# Patient Record
Sex: FEMALE | Race: BLACK OR AFRICAN AMERICAN | NOT HISPANIC OR LATINO | Employment: OTHER | URBAN - METROPOLITAN AREA
[De-identification: names, ages, dates, MRNs, and addresses within clinical notes are randomized per-mention and may not be internally consistent; named-entity substitution may affect disease eponyms.]

---

## 2017-01-18 ENCOUNTER — HISTORICAL (OUTPATIENT)
Dept: ADMINISTRATIVE | Facility: HOSPITAL | Age: 67
End: 2017-01-18

## 2017-02-03 ENCOUNTER — HISTORICAL (OUTPATIENT)
Dept: LAB | Facility: HOSPITAL | Age: 67
End: 2017-02-03

## 2017-02-17 ENCOUNTER — HISTORICAL (OUTPATIENT)
Dept: RADIOLOGY | Facility: HOSPITAL | Age: 67
End: 2017-02-17

## 2017-03-03 ENCOUNTER — HISTORICAL (OUTPATIENT)
Dept: RADIOLOGY | Facility: HOSPITAL | Age: 67
End: 2017-03-03

## 2017-03-14 ENCOUNTER — HISTORICAL (OUTPATIENT)
Dept: SURGERY | Facility: HOSPITAL | Age: 67
End: 2017-03-14

## 2017-03-15 ENCOUNTER — HISTORICAL (OUTPATIENT)
Dept: ANESTHESIOLOGY | Facility: HOSPITAL | Age: 67
End: 2017-03-15

## 2017-03-27 ENCOUNTER — HISTORICAL (OUTPATIENT)
Dept: SURGERY | Facility: HOSPITAL | Age: 67
End: 2017-03-27

## 2017-03-29 ENCOUNTER — HOSPITAL ENCOUNTER (OUTPATIENT)
Dept: MEDSURG UNIT | Facility: HOSPITAL | Age: 67
End: 2017-03-31

## 2017-05-22 ENCOUNTER — HISTORICAL (OUTPATIENT)
Dept: ADMINISTRATIVE | Facility: HOSPITAL | Age: 67
End: 2017-05-22

## 2017-06-26 ENCOUNTER — HISTORICAL (OUTPATIENT)
Dept: RADIOLOGY | Facility: HOSPITAL | Age: 67
End: 2017-06-26

## 2017-07-05 ENCOUNTER — HISTORICAL (OUTPATIENT)
Dept: INFUSION THERAPY | Facility: HOSPITAL | Age: 67
End: 2017-07-05

## 2017-07-05 LAB
ABS NEUT (OLG): 1.5 X10(3)/MCL (ref 1.5–6.9)
ALBUMIN SERPL-MCNC: 3.3 GM/DL (ref 3.4–5)
ALBUMIN/GLOB SERPL: 0.9 RATIO
ALP SERPL-CCNC: 81 UNIT/L (ref 30–113)
ALT SERPL-CCNC: 21 UNIT/L (ref 10–45)
AST SERPL-CCNC: 20 UNIT/L (ref 15–37)
BASOPHILS # BLD AUTO: 0 X10(3)/MCL (ref 0–0.1)
BASOPHILS NFR BLD AUTO: 0 % (ref 0–1)
BILIRUB SERPL-MCNC: 0.3 MG/DL (ref 0.1–0.9)
BILIRUBIN DIRECT+TOT PNL SERPL-MCNC: 0.1 MG/DL (ref 0–0.3)
BILIRUBIN DIRECT+TOT PNL SERPL-MCNC: 0.2 MG/DL
BUN SERPL-MCNC: 18 MG/DL (ref 10–20)
CALCIUM SERPL-MCNC: 8.9 MG/DL (ref 8–10.5)
CHLORIDE SERPL-SCNC: 106 MMOL/L (ref 100–108)
CO2 SERPL-SCNC: 30 MMOL/L (ref 21–35)
CREAT SERPL-MCNC: 0.89 MG/DL (ref 0.7–1.3)
EOSINOPHIL # BLD AUTO: 0.1 X10(3)/MCL (ref 0–0.6)
EOSINOPHIL NFR BLD AUTO: 3 % (ref 0–5)
ERYTHROCYTE [DISTWIDTH] IN BLOOD BY AUTOMATED COUNT: 13.6 % (ref 11.5–17)
GLOBULIN SER-MCNC: 3.6 GM/DL
GLUCOSE SERPL-MCNC: 75 MG/DL (ref 75–116)
HCT VFR BLD AUTO: 37.8 % (ref 36–48)
HGB BLD-MCNC: 12.2 GM/DL (ref 12–16)
LYMPHOCYTES # BLD AUTO: 1.3 X10(3)/MCL (ref 0.5–4.1)
LYMPHOCYTES NFR BLD AUTO: 40.1 % (ref 15–40)
MCH RBC QN AUTO: 29 PG (ref 27–34)
MCHC RBC AUTO-ENTMCNC: 32 GM/DL (ref 31–36)
MCV RBC AUTO: 90 FL (ref 80–99)
MONOCYTES # BLD AUTO: 0.3 X10(3)/MCL (ref 0–1.1)
MONOCYTES NFR BLD AUTO: 10 % (ref 4–12)
NEUTROPHILS # BLD AUTO: 1.5 X10(3)/MCL (ref 1.5–6.9)
NEUTROPHILS NFR BLD AUTO: 46 % (ref 43–75)
PLATELET # BLD AUTO: 183 X10(3)/MCL (ref 140–400)
PMV BLD AUTO: 10.3 FL (ref 6.8–10)
POTASSIUM SERPL-SCNC: 4.5 MMOL/L (ref 3.6–5.2)
PROT SERPL-MCNC: 6.9 GM/DL (ref 6.4–8.2)
RBC # BLD AUTO: 4.19 X10(6)/MCL (ref 4.2–5.4)
SODIUM SERPL-SCNC: 142 MMOL/L (ref 135–145)
WBC # SPEC AUTO: 3.2 X10(3)/MCL (ref 4.5–11.5)

## 2017-09-05 ENCOUNTER — HISTORICAL (OUTPATIENT)
Dept: RADIOLOGY | Facility: HOSPITAL | Age: 67
End: 2017-09-05

## 2017-10-05 ENCOUNTER — HISTORICAL (OUTPATIENT)
Dept: INFUSION THERAPY | Facility: HOSPITAL | Age: 67
End: 2017-10-05

## 2017-10-05 LAB
ABS NEUT (OLG): 3.1 X10(3)/MCL (ref 1.5–6.9)
ALBUMIN SERPL-MCNC: 3.3 GM/DL (ref 3.4–5)
ALBUMIN/GLOB SERPL: 1 RATIO
ALP SERPL-CCNC: 74 UNIT/L (ref 30–113)
ALT SERPL-CCNC: 14 UNIT/L (ref 10–45)
AST SERPL-CCNC: 13 UNIT/L (ref 15–37)
BASOPHILS # BLD AUTO: 0 X10(3)/MCL (ref 0–0.1)
BASOPHILS NFR BLD AUTO: 0 % (ref 0–1)
BILIRUB SERPL-MCNC: 0.2 MG/DL (ref 0.1–0.9)
BILIRUBIN DIRECT+TOT PNL SERPL-MCNC: 0.1 MG/DL
BILIRUBIN DIRECT+TOT PNL SERPL-MCNC: 0.1 MG/DL (ref 0–0.3)
BUN SERPL-MCNC: 15 MG/DL (ref 10–20)
CALCIUM SERPL-MCNC: 8.8 MG/DL (ref 8–10.5)
CHLORIDE SERPL-SCNC: 107 MMOL/L (ref 100–108)
CO2 SERPL-SCNC: 32 MMOL/L (ref 21–35)
CREAT SERPL-MCNC: 0.93 MG/DL (ref 0.7–1.3)
EOSINOPHIL # BLD AUTO: 0 X10(3)/MCL (ref 0–0.6)
EOSINOPHIL NFR BLD AUTO: 1 % (ref 0–5)
ERYTHROCYTE [DISTWIDTH] IN BLOOD BY AUTOMATED COUNT: 14.4 % (ref 11.5–17)
GLOBULIN SER-MCNC: 3.4 GM/DL
GLUCOSE SERPL-MCNC: 81 MG/DL (ref 75–116)
HCT VFR BLD AUTO: 36.7 % (ref 36–48)
HGB BLD-MCNC: 11.7 GM/DL (ref 12–16)
LYMPHOCYTES # BLD AUTO: 1.1 X10(3)/MCL (ref 0.5–4.1)
LYMPHOCYTES NFR BLD AUTO: 24 % (ref 15–40)
MCH RBC QN AUTO: 28 PG (ref 27–34)
MCHC RBC AUTO-ENTMCNC: 32 GM/DL (ref 31–36)
MCV RBC AUTO: 89 FL (ref 80–99)
MONOCYTES # BLD AUTO: 0.4 X10(3)/MCL (ref 0–1.1)
MONOCYTES NFR BLD AUTO: 9 % (ref 4–12)
NEUTROPHILS # BLD AUTO: 3.1 X10(3)/MCL (ref 1.5–6.9)
NEUTROPHILS NFR BLD AUTO: 66 % (ref 43–75)
PLATELET # BLD AUTO: 216 X10(3)/MCL (ref 140–400)
PMV BLD AUTO: 10 FL (ref 6.8–10)
POTASSIUM SERPL-SCNC: 3.9 MMOL/L (ref 3.6–5.2)
PROT SERPL-MCNC: 6.7 GM/DL (ref 6.4–8.2)
RBC # BLD AUTO: 4.11 X10(6)/MCL (ref 4.2–5.4)
SODIUM SERPL-SCNC: 142 MMOL/L (ref 135–145)
WBC # SPEC AUTO: 4.7 X10(3)/MCL (ref 4.5–11.5)

## 2017-10-25 ENCOUNTER — HISTORICAL (OUTPATIENT)
Dept: RADIOLOGY | Facility: HOSPITAL | Age: 67
End: 2017-10-25

## 2018-01-30 ENCOUNTER — HISTORICAL (OUTPATIENT)
Dept: INFUSION THERAPY | Facility: HOSPITAL | Age: 68
End: 2018-01-30

## 2018-01-30 LAB
ABS NEUT (OLG): 3 X10(3)/MCL (ref 1.5–6.9)
ALBUMIN SERPL-MCNC: 3.5 GM/DL (ref 3.4–5)
ALBUMIN/GLOB SERPL: 0.9 RATIO
ALP SERPL-CCNC: 84 UNIT/L (ref 30–113)
ALT SERPL-CCNC: 20 UNIT/L (ref 10–45)
AST SERPL-CCNC: 21 UNIT/L (ref 15–37)
BASOPHILS # BLD AUTO: 0 X10(3)/MCL (ref 0–0.1)
BASOPHILS NFR BLD AUTO: 0 % (ref 0–1)
BILIRUB SERPL-MCNC: 0.1 MG/DL (ref 0.1–0.9)
BILIRUBIN DIRECT+TOT PNL SERPL-MCNC: 0 MG/DL
BILIRUBIN DIRECT+TOT PNL SERPL-MCNC: 0.1 MG/DL (ref 0–0.3)
BUN SERPL-MCNC: 12 MG/DL (ref 10–20)
CALCIUM SERPL-MCNC: 9.7 MG/DL (ref 8–10.5)
CHLORIDE SERPL-SCNC: 104 MMOL/L (ref 100–108)
CO2 SERPL-SCNC: 30 MMOL/L (ref 21–35)
CREAT SERPL-MCNC: 0.87 MG/DL (ref 0.7–1.3)
EOSINOPHIL # BLD AUTO: 0.1 X10(3)/MCL (ref 0–0.6)
EOSINOPHIL NFR BLD AUTO: 2 % (ref 0–5)
ERYTHROCYTE [DISTWIDTH] IN BLOOD BY AUTOMATED COUNT: 13.6 % (ref 11.5–17)
GLOBULIN SER-MCNC: 3.9 GM/DL
GLUCOSE SERPL-MCNC: 85 MG/DL (ref 75–116)
HCT VFR BLD AUTO: 41 % (ref 36–48)
HGB BLD-MCNC: 13 GM/DL (ref 12–16)
LYMPHOCYTES # BLD AUTO: 1.2 X10(3)/MCL (ref 0.5–4.1)
LYMPHOCYTES NFR BLD AUTO: 24.9 % (ref 15–40)
MCH RBC QN AUTO: 29 PG (ref 27–34)
MCHC RBC AUTO-ENTMCNC: 32 GM/DL (ref 31–36)
MCV RBC AUTO: 91 FL (ref 80–99)
MONOCYTES # BLD AUTO: 0.4 X10(3)/MCL (ref 0–1.1)
MONOCYTES NFR BLD AUTO: 9 % (ref 4–12)
NEUTROPHILS # BLD AUTO: 3 X10(3)/MCL (ref 1.5–6.9)
NEUTROPHILS NFR BLD AUTO: 63 % (ref 43–75)
PLATELET # BLD AUTO: 203 X10(3)/MCL (ref 140–400)
PMV BLD AUTO: 10.6 FL (ref 6.8–10)
POTASSIUM SERPL-SCNC: 4.2 MMOL/L (ref 3.6–5.2)
PROT SERPL-MCNC: 7.4 GM/DL (ref 6.4–8.2)
RBC # BLD AUTO: 4.5 X10(6)/MCL (ref 4.2–5.4)
SODIUM SERPL-SCNC: 141 MMOL/L (ref 135–145)
WBC # SPEC AUTO: 4.7 X10(3)/MCL (ref 4.5–11.5)

## 2018-02-26 ENCOUNTER — HISTORICAL (OUTPATIENT)
Dept: RADIOLOGY | Facility: HOSPITAL | Age: 68
End: 2018-02-26

## 2018-02-26 ENCOUNTER — HISTORICAL (OUTPATIENT)
Dept: LAB | Facility: HOSPITAL | Age: 68
End: 2018-02-26

## 2018-02-26 LAB
ABS NEUT (OLG): 3 X10(3)/MCL (ref 1.5–6.9)
ALBUMIN SERPL-MCNC: 3.5 GM/DL (ref 3.4–5)
ALBUMIN/GLOB SERPL: 0.9 RATIO
ALP SERPL-CCNC: 78 UNIT/L (ref 30–113)
ALT SERPL-CCNC: 25 UNIT/L (ref 10–45)
APPEARANCE, UA: CLEAR
AST SERPL-CCNC: 39 UNIT/L (ref 15–37)
BACTERIA SPEC CULT: NORMAL /HPF
BILIRUB SERPL-MCNC: 0.3 MG/DL (ref 0.1–0.9)
BILIRUB UR QL STRIP: NEGATIVE
BILIRUBIN DIRECT+TOT PNL SERPL-MCNC: 0.1 MG/DL (ref 0–0.3)
BILIRUBIN DIRECT+TOT PNL SERPL-MCNC: 0.2 MG/DL
BUN SERPL-MCNC: 17 MG/DL (ref 10–20)
CALCIUM SERPL-MCNC: 8.9 MG/DL (ref 8–10.5)
CHLORIDE SERPL-SCNC: 104 MMOL/L (ref 100–108)
CHOLEST SERPL-MCNC: 126 MG/DL (ref 140–200)
CHOLEST/HDLC SERPL: 2 MG/DL (ref 0–8)
CO2 SERPL-SCNC: 30 MMOL/L (ref 21–35)
COLOR UR: YELLOW
CREAT SERPL-MCNC: 0.77 MG/DL (ref 0.7–1.3)
ERYTHROCYTE [DISTWIDTH] IN BLOOD BY AUTOMATED COUNT: 13.5 % (ref 11.5–17)
GLOBULIN SER-MCNC: 3.9 GM/DL
GLUCOSE (UA): NEGATIVE
GLUCOSE SERPL-MCNC: 80 MG/DL (ref 75–116)
HCT VFR BLD AUTO: 38.6 % (ref 36–48)
HDLC SERPL-MCNC: 64 MG/DL (ref 35–59)
HGB BLD-MCNC: 12.3 GM/DL (ref 12–16)
HGB UR QL STRIP: ABNORMAL
KETONES UR QL STRIP: NEGATIVE
LDLC SERPL CALC-MCNC: 57 MG/DL (ref 100–129)
LEUKOCYTE ESTERASE UR QL STRIP: NEGATIVE
MCH RBC QN AUTO: 29 PG (ref 27–34)
MCHC RBC AUTO-ENTMCNC: 32 GM/DL (ref 31–36)
MCV RBC AUTO: 91 FL (ref 80–99)
NITRITE UR QL STRIP: NEGATIVE
PH UR STRIP: 6 [PH]
PLATELET # BLD AUTO: 212 X10(3)/MCL (ref 140–400)
PMV BLD AUTO: 10.9 FL (ref 6.8–10)
POTASSIUM SERPL-SCNC: 4.3 MMOL/L (ref 3.6–5.2)
PROT SERPL-MCNC: 7.4 GM/DL (ref 6.4–8.2)
PROT UR QL STRIP: ABNORMAL
RBC # BLD AUTO: 4.25 X10(6)/MCL (ref 4.2–5.4)
RBC #/AREA URNS HPF: NORMAL /HPF
SODIUM SERPL-SCNC: 140 MMOL/L (ref 135–145)
SP GR UR STRIP: 1.02
SQUAMOUS EPITHELIAL, UA: NORMAL /LPF
TRIGL SERPL-MCNC: 29 MG/DL (ref 35–150)
TSH SERPL-ACNC: 1.67 MIU/ML (ref 0.36–3.74)
UROBILINOGEN UR STRIP-ACNC: 1 EU/DL
VLDLC SERPL CALC-MCNC: 6 MG/DL
WBC # SPEC AUTO: 4.9 X10(3)/MCL (ref 4.5–11.5)
WBC #/AREA URNS HPF: NORMAL /[HPF]

## 2018-03-05 ENCOUNTER — HISTORICAL (OUTPATIENT)
Dept: LAB | Facility: HOSPITAL | Age: 68
End: 2018-03-05

## 2018-03-05 LAB
HEMOCCULT SP1 STL QL: NEGATIVE
HEMOCCULT SP2 STL QL: NEGATIVE

## 2018-03-23 ENCOUNTER — HISTORICAL (OUTPATIENT)
Dept: RADIOLOGY | Facility: HOSPITAL | Age: 68
End: 2018-03-23

## 2018-04-27 ENCOUNTER — HISTORICAL (OUTPATIENT)
Dept: LAB | Facility: HOSPITAL | Age: 68
End: 2018-04-27

## 2018-04-27 LAB
ABS NEUT (OLG): 3.7 X10(3)/MCL (ref 1.5–6.9)
ALBUMIN SERPL-MCNC: 3.3 GM/DL (ref 3.4–5)
ALBUMIN/GLOB SERPL: 0.8 RATIO
ALP SERPL-CCNC: 92 UNIT/L (ref 30–113)
ALT SERPL-CCNC: 23 UNIT/L (ref 10–45)
AST SERPL-CCNC: 17 UNIT/L (ref 15–37)
BASOPHILS # BLD AUTO: 0 X10(3)/MCL (ref 0–0.1)
BASOPHILS NFR BLD AUTO: 0 % (ref 0–1)
BILIRUB SERPL-MCNC: 0.4 MG/DL (ref 0.1–0.9)
BILIRUBIN DIRECT+TOT PNL SERPL-MCNC: 0.1 MG/DL (ref 0–0.3)
BILIRUBIN DIRECT+TOT PNL SERPL-MCNC: 0.3 MG/DL
BUN SERPL-MCNC: 13 MG/DL (ref 10–20)
CALCIUM SERPL-MCNC: 9.2 MG/DL (ref 8–10.5)
CHLORIDE SERPL-SCNC: 104 MMOL/L (ref 100–108)
CO2 SERPL-SCNC: 28 MMOL/L (ref 21–35)
CREAT SERPL-MCNC: 0.82 MG/DL (ref 0.7–1.3)
EOSINOPHIL # BLD AUTO: 0.1 X10(3)/MCL (ref 0–0.6)
EOSINOPHIL NFR BLD AUTO: 2 % (ref 0–5)
ERYTHROCYTE [DISTWIDTH] IN BLOOD BY AUTOMATED COUNT: 14.2 % (ref 11.5–17)
GLOBULIN SER-MCNC: 4 GM/DL
GLUCOSE SERPL-MCNC: 89 MG/DL (ref 75–116)
HCT VFR BLD AUTO: 37.3 % (ref 36–48)
HGB BLD-MCNC: 12.1 GM/DL (ref 12–16)
LYMPHOCYTES # BLD AUTO: 1.2 X10(3)/MCL (ref 0.5–4.1)
LYMPHOCYTES NFR BLD AUTO: 21.6 % (ref 15–40)
MCH RBC QN AUTO: 29 PG (ref 27–34)
MCHC RBC AUTO-ENTMCNC: 32 GM/DL (ref 31–36)
MCV RBC AUTO: 90 FL (ref 80–99)
MONOCYTES # BLD AUTO: 0.4 X10(3)/MCL (ref 0–1.1)
MONOCYTES NFR BLD AUTO: 8 % (ref 4–12)
NEUTROPHILS # BLD AUTO: 3.7 X10(3)/MCL (ref 1.5–6.9)
NEUTROPHILS NFR BLD AUTO: 68 % (ref 43–75)
PLATELET # BLD AUTO: 195 X10(3)/MCL (ref 140–400)
PMV BLD AUTO: 10.6 FL (ref 6.8–10)
POTASSIUM SERPL-SCNC: 3.8 MMOL/L (ref 3.6–5.2)
PROT SERPL-MCNC: 7.3 GM/DL (ref 6.4–8.2)
RBC # BLD AUTO: 4.16 X10(6)/MCL (ref 4.2–5.4)
SODIUM SERPL-SCNC: 141 MMOL/L (ref 135–145)
WBC # SPEC AUTO: 5.5 X10(3)/MCL (ref 4.5–11.5)

## 2018-06-18 ENCOUNTER — HISTORICAL (OUTPATIENT)
Dept: RADIOLOGY | Facility: HOSPITAL | Age: 68
End: 2018-06-18

## 2018-07-18 ENCOUNTER — HISTORICAL (OUTPATIENT)
Dept: LAB | Facility: HOSPITAL | Age: 68
End: 2018-07-18

## 2018-07-18 LAB
ABS NEUT (OLG): 2.7 X10(3)/MCL (ref 1.5–6.9)
ALBUMIN SERPL-MCNC: 3.7 GM/DL (ref 3.4–5)
ALBUMIN/GLOB SERPL: 1 RATIO
ALP SERPL-CCNC: 88 UNIT/L (ref 30–113)
ALT SERPL-CCNC: 23 UNIT/L (ref 10–45)
AST SERPL-CCNC: 21 UNIT/L (ref 15–37)
BASOPHILS # BLD AUTO: 0 X10(3)/MCL (ref 0–0.1)
BASOPHILS NFR BLD AUTO: 0 % (ref 0–1)
BILIRUB SERPL-MCNC: 0.4 MG/DL (ref 0.1–0.9)
BILIRUBIN DIRECT+TOT PNL SERPL-MCNC: 0.1 MG/DL (ref 0–0.3)
BILIRUBIN DIRECT+TOT PNL SERPL-MCNC: 0.3 MG/DL
BUN SERPL-MCNC: 14 MG/DL (ref 10–20)
CALCIUM SERPL-MCNC: 9.1 MG/DL (ref 8–10.5)
CHLORIDE SERPL-SCNC: 104 MMOL/L (ref 100–108)
CO2 SERPL-SCNC: 30 MMOL/L (ref 21–35)
CREAT SERPL-MCNC: 0.87 MG/DL (ref 0.7–1.3)
EOSINOPHIL # BLD AUTO: 0.1 X10(3)/MCL (ref 0–0.6)
EOSINOPHIL NFR BLD AUTO: 2 % (ref 0–5)
ERYTHROCYTE [DISTWIDTH] IN BLOOD BY AUTOMATED COUNT: 13.9 % (ref 11.5–17)
GLOBULIN SER-MCNC: 3.7 GM/DL
GLUCOSE SERPL-MCNC: 79 MG/DL (ref 75–116)
HCT VFR BLD AUTO: 39.3 % (ref 36–48)
HEMOCCULT SP1 STL QL: NEGATIVE
HEMOCCULT SP2 STL QL: NEGATIVE
HGB BLD-MCNC: 12.6 GM/DL (ref 12–16)
LYMPHOCYTES # BLD AUTO: 1.2 X10(3)/MCL (ref 0.5–4.1)
LYMPHOCYTES NFR BLD AUTO: 26.5 % (ref 15–40)
MCH RBC QN AUTO: 29 PG (ref 27–34)
MCHC RBC AUTO-ENTMCNC: 32 GM/DL (ref 31–36)
MCV RBC AUTO: 90 FL (ref 80–99)
MONOCYTES # BLD AUTO: 0.4 X10(3)/MCL (ref 0–1.1)
MONOCYTES NFR BLD AUTO: 10 % (ref 4–12)
NEUTROPHILS # BLD AUTO: 2.7 X10(3)/MCL (ref 1.5–6.9)
NEUTROPHILS NFR BLD AUTO: 61 % (ref 43–75)
PLATELET # BLD AUTO: 212 X10(3)/MCL (ref 140–400)
PMV BLD AUTO: 10.8 FL (ref 6.8–10)
POTASSIUM SERPL-SCNC: 4.2 MMOL/L (ref 3.6–5.2)
PROT SERPL-MCNC: 7.4 GM/DL (ref 6.4–8.2)
RBC # BLD AUTO: 4.37 X10(6)/MCL (ref 4.2–5.4)
SODIUM SERPL-SCNC: 140 MMOL/L (ref 135–145)
WBC # SPEC AUTO: 4.4 X10(3)/MCL (ref 4.5–11.5)

## 2018-07-26 ENCOUNTER — HISTORICAL (OUTPATIENT)
Dept: LAB | Facility: HOSPITAL | Age: 68
End: 2018-07-26

## 2018-07-26 LAB
ABS NEUT (OLG): 2.7 X10(3)/MCL (ref 1.5–6.9)
ALBUMIN SERPL-MCNC: 3.3 GM/DL (ref 3.4–5)
ALBUMIN/GLOB SERPL: 0.9 RATIO
ALP SERPL-CCNC: 85 UNIT/L (ref 30–113)
ALT SERPL-CCNC: 22 UNIT/L (ref 10–45)
AST SERPL-CCNC: 18 UNIT/L (ref 15–37)
BASOPHILS # BLD AUTO: 0 X10(3)/MCL (ref 0–0.1)
BASOPHILS NFR BLD AUTO: 0 % (ref 0–1)
BILIRUB SERPL-MCNC: 0.2 MG/DL (ref 0.1–0.9)
BILIRUBIN DIRECT+TOT PNL SERPL-MCNC: 0.1 MG/DL
BILIRUBIN DIRECT+TOT PNL SERPL-MCNC: 0.1 MG/DL (ref 0–0.3)
BUN SERPL-MCNC: 15 MG/DL (ref 10–20)
CALCIUM SERPL-MCNC: 9.2 MG/DL (ref 8–10.5)
CHLORIDE SERPL-SCNC: 106 MMOL/L (ref 100–108)
CO2 SERPL-SCNC: 30 MMOL/L (ref 21–35)
CREAT SERPL-MCNC: 0.86 MG/DL (ref 0.7–1.3)
EOSINOPHIL # BLD AUTO: 0.1 X10(3)/MCL (ref 0–0.6)
EOSINOPHIL NFR BLD AUTO: 3 % (ref 0–5)
ERYTHROCYTE [DISTWIDTH] IN BLOOD BY AUTOMATED COUNT: 13.9 % (ref 11.5–17)
GLOBULIN SER-MCNC: 3.5 GM/DL
GLUCOSE SERPL-MCNC: 91 MG/DL (ref 75–116)
HCT VFR BLD AUTO: 37 % (ref 36–48)
HGB BLD-MCNC: 11.8 GM/DL (ref 12–16)
LYMPHOCYTES # BLD AUTO: 1 X10(3)/MCL (ref 0.5–4.1)
LYMPHOCYTES NFR BLD AUTO: 23.9 % (ref 15–40)
MCH RBC QN AUTO: 29 PG (ref 27–34)
MCHC RBC AUTO-ENTMCNC: 32 GM/DL (ref 31–36)
MCV RBC AUTO: 91 FL (ref 80–99)
MONOCYTES # BLD AUTO: 0.4 X10(3)/MCL (ref 0–1.1)
MONOCYTES NFR BLD AUTO: 10 % (ref 4–12)
NEUTROPHILS # BLD AUTO: 2.7 X10(3)/MCL (ref 1.5–6.9)
NEUTROPHILS NFR BLD AUTO: 63 % (ref 43–75)
PLATELET # BLD AUTO: 203 X10(3)/MCL (ref 140–400)
PMV BLD AUTO: 10.7 FL (ref 6.8–10)
POTASSIUM SERPL-SCNC: 3.9 MMOL/L (ref 3.6–5.2)
PROT SERPL-MCNC: 6.8 GM/DL (ref 6.4–8.2)
RBC # BLD AUTO: 4.08 X10(6)/MCL (ref 4.2–5.4)
SODIUM SERPL-SCNC: 139 MMOL/L (ref 135–145)
WBC # SPEC AUTO: 4.3 X10(3)/MCL (ref 4.5–11.5)

## 2018-10-17 ENCOUNTER — HISTORICAL (OUTPATIENT)
Dept: CARDIOLOGY | Facility: HOSPITAL | Age: 68
End: 2018-10-17

## 2019-01-14 ENCOUNTER — HISTORICAL (OUTPATIENT)
Dept: LAB | Facility: HOSPITAL | Age: 69
End: 2019-01-14

## 2019-01-14 LAB
ABS NEUT (OLG): 8 X10(3)/MCL (ref 1.5–6.9)
ALBUMIN SERPL-MCNC: 3.3 GM/DL (ref 3.4–5)
ALBUMIN/GLOB SERPL: 0.8 RATIO
ALP SERPL-CCNC: 96 UNIT/L (ref 30–113)
ALT SERPL-CCNC: 24 UNIT/L (ref 10–45)
APPEARANCE, UA: CLEAR
AST SERPL-CCNC: 14 UNIT/L (ref 15–37)
BILIRUB SERPL-MCNC: 0.1 MG/DL (ref 0.1–0.9)
BILIRUB UR QL STRIP: NEGATIVE
BILIRUBIN DIRECT+TOT PNL SERPL-MCNC: <0.05 MG/DL (ref 0–0.3)
BILIRUBIN DIRECT+TOT PNL SERPL-MCNC: >0.05 MG/DL
BUN SERPL-MCNC: 13 MG/DL (ref 10–20)
CALCIUM SERPL-MCNC: 8.9 MG/DL (ref 8–10.5)
CHLORIDE SERPL-SCNC: 103 MMOL/L (ref 100–108)
CHOLEST SERPL-MCNC: 129 MG/DL (ref 140–200)
CHOLEST/HDLC SERPL: 2 MG/DL (ref 0–8)
CO2 SERPL-SCNC: 30 MMOL/L (ref 21–35)
COLOR UR: NORMAL
CREAT SERPL-MCNC: 0.99 MG/DL (ref 0.7–1.3)
ERYTHROCYTE [DISTWIDTH] IN BLOOD BY AUTOMATED COUNT: 13.4 % (ref 11.5–17)
GLOBULIN SER-MCNC: 3.9 GM/DL
GLUCOSE (UA): NEGATIVE
GLUCOSE SERPL-MCNC: 88 MG/DL (ref 75–116)
HCT VFR BLD AUTO: 40.3 % (ref 36–48)
HDLC SERPL-MCNC: 65 MG/DL (ref 35–59)
HEMOCCULT SP1 STL QL: NEGATIVE
HEMOCCULT SP2 STL QL: NEGATIVE
HGB BLD-MCNC: 12.5 GM/DL (ref 12–16)
HGB UR QL STRIP: NEGATIVE
KETONES UR QL STRIP: NEGATIVE
LDLC SERPL CALC-MCNC: 59 MG/DL (ref 100–129)
LEUKOCYTE ESTERASE UR QL STRIP: NEGATIVE
MCH RBC QN AUTO: 29 PG (ref 27–34)
MCHC RBC AUTO-ENTMCNC: 31 GM/DL (ref 31–36)
MCV RBC AUTO: 92 FL (ref 80–99)
NITRITE UR QL STRIP: NEGATIVE
PH UR STRIP: 7 [PH]
PLATELET # BLD AUTO: 225 X10(3)/MCL (ref 140–400)
PMV BLD AUTO: 10.2 FL (ref 6.8–10)
POTASSIUM SERPL-SCNC: 3.8 MMOL/L (ref 3.6–5.2)
PROT SERPL-MCNC: 7.2 GM/DL (ref 6.4–8.2)
PROT UR QL STRIP: NEGATIVE
RBC # BLD AUTO: 4.37 X10(6)/MCL (ref 4.2–5.4)
SODIUM SERPL-SCNC: 141 MMOL/L (ref 135–145)
SP GR UR STRIP: 1.01
TRIGL SERPL-MCNC: 33 MG/DL (ref 35–150)
TSH SERPL-ACNC: 1.93 MIU/ML (ref 0.36–3.74)
UROBILINOGEN UR STRIP-ACNC: 0.2 EU/DL
VLDLC SERPL CALC-MCNC: 7 MG/DL
WBC # SPEC AUTO: 9.7 X10(3)/MCL (ref 4.5–11.5)

## 2019-02-12 ENCOUNTER — HISTORICAL (OUTPATIENT)
Dept: LAB | Facility: HOSPITAL | Age: 69
End: 2019-02-12

## 2019-02-12 LAB
ABS NEUT (OLG): 2.5 X10(3)/MCL (ref 1.5–6.9)
ALBUMIN SERPL-MCNC: 3.2 GM/DL (ref 3.4–5)
ALBUMIN/GLOB SERPL: 0.8 RATIO
ALP SERPL-CCNC: 96 UNIT/L (ref 30–113)
ALT SERPL-CCNC: 21 UNIT/L (ref 10–45)
AST SERPL-CCNC: 17 UNIT/L (ref 15–37)
BASOPHILS # BLD AUTO: 0 X10(3)/MCL (ref 0–0.1)
BASOPHILS NFR BLD AUTO: 0 % (ref 0–1)
BILIRUB SERPL-MCNC: 0.2 MG/DL (ref 0.1–0.9)
BILIRUBIN DIRECT+TOT PNL SERPL-MCNC: <0.05 MG/DL (ref 0–0.3)
BILIRUBIN DIRECT+TOT PNL SERPL-MCNC: >0.15 MG/DL
BUN SERPL-MCNC: 13 MG/DL (ref 10–20)
CALCIUM SERPL-MCNC: 9 MG/DL (ref 8–10.5)
CHLORIDE SERPL-SCNC: 106 MMOL/L (ref 100–108)
CO2 SERPL-SCNC: 29 MMOL/L (ref 21–35)
CREAT SERPL-MCNC: 0.96 MG/DL (ref 0.7–1.3)
EOSINOPHIL # BLD AUTO: 0.1 X10(3)/MCL (ref 0–0.6)
EOSINOPHIL NFR BLD AUTO: 2 % (ref 0–5)
ERYTHROCYTE [DISTWIDTH] IN BLOOD BY AUTOMATED COUNT: 13.5 % (ref 11.5–17)
GLOBULIN SER-MCNC: 3.8 GM/DL
GLUCOSE SERPL-MCNC: 81 MG/DL (ref 75–116)
HCT VFR BLD AUTO: 40.3 % (ref 36–48)
HGB BLD-MCNC: 12.4 GM/DL (ref 12–16)
IMM GRANULOCYTES # BLD AUTO: 0.01 10*3/UL (ref 0–0.02)
IMM GRANULOCYTES NFR BLD AUTO: 0.2 % (ref 0–0.43)
LYMPHOCYTES # BLD AUTO: 1.1 X10(3)/MCL (ref 0.5–4.1)
LYMPHOCYTES NFR BLD AUTO: 27 % (ref 15–40)
MCH RBC QN AUTO: 29 PG (ref 27–34)
MCHC RBC AUTO-ENTMCNC: 31 GM/DL (ref 31–36)
MCV RBC AUTO: 93 FL (ref 80–99)
MONOCYTES # BLD AUTO: 0.4 X10(3)/MCL (ref 0–1.1)
MONOCYTES NFR BLD AUTO: 10 % (ref 4–12)
NEUTROPHILS # BLD AUTO: 2.5 X10(3)/MCL (ref 1.5–6.9)
NEUTROPHILS NFR BLD AUTO: 61 % (ref 43–75)
PLATELET # BLD AUTO: 223 X10(3)/MCL (ref 140–400)
PMV BLD AUTO: 9.6 FL (ref 6.8–10)
POTASSIUM SERPL-SCNC: 4.1 MMOL/L (ref 3.6–5.2)
PROT SERPL-MCNC: 7 GM/DL (ref 6.4–8.2)
RBC # BLD AUTO: 4.33 X10(6)/MCL (ref 4.2–5.4)
SODIUM SERPL-SCNC: 142 MMOL/L (ref 135–145)
WBC # SPEC AUTO: 4.1 X10(3)/MCL (ref 4.5–11.5)

## 2019-02-28 ENCOUNTER — HISTORICAL (OUTPATIENT)
Dept: RADIOLOGY | Facility: HOSPITAL | Age: 69
End: 2019-02-28

## 2019-03-15 ENCOUNTER — HISTORICAL (OUTPATIENT)
Dept: LAB | Facility: HOSPITAL | Age: 69
End: 2019-03-15

## 2019-03-15 LAB
ABS NEUT (OLG): 2.7 X10(3)/MCL (ref 1.5–6.9)
ALBUMIN SERPL-MCNC: 3.5 GM/DL (ref 3.4–5)
ALBUMIN/GLOB SERPL: 1 RATIO
ALP SERPL-CCNC: 91 UNIT/L (ref 30–113)
ALT SERPL-CCNC: 18 UNIT/L (ref 10–45)
AST SERPL-CCNC: 19 UNIT/L (ref 15–37)
BASOPHILS # BLD AUTO: 0 X10(3)/MCL (ref 0–0.1)
BASOPHILS NFR BLD AUTO: 0 % (ref 0–1)
BILIRUB SERPL-MCNC: 0.2 MG/DL (ref 0.1–0.9)
BILIRUBIN DIRECT+TOT PNL SERPL-MCNC: <0.05 MG/DL (ref 0–0.3)
BILIRUBIN DIRECT+TOT PNL SERPL-MCNC: >0.15 MG/DL
BUN SERPL-MCNC: 14 MG/DL (ref 10–20)
CALCIUM SERPL-MCNC: 9.3 MG/DL (ref 8–10.5)
CHLORIDE SERPL-SCNC: 104 MMOL/L (ref 100–108)
CO2 SERPL-SCNC: 28 MMOL/L (ref 21–35)
CREAT SERPL-MCNC: 0.97 MG/DL (ref 0.7–1.3)
EOSINOPHIL # BLD AUTO: 0.1 X10(3)/MCL (ref 0–0.6)
EOSINOPHIL NFR BLD AUTO: 2 % (ref 0–5)
ERYTHROCYTE [DISTWIDTH] IN BLOOD BY AUTOMATED COUNT: 13.5 % (ref 11.5–17)
GLOBULIN SER-MCNC: 3.6 GM/DL
GLUCOSE SERPL-MCNC: 85 MG/DL (ref 75–116)
HCT VFR BLD AUTO: 38.8 % (ref 36–48)
HGB BLD-MCNC: 12 GM/DL (ref 12–16)
LYMPHOCYTES # BLD AUTO: 1.1 X10(3)/MCL (ref 0.5–4.1)
LYMPHOCYTES NFR BLD AUTO: 26 % (ref 15–40)
MCH RBC QN AUTO: 29 PG (ref 27–34)
MCHC RBC AUTO-ENTMCNC: 31 GM/DL (ref 31–36)
MCV RBC AUTO: 93 FL (ref 80–99)
MONOCYTES # BLD AUTO: 0.4 X10(3)/MCL (ref 0–1.1)
MONOCYTES NFR BLD AUTO: 9 % (ref 4–12)
NEUTROPHILS # BLD AUTO: 2.7 X10(3)/MCL (ref 1.5–6.9)
NEUTROPHILS NFR BLD AUTO: 62 % (ref 43–75)
PLATELET # BLD AUTO: 199 X10(3)/MCL (ref 140–400)
PMV BLD AUTO: 10.3 FL (ref 6.8–10)
POTASSIUM SERPL-SCNC: 4.2 MMOL/L (ref 3.6–5.2)
PROT SERPL-MCNC: 7.1 GM/DL (ref 6.4–8.2)
RBC # BLD AUTO: 4.17 X10(6)/MCL (ref 4.2–5.4)
SODIUM SERPL-SCNC: 139 MMOL/L (ref 135–145)
WBC # SPEC AUTO: 4.3 X10(3)/MCL (ref 4.5–11.5)

## 2019-06-17 ENCOUNTER — HISTORICAL (OUTPATIENT)
Dept: RESPIRATORY THERAPY | Facility: HOSPITAL | Age: 69
End: 2019-06-17

## 2019-06-17 LAB
ABS NEUT (OLG): 2.5 X10(3)/MCL (ref 1.5–6.9)
ALBUMIN SERPL-MCNC: 3.4 GM/DL (ref 3.4–5)
ALBUMIN/GLOB SERPL: 0.8 RATIO
ALP SERPL-CCNC: 93 UNIT/L (ref 30–113)
ALT SERPL-CCNC: 18 UNIT/L (ref 10–45)
AST SERPL-CCNC: 23 UNIT/L (ref 15–37)
BASOPHILS # BLD AUTO: 0 X10(3)/MCL (ref 0–0.1)
BASOPHILS NFR BLD AUTO: 0 % (ref 0–1)
BILIRUB SERPL-MCNC: 0.4 MG/DL (ref 0.1–0.9)
BILIRUBIN DIRECT+TOT PNL SERPL-MCNC: 0.1 MG/DL (ref 0–0.3)
BILIRUBIN DIRECT+TOT PNL SERPL-MCNC: 0.3 MG/DL
BUN SERPL-MCNC: 16 MG/DL (ref 10–20)
CALCIUM SERPL-MCNC: 9.7 MG/DL (ref 8–10.5)
CHLORIDE SERPL-SCNC: 103 MMOL/L (ref 100–108)
CO2 SERPL-SCNC: 28 MMOL/L (ref 21–35)
CREAT SERPL-MCNC: 1 MG/DL (ref 0.7–1.3)
EOSINOPHIL # BLD AUTO: 0.1 X10(3)/MCL (ref 0–0.6)
EOSINOPHIL NFR BLD AUTO: 2 % (ref 0–5)
ERYTHROCYTE [DISTWIDTH] IN BLOOD BY AUTOMATED COUNT: 13.1 % (ref 11.5–17)
GLOBULIN SER-MCNC: 4.2 GM/DL
GLUCOSE SERPL-MCNC: 82 MG/DL (ref 75–116)
HCT VFR BLD AUTO: 40.8 % (ref 36–48)
HGB BLD-MCNC: 13 GM/DL (ref 12–16)
LYMPHOCYTES # BLD AUTO: 1.1 X10(3)/MCL (ref 0.5–4.1)
LYMPHOCYTES NFR BLD AUTO: 28 % (ref 15–40)
MCH RBC QN AUTO: 29 PG (ref 27–34)
MCHC RBC AUTO-ENTMCNC: 32 GM/DL (ref 31–36)
MCV RBC AUTO: 90 FL (ref 80–99)
MONOCYTES # BLD AUTO: 0.3 X10(3)/MCL (ref 0–1.1)
MONOCYTES NFR BLD AUTO: 8 % (ref 4–12)
NEUTROPHILS # BLD AUTO: 2.5 X10(3)/MCL (ref 1.5–6.9)
NEUTROPHILS NFR BLD AUTO: 62 % (ref 43–75)
PLATELET # BLD AUTO: 209 X10(3)/MCL (ref 140–400)
PMV BLD AUTO: 10.6 FL (ref 6.8–10)
POTASSIUM SERPL-SCNC: 4.5 MMOL/L (ref 3.6–5.2)
PROT SERPL-MCNC: 7.6 GM/DL (ref 6.4–8.2)
RBC # BLD AUTO: 4.51 X10(6)/MCL (ref 4.2–5.4)
SODIUM SERPL-SCNC: 139 MMOL/L (ref 135–145)
WBC # SPEC AUTO: 4 X10(3)/MCL (ref 4.5–11.5)

## 2019-06-24 ENCOUNTER — HISTORICAL (OUTPATIENT)
Dept: RADIOLOGY | Facility: HOSPITAL | Age: 69
End: 2019-06-24

## 2019-09-23 ENCOUNTER — HISTORICAL (OUTPATIENT)
Dept: CARDIOLOGY | Facility: HOSPITAL | Age: 69
End: 2019-09-23

## 2019-09-25 ENCOUNTER — HISTORICAL (OUTPATIENT)
Dept: LAB | Facility: HOSPITAL | Age: 69
End: 2019-09-25

## 2019-09-25 LAB
ABS NEUT (OLG): 2.8 X10(3)/MCL (ref 1.5–6.9)
ALBUMIN SERPL-MCNC: 3.2 GM/DL (ref 3.4–5)
ALBUMIN/GLOB SERPL: 0.8 RATIO
ALP SERPL-CCNC: 104 UNIT/L (ref 30–113)
ALT SERPL-CCNC: 19 UNIT/L (ref 10–45)
AST SERPL-CCNC: 13 UNIT/L (ref 15–37)
BASOPHILS # BLD AUTO: 0 X10(3)/MCL (ref 0–0.1)
BASOPHILS NFR BLD AUTO: 1 % (ref 0–1)
BILIRUB SERPL-MCNC: 0.3 MG/DL (ref 0.1–0.9)
BILIRUBIN DIRECT+TOT PNL SERPL-MCNC: 0.1 MG/DL (ref 0–0.3)
BILIRUBIN DIRECT+TOT PNL SERPL-MCNC: 0.2 MG/DL
BUN SERPL-MCNC: 15 MG/DL (ref 10–20)
CALCIUM SERPL-MCNC: 9.1 MG/DL (ref 8–10.5)
CHLORIDE SERPL-SCNC: 104 MMOL/L (ref 100–108)
CO2 SERPL-SCNC: 28 MMOL/L (ref 21–35)
CREAT SERPL-MCNC: 1.1 MG/DL (ref 0.7–1.3)
EOSINOPHIL # BLD AUTO: 0.1 X10(3)/MCL (ref 0–0.6)
EOSINOPHIL NFR BLD AUTO: 2 % (ref 0–5)
ERYTHROCYTE [DISTWIDTH] IN BLOOD BY AUTOMATED COUNT: 13.5 % (ref 11.5–17)
GLOBULIN SER-MCNC: 3.8 GM/DL
GLUCOSE SERPL-MCNC: 112 MG/DL (ref 75–116)
HCT VFR BLD AUTO: 40.3 % (ref 36–48)
HGB BLD-MCNC: 12.9 GM/DL (ref 12–16)
IMM GRANULOCYTES # BLD AUTO: 0.01 10*3/UL (ref 0–0.02)
IMM GRANULOCYTES NFR BLD AUTO: 0.2 % (ref 0–0.43)
LYMPHOCYTES # BLD AUTO: 1.2 X10(3)/MCL (ref 0.5–4.1)
LYMPHOCYTES NFR BLD AUTO: 26 % (ref 15–40)
MCH RBC QN AUTO: 29 PG (ref 27–34)
MCHC RBC AUTO-ENTMCNC: 32 GM/DL (ref 31–36)
MCV RBC AUTO: 90 FL (ref 80–99)
MONOCYTES # BLD AUTO: 0.4 X10(3)/MCL (ref 0–1.1)
MONOCYTES NFR BLD AUTO: 8 % (ref 4–12)
NEUTROPHILS # BLD AUTO: 2.8 X10(3)/MCL (ref 1.5–6.9)
NEUTROPHILS NFR BLD AUTO: 63 % (ref 43–75)
PLATELET # BLD AUTO: 193 X10(3)/MCL (ref 140–400)
PMV BLD AUTO: 9.9 FL (ref 6.8–10)
POTASSIUM SERPL-SCNC: 3.7 MMOL/L (ref 3.6–5.2)
PROT SERPL-MCNC: 7 GM/DL (ref 6.4–8.2)
RBC # BLD AUTO: 4.46 X10(6)/MCL (ref 4.2–5.4)
SODIUM SERPL-SCNC: 139 MMOL/L (ref 135–145)
WBC # SPEC AUTO: 4.4 X10(3)/MCL (ref 4.5–11.5)

## 2020-01-29 ENCOUNTER — HISTORICAL (OUTPATIENT)
Dept: LAB | Facility: HOSPITAL | Age: 70
End: 2020-01-29

## 2020-01-29 LAB
ABS NEUT (OLG): 2.7 X10(3)/MCL (ref 1.5–6.9)
ALBUMIN SERPL-MCNC: 3.6 GM/DL (ref 3.4–5)
ALBUMIN/GLOB SERPL: 0.9 RATIO
ALP SERPL-CCNC: 102 UNIT/L (ref 30–113)
ALT SERPL-CCNC: 24 UNIT/L (ref 10–45)
APPEARANCE, UA: CLEAR
AST SERPL-CCNC: 23 UNIT/L (ref 15–37)
BILIRUB SERPL-MCNC: 0.3 MG/DL (ref 0.1–0.9)
BILIRUB UR QL STRIP: NEGATIVE
BILIRUBIN DIRECT+TOT PNL SERPL-MCNC: 0.1 MG/DL (ref 0–0.3)
BILIRUBIN DIRECT+TOT PNL SERPL-MCNC: 0.2 MG/DL
BUN SERPL-MCNC: 14 MG/DL (ref 10–20)
CALCIUM SERPL-MCNC: 9.2 MG/DL (ref 8–10.5)
CHLORIDE SERPL-SCNC: 105 MMOL/L (ref 100–108)
CHOLEST SERPL-MCNC: 118 MG/DL (ref 140–200)
CHOLEST/HDLC SERPL: 2 MG/DL (ref 0–8)
CO2 SERPL-SCNC: 30 MMOL/L (ref 21–35)
COLOR UR: NORMAL
CREAT SERPL-MCNC: 0.92 MG/DL (ref 0.7–1.3)
DEPRECATED CALCIDIOL+CALCIFEROL SERPL-MC: 66.6 NG/ML (ref 30–80)
ERYTHROCYTE [DISTWIDTH] IN BLOOD BY AUTOMATED COUNT: 13.8 % (ref 11.5–17)
GLOBULIN SER-MCNC: 3.8 GM/DL
GLUCOSE (UA): NEGATIVE
GLUCOSE SERPL-MCNC: 69 MG/DL (ref 75–116)
HCT VFR BLD AUTO: 40.7 % (ref 36–48)
HDLC SERPL-MCNC: 60 MG/DL (ref 35–59)
HGB BLD-MCNC: 12.8 GM/DL (ref 12–16)
HGB UR QL STRIP: NEGATIVE
KETONES UR QL STRIP: NEGATIVE
LDLC SERPL CALC-MCNC: 55 MG/DL (ref 100–129)
LEUKOCYTE ESTERASE UR QL STRIP: NEGATIVE
MCH RBC QN AUTO: 29 PG (ref 27–34)
MCHC RBC AUTO-ENTMCNC: 31 GM/DL (ref 31–36)
MCV RBC AUTO: 93 FL (ref 80–99)
NITRITE UR QL STRIP: NEGATIVE
PH UR STRIP: 7.5 [PH]
PLATELET # BLD AUTO: 195 X10(3)/MCL (ref 140–400)
PMV BLD AUTO: 10.8 FL (ref 6.8–10)
POTASSIUM SERPL-SCNC: 4.1 MMOL/L (ref 3.6–5.2)
PROT SERPL-MCNC: 7.4 GM/DL (ref 6.4–8.2)
PROT UR QL STRIP: NEGATIVE
RBC # BLD AUTO: 4.39 X10(6)/MCL (ref 4.2–5.4)
SODIUM SERPL-SCNC: 141 MMOL/L (ref 135–145)
SP GR UR STRIP: 1.01
TRIGL SERPL-MCNC: 24 MG/DL (ref 35–150)
TSH SERPL-ACNC: 3 MIU/ML (ref 0.36–3.74)
UROBILINOGEN UR STRIP-ACNC: 0.2 EU/DL
VLDLC SERPL CALC-MCNC: 5 MG/DL
WBC # SPEC AUTO: 4.3 X10(3)/MCL (ref 4.5–11.5)

## 2020-02-03 LAB
HEMOCCULT SP1 STL QL: NEGATIVE
HEMOCCULT SP2 STL QL: NEGATIVE

## 2020-03-05 ENCOUNTER — HOSPITAL ENCOUNTER (OUTPATIENT)
Dept: ADMINISTRATIVE | Facility: HOSPITAL | Age: 70
End: 2020-03-11
Attending: INTERNAL MEDICINE | Admitting: INTERNAL MEDICINE

## 2020-03-07 LAB — FINAL CULTURE: NORMAL

## 2020-03-09 ENCOUNTER — HISTORICAL (OUTPATIENT)
Dept: RADIOLOGY | Facility: HOSPITAL | Age: 70
End: 2020-03-09

## 2020-09-16 ENCOUNTER — HISTORICAL (OUTPATIENT)
Dept: LAB | Facility: HOSPITAL | Age: 70
End: 2020-09-16

## 2020-09-16 LAB
ALBUMIN SERPL-MCNC: 3.5 GM/DL (ref 3.4–4.8)
ALBUMIN/GLOB SERPL: 0.9 RATIO (ref 1.1–2)
ALP SERPL-CCNC: 104 UNIT/L (ref 40–150)
ALT SERPL-CCNC: 27 UNIT/L (ref 0–55)
AST SERPL-CCNC: 25 UNIT/L (ref 5–34)
BILIRUB SERPL-MCNC: 0.3 MG/DL
BILIRUBIN DIRECT+TOT PNL SERPL-MCNC: 0.1 MG/DL (ref 0–0.5)
BILIRUBIN DIRECT+TOT PNL SERPL-MCNC: 0.2 MG/DL (ref 0–0.8)
BUN SERPL-MCNC: 16 MG/DL (ref 9.8–20.1)
CALCIUM SERPL-MCNC: 9.7 MG/DL (ref 8.4–10.2)
CHLORIDE SERPL-SCNC: 102 MMOL/L (ref 98–107)
CO2 SERPL-SCNC: 25 MMOL/L (ref 23–31)
CREAT SERPL-MCNC: 0.9 MG/DL (ref 0.55–1.02)
GLOBULIN SER-MCNC: 3.8 GM/DL (ref 2.4–3.5)
GLUCOSE SERPL-MCNC: 109 MG/DL (ref 82–115)
POTASSIUM SERPL-SCNC: 3.8 MMOL/L (ref 3.5–5.1)
PROT SERPL-MCNC: 7.3 GM/DL (ref 5.8–7.6)
SODIUM SERPL-SCNC: 138 MMOL/L (ref 136–145)

## 2021-02-23 ENCOUNTER — HISTORICAL (OUTPATIENT)
Dept: LAB | Facility: HOSPITAL | Age: 71
End: 2021-02-23

## 2021-02-23 LAB
ABS NEUT (OLG): 3.2 X10(3)/MCL (ref 1.5–6.9)
ALBUMIN SERPL-MCNC: 3.5 GM/DL (ref 3.4–4.8)
ALBUMIN/GLOB SERPL: 1 RATIO (ref 1.1–2)
ALP SERPL-CCNC: 107 UNIT/L (ref 40–150)
ALT SERPL-CCNC: 12 UNIT/L (ref 0–55)
AST SERPL-CCNC: 15 UNIT/L (ref 5–34)
BILIRUB SERPL-MCNC: <0.5 MG/DL
BILIRUBIN DIRECT+TOT PNL SERPL-MCNC: 0.2 MG/DL (ref 0–0.5)
BILIRUBIN DIRECT+TOT PNL SERPL-MCNC: <0.3 MG/DL (ref 0–0.8)
BUN SERPL-MCNC: 14 MG/DL (ref 9.8–20.1)
CALCIUM SERPL-MCNC: 9.5 MG/DL (ref 8.4–10.2)
CHLORIDE SERPL-SCNC: 105 MMOL/L (ref 98–107)
CHOLEST SERPL-MCNC: 106 MG/DL
CHOLEST/HDLC SERPL: 3 {RATIO} (ref 0–5)
CO2 SERPL-SCNC: 30 MMOL/L (ref 23–31)
CREAT SERPL-MCNC: 0.92 MG/DL (ref 0.55–1.02)
DEPRECATED CALCIDIOL+CALCIFEROL SERPL-MC: 52.8 NG/ML (ref 30–80)
ERYTHROCYTE [DISTWIDTH] IN BLOOD BY AUTOMATED COUNT: 13.7 % (ref 11.5–17)
GLOBULIN SER-MCNC: 3.6 GM/DL (ref 2.4–3.5)
GLUCOSE SERPL-MCNC: 95 MG/DL (ref 82–115)
HCT VFR BLD AUTO: 38.8 % (ref 36–48)
HDLC SERPL-MCNC: 38 MG/DL (ref 35–60)
HEMOCCULT SP1 STL QL: NEGATIVE
HEMOCCULT SP2 STL QL: NEGATIVE
HGB BLD-MCNC: 12.6 GM/DL (ref 12–16)
LDLC SERPL CALC-MCNC: 60 MG/DL (ref 50–140)
MCH RBC QN AUTO: 29 PG (ref 27–34)
MCHC RBC AUTO-ENTMCNC: 32 GM/DL (ref 31–36)
MCV RBC AUTO: 89 FL (ref 80–99)
PLATELET # BLD AUTO: 199 X10(3)/MCL (ref 140–400)
PMV BLD AUTO: 10.1 FL (ref 6.8–10)
POTASSIUM SERPL-SCNC: 4.2 MMOL/L (ref 3.5–5.1)
PROT SERPL-MCNC: 7.1 GM/DL (ref 5.8–7.6)
RBC # BLD AUTO: 4.37 X10(6)/MCL (ref 4.2–5.4)
SODIUM SERPL-SCNC: 142 MMOL/L (ref 136–145)
TRIGL SERPL-MCNC: 40 MG/DL (ref 37–140)
TSH SERPL-ACNC: 2.37 UIU/ML (ref 0.35–4.94)
VLDLC SERPL CALC-MCNC: 8 MG/DL
WBC # SPEC AUTO: 4.7 X10(3)/MCL (ref 4.5–11.5)

## 2021-02-24 LAB
APPEARANCE, UA: NORMAL
BILIRUB UR QL STRIP: NEGATIVE
COLOR UR: YELLOW
GLUCOSE (UA): NEGATIVE MG/DL
HGB UR QL STRIP: NEGATIVE
KETONES UR QL STRIP: NEGATIVE MG/DL
LEUKOCYTE ESTERASE UR QL STRIP: NEGATIVE
NITRITE UR QL STRIP: NEGATIVE
PH UR STRIP: 6 [PH] (ref 4.6–8)
PROT UR QL STRIP: NEGATIVE MG/DL
SP GR UR STRIP: 1.02 (ref 1–1.03)
UROBILINOGEN UR STRIP-ACNC: 0.2 EU/DL

## 2021-03-10 ENCOUNTER — HISTORICAL (OUTPATIENT)
Dept: RADIOLOGY | Facility: HOSPITAL | Age: 71
End: 2021-03-10

## 2021-06-14 ENCOUNTER — HISTORICAL (OUTPATIENT)
Dept: LAB | Facility: HOSPITAL | Age: 71
End: 2021-06-14

## 2021-06-14 LAB — BNP BLD-MCNC: 13.7 PG/ML

## 2021-07-19 ENCOUNTER — HISTORICAL (OUTPATIENT)
Dept: RADIOLOGY | Facility: HOSPITAL | Age: 71
End: 2021-07-19

## 2021-07-19 LAB
ALBUMIN SERPL-MCNC: 3.3 GM/DL (ref 3.4–4.8)
ALBUMIN/GLOB SERPL: 1 RATIO (ref 1.1–2)
ALP SERPL-CCNC: 112 UNIT/L (ref 40–150)
ALT SERPL-CCNC: 17 UNIT/L (ref 0–55)
AST SERPL-CCNC: 17 UNIT/L (ref 5–34)
BILIRUB SERPL-MCNC: 0.3 MG/DL
BILIRUBIN DIRECT+TOT PNL SERPL-MCNC: 0.1 MG/DL (ref 0–0.5)
BILIRUBIN DIRECT+TOT PNL SERPL-MCNC: 0.2 MG/DL (ref 0–0.8)
BUN SERPL-MCNC: 15 MG/DL (ref 9.8–20.1)
CALCIUM SERPL-MCNC: 9.7 MG/DL (ref 8.4–10.2)
CHLORIDE SERPL-SCNC: 106 MMOL/L (ref 98–107)
CO2 SERPL-SCNC: 28 MMOL/L (ref 23–31)
CREAT SERPL-MCNC: 0.91 MG/DL (ref 0.55–1.02)
DEPRECATED CALCIDIOL+CALCIFEROL SERPL-MC: 55.3 NG/ML (ref 30–80)
GLOBULIN SER-MCNC: 3.3 GM/DL (ref 2.4–3.5)
GLUCOSE SERPL-MCNC: 88 MG/DL (ref 82–115)
POTASSIUM SERPL-SCNC: 3.8 MMOL/L (ref 3.5–5.1)
PROT SERPL-MCNC: 6.6 GM/DL (ref 5.8–7.6)
SODIUM SERPL-SCNC: 140 MMOL/L (ref 136–145)

## 2022-01-28 ENCOUNTER — HISTORICAL (OUTPATIENT)
Dept: LAB | Facility: HOSPITAL | Age: 72
End: 2022-01-28

## 2022-01-28 LAB
ALBUMIN SERPL-MCNC: 3.5 G/DL (ref 3.4–4.8)
ALBUMIN/GLOB SERPL: 1 {RATIO} (ref 1.1–2)
ALP SERPL-CCNC: 89 U/L (ref 40–150)
ALT SERPL-CCNC: 19 U/L (ref 0–55)
AST SERPL-CCNC: 24 U/L (ref 5–34)
BILIRUB SERPL-MCNC: 0.3 MG/DL
BILIRUBIN DIRECT+TOT PNL SERPL-MCNC: 0.1 (ref 0–0.5)
BILIRUBIN DIRECT+TOT PNL SERPL-MCNC: 0.2 (ref 0–0.8)
BUN SERPL-MCNC: 12 MG/DL (ref 9.8–20.1)
CALCIUM SERPL-MCNC: 9 MG/DL (ref 8.7–10.5)
CHLORIDE SERPL-SCNC: 104 MMOL/L (ref 98–107)
CO2 SERPL-SCNC: 25 MMOL/L (ref 23–31)
CREAT SERPL-MCNC: 0.97 MG/DL (ref 0.55–1.02)
GLOBULIN SER-MCNC: 3.4 G/DL (ref 2.4–3.5)
GLUCOSE SERPL-MCNC: 106 MG/DL (ref 82–115)
HEMOLYSIS INTERF INDEX SERPL-ACNC: >10
ICTERIC INTERF INDEX SERPL-ACNC: 0
LIPEMIC INTERF INDEX SERPL-ACNC: >10
POTASSIUM SERPL-SCNC: 4.6 MMOL/L (ref 3.5–5.1)
PROT SERPL-MCNC: 6.9 G/DL (ref 5.8–7.6)
SODIUM SERPL-SCNC: 138 MMOL/L (ref 136–145)

## 2022-01-30 ENCOUNTER — HISTORICAL (OUTPATIENT)
Dept: ADMINISTRATIVE | Facility: HOSPITAL | Age: 72
End: 2022-01-30

## 2022-03-06 ENCOUNTER — HISTORICAL (OUTPATIENT)
Dept: ADMINISTRATIVE | Facility: HOSPITAL | Age: 72
End: 2022-03-06

## 2022-03-07 ENCOUNTER — HISTORICAL (OUTPATIENT)
Dept: LAB | Facility: HOSPITAL | Age: 72
End: 2022-03-07

## 2022-03-07 LAB
ABS NEUT (OLG): 2.9 (ref 1.5–6.9)
ALBUMIN SERPL-MCNC: 3.6 G/DL (ref 3.4–4.8)
ALBUMIN/GLOB SERPL: 1.1 {RATIO} (ref 1.1–2)
ALP SERPL-CCNC: 109 U/L (ref 40–150)
ALT SERPL-CCNC: 19 U/L (ref 0–55)
APPEARANCE, UA: CLEAR
AST SERPL-CCNC: 22 U/L (ref 5–34)
BILIRUB SERPL-MCNC: 0.4 MG/DL
BILIRUB UR QL STRIP: NEGATIVE
BILIRUBIN DIRECT+TOT PNL SERPL-MCNC: 0.2 (ref 0–0.5)
BILIRUBIN DIRECT+TOT PNL SERPL-MCNC: 0.2 (ref 0–0.8)
BUN SERPL-MCNC: 14 MG/DL (ref 9.8–20.1)
CALCIUM SERPL-MCNC: 9.3 MG/DL (ref 8.7–10.5)
CHLORIDE SERPL-SCNC: 107 MMOL/L (ref 98–107)
CHOLEST SERPL-MCNC: 129 MG/DL
CHOLEST/HDLC SERPL: 3 {RATIO} (ref 0–5)
CO2 SERPL-SCNC: 28 MMOL/L (ref 23–31)
COLOR UR: YELLOW
CREAT SERPL-MCNC: 0.81 MG/DL (ref 0.55–1.02)
DEPRECATED CALCIDIOL+CALCIFEROL SERPL-MC: 56 NG/ML (ref 30–80)
DO MICRO?: NO
ERYTHROCYTE [DISTWIDTH] IN BLOOD BY AUTOMATED COUNT: 14.3 % (ref 11.5–17)
GLOBULIN SER-MCNC: 3.4 G/DL (ref 2.4–3.5)
GLUCOSE (UA): NEGATIVE
GLUCOSE SERPL-MCNC: 85 MG/DL (ref 82–115)
HCT VFR BLD AUTO: 40 % (ref 36–48)
HDLC SERPL-MCNC: 50 MG/DL (ref 35–60)
HEMOCCULT SP1 STL QL: NEGATIVE
HEMOCCULT SP2 STL QL: NEGATIVE
HEMOCCULT SP3 STL QL: NEGATIVE
HEMOLYSIS INTERF INDEX SERPL-ACNC: >10
HGB BLD-MCNC: 12.5 G/DL (ref 12–16)
HGB UR QL STRIP: NORMAL
ICTERIC INTERF INDEX SERPL-ACNC: 0
KETONES UR QL STRIP: NEGATIVE
LDLC SERPL CALC-MCNC: 72 MG/DL (ref 50–140)
LEUKOCYTE ESTERASE UR QL STRIP: NEGATIVE
LIPEMIC INTERF INDEX SERPL-ACNC: <0
MCH RBC QN AUTO: 28 PG (ref 27–34)
MCHC RBC AUTO-ENTMCNC: 31 G/DL (ref 31–36)
MCV RBC AUTO: 91 FL (ref 80–99)
NITRITE UR QL STRIP: NEGATIVE
PH UR STRIP: 7 [PH] (ref 4.6–8)
PLATELET # BLD AUTO: 260 10*3/UL (ref 140–400)
PMV BLD AUTO: 10.5 FL (ref 6.8–10)
POTASSIUM SERPL-SCNC: 4.5 MMOL/L (ref 3.5–5.1)
PROT SERPL-MCNC: 7 G/DL (ref 5.8–7.6)
PROT UR QL STRIP: NEGATIVE
RBC # BLD AUTO: 4.39 10*6/UL (ref 4.2–5.4)
SODIUM SERPL-SCNC: 141 MMOL/L (ref 136–145)
SP GR UR STRIP: 1.02 (ref 1–1.03)
TRIGL SERPL-MCNC: 37 MG/DL (ref 37–140)
TSH SERPL-ACNC: 2.16 M[IU]/L (ref 0.35–4.94)
UROBILINOGEN UR STRIP-ACNC: 0.2
VLDLC SERPL CALC-MCNC: 7 MG/DL
WBC # SPEC AUTO: 4.3 10*3/UL (ref 4.5–11.5)

## 2022-03-11 ENCOUNTER — HISTORICAL (OUTPATIENT)
Dept: RADIOLOGY | Facility: HOSPITAL | Age: 72
End: 2022-03-11

## 2022-03-11 ENCOUNTER — HISTORICAL (OUTPATIENT)
Dept: ADMINISTRATIVE | Facility: HOSPITAL | Age: 72
End: 2022-03-11

## 2022-04-04 ENCOUNTER — HISTORICAL (OUTPATIENT)
Dept: ADMINISTRATIVE | Facility: HOSPITAL | Age: 72
End: 2022-04-04

## 2022-04-04 LAB
ABS NEUT (OLG): 2.93 (ref 2.1–9.2)
ALBUMIN SERPL-MCNC: 3.3 G/DL (ref 3.4–4.8)
ALBUMIN/GLOB SERPL: 0.9 {RATIO} (ref 1.1–2)
ALP SERPL-CCNC: 103 U/L (ref 40–150)
ALT SERPL-CCNC: 7 U/L (ref 0–55)
AST SERPL-CCNC: 16 U/L (ref 5–34)
BILIRUB SERPL-MCNC: 1 MG/DL
BILIRUBIN DIRECT+TOT PNL SERPL-MCNC: 0.4 (ref 0–0.5)
BILIRUBIN DIRECT+TOT PNL SERPL-MCNC: 0.6 (ref 0–0.8)
BUN SERPL-MCNC: 13 MG/DL (ref 9.8–20.1)
CALCIUM SERPL-MCNC: 8.7 MG/DL (ref 8.7–10.5)
CHLORIDE SERPL-SCNC: 105 MMOL/L (ref 98–107)
CO2 SERPL-SCNC: 24 MMOL/L (ref 23–31)
CREAT SERPL-MCNC: 1.01 MG/DL (ref 0.55–1.02)
ERYTHROCYTE [DISTWIDTH] IN BLOOD BY AUTOMATED COUNT: 15.8 % (ref 11.5–17)
GLOBULIN SER-MCNC: 3.7 G/DL (ref 2.4–3.5)
GLUCOSE SERPL-MCNC: 78 MG/DL (ref 82–115)
HCT VFR BLD AUTO: 42.6 % (ref 37–47)
HEMOLYSIS INTERF INDEX SERPL-ACNC: 30
HGB BLD-MCNC: 13.5 G/DL (ref 12–16)
ICTERIC INTERF INDEX SERPL-ACNC: 1
LIPEMIC INTERF INDEX SERPL-ACNC: 4
MANUAL DIFF? (OHS): NO
MCH RBC QN AUTO: 32 PG (ref 27–31)
MCHC RBC AUTO-ENTMCNC: 31.7 G/DL (ref 33–36)
MCV RBC AUTO: 100.9 FL (ref 80–94)
PLATELET # BLD AUTO: 213 10*3/UL (ref 130–400)
PMV BLD AUTO: 11.1 FL (ref 9.4–12.4)
POTASSIUM SERPL-SCNC: 4.9 MMOL/L (ref 3.5–5.1)
PROT SERPL-MCNC: 7 G/DL (ref 5.8–7.6)
RBC # BLD AUTO: 4.22 10*6/UL (ref 4.2–5.4)
SODIUM SERPL-SCNC: 142 MMOL/L (ref 136–145)
VERIFY DIFF SUSPECT FLAG (OHS): NORMAL
WBC # SPEC AUTO: 5.1 10*3/UL (ref 4.5–11.5)

## 2022-04-05 LAB
INR PPP: 1.1 (ref 0–1.3)
PROTHROMBIN TIME: 14 S (ref 12.5–14.5)

## 2022-04-09 ENCOUNTER — HISTORICAL (OUTPATIENT)
Dept: ADMINISTRATIVE | Facility: HOSPITAL | Age: 72
End: 2022-04-09
Payer: MEDICAID

## 2022-04-26 VITALS
BODY MASS INDEX: 30.48 KG/M2 | HEIGHT: 64 IN | SYSTOLIC BLOOD PRESSURE: 148 MMHG | WEIGHT: 178.56 LBS | DIASTOLIC BLOOD PRESSURE: 68 MMHG

## 2022-04-30 NOTE — OP NOTE
DATE OF SURGERY:    09/23/2019    SURGEON:  Hanna Lezama MD    PROCEDURE:  Left heart catheterization.    INDICATION:  Chest pain and abnormal nuclear stress test.    ACCESS:  Right common femoral artery.    DESCRIPTION OF PROCEDURE:  The patient was brought to the cardiac catheterization lab in the fasting postabsorptive state.  The right groin was prepped and draped in the usual sterile manner.  Copious 2% xylocaine was instilled into the right groin.  The right common femoral artery was accessed using standard Seldinger technique, and a 5-Hebrew sheath was introduced.  Coronary angiogram was performed.  The report is as follows:   1. The left main coronary artery is normal in size and caliber with no evidence of any obstructive disease noted.  2. The left anterior descending artery is a medium caliber vessel originating from the left main coronary artery.  Normal size and caliber with no evidence of any obstructive disease noted.  3. Left circumflex artery is a codominant vessel originating from the left main coronary artery, which is normal in size and caliber with no evidence of any obstructive disease noted.  4. The right coronary artery is a medium caliber vessel originating from the right coronary sinus, normal in size and caliber, with no evidence of obstructive disease noted.  5. The left ventricle is mildly dilated with moderately reduced left ventricular ejection fraction of 35%.     The patient tolerated the procedure well.  No complications were noted.        ______________________________  Hanna Lezama MD    RRP/UAB  DD:  09/23/2019  Time:  01:59PM  DT:  09/23/2019  Time:  02:14PM  Job #:  [id number]

## 2022-04-30 NOTE — DISCHARGE SUMMARY
Patient:   Laura Jacobs            MRN: [id number]            FIN: [id number]               Age:   69 years     Sex:  Female     :  1950   Associated Diagnoses:   None   Author:   Dakota Benavides      Discharge Information      Discharge Summary Information   Admitted  3/5/2020   Discharged  3/11/2020   Admitting physician     Teja BAZZI, Hanna PICKARD        Physical Examination   Vital Signs   3/11/2020 7:27 CDT       Temperature Oral          36.8 DegC                             Temperature Oral (calculated)             98.24 DegF                             Peripheral Pulse Rate     77 bpm                             SpO2                      97 %                             Systolic Blood Pressure   102 mmHg                             Diastolic Blood Pressure  71 mmHg                             Mean Arterial Pressure, Cuff              81 mmHg    3/11/2020 4:53 CDT       Temperature Oral          36.6 DegC                             Temperature Oral (calculated)             97.88 DegF                             Peripheral Pulse Rate     80 bpm                             SpO2                      96 %                             Systolic Blood Pressure   96 mmHg                             Diastolic Blood Pressure  62 mmHg                             Mean Arterial Pressure, Cuff              73 mmHg    3/11/2020 4:00 CDT       Heart Rate Monitored      77 bpm    3/11/2020 0:00 CDT       Heart Rate Monitored      78 bpm    3/10/2020 22:39 CDT      Temperature Oral          36.8 DegC                             Temperature Oral (calculated)             98.24 DegF                             Peripheral Pulse Rate     89 bpm                             Systolic Blood Pressure   107 mmHg                             Diastolic Blood Pressure  73 mmHg                             Mean Arterial Pressure, Cuff              84 mmHg    3/10/2020 20:00 CDT      Heart Rate Monitored       83 bpm                             Oxygen Therapy            Room air    3/10/2020 19:11 CDT      Temperature Oral          36.5 DegC                             Temperature Oral (calculated)             97.70 DegF                             Peripheral Pulse Rate     95 bpm                             SpO2                      95 %                             Systolic Blood Pressure   102 mmHg                             Diastolic Blood Pressure  70 mmHg                             Mean Arterial Pressure, Cuff              80 mmHg    3/10/2020 18:00 CDT      Oxygen Therapy            Room air    3/10/2020 16:02 CDT      Temperature Oral          36.5 DegC                             Temperature Oral (calculated)             97.70 DegF                             Peripheral Pulse Rate     79 bpm                             SpO2                      100 %                             Systolic Blood Pressure   97 mmHg                             Diastolic Blood Pressure  72 mmHg                             Mean Arterial Pressure, Cuff              80 mmHg    3/10/2020 16:00 CDT      Heart Rate Monitored      72 bpm                             Oxygen Therapy            Room air    3/10/2020 12:42 CDT      Heart Rate Monitored      78 bpm    3/10/2020 12:25 CDT      Oxygen Therapy            Room air    3/10/2020 12:00 CDT      Heart Rate Monitored      75 bpm    3/10/2020 11:16 CDT      Peripheral Pulse Rate     75 bpm                             SpO2                      97 %                             Systolic Blood Pressure   96 mmHg                             Diastolic Blood Pressure  71 mmHg                             Mean Arterial Pressure, Cuff              79 mmHg    3/10/2020 8:10 CDT       Heart Rate Monitored      71 bpm    3/10/2020 8:00 CDT       Heart Rate Monitored      64 bpm                             Oxygen Therapy            Room air                             Oxygen Therapy            Room air     3/10/2020 7:16 CDT       Peripheral Pulse Rate     85 bpm                             Systolic Blood Pressure   94 mmHg                             Diastolic Blood Pressure  73 mmHg                             Mean Arterial Pressure, Cuff              80 mmHg    3/10/2020 3:28 CDT       Systolic Blood Pressure   102 mmHg                             Diastolic Blood Pressure  69 mmHg                             Mean Arterial Pressure, Cuff              80 mmHg    3/10/2020 0:00 CDT       Heart Rate Monitored      78 bpm        Vital Signs (last 24 hrs)_____  Last Charted___________  Temp Oral     36.8 DegC  (MAR 11 07:27)  Heart Rate Peripheral   77 bpm  (MAR 11 07:27)  SBP      102 mmHg  (MAR 11 07:27)  DBP      71 mmHg  (MAR 11 07:27)  SpO2      97 %  (MAR 11 07:27)  Weight      70.9 kg  (MAR 11 05:00)     Measurements from flowsheet : Measurements   3/11/2020 5:00 CDT       Weight Measured           70.9 kg                             Weighing Method           Standing    3/10/2020 5:00 CDT       Weight Measured           74.3 kg                             Weighing Method           Standing     General:  Alert and oriented, No acute distress.    HENT:  Normocephalic.    Neck:  Supple, Non-tender.    Respiratory:  Lungs are clear to auscultation, Respirations are non-labored, Breath sounds are equal.    Cardiovascular:  Normal rate, Regular rhythm, 1+ BLE edema.    Gastrointestinal:  Soft, Non-tender.    Musculoskeletal:  Normal range of motion, Normal strength.    Integumentary:  Warm, Dry.    Neurologic:  Alert, Oriented.    Psychiatric:  Cooperative, Appropriate mood & affect.       Hospital Course   Ms. Jacobs is a 68yo female with a history of HTN, breast cancer s/p radiation, chemo, and left mastectomy, and nonischemic cardiomyopathy who presented to the ER with complaints of worsening SOB and BLE edema over the last 2 weeks. Upon evaluation in the ER her BNP was elevated and she was admitted for further  treatment.  She denied any chest pain and had a LHC last year which revealed normal coronaries and EF of 35%.  She has diuresed well ~6kg and reports feeling much better today.  She will be discharged home today and follow up in our office in 1 week.       Discharge Plan   Discharge Summary Plan   Discharge Status: improved.     Discharge instructions given: to patient.     Discharge disposition: discharge to home self care.

## 2022-04-30 NOTE — ED PROVIDER NOTES
Patient:   Laura Jacobs            MRN: [id number]            FIN: [id number]               Age:   69 years     Sex:  Female     :  1950   Associated Diagnoses:   CHF exacerbation; Hypokalemia   Author:   Keagan Steele MD      Basic Information   Time seen: Date & time 3/5/2020 19:50:00.   History source: Patient.   Arrival mode: Private vehicle.   History limitation: None.      History of Present Illness   The patient presents with difficulty breathing and Pt here for mild inc in SOB x 2 days.  Has bilateral lower ext swelling that also has worsened.  Tried fluid pills but didn't help.  (William MERINO).  The onset was 2  days ago.  The course/duration of symptoms is worsening.  Degree at onset mild.  Degree at present moderate.  There are Exacerbating factorss including exertion and lying flat.  There are Relieving factorss including rest and sitting upright.  Risk factors consist of congestive heart failure and hypertension.  Prior episodes: rare.  Therapy today: none.  Associated symptoms: weight gain, denies chest pain, denies fever, denies chills, denies cough, denies nausea, denies vomiting, denies abdominal pain, denies back pain and denies hemoptysis.     This is a 69-year-old female who is known to Dr. Lezama who presents emergency department after having increased shortness of breath, orthopnea, ROMO and lower extremity swelling x2 days.  Patient states she took a little pink pill for fluid yesterday which helped minimally.  She states that her shortness of breath has worsened to the point where she presented to emergency room today.  She denies any chest pain, cough or sputum production.      Review of Systems   Constitutional symptoms:  Negative except as documented in HPI.   Skin symptoms:  Negative except as documented in HPI.   ENMT symptoms:  Negative except as documented in HPI.   Respiratory symptoms:  Shortness of breath, orthopnea, no cough, no wheezing.     Cardiovascular symptoms:  Peripheral edema, No chest pain,    Gastrointestinal symptoms:  No abdominal pain, no nausea, no vomiting, no diarrhea, no constipation.    Neurologic symptoms:  Negative except as documented in HPI.   Psychiatric symptoms:  Negative except as documented in HPI.      Health Status   Allergies:    Allergic Reactions (Selected)  No Known Medication Allergies,    Allergies (1) Active Reaction  No Known Medication Allergies None Documented  .   Medications:  (Selected)   Prescriptions  Prescribed  Lasix 40 mg oral tablet: 40 mg = 1 tab(s), Oral, Daily, # 5 tab(s), 0 Refill(s)  Documented Medications  Documented  Xarelto 20mg Tablet: 20 mg = 1 tab(s), Oral, Daily, # 30 tab(s), 0 Refill(s)  carvedilol 3.125 mg oral tablet: 3.125 mg = 1 tab(s), Oral, BID  lisinopril 10 mg oral tablet: 10 mg = 1 tab(s), Oral, Daily.      Past Medical/ Family/ Social History   Medical history:    Active  Knee pain (91808905)  Resolved  Hypertension ([id number]):  Resolved.  Osteoarthritis (8631846576):  Resolved.  Venous insufficiency of lower limb (2755504141):  Resolved.  DVT - Deep vein thrombosis, History of (2595267964):  Resolved.  Breast cancer (699827026):  Resolved.  Leg swelling symptom (206959242):  Resolved.  CHF - Congestive heart failure (755001759):  Resolved.  PAD - Peripheral arterial disease (685756453044899):  Resolved.  HLD - Hyperlipidemia (560265288):  Resolved.  Chemotherapy (158794966):  Resolved.  Radiation (741778312):  Resolved..   Surgical history:    Total Knee Arthroplasty (Right) on 1/5/2017 at 66 Years.  Comments:  1/5/2017 12:40 [facility] - Addison Spann RN  auto-populated from documented surgical case  Total Knee Arthroplasty (Left) on 5/3/2016 at 65 Years.  Comments:  5/3/2016 14:18 Addison Salomon RN  auto-populated from documented surgical case  Hysterectomy ([id number]).  Mastectomy, Left.  Mediport Placement, Right.  Foot, Right..   Family  history:    Entire family history is negative..   Social history:    Social & Psychosocial Habits    Alcohol  04/21/2015 Risk Assessment: Denies Alcohol Use    08/03/2016  Use: Never    Employment/School  10/17/2018  Status: Employed    Home/Environment  10/17/2018  Lives with: Children    Substance Use  04/21/2015 Risk Assessment: Denies Substance Abuse    12/23/2015  Use: Never    Tobacco  11/28/2014 Risk Assessment: Denies Tobacco Use    09/23/2019  Use: Never (less than 100 in l    Type: Cigarettes    Patient Wants Consult For Cessation Counseling No    Abuse/Neglect  09/23/2019  SHX Any signs of abuse or neglect No  .   Problem list:    Active Problems (12)  Acquired flat foot   Aftercare following joint replacement   Altered gait   DVT - Deep vein thrombosis   History of arthroplasty of right knee   Impaired mobility   Knee pain   None   Obesity   Osteoarthritis of both knees   Right hip pain   Status post left knee replacement   .      Physical Examination               Vital Signs      Vital Signs (last 24 hrs)_____  Last Charted___________  Temp Oral     36.7 DegC  (MAR 05 19:52)  Heart Rate Peripheral   99 bpm  (MAR 05 19:52)  Resp Rate         16 br/min  (MAR 05 19:52)  SBP      131 mmHg  (MAR 05 19:52)  DBP      H 91mmHg  (MAR 05 19:52)  SpO2      96 %  (MAR 05 19:52)  .   General:  Alert, mild distress.    Skin:  Warm, dry, intact.    Head:  Normocephalic.   Eye:  Extraocular movements are intact, normal conjunctiva.    Ears, nose, mouth and throat:  Tympanic membranes clear, oral mucosa moist, no pharyngeal erythema or exudate.    Cardiovascular:  Regular rate and rhythm, Normal peripheral perfusion, Holosystolic murmur, Edema: Bilateral, lower extremity, 3+, pitting.    Respiratory:  Breath sounds are equal, Symmetrical chest wall expansion, Respirations: Regular, no respiratory distress, Breath sounds: Bilateral, base(s), crackles present.    Gastrointestinal:  Soft, Nontender, Non distended, Normal  bowel sounds.    Musculoskeletal:  Normal ROM, normal strength, no tenderness, no swelling, no deformity.    Neurological:  Alert and oriented to person, place, time, and situation, No focal neurological deficit observed, CN II-XII intact, normal sensory observed, normal motor observed, normal speech observed, normal coordination observed.    Psychiatric:  Cooperative, appropriate mood & affect, normal judgment, non-suicidal.       Medical Decision Making   Documents reviewed:  Emergency department nurses' notes, emergency department records, prior records.    Orders  Launch Orders   Laboratory:  POC iSTAT ER Troponin request (Order): Blood, Stat collect, 3/5/2020 19:54 CST by Idris MERINO, Lacy NEWBERRY Lab Collect, Print Label By Order Location  Troponin-I (Order): Stat collect, 3/5/2020 19:53 CST, Blood, Lab Collect, Print Label By Order Location, 3/5/2020 19:53 CST  BNP (Order): Stat collect, 3/5/2020 19:53 CST, Blood, Lab Collect, Print Label By Order Location, 3/5/2020 19:53 CST  UA with Reflex (Order): Stat collect, Urine, 3/5/2020 19:53 CST, Nurse collect, Print Label By Order Location  CMP (Order): Stat collect, 3/5/2020 19:53 CST, Blood, Lab Collect, Print Label By Order Location, 3/5/2020 19:53 CST  CBC w/ Auto Diff (Order): Now collect, 3/5/2020 19:53 CST, Blood, Lab Collect, Print Label By Order Location, 3/5/2020 19:53 CST  Radiology:  CXR 1 View (Order): Stat, 3/5/2020 19:53 CST, Chest Pain, None, Patient Bed, Patient Has IV?, Rad Type, Schedule this test, Not Scheduled  Cardiology:  EKG Adult 12 Lead (Order): 3/5/2020 19:54 CST, Patient Bed, Patient Has IV, Standard Precautions, -1, -1, 3/5/2020 19:54 CST.   Electrocardiogram:  Time 3/5/2020 19:57:00, rate 96, normal sinus rhythm, EP Interp, The Axis is normal.  , Ectopy None, P wave and MD interval Left atrial enlargement.    Radiology results:  Rad Results (ST)  < 12 hrs   Accession: [id number]  Order: XR Chest 1 View  Report Dt/Tm: 03/05/2020  20:07  Report:   EXAMINATION  XR Chest 1 View     INDICATION  Chest Pain     Comparison: 20 May, 2019     FINDINGS  Lines/tubes/devices:  Right chest wall power-injectable subcutaneous port is unchanged. Left  axillary clips are again noted.     The cardiomediastinal silhouette and central pulmonary vasculature are  unremarkable for AP projection.  The trachea is midline. There is no lobar consolidation or significant  pleural effusion. No definite pneumothorax is appreciated.     There is no acute osseous or extrathoracic abnormality.     IMPRESSION  No acute intrathoracic process.      .      Impression and Plan   Diagnosis   CHF exacerbation (HFC63-YA I50.9)   Hypokalemia (AHA52-DG E87.6)      Calls-Consults   -  3/5/2020 23:05:00 , Teja BAZZI, Hanna KUMARI, recommends admission.    Plan   Condition: Guarded.    Disposition: Admit time  3/5/2020 23:06:00, Admit to Inpatient Telemetry Unit, Hanna Lezama MD       Addendum      Teaching-Supervisory Addendum-Brief   I participated in the following activities of this patients care: the medical history, the physical exam, medical decision making.   I personally performed: supervision of the patient's care, the medical history, the physical exam, the medical decision making.   The case was discussed with: the resident.   Results interpretation: I agree with the study interpretation in this patient's care.   Notes: I have seen the pt with the resident and I have performed an independent history and physical.      70 yo f c/o fluid overload symptoms - sob and leg swelling, pt has crackles on lung exam and swollen ankles.  Pt has elevated bnp and normal cxr, will diurese and admit.

## 2022-04-30 NOTE — H&P
Patient:   Laura Jacobs            MRN: [id number]            FIN: [id number]               Age:   69 years     Sex:  Female     :  1950   Associated Diagnoses:   None   Author:   Dakota Benavides      Basic Information   Source of history:  Self, Medical record.    Referral source:  Emergency department.    History limitation:  None.    Resusitation Status:  Full Code.       Chief Complaint   3/5/2020 19:52 CST       C/O BILAT LOWER EXT EDEMA X 2 DAYS. HX LUNG CA/CHF. RA SAT = 96        History of Present Illness   Ms. Jacobs is a 70yo female with a history of HTN, breast cancer s/p radiation, chemo, and left mastectomy, and nonischemic cardiomyopathy who presented to the ER with complaints of worsening SOB and BLE edema over the last 2 weeks. Upon evaluation in the ER her BNP was elevated and she was admitted for further treatment.  She denies any chest pain and had a LHC last year which revealed normal coronaries and EF of 35%.  She has been on optimized medical therapy.       Review of Systems   Constitutional:  Negative.    Respiratory:  Shortness of breath, No cough, No wheezing.    Cardiovascular:  Peripheral edema, No chest pain, No palpitations.    Gastrointestinal:  Negative.    Neurologic:  Alert and oriented X4.       Health Status   Allergies:    Allergic Reactions (All)  No Known Medication Allergies   Problem list:    All Problems  Right hip pain / SNOMED CT 2891855077 / Confirmed  DVT - Deep vein thrombosis / SNOMED CT 9961742084 / Confirmed  Altered gait / SNOMED CT 43246840 / Confirmed  History of arthroplasty of right knee / SNOMED CT 166124661 / Confirmed  Status post left knee replacement / SNOMED CT 0784424564 / Confirmed  Impaired mobility / SNOMED CT 705393081 / Confirmed  Knee pain / SNOMED CT 78999890 / Confirmed  None / SNOMED CT 729795075 / Confirmed  Obesity / SNOMED CT 2954688945 / Probable  Osteoarthritis of both knees / SNOMED CT 158018635 /  Confirmed  Acquired flat foot / SNOMED CT 678016437 / Confirmed  Aftercare following joint replacement / SNOMED CT 252949745 / Confirmed      Histories   Past Medical History:    Active  Knee pain (08401753)  Resolved  Hypertension (XH81A8V5--K1K4-D4EX4HG62D64):  Resolved.  Osteoarthritis (6535410564):  Resolved.  Venous insufficiency of lower limb (6048403631):  Resolved.  DVT - Deep vein thrombosis, History of (8788611533):  Resolved.  Breast cancer (315535855):  Resolved.  Leg swelling symptom (920842696):  Resolved.  CHF - Congestive heart failure (738700657):  Resolved.  PAD - Peripheral arterial disease (549096089680433):  Resolved.  HLD - Hyperlipidemia (785887696):  Resolved.  Chemotherapy (517327250):  Resolved.  Radiation (960826078):  Resolved.   Procedure history:    Total Knee Arthroplasty (Right) on 1/5/2017 at 66 Years.  Comments:  1/5/2017 12:40 [facility] - Addison Spann RN  auto-populated from documented surgical case  Total Knee Arthroplasty (Left) on 5/3/2016 at 65 Years.  Comments:  5/3/2016 14:18 [facility] - Addison Spann RN  auto-populated from documented surgical case  Hysterectomy ([id number]).  Mastectomy, Left.  Mediport Placement, Right.  Foot, Right.      Physical Examination   General:  Alert and oriented, No acute distress.    HENT:  Normocephalic.    Neck:  Supple, Non-tender.    Respiratory:  Respirations are non-labored, Breath sounds are equal.    Cardiovascular:  Normal rate, Regular rhythm, 3+ edema to BLE.    Gastrointestinal:  Soft, Non-tender.    Musculoskeletal:  Normal range of motion, Normal strength.    Integumentary:  Warm, Dry, Pink.    Neurologic:  Alert, Oriented.    Psychiatric:  Cooperative, Appropriate mood & affect.       Impression and Plan   Acute on chronic systolic heart failure       hx of nonischemic cardiomyopathy, EF 35%       diuresing well       continue Lasix 40mg IV BID       monitor strict I&Os and daily weights    HTN       bp elevated this morning        resume home medications and continue to monitor    Hypokalemia       replacement ordered       recheck in AM

## 2022-05-06 ENCOUNTER — HOSPITAL ENCOUNTER (INPATIENT)
Facility: HOSPITAL | Age: 72
LOS: 4 days | Discharge: HOME | DRG: 312 | End: 2022-05-10
Attending: EMERGENCY MEDICINE | Admitting: INTERNAL MEDICINE
Payer: COMMERCIAL

## 2022-05-06 DIAGNOSIS — N17.9 AKI (ACUTE KIDNEY INJURY): ICD-10-CM

## 2022-05-06 DIAGNOSIS — R55 SYNCOPE: ICD-10-CM

## 2022-05-06 DIAGNOSIS — R55 NEAR SYNCOPE: Primary | ICD-10-CM

## 2022-05-06 DIAGNOSIS — I95.9 HYPOTENSION, UNSPECIFIED HYPOTENSION TYPE: ICD-10-CM

## 2022-05-06 LAB
ALBUMIN SERPL-MCNC: 3.7 GM/DL (ref 3.4–4.8)
ALBUMIN/GLOB SERPL: 0.9 RATIO (ref 1.1–2)
ALP SERPL-CCNC: 119 UNIT/L (ref 40–150)
ALT SERPL-CCNC: 12 UNIT/L (ref 0–55)
AMPHET UR QL SCN: NEGATIVE
AST SERPL-CCNC: 20 UNIT/L (ref 5–34)
BARBITURATE SCN PRESENT UR: NEGATIVE
BASOPHILS # BLD AUTO: 0.06 X10(3)/MCL (ref 0–0.2)
BASOPHILS NFR BLD AUTO: 1.1 %
BENZODIAZ UR QL SCN: NEGATIVE
BILIRUBIN DIRECT+TOT PNL SERPL-MCNC: 0.3 MG/DL (ref 0–0.5)
BILIRUBIN DIRECT+TOT PNL SERPL-MCNC: 0.5 MG/DL (ref 0–0.8)
BILIRUBIN DIRECT+TOT PNL SERPL-MCNC: 0.8 MG/DL
BUN SERPL-MCNC: 43.3 MG/DL (ref 9.8–20.1)
CALCIUM SERPL-MCNC: 9.3 MG/DL (ref 8.4–10.2)
CANNABINOIDS UR QL SCN: NEGATIVE
CHLORIDE SERPL-SCNC: 103 MMOL/L (ref 98–107)
CO2 SERPL-SCNC: 24 MMOL/L (ref 23–31)
COCAINE UR QL SCN: NEGATIVE
CREAT SERPL-MCNC: 1.79 MG/DL (ref 0.55–1.02)
EOSINOPHIL # BLD AUTO: 0.19 X10(3)/MCL (ref 0–0.9)
EOSINOPHIL NFR BLD AUTO: 3.6 %
ERYTHROCYTE [DISTWIDTH] IN BLOOD BY AUTOMATED COUNT: 15.3 % (ref 11.5–17)
FENTANYL UR QL SCN: NEGATIVE
GLOBULIN SER-MCNC: 4.2 GM/DL (ref 2.4–3.5)
GLUCOSE SERPL-MCNC: 94 MG/DL (ref 82–115)
HCT VFR BLD AUTO: 44.7 % (ref 37–47)
HGB BLD-MCNC: 14.4 GM/DL (ref 12–16)
IMM GRANULOCYTES # BLD AUTO: 0.01 X10(3)/MCL (ref 0–0.02)
IMM GRANULOCYTES NFR BLD AUTO: 0.2 % (ref 0–0.43)
LACTATE SERPL-SCNC: 1 MMOL/L (ref 0.5–2.2)
LYMPHOCYTES # BLD AUTO: 1.16 X10(3)/MCL (ref 0.6–4.6)
LYMPHOCYTES NFR BLD AUTO: 22 %
MCH RBC QN AUTO: 31.9 PG (ref 27–31)
MCHC RBC AUTO-ENTMCNC: 32.2 MG/DL (ref 33–36)
MCV RBC AUTO: 98.9 FL (ref 80–94)
MDMA UR QL SCN: NEGATIVE
MONOCYTES # BLD AUTO: 0.4 X10(3)/MCL (ref 0.1–1.3)
MONOCYTES NFR BLD AUTO: 7.6 %
NEUTROPHILS # BLD AUTO: 3.5 X10(3)/MCL (ref 2.1–9.2)
NEUTROPHILS NFR BLD AUTO: 65.5 %
NRBC BLD AUTO-RTO: 0 %
OPIATES UR QL SCN: NEGATIVE
PCP UR QL: NEGATIVE
PH UR: 6 [PH] (ref 3–11)
PLATELET # BLD AUTO: 284 X10(3)/MCL (ref 130–400)
PMV BLD AUTO: 9.5 FL (ref 9.4–12.4)
POCT GLUCOSE: 126 MG/DL (ref 70–110)
POTASSIUM SERPL-SCNC: 5.1 MMOL/L (ref 3.5–5.1)
PROT SERPL-MCNC: 7.9 GM/DL (ref 5.8–7.6)
RBC # BLD AUTO: 4.52 X10(6)/MCL (ref 4.2–5.4)
SODIUM SERPL-SCNC: 138 MMOL/L (ref 136–145)
SPECIFIC GRAVITY, URINE AUTO (.000) (OHS): 1.01 (ref 1–1.03)
TROPONIN I SERPL-MCNC: 0.02 NG/ML (ref 0–0.04)
WBC # SPEC AUTO: 5.3 X10(3)/MCL (ref 4.5–11.5)

## 2022-05-06 PROCEDURE — 93010 EKG 12-LEAD: ICD-10-PCS | Mod: ,,, | Performed by: INTERNAL MEDICINE

## 2022-05-06 PROCEDURE — 99285 EMERGENCY DEPT VISIT HI MDM: CPT | Mod: 25

## 2022-05-06 PROCEDURE — 85025 COMPLETE CBC W/AUTO DIFF WBC: CPT | Performed by: STUDENT IN AN ORGANIZED HEALTH CARE EDUCATION/TRAINING PROGRAM

## 2022-05-06 PROCEDURE — 11000001 HC ACUTE MED/SURG PRIVATE ROOM

## 2022-05-06 PROCEDURE — 80053 COMPREHEN METABOLIC PANEL: CPT | Performed by: STUDENT IN AN ORGANIZED HEALTH CARE EDUCATION/TRAINING PROGRAM

## 2022-05-06 PROCEDURE — 93005 ELECTROCARDIOGRAM TRACING: CPT

## 2022-05-06 PROCEDURE — 84484 ASSAY OF TROPONIN QUANT: CPT | Performed by: STUDENT IN AN ORGANIZED HEALTH CARE EDUCATION/TRAINING PROGRAM

## 2022-05-06 PROCEDURE — 93010 ELECTROCARDIOGRAM REPORT: CPT | Mod: ,,, | Performed by: INTERNAL MEDICINE

## 2022-05-06 PROCEDURE — 83605 ASSAY OF LACTIC ACID: CPT | Performed by: STUDENT IN AN ORGANIZED HEALTH CARE EDUCATION/TRAINING PROGRAM

## 2022-05-06 PROCEDURE — 63600175 PHARM REV CODE 636 W HCPCS: Performed by: STUDENT IN AN ORGANIZED HEALTH CARE EDUCATION/TRAINING PROGRAM

## 2022-05-06 PROCEDURE — 82962 GLUCOSE BLOOD TEST: CPT

## 2022-05-06 PROCEDURE — 80307 DRUG TEST PRSMV CHEM ANLYZR: CPT | Performed by: STUDENT IN AN ORGANIZED HEALTH CARE EDUCATION/TRAINING PROGRAM

## 2022-05-06 PROCEDURE — 36415 COLL VENOUS BLD VENIPUNCTURE: CPT | Performed by: STUDENT IN AN ORGANIZED HEALTH CARE EDUCATION/TRAINING PROGRAM

## 2022-05-06 RX ADMIN — SODIUM CHLORIDE, POTASSIUM CHLORIDE, SODIUM LACTATE AND CALCIUM CHLORIDE 500 ML: 600; 310; 30; 20 INJECTION, SOLUTION INTRAVENOUS at 04:05

## 2022-05-06 NOTE — ED PROVIDER NOTES
Encounter Date: 5/6/2022       History     Chief Complaint   Patient presents with    Altered Mental Status     Pt found lethargic at home. Takes Oxycodone for chronic pain, Hx. CA. Given 1mg Narcan INH. Pt now awake, alert, answers questions appropriately.      72 yo female with sig pmh CHF, HTN, Hx of DVT, HLD and breast CA presents to ED by EMS.  Nephew called 911 after he spoke to her on the phone when she told him she didn't feel well.  Pt was given 1mg of Narcan in route, which was stated that she was more alert after.  She is currently awake and alert and answering questions appropriately.  States she was feeling well this AM.  She was sitting under her carport this afternoon when she suddenly began to feel lightheaded and dizzy.  While in ED she states she is feeling fine, nothing is bothering her and she has no pain.  Asking to leave.  She is unsure if she takes any pain medication.   Has home health once a week to help with medications. Denies SI/HI.        Review of patient's allergies indicates:  No Known Allergies  Past Medical History:   Diagnosis Date    Cancer     breast CA s/p chemo and L masectomy     CHF (congestive heart failure)     History of DVT (deep vein thrombosis)     HLD (hyperlipidemia)     Hypertension     Osteoarthritis     Venous insufficiency      Past Surgical History:   Procedure Laterality Date    HYSTERECTOMY      INSERTION OF PACEMAKER      JOINT REPLACEMENT Bilateral     Knee    MASTECTOMY Left 2019     Family History   Family history unknown: Yes     Social History     Tobacco Use    Smoking status: Never Smoker    Smokeless tobacco: Never Used   Substance Use Topics    Alcohol use: Not Currently    Drug use: Never     Review of Systems   Constitutional: Negative for chills, diaphoresis, fatigue and fever.   HENT: Negative for congestion, ear pain, sinus pressure, sinus pain, sneezing and sore throat.    Eyes: Negative for visual disturbance.   Respiratory:  Negative for cough, chest tightness, shortness of breath and wheezing.    Cardiovascular: Negative for chest pain, palpitations and leg swelling.   Gastrointestinal: Negative for abdominal pain, constipation, diarrhea, nausea and vomiting.   Genitourinary: Negative for dysuria, flank pain, hematuria and urgency.   Musculoskeletal: Negative for back pain.   Neurological: Negative for dizziness, weakness, light-headedness and headaches.   Psychiatric/Behavioral: Negative for agitation, confusion and suicidal ideas.       Physical Exam     Initial Vitals [05/06/22 1520]   BP Pulse Resp Temp SpO2   100/63 62 18 97.7 °F (36.5 °C) 96 %      MAP       --         Physical Exam    Constitutional: She appears well-developed and well-nourished. No distress.   HENT:   Head: Normocephalic and atraumatic.   Right Ear: External ear normal.   Left Ear: External ear normal.   Nose: Nose normal.   Mouth/Throat: Oropharynx is clear and moist.   Eyes: EOM are normal. Pupils are equal, round, and reactive to light. No scleral icterus.   Neck: No JVD present.   Cardiovascular: Normal rate, regular rhythm, normal heart sounds and intact distal pulses.   No murmur heard.  Pulmonary/Chest: Breath sounds normal. No respiratory distress. She has no wheezes.   Abdominal: Abdomen is soft. Bowel sounds are normal. She exhibits no distension. There is no abdominal tenderness.   Musculoskeletal:         General: No edema.     Neurological: She is alert and oriented to person, place, and time. She has normal strength. No cranial nerve deficit. GCS eye subscore is 4. GCS verbal subscore is 5. GCS motor subscore is 6.   Skin: Skin is warm and dry. Capillary refill takes less than 2 seconds.   Chronic L upper extremity lymphedema   Psychiatric: She has a normal mood and affect. Her behavior is normal.          ED Course   Procedures  Labs Reviewed   COMPREHENSIVE METABOLIC PANEL - Abnormal; Notable for the following components:       Result Value     Blood Urea Nitrogen 43.3 (*)     Creatinine 1.79 (*)     Protein Total 7.9 (*)     Globulin 4.2 (*)     Albumin/Globulin Ratio 0.9 (*)     All other components within normal limits   CBC WITH DIFFERENTIAL - Abnormal; Notable for the following components:    MCV 98.9 (*)     MCH 31.9 (*)     MCHC 32.2 (*)     All other components within normal limits   POCT GLUCOSE - Abnormal; Notable for the following components:    POCT Glucose 126 (*)     All other components within normal limits   TROPONIN I - Normal   CBC W/ AUTO DIFFERENTIAL    Narrative:     The following orders were created for panel order CBC auto differential.  Procedure                               Abnormality         Status                     ---------                               -----------         ------                     CBC with Differential[[id number]]        Abnormal            Final result                 Please view results for these tests on the individual orders.   DRUG SCREEN, URINE (BEAKER)   LACTIC ACID, PLASMA   POCT GLUCOSE, HAND-HELD DEVICE        ECG Results          EKG 12-lead (Final result)  Result time 05/06/22 17:29:57    Final result by Interface, Lab In [facility] (05/06/22 17:29:57)                 Narrative:    Test Reason : R55,    Vent. Rate : 057 BPM     Atrial Rate : 057 BPM     P-R Int : 226 ms          QRS Dur : 116 ms      QT Int : 520 ms       P-R-T Axes : 075 -66 092 degrees     QTc Int : 506 ms    Sinus bradycardia with 1st degree A-V block  Left axis deviation  LVH with QRS widening ( R in aVL , Misael product )  Prolonged QT  Abnormal ECG    Confirmed by Aleena Noel MD ([id number]) on 5/6/2022 5:29:48 PM    Referred By:             Confirmed By:Aleena Noel MD                            Imaging Results    None          Medications   lactated ringers bolus 500 mL (500 mLs Intravenous New Bag 5/6/22 1420)     Medical Decision Making:   History:   Old Records Summarized: records from previous admission(s) and  records from clinic visits.  Differential Diagnosis:   Syncope, arrhythmia, dehydration,   Clinical Tests:   Lab Tests: Ordered  Medical Tests: Ordered  Other:   I have discussed this case with another health care provider.       <> Summary of the Discussion: Hospital medicine agrees to admit.              ED Course as of 05/06/22 2031   Fri May 06, 2022   1830 Patient was given initial 500cc LR bolus with mild improvement of BP to 104/80.    On re-eval BP 84/56, patient denies any sob and appears euvolemic, will give another 500cc LR bolus at this time.  [TP]   1935 Completed 2nd 500cc LR bolus.  /72 when patient up and moving.  [TP]      ED Course User Index  [TP] Bettye Zapien MD             Clinical Impression:   Final diagnoses:  [R55] Syncope  [R55] Near syncope (Primary)  [N17.9] AUSTIN (acute kidney injury)  [I95.9] Hypotension, unspecified hypotension type          ED Disposition Condition    Admit               Bettye Zapien MD  05/06/22 2031

## 2022-05-06 NOTE — ED NOTES
Bed: 32  Expected date:   Expected time:   Means of arrival:   Comments:  70yo F, Weakness, hx. CA and HTN.

## 2022-05-07 PROCEDURE — 25000003 PHARM REV CODE 250: Performed by: INTERNAL MEDICINE

## 2022-05-07 PROCEDURE — 11000001 HC ACUTE MED/SURG PRIVATE ROOM

## 2022-05-07 RX ORDER — SPIRONOLACTONE 25 MG/1
25 TABLET ORAL DAILY
Status: DISCONTINUED | OUTPATIENT
Start: 2022-05-08 | End: 2022-05-10 | Stop reason: HOSPADM

## 2022-05-07 RX ORDER — ATORVASTATIN CALCIUM 40 MG/1
40 TABLET, FILM COATED ORAL DAILY
Status: DISCONTINUED | OUTPATIENT
Start: 2022-05-08 | End: 2022-05-10 | Stop reason: HOSPADM

## 2022-05-07 RX ORDER — FUROSEMIDE 40 MG/1
40 TABLET ORAL
Status: ON HOLD | COMMUNITY
End: 2023-04-01 | Stop reason: HOSPADM

## 2022-05-07 RX ORDER — AMIODARONE HYDROCHLORIDE 400 MG/1
400 TABLET ORAL DAILY
Status: ON HOLD | COMMUNITY
End: 2023-04-01 | Stop reason: HOSPADM

## 2022-05-07 RX ORDER — ATORVASTATIN CALCIUM 40 MG/1
40 TABLET, FILM COATED ORAL DAILY
Status: ON HOLD | COMMUNITY
End: 2023-11-27 | Stop reason: SDUPTHER

## 2022-05-07 RX ORDER — SPIRONOLACTONE 25 MG/1
25 TABLET ORAL DAILY
Status: ON HOLD | COMMUNITY
End: 2023-04-01 | Stop reason: HOSPADM

## 2022-05-07 RX ORDER — SACUBITRIL AND VALSARTAN 24; 26 MG/1; MG/1
1 TABLET, FILM COATED ORAL 2 TIMES DAILY
Status: ON HOLD | COMMUNITY
End: 2023-04-01 | Stop reason: SDUPTHER

## 2022-05-07 RX ORDER — METOPROLOL SUCCINATE 25 MG/1
25 TABLET, EXTENDED RELEASE ORAL DAILY
Status: ON HOLD | COMMUNITY
End: 2023-04-01 | Stop reason: SDUPTHER

## 2022-05-07 RX ORDER — METOPROLOL SUCCINATE 25 MG/1
25 TABLET, EXTENDED RELEASE ORAL DAILY
Status: DISCONTINUED | OUTPATIENT
Start: 2022-05-08 | End: 2022-05-10 | Stop reason: HOSPADM

## 2022-05-07 RX ORDER — FUROSEMIDE 40 MG/1
40 TABLET ORAL DAILY
Status: DISCONTINUED | OUTPATIENT
Start: 2022-05-08 | End: 2022-05-10 | Stop reason: HOSPADM

## 2022-05-07 RX ORDER — ASPIRIN 81 MG/1
81 TABLET ORAL DAILY
Status: DISCONTINUED | OUTPATIENT
Start: 2022-05-08 | End: 2022-05-10 | Stop reason: HOSPADM

## 2022-05-07 RX ORDER — ANASTROZOLE 1 MG/1
1 TABLET ORAL DAILY
Status: ON HOLD | COMMUNITY
End: 2023-11-27 | Stop reason: SDUPTHER

## 2022-05-07 RX ORDER — PANTOPRAZOLE SODIUM 40 MG/1
40 TABLET, DELAYED RELEASE ORAL DAILY
Status: DISCONTINUED | OUTPATIENT
Start: 2022-05-08 | End: 2022-05-10 | Stop reason: HOSPADM

## 2022-05-07 RX ORDER — SUCRALFATE 1 G/1
1 TABLET ORAL
Status: DISCONTINUED | OUTPATIENT
Start: 2022-05-07 | End: 2022-05-07

## 2022-05-07 RX ORDER — AMIODARONE HYDROCHLORIDE 200 MG/1
400 TABLET ORAL DAILY
Status: DISCONTINUED | OUTPATIENT
Start: 2022-05-08 | End: 2022-05-10 | Stop reason: HOSPADM

## 2022-05-07 RX ORDER — ALUMINUM HYDROXIDE, MAGNESIUM HYDROXIDE, AND SIMETHICONE 2400; 240; 2400 MG/30ML; MG/30ML; MG/30ML
30 SUSPENSION ORAL
Status: DISCONTINUED | OUTPATIENT
Start: 2022-05-07 | End: 2022-05-10 | Stop reason: HOSPADM

## 2022-05-07 RX ORDER — SUCRALFATE 1 G/1
1 TABLET ORAL
Status: DISCONTINUED | OUTPATIENT
Start: 2022-05-07 | End: 2022-05-10 | Stop reason: HOSPADM

## 2022-05-07 RX ORDER — DEXLANSOPRAZOLE 60 MG/1
60 CAPSULE, DELAYED RELEASE ORAL DAILY
COMMUNITY

## 2022-05-07 RX ORDER — ANASTROZOLE 1 MG/1
1 TABLET ORAL DAILY
Status: DISCONTINUED | OUTPATIENT
Start: 2022-05-08 | End: 2022-05-10 | Stop reason: HOSPADM

## 2022-05-07 RX ORDER — ASPIRIN 81 MG/1
81 TABLET ORAL DAILY
Status: ON HOLD | COMMUNITY
End: 2022-10-24 | Stop reason: HOSPADM

## 2022-05-07 RX ADMIN — SACUBITRIL AND VALSARTAN 1 TABLET: 24; 26 TABLET, FILM COATED ORAL at 10:05

## 2022-05-07 RX ADMIN — ALUMINUM HYDROXIDE, MAGNESIUM HYDROXIDE, AND DIMETHICONE 30 ML: 400; 400; 40 SUSPENSION ORAL at 10:05

## 2022-05-07 RX ADMIN — SUCRALFATE 1 G: 1 TABLET ORAL at 10:05

## 2022-05-08 PROCEDURE — 11000001 HC ACUTE MED/SURG PRIVATE ROOM

## 2022-05-08 PROCEDURE — 25000003 PHARM REV CODE 250: Performed by: INTERNAL MEDICINE

## 2022-05-08 RX ADMIN — SUCRALFATE 1 G: 1 TABLET ORAL at 05:05

## 2022-05-08 RX ADMIN — AMIODARONE HYDROCHLORIDE 400 MG: 200 TABLET ORAL at 09:05

## 2022-05-08 RX ADMIN — ASPIRIN 81 MG: 81 TABLET, COATED ORAL at 09:05

## 2022-05-08 RX ADMIN — ALUMINUM HYDROXIDE, MAGNESIUM HYDROXIDE, AND DIMETHICONE 30 ML: 400; 400; 40 SUSPENSION ORAL at 08:05

## 2022-05-08 RX ADMIN — ATORVASTATIN CALCIUM 40 MG: 40 TABLET, FILM COATED ORAL at 09:05

## 2022-05-08 RX ADMIN — ANASTROZOLE 1 MG: 1 TABLET ORAL at 09:05

## 2022-05-08 RX ADMIN — SUCRALFATE 1 G: 1 TABLET ORAL at 11:05

## 2022-05-08 RX ADMIN — PANTOPRAZOLE SODIUM 40 MG: 40 TABLET, DELAYED RELEASE ORAL at 09:05

## 2022-05-08 RX ADMIN — SUCRALFATE 1 G: 1 TABLET ORAL at 08:05

## 2022-05-08 RX ADMIN — ALUMINUM HYDROXIDE, MAGNESIUM HYDROXIDE, AND DIMETHICONE 30 ML: 400; 400; 40 SUSPENSION ORAL at 05:05

## 2022-05-08 RX ADMIN — RIVAROXABAN 20 MG: 10 TABLET, FILM COATED ORAL at 05:05

## 2022-05-08 NOTE — H&P
[facility]                       [address] [address]    PATIENT NAME:       JI HILLMAN  YOB: 1950  CSN:                [id number]   MRN:                [id number]  ADMIT DATE:         05/06/2022 15:24:00  PHYSICIAN:          Juan F Mena MD                        HISTORY AND PHYSICAL      CHIEF COMPLAINT:  Weakness, near syncope, and dizziness.    HISTORY OF PRESENT ILLNESS:  Patient is a 71-year-old  female   who stated that she was at [employer] sitting  when she basically started feeling   weak, dizzy and almost passed out.  At this point, they called the ambulance   and patient was taken to emergency room at [facility],   and patient was evaluated.  Therefore, patient is to be admitted for further   evaluation and treatment.    PAST MEDICAL HISTORY:  Significant for hypertension and hyperlipidemia.  Breast   cancer.  Osteoarthritis.  Deep venous thrombosis.  Heart problem.    SOCIAL:  Patient is single, has 2 sons.  School:  She completed 10th grade.  She   does not drink.  Does not smoke.  No history of IV drug abuse.    ALLERGIES:  NO KNOWN ALLERGIES.     FAMILY HISTORY:  Significant for hypertension, heart problem and cancer.    PAST SURGICAL HISTORY:  Patient stated that she had both knee surgery, total   knee replacement.  Also had pacemaker placement and MediPort.    CURRENT MEDICATIONS:  Patient is on:   1. Amiodarone 400 mg once a day.    2. Anastrazole 1 g a day.    3. Aspirin 81 mg once a day.    4. Atorvastatin 40 mg once a day.    5. Furosemide 40 mg once a day.    6. Pantoprazole 40 mg once a day.   7. Xarelto 20 mg once a day.    8. Spironolactone 25 mg once a day.    9. Sucralfate 1 g q.i.d. before meals and at bedtime.    REVIEW OF SYSTEMS:  CARDIOVASCULAR:  Negative for chest pain.    RESPIRATORY:  No shortness of breath or  productive cough.    GASTROINTESTINAL:  No diarrhea or vomiting.    ENDOCRINOLOGIC:  Patient has no diabetes or thyroid disorder.    NEUROLOGICAL:  Negative.    PHYSICAL EXAMINATION:  VITAL SIGNS:  At the time patient was seen, she had a blood pressure of 102/53,   a pulse of 51, respirations 16, temperature 98.0.    HEAD:  Normocephalic with no acute trauma to the head.   EYES:  Pupils react to light.  Accommodation appears to be satisfactory.    EARS: NOSE AND THROAT:  No clear pathology.    NECK:  Supple.  No JVD or adenopathy.    HEART:  Patient has S1, S2.  No murmur or gallop.    CHEST:  Appeared to be fairly clear.  No wheezing or rales.    ABDOMEN:  Soft and nontender with good bowel sounds.   EXTREMITIES:  Superior and inferior fairly good range of motion, but the left   arm is edematous.  Patient stated that since she had the breast cancer surgery,   that has happened.  Because she had the lymph node removed, that should be part   of the surgery cut also.  NEUROLOGICAL:  No focal neurological deficit.  Cranial   nerves 2-12 intact.    IMPRESSION:    1. Patient has had near syncope.    2. Hypertension.    3. Hyperlipidemia.    4. History of breast cancer.    5. Deep venous thrombosis.    6. Osteoarthritis.    7. Pacemaker dependent.    PLAN:  We will continue present care.  When patient is stable enough, probably   will discharge the patient soon.        ______________________________  Juan F Mena MD    CHL/AQS  DD:  05/07/2022  Time:  07:47PM  DT:  05/07/2022  Time:  09:11PM  Job #:  [id number]      HISTORY AND PHYSICAL

## 2022-05-09 PROCEDURE — 25000003 PHARM REV CODE 250: Performed by: INTERNAL MEDICINE

## 2022-05-09 PROCEDURE — 11000001 HC ACUTE MED/SURG PRIVATE ROOM

## 2022-05-09 RX ADMIN — ALUMINUM HYDROXIDE, MAGNESIUM HYDROXIDE, AND DIMETHICONE 30 ML: 400; 400; 40 SUSPENSION ORAL at 09:05

## 2022-05-09 RX ADMIN — SACUBITRIL AND VALSARTAN 1 TABLET: 24; 26 TABLET, FILM COATED ORAL at 10:05

## 2022-05-09 RX ADMIN — SUCRALFATE 1 G: 1 TABLET ORAL at 10:05

## 2022-05-09 RX ADMIN — ALUMINUM HYDROXIDE, MAGNESIUM HYDROXIDE, AND DIMETHICONE 30 ML: 400; 400; 40 SUSPENSION ORAL at 05:05

## 2022-05-09 RX ADMIN — PANTOPRAZOLE SODIUM 40 MG: 40 TABLET, DELAYED RELEASE ORAL at 10:05

## 2022-05-09 RX ADMIN — SUCRALFATE 1 G: 1 TABLET ORAL at 05:05

## 2022-05-09 RX ADMIN — METOPROLOL SUCCINATE 25 MG: 25 TABLET, EXTENDED RELEASE ORAL at 10:05

## 2022-05-09 RX ADMIN — ANASTROZOLE 1 MG: 1 TABLET ORAL at 10:05

## 2022-05-09 RX ADMIN — FUROSEMIDE 40 MG: 40 TABLET ORAL at 10:05

## 2022-05-09 RX ADMIN — RIVAROXABAN 20 MG: 10 TABLET, FILM COATED ORAL at 04:05

## 2022-05-09 RX ADMIN — SUCRALFATE 1 G: 1 TABLET ORAL at 09:05

## 2022-05-09 RX ADMIN — ALUMINUM HYDROXIDE, MAGNESIUM HYDROXIDE, AND DIMETHICONE 30 ML: 400; 400; 40 SUSPENSION ORAL at 10:05

## 2022-05-09 RX ADMIN — AMIODARONE HYDROCHLORIDE 400 MG: 200 TABLET ORAL at 10:05

## 2022-05-09 RX ADMIN — SUCRALFATE 1 G: 1 TABLET ORAL at 04:05

## 2022-05-09 RX ADMIN — SPIRONOLACTONE 25 MG: 25 TABLET ORAL at 10:05

## 2022-05-09 RX ADMIN — SACUBITRIL AND VALSARTAN 1 TABLET: 24; 26 TABLET, FILM COATED ORAL at 09:05

## 2022-05-09 RX ADMIN — ASPIRIN 81 MG: 81 TABLET, COATED ORAL at 10:05

## 2022-05-09 RX ADMIN — ATORVASTATIN CALCIUM 40 MG: 40 TABLET, FILM COATED ORAL at 10:05

## 2022-05-09 NOTE — PROGRESS NOTES
[facility]                       [address] [address]    PATIENT NAME:       JI HILLMAN  YOB: 1950  CSN:                [id number]   MRN:                [id number]  ADMIT DATE:         05/06/2022 15:24:00  PHYSICIAN:          Juan F Mena MD                            PROGRESS NOTE    DATE:  05/08/2022 00:00:00    SUBJECTIVE:  Patient is a 71-year-old  female who definitely was   at [employer] when she basically passed out.  They had called ambulance and   transported patient to the emergency room.  We admitted the patient on telemetry   and also we found that patient during the hospitalization, her blood pressure   has been very low.  Patient has been having hypotension, so we had to stop her   medication today.  Also, we consulted cardiologist to see the patient and also   to make sure the pacemaker is working okay.  She is very happy to stay because   yesterday she did not want to stay.    EXAMINATION:  GENERAL:  She is a cooperative, alert, oriented.    HEART:  Patient has S1, S2.  No murmur or gallop.   CHEST:  Appeared to be clear.  No wheezing or rales.    ABDOMEN:  Soft and nontender.  Good bowel sounds.   EXTREMITIES:  Superior and inferiorly with good motor function.  No clubbing or   cyanosis.  Again, the left arm is edematous since patient had breast cancer   surgery.    IMPRESSION:    1. Near syncope.    2. Hypertension.    3. Hypotension.    4. Hyperlipidemia.    5. History of breast cancer.    6. Deep venous thrombosis.    7. Pacemaker dependent.    8. Osteoarthritis.    PLAN:  I am consulting.  Patient's cardiologist is Dr. Lezama, especially to   check the position and the review of the pacemaker also.  Patient has been   cooperative.        ______________________________  Juan F Mena MD    CHL/AQS  DD:  05/08/2022  Time:  06:50PM  DT:  05/08/2022  Time:   09:36PM  Job #:  [id number]      PROGRESS NOTE

## 2022-05-09 NOTE — PROGRESS NOTES
[facility] - Observation Unit Hospital Medicine  Progress Note    Patient Name: Laura Jacobs  MRN: [id number]  Patient Class: IP- Inpatient   Admission Date: 5/6/2022  Length of Stay: 3 days  Attending Physician: Ruben Brooks Sr., MD  Primary Care Provider: Ruben Brooks Sr, MD        Subjective:     Principal Problem-Syncope        HPI:  Patient is feeling much better.      Overview/Hospital Course:  Patient is responding very well to current treatment.      Interval History:    Review of Systems   Constitutional: Negative.    HENT: Negative.     Eyes: Negative.    Respiratory: Negative.     Cardiovascular: Negative.    Gastrointestinal: Negative.    Genitourinary: Negative.    Musculoskeletal: Negative.    Neurological: Negative.    Psychiatric/Behavioral: Negative.     Objective:     Vital Signs (Most Recent):  Temp: 97.9 °F (36.6 °C) (05/09/22 1623)  Pulse: 65 (05/09/22 1623)  Resp: 18 (05/09/22 1623)  BP: 98/66 (05/09/22 1623)  SpO2: 95 % (05/09/22 1623) Vital Signs (24h Range):  Temp:  [97.6 °F (36.4 °C)-98.3 °F (36.8 °C)] 97.9 °F (36.6 °C)  Pulse:  [65-85] 65  Resp:  [16-18] 18  SpO2:  [95 %-97 %] 95 %  BP: ()/(61-74) 98/66     Weight: 63.5 kg (139 lb 15.9 oz)  Body mass index is 24.8 kg/m².    Intake/Output Summary (Last 24 hours) at 5/9/2022 1844  Last data filed at 5/9/2022 1700  Gross per 24 hour   Intake 960 ml   Output --   Net 960 ml      Physical Exam  Constitutional:       Appearance: Normal appearance.   HENT:      Head: Normocephalic and atraumatic.      Nose: Nose normal.   Eyes:      Extraocular Movements: Extraocular movements intact.      Pupils: Pupils are equal, round, and reactive to light.   Cardiovascular:      Rate and Rhythm: Normal rate and regular rhythm.      Pulses: Normal pulses.      Heart sounds: Normal heart sounds.   Pulmonary:      Effort: Pulmonary effort is normal.      Breath sounds: Normal breath sounds.   Abdominal:      General: Abdomen is  flat. Bowel sounds are normal.      Palpations: Abdomen is soft.   Musculoskeletal:         General: Normal range of motion.      Cervical back: Normal range of motion and neck supple.   Skin:     General: Skin is warm and dry.   Neurological:      General: No focal deficit present.      Mental Status: She is alert and oriented to person, place, and time. Mental status is at baseline.       Significant Labs: All pertinent labs within the past 24 hours have been reviewed.    Significant Imaging: I have reviewed all pertinent imaging results/findings within the past 24 hours.      Assessment/Plan:      No notes have been filed under this hospital service.  Service: Hospital Medicine    VTE Risk Mitigation (From admission, onward)         Ordered     rivaroxaban tablet 20 mg  With dinner         05/07/22 1938                Discharge Planning   AMANDA:      Code Status: Not on file   Is the patient medically ready for discharge?:     Reason for patient still in hospital (select all that apply): Patient unstable  Discharge Plan A: Home with family                  Ruben Brooks Sr, MD  Department of Hospital Medicine   [facility] - Observation Unit

## 2022-05-09 NOTE — PLAN OF CARE
PT RESIDES WITH SONGLADYS. USES [employer].  INDEP. WITH ADL, COOKING. 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  USES Phloronol ON [employer].  WILL DISCHARGE HOME TO SON.

## 2022-05-09 NOTE — SUBJECTIVE & OBJECTIVE
Interval History:    Review of Systems   Constitutional: Negative.    HENT: Negative.     Eyes: Negative.    Respiratory: Negative.     Cardiovascular: Negative.    Gastrointestinal: Negative.    Genitourinary: Negative.    Musculoskeletal: Negative.    Neurological: Negative.    Psychiatric/Behavioral: Negative.     Objective:     Vital Signs (Most Recent):  Temp: 97.9 °F (36.6 °C) (05/09/22 1623)  Pulse: 65 (05/09/22 1623)  Resp: 18 (05/09/22 1623)  BP: 98/66 (05/09/22 1623)  SpO2: 95 % (05/09/22 1623) Vital Signs (24h Range):  Temp:  [97.6 °F (36.4 °C)-98.3 °F (36.8 °C)] 97.9 °F (36.6 °C)  Pulse:  [65-85] 65  Resp:  [16-18] 18  SpO2:  [95 %-97 %] 95 %  BP: ()/(61-74) 98/66     Weight: 63.5 kg (139 lb 15.9 oz)  Body mass index is 24.8 kg/m².    Intake/Output Summary (Last 24 hours) at 5/9/2022 1844  Last data filed at 5/9/2022 1700  Gross per 24 hour   Intake 960 ml   Output --   Net 960 ml      Physical Exam  Constitutional:       Appearance: Normal appearance.   HENT:      Head: Normocephalic and atraumatic.      Nose: Nose normal.   Eyes:      Extraocular Movements: Extraocular movements intact.      Pupils: Pupils are equal, round, and reactive to light.   Cardiovascular:      Rate and Rhythm: Normal rate and regular rhythm.      Pulses: Normal pulses.      Heart sounds: Normal heart sounds.   Pulmonary:      Effort: Pulmonary effort is normal.      Breath sounds: Normal breath sounds.   Abdominal:      General: Abdomen is flat. Bowel sounds are normal.      Palpations: Abdomen is soft.   Musculoskeletal:         General: Normal range of motion.      Cervical back: Normal range of motion and neck supple.   Skin:     General: Skin is warm and dry.   Neurological:      General: No focal deficit present.      Mental Status: She is alert and oriented to person, place, and time. Mental status is at baseline.       Significant Labs: All pertinent labs within the past 24 hours have been reviewed.    Significant  Imaging: I have reviewed all pertinent imaging results/findings within the past 24 hours.

## 2022-05-10 VITALS
HEIGHT: 63 IN | WEIGHT: 140 LBS | OXYGEN SATURATION: 90 % | RESPIRATION RATE: 18 BRPM | SYSTOLIC BLOOD PRESSURE: 113 MMHG | TEMPERATURE: 98 F | DIASTOLIC BLOOD PRESSURE: 80 MMHG | BODY MASS INDEX: 24.8 KG/M2 | HEART RATE: 67 BPM

## 2022-05-10 PROBLEM — R55 VASOVAGAL SYNCOPE: Status: ACTIVE | Noted: 2022-05-10

## 2022-05-10 PROCEDURE — 25000003 PHARM REV CODE 250: Performed by: INTERNAL MEDICINE

## 2022-05-10 RX ADMIN — ANASTROZOLE 1 MG: 1 TABLET ORAL at 08:05

## 2022-05-10 RX ADMIN — ASPIRIN 81 MG: 81 TABLET, COATED ORAL at 08:05

## 2022-05-10 RX ADMIN — FUROSEMIDE 40 MG: 40 TABLET ORAL at 08:05

## 2022-05-10 RX ADMIN — METOPROLOL SUCCINATE 25 MG: 25 TABLET, EXTENDED RELEASE ORAL at 08:05

## 2022-05-10 RX ADMIN — PANTOPRAZOLE SODIUM 40 MG: 40 TABLET, DELAYED RELEASE ORAL at 08:05

## 2022-05-10 RX ADMIN — SACUBITRIL AND VALSARTAN 1 TABLET: 24; 26 TABLET, FILM COATED ORAL at 08:05

## 2022-05-10 RX ADMIN — SPIRONOLACTONE 25 MG: 25 TABLET ORAL at 08:05

## 2022-05-10 RX ADMIN — SUCRALFATE 1 G: 1 TABLET ORAL at 06:05

## 2022-05-10 RX ADMIN — AMIODARONE HYDROCHLORIDE 400 MG: 200 TABLET ORAL at 08:05

## 2022-05-10 RX ADMIN — ATORVASTATIN CALCIUM 40 MG: 40 TABLET, FILM COATED ORAL at 08:05

## 2022-05-10 RX ADMIN — ALUMINUM HYDROXIDE, MAGNESIUM HYDROXIDE, AND DIMETHICONE 30 ML: 400; 400; 40 SUSPENSION ORAL at 06:05

## 2022-05-10 NOTE — PLAN OF CARE
Problem: Adult Inpatient Plan of Care  Goal: Patient-Specific Goal (Individualized)  Outcome: Ongoing, Progressing  Goal: Optimal Comfort and Wellbeing  Outcome: Ongoing, Progressing     Problem: Fall Injury Risk  Goal: Absence of Fall and Fall-Related Injury  5/10/2022 0316 by Marta Agarwal RN  Outcome: Ongoing, Progressing  5/10/2022 0316 by Marta Agarwal RN  Outcome: Ongoing, Progressing  5/9/2022 2014 by Marta Agarwal RN  Outcome: Ongoing, Progressing     Problem: Pain Acute  Goal: Acceptable Pain Control and Functional Ability  5/10/2022 0316 by Marta Agarwal RN  Outcome: Ongoing, Progressing  5/10/2022 0316 by Marta Agarwal RN  Outcome: Ongoing, Progressing

## 2022-05-10 NOTE — DISCHARGE SUMMARY
[facility]  Observation Unit  Hospital Medicine  Discharge Summary      Patient Name: Laura Jacobs  MRN: [id number]  Patient Class: IP- Inpatient  Admission Date: 5/6/2022  Hospital Length of Stay: 4 days  Discharge Date and Time:  05/10/2022 8:20 AM  Attending Physician: Ruben Brooks Sr., MD   Discharging Provider: Ruben Brooks Sr, MD  Primary Care Provider: Ruben Brooks Sr, MD      HPI:   Patient is feeling much better.      * No surgery found *      Hospital Course:   Patient is responding very well to current treatment.       Goals of Care Treatment Preferences:         Consults:     No new Assessment & Plan notes have been filed under this hospital service since the last note was generated.  Service: Hospital Medicine    Final Active Diagnoses:    Diagnosis Date Noted POA    PRINCIPAL PROBLEM:  Vasovagal syncope [R55] 05/10/2022 Yes      Problems Resolved During this Admission:       Discharged Condition: good    Disposition:     Follow Up:    Patient Instructions:   No discharge procedures on file.    Significant Diagnostic Studies: Labs: All labs within the past 24 hours have been reviewed    Pending Diagnostic Studies:     None         Medications:  Reconciled Home Medications:      Medication List      CONTINUE taking these medications    amiodarone 400 MG tablet  Commonly known as: PACERONE  Take 400 mg by mouth once daily.     anastrozole 1 mg Tab  Commonly known as: ARIMIDEX  Take 1 mg by mouth once daily.     aspirin 81 MG EC tablet  Commonly known as: ECOTRIN  Take 81 mg by mouth once daily.     atorvastatin 40 MG tablet  Commonly known as: LIPITOR  Take 40 mg by mouth once daily.     dexlansoprazole 60 mg capsule  Commonly known as: DEXILANT  Take 60 mg by mouth once daily.     ENTRESTO 24-26 mg per tablet  Generic drug: sacubitriL-valsartan  Take 1 tablet by mouth 2 (two) times daily.     furosemide 40 MG tablet  Commonly known as: LASIX  Take 40 mg by mouth once  daily.     metoprolol succinate 25 MG 24 hr tablet  Commonly known as: TOPROL-XL  Take 25 mg by mouth once daily.     rivaroxaban 20 mg Tab  Commonly known as: XARELTO  Take 20 mg by mouth daily with dinner or evening meal.     spironolactone 25 MG tablet  Commonly known as: ALDACTONE  Take 25 mg by mouth once daily.            Indwelling Lines/Drains at time of discharge:   Lines/Drains/Airways     None                 Time spent on the discharge of patient: 20 minutes         Ruben Brooks Sr, MD  Department of [facility] Medicine  [facility] - Observation Unit

## 2022-05-10 NOTE — PLAN OF CARE
Problem: Adult Inpatient Plan of Care  Goal: Patient-Specific Goal (Individualized)  Outcome: Ongoing, Progressing  Goal: Optimal Comfort and Wellbeing  Outcome: Ongoing, Progressing     Problem: Fall Injury Risk  Goal: Absence of Fall and Fall-Related Injury  Outcome: Ongoing, Progressing

## 2022-05-23 ENCOUNTER — LAB VISIT (OUTPATIENT)
Dept: LAB | Facility: HOSPITAL | Age: 72
End: 2022-05-23
Attending: NURSE PRACTITIONER
Payer: COMMERCIAL

## 2022-05-23 DIAGNOSIS — C50.911 MALIGNANT NEOPLASM OF RIGHT FEMALE BREAST, UNSPECIFIED ESTROGEN RECEPTOR STATUS, UNSPECIFIED SITE OF BREAST: ICD-10-CM

## 2022-05-23 DIAGNOSIS — C50.911 MALIGNANT NEOPLASM OF RIGHT FEMALE BREAST, UNSPECIFIED ESTROGEN RECEPTOR STATUS, UNSPECIFIED SITE OF BREAST: Primary | ICD-10-CM

## 2022-05-23 LAB
BASOPHILS # BLD AUTO: 0.01 X10(3)/MCL (ref 0–0.2)
BASOPHILS NFR BLD AUTO: 0.2 %
EOSINOPHIL # BLD AUTO: 0.14 X10(3)/MCL (ref 0–0.9)
EOSINOPHIL NFR BLD AUTO: 2.6 %
ERYTHROCYTE [DISTWIDTH] IN BLOOD BY AUTOMATED COUNT: 14 % (ref 11.5–17)
HCT VFR BLD AUTO: 41 % (ref 37–47)
HGB BLD-MCNC: 12.9 GM/DL (ref 12–16)
IMM GRANULOCYTES # BLD AUTO: 0.01 X10(3)/MCL (ref 0–0.02)
IMM GRANULOCYTES NFR BLD AUTO: 0.2 % (ref 0–0.43)
LYMPHOCYTES # BLD AUTO: 1.06 X10(3)/MCL (ref 0.6–4.6)
LYMPHOCYTES NFR BLD AUTO: 19.9 %
MCH RBC QN AUTO: 29.1 PG (ref 27–31)
MCHC RBC AUTO-ENTMCNC: 31.5 MG/DL (ref 33–36)
MCV RBC AUTO: 92.3 FL (ref 80–94)
MONOCYTES # BLD AUTO: 0.38 X10(3)/MCL (ref 0.1–1.3)
MONOCYTES NFR BLD AUTO: 7.1 %
NEUTROPHILS # BLD AUTO: 3.7 X10(3)/MCL (ref 2.1–9.2)
NEUTROPHILS NFR BLD AUTO: 70 %
PLATELET # BLD AUTO: 210 X10(3)/MCL (ref 130–400)
PMV BLD AUTO: 9.9 FL (ref 9.4–12.4)
RBC # BLD AUTO: 4.44 X10(6)/MCL (ref 4.2–5.4)
WBC # SPEC AUTO: 5.3 X10(3)/MCL (ref 4.5–11.5)

## 2022-05-23 PROCEDURE — 85025 COMPLETE CBC W/AUTO DIFF WBC: CPT

## 2022-05-23 PROCEDURE — 36415 COLL VENOUS BLD VENIPUNCTURE: CPT

## 2022-05-30 DIAGNOSIS — C50.911 MALIGNANT NEOPLASM OF RIGHT FEMALE BREAST, UNSPECIFIED ESTROGEN RECEPTOR STATUS, UNSPECIFIED SITE OF BREAST: Primary | ICD-10-CM

## 2022-05-30 RX ORDER — VERAPAMIL HYDROCHLORIDE 240 MG/1
240 TABLET, FILM COATED, EXTENDED RELEASE ORAL DAILY
COMMUNITY
Start: 2022-03-29

## 2022-05-30 RX ORDER — ASPIRIN 81 MG/1
81 TABLET ORAL DAILY
COMMUNITY
Start: 2022-04-21

## 2022-05-30 RX ORDER — AMLODIPINE BESYLATE 10 MG/1
5 TABLET ORAL DAILY
COMMUNITY
Start: 2022-03-05 | End: 2023-09-13

## 2022-05-30 RX ORDER — PRAVASTATIN SODIUM 20 MG/1
20 TABLET ORAL DAILY
COMMUNITY
Start: 2022-05-13

## 2022-05-30 RX ORDER — DEXLANSOPRAZOLE 60 MG/1
1 CAPSULE, DELAYED RELEASE ORAL DAILY
COMMUNITY
Start: 2022-05-25

## 2022-05-30 RX ORDER — CLONIDINE HYDROCHLORIDE 0.1 MG/1
0.1 TABLET ORAL 3 TIMES DAILY
COMMUNITY
Start: 2022-03-29

## 2022-05-30 RX ORDER — ANASTROZOLE 1 MG/1
1 TABLET ORAL DAILY
COMMUNITY
Start: 2022-05-25 | End: 2023-03-13

## 2022-05-30 RX ORDER — CETIRIZINE HYDROCHLORIDE 10 MG/1
10 TABLET ORAL NIGHTLY
COMMUNITY
Start: 2022-04-21

## 2022-05-31 ENCOUNTER — OFFICE VISIT (OUTPATIENT)
Dept: HEMATOLOGY/ONCOLOGY | Facility: CLINIC | Age: 72
End: 2022-05-31
Payer: COMMERCIAL

## 2022-05-31 ENCOUNTER — LAB VISIT (OUTPATIENT)
Dept: LAB | Facility: HOSPITAL | Age: 72
End: 2022-05-31
Attending: NURSE PRACTITIONER
Payer: COMMERCIAL

## 2022-05-31 VITALS
WEIGHT: 238.81 LBS | TEMPERATURE: 97 F | HEART RATE: 66 BPM | HEIGHT: 66 IN | RESPIRATION RATE: 16 BRPM | DIASTOLIC BLOOD PRESSURE: 60 MMHG | SYSTOLIC BLOOD PRESSURE: 126 MMHG | OXYGEN SATURATION: 95 % | BODY MASS INDEX: 38.38 KG/M2

## 2022-05-31 DIAGNOSIS — C50.911 MALIGNANT NEOPLASM OF RIGHT FEMALE BREAST, UNSPECIFIED ESTROGEN RECEPTOR STATUS, UNSPECIFIED SITE OF BREAST: Primary | ICD-10-CM

## 2022-05-31 DIAGNOSIS — C50.911 MALIGNANT NEOPLASM OF RIGHT FEMALE BREAST, UNSPECIFIED ESTROGEN RECEPTOR STATUS, UNSPECIFIED SITE OF BREAST: ICD-10-CM

## 2022-05-31 LAB
ALBUMIN SERPL-MCNC: 3.8 GM/DL (ref 3.4–4.8)
ALBUMIN/GLOB SERPL: 1 RATIO (ref 1.1–2)
ALP SERPL-CCNC: 101 UNIT/L (ref 40–150)
ALT SERPL-CCNC: 18 UNIT/L (ref 0–55)
AST SERPL-CCNC: 20 UNIT/L (ref 5–34)
BILIRUBIN DIRECT+TOT PNL SERPL-MCNC: 0.3 MG/DL
BUN SERPL-MCNC: 12 MG/DL (ref 9.8–20.1)
CALCIUM SERPL-MCNC: 9.4 MG/DL (ref 8.4–10.2)
CHLORIDE SERPL-SCNC: 107 MMOL/L (ref 98–107)
CO2 SERPL-SCNC: 27 MMOL/L (ref 23–31)
CREAT SERPL-MCNC: 0.91 MG/DL (ref 0.55–1.02)
GLOBULIN SER-MCNC: 3.7 GM/DL (ref 2.4–3.5)
GLUCOSE SERPL-MCNC: 79 MG/DL (ref 82–115)
POTASSIUM SERPL-SCNC: 3.9 MMOL/L (ref 3.5–5.1)
PROT SERPL-MCNC: 7.5 GM/DL (ref 5.8–7.6)
SODIUM SERPL-SCNC: 139 MMOL/L (ref 136–145)

## 2022-05-31 PROCEDURE — 3288F FALL RISK ASSESSMENT DOCD: CPT | Mod: CPTII,S$GLB,, | Performed by: NURSE PRACTITIONER

## 2022-05-31 PROCEDURE — 80053 COMPREHEN METABOLIC PANEL: CPT

## 2022-05-31 PROCEDURE — 3288F PR FALLS RISK ASSESSMENT DOCUMENTED: ICD-10-PCS | Mod: CPTII,S$GLB,, | Performed by: NURSE PRACTITIONER

## 2022-05-31 PROCEDURE — 3074F PR MOST RECENT SYSTOLIC BLOOD PRESSURE < 130 MM HG: ICD-10-PCS | Mod: CPTII,S$GLB,, | Performed by: NURSE PRACTITIONER

## 2022-05-31 PROCEDURE — 1160F PR REVIEW ALL MEDS BY PRESCRIBER/CLIN PHARMACIST DOCUMENTED: ICD-10-PCS | Mod: CPTII,S$GLB,, | Performed by: NURSE PRACTITIONER

## 2022-05-31 PROCEDURE — 1160F RVW MEDS BY RX/DR IN RCRD: CPT | Mod: CPTII,S$GLB,, | Performed by: NURSE PRACTITIONER

## 2022-05-31 PROCEDURE — 1159F PR MEDICATION LIST DOCUMENTED IN MEDICAL RECORD: ICD-10-PCS | Mod: CPTII,S$GLB,, | Performed by: NURSE PRACTITIONER

## 2022-05-31 PROCEDURE — 3008F BODY MASS INDEX DOCD: CPT | Mod: CPTII,S$GLB,, | Performed by: NURSE PRACTITIONER

## 2022-05-31 PROCEDURE — 3008F PR BODY MASS INDEX (BMI) DOCUMENTED: ICD-10-PCS | Mod: CPTII,S$GLB,, | Performed by: NURSE PRACTITIONER

## 2022-05-31 PROCEDURE — 1101F PT FALLS ASSESS-DOCD LE1/YR: CPT | Mod: CPTII,S$GLB,, | Performed by: NURSE PRACTITIONER

## 2022-05-31 PROCEDURE — 1101F PR PT FALLS ASSESS DOC 0-1 FALLS W/OUT INJ PAST YR: ICD-10-PCS | Mod: CPTII,S$GLB,, | Performed by: NURSE PRACTITIONER

## 2022-05-31 PROCEDURE — 1159F MED LIST DOCD IN RCRD: CPT | Mod: CPTII,S$GLB,, | Performed by: NURSE PRACTITIONER

## 2022-05-31 PROCEDURE — 1126F AMNT PAIN NOTED NONE PRSNT: CPT | Mod: CPTII,S$GLB,, | Performed by: NURSE PRACTITIONER

## 2022-05-31 PROCEDURE — 3078F PR MOST RECENT DIASTOLIC BLOOD PRESSURE < 80 MM HG: ICD-10-PCS | Mod: CPTII,S$GLB,, | Performed by: NURSE PRACTITIONER

## 2022-05-31 PROCEDURE — 4010F PR ACE/ARB THEARPY RXD/TAKEN: ICD-10-PCS | Mod: CPTII,S$GLB,, | Performed by: NURSE PRACTITIONER

## 2022-05-31 PROCEDURE — 4010F ACE/ARB THERAPY RXD/TAKEN: CPT | Mod: CPTII,S$GLB,, | Performed by: NURSE PRACTITIONER

## 2022-05-31 PROCEDURE — 3074F SYST BP LT 130 MM HG: CPT | Mod: CPTII,S$GLB,, | Performed by: NURSE PRACTITIONER

## 2022-05-31 PROCEDURE — 99999 PR PBB SHADOW E&M-EST. PATIENT-LVL IV: CPT | Mod: PBBFAC,,, | Performed by: NURSE PRACTITIONER

## 2022-05-31 PROCEDURE — 3078F DIAST BP <80 MM HG: CPT | Mod: CPTII,S$GLB,, | Performed by: NURSE PRACTITIONER

## 2022-05-31 PROCEDURE — 99999 PR PBB SHADOW E&M-EST. PATIENT-LVL IV: ICD-10-PCS | Mod: PBBFAC,,, | Performed by: NURSE PRACTITIONER

## 2022-05-31 PROCEDURE — 1126F PR PAIN SEVERITY QUANTIFIED, NO PAIN PRESENT: ICD-10-PCS | Mod: CPTII,S$GLB,, | Performed by: NURSE PRACTITIONER

## 2022-05-31 PROCEDURE — 36415 COLL VENOUS BLD VENIPUNCTURE: CPT

## 2022-05-31 PROCEDURE — 99214 PR OFFICE/OUTPT VISIT, EST, LEVL IV, 30-39 MIN: ICD-10-PCS | Mod: S$GLB,,, | Performed by: NURSE PRACTITIONER

## 2022-05-31 PROCEDURE — 99214 OFFICE O/P EST MOD 30 MIN: CPT | Mod: S$GLB,,, | Performed by: NURSE PRACTITIONER

## 2022-05-31 NOTE — PROGRESS NOTES
[facility] Hematology/Oncology  PROGRESS NOTE      Subjective:         NAME: Klarissa Salazar : 1950     71 y.o. female   MRN: [id number]      Chief Complaint:    Chief Complaint   Patient presents with    Breast Cancer     F/u with labs--- Pt reports no new concerns         Current Treatment : Arimidex 2017     History of Present Illness:   Ms. Salazar is a 68 yo BF with PMH of HTN and hyperlipidemia who had an abnormal mammogram in 2017. It was recommended that she undergo additional imaging and US of R breast done on 3/3/17 demonstrated a 1.1 x 1.0 cm suspicious, solid, irregular mass at the 930 position. She was referred to Dr. Farmer who performed a needle localization and excision of R breast mass on 3/15/17. Pathology revealed invasive ductal carcinoma, grade 1. ER/ME both positive, Her 2 negative. After much discussion, decision was made to proceed with complete mastectomy and sentinel lymph node biopsy, which she had done on 3/30/17. Path from this procedure was negative for residual malignancy in the breast and sentinel lymph node was negative, but 1 of 16 additional lymph nodes removed had metastatic carcinoma present. She has been referred to oncology to discuss additional treatment options.     Treatment completed:   R mastectomy and SLNB 3/30/17   Path: No residual carcinoma identified. [place] lymph node negative. 1 of 16 other lymph nodes with metastatic carcinoma.      Interval History:  Pt is here today for follow up of ER + breast ca dx 3/2017 s/p right mastectomy currently on arimidex, Calcium and Vit D   She has no new concerns. She is tolerating Arimidex ,She has minimal hot flashes, she declines medication for this.   Denies any new pain, unintentional weight loss. She denies shortness of breath, chest pain, dizziness of headaches.   DEXA 2021-Borderline osteopenia with diminishing bone density since prior   exam    ROS:   Review of Systems   Constitutional: Negative.    HENT:  Negative.    Eyes: Negative.    Respiratory: Negative.    Cardiovascular: Negative.    Gastrointestinal: Positive for constipation.   Genitourinary: Negative.    Musculoskeletal: Negative.    Skin: Negative.    Neurological: Negative.    Endo/Heme/Allergies: Negative.    Psychiatric/Behavioral: Negative.      Allergies:  Review of patient's allergies indicates:   Allergen Reactions    Clindamycin      Other reaction(s): Angioedema    Lisinopril      Other reaction(s): Angioedema    Sulfa (sulfonamide antibiotics)     Sulfamethoxazole-trimethoprim      Other reaction(s): Angioedema       Medications:    Current Outpatient Medications:     amLODIPine (NORVASC) 10 MG tablet, Take 10 mg by mouth once daily., Disp: , Rfl:     anastrozole (ARIMIDEX) 1 mg Tab, Take 1 mg by mouth once daily., Disp: , Rfl:     aspirin (ECOTRIN) 81 MG EC tablet, Take 81 mg by mouth once daily., Disp: , Rfl:     cetirizine (ZYRTEC) 10 MG tablet, Take 10 mg by mouth nightly., Disp: , Rfl:     cloNIDine (CATAPRES) 0.1 MG tablet, Take 0.1 mg by mouth 3 (three) times daily., Disp: , Rfl:     dexlansoprazole (DEXILANT) 60 mg capsule, Take 1 capsule by mouth once daily at 6am., Disp: , Rfl:     pravastatin (PRAVACHOL) 20 MG tablet, Take 20 mg by mouth once daily., Disp: , Rfl:     verapamiL (CALAN-SR) 240 MG CR tablet, Take 240 mg by mouth once daily., Disp: , Rfl:     PMHx/PSHx Updates:  Past Medical History:   Diagnosis Date    High cholesterol     HTN (hypertension)     Malignant neoplasm of right female breast      Past Surgical History:   Procedure Laterality Date    BREAST LUMPECTOMY W/ NEEDLE LOCALIZATION Right     HYSTERECTOMY      MASTECTOMY W/ SENTINEL NODE BIOPSY Right        Family History:   Family History   Problem Relation Age of Onset    Diabetes Mother     Hypertension Mother     Lung cancer Mother        Social History:   Social History     Socioeconomic History    Marital status:    Tobacco Use     "Smoking status: Never Smoker    Smokeless tobacco: Never Used   Substance and Sexual Activity    Alcohol use: Yes    Drug use: Never             Objective:     Vitals:  Blood pressure 126/60, pulse 66, temperature 97.2 °F (36.2 °C), resp. rate 16, height 5' 6" (1.676 m), weight 108.3 kg (238 lb 12.8 oz), SpO2 95 %.    Physical Examination:   Physical Exam  Vitals reviewed.   Constitutional:       Appearance: Normal appearance.   HENT:      Head: Normocephalic and atraumatic.      Right Ear: External ear normal.      Left Ear: External ear normal.      Nose: Nose normal.      Mouth/Throat:      Mouth: Mucous membranes are dry.      Pharynx: Oropharynx is clear.   Eyes:      Extraocular Movements: Extraocular movements intact.      Conjunctiva/sclera: Conjunctivae normal.      Pupils: Pupils are equal, round, and reactive to light.   Cardiovascular:      Rate and Rhythm: Normal rate and regular rhythm.      Pulses: Normal pulses.      Heart sounds: Normal heart sounds.   Pulmonary:      Effort: Pulmonary effort is normal.      Breath sounds: Normal breath sounds.   Abdominal:      General: Abdomen is flat. Bowel sounds are normal.      Palpations: Abdomen is soft.   Musculoskeletal:         General: Normal range of motion.      Cervical back: Normal range of motion and neck supple.   Skin:     General: Skin is warm and dry.   Neurological:      General: No focal deficit present.      Mental Status: She is alert and oriented to person, place, and time.   Psychiatric:         Mood and Affect: Mood normal.         Behavior: Behavior normal.         Thought Content: Thought content normal.         Judgment: Judgment normal.      Breasts: left breasts appear normal, no suspicious masses, no skin or nipple changes or axillary nodes, right breast s/p mastectomy, keloid scarring       Labs:   Lab Visit on 05/31/2022   Component Date Value Ref Range Status    Sodium Level 05/31/2022 139  136 - 145 mmol/L Final    " Potassium Level 05/31/2022 3.9  3.5 - 5.1 mmol/L Final    Chloride 05/31/2022 107  98 - 107 mmol/L Final    Carbon Dioxide 05/31/2022 27  23 - 31 mmol/L Final    Glucose Level 05/31/2022 79 (A) 82 - 115 mg/dL Final    Blood Urea Nitrogen 05/31/2022 12.0  9.8 - 20.1 mg/dL Final    Creatinine 05/31/2022 0.91  0.55 - 1.02 mg/dL Final    Calcium Level Total 05/31/2022 9.4  8.4 - 10.2 mg/dL Final    Protein Total 05/31/2022 7.5  5.8 - 7.6 gm/dL Final    Albumin Level 05/31/2022 3.8  3.4 - 4.8 gm/dL Final    Globulin 05/31/2022 3.7 (A) 2.4 - 3.5 gm/dL Final    Albumin/Globulin Ratio 05/31/2022 1.0 (A) 1.1 - 2.0 ratio Final    Bilirubin Total 05/31/2022 0.3  <=1.5 mg/dL Final    Alkaline Phosphatase 05/31/2022 101  40 - 150 unit/L Final    Alanine Aminotransferase 05/31/2022 18  0 - 55 unit/L Final    Aspartate Aminotransferase 05/31/2022 20  5 - 34 unit/L Final    Estimated GFR- 05/31/2022 >60  mls/min/1.73/m2 Final         Assessment/Plan:   Right breast cancer, T1N1M0, ER/HI positive and Her2 negative. S/p mastectomy and lymph node biopsy on 3/30/17. Because Oncotype recurrence score was 8, endocrine therapy alone was recommended.   She started adjuvant AI at the end of May 2017 and continues to tolerate it well. The hot flashes she reports are infrequent and mild.   Today pt is clinically stable, has YESENIA on exam and her labs are unremarkable. Recommend she continue current treatment with Arimidex.   Bone health. DEXA 7/2021 reveals boarderline osteopenia - slightly worse than previous. Rec she continue OTC Petar/Vit D supplementation.   Discussed Prolia advised patient she will need to get dental clearance, pt states that she will wait for now  MMG 3/2022- Recommend annual supplemental breast screening with dynamic contrast enhanced breast MRI in this patient with a personal history of breast cancer and heterogeneously dense breast tissue. Ideally, mammography and breast MRI are alternated  every 6 months. To establish such a schedule, breast MRI would be due September, 2022.        PLAN:  RTC 4mo with MD  with CMP   MRI breast ordered   Mammo left breast 3/2022         Discussion:     I have explained all of the above in detail and the patient understands all of the current recommendation(s). I have answered all of their questions to the best of my ability and to their complete satisfaction.   The patient is to continue with the current management plan.            Electronically signed by Darleen Harp NP

## 2022-06-25 ENCOUNTER — HOSPITAL ENCOUNTER (EMERGENCY)
Facility: HOSPITAL | Age: 72
Discharge: HOME | End: 2022-06-25
Attending: STUDENT IN AN ORGANIZED HEALTH CARE EDUCATION/TRAINING PROGRAM
Payer: COMMERCIAL

## 2022-06-25 VITALS
HEART RATE: 86 BPM | SYSTOLIC BLOOD PRESSURE: 129 MMHG | RESPIRATION RATE: 18 BRPM | HEIGHT: 64 IN | WEIGHT: 154.31 LBS | TEMPERATURE: 99 F | BODY MASS INDEX: 26.34 KG/M2 | DIASTOLIC BLOOD PRESSURE: 88 MMHG | OXYGEN SATURATION: 99 %

## 2022-06-25 DIAGNOSIS — M25.462 KNEE EFFUSION, LEFT: Primary | ICD-10-CM
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
## 2022-06-25 PROCEDURE — 25000003 PHARM REV CODE 250: Performed by: PHYSICIAN ASSISTANT

## 2022-06-25 PROCEDURE — 99283 EMERGENCY DEPT VISIT LOW MDM: CPT | Mod: 25

## 2022-06-25 RX ORDER — HYDROCODONE BITARTRATE AND ACETAMINOPHEN 5; 325 MG/1; MG/1
1 TABLET ORAL EVERY 6 HOURS PRN
Qty: 12 TABLET | Refills: 0 | Status: SHIPPED | OUTPATIENT
Start: 2022-06-25 | End: 2022-06-25 | Stop reason: SDUPTHER

## 2022-06-25 RX ORDER — HYDROCODONE BITARTRATE AND ACETAMINOPHEN 5; 325 MG/1; MG/1
1 TABLET ORAL
Status: COMPLETED | OUTPATIENT
Start: 2022-06-25 | End: 2022-06-25

## 2022-06-25 RX ORDER — HYDROCODONE BITARTRATE AND ACETAMINOPHEN 5; 325 MG/1; MG/1
1 TABLET ORAL EVERY 6 HOURS PRN
Qty: 12 TABLET | Refills: 0 | Status: SHIPPED | OUTPATIENT
Start: 2022-06-25 | End: 2022-06-28

## 2022-06-25 RX ADMIN — HYDROCODONE BITARTRATE AND ACETAMINOPHEN 1 TABLET: 5; 325 TABLET ORAL at 10:06

## 2022-06-25 NOTE — ED PROVIDER NOTES
"Encounter Date: 6/25/2022       History     Chief Complaint   Patient presents with    Fall     Pt slipped and fell on left knee yesterday, pt uses cane at baseline, difficulty walking since accident and pain is on anterior surface, previous knee surgery (1 year ago- MD luther). Pt denies hitting head or loc.     72 y/o female with hx of prior total knee replacement presents with acute pain and swelling after having a trip and fall at home yesterday.  Denies head injury or LOC.  No numbness, tingling, or weakness to extremities.  Denies any other injuries.  States "I just need some pain medication to go to my niece's party".    The history is provided by the patient. No  was used.     Review of patient's allergies indicates:  No Known Allergies  Past Medical History:   Diagnosis Date    Cancer     breast CA s/p chemo and L masectomy     CHF (congestive heart failure)     History of DVT (deep vein thrombosis)     HLD (hyperlipidemia)     Hypertension     Osteoarthritis     Venous insufficiency      Past Surgical History:   Procedure Laterality Date    HYSTERECTOMY      INSERTION OF PACEMAKER      JOINT REPLACEMENT Bilateral     Knee    MASTECTOMY Left 2019     Family History   Family history unknown: Yes     Social History     Tobacco Use    Smoking status: Never Smoker    Smokeless tobacco: Never Used   Substance Use Topics    Alcohol use: Not Currently    Drug use: Never     Review of Systems   Constitutional: Negative.    Respiratory: Negative.    Cardiovascular: Negative.    Musculoskeletal: Positive for gait problem and joint swelling. Negative for back pain and neck pain.   Neurological: Negative for dizziness, syncope, weakness, light-headedness and numbness.   All other systems reviewed and are negative.      Physical Exam     Initial Vitals [06/25/22 0800]   BP Pulse Resp Temp SpO2   119/73 67 12 99 °F (37.2 °C) 98 %      MAP       --         Physical " Exam    Constitutional: She appears well-developed and well-nourished.   HENT:   Head: Normocephalic and atraumatic.   Neck: Neck supple.   Normal range of motion.  Cardiovascular: Normal rate, regular rhythm, normal heart sounds and intact distal pulses.   Pulmonary/Chest: Breath sounds normal. No respiratory distress. She has no rhonchi. She has no rales.   Abdominal: Abdomen is soft. Bowel sounds are normal. There is no abdominal tenderness.   Musculoskeletal:      Cervical back: Normal range of motion and neck supple.      Comments: Left Knee:  Anterior scar noted c/w hx of total knee replacement.  Moderate edema and effusion noted.  Diffuse anterior tenderness noted on exam.  Limited ROM secondary to pain and swelling.  No definite instabilities.  Distal N/V status intact.      Normal Left ankle and left hip.     Skin: Skin is warm and dry.   Psychiatric: She has a normal mood and affect.         ED Course   Procedures  Labs Reviewed - No data to display       Imaging Results          X-Ray Knee Complete 4 or More Views Left (Final result)  Result time 06/25/22 08:56:11    Final result by Jaxon De Jesus MD (06/25/22 08:56:11)                 Impression:      No acute abnormalities are demonstrated.    Intra-articular effusion      Electronically signed by: Jaxon De Jesus MD  Date:    06/25/2022  Time:    08:56             Narrative:    EXAMINATION:  XR KNEE COMP 4 OR MORE VIEWS LEFT    CLINICAL HISTORY:  Unspecified fall, initial encounter    COMPARISON:  11/28/2016    FINDINGS:  There is a prosthesis in the left knee.  No acute fractures are seen.  There is no dislocation.  There is a joint effusion present.                                 Medications   HYDROcodone-acetaminophen 5-325 mg per tablet 1 tablet (1 tablet Oral Given 6/25/22 1006)        Additional MDM:   Differential Diagnosis:   Other: The following diagnoses were also considered and will be evaluated: patella fracture, knee effusion and knee  sprain. Patient presented with acute swelling and pain after slip and fall at home.  Xray is negative for acute findings.  She is N/V intact.  RICE protocol.  Will dc with ace wrap and knee immobilizer.  Will also give short course of Norco.  Patient is on Xarelto so will avoid NSAIDS.  Again, she did not hit her head or have a LOC.  She has denied headache, dizziness, and weakness.  Advised fu with Ortho next week and return to ED with worsening symptoms.                      Clinical Impression:   Final diagnoses:  [W19.XXXA] Fall  [M25.462] Knee effusion, left (Primary)          ED Disposition Condition    Discharge Stable        ED Prescriptions     Medication Sig Dispense Start Date End Date Auth. Provider    HYDROcodone-acetaminophen (NORCO) 5-325 mg per tablet  (Status: Discontinued) Take 1 tablet by mouth every 6 (six) hours as needed for Pain. 12 tablet 6/25/2022 6/25/2022 Maribell Agosto PA-C    HYDROcodone-acetaminophen (NORCO) 5-325 mg per tablet  (Status: Discontinued) Take 1 tablet by mouth every 6 (six) hours as needed for Pain. 12 tablet 6/25/2022 6/25/2022 Maribell Agosto PA-C    HYDROcodone-acetaminophen (NORCO) 5-325 mg per tablet  (Status: Discontinued) Take 1 tablet by mouth every 6 (six) hours as needed for Pain. 12 tablet 6/25/2022 6/25/2022 Maribell Agosto PA-C    HYDROcodone-acetaminophen (NORCO) 5-325 mg per tablet Take 1 tablet by mouth every 6 (six) hours as needed for Pain. 12 tablet 6/25/2022 6/28/2022 Maribell Agosto PA-C        Follow-up Information     Follow up With Specialties Details Why Contact Info    Ruben Brooks Sr., MD Internal Medicine In 3 days for recheck.  Return to ED with worsening symptoms. [address] [facility] [address]  [address]  [facility] [id number] [phone]      Addison Montes MD Orthopedic Surgery In 3 days for follow up. [address]  [address]  [place] [address]  [phone]             Maribell Agosto PA-C  06/25/22 0701

## 2022-06-27 DIAGNOSIS — M25.569 KNEE PAIN, UNSPECIFIED CHRONICITY, UNSPECIFIED LATERALITY: Primary | ICD-10-CM

## 2022-07-04 ENCOUNTER — HOSPITAL ENCOUNTER (EMERGENCY)
Facility: HOSPITAL | Age: 72
Discharge: HOME | End: 2022-07-04
Attending: INTERNAL MEDICINE
Payer: COMMERCIAL

## 2022-07-04 VITALS
SYSTOLIC BLOOD PRESSURE: 158 MMHG | RESPIRATION RATE: 19 BRPM | DIASTOLIC BLOOD PRESSURE: 80 MMHG | BODY MASS INDEX: 37.45 KG/M2 | TEMPERATURE: 99 F | OXYGEN SATURATION: 96 % | HEART RATE: 71 BPM | HEIGHT: 66 IN | WEIGHT: 233 LBS

## 2022-07-04 DIAGNOSIS — R42 DIZZINESS: Primary | ICD-10-CM

## 2022-07-04 PROBLEM — E78.00 HYPERCHOLESTEROLEMIA: Status: ACTIVE | Noted: 2022-07-04

## 2022-07-04 PROBLEM — I10 HYPERTENSION: Status: ACTIVE | Noted: 2022-07-04

## 2022-07-04 PROBLEM — C50.919 MALIGNANT NEOPLASM OF BREAST: Status: ACTIVE | Noted: 2022-07-04

## 2022-07-04 LAB
ALBUMIN SERPL-MCNC: 3.7 GM/DL (ref 3.4–4.8)
ALBUMIN/GLOB SERPL: 1 RATIO (ref 1.1–2)
ALP SERPL-CCNC: 114 UNIT/L (ref 40–150)
ALT SERPL-CCNC: 24 UNIT/L (ref 0–55)
APPEARANCE UR: CLEAR
AST SERPL-CCNC: 23 UNIT/L (ref 5–34)
BASOPHILS # BLD AUTO: 0.02 X10(3)/MCL (ref 0–0.2)
BASOPHILS NFR BLD AUTO: 0.3 %
BILIRUB UR QL STRIP.AUTO: NEGATIVE MG/DL
BILIRUBIN DIRECT+TOT PNL SERPL-MCNC: 0.2 MG/DL
BNP BLD-MCNC: 14.3 PG/ML
BUN SERPL-MCNC: 12 MG/DL (ref 9.8–20.1)
CALCIUM SERPL-MCNC: 9.4 MG/DL (ref 8.4–10.2)
CHLORIDE SERPL-SCNC: 111 MMOL/L (ref 98–107)
CO2 SERPL-SCNC: 22 MMOL/L (ref 23–31)
COLOR UR AUTO: YELLOW
CREAT SERPL-MCNC: 0.79 MG/DL (ref 0.55–1.02)
EOSINOPHIL # BLD AUTO: 0.07 X10(3)/MCL (ref 0–0.9)
EOSINOPHIL NFR BLD AUTO: 1.2 %
ERYTHROCYTE [DISTWIDTH] IN BLOOD BY AUTOMATED COUNT: 13.6 % (ref 11.5–17)
GLOBULIN SER-MCNC: 3.7 GM/DL (ref 2.4–3.5)
GLUCOSE SERPL-MCNC: 95 MG/DL (ref 82–115)
GLUCOSE UR QL STRIP.AUTO: NEGATIVE MG/DL
HCT VFR BLD AUTO: 40.8 % (ref 37–47)
HGB BLD-MCNC: 12.6 GM/DL (ref 12–16)
IMM GRANULOCYTES # BLD AUTO: 0.01 X10(3)/MCL (ref 0–0.04)
IMM GRANULOCYTES NFR BLD AUTO: 0.2 %
KETONES UR QL STRIP.AUTO: NEGATIVE MG/DL
LEUKOCYTE ESTERASE UR QL STRIP.AUTO: NEGATIVE UNIT/L
LYMPHOCYTES # BLD AUTO: 0.79 X10(3)/MCL (ref 0.6–4.6)
LYMPHOCYTES NFR BLD AUTO: 13.2 %
MCH RBC QN AUTO: 28.8 PG (ref 27–31)
MCHC RBC AUTO-ENTMCNC: 30.9 MG/DL (ref 33–36)
MCV RBC AUTO: 93.4 FL (ref 80–94)
MONOCYTES # BLD AUTO: 0.37 X10(3)/MCL (ref 0.1–1.3)
MONOCYTES NFR BLD AUTO: 6.2 %
NEUTROPHILS # BLD AUTO: 4.7 X10(3)/MCL (ref 2.1–9.2)
NEUTROPHILS NFR BLD AUTO: 78.9 %
NITRITE UR QL STRIP.AUTO: NEGATIVE
PH UR STRIP.AUTO: 7 [PH]
PLATELET # BLD AUTO: 194 X10(3)/MCL (ref 130–400)
PMV BLD AUTO: 10 FL (ref 7.4–10.4)
POTASSIUM SERPL-SCNC: 3.8 MMOL/L (ref 3.5–5.1)
PROT SERPL-MCNC: 7.4 GM/DL (ref 5.8–7.6)
PROT UR QL STRIP.AUTO: NEGATIVE MG/DL
RBC # BLD AUTO: 4.37 X10(6)/MCL (ref 4.2–5.4)
RBC UR QL AUTO: NEGATIVE UNIT/L
SARS-COV-2 RDRP RESP QL NAA+PROBE: NEGATIVE
SODIUM SERPL-SCNC: 142 MMOL/L (ref 136–145)
SP GR UR STRIP.AUTO: 1.01
TROPONIN I SERPL-MCNC: <0.01 NG/ML (ref 0–0.04)
TSH SERPL-ACNC: 1.97 UIU/ML (ref 0.35–4.94)
UROBILINOGEN UR STRIP-ACNC: 0.2 MG/DL
WBC # SPEC AUTO: 6 X10(3)/MCL (ref 4.5–11.5)

## 2022-07-04 PROCEDURE — 81003 URINALYSIS AUTO W/O SCOPE: CPT | Performed by: INTERNAL MEDICINE

## 2022-07-04 PROCEDURE — 99285 EMERGENCY DEPT VISIT HI MDM: CPT | Mod: 25

## 2022-07-04 PROCEDURE — 36415 COLL VENOUS BLD VENIPUNCTURE: CPT | Performed by: INTERNAL MEDICINE

## 2022-07-04 PROCEDURE — 80053 COMPREHEN METABOLIC PANEL: CPT | Performed by: INTERNAL MEDICINE

## 2022-07-04 PROCEDURE — 93005 ELECTROCARDIOGRAM TRACING: CPT

## 2022-07-04 PROCEDURE — 87635 SARS-COV-2 COVID-19 AMP PRB: CPT | Performed by: INTERNAL MEDICINE

## 2022-07-04 PROCEDURE — 84443 ASSAY THYROID STIM HORMONE: CPT | Performed by: INTERNAL MEDICINE

## 2022-07-04 PROCEDURE — 85025 COMPLETE CBC W/AUTO DIFF WBC: CPT | Performed by: INTERNAL MEDICINE

## 2022-07-04 PROCEDURE — 83880 ASSAY OF NATRIURETIC PEPTIDE: CPT | Performed by: INTERNAL MEDICINE

## 2022-07-04 PROCEDURE — 84484 ASSAY OF TROPONIN QUANT: CPT | Performed by: INTERNAL MEDICINE

## 2022-07-04 NOTE — ED NOTES
Pt to ED via POV for near syncope. Pt states she woke up and walked into living room, was going to close AC vent and got dizzy, almost passing out. PT awake and alert, feels well now. Pt denies any other complaints at this time. Pt moves all extremities, no distress, all safety and personal needs addressed.

## 2022-07-04 NOTE — ED PROVIDER NOTES
07/04/2022         11:27 AM    Source of History:  History obtained from the patient.       Chief complaint:  From Nurse Triage:  Dizziness (States she woke up dizzy and feeling faintish this am)    HPI:  Klarissa Salazar is a 71 y.o. female presenting with Dizziness (States she woke up dizzy and feeling faintish this am)       With history of hypertension comes to the emergency room with complaint of dizziness which started about 15-20 minute after she woke up, she says she was feeling cold this morning, was trying to close the air-conditioner vent with a broom and she got dizzy and almost passes out, she is feeling okay now.  Denies any other complaints    Review of Systems   Constitutional symptoms:  No Fever. No Chills    Skin symptoms:  No Rash.    Eye symptoms:  Negative except as documented in HPI.   ENMT symptoms:  No Sore throat,    Respiratory symptoms:  No Shortness of Breath, no Cough, no Wheezing.    Cardiovascular symptoms:  No Chest pain, no Palpitations, no Tachycardia.    Gastrointestinal symptoms:  No Abdominal pain, no Nausea, no Vomiting, no Diarrhea, no Constipation.    Genitourinary symptoms:  No Dysuria,    Musculoskeletal symptoms:  No Back pain,    Neurologic symptoms:  no Headache, Dizziness.    Psychiatric symptoms:  No Anxiety, No Depression, No Substance Abuse.              Additional review of systems information: Patient Denies Any Other Complaints.  All Other Systems Reviewed With Patient And Negative.    ALLEGIES:  Review of patient's allergies indicates:   Allergen Reactions    Clindamycin      Other reaction(s): Angioedema    Lisinopril      Other reaction(s): Angioedema    Sulfa (sulfonamide antibiotics)     Sulfamethoxazole-trimethoprim      Other reaction(s): Angioedema       HOME MEDICINES:  No current facility-administered medications for this encounter.    Current Outpatient Medications:     amLODIPine (NORVASC) 10 MG tablet, Take 10 mg by mouth once daily., Disp: ,  "Rfl:     anastrozole (ARIMIDEX) 1 mg Tab, Take 1 mg by mouth once daily., Disp: , Rfl:     aspirin (ECOTRIN) 81 MG EC tablet, Take 81 mg by mouth once daily., Disp: , Rfl:     cetirizine (ZYRTEC) 10 MG tablet, Take 10 mg by mouth nightly., Disp: , Rfl:     cloNIDine (CATAPRES) 0.1 MG tablet, Take 0.1 mg by mouth 3 (three) times daily., Disp: , Rfl:     dexlansoprazole (DEXILANT) 60 mg capsule, Take 1 capsule by mouth once daily at 6am., Disp: , Rfl:     pravastatin (PRAVACHOL) 20 MG tablet, Take 20 mg by mouth once daily., Disp: , Rfl:     verapamiL (CALAN-SR) 240 MG CR tablet, Take 240 mg by mouth once daily., Disp: , Rfl:     PMH:  As per HPI and below:    Reviewed and updated in chart.    PAST MEDICAL HISTORY:  Past Medical History:   Diagnosis Date    High cholesterol     HTN (hypertension)     Malignant neoplasm of right female breast         PAST SURGICAL HISTORY:  Past Surgical History:   Procedure Laterality Date    BREAST LUMPECTOMY W/ NEEDLE LOCALIZATION Right     HYSTERECTOMY      MASTECTOMY W/ SENTINEL NODE BIOPSY Right        SOCIAL HISTORY:  Social History     Tobacco Use    Smoking status: Never Smoker    Smokeless tobacco: Never Used   Substance Use Topics    Alcohol use: Yes    Drug use: Never       FAMILY HISTORY:  Family History   Problem Relation Age of Onset    Diabetes Mother     Hypertension Mother     Lung cancer Mother         PROBLEM LIST:  Patient Active Problem List   Diagnosis    Hypercholesterolemia    Hypertension    Malignant neoplasm of breast        PHYSICAL EXAM:    Vitals:    07/04/22 1102   BP: (!) 158/80   Pulse: 71   Resp: 19   Temp: 98.6 °F (37 °C)     BP (!) 158/80 (BP Location: Left arm, Patient Position: Sitting)   Pulse 71   Temp 98.6 °F (37 °C) (Oral)   Resp 19   Ht 5' 6" (1.676 m)   Wt 105.7 kg (233 lb)   SpO2 96%   BMI 37.61 kg/m²    Vital Signs: Reviewed As In Chart.  General:  Alert, No Acute Distress.   Skin: Normal For Ethnic " Origin  Eye:  Extraocular Movements Are Intact.   Cardiovascular:  Regular Rate And Rhythm, No Murmur.    Respiratory:  Lungs Are Clear To Auscultation, Respirations Are Non-Labored.    Musculoskeletal:  No Gross Deformity Noted.   Gastrointestinal:  Soft, Non Distended, Non Tender, Normal Bowel Sounds.    Neurological:  Alert And Oriented To Person, Place, Time, And Situation, Normal Motor Observed, Normal Speech Observed.  GCS 15, NIH is 0  Psychiatric:  Cooperative, Appropriate Mood & Affect.    INITIAL IMPRESSION/ DIFFERENTIAL DX:      MEDICAL DECISION MAKING:      Reviewed Nurses Note. Reviewed Vital Signs.     Reviewed Pertinent old records.    71 y.o. female with dizziness.  Denies any other complaints, I am going to do a detailed workup on her and decide further.    ED WORKUP AND COURSE:    Orders Placed This Encounter   Procedures    CT Head Without Contrast    CBC auto differential    Comprehensive metabolic panel    Troponin I #1    BNP    Urinalysis, Reflex to Urine Culture Urine, Clean Catch    TSH    CBC with Differential    COVID-19 Rapid Screening    Diet NPO    Vital signs    Cardiac Monitoring - Adult    Pulse Oximetry Continuous    EKG 12-lead    Saline lock IV       Medications - No data to display         Reviewed Labs and Radiology Interpretations:    ECG Results          EKG 12-lead (Preliminary result)  Result time 07/04/22 11:35:58    ED Interpretation by Steffen Espinoza MD (07/04/22 11:35:58, [facility] - Emergency Dept, Emergency Medicine)    EKG: Interpreted by Steffen Espinoza MD. independently as Normal Sinus Rhythm, Rate 73, Normal Axis, Normal Intervals.                                ED LABS ORDERED AND REVIEWED:  Admission on 07/04/2022   Component Date Value Ref Range Status    Sodium Level 07/04/2022 142  136 - 145 mmol/L Final    Potassium Level 07/04/2022 3.8  3.5 - 5.1 mmol/L Final    Chloride 07/04/2022 111 (A) 98 - 107 mmol/L Final    Carbon  Dioxide 07/04/2022 22 (A) 23 - 31 mmol/L Final    Glucose Level 07/04/2022 95  82 - 115 mg/dL Final    Blood Urea Nitrogen 07/04/2022 12.0  9.8 - 20.1 mg/dL Final    Creatinine 07/04/2022 0.79  0.55 - 1.02 mg/dL Final    Calcium Level Total 07/04/2022 9.4  8.4 - 10.2 mg/dL Final    Protein Total 07/04/2022 7.4  5.8 - 7.6 gm/dL Final    Albumin Level 07/04/2022 3.7  3.4 - 4.8 gm/dL Final    Globulin 07/04/2022 3.7 (A) 2.4 - 3.5 gm/dL Final    Albumin/Globulin Ratio 07/04/2022 1.0 (A) 1.1 - 2.0 ratio Final    Bilirubin Total 07/04/2022 0.2  <=1.5 mg/dL Final    Alkaline Phosphatase 07/04/2022 114  40 - 150 unit/L Final    Alanine Aminotransferase 07/04/2022 24  0 - 55 unit/L Final    Aspartate Aminotransferase 07/04/2022 23  5 - 34 unit/L Final    Estimated GFR- 07/04/2022 >60  mls/min/1.73/m2 Final    Troponin-I 07/04/2022 <0.010  0.000 - 0.045 ng/mL Final    Natriuretic Peptide 07/04/2022 14.3  <=100.0 pg/mL Final    Color, UA 07/04/2022 Yellow  Yellow, Colorless, Other, Clear Final    Appearance, UA 07/04/2022 Clear  Clear Final    Specific Gravity, UA 07/04/2022 1.010   Final    pH, UA 07/04/2022 7.0  5.0, 5.5, 6.0, 6.5, 7.0, 7.5, 8.0, 8.5 Final    Protein, UA 07/04/2022 Negative  Negative, 300  mg/dL Final    Glucose, UA 07/04/2022 Negative  Negative, Normal mg/dL Final    Ketones, UA 07/04/2022 Negative  Negative, +1, +2, +3, +4, +5, >=160, >=80 mg/dL Final    Blood, UA 07/04/2022 Negative  Negative unit/L Final    Bilirubin, UA 07/04/2022 Negative  Negative mg/dL Final    Urobilinogen, UA 07/04/2022 0.2  0.2, 1.0, Normal mg/dL Final    Nitrites, UA 07/04/2022 Negative  Negative Final    Leukocyte Esterase, UA 07/04/2022 Negative  Negative, 75  unit/L Final    Thyroid Stimulating Hormone 07/04/2022 1.9739  0.3500 - 4.9400 uIU/mL Final    WBC 07/04/2022 6.0  4.5 - 11.5 x10(3)/mcL Final    RBC 07/04/2022 4.37  4.20 - 5.40 x10(6)/mcL Final    Hgb 07/04/2022 12.6  12.0  - 16.0 gm/dL Final    Hct 07/04/2022 40.8  37.0 - 47.0 % Final    MCV 07/04/2022 93.4  80.0 - 94.0 fL Final    MCH 07/04/2022 28.8  27.0 - 31.0 pg Final    MCHC 07/04/2022 30.9 (A) 33.0 - 36.0 mg/dL Final    RDW 07/04/2022 13.6  11.5 - 17.0 % Final    Platelet 07/04/2022 194  130 - 400 x10(3)/mcL Final    MPV 07/04/2022 10.0  7.4 - 10.4 fL Final    Neut % 07/04/2022 78.9  % Final    Lymph % 07/04/2022 13.2  % Final    Mono % 07/04/2022 6.2  % Final    Eos % 07/04/2022 1.2  % Final    Basophil % 07/04/2022 0.3  % Final    Lymph # 07/04/2022 0.79  0.6 - 4.6 x10(3)/mcL Final    Neut # 07/04/2022 4.7  2.1 - 9.2 x10(3)/mcL Final    Mono # 07/04/2022 0.37  0.1 - 1.3 x10(3)/mcL Final    Eos # 07/04/2022 0.07  0 - 0.9 x10(3)/mcL Final    Baso # 07/04/2022 0.02  0 - 0.2 x10(3)/mcL Final    IG# 07/04/2022 0.01  0 - 0.04 x10(3)/mcL Final    IG% 07/04/2022 0.2  % Final    SARS COV-2 MOLECULAR 07/04/2022 Negative  Negative Final       RADIOLOGY STUDIES ORDERED AND REVIEWED:  Imaging Results          CT Head Without Contrast (Final result)  Result time 07/04/22 12:29:28    Final result by Joaquin Pelaez MD (07/04/22 12:29:28)                 Impression:      No acute intracranial abnormality.  Findings consistent microangiopathy      Electronically signed by: Joaquin Pelaez MD  Date:    07/04/2022  Time:    12:29             Narrative:    EXAMINATION:  CT HEAD WITHOUT CONTRAST    CLINICAL HISTORY:  Dizziness, persistent/recurrent, cardiac or vascular cause suspected;    TECHNIQUE:  Multiple cross-section of the head were obtained without the use of contrast.  Coronal and sagittal reformatted images were obtained.  An automated dose exposure technique was utilized.  This limits radiation does the patient.    COMPARISON:  None    FINDINGS:  No acute ischemic changes or infarct.  No intraparenchymal hemorrhage or contusion.  No mass, mass effect midline shift, edema, or extra-axial fluid collection.  The gray-white  matter differentiation maintained with hypodensities in the periventricular deep cortical white matter consistent with nonspecific small-vessel ischemia.  The cerebral sulci and basal cisterns are normal.  No hydrocephalus.    The globes are intact.  The paranasal sinuses and mastoid air cells are well pneumatized                                ED COURSE AND REEVALUATIONS:    PROCEDURES PERFORMED IN ED:  Procedures                DIAGNOSTIC IMPRESSION:      1. Dizziness         ED Disposition Condition    Discharge Stable             Medication List      CONTINUE taking these medications    amLODIPine 10 MG tablet  Commonly known as: NORVASC     anastrozole 1 mg Tab  Commonly known as: ARIMIDEX     aspirin 81 MG EC tablet  Commonly known as: ECOTRIN     cetirizine 10 MG tablet  Commonly known as: ZYRTEC     cloNIDine 0.1 MG tablet  Commonly known as: CATAPRES     dexlansoprazole 60 mg capsule  Commonly known as: DEXILANT     pravastatin 20 MG tablet  Commonly known as: PRAVACHOL     verapamiL 240 MG CR tablet  Commonly known as: CALAN-SR              Follow-up Information     Drew Morse MD In 2 days.    Specialty: Family Medicine  Contact information:  [address] [place] [address]  [phone]                          ED Prescriptions     None        Follow-up Information     Follow up With Specialties Details Why Contact Info    Drew Morse MD Family Medicine In 2 days  [address] [place] [address]  [phone]             Steffen Espinoza MD  07/04/22 1321

## 2022-07-04 NOTE — DISCHARGE INSTRUCTIONS
On studies in the ER we have found following incidental finding and we recommend that you must see your family doctor to followup on these and do further workup, reevaluation, followup and treatment as needed.    Old changes in the brain of possible small vessel disease.    Continue taking Aspirin daily.          Take medicines as prescribed    See your family doctor in one to 2 days for further evaluation, workup, and treatment as necessary    Avoid driving or operating machinery while taking medicines as some medicines might cause drowsiness and may cause problems. Also pain medicines have potential of being addictive  so use Pain meds specially Narcotics Sparingly.    The exam and treatment you received in Emergency Room was for an urgent problem and NOT INTENDED AS COMPLETE CARE. It is important that you FOLLOW UP with a doctor for ongoing care. If your symptoms become WORSE or you DO NOT IMPROVE and you are unable to reach your health care provider, you should RETURN to the emergency department. The Emergency Room doctor has provided a PRELIMINARY INTERPRETATION of all your STUDIES. A final interpretation may be done after you are discharged. IF A CHANGE in your diagnosis or treatment is needed WE WILL CONTACT YOU. It is critical that we have a CURRENT PHONE NUMBER FOR YOU.

## 2022-07-25 ENCOUNTER — HOSPITAL ENCOUNTER (EMERGENCY)
Facility: HOSPITAL | Age: 72
Discharge: HOME | End: 2022-07-25
Attending: GENERAL ACUTE CARE HOSPITAL
Payer: COMMERCIAL

## 2022-07-25 VITALS
RESPIRATION RATE: 18 BRPM | DIASTOLIC BLOOD PRESSURE: 78 MMHG | BODY MASS INDEX: 37.61 KG/M2 | OXYGEN SATURATION: 97 % | TEMPERATURE: 98 F | HEART RATE: 72 BPM | SYSTOLIC BLOOD PRESSURE: 131 MMHG | WEIGHT: 233 LBS

## 2022-07-25 DIAGNOSIS — M70.62 TROCHANTERIC BURSITIS OF LEFT HIP: Primary | ICD-10-CM

## 2022-07-25 PROCEDURE — 63600175 PHARM REV CODE 636 W HCPCS: Performed by: GENERAL ACUTE CARE HOSPITAL

## 2022-07-25 PROCEDURE — 25000003 PHARM REV CODE 250: Performed by: GENERAL ACUTE CARE HOSPITAL

## 2022-07-25 PROCEDURE — 96372 THER/PROPH/DIAG INJ SC/IM: CPT | Performed by: GENERAL ACUTE CARE HOSPITAL

## 2022-07-25 PROCEDURE — 99284 EMERGENCY DEPT VISIT MOD MDM: CPT | Mod: 25

## 2022-07-25 RX ORDER — DEXAMETHASONE SODIUM PHOSPHATE 4 MG/ML
8 INJECTION, SOLUTION INTRA-ARTICULAR; INTRALESIONAL; INTRAMUSCULAR; INTRAVENOUS; SOFT TISSUE
Status: COMPLETED | OUTPATIENT
Start: 2022-07-25 | End: 2022-07-25

## 2022-07-25 RX ORDER — CYCLOBENZAPRINE HCL 10 MG
10 TABLET ORAL
Status: COMPLETED | OUTPATIENT
Start: 2022-07-25 | End: 2022-07-25

## 2022-07-25 RX ORDER — CYCLOBENZAPRINE HCL 10 MG
10 TABLET ORAL 3 TIMES DAILY PRN
Qty: 15 TABLET | Refills: 0 | Status: SHIPPED | OUTPATIENT
Start: 2022-07-25 | End: 2022-07-30

## 2022-07-25 RX ORDER — DICLOFENAC SODIUM 50 MG/1
50 TABLET, DELAYED RELEASE ORAL 3 TIMES DAILY
Qty: 21 TABLET | Refills: 0 | Status: SHIPPED | OUTPATIENT
Start: 2022-07-25 | End: 2022-08-01

## 2022-07-25 RX ORDER — KETOROLAC TROMETHAMINE 30 MG/ML
30 INJECTION, SOLUTION INTRAMUSCULAR; INTRAVENOUS
Status: COMPLETED | OUTPATIENT
Start: 2022-07-25 | End: 2022-07-25

## 2022-07-25 RX ADMIN — KETOROLAC TROMETHAMINE 30 MG: 30 INJECTION, SOLUTION INTRAMUSCULAR; INTRAVENOUS at 08:07

## 2022-07-25 RX ADMIN — CYCLOBENZAPRINE 10 MG: 10 TABLET, FILM COATED ORAL at 08:07

## 2022-07-25 RX ADMIN — DEXAMETHASONE SODIUM PHOSPHATE 8 MG: 4 INJECTION, SOLUTION INTRA-ARTICULAR; INTRALESIONAL; INTRAMUSCULAR; INTRAVENOUS; SOFT TISSUE at 09:07

## 2022-07-26 NOTE — ED PROVIDER NOTES
Encounter Date: 7/25/2022       History     Chief Complaint   Patient presents with    Back Pain     C/o pain from L hip that radiates down L leg.      Patient given emergency room with chief complaints of left hip pain radiating to left leg, relieved pink with walking, has symptoms for 2 days, denies history of trauma and falls, denies history of previous back pain        Review of patient's allergies indicates:   Allergen Reactions    Clindamycin      Other reaction(s): Angioedema    Lisinopril      Other reaction(s): Angioedema    Sulfa (sulfonamide antibiotics)     Sulfamethoxazole-trimethoprim      Other reaction(s): Angioedema     Past Medical History:   Diagnosis Date    High cholesterol     HTN (hypertension)     Malignant neoplasm of right female breast      Past Surgical History:   Procedure Laterality Date    BREAST LUMPECTOMY W/ NEEDLE LOCALIZATION Right     HYSTERECTOMY      MASTECTOMY W/ SENTINEL NODE BIOPSY Right      Family History   Problem Relation Age of Onset    Diabetes Mother     Hypertension Mother     Lung cancer Mother      Social History     Tobacco Use    Smoking status: Never Smoker    Smokeless tobacco: Never Used   Substance Use Topics    Alcohol use: Yes    Drug use: Never     Review of Systems   Musculoskeletal: Positive for arthralgias, gait problem and myalgias.   All other systems reviewed and are negative.      Physical Exam     Initial Vitals [07/25/22 1939]   BP Pulse Resp Temp SpO2   (!) 181/97 76 18 98.1 °F (36.7 °C) 100 %      MAP       --         Physical Exam    Nursing note and vitals reviewed.  Constitutional: She appears well-developed and well-nourished.   HENT:   Head: Normocephalic.   Right Ear: External ear normal.   Left Ear: External ear normal.   Nose: Nose normal.   Mouth/Throat: Oropharynx is clear and moist.   Eyes: Conjunctivae and EOM are normal. Pupils are equal, round, and reactive to light.   Neck: Neck supple.   Normal range of  motion.  Cardiovascular: Normal rate and regular rhythm.   Pulmonary/Chest: Breath sounds normal.   Abdominal: Abdomen is soft. Bowel sounds are normal.   Musculoskeletal:      Cervical back: Normal range of motion and neck supple.      Left hip: Tenderness present. Decreased range of motion.      Comments: Tenderness at left supratrochanteric area     Neurological: She is alert and oriented to person, place, and time. She has normal reflexes.   Skin: Skin is warm and dry. Capillary refill takes 2 to 3 seconds.   Psychiatric: She has a normal mood and affect. Her behavior is normal. Judgment and thought content normal.         ED Course   Procedures  Labs Reviewed - No data to display       Imaging Results    None          Medications   cyclobenzaprine tablet 10 mg (10 mg Oral Given 7/25/22 2000)   ketorolac injection 30 mg (30 mg Intramuscular Given 7/25/22 2000)   dexamethasone injection 8 mg (8 mg Intramuscular Given 7/25/22 2100)                          Clinical Impression:   Final diagnoses:  [M70.62] Trochanteric bursitis of left hip (Primary)          ED Disposition Condition    Discharge Stable        ED Prescriptions     Medication Sig Dispense Start Date End Date Auth. Provider    cyclobenzaprine (FLEXERIL) 10 MG tablet Take 1 tablet (10 mg total) by mouth 3 (three) times daily as needed for Muscle spasms. 15 tablet 7/25/2022 7/30/2022 Anisa Tyson MD    diclofenac (VOLTAREN) 50 MG EC tablet Take 1 tablet (50 mg total) by mouth 3 (three) times daily. for 7 days 21 tablet 7/25/2022 8/1/2022 Anisa Tyson MD        Follow-up Information     Follow up With Specialties Details Why Contact Info    Drew Morse MD Family Medicine In 3 days requires a referral for PT/ortho [address] [place] [address]  [phone]             Anisa Tyson MD  07/25/22 2053

## 2022-08-09 ENCOUNTER — HOSPITAL ENCOUNTER (EMERGENCY)
Facility: HOSPITAL | Age: 72
Discharge: HOME | End: 2022-08-09
Attending: EMERGENCY MEDICINE
Payer: COMMERCIAL

## 2022-08-09 VITALS
WEIGHT: 230 LBS | TEMPERATURE: 98 F | HEART RATE: 74 BPM | DIASTOLIC BLOOD PRESSURE: 95 MMHG | SYSTOLIC BLOOD PRESSURE: 168 MMHG | BODY MASS INDEX: 40.75 KG/M2 | HEIGHT: 63 IN | RESPIRATION RATE: 16 BRPM | OXYGEN SATURATION: 96 %

## 2022-08-09 DIAGNOSIS — U07.1 COVID: Primary | ICD-10-CM

## 2022-08-09 DIAGNOSIS — M54.10 RADICULAR SYNDROME OF LEFT LEG: ICD-10-CM

## 2022-08-09 DIAGNOSIS — M79.18 LEFT BUTTOCK PAIN: ICD-10-CM

## 2022-08-09 PROCEDURE — 63600175 PHARM REV CODE 636 W HCPCS: Performed by: EMERGENCY MEDICINE

## 2022-08-09 PROCEDURE — 99284 EMERGENCY DEPT VISIT MOD MDM: CPT | Mod: 25

## 2022-08-09 PROCEDURE — 96372 THER/PROPH/DIAG INJ SC/IM: CPT | Performed by: EMERGENCY MEDICINE

## 2022-08-09 RX ORDER — FUROSEMIDE 20 MG/1
20 TABLET ORAL DAILY
COMMUNITY
Start: 2022-07-20

## 2022-08-09 RX ORDER — ERGOCALCIFEROL 1.25 MG/1
50000 CAPSULE ORAL
COMMUNITY
Start: 2022-06-23

## 2022-08-09 RX ORDER — PREDNISONE 20 MG/1
40 TABLET ORAL DAILY
Qty: 10 TABLET | Refills: 0 | Status: SHIPPED | OUTPATIENT
Start: 2022-08-09 | End: 2022-08-14

## 2022-08-09 RX ORDER — KETOROLAC TROMETHAMINE 30 MG/ML
30 INJECTION, SOLUTION INTRAMUSCULAR; INTRAVENOUS
Status: COMPLETED | OUTPATIENT
Start: 2022-08-09 | End: 2022-08-09

## 2022-08-09 RX ADMIN — KETOROLAC TROMETHAMINE 30 MG: 60 INJECTION, SOLUTION INTRAMUSCULAR at 08:08

## 2022-08-09 NOTE — ED PROVIDER NOTES
Encounter Date: 8/9/2022       History     Chief Complaint   Patient presents with    Leg Pain     Chronic leg pain   seen here for the  same 2 -3 weeks ago     Patient presents the emergency department for concern of left buttock pain that started late last month.  No recent traumas falls or fevers.  No recent injuries to account for patient's symptoms.  Patient states she has a burning pain in the mid aspect of her left buttock which sometimes radiates down the back of her leg.  She was seen by her family doctor was provided tramadol but she continues to have pain.  She denies any motor or sensory deficits of her lower extremities.        Review of patient's allergies indicates:   Allergen Reactions    Clindamycin      Other reaction(s): Angioedema    Lisinopril      Other reaction(s): Angioedema    Sulfa (sulfonamide antibiotics)     Sulfamethoxazole-trimethoprim      Other reaction(s): Angioedema     Past Medical History:   Diagnosis Date    High cholesterol     HTN (hypertension)     Malignant neoplasm of right female breast      Past Surgical History:   Procedure Laterality Date    BREAST LUMPECTOMY W/ NEEDLE LOCALIZATION Right     HYSTERECTOMY      MASTECTOMY W/ SENTINEL NODE BIOPSY Right      Family History   Problem Relation Age of Onset    Diabetes Mother     Hypertension Mother     Lung cancer Mother      Social History     Tobacco Use    Smoking status: Never Smoker    Smokeless tobacco: Never Used   Substance Use Topics    Alcohol use: Yes    Drug use: Never     Review of Systems   Constitutional: Negative for fever.   HENT: Negative for sore throat.    Respiratory: Negative for shortness of breath.    Cardiovascular: Negative for chest pain.   Gastrointestinal: Negative for nausea.   Genitourinary: Negative for dysuria.   Musculoskeletal: Negative for back pain.        Left buttock pain sharp tingly radiating down her left leg   Skin: Negative for rash.   Neurological: Negative for  weakness.   Hematological: Does not bruise/bleed easily.       Physical Exam     Initial Vitals [08/09/22 0748]   BP Pulse Resp Temp SpO2   (!) 164/85 82 16 98.1 °F (36.7 °C) 98 %      MAP       --         Physical Exam    Constitutional: She appears well-developed and well-nourished. No distress.   HENT:   Head: Normocephalic and atraumatic.   Eyes: EOM are normal. Pupils are equal, round, and reactive to light.   Neck:   Normal range of motion.  Cardiovascular: Normal rate, regular rhythm, normal heart sounds and intact distal pulses.   Or bilateral lower extremities with 2+ DP pulses bilaterally   Pulmonary/Chest: No stridor. No respiratory distress. She has no wheezes.   Abdominal: Abdomen is soft. Bowel sounds are normal. She exhibits no distension.   Musculoskeletal:         General: Normal range of motion.      Cervical back: Normal range of motion.      Comments: Patient able to stand up from a Emergency Department bed without difficulty takes several stents exhibiting no pain.  Patient has no midline tenderness to palpation of her lumbar spine.  Patient does have tenderness when palpation mid gluteal region is performed the absence of any masses induration or erythema or fluctuance     Psychiatric: She has a normal mood and affect.         ED Course   Procedures  Labs Reviewed - No data to display       Imaging Results          X-Ray Hip 2 or 3 views Left (with Pelvis when performed) (Final result)  Result time 08/09/22 09:31:47    Final result by Ok Douglas MD (08/09/22 09:31:47)                 Impression:      1. No acute osseous defect identified      Electronically signed by: Ok Douglas  Date:    08/09/2022  Time:    09:31             Narrative:    EXAMINATION:  XR HIP WITH PELVIS WHEN PERFORMED, 2 OR 3 VIEWS LEFT    CLINICAL HISTORY:  ; gluteal pain;.    COMPARISON:  None available.    FINDINGS:  AP and frog-leg lateral views revealno definite fracture or dislocation.Hip joints appear grossly  intact.  No aggressive osteolytic or osteoblastic lesion is seen.  There is mild asymmetry of the obturator foramen and iliac bones likely due to patient position/rotation                    ED Interpretation by Ok Mesa MD (08/09/22 09:22:37, [facility] - Emergency Dept, Emergency Medicine)    No osteolytic lesions no acute disease process                               Medications   ketorolac injection 30 mg (30 mg Intramuscular Given 8/9/22 0843)     Medical Decision Making:   Initial Assessment:   Patient has mid buttock pain intermittent sharp tingling that radiates down her legs at times.  No recent event traumas falls or infection to account for patient's symptoms.  She is taking tramadol for family doctor but continues have pain.  She was here on the 25th and was diagnosed with trochanteric bursitis.  No x-ray on file.  Her symptoms today are more consistent with a piriformis syndrome the patient has no midline tenderness to palpation over lumbar spine and has tingling in her buttock that goes down her leg.  She has a strong +2 DP pulse symptoms clinically are not consistent with dissection.  However, will perform x-ray of hip at this time as this is the 2nd time patient has been to this emergency department and she has also visited her family doctor.  Will also provide Toradol to the patient.  She denies being diabetic will also provide little piriformis syndrome and 5 days of steroids with PCP follow-up to let them know if this helps with her symptoms.  Clinical Tests:   Radiological Study: Reviewed  ED Management:  No osteolytic lesions seen on x-ray.  As discussed patient's symptoms most consistent with piriformis syndrome.  Will provide 5 days of steroids with follow-up primary care physician in the next 5 days to let them know if that helps with her symptoms.  Gabapentin can also be a consideration with therefore syndrome who would defer this at this time S family doctor should evaluate  see if the steroids have helped before prescribing another therapy.                      Clinical Impression:   Final diagnoses:  [U07.1] COVID (Primary)  [M79.18] Left buttock pain  [M54.10] Radicular syndrome of left leg          ED Disposition Condition    Discharge Stable        ED Prescriptions     Medication Sig Dispense Start Date End Date Auth. Provider    predniSONE (DELTASONE) 20 MG tablet Take 2 tablets (40 mg total) by mouth once daily. for 5 days 10 tablet 8/9/2022 8/14/2022 Ok Mesa MD        Follow-up Information     Follow up With Specialties Details Why Contact Info    Drew Morse MD Family Medicine In 1 week  [address]  [place] [address]  [phone]      [facility] - Emergency Dept Emergency Medicine  As needed, If symptoms worsen [address]  [facility] [id number] [phone]           Ok Mesa MD  08/09/22 0927       Ok Mesa MD  08/09/22 0952

## 2022-08-15 ENCOUNTER — HOSPITAL ENCOUNTER (EMERGENCY)
Facility: HOSPITAL | Age: 72
Discharge: HOME | End: 2022-08-15
Attending: GENERAL ACUTE CARE HOSPITAL
Payer: COMMERCIAL

## 2022-08-15 VITALS
HEART RATE: 78 BPM | RESPIRATION RATE: 20 BRPM | DIASTOLIC BLOOD PRESSURE: 78 MMHG | OXYGEN SATURATION: 99 % | TEMPERATURE: 98 F | SYSTOLIC BLOOD PRESSURE: 143 MMHG | HEIGHT: 66 IN | BODY MASS INDEX: 36.96 KG/M2 | WEIGHT: 230 LBS

## 2022-08-15 DIAGNOSIS — M54.32 SCIATICA OF LEFT SIDE: Primary | ICD-10-CM

## 2022-08-15 DIAGNOSIS — I10 MALIGNANT HYPERTENSION: ICD-10-CM

## 2022-08-15 PROCEDURE — 25000003 PHARM REV CODE 250: Performed by: GENERAL ACUTE CARE HOSPITAL

## 2022-08-15 PROCEDURE — 96372 THER/PROPH/DIAG INJ SC/IM: CPT | Performed by: GENERAL ACUTE CARE HOSPITAL

## 2022-08-15 PROCEDURE — 99284 EMERGENCY DEPT VISIT MOD MDM: CPT | Mod: 25

## 2022-08-15 PROCEDURE — 63600175 PHARM REV CODE 636 W HCPCS: Performed by: GENERAL ACUTE CARE HOSPITAL

## 2022-08-15 RX ORDER — CYCLOBENZAPRINE HCL 10 MG
10 TABLET ORAL
Status: COMPLETED | OUTPATIENT
Start: 2022-08-15 | End: 2022-08-15

## 2022-08-15 RX ORDER — CYCLOBENZAPRINE HCL 10 MG
10 TABLET ORAL 3 TIMES DAILY PRN
Qty: 30 TABLET | Refills: 0 | Status: SHIPPED | OUTPATIENT
Start: 2022-08-15 | End: 2022-08-25

## 2022-08-15 RX ORDER — KETOROLAC TROMETHAMINE 30 MG/ML
60 INJECTION, SOLUTION INTRAMUSCULAR; INTRAVENOUS
Status: COMPLETED | OUTPATIENT
Start: 2022-08-15 | End: 2022-08-15

## 2022-08-15 RX ORDER — NIFEDIPINE 10 MG/1
10 CAPSULE ORAL ONCE
Status: COMPLETED | OUTPATIENT
Start: 2022-08-15 | End: 2022-08-15

## 2022-08-15 RX ORDER — IBUPROFEN 800 MG/1
800 TABLET ORAL EVERY 8 HOURS PRN
Qty: 30 TABLET | Refills: 0 | Status: SHIPPED | OUTPATIENT
Start: 2022-08-15 | End: 2022-08-25

## 2022-08-15 RX ADMIN — CYCLOBENZAPRINE 10 MG: 10 TABLET, FILM COATED ORAL at 03:08

## 2022-08-15 RX ADMIN — KETOROLAC TROMETHAMINE 60 MG: 60 INJECTION, SOLUTION INTRAMUSCULAR at 03:08

## 2022-08-15 RX ADMIN — NIFEDIPINE 10 MG: 10 CAPSULE ORAL at 04:08

## 2022-08-15 NOTE — ED PROVIDER NOTES
Encounter Date: 8/15/2022       History     Chief Complaint   Patient presents with    Leg Pain     Pt brought in by [facility] with L leg pain since July 23rd.     Patient was brought into emergency room by EMS with chief complaints in the left lower extremity pain radiated to left foot for 3 weeks, patient was seen in ER at 08/09/2022, discharged home with prescription for prednisolone 20 mg (patient confirms that she takes medication but there is no improvement of pain), patient was recommended to follow-up with PCP (did not see PCP), patient easily transferred herself from stretcher to bed, denies paresthesias, denies urinary/fecal incontinence.        Review of patient's allergies indicates:   Allergen Reactions    Clindamycin      Other reaction(s): Angioedema    Lisinopril      Other reaction(s): Angioedema    Sulfa (sulfonamide antibiotics)     Sulfamethoxazole-trimethoprim      Other reaction(s): Angioedema     Past Medical History:   Diagnosis Date    High cholesterol     HTN (hypertension)     Malignant neoplasm of right female breast      Past Surgical History:   Procedure Laterality Date    BREAST LUMPECTOMY W/ NEEDLE LOCALIZATION Right     HYSTERECTOMY      MASTECTOMY W/ SENTINEL NODE BIOPSY Right      Family History   Problem Relation Age of Onset    Diabetes Mother     Hypertension Mother     Lung cancer Mother      Social History     Tobacco Use    Smoking status: Never Smoker    Smokeless tobacco: Never Used   Substance Use Topics    Alcohol use: Yes    Drug use: Never     Review of Systems   Musculoskeletal: Positive for myalgias.   All other systems reviewed and are negative.      Physical Exam     Initial Vitals [08/15/22 0307]   BP Pulse Resp Temp SpO2   (!) 178/88 78 20 98.2 °F (36.8 °C) 99 %      MAP       --         Physical Exam    Nursing note and vitals reviewed.  Constitutional: She appears well-developed and well-nourished.   HENT:   Head: Normocephalic and  atraumatic.   Right Ear: External ear normal.   Left Ear: External ear normal.   Nose: Nose normal.   Mouth/Throat: Oropharynx is clear and moist.   Eyes: Conjunctivae and EOM are normal. Pupils are equal, round, and reactive to light.   Neck: Neck supple.   Normal range of motion.  Cardiovascular: Normal rate, regular rhythm and normal heart sounds.   Pulmonary/Chest: Breath sounds normal.   Abdominal: Abdomen is soft. Bowel sounds are normal.   Musculoskeletal:         General: Tenderness present.      Cervical back: Normal range of motion and neck supple.      Thoracic back: Normal.      Lumbar back: Normal.      Right hip: Normal.      Left hip: Tenderness present.      Comments: Patient has bilateral negative straight raising leg tests, there is tenderness on the palpation of mid left buttock, reports radiation pain in the left foot     Neurological: She is alert. She has normal strength and normal reflexes. GCS score is 15. GCS eye subscore is 4. GCS verbal subscore is 5. GCS motor subscore is 6.   Skin: Skin is warm. Capillary refill takes 2 to 3 seconds.   Psychiatric: She has a normal mood and affect. Her behavior is normal. Thought content normal.         ED Course   Procedures  Labs Reviewed - No data to display       Imaging Results    None          Medications   ketorolac injection 60 mg (60 mg Intramuscular Given 8/15/22 0329)   cyclobenzaprine tablet 10 mg (10 mg Oral Given 8/15/22 6449)   NIFEdipine capsule 10 mg (10 mg Oral Given 8/15/22 3425)     Medical Decision Making:   Initial Assessment:   Patient presented 2nd time in the ER for left buttock pain radiating to left foot, has symptoms for 3-4 weeks, was evaluated in ER at 08/09/2022, diagnosed with sciatica, discharged with prednisone and recommended to follow-up with PCP.  Patient did not follow-up with PCP.  Patient was easy transported herself from stretcher to bed, walking without limping.  Patient received Toradol IM injection and Robaxin  for pain.  Patient was educated that she requires physical therapy referral for nerve stimulation and tends unit treatment  Differential Diagnosis:   Sciatica, muscle strain, hamstring strain,             ED Course as of 08/15/22 0512   Mon Aug 15, 2022   0355 [place] [facility] website revealed that Overdose Risk score is 280, last refills at 07.28.2022 for Tramadol 50 mg #7 [IP]   0356 The pain is slightly got better   [IP]      ED Course User Index  [IP] Anisa Tyson MD             Clinical Impression:   Final diagnoses:  [M54.32] Sciatica of left side (Primary)  [I10] Malignant hypertension          ED Disposition Condition    Discharge Stable        ED Prescriptions     Medication Sig Dispense Start Date End Date Auth. Provider    cyclobenzaprine (FLEXERIL) 10 MG tablet Take 1 tablet (10 mg total) by mouth 3 (three) times daily as needed for Muscle spasms. 30 tablet 8/15/2022 8/25/2022 Anisa Tyson MD    ibuprofen (ADVIL,MOTRIN) 800 MG tablet Take 1 tablet (800 mg total) by mouth every 8 (eight) hours as needed for Pain. 30 tablet 8/15/2022 8/25/2022 Anisa Tyson MD        Follow-up Information     Follow up With Specialties Details Why Contact Info    Drew Morse MD Family Medicine In 3 days If symptoms worsen [address] [place] [address]  [phone]             Anisa Tyson MD  08/15/22 0512

## 2022-08-30 DIAGNOSIS — C50.911 MALIGNANT NEOPLASM OF RIGHT FEMALE BREAST, UNSPECIFIED ESTROGEN RECEPTOR STATUS, UNSPECIFIED SITE OF BREAST: Primary | ICD-10-CM

## 2022-08-31 DIAGNOSIS — C50.911 MALIGNANT NEOPLASM OF RIGHT FEMALE BREAST, UNSPECIFIED ESTROGEN RECEPTOR STATUS, UNSPECIFIED SITE OF BREAST: Primary | ICD-10-CM

## 2022-09-06 ENCOUNTER — HOSPITAL ENCOUNTER (OUTPATIENT)
Dept: RADIOLOGY | Facility: HOSPITAL | Age: 72
Discharge: HOME | End: 2022-09-06
Attending: PHYSICIAN ASSISTANT
Payer: COMMERCIAL

## 2022-09-06 DIAGNOSIS — Z12.31 BREAST CANCER SCREENING BY MAMMOGRAM: ICD-10-CM

## 2022-09-06 PROCEDURE — 77067 SCR MAMMO BI INCL CAD: CPT | Mod: TC

## 2022-09-06 PROCEDURE — 77067 SCR MAMMO BI INCL CAD: CPT | Mod: 26,,, | Performed by: RADIOLOGY

## 2022-09-06 PROCEDURE — 77067 MAMMO DIGITAL SCREENING RIGHT WITH TOMO: ICD-10-PCS | Mod: 26,,, | Performed by: RADIOLOGY

## 2022-09-06 PROCEDURE — 77063 BREAST TOMOSYNTHESIS BI: CPT | Mod: 26,,, | Performed by: RADIOLOGY

## 2022-09-06 PROCEDURE — 77063 MAMMO DIGITAL SCREENING RIGHT WITH TOMO: ICD-10-PCS | Mod: 26,,, | Performed by: RADIOLOGY

## 2022-09-12 ENCOUNTER — OFFICE VISIT (OUTPATIENT)
Dept: HEMATOLOGY/ONCOLOGY | Facility: CLINIC | Age: 72
End: 2022-09-12
Payer: COMMERCIAL

## 2022-09-12 VITALS
SYSTOLIC BLOOD PRESSURE: 133 MMHG | BODY MASS INDEX: 37.74 KG/M2 | DIASTOLIC BLOOD PRESSURE: 86 MMHG | HEART RATE: 75 BPM | TEMPERATURE: 98 F | WEIGHT: 234.81 LBS | OXYGEN SATURATION: 97 % | HEIGHT: 66 IN | RESPIRATION RATE: 18 BRPM

## 2022-09-12 DIAGNOSIS — Z79.811 LONG TERM CURRENT USE OF AROMATASE INHIBITOR: Primary | ICD-10-CM

## 2022-09-12 DIAGNOSIS — Z12.31 SCREENING MAMMOGRAM FOR HIGH-RISK PATIENT: ICD-10-CM

## 2022-09-12 PROCEDURE — 3008F PR BODY MASS INDEX (BMI) DOCUMENTED: ICD-10-PCS | Mod: CPTII,S$GLB,, | Performed by: INTERNAL MEDICINE

## 2022-09-12 PROCEDURE — 1160F RVW MEDS BY RX/DR IN RCRD: CPT | Mod: CPTII,S$GLB,, | Performed by: INTERNAL MEDICINE

## 2022-09-12 PROCEDURE — 1159F PR MEDICATION LIST DOCUMENTED IN MEDICAL RECORD: ICD-10-PCS | Mod: CPTII,S$GLB,, | Performed by: INTERNAL MEDICINE

## 2022-09-12 PROCEDURE — 3075F PR MOST RECENT SYSTOLIC BLOOD PRESS GE 130-139MM HG: ICD-10-PCS | Mod: CPTII,S$GLB,, | Performed by: INTERNAL MEDICINE

## 2022-09-12 PROCEDURE — 1125F PR PAIN SEVERITY QUANTIFIED, PAIN PRESENT: ICD-10-PCS | Mod: CPTII,S$GLB,, | Performed by: INTERNAL MEDICINE

## 2022-09-12 PROCEDURE — 3288F PR FALLS RISK ASSESSMENT DOCUMENTED: ICD-10-PCS | Mod: CPTII,S$GLB,, | Performed by: INTERNAL MEDICINE

## 2022-09-12 PROCEDURE — 4010F PR ACE/ARB THEARPY RXD/TAKEN: ICD-10-PCS | Mod: CPTII,S$GLB,, | Performed by: INTERNAL MEDICINE

## 2022-09-12 PROCEDURE — 1160F PR REVIEW ALL MEDS BY PRESCRIBER/CLIN PHARMACIST DOCUMENTED: ICD-10-PCS | Mod: CPTII,S$GLB,, | Performed by: INTERNAL MEDICINE

## 2022-09-12 PROCEDURE — 3079F DIAST BP 80-89 MM HG: CPT | Mod: CPTII,S$GLB,, | Performed by: INTERNAL MEDICINE

## 2022-09-12 PROCEDURE — 99999 PR PBB SHADOW E&M-EST. PATIENT-LVL V: ICD-10-PCS | Mod: PBBFAC,,, | Performed by: INTERNAL MEDICINE

## 2022-09-12 PROCEDURE — 1101F PT FALLS ASSESS-DOCD LE1/YR: CPT | Mod: CPTII,S$GLB,, | Performed by: INTERNAL MEDICINE

## 2022-09-12 PROCEDURE — 3288F FALL RISK ASSESSMENT DOCD: CPT | Mod: CPTII,S$GLB,, | Performed by: INTERNAL MEDICINE

## 2022-09-12 PROCEDURE — 99214 PR OFFICE/OUTPT VISIT, EST, LEVL IV, 30-39 MIN: ICD-10-PCS | Mod: S$GLB,,, | Performed by: INTERNAL MEDICINE

## 2022-09-12 PROCEDURE — 3079F PR MOST RECENT DIASTOLIC BLOOD PRESSURE 80-89 MM HG: ICD-10-PCS | Mod: CPTII,S$GLB,, | Performed by: INTERNAL MEDICINE

## 2022-09-12 PROCEDURE — 3075F SYST BP GE 130 - 139MM HG: CPT | Mod: CPTII,S$GLB,, | Performed by: INTERNAL MEDICINE

## 2022-09-12 PROCEDURE — 1159F MED LIST DOCD IN RCRD: CPT | Mod: CPTII,S$GLB,, | Performed by: INTERNAL MEDICINE

## 2022-09-12 PROCEDURE — 1125F AMNT PAIN NOTED PAIN PRSNT: CPT | Mod: CPTII,S$GLB,, | Performed by: INTERNAL MEDICINE

## 2022-09-12 PROCEDURE — 1101F PR PT FALLS ASSESS DOC 0-1 FALLS W/OUT INJ PAST YR: ICD-10-PCS | Mod: CPTII,S$GLB,, | Performed by: INTERNAL MEDICINE

## 2022-09-12 PROCEDURE — 99214 OFFICE O/P EST MOD 30 MIN: CPT | Mod: S$GLB,,, | Performed by: INTERNAL MEDICINE

## 2022-09-12 PROCEDURE — 3008F BODY MASS INDEX DOCD: CPT | Mod: CPTII,S$GLB,, | Performed by: INTERNAL MEDICINE

## 2022-09-12 PROCEDURE — 4010F ACE/ARB THERAPY RXD/TAKEN: CPT | Mod: CPTII,S$GLB,, | Performed by: INTERNAL MEDICINE

## 2022-09-12 PROCEDURE — 99999 PR PBB SHADOW E&M-EST. PATIENT-LVL V: CPT | Mod: PBBFAC,,, | Performed by: INTERNAL MEDICINE

## 2022-09-12 RX ORDER — METOPROLOL SUCCINATE 25 MG/1
25 TABLET, EXTENDED RELEASE ORAL DAILY
COMMUNITY
Start: 2022-08-23

## 2022-09-12 RX ORDER — SACUBITRIL AND VALSARTAN 24; 26 MG/1; MG/1
1 TABLET, FILM COATED ORAL 2 TIMES DAILY
COMMUNITY
Start: 2022-08-23

## 2022-09-12 RX ORDER — SPIRONOLACTONE 25 MG/1
25 TABLET ORAL DAILY
COMMUNITY
Start: 2022-08-23

## 2022-09-12 RX ORDER — FUROSEMIDE 40 MG/1
40 TABLET ORAL
COMMUNITY
Start: 2022-08-23 | End: 2023-03-13

## 2022-09-12 RX ORDER — AMIODARONE HYDROCHLORIDE 200 MG/1
400 TABLET ORAL DAILY
COMMUNITY
Start: 2022-08-23

## 2022-09-12 RX ORDER — ATORVASTATIN CALCIUM 40 MG/1
40 TABLET, FILM COATED ORAL DAILY
COMMUNITY
Start: 2022-08-30

## 2022-09-12 RX ORDER — FOLIC ACID 1 MG/1
1000 TABLET ORAL DAILY
COMMUNITY
Start: 2022-08-23

## 2022-09-12 RX ORDER — RIVAROXABAN 20 MG/1
20 TABLET, FILM COATED ORAL DAILY
COMMUNITY
Start: 2022-08-23

## 2022-09-12 NOTE — PROGRESS NOTES
[facility]  PROGRESS NOTE      Subjective:         NAME: Klarissa Salazar : 1950     71 y.o. female   MRN: [id number]      Chief Complaint:    Chief Complaint   Patient presents with    Breast Cancer     4 month f/u with labs-- Patient reports no new concerns today         Current Treatment : Arimidex 2017     History of Present Illness:   Ms. Salazar is a 70 yo BF with PMH of HTN and hyperlipidemia who had an abnormal mammogram in 2017. It was recommended that she undergo additional imaging and US of R breast done on 3/3/17 demonstrated a 1.1 x 1.0 cm suspicious, solid, irregular mass at the 930 position. She was referred to Dr. Farmer who performed a needle localization and excision of R breast mass on 3/15/17. Pathology revealed invasive ductal carcinoma, grade 1. ER/CT both positive, Her 2 negative. After much discussion, decision was made to proceed with complete mastectomy and sentinel lymph node biopsy, which she had done on 3/30/17. Path from this procedure was negative for residual malignancy in the breast and sentinel lymph node was negative, but 1 of 16 additional lymph nodes removed had metastatic carcinoma present. She has been referred to oncology to discuss additional treatment options.     Treatment completed:   R mastectomy and SLNB 3/30/17   Path: No residual carcinoma identified. [place] lymph node negative. 1 of 16 other lymph nodes with metastatic carcinoma.      Interval History:  Pt is here today for follow up of ER + breast ca dx 3/2017 s/p right mastectomy currently on arimidex, Calcium and Vit D   She has no new concerns. She is tolerating Arimidex ,She has minimal hot flashes, she declines medication for this.   Denies any new pain, unintentional weight loss. She denies shortness of breath, chest pain, dizziness of headaches.   DEXA 2021-Borderline osteopenia with diminishing bone density since prior   exam    ROS:   Review of Systems   Constitutional:  Negative.    HENT: Negative.     Eyes: Negative.    Respiratory: Negative.     Cardiovascular: Negative.    Gastrointestinal:  Positive for constipation.   Genitourinary: Negative.    Musculoskeletal: Negative.    Skin: Negative.    Neurological: Negative.    Endo/Heme/Allergies: Negative.    Psychiatric/Behavioral: Negative.     Allergies:  Review of patient's allergies indicates:   Allergen Reactions    Clindamycin      Other reaction(s): Angioedema    Lisinopril      Other reaction(s): Angioedema    Sulfa (sulfonamide antibiotics)     Sulfamethoxazole-trimethoprim      Other reaction(s): Angioedema       Medications:    Current Outpatient Medications:     amiodarone (PACERONE) 200 MG Tab, Take 400 mg by mouth once daily., Disp: , Rfl:     amLODIPine (NORVASC) 10 MG tablet, Take 5 mg by mouth once daily., Disp: , Rfl:     anastrozole (ARIMIDEX) 1 mg Tab, Take 1 mg by mouth once daily., Disp: , Rfl:     aspirin (ECOTRIN) 81 MG EC tablet, Take 81 mg by mouth once daily., Disp: , Rfl:     atorvastatin (LIPITOR) 40 MG tablet, Take 40 mg by mouth once daily., Disp: , Rfl:     cetirizine (ZYRTEC) 10 MG tablet, Take 10 mg by mouth nightly., Disp: , Rfl:     cloNIDine (CATAPRES) 0.1 MG tablet, Take 0.1 mg by mouth 3 (three) times daily., Disp: , Rfl:     dexlansoprazole (DEXILANT) 60 mg capsule, Take 1 capsule by mouth once daily at 6am., Disp: , Rfl:     ENTRESTO 24-26 mg per tablet, Take 1 tablet by mouth 2 (two) times daily., Disp: , Rfl:     folic acid (FOLVITE) 1 MG tablet, Take 1,000 mcg by mouth once daily., Disp: , Rfl:     furosemide (LASIX) 20 MG tablet, Take 20 mg by mouth once daily., Disp: , Rfl:     furosemide (LASIX) 40 MG tablet, Take 40 mg by mouth every Mon, Wed, Fri., Disp: , Rfl:     metoprolol succinate (TOPROL-XL) 25 MG 24 hr tablet, Take 25 mg by mouth once daily., Disp: , Rfl:     pravastatin (PRAVACHOL) 20 MG tablet, Take 20 mg by mouth once daily., Disp: , Rfl:     spironolactone (ALDACTONE) 25  "MG tablet, Take 25 mg by mouth once daily., Disp: , Rfl:     verapamiL (CALAN-SR) 240 MG CR tablet, Take 240 mg by mouth once daily., Disp: , Rfl:     VITAMIN D2 1,250 mcg (50,000 unit) capsule, Take 50,000 Units by mouth every 7 days., Disp: , Rfl:     XARELTO 20 mg Tab, Take 20 mg by mouth once daily., Disp: , Rfl:     PMHx/PSHx Updates:  Past Medical History:   Diagnosis Date    High cholesterol     HTN (hypertension)     Malignant neoplasm of right female breast      Past Surgical History:   Procedure Laterality Date    BREAST LUMPECTOMY W/ NEEDLE LOCALIZATION Right     HYSTERECTOMY      MASTECTOMY W/ SENTINEL NODE BIOPSY Right        Family History:   Family History   Problem Relation Age of Onset    Diabetes Mother     Hypertension Mother     Lung cancer Mother        Social History:   Social History     Socioeconomic History    Marital status:    Tobacco Use    Smoking status: Never    Smokeless tobacco: Never   Substance and Sexual Activity    Alcohol use: Yes    Drug use: Never             Objective:     Vitals:  Blood pressure 133/86, pulse 75, temperature 98.2 °F (36.8 °C), resp. rate 18, height 5' 5.98" (1.676 m), weight 106.5 kg (234 lb 12.8 oz), SpO2 97 %.    Physical Examination:   Physical Exam  Vitals reviewed.   Constitutional:       Appearance: Normal appearance.   HENT:      Head: Normocephalic and atraumatic.      Right Ear: External ear normal.      Left Ear: External ear normal.      Nose: Nose normal.      Mouth/Throat:      Mouth: Mucous membranes are dry.      Pharynx: Oropharynx is clear.   Eyes:      Extraocular Movements: Extraocular movements intact.      Conjunctiva/sclera: Conjunctivae normal.      Pupils: Pupils are equal, round, and reactive to light.   Cardiovascular:      Rate and Rhythm: Normal rate and regular rhythm.      Pulses: Normal pulses.      Heart sounds: Normal heart sounds.   Pulmonary:      Effort: Pulmonary effort is normal.      Breath sounds: Normal breath " sounds.   Abdominal:      General: Abdomen is flat. Bowel sounds are normal.      Palpations: Abdomen is soft.   Musculoskeletal:         General: Normal range of motion.      Cervical back: Normal range of motion and neck supple.   Skin:     General: Skin is warm and dry.   Neurological:      General: No focal deficit present.      Mental Status: She is alert and oriented to person, place, and time.   Psychiatric:         Mood and Affect: Mood normal.         Behavior: Behavior normal.         Thought Content: Thought content normal.         Judgment: Judgment normal.   Breast - deferred per patient preference      Labs:   Lab Visit on 09/12/2022   Component Date Value Ref Range Status    Sodium Level 09/12/2022 141  136 - 145 mmol/L Final    Potassium Level 09/12/2022 4.1  3.5 - 5.1 mmol/L Final    Chloride 09/12/2022 106  98 - 107 mmol/L Final    Carbon Dioxide 09/12/2022 26  23 - 31 mmol/L Final    Glucose Level 09/12/2022 100  82 - 115 mg/dL Final    Blood Urea Nitrogen 09/12/2022 12.0  9.8 - 20.1 mg/dL Final    Creatinine 09/12/2022 0.93  0.55 - 1.02 mg/dL Final    Calcium Level Total 09/12/2022 9.5  8.4 - 10.2 mg/dL Final    Protein Total 09/12/2022 7.5  5.8 - 7.6 gm/dL Final    Albumin Level 09/12/2022 3.5  3.4 - 4.8 gm/dL Final    Globulin 09/12/2022 4.0 (H)  2.4 - 3.5 gm/dL Final    Albumin/Globulin Ratio 09/12/2022 0.9 (L)  1.1 - 2.0 ratio Final    Bilirubin Total 09/12/2022 0.2  <=1.5 mg/dL Final    Alkaline Phosphatase 09/12/2022 106  40 - 150 unit/L Final    Alanine Aminotransferase 09/12/2022 15  0 - 55 unit/L Final    Aspartate Aminotransferase 09/12/2022 16  5 - 34 unit/L Final    eGFR 09/12/2022 >60  mls/min/1.73/m2 Final         Assessment/Plan:   Right breast cancer, T1N1M0, ER/IL positive and Her2 negative. S/p mastectomy and lymph node biopsy on 3/30/17. Because Oncotype recurrence score was 8, endocrine therapy alone was recommended.   She started adjuvant AI at the end of May 2017 and  continues to tolerate it well. The hot flashes she reports are infrequent and mild.   Today pt is clinically stable, has YESENIA on exam and her labs are unremarkable. Recommend she continue current treatment with Arimidex.   Bone health. DEXA 7/2021 reveals boarderline osteopenia - slightly worse than previous. Rec she continue OTC Petar/Vit D supplementation.   Discussed Prolia advised patient she will need to get dental clearance, pt states that she will wait for now  MMG 3/2022- Recommend annual supplemental breast screening with dynamic contrast enhanced breast MRI in this patient with a personal history of breast cancer and heterogeneously dense breast tissue. Ideally, mammography and breast MRI are alternated every 6 months. To establish such a schedule, breast MRI would be due September, 2022.        PLAN:  Continue Arimidex for 7 - 10 years given patient initially had 1+ LN, currently tolerating with no issues  RTC 6 months after next imaging  MRI breast ordered however patient unable to tolerate due to claustrophobia  Mammo left breast 3/2022, repeat ordered for 3/2023  Repeat DEXA ordered      Discussion:     I have explained all of the above in detail and the patient understands all of the current recommendation(s). I have answered all of their questions to the best of my ability and to their complete satisfaction.   The patient is to continue with the current management plan.      Electronically signed by Emanuel Dumont MD

## 2022-09-28 ENCOUNTER — TELEPHONE (OUTPATIENT)
Dept: HEMATOLOGY/ONCOLOGY | Facility: CLINIC | Age: 72
End: 2022-09-28
Payer: COMMERCIAL

## 2022-10-02 ENCOUNTER — HOSPITAL ENCOUNTER (EMERGENCY)
Facility: HOSPITAL | Age: 72
Discharge: HOME | End: 2022-10-02
Attending: EMERGENCY MEDICINE
Payer: COMMERCIAL

## 2022-10-02 VITALS
SYSTOLIC BLOOD PRESSURE: 108 MMHG | RESPIRATION RATE: 18 BRPM | OXYGEN SATURATION: 99 % | DIASTOLIC BLOOD PRESSURE: 67 MMHG | HEART RATE: 56 BPM | TEMPERATURE: 98 F

## 2022-10-02 DIAGNOSIS — T14.90XA TRAUMA: ICD-10-CM

## 2022-10-02 DIAGNOSIS — R53.1 WEAKNESS: ICD-10-CM

## 2022-10-02 DIAGNOSIS — S96.912A MUSCLE STRAIN OF LEFT FOOT, INITIAL ENCOUNTER: Primary | ICD-10-CM

## 2022-10-02 DIAGNOSIS — R55 SYNCOPE: ICD-10-CM

## 2022-10-02 LAB
ALBUMIN SERPL-MCNC: 3.1 GM/DL (ref 3.4–4.8)
ALBUMIN/GLOB SERPL: 1.1 RATIO (ref 1.1–2)
ALP SERPL-CCNC: 94 UNIT/L (ref 40–150)
ALT SERPL-CCNC: 35 UNIT/L (ref 0–55)
APPEARANCE UR: CLEAR
AST SERPL-CCNC: 32 UNIT/L (ref 5–34)
BACTERIA #/AREA URNS AUTO: NORMAL /HPF
BASOPHILS # BLD AUTO: 0.03 X10(3)/MCL (ref 0–0.2)
BASOPHILS NFR BLD AUTO: 0.4 %
BILIRUB UR QL STRIP.AUTO: NEGATIVE MG/DL
BILIRUBIN DIRECT+TOT PNL SERPL-MCNC: 0.5 MG/DL
BUN SERPL-MCNC: 20 MG/DL (ref 9.8–20.1)
CALCIUM SERPL-MCNC: 8.3 MG/DL (ref 8.4–10.2)
CHLORIDE SERPL-SCNC: 112 MMOL/L (ref 98–107)
CO2 SERPL-SCNC: 23 MMOL/L (ref 23–31)
COLOR UR AUTO: YELLOW
CREAT SERPL-MCNC: 1.07 MG/DL (ref 0.55–1.02)
EOSINOPHIL # BLD AUTO: 0.02 X10(3)/MCL (ref 0–0.9)
EOSINOPHIL NFR BLD AUTO: 0.2 %
ERYTHROCYTE [DISTWIDTH] IN BLOOD BY AUTOMATED COUNT: 13.3 % (ref 11.5–17)
GFR SERPLBLD CREATININE-BSD FMLA CKD-EPI: 56 MLS/MIN/1.73/M2
GLOBULIN SER-MCNC: 2.7 GM/DL (ref 2.4–3.5)
GLUCOSE SERPL-MCNC: 98 MG/DL (ref 82–115)
GLUCOSE UR QL STRIP.AUTO: NEGATIVE MG/DL
HCT VFR BLD AUTO: 30.4 % (ref 37–47)
HGB BLD-MCNC: 10 GM/DL (ref 12–16)
IMM GRANULOCYTES # BLD AUTO: 0.04 X10(3)/MCL (ref 0–0.04)
IMM GRANULOCYTES NFR BLD AUTO: 0.5 %
KETONES UR QL STRIP.AUTO: NEGATIVE MG/DL
LEUKOCYTE ESTERASE UR QL STRIP.AUTO: ABNORMAL UNIT/L
LYMPHOCYTES # BLD AUTO: 0.74 X10(3)/MCL (ref 0.6–4.6)
LYMPHOCYTES NFR BLD AUTO: 8.9 %
MCH RBC QN AUTO: 34.4 PG (ref 27–31)
MCHC RBC AUTO-ENTMCNC: 32.9 MG/DL (ref 33–36)
MCV RBC AUTO: 104.5 FL (ref 80–94)
MONOCYTES # BLD AUTO: 0.75 X10(3)/MCL (ref 0.1–1.3)
MONOCYTES NFR BLD AUTO: 9 %
NEUTROPHILS # BLD AUTO: 6.7 X10(3)/MCL (ref 2.1–9.2)
NEUTROPHILS NFR BLD AUTO: 81 %
NITRITE UR QL STRIP.AUTO: NEGATIVE
NRBC BLD AUTO-RTO: 0 %
PH UR STRIP.AUTO: 5.5 [PH]
PLATELET # BLD AUTO: 195 X10(3)/MCL (ref 130–400)
PMV BLD AUTO: 9 FL (ref 7.4–10.4)
POTASSIUM SERPL-SCNC: 4.5 MMOL/L (ref 3.5–5.1)
PROT SERPL-MCNC: 5.8 GM/DL (ref 5.8–7.6)
PROT UR QL STRIP.AUTO: NEGATIVE MG/DL
RBC # BLD AUTO: 2.91 X10(6)/MCL (ref 4.2–5.4)
RBC #/AREA URNS AUTO: <5 /HPF
RBC UR QL AUTO: NEGATIVE UNIT/L
SODIUM SERPL-SCNC: 143 MMOL/L (ref 136–145)
SP GR UR STRIP.AUTO: 1.02 (ref 1–1.03)
SQUAMOUS #/AREA URNS AUTO: <5 /HPF
TROPONIN I SERPL-MCNC: 0.03 NG/ML (ref 0–0.04)
UROBILINOGEN UR STRIP-ACNC: 1 MG/DL
WBC # SPEC AUTO: 8.3 X10(3)/MCL (ref 4.5–11.5)
WBC #/AREA URNS AUTO: <5 /HPF

## 2022-10-02 PROCEDURE — 93005 ELECTROCARDIOGRAM TRACING: CPT

## 2022-10-02 PROCEDURE — 93010 EKG 12-LEAD: ICD-10-PCS | Mod: ,,, | Performed by: INTERNAL MEDICINE

## 2022-10-02 PROCEDURE — 80053 COMPREHEN METABOLIC PANEL: CPT | Performed by: EMERGENCY MEDICINE

## 2022-10-02 PROCEDURE — 81001 URINALYSIS AUTO W/SCOPE: CPT | Performed by: EMERGENCY MEDICINE

## 2022-10-02 PROCEDURE — 85025 COMPLETE CBC W/AUTO DIFF WBC: CPT | Performed by: EMERGENCY MEDICINE

## 2022-10-02 PROCEDURE — 25000003 PHARM REV CODE 250: Performed by: EMERGENCY MEDICINE

## 2022-10-02 PROCEDURE — 36415 COLL VENOUS BLD VENIPUNCTURE: CPT | Performed by: EMERGENCY MEDICINE

## 2022-10-02 PROCEDURE — 84484 ASSAY OF TROPONIN QUANT: CPT

## 2022-10-02 PROCEDURE — 93010 ELECTROCARDIOGRAM REPORT: CPT | Mod: ,,, | Performed by: INTERNAL MEDICINE

## 2022-10-02 PROCEDURE — 99285 EMERGENCY DEPT VISIT HI MDM: CPT | Mod: 25

## 2022-10-02 PROCEDURE — 84484 ASSAY OF TROPONIN QUANT: CPT | Performed by: EMERGENCY MEDICINE

## 2022-10-02 RX ORDER — HYDROCODONE BITARTRATE AND ACETAMINOPHEN 5; 325 MG/1; MG/1
1 TABLET ORAL EVERY 4 HOURS PRN
Qty: 8 TABLET | Refills: 0 | Status: ON HOLD | OUTPATIENT
Start: 2022-10-02 | End: 2023-11-27 | Stop reason: SDUPTHER

## 2022-10-02 RX ADMIN — SODIUM CHLORIDE 500 ML: 0.9 INJECTION, SOLUTION INTRAVENOUS at 12:10

## 2022-10-02 NOTE — ED PROVIDER NOTES
Encounter Date: 10/2/2022       History     Chief Complaint   Patient presents with    Fatigue     C/o weakness and l foot pain after fall yesterday. L foot swelling noted. +PMS to LLE.      Patient states that for last 24 hours has been feeling weak she inverted her left foot the other day and is complaining of persistent ankle pain patient globally weak no chest pain or shortness of breath no nausea no vomiting has been eating well no fevers no chills.  Patient no headache neck pain chest pain shortness of breath patient is able to walk on that foot with a cane.  No knee pain or hip pain did not fall and strike any other part of her body    The history is provided by the patient.   Fatigue  The current episode started more than 2 days ago. The problem occurs constantly. The problem has not changed since onset.Nothing aggravates the symptoms. Nothing relieves the symptoms.   Review of patient's allergies indicates:  No Known Allergies  Past Medical History:   Diagnosis Date    Cancer     breast CA s/p chemo and L masectomy     CHF (congestive heart failure)     History of DVT (deep vein thrombosis)     HLD (hyperlipidemia)     Hypertension     Osteoarthritis     Venous insufficiency      Past Surgical History:   Procedure Laterality Date    HYSTERECTOMY      INSERTION OF PACEMAKER      JOINT REPLACEMENT Bilateral     Knee    MASTECTOMY Left 2019     Family History   Family history unknown: Yes     Social History     Tobacco Use    Smoking status: Never    Smokeless tobacco: Never   Substance Use Topics    Alcohol use: Not Currently    Drug use: Never     Review of Systems   Constitutional:  Positive for fatigue.     Physical Exam     Initial Vitals [10/02/22 1052]   BP Pulse Resp Temp SpO2   (!) 120/40 62 20 97.9 °F (36.6 °C) 99 %      MAP       --         Physical Exam    Constitutional: She appears well-developed and well-nourished.   Weak frail elderly female no apparent distress mildly anxious   HENT:   Head:  Normocephalic and atraumatic.   Mouth/Throat: Oropharynx is clear and moist.   Eyes: Conjunctivae are normal. Pupils are equal, round, and reactive to light.   Neck: Neck supple.   Normal range of motion.  Cardiovascular:  Normal rate, regular rhythm and normal heart sounds.           Pulmonary/Chest: Breath sounds normal. She has no wheezes. She has no rhonchi. She has no rales.   Abdominal: Abdomen is soft. Bowel sounds are normal.   Musculoskeletal:         General: Normal range of motion.      Cervical back: Normal range of motion and neck supple.      Comments: Left ankle mildly edematous and tender     Neurological: She is alert and oriented to person, place, and time. GCS score is 15. GCS eye subscore is 4. GCS verbal subscore is 5. GCS motor subscore is 6.   Skin: Skin is warm and dry. Capillary refill takes less than 2 seconds.   Psychiatric: She has a normal mood and affect. Her behavior is normal. Judgment and thought content normal.       ED Course   Procedures  Labs Reviewed   COMPREHENSIVE METABOLIC PANEL - Abnormal; Notable for the following components:       Result Value    Chloride 112 (*)     Creatinine 1.07 (*)     Calcium Level Total 8.3 (*)     Albumin Level 3.1 (*)     All other components within normal limits   URINALYSIS, REFLEX TO URINE CULTURE - Abnormal; Notable for the following components:    Leukocyte Esterase, UA Trace (*)     All other components within normal limits   CBC WITH DIFFERENTIAL - Abnormal; Notable for the following components:    RBC 2.91 (*)     Hgb 10.0 (*)     Hct 30.4 (*)     .5 (*)     MCH 34.4 (*)     MCHC 32.9 (*)     All other components within normal limits   TROPONIN I - Normal   URINALYSIS, MICROSCOPIC - Normal   CBC W/ AUTO DIFFERENTIAL    Narrative:     The following orders were created for panel order CBC auto differential.  Procedure                               Abnormality         Status                     ---------                                -----------         ------                     CBC with Differential[[id number]]        Abnormal            Final result                 Please view results for these tests on the individual orders.     EKG Readings: (Independently Interpreted)   Initial Reading: No STEMI. Rhythm: Normal Sinus Rhythm. Heart Rate: 60. Ectopy: No Ectopy. Conduction: 1st Degree AV Block. ST Segments: Normal ST Segments. T Waves: Normal. Clinical Impression: Left Ventricular Hypertrophy (LDH)   1358   ECG Results              EKG 12-lead (Final result)  Result time 10/02/22 15:11:47      Final result by Interface, Lab In [facility] (10/02/22 15:11:47)                   Narrative:    Test Reason : R53.1,    Vent. Rate : 061 BPM     Atrial Rate : 061 BPM     P-R Int : 232 ms          QRS Dur : 128 ms      QT Int : 522 ms       P-R-T Axes : 107 221 134 degrees     QTc Int : 525 ms     Suspect arm lead reversal, interpretation assumes no reversal  Sinus rhythm with 1st degree A-V block  Nonspecific intraventricular block  Minimal voltage criteria for LVH, may be normal variant ( Misael product )  Abnormal ECG  Confirmed by Teofilo Morataya MD ([id number]) on 10/2/2022 3:11:36 PM    Referred By: [email or web address]   SELF           Confirmed By:Teofilo Morataya MD                                  Imaging Results              X-Ray Chest 1 View (Final result)  Result time 10/02/22 13:01:03      Final result by Shubham Jain MD (10/02/22 13:01:03)                   Impression:      NO ACUTE CARDIOPULMONARY PROCESS IDENTIFIED.      Electronically signed by: Shubham Jain  Date:    10/02/2022  Time:    13:01               Narrative:    EXAMINATION:  XR CHEST 1 VIEW    CLINICAL HISTORY:  Syncope and collapse    TECHNIQUE:  One view    COMPARISON:  March 6, 2022.    FINDINGS:  Cardiopericardial silhouette is within normal limits.  Right chest implanted MediPort terminates within the superior vena cava.  Left chest implanted cardiac device electrode is in similar  location.  No acute dense focal or segmental consolidation, congestive process, pleural effusions or pneumothorax.  Left axillary metallic clips.                                       X-Ray Ankle Complete Left (Final result)  Result time 10/02/22 13:02:24      Final result by Shubham Jain MD (10/02/22 13:02:24)                   Impression:      No acute osseous abnormality identified.      Electronically signed by: Shubham Jain  Date:    10/02/2022  Time:    13:02               Narrative:    EXAMINATION:  Left ankle three views.    CLINICAL HISTORY:  Four.    TECHNIQUE:  Three views.    COMPARISON:  None available .    FINDINGS:  Left ankle articular surfaces alignment is preserved.  No acute fracture or dislocation.  There is prominent calcaneal enthesophyte.                                       Medications   sodium chloride 0.9% bolus 500 mL (0 mLs Intravenous Stopped 10/2/22 1332)     Medical Decision Making:   Clinical Tests:   Lab Tests: Ordered and Reviewed  The following lab test(s) were unremarkable: CBC, CMP and Urinalysis  ED Management:  Labs EKG without abnormality, patient feels better with IV fluids patient left ankle no fracture no subluxation will place in walking boot she will follow-up with Dr. Currie  return emergency room as needed                        Clinical Impression:   Final diagnoses:  [T14.90XA] Trauma  [R55] Syncope  [R53.1] Weakness  [S96.912A] Muscle strain of left foot, initial encounter (Primary)               Trevor Rodriguez III, MD  10/02/22 1920       Trevor Rodriguez III, MD  10/02/22 1921       Trevor Rodriguez III, MD  10/02/22 1922

## 2022-10-02 NOTE — ED NOTES
C/o l. Foot pain r/t fall on yesterday on cement, reports tripped on cement, unable to bear weight on LLE, + swelling, cap refill < 3 seconds, pedal pulse + 2 bilateral.

## 2022-10-03 NOTE — DISCHARGE INSTRUCTIONS
Call Dr. Brooks in tomorrow for follow-up return emergency room as needed any complaints or problems

## 2022-10-05 ENCOUNTER — HOSPITAL ENCOUNTER (OUTPATIENT)
Dept: RADIOLOGY | Facility: HOSPITAL | Age: 72
Discharge: HOME | End: 2022-10-05
Attending: FAMILY MEDICINE
Payer: COMMERCIAL

## 2022-10-05 DIAGNOSIS — M79.672 PAIN IN LEFT FOOT: ICD-10-CM

## 2022-10-05 DIAGNOSIS — M25.572 PAIN IN JOINT, ANKLE AND FOOT, LEFT: ICD-10-CM

## 2022-10-05 PROCEDURE — 73630 X-RAY EXAM OF FOOT: CPT | Mod: TC,LT

## 2022-10-05 PROCEDURE — 73610 X-RAY EXAM OF ANKLE: CPT | Mod: TC,LT

## 2022-10-14 ENCOUNTER — ANESTHESIA (OUTPATIENT)
Dept: SURGERY | Facility: HOSPITAL | Age: 72
DRG: 481 | End: 2022-10-14
Payer: COMMERCIAL

## 2022-10-14 ENCOUNTER — ANESTHESIA EVENT (OUTPATIENT)
Dept: SURGERY | Facility: HOSPITAL | Age: 72
DRG: 481 | End: 2022-10-14
Payer: COMMERCIAL

## 2022-10-14 ENCOUNTER — HOSPITAL ENCOUNTER (INPATIENT)
Facility: HOSPITAL | Age: 72
LOS: 10 days | Discharge: NURSING, REHAB OR LONG-TERM CARE | DRG: 481 | End: 2022-10-24
Attending: EMERGENCY MEDICINE | Admitting: INTERNAL MEDICINE
Payer: COMMERCIAL

## 2022-10-14 DIAGNOSIS — S72.142A CLOSED INTERTROCHANTERIC FRACTURE OF HIP, LEFT, INITIAL ENCOUNTER: Primary | ICD-10-CM

## 2022-10-14 DIAGNOSIS — I48.20 CHRONIC A-FIB: ICD-10-CM

## 2022-10-14 DIAGNOSIS — Z01.811 PRE-OP CHEST EXAM: ICD-10-CM

## 2022-10-14 LAB
ALBUMIN SERPL-MCNC: 3.1 GM/DL (ref 3.4–4.8)
ALBUMIN/GLOB SERPL: 0.9 RATIO (ref 1.1–2)
ALP SERPL-CCNC: 118 UNIT/L (ref 40–150)
ALT SERPL-CCNC: 186 UNIT/L (ref 0–55)
AST SERPL-CCNC: 129 UNIT/L (ref 5–34)
BASOPHILS # BLD AUTO: 0.04 X10(3)/MCL (ref 0–0.2)
BASOPHILS NFR BLD AUTO: 0.5 %
BILIRUBIN DIRECT+TOT PNL SERPL-MCNC: 0.5 MG/DL
BUN SERPL-MCNC: 17.4 MG/DL (ref 9.8–20.1)
CALCIUM SERPL-MCNC: 8.9 MG/DL (ref 8.4–10.2)
CHLORIDE SERPL-SCNC: 103 MMOL/L (ref 98–107)
CO2 SERPL-SCNC: 24 MMOL/L (ref 23–31)
CREAT SERPL-MCNC: 1.24 MG/DL (ref 0.55–1.02)
EOSINOPHIL # BLD AUTO: 0.3 X10(3)/MCL (ref 0–0.9)
EOSINOPHIL NFR BLD AUTO: 3.8 %
ERYTHROCYTE [DISTWIDTH] IN BLOOD BY AUTOMATED COUNT: 13.4 % (ref 11.5–17)
GFR SERPLBLD CREATININE-BSD FMLA CKD-EPI: 47 MLS/MIN/1.73/M2
GLOBULIN SER-MCNC: 3.5 GM/DL (ref 2.4–3.5)
GLUCOSE SERPL-MCNC: 95 MG/DL (ref 82–115)
HCT VFR BLD AUTO: 34.8 % (ref 37–47)
HGB BLD-MCNC: 11.4 GM/DL (ref 12–16)
IMM GRANULOCYTES # BLD AUTO: 0.05 X10(3)/MCL (ref 0–0.04)
IMM GRANULOCYTES NFR BLD AUTO: 0.6 %
INR BLD: 1.53 (ref 0–1.3)
LYMPHOCYTES # BLD AUTO: 1.17 X10(3)/MCL (ref 0.6–4.6)
LYMPHOCYTES NFR BLD AUTO: 14.9 %
MCH RBC QN AUTO: 33.6 PG (ref 27–31)
MCHC RBC AUTO-ENTMCNC: 32.8 MG/DL (ref 33–36)
MCV RBC AUTO: 102.7 FL (ref 80–94)
MONOCYTES # BLD AUTO: 0.44 X10(3)/MCL (ref 0.1–1.3)
MONOCYTES NFR BLD AUTO: 5.6 %
NEUTROPHILS # BLD AUTO: 5.8 X10(3)/MCL (ref 2.1–9.2)
NEUTROPHILS NFR BLD AUTO: 74.6 %
NRBC BLD AUTO-RTO: 0 %
PLATELET # BLD AUTO: 290 X10(3)/MCL (ref 130–400)
PMV BLD AUTO: 9.8 FL (ref 7.4–10.4)
POTASSIUM SERPL-SCNC: 3.8 MMOL/L (ref 3.5–5.1)
PROT SERPL-MCNC: 6.6 GM/DL (ref 5.8–7.6)
PROTHROMBIN TIME: 18.2 SECONDS (ref 12.5–14.5)
RBC # BLD AUTO: 3.39 X10(6)/MCL (ref 4.2–5.4)
SODIUM SERPL-SCNC: 143 MMOL/L (ref 136–145)
WBC # SPEC AUTO: 7.8 X10(3)/MCL (ref 4.5–11.5)

## 2022-10-14 PROCEDURE — 27245 TREAT THIGH FRACTURE: CPT | Mod: LT,,, | Performed by: STUDENT IN AN ORGANIZED HEALTH CARE EDUCATION/TRAINING PROGRAM

## 2022-10-14 PROCEDURE — 99285 EMERGENCY DEPT VISIT HI MDM: CPT | Mod: 25

## 2022-10-14 PROCEDURE — 25000003 PHARM REV CODE 250: Performed by: EMERGENCY MEDICINE

## 2022-10-14 PROCEDURE — 93010 EKG 12-LEAD: ICD-10-PCS | Mod: ,,, | Performed by: INTERNAL MEDICINE

## 2022-10-14 PROCEDURE — 93005 ELECTROCARDIOGRAM TRACING: CPT

## 2022-10-14 PROCEDURE — 63600175 PHARM REV CODE 636 W HCPCS: Performed by: EMERGENCY MEDICINE

## 2022-10-14 PROCEDURE — 36000711: Performed by: STUDENT IN AN ORGANIZED HEALTH CARE EDUCATION/TRAINING PROGRAM

## 2022-10-14 PROCEDURE — 21400001 HC TELEMETRY ROOM

## 2022-10-14 PROCEDURE — 96376 TX/PRO/DX INJ SAME DRUG ADON: CPT

## 2022-10-14 PROCEDURE — 99223 PR INITIAL HOSPITAL CARE,LEVL III: ICD-10-PCS | Mod: 57,,, | Performed by: STUDENT IN AN ORGANIZED HEALTH CARE EDUCATION/TRAINING PROGRAM

## 2022-10-14 PROCEDURE — 27245 PR OPEN FIX INTER/SUBTROCH FX,IMPLNT: ICD-10-PCS | Mod: LT,,, | Performed by: STUDENT IN AN ORGANIZED HEALTH CARE EDUCATION/TRAINING PROGRAM

## 2022-10-14 PROCEDURE — 80053 COMPREHEN METABOLIC PANEL: CPT | Performed by: EMERGENCY MEDICINE

## 2022-10-14 PROCEDURE — 36415 COLL VENOUS BLD VENIPUNCTURE: CPT | Performed by: EMERGENCY MEDICINE

## 2022-10-14 PROCEDURE — 85025 COMPLETE CBC W/AUTO DIFF WBC: CPT | Performed by: EMERGENCY MEDICINE

## 2022-10-14 PROCEDURE — C1713 ANCHOR/SCREW BN/BN,TIS/BN: HCPCS | Performed by: STUDENT IN AN ORGANIZED HEALTH CARE EDUCATION/TRAINING PROGRAM

## 2022-10-14 PROCEDURE — 37000008 HC ANESTHESIA 1ST 15 MINUTES: Performed by: STUDENT IN AN ORGANIZED HEALTH CARE EDUCATION/TRAINING PROGRAM

## 2022-10-14 PROCEDURE — 25000003 PHARM REV CODE 250: Performed by: NURSE ANESTHETIST, CERTIFIED REGISTERED

## 2022-10-14 PROCEDURE — 85610 PROTHROMBIN TIME: CPT | Performed by: EMERGENCY MEDICINE

## 2022-10-14 PROCEDURE — 63600175 PHARM REV CODE 636 W HCPCS

## 2022-10-14 PROCEDURE — 63600175 PHARM REV CODE 636 W HCPCS: Performed by: STUDENT IN AN ORGANIZED HEALTH CARE EDUCATION/TRAINING PROGRAM

## 2022-10-14 PROCEDURE — 71000033 HC RECOVERY, INTIAL HOUR: Performed by: STUDENT IN AN ORGANIZED HEALTH CARE EDUCATION/TRAINING PROGRAM

## 2022-10-14 PROCEDURE — 93010 ELECTROCARDIOGRAM REPORT: CPT | Mod: ,,, | Performed by: INTERNAL MEDICINE

## 2022-10-14 PROCEDURE — 25000003 PHARM REV CODE 250: Performed by: STUDENT IN AN ORGANIZED HEALTH CARE EDUCATION/TRAINING PROGRAM

## 2022-10-14 PROCEDURE — 37000009 HC ANESTHESIA EA ADD 15 MINS: Performed by: STUDENT IN AN ORGANIZED HEALTH CARE EDUCATION/TRAINING PROGRAM

## 2022-10-14 PROCEDURE — C1769 GUIDE WIRE: HCPCS | Performed by: STUDENT IN AN ORGANIZED HEALTH CARE EDUCATION/TRAINING PROGRAM

## 2022-10-14 PROCEDURE — 27201423 OPTIME MED/SURG SUP & DEVICES STERILE SUPPLY: Performed by: STUDENT IN AN ORGANIZED HEALTH CARE EDUCATION/TRAINING PROGRAM

## 2022-10-14 PROCEDURE — 63600175 PHARM REV CODE 636 W HCPCS: Performed by: NURSE ANESTHETIST, CERTIFIED REGISTERED

## 2022-10-14 PROCEDURE — 71000039 HC RECOVERY, EACH ADD'L HOUR: Performed by: STUDENT IN AN ORGANIZED HEALTH CARE EDUCATION/TRAINING PROGRAM

## 2022-10-14 PROCEDURE — 36000710: Performed by: STUDENT IN AN ORGANIZED HEALTH CARE EDUCATION/TRAINING PROGRAM

## 2022-10-14 PROCEDURE — 96374 THER/PROPH/DIAG INJ IV PUSH: CPT

## 2022-10-14 PROCEDURE — 99223 1ST HOSP IP/OBS HIGH 75: CPT | Mod: 57,,, | Performed by: STUDENT IN AN ORGANIZED HEALTH CARE EDUCATION/TRAINING PROGRAM

## 2022-10-14 PROCEDURE — 96375 TX/PRO/DX INJ NEW DRUG ADDON: CPT

## 2022-10-14 PROCEDURE — 11000001 HC ACUTE MED/SURG PRIVATE ROOM

## 2022-10-14 DEVICE — SCREW LOCKING 5.0 X 35MM: Type: IMPLANTABLE DEVICE | Site: FEMUR | Status: FUNCTIONAL

## 2022-10-14 DEVICE — SCREW LAG TITANIUM 10.5X90: Type: IMPLANTABLE DEVICE | Site: FEMUR | Status: FUNCTIONAL

## 2022-10-14 DEVICE — NAIL GAMMA 3 125D ANG 11X180: Type: IMPLANTABLE DEVICE | Site: FEMUR | Status: FUNCTIONAL

## 2022-10-14 RX ORDER — TRAMADOL HYDROCHLORIDE 50 MG/1
50 TABLET ORAL EVERY 6 HOURS PRN
Status: DISCONTINUED | OUTPATIENT
Start: 2022-10-14 | End: 2022-10-24 | Stop reason: HOSPADM

## 2022-10-14 RX ORDER — HYDROMORPHONE HYDROCHLORIDE 2 MG/ML
0.5 INJECTION, SOLUTION INTRAMUSCULAR; INTRAVENOUS; SUBCUTANEOUS EVERY 4 HOURS PRN
Status: DISCONTINUED | OUTPATIENT
Start: 2022-10-14 | End: 2022-10-24 | Stop reason: HOSPADM

## 2022-10-14 RX ORDER — SODIUM CHLORIDE, SODIUM LACTATE, POTASSIUM CHLORIDE, CALCIUM CHLORIDE 600; 310; 30; 20 MG/100ML; MG/100ML; MG/100ML; MG/100ML
INJECTION, SOLUTION INTRAVENOUS CONTINUOUS
Status: DISCONTINUED | OUTPATIENT
Start: 2022-10-14 | End: 2022-10-21

## 2022-10-14 RX ORDER — SODIUM CHLORIDE 9 MG/ML
500 INJECTION, SOLUTION INTRAVENOUS
Status: COMPLETED | OUTPATIENT
Start: 2022-10-14 | End: 2022-10-14

## 2022-10-14 RX ORDER — ONDANSETRON 2 MG/ML
INJECTION INTRAMUSCULAR; INTRAVENOUS
Status: COMPLETED
Start: 2022-10-14 | End: 2022-10-14

## 2022-10-14 RX ORDER — MORPHINE SULFATE 4 MG/ML
4 INJECTION, SOLUTION INTRAMUSCULAR; INTRAVENOUS
Status: COMPLETED | OUTPATIENT
Start: 2022-10-14 | End: 2022-10-14

## 2022-10-14 RX ORDER — ROCURONIUM BROMIDE 10 MG/ML
INJECTION, SOLUTION INTRAVENOUS
Status: DISCONTINUED | OUTPATIENT
Start: 2022-10-14 | End: 2022-10-14

## 2022-10-14 RX ORDER — TALC
6 POWDER (GRAM) TOPICAL NIGHTLY PRN
Status: DISCONTINUED | OUTPATIENT
Start: 2022-10-14 | End: 2022-10-24 | Stop reason: HOSPADM

## 2022-10-14 RX ORDER — EPHEDRINE SULFATE 50 MG/ML
INJECTION, SOLUTION INTRAVENOUS
Status: DISCONTINUED | OUTPATIENT
Start: 2022-10-14 | End: 2022-10-14

## 2022-10-14 RX ORDER — ACETAMINOPHEN 10 MG/ML
INJECTION, SOLUTION INTRAVENOUS
Status: DISCONTINUED | OUTPATIENT
Start: 2022-10-14 | End: 2022-10-14

## 2022-10-14 RX ORDER — PROPOFOL 10 MG/ML
VIAL (ML) INTRAVENOUS
Status: DISCONTINUED | OUTPATIENT
Start: 2022-10-14 | End: 2022-10-14

## 2022-10-14 RX ORDER — MORPHINE SULFATE 4 MG/ML
INJECTION, SOLUTION INTRAMUSCULAR; INTRAVENOUS
Status: COMPLETED
Start: 2022-10-14 | End: 2022-10-14

## 2022-10-14 RX ORDER — MIDAZOLAM HYDROCHLORIDE 1 MG/ML
INJECTION INTRAMUSCULAR; INTRAVENOUS
Status: DISCONTINUED | OUTPATIENT
Start: 2022-10-14 | End: 2022-10-14

## 2022-10-14 RX ORDER — CEFAZOLIN SODIUM 2 G/50ML
SOLUTION INTRAVENOUS
Status: DISPENSED
Start: 2022-10-14 | End: 2022-10-15

## 2022-10-14 RX ORDER — DEXAMETHASONE SODIUM PHOSPHATE 4 MG/ML
INJECTION, SOLUTION INTRA-ARTICULAR; INTRALESIONAL; INTRAMUSCULAR; INTRAVENOUS; SOFT TISSUE
Status: DISCONTINUED | OUTPATIENT
Start: 2022-10-14 | End: 2022-10-14

## 2022-10-14 RX ORDER — VANCOMYCIN HYDROCHLORIDE 1 G/20ML
INJECTION, POWDER, LYOPHILIZED, FOR SOLUTION INTRAVENOUS
Status: DISCONTINUED | OUTPATIENT
Start: 2022-10-14 | End: 2022-10-14 | Stop reason: HOSPADM

## 2022-10-14 RX ORDER — ONDANSETRON 2 MG/ML
4 INJECTION INTRAMUSCULAR; INTRAVENOUS EVERY 8 HOURS PRN
Status: DISCONTINUED | OUTPATIENT
Start: 2022-10-14 | End: 2022-10-24 | Stop reason: HOSPADM

## 2022-10-14 RX ORDER — GLYCOPYRROLATE 0.2 MG/ML
INJECTION INTRAMUSCULAR; INTRAVENOUS
Status: DISCONTINUED | OUTPATIENT
Start: 2022-10-14 | End: 2022-10-14

## 2022-10-14 RX ORDER — CEFAZOLIN SODIUM 2 G/50ML
2 SOLUTION INTRAVENOUS
Status: COMPLETED | OUTPATIENT
Start: 2022-10-15 | End: 2022-10-15

## 2022-10-14 RX ORDER — ONDANSETRON 2 MG/ML
4 INJECTION INTRAMUSCULAR; INTRAVENOUS
Status: COMPLETED | OUTPATIENT
Start: 2022-10-14 | End: 2022-10-14

## 2022-10-14 RX ORDER — SODIUM CHLORIDE 0.9 % (FLUSH) 0.9 %
10 SYRINGE (ML) INJECTION
Status: DISCONTINUED | OUTPATIENT
Start: 2022-10-14 | End: 2022-10-24 | Stop reason: HOSPADM

## 2022-10-14 RX ORDER — FENTANYL CITRATE 50 UG/ML
INJECTION, SOLUTION INTRAMUSCULAR; INTRAVENOUS
Status: DISCONTINUED | OUTPATIENT
Start: 2022-10-14 | End: 2022-10-14

## 2022-10-14 RX ORDER — PHENYLEPHRINE HYDROCHLORIDE 10 MG/ML
INJECTION INTRAVENOUS
Status: DISCONTINUED | OUTPATIENT
Start: 2022-10-14 | End: 2022-10-14

## 2022-10-14 RX ORDER — HYDROMORPHONE HYDROCHLORIDE 2 MG/ML
0.2 INJECTION, SOLUTION INTRAMUSCULAR; INTRAVENOUS; SUBCUTANEOUS EVERY 5 MIN PRN
Status: DISCONTINUED | OUTPATIENT
Start: 2022-10-14 | End: 2022-10-24 | Stop reason: HOSPADM

## 2022-10-14 RX ORDER — FAMOTIDINE 20 MG/1
20 TABLET, FILM COATED ORAL 2 TIMES DAILY
Status: DISCONTINUED | OUTPATIENT
Start: 2022-10-14 | End: 2022-10-24 | Stop reason: HOSPADM

## 2022-10-14 RX ORDER — CEFAZOLIN SODIUM 1 G/3ML
INJECTION, POWDER, FOR SOLUTION INTRAMUSCULAR; INTRAVENOUS
Status: DISCONTINUED | OUTPATIENT
Start: 2022-10-14 | End: 2022-10-14

## 2022-10-14 RX ADMIN — PHENYLEPHRINE HYDROCHLORIDE 200 MCG: 10 INJECTION INTRAVENOUS at 05:10

## 2022-10-14 RX ADMIN — MORPHINE SULFATE 4 MG: 4 INJECTION, SOLUTION INTRAMUSCULAR; INTRAVENOUS at 02:10

## 2022-10-14 RX ADMIN — PROPOFOL 80 MG: 10 INJECTION, EMULSION INTRAVENOUS at 04:10

## 2022-10-14 RX ADMIN — CEFAZOLIN 2 G: 330 INJECTION, POWDER, FOR SOLUTION INTRAMUSCULAR; INTRAVENOUS at 04:10

## 2022-10-14 RX ADMIN — SUGAMMADEX 141 MG: 100 INJECTION, SOLUTION INTRAVENOUS at 05:10

## 2022-10-14 RX ADMIN — GLYCOPYRROLATE 0.1 MG: 0.2 INJECTION INTRAMUSCULAR; INTRAVENOUS at 04:10

## 2022-10-14 RX ADMIN — ROCURONIUM BROMIDE 50 MG: 50 INJECTION INTRAVENOUS at 04:10

## 2022-10-14 RX ADMIN — TRAMADOL HYDROCHLORIDE 50 MG: 50 TABLET, COATED ORAL at 09:10

## 2022-10-14 RX ADMIN — FENTANYL CITRATE 25 MCG: 50 INJECTION, SOLUTION INTRAMUSCULAR; INTRAVENOUS at 05:10

## 2022-10-14 RX ADMIN — ONDANSETRON 4 MG: 2 INJECTION INTRAMUSCULAR; INTRAVENOUS at 02:10

## 2022-10-14 RX ADMIN — SODIUM CHLORIDE, SODIUM GLUCONATE, SODIUM ACETATE, POTASSIUM CHLORIDE AND MAGNESIUM CHLORIDE: 526; 502; 368; 37; 30 INJECTION, SOLUTION INTRAVENOUS at 04:10

## 2022-10-14 RX ADMIN — PHENYLEPHRINE HYDROCHLORIDE 200 MCG: 10 INJECTION INTRAVENOUS at 04:10

## 2022-10-14 RX ADMIN — MORPHINE SULFATE 4 MG: 4 INJECTION INTRAVENOUS at 02:10

## 2022-10-14 RX ADMIN — SODIUM CHLORIDE 500 ML: 9 INJECTION, SOLUTION INTRAVENOUS at 12:10

## 2022-10-14 RX ADMIN — FENTANYL CITRATE 25 MCG: 50 INJECTION, SOLUTION INTRAMUSCULAR; INTRAVENOUS at 04:10

## 2022-10-14 RX ADMIN — DEXAMETHASONE SODIUM PHOSPHATE 4 MG: 4 INJECTION, SOLUTION INTRA-ARTICULAR; INTRALESIONAL; INTRAMUSCULAR; INTRAVENOUS; SOFT TISSUE at 04:10

## 2022-10-14 RX ADMIN — EPHEDRINE SULFATE 50 MG: 50 INJECTION INTRAVENOUS at 04:10

## 2022-10-14 RX ADMIN — SODIUM CHLORIDE, POTASSIUM CHLORIDE, SODIUM LACTATE AND CALCIUM CHLORIDE: 600; 310; 30; 20 INJECTION, SOLUTION INTRAVENOUS at 03:10

## 2022-10-14 RX ADMIN — FAMOTIDINE 20 MG: 20 TABLET, FILM COATED ORAL at 09:10

## 2022-10-14 RX ADMIN — MORPHINE SULFATE: 4 INJECTION INTRAVENOUS at 12:10

## 2022-10-14 RX ADMIN — ONDANSETRON 4 MG: 2 INJECTION INTRAMUSCULAR; INTRAVENOUS at 04:10

## 2022-10-14 RX ADMIN — ACETAMINOPHEN 1000 MG: 10 INJECTION, SOLUTION INTRAVENOUS at 04:10

## 2022-10-14 RX ADMIN — MORPHINE SULFATE: 4 INJECTION, SOLUTION INTRAMUSCULAR; INTRAVENOUS at 12:10

## 2022-10-14 RX ADMIN — MIDAZOLAM 2 MG: 1 INJECTION INTRAMUSCULAR; INTRAVENOUS at 04:10

## 2022-10-14 NOTE — CONSULTS
Orthopedic Surgery Consult Note    Reason for Consult:  Left hip fracture    HPI:  Patient is a 71-year-old female who was walking to the store when she tripped and fell and injured her left hip.  She denies any previous injuries to that hip before.  She has a history of total knee arthroplasty on the left side.  She has no pain in the knee today nor did she have any pain in the knee prior to this.  She has a history of congestive heart failure and is on anticoagulation.  She has no other pain anywhere else.  She denies any numbness tingling radiating to the foot.  Pain is localized to the anterolateral hip.  It is a sharp pain worse with motion.      PMH:   Past Medical History:   Diagnosis Date    Cancer     breast CA s/p chemo and L masectomy     CHF (congestive heart failure)     History of DVT (deep vein thrombosis)     HLD (hyperlipidemia)     Hypertension     Osteoarthritis     Venous insufficiency        PSH:   Past Surgical History:   Procedure Laterality Date    HYSTERECTOMY      INSERTION OF PACEMAKER      JOINT REPLACEMENT Bilateral     Knee    MASTECTOMY Left 2019       Med:   No current facility-administered medications on file prior to encounter.     Current Outpatient Medications on File Prior to Encounter   Medication Sig Dispense Refill    amiodarone (PACERONE) 400 MG tablet Take 400 mg by mouth once daily.      anastrozole (ARIMIDEX) 1 mg Tab Take 1 mg by mouth once daily.      aspirin (ECOTRIN) 81 MG EC tablet Take 81 mg by mouth once daily.      atorvastatin (LIPITOR) 40 MG tablet Take 40 mg by mouth once daily.      dexlansoprazole (DEXILANT) 60 mg capsule Take 60 mg by mouth once daily.      furosemide (LASIX) 40 MG tablet Take 40 mg by mouth once daily.      HYDROcodone-acetaminophen (NORCO) 5-325 mg per tablet Take 1 tablet by mouth every 4 (four) hours as needed for Pain. 8 tablet 0    metoprolol succinate (TOPROL-XL) 25 MG 24 hr tablet Take 25 mg by mouth once daily.      rivaroxaban  (XARELTO) 20 mg Tab Take 20 mg by mouth daily with dinner or evening meal.      sacubitriL-valsartan (ENTRESTO) 24-26 mg per tablet Take 1 tablet by mouth 2 (two) times daily.      spironolactone (ALDACTONE) 25 MG tablet Take 25 mg by mouth once daily.         Allergies: Review of patient's allergies indicates:  No Known Allergies    SH:   Social History     Socioeconomic History    Marital status: Single   Tobacco Use    Smoking status: Never    Smokeless tobacco: Never   Substance and Sexual Activity    Alcohol use: Not Currently    Drug use: Never    Sexual activity: Not Currently   Social History Narrative    ** Merged History Encounter **            FH: None    ROS:   Constitutional: Denies fever chills  Eyes: No change in vision  ENT: No ringing or current infections  CV: No chest pain  Resp: No labored breathing  MSK: Pain evident at site of injury located in HPI,   Integ: No signs of abrasions or lacerations  Neuro: No numbness or tingling  Lymphatic: No swelling outside the area of injury     /62 (BP Location: Right arm, Patient Position: Lying)   Pulse 60   Temp 97.7 °F (36.5 °C)   Resp 18   Wt 70.3 kg (155 lb)   SpO2 95%   BMI 26.61 kg/m²   NAD, Alert, Awake, Ox4   HEENT: atraumatic, normal cephalic, neg ecchymosis, lacerations   CV: No increased work of breathing   Pulm: Normal work of breathing   Skin: No cutaneous rash, no open wounds   Vascular: 2+ RP, wwp, bcr   Left lower extremity:  No open wounds or rashes.  Previous surgical incision over the total knee arthroplasty site is well healed without any sign of infection.  No knee effusion.  Leg is shortened and externally rotated.  Significant pain with log roll.  She is able to flex and extend the great toe.  Dorsalis pedis pulses 2+ her foot is warm well perfused    Labs:  Hemoglobin is 11.4, hematocrit is 34.8       Imaging:  X-ray of the left hip and pelvis shows comminuted intertrochanteric left femur fracture        Assessment:  Left  femur intertrochanteric hip fracture    Plan:  Plan is for surgery today.  Intramedullary nail placement left femur    I explained that surgery and the nature of their condition are not without risks. These include, but are not limited to, bleeding, infection, neurovascular compromise, malunion, nonunion, hardware complications, wound complications, scarring, cosmetic defects, need for later and/or repeated surgeries, pain, loss of ROM, loss of function, PTOA, deformity, functional abnormalities, stiffness, thromboembolic complications, compartment syndrome, loss of limb, loss of life, anesthetic complications, and other imponderables. I explained that these can occur despite the adequacy of treatments rendered, and that their risks are heightened given the nature of their condition. They verbalized understanding. They would like to continue with surgery at this time. If appropriate family was involved with surgical discussion.

## 2022-10-14 NOTE — OP NOTE
OPERATIVE NOTE     INTRAMEDULLARY NAIL left INTERTROCHANTERIC FEMUR FRACTURE     Attending Physician: Ankush Alex MD     First Assistant:  None     Pre-Op Diagnosis:  Left Intertrochanteric Femur fracture      Post-Op Diagnosis: same      Procedure: Intramedullary Nail left Hip 47717     Anesthesia: General     Estimated Blood Loss: 75cc     Complications: none      Specimens: none     Drains: none      Findings: displaced intertrochanteric femur fracture of the left hip     Implants:       [place] short gamma nail 11 x 180 x 125  10.5 x 90 mm lag screw  5 mm x 35 mm distal locking screw           Indications:   Patient sustained a fall from standing sustaining a hip fracture. Patient was explained the risks/benefits/alternatives to operative management of his hip fracture and elected to proceed with the recommended IMN of hip. Written informed consent was obtained from the patient. The patient was admitted tot he family medicine service and medically optimized.      Procedure:   The correct patient was identified in the pre-operative holding area and the operative site was marked. The patient was taken to the OR where general anesthesia was induced by the anesthesia team.  The patient was placed on the fracture table in the scissor position with the contralateral leg well padded to the frame. A closed reduction of the operative hip fracture was obtained under fluoroscopic guidance with traction, adduction, and internal rotation. The fracture reduction was acceptable.  Two grams of IV ancef were administered to the patient. The operative hip was prepped and draped in the usual standard fashion. A time-out was called verifying the correct patient, procedure, operative site, allergies, and administration of antibiotics.      After a time-out, I made an incision over the greater trochanter splitting the fascia and carrying the dissection down sharply to the greater trochanter. A guidewire was inserted into the proximal  femur under fluoroscopic guidance in both the AP and lateral planes. The greater trochanter and proximal femur was entered with the opening reamer. An intramedullay nail was placed in the femoral canal. Using the jig, the guidewire was placed through the nail and into the femoral head under fluoroscopic guidance. This was over drilled and the appropriate size lag screw was inserted, followed by compression of the fracture. We had an acceptable reduction and placement of hardware. The nail was locked distally using the jig and this screw obtained great purchase. The jig was removed and final fluoroscopy revealed acceptable reduction and fixation of the intertrochanteric femur fracture. The wounds were irrigated with normal saline and closed with vicryl, followed by dermabond and steri-strips for the skin closure. Sterile dressings were applied. The patient was taken off the fracture table and then to recovery in stable condition.      POST OP PLAN:   The patient will be admitted to the medicine service. The patient will receive 2 doses of ancef post op and will work with PT/OT in the morning, WBAT. Lovenox for DVT Prophylaxis.

## 2022-10-14 NOTE — ED PROVIDER NOTES
Encounter Date: 10/14/2022       History     Chief Complaint   Patient presents with    Hip Pain     Walking and left leg gave out, fell onto left hip. Shortened, but not rotated.  Pt complaining of some mid thigh pain.  GCS 15. Did not hit her head.  +Thinners. No LOC. 2+ pulses.      Patient was walking to the store tripped and fell and experienced immediately left hip pain unable ambulate.  No history of previous injuries to that hip before does have a history of cardiac disease were she is on blood thinners did not strike her head no head neck pain no preceding events such as chest pain shortness of breath or unilateral weakness.    The history is provided by the patient.   Hip Pain  This is a new problem. The current episode started less than 1 hour ago. The problem occurs constantly. The problem has not changed since onset.Pertinent negatives include no chest pain and no shortness of breath. Nothing aggravates the symptoms. Nothing relieves the symptoms.   Review of patient's allergies indicates:  No Known Allergies  Past Medical History:   Diagnosis Date    Cancer     breast CA s/p chemo and L masectomy     CHF (congestive heart failure)     History of DVT (deep vein thrombosis)     HLD (hyperlipidemia)     Hypertension     Osteoarthritis     Venous insufficiency      Past Surgical History:   Procedure Laterality Date    HYSTERECTOMY      INSERTION OF PACEMAKER      JOINT REPLACEMENT Bilateral     Knee    MASTECTOMY Left 2019     Family History   Family history unknown: Yes     Social History     Tobacco Use    Smoking status: Never    Smokeless tobacco: Never   Substance Use Topics    Alcohol use: Not Currently    Drug use: Never     Review of Systems   Constitutional:  Negative for fever.   HENT:  Negative for sore throat.    Respiratory:  Negative for shortness of breath.    Cardiovascular:  Negative for chest pain.   Gastrointestinal:  Negative for nausea.   Genitourinary:  Negative for difficulty  urinating, dyspareunia, dysuria, menstrual problem, vaginal bleeding and vaginal discharge.   Musculoskeletal:  Negative for back pain.   Skin:  Negative for rash.   Neurological:  Negative for weakness.   Hematological:  Does not bruise/bleed easily.     Physical Exam     Initial Vitals [10/14/22 1152]   BP Pulse Resp Temp SpO2   114/66 60 18 97.7 °F (36.5 °C) 98 %      MAP       --         Physical Exam    Constitutional: She appears well-developed and well-nourished.   HENT:   Head: Normocephalic and atraumatic.   Mouth/Throat: Oropharynx is clear and moist.   Eyes: Conjunctivae are normal. Pupils are equal, round, and reactive to light.   Neck: Neck supple.   Normal range of motion.  Cardiovascular:  Normal rate, regular rhythm and normal heart sounds.           Pulmonary/Chest: Breath sounds normal. She has no wheezes. She has no rhonchi. She has no rales.   Abdominal: Abdomen is soft. Bowel sounds are normal.   Musculoskeletal:         General: Normal range of motion.      Cervical back: Normal range of motion and neck supple.      Comments: Left hip externally rotated but not shortened neurovascular intact     Neurological: She is alert and oriented to person, place, and time. GCS score is 15. GCS eye subscore is 4. GCS verbal subscore is 5. GCS motor subscore is 6.   Skin: Skin is warm and dry. Capillary refill takes less than 2 seconds.   Psychiatric: She has a normal mood and affect. Her behavior is normal. Judgment and thought content normal.       ED Course   Procedures  Labs Reviewed   COMPREHENSIVE METABOLIC PANEL - Abnormal; Notable for the following components:       Result Value    Creatinine 1.24 (*)     Albumin Level 3.1 (*)     Albumin/Globulin Ratio 0.9 (*)     Alanine Aminotransferase 186 (*)     Aspartate Aminotransferase 129 (*)     All other components within normal limits   PROTIME-INR - Abnormal; Notable for the following components:    PT 18.2 (*)     INR 1.53 (*)     All other components  within normal limits   CBC WITH DIFFERENTIAL - Abnormal; Notable for the following components:    RBC 3.39 (*)     Hgb 11.4 (*)     Hct 34.8 (*)     .7 (*)     MCH 33.6 (*)     MCHC 32.8 (*)     IG# 0.05 (*)     All other components within normal limits   CBC W/ AUTO DIFFERENTIAL    Narrative:     The following orders were created for panel order CBC auto differential.  Procedure                               Abnormality         Status                     ---------                               -----------         ------                     CBC with Differential[[id number]]        Abnormal            Final result                 Please view results for these tests on the individual orders.   URINALYSIS     EKG Readings: (Independently Interpreted)   Rhythm: Normal Sinus Rhythm. Heart Rate: 55. Ectopy: No Ectopy. Conduction: 1st Degree AV Block. ST Segments: Normal ST Segments. T Waves: Normal. Clinical Impression: Normal Sinus Rhythm   1205   ECG Results              EKG 12-lead (Final result)  Result time 10/14/22 13:47:01      Final result by Interface, Lab In [facility] (10/14/22 13:47:01)                   Narrative:    Test Reason : S72.142A,    Vent. Rate : 054 BPM     Atrial Rate : 054 BPM     P-R Int : 216 ms          QRS Dur : 114 ms      QT Int : 534 ms       P-R-T Axes : 067 018 065 degrees     QTc Int : 506 ms    Sinus bradycardia with 1st degree A-V block  Minimal voltage criteria for LVH, may be normal variant ( Misael product )    Abnormal ECG  Confirmed by Teofilo Morataya MD ([id number]) on 10/14/2022 1:46:50 PM    Referred By: [email or web address]   SELF           Confirmed By:Teofilo Morataya MD                                  Imaging Results              X-Ray Chest 1 View (Final result)  Result time 10/14/22 12:54:01      Final result by Edgar Chaparro MD (10/14/22 12:54:01)                   Impression:      No acute findings in the chest      Electronically signed by: Edgar Chaparro  MD  Date:    10/14/2022  Time:    12:54               Narrative:    EXAMINATION:  XR CHEST 1 VIEW    CLINICAL HISTORY:  Encounter for preprocedural respiratory examination    COMPARISON:  10/02/2022    FINDINGS:  Single view of the chest shows no focal consolidation, pneumothorax or pleural effusion.  Cardiac silhouette and pulmonary vasculature are normal.  Aorta is partially calcified.  Cardiac device and port catheter are stable in position.                                       X-Ray Hip 2 or 3 views Left (with Pelvis when performed) (Final result)  Result time 10/14/22 12:53:43      Final result by Edgar Chaparro MD (10/14/22 12:53:43)                   Impression:      Intertrochanteric left femoral neck fracture      Electronically signed by: Edgar Chaparro MD  Date:    10/14/2022  Time:    12:53               Narrative:    EXAMINATION:  Four images pelvis and left hip    CLINICAL HISTORY:  Fracture    COMPARISON:  05/22/2017    FINDINGS:  There is an acute, displaced and mildly comminuted intertrochanteric left femoral neck fracture.  No additional fracture or dislocation.                                       Medications   morphine injection 4 mg (has no administration in time range)   ondansetron 4 mg/2 mL injection (has no administration in time range)   morphine 4 mg/mL injection (has no administration in time range)   morphine injection 4 mg ( Intravenous Given 10/14/22 1230)   0.9%  NaCl infusion (500 mLs Intravenous New Bag 10/14/22 1255)   ondansetron injection 4 mg (4 mg Intravenous Given 10/14/22 1409)     Medical Decision Making:   Clinical Tests:   Lab Tests: Ordered and Reviewed  The following lab test(s) were unremarkable: CBC and CMP  ED Management:  Discussed case with orthopedics and with Internal Medicine will admit           ED Course as of 10/14/22 1413   Fri Oct 14, 2022   1405 Discussed case with orthopedics and with Internal Medicine will admit [FK]      ED Course User Index  [FK]  Trevor Rodriguez III, MD                 Clinical Impression:   Final diagnoses:  [Z01.811] Pre-op chest exam  [S72.142A] Closed intertrochanteric fracture of hip, left, initial encounter (Primary)        ED Disposition Condition    Admit Stable                Trevor Rodriguez III, MD  10/14/22 1411       Trevor Rodriguez III, MD  10/20/22 1498

## 2022-10-14 NOTE — TRANSFER OF CARE
Anesthesia Transfer of Care Note    Patient: Laura Jacobs    Procedure(s) Performed: Procedure(s) (LRB):  INSERTION, INTRAMEDULLARY СВЕТЛАНА, FEMUR (Left)    Patient location: PACU    Anesthesia Type: general    Transport from OR: Transported from OR on room air with adequate spontaneous ventilation    Post pain: adequate analgesia    Post assessment: no apparent anesthetic complications    Post vital signs: stable    Level of consciousness: awake and alert    Nausea/Vomiting: no nausea/vomiting    Complications: none    Transfer of care protocol was followed      Last vitals:   Visit Vitals  /70 (BP Location: Right arm, Patient Position: Lying)   Pulse 85   Temp (!) 35 °C (95 °F) (Skin)   Resp 15   Wt 70.3 kg (155 lb)   SpO2 99%   BMI 26.61 kg/m²

## 2022-10-14 NOTE — PROGRESS NOTES
I reviewed x-rays.  Patient has left intertrochanteric femur fracture.  This will need surgery.  Please keep NPO.  Hospitalist admission.  I will do surgery on the left hip today    Ankush Alex MD

## 2022-10-14 NOTE — ANESTHESIA PROCEDURE NOTES
Intubation    Date/Time: 10/14/2022 4:40 PM  Performed by: Jose Lan CRNA  Authorized by: Jayden Simmons MD     Intubation:     Induction:  Intravenous    Intubated:  Postinduction    Mask Ventilation:  Easy mask    Attempts:  1    Attempted By:  CRNA    Method of Intubation:  Direct    Blade:  Thompson 2    Laryngeal View Grade: Grade I - full view of cords      Difficult Airway Encountered?: No      Complications:  None    Airway Device:  Oral endotracheal tube    Airway Device Size:  7.0    Style/Cuff Inflation:  Cuffed    Inflation Amount (mL):  6    Tube secured:  21    Secured at:  The lips    Placement Verified By:  Capnometry    Complicating Factors:  None    Findings Post-Intubation:  BS equal bilateral and atraumatic/condition of teeth unchanged

## 2022-10-14 NOTE — ANESTHESIA PREPROCEDURE EVALUATION
10/14/2022  Laura Jacobs is a 71 y.o., female.      Pre-op Assessment    I have reviewed the Patient Summary Reports.     I have reviewed the Nursing Notes. I have reviewed the NPO Status.   I have reviewed the Medications.     Review of Systems  Anesthesia Hx:  No problems with previous Anesthesia    Hematology/Oncology:        Current/Recent Cancer. Breast left   EENT/Dental:EENT/Dental Normal   Cardiovascular:   Pacemaker Hypertension CAD  Dysrhythmias  CHF    Pulmonary:  Pulmonary Normal    Renal/:  Renal/ Normal     Hepatic/GI:  Hepatic/GI Normal    Musculoskeletal:   Arthritis     Neurological:  Neurology Normal    Endocrine:  Endocrine Normal    Dermatological:  Skin Normal    Psych:  Psychiatric Normal           Physical Exam  General: Well nourished, Cooperative, Alert and Oriented    Airway:  Mallampati: I   Mouth Opening: Normal  TM Distance: Normal  Tongue: Normal  Neck ROM: Normal ROM    Dental:  Partial Dentures  Top edentulous  Chest/Lungs:  Clear to auscultation, Normal Respiratory Rate    Heart:  Rate: Normal  Rhythm: Regular Rhythm        Anesthesia Plan  Type of Anesthesia, risks & benefits discussed:    Anesthesia Type: Gen ETT  Intra-op Monitoring Plan: Standard ASA Monitors  Post Op Pain Control Plan: multimodal analgesia  Induction:  IV  Airway Plan: Direct  Informed Consent: Informed consent signed with the Patient and all parties understand the risks and agree with anesthesia plan.  All questions answered.   ASA Score: 3 Emergent  Day of Surgery Review of History & Physical: H&P Update referred to the surgeon/provider.    Ready For Surgery From Anesthesia Perspective.     .

## 2022-10-15 PROBLEM — S72.142A CLOSED INTERTROCHANTERIC FRACTURE OF HIP, LEFT, INITIAL ENCOUNTER: Status: ACTIVE | Noted: 2022-10-15

## 2022-10-15 LAB
APPEARANCE UR: CLEAR
BACTERIA #/AREA URNS AUTO: NORMAL /HPF
BILIRUB UR QL STRIP.AUTO: NEGATIVE MG/DL
COLOR UR AUTO: YELLOW
GLUCOSE UR QL STRIP.AUTO: NEGATIVE MG/DL
KETONES UR QL STRIP.AUTO: NEGATIVE MG/DL
LEUKOCYTE ESTERASE UR QL STRIP.AUTO: NEGATIVE UNIT/L
NITRITE UR QL STRIP.AUTO: NEGATIVE
PH UR STRIP.AUTO: 5.5 [PH]
PROT UR QL STRIP.AUTO: NEGATIVE MG/DL
RBC #/AREA URNS AUTO: <5 /HPF
RBC UR QL AUTO: NEGATIVE UNIT/L
SP GR UR STRIP.AUTO: 1.02 (ref 1–1.03)
SQUAMOUS #/AREA URNS AUTO: <5 /HPF
UROBILINOGEN UR STRIP-ACNC: 0.2 MG/DL
WBC #/AREA URNS AUTO: <5 /HPF

## 2022-10-15 PROCEDURE — 63600175 PHARM REV CODE 636 W HCPCS: Performed by: STUDENT IN AN ORGANIZED HEALTH CARE EDUCATION/TRAINING PROGRAM

## 2022-10-15 PROCEDURE — 25000003 PHARM REV CODE 250: Performed by: STUDENT IN AN ORGANIZED HEALTH CARE EDUCATION/TRAINING PROGRAM

## 2022-10-15 PROCEDURE — 21400001 HC TELEMETRY ROOM

## 2022-10-15 PROCEDURE — 25000003 PHARM REV CODE 250: Performed by: EMERGENCY MEDICINE

## 2022-10-15 PROCEDURE — 81001 URINALYSIS AUTO W/SCOPE: CPT | Performed by: EMERGENCY MEDICINE

## 2022-10-15 PROCEDURE — 25000003 PHARM REV CODE 250: Performed by: INTERNAL MEDICINE

## 2022-10-15 PROCEDURE — 97530 THERAPEUTIC ACTIVITIES: CPT

## 2022-10-15 PROCEDURE — 97162 PT EVAL MOD COMPLEX 30 MIN: CPT

## 2022-10-15 RX ORDER — ATORVASTATIN CALCIUM 40 MG/1
40 TABLET, FILM COATED ORAL DAILY
Status: DISCONTINUED | OUTPATIENT
Start: 2022-10-16 | End: 2022-10-24 | Stop reason: HOSPADM

## 2022-10-15 RX ORDER — FUROSEMIDE 40 MG/1
40 TABLET ORAL DAILY
Status: DISCONTINUED | OUTPATIENT
Start: 2022-10-16 | End: 2022-10-21

## 2022-10-15 RX ORDER — HYDROCODONE BITARTRATE AND ACETAMINOPHEN 5; 325 MG/1; MG/1
1 TABLET ORAL EVERY 4 HOURS PRN
Status: DISCONTINUED | OUTPATIENT
Start: 2022-10-15 | End: 2022-10-24 | Stop reason: HOSPADM

## 2022-10-15 RX ORDER — AMIODARONE HYDROCHLORIDE 200 MG/1
400 TABLET ORAL DAILY
Status: DISCONTINUED | OUTPATIENT
Start: 2022-10-16 | End: 2022-10-24 | Stop reason: HOSPADM

## 2022-10-15 RX ORDER — ANASTROZOLE 1 MG/1
1 TABLET ORAL DAILY
Status: DISCONTINUED | OUTPATIENT
Start: 2022-10-16 | End: 2022-10-24 | Stop reason: HOSPADM

## 2022-10-15 RX ORDER — PANTOPRAZOLE SODIUM 40 MG/1
40 TABLET, DELAYED RELEASE ORAL DAILY
Status: DISCONTINUED | OUTPATIENT
Start: 2022-10-16 | End: 2022-10-24 | Stop reason: HOSPADM

## 2022-10-15 RX ORDER — METOPROLOL SUCCINATE 25 MG/1
25 TABLET, EXTENDED RELEASE ORAL DAILY
Status: DISCONTINUED | OUTPATIENT
Start: 2022-10-16 | End: 2022-10-24 | Stop reason: HOSPADM

## 2022-10-15 RX ORDER — SPIRONOLACTONE 25 MG/1
25 TABLET ORAL DAILY
Status: DISCONTINUED | OUTPATIENT
Start: 2022-10-16 | End: 2022-10-24 | Stop reason: HOSPADM

## 2022-10-15 RX ADMIN — CEFAZOLIN SODIUM 2 G: 2 SOLUTION INTRAVENOUS at 10:10

## 2022-10-15 RX ADMIN — TRAMADOL HYDROCHLORIDE 50 MG: 50 TABLET, COATED ORAL at 07:10

## 2022-10-15 RX ADMIN — MELATONIN TAB 3 MG 6 MG: 3 TAB at 08:10

## 2022-10-15 RX ADMIN — TRAMADOL HYDROCHLORIDE 50 MG: 50 TABLET, COATED ORAL at 03:10

## 2022-10-15 RX ADMIN — CEFAZOLIN SODIUM 2 G: 2 SOLUTION INTRAVENOUS at 04:10

## 2022-10-15 RX ADMIN — FAMOTIDINE 20 MG: 20 TABLET, FILM COATED ORAL at 08:10

## 2022-10-15 RX ADMIN — SACUBITRIL AND VALSARTAN 1 TABLET: 24; 26 TABLET, FILM COATED ORAL at 08:10

## 2022-10-15 RX ADMIN — TRAMADOL HYDROCHLORIDE 50 MG: 50 TABLET, COATED ORAL at 10:10

## 2022-10-15 RX ADMIN — FAMOTIDINE 20 MG: 20 TABLET, FILM COATED ORAL at 10:10

## 2022-10-15 NOTE — PROGRESS NOTES
Ortho Progress Note    Orthopaedic Injuries:  Left intertrochanteric hip fracture    Orthopaedic Surgeries:  10/14/2022:  Intramedullary nail to left hip for intertrochanteric femur fracture    S:  No acute events overnight.  Patient is doing well.  She worked with therapy today.  Pain is controlled.  No issues with the incision site.  No fevers sweats or chills.  No calf pain.  No shortness of breath.  No chest pain    O:   Vitals:    10/15/22 1139   BP: 119/64   Pulse: 68   Resp: 19   Temp: 98.2 °F (36.8 °C)     Recent Labs     10/14/22  1245   WBC 7.8   HGB 11.4*   HCT 34.8*        Recent Labs     10/14/22  1245      K 3.8   CO2 24   BUN 17.4     No results for input(s): ESR, CRP in the last 72 hours.    PE:  Gen: A+Ox3, NAD  Card: RRR by RP  Lungs: nonlabored breathing, symmetric chest rise  Abd: S/NT/ND  Left lower extremity:  Surgical dressing is in place without any drainage.  Calf is soft and compressible without any pain.  No pain with logroll of the hip.  The foot    X-ray of the left hip shows well-fixed intramedullary richard with fixed intertrochanteric femur fracture    A/P:  Postop day 1 status post left IM nail for intertrochanteric femur fracture  - DVT ppx:  Per primary team  - Abx plan:  No further antibiotics needed  - PT/OT- weight-bearing as tolerated  - Pain control  ?  Weight-bearing status:  Weight-bearing as tolerated

## 2022-10-15 NOTE — PLAN OF CARE
Problem: Physical Therapy  Goal: Physical Therapy Goal  Description: STGs:     1. Pt to perform bed mobility with Min A  2. Pt to perform sit<>stand at RW with Min A  3. Pt to perform stand-pivot transfer at RW with Min A  4. Pt to ambulate 50' at RW with Min A  Outcome: Ongoing, Progressing

## 2022-10-15 NOTE — PT/OT/SLP EVAL
Physical Therapy Evaluation    Patient Name:  Laura Jacobs   MRN:  [id number]    Recommendations:     Discharge Recommendations:  rehabilitation facility, nursing facility, skilled   Discharge Equipment Recommendations: walker, rolling, commode, other (see comments) (shower chair vs tub bench)   Barriers to discharge: Decreased caregiver support    Assessment:     Laura Jacobs is a 71 y.o. female admitted following a fall and s/p IM nail of L intertrochanteric femur fx (10/14/22).  She presents with the following impairments/functional limitations:  weakness, impaired endurance, impaired self care skills, impaired functional mobility, gait instability, impaired balance, decreased lower extremity function, pain, orthopedic precautions.    Rehab Prognosis: Good; patient would benefit from acute skilled PT services to address these deficits and reach maximum level of function.    Recent Surgery: Procedure(s) (LRB):  INSERTION, INTRAMEDULLARY СВЕТЛАНА, FEMUR (Left) 1 Day Post-Op    Plan:     During this hospitalization, patient to be seen BID to address the identified rehab impairments via gait training, therapeutic activities, therapeutic exercises and progress toward the following goals:    Plan of Care Expires:  11/14/22    Subjective     Chief Complaint: L hip pain   Patient/Family Comments/goals: Return to PLOF  Pain/Comfort:  Pain Rating 1: 9/10  Location - Side 1: Left  Location 1: hip  Pain Addressed 1: Pre-medicate for activity, Reposition, Cessation of Activity, Nurse notified    Living Environment:  Single story home and with no steps to enter.   Prior to admission, patients level of function was Independent with use of SC.  Equipment used at home: cane, straight.  DME owned (not currently used): single point cane and bedside commode.     Objective:     Communicated with nurse Jaime prior to session.  Patient found HOB elevated with peripheral IV, SCD  upon PT entry to room.    General Precautions:  Standard, fall   Orthopedic Precautions:LLE weight bearing as tolerated   Braces: N/A  Respiratory Status: Room air    Exams:  RLE ROM: WFL  RLE Strength: WFL; at least 3/5 all  LLE ROM: WFL  LLE Strength: ankle DF/PF 3/5; otherwise, 2-/5 all, limited 2/2 pain    Functional Mobility:  Bed Mobility:     Supine to Sit: moderate assistance  Sit to Supine: maximal assistance  Transfers:     Sit to Stand:  maximal assistance with rolling walker x 2 reps   Gait: Pt side stepped 2' at RW at BS with Max A and unable to tolerate further attempt 2/2 pain. Pt also very anxious.  Sitting balance: Min A  Standing balance: at RW; statically with Mod A, dynamically with Max A    Therapeutic Activities and Exercises:   Increased time required for pt education, LE exercise sitting EOB with Min A for balance, seated balance, activities, and practice of transfers x 2 reps. Pt educated on LLE WBAT and proper technique for transfers and ambulation with use of RW. Pt verbalized understanding of all but unable to successfully attempt forward ambulation at this time 2/2 instability, weakness, and pain. Continue POC as tolerated.       Patient left HOB elevated with all lines intact, call button in reach, and SCDs reapplied .    GOALS:   Multidisciplinary Problems       Physical Therapy Goals          Problem: Physical Therapy    Goal Priority Disciplines Outcome Goal Variances Interventions   Physical Therapy Goal     PT, PT/OT Ongoing, Progressing     Description: STGs:     1. Pt to perform bed mobility with Min A  2. Pt to perform sit<>stand at RW with Min A  3. Pt to perform stand-pivot transfer at RW with Min A  4. Pt to ambulate 50' at RW with Min A                       History:     Past Medical History:   Diagnosis Date    Cancer     breast CA s/p chemo and L masectomy     CHF (congestive heart failure)     History of DVT (deep vein thrombosis)     HLD (hyperlipidemia)     Hypertension     Osteoarthritis     Venous insufficiency         Past Surgical History:   Procedure Laterality Date    HYSTERECTOMY      INSERTION OF PACEMAKER      JOINT REPLACEMENT Bilateral     Knee    MASTECTOMY Left 2019       Time Tracking:     PT Received On:    PT Start Time: 0852     PT Stop Time: 0920  PT Total Time (min): 28 min     Billable Minutes: Evaluation moderate complexity 18 minutes and Therapeutic Activity 10 minutes      10/15/2022

## 2022-10-16 PROCEDURE — 25000003 PHARM REV CODE 250: Performed by: STUDENT IN AN ORGANIZED HEALTH CARE EDUCATION/TRAINING PROGRAM

## 2022-10-16 PROCEDURE — 25000003 PHARM REV CODE 250: Performed by: INTERNAL MEDICINE

## 2022-10-16 PROCEDURE — 21400001 HC TELEMETRY ROOM

## 2022-10-16 PROCEDURE — 25000003 PHARM REV CODE 250: Performed by: EMERGENCY MEDICINE

## 2022-10-16 RX ADMIN — MELATONIN TAB 3 MG 6 MG: 3 TAB at 08:10

## 2022-10-16 RX ADMIN — ATORVASTATIN CALCIUM 40 MG: 40 TABLET, FILM COATED ORAL at 11:10

## 2022-10-16 RX ADMIN — ANASTROZOLE 1 MG: 1 TABLET, COATED ORAL at 11:10

## 2022-10-16 RX ADMIN — AMIODARONE HYDROCHLORIDE 400 MG: 200 TABLET ORAL at 11:10

## 2022-10-16 RX ADMIN — TRAMADOL HYDROCHLORIDE 50 MG: 50 TABLET, COATED ORAL at 08:10

## 2022-10-16 RX ADMIN — FAMOTIDINE 20 MG: 20 TABLET, FILM COATED ORAL at 08:10

## 2022-10-16 RX ADMIN — TRAMADOL HYDROCHLORIDE 50 MG: 50 TABLET, COATED ORAL at 11:10

## 2022-10-16 RX ADMIN — PANTOPRAZOLE SODIUM 40 MG: 40 TABLET, DELAYED RELEASE ORAL at 11:10

## 2022-10-16 RX ADMIN — SACUBITRIL AND VALSARTAN 1 TABLET: 24; 26 TABLET, FILM COATED ORAL at 08:10

## 2022-10-16 NOTE — H&P
[facility]                       [address] [place] [address]    PATIENT NAME:       JI HILLMAN  YOB: 1950  CSN:                [id number]   MRN:                [id number]  ADMIT DATE:         10/14/2022 11:58:00  PHYSICIAN:          Juan F Mena MD                        HISTORY AND PHYSICAL      CHIEF COMPLAINT:  Fall resulting in left hip fracture.    HISTORY OF PRESENT ILLNESS:  The patient is a 71-year-old    female who reported that she was going to the store and apparently she tripped   and fell.  At this point, she was assisted by one of the family members.    Therefore, the patient was unable to put pressure on this left lower extremity   and she was transferred to the hospital for further care.  In the emergency   room, she was attended by the ER physician.  Therefore, Mrs. Hillman was   admitted for further care.    PAST MEDICAL HISTORY:  Significant for:  Hypertension, congestive heart failure,   history of left breast cancer, hyperlipidemia, osteoarthritis, and deep venous   thrombosis.    SOCIAL HISTORY:  The patient is a , has 2 boys.  She does not drink, does   not smoke.  No history of IV drug abuse.    FAMILY HISTORY:  Significant for hypertension, diabetes mellitus, heart, and   cancer.    PAST SURGICAL HISTORY:  The patient has a history of mastectomy, bilateral knee   replacement, hysterectomy.    ALLERGIES:  NO KNOWN ALLERGY.     CURRENT MEDICATION:  She is takin. Aspirin 81 mg once a day.    2. Lipitor 40 mg once a day.  3. Dexilant 60 mg once a day.  4. Lasix 40 mg tablet once a day.  5. Metoprolol succinate 25 mg once a day.    6. Entresto 24/26 mg daily.    7. Spironolactone 25 mg a day.    REVIEW OF SYSTEMS:  CARDIOVASCULAR:  Negative for chest pain.  RESPIRATORY:  No shortness of breath.    GASTROINTESTINAL:  No diarrhea or  vomiting.  ENDOCRINOLOGIC:  The patient definitely has no thyroid disorder or diabetes.    Apparently not reported as having diabetes mellitus.  NEUROLOGICAL:  The patient has no focal neurological deficit.  Has a gait   problem secondary to arthritis.    PHYSICAL EXAMINATION:  VITAL SIGNS:  At the time the patient was seen, she had a blood pressure of   122/62, a pulse of 88, respirations 20, temperature 97.8.    HEENT:  Head:  Normocephalic, with no acute trauma to the head.  Eyes:  Both   pupils react to light.  Accommodation seems to be satisfactory.  Ears, Nose, and   Throat:  No current pathology.    NECK:  Supple.  No JVD or adenopathy.    HEART:  The patient has S1, S2.  No significant murmur or gallop.  CHEST:  Fairly clear.  No wheezing or rales.    ABDOMEN:  Soft, nontender.  Good bowel sounds.  EXTREMITIES:  Superior:  Fairly good range of motion.  Inferior:  Limited range   of motion of the left leg secondary to hip fracture.    NEUROLOGIC:  No focal neurological deficit.  Cranial nerves 2 through 12 intact.    LABORATORY DATA:  The patient has hemoglobin 11.4, hematocrit 34.8.     X-ray done revealed that the patient has an intertrochanteric left femoral neck   fracture.    IMPRESSION:  The patient has:  1. Hypertension.   2. Hyperlipidemia.    3. Osteoarthritis.    4. History of left breast cancer.    5. History of deep venous thrombosis.    6. Left intertrochanteric hip fracture.    PLAN:    1. The patient has been submitted to surgery, and she is post surgery at this   time.  2. We have been giving her lot of medication at this time for pain.  3. Will resume her current medication.      Hopefully, the patient will do well.        ______________________________  MD DELIA Fry/MARTIS  DD:  10/15/2022  Time:  06:58PM  DT:  10/15/2022  Time:  07:37PM  Job #:  [id number]      HISTORY AND PHYSICAL

## 2022-10-16 NOTE — PROGRESS NOTES
Ortho Progress Note    Orthopaedic Injuries:  Left intertrochanteric hip fracture    Orthopaedic Surgeries:  10/14/2022:  Intramedullary nail to left hip for intertrochanteric femur fracture    S:  No acute events overnight.  Hip pain is significantly improved.  She has not worked with therapy although she has been getting up out of bed to use the restroom.  No chest pain shortness of breath or calf pain.    O:   Vitals:    10/16/22 1524   BP: 131/69   Pulse: 75   Resp: 18   Temp: 97.7 °F (36.5 °C)     Recent Labs     10/14/22  1245   WBC 7.8   HGB 11.4*   HCT 34.8*        Recent Labs     10/14/22  1245      K 3.8   CO2 24   BUN 17.4     No results for input(s): ESR, CRP in the last 72 hours.    PE:  Gen: A+Ox3, NAD  Card: RRR by RP  Lungs: nonlabored breathing, symmetric chest rise  Abd: S/NT/ND  Left lower extremity:  Surgical dressing is in place without any drainage.  Calf is soft and compressible without any pain.  No pain with logroll of the hip.  The foot    X-ray of the left hip shows well-fixed intramedullary richard with fixed intertrochanteric femur fracture    A/P:  Postop day 2 status post left IM nail for intertrochanteric femur fracture  - DVT ppx:  Per primary team  - Abx plan:  No further antibiotics needed  - PT/OT- weight-bearing as tolerated  - Pain control  ?  Weight-bearing status:  Weight-bearing as tolerated

## 2022-10-17 LAB
ABO + RH BLD: NORMAL
ABO + RH BLD: NORMAL
ALBUMIN SERPL-MCNC: 2.2 GM/DL (ref 3.4–4.8)
ALBUMIN/GLOB SERPL: 0.9 RATIO (ref 1.1–2)
ALP SERPL-CCNC: 101 UNIT/L (ref 40–150)
ALT SERPL-CCNC: 88 UNIT/L (ref 0–55)
AST SERPL-CCNC: 67 UNIT/L (ref 5–34)
BASOPHILS # BLD AUTO: 0.07 X10(3)/MCL (ref 0–0.2)
BASOPHILS NFR BLD AUTO: 0.8 %
BILIRUBIN DIRECT+TOT PNL SERPL-MCNC: 0.3 MG/DL
BLD PROD TYP BPU: NORMAL
BLD PROD TYP BPU: NORMAL
BLOOD UNIT EXPIRATION DATE: NORMAL
BLOOD UNIT EXPIRATION DATE: NORMAL
BLOOD UNIT TYPE CODE: 5100
BLOOD UNIT TYPE CODE: 5100
BUN SERPL-MCNC: 9.8 MG/DL (ref 9.8–20.1)
CALCIUM SERPL-MCNC: 8 MG/DL (ref 8.4–10.2)
CHLORIDE SERPL-SCNC: 103 MMOL/L (ref 98–107)
CO2 SERPL-SCNC: 26 MMOL/L (ref 23–31)
CREAT SERPL-MCNC: 0.72 MG/DL (ref 0.55–1.02)
CROSSMATCH INTERPRETATION: NORMAL
CROSSMATCH INTERPRETATION: NORMAL
DISPENSE STATUS: NORMAL
DISPENSE STATUS: NORMAL
EOSINOPHIL # BLD AUTO: 0.37 X10(3)/MCL (ref 0–0.9)
EOSINOPHIL NFR BLD AUTO: 4.1 %
ERYTHROCYTE [DISTWIDTH] IN BLOOD BY AUTOMATED COUNT: 14.1 % (ref 11.5–17)
GFR SERPLBLD CREATININE-BSD FMLA CKD-EPI: >60 MLS/MIN/1.73/M2
GLOBULIN SER-MCNC: 2.4 GM/DL (ref 2.4–3.5)
GLUCOSE SERPL-MCNC: 97 MG/DL (ref 82–115)
GROUP & RH: NORMAL
HCT VFR BLD AUTO: 21.1 % (ref 37–47)
HGB BLD-MCNC: 7 GM/DL (ref 12–16)
IMM GRANULOCYTES # BLD AUTO: 0.12 X10(3)/MCL (ref 0–0.04)
IMM GRANULOCYTES NFR BLD AUTO: 1.3 %
INDIRECT COOMBS GEL: NORMAL
LYMPHOCYTES # BLD AUTO: 1.75 X10(3)/MCL (ref 0.6–4.6)
LYMPHOCYTES NFR BLD AUTO: 19.5 %
MCH RBC QN AUTO: 34 PG (ref 27–31)
MCHC RBC AUTO-ENTMCNC: 33.2 MG/DL (ref 33–36)
MCV RBC AUTO: 102.4 FL (ref 80–94)
MONOCYTES # BLD AUTO: 0.83 X10(3)/MCL (ref 0.1–1.3)
MONOCYTES NFR BLD AUTO: 9.2 %
NEUTROPHILS # BLD AUTO: 5.9 X10(3)/MCL (ref 2.1–9.2)
NEUTROPHILS NFR BLD AUTO: 65.1 %
NRBC BLD AUTO-RTO: 0 %
PLATELET # BLD AUTO: 220 X10(3)/MCL (ref 130–400)
PMV BLD AUTO: 9.4 FL (ref 7.4–10.4)
POTASSIUM SERPL-SCNC: 4.6 MMOL/L (ref 3.5–5.1)
PROT SERPL-MCNC: 4.6 GM/DL (ref 5.8–7.6)
RBC # BLD AUTO: 2.06 X10(6)/MCL (ref 4.2–5.4)
SODIUM SERPL-SCNC: 136 MMOL/L (ref 136–145)
UNIT NUMBER: NORMAL
UNIT NUMBER: NORMAL
WBC # SPEC AUTO: 9 X10(3)/MCL (ref 4.5–11.5)

## 2022-10-17 PROCEDURE — 86850 RBC ANTIBODY SCREEN: CPT | Performed by: INTERNAL MEDICINE

## 2022-10-17 PROCEDURE — 80053 COMPREHEN METABOLIC PANEL: CPT | Performed by: INTERNAL MEDICINE

## 2022-10-17 PROCEDURE — 85025 COMPLETE CBC W/AUTO DIFF WBC: CPT | Performed by: INTERNAL MEDICINE

## 2022-10-17 PROCEDURE — 86923 COMPATIBILITY TEST ELECTRIC: CPT | Performed by: INTERNAL MEDICINE

## 2022-10-17 PROCEDURE — 97530 THERAPEUTIC ACTIVITIES: CPT | Mod: CQ

## 2022-10-17 PROCEDURE — 63600175 PHARM REV CODE 636 W HCPCS: Performed by: INTERNAL MEDICINE

## 2022-10-17 PROCEDURE — P9016 RBC LEUKOCYTES REDUCED: HCPCS | Performed by: INTERNAL MEDICINE

## 2022-10-17 PROCEDURE — 36415 COLL VENOUS BLD VENIPUNCTURE: CPT | Performed by: INTERNAL MEDICINE

## 2022-10-17 PROCEDURE — 36430 TRANSFUSION BLD/BLD COMPNT: CPT

## 2022-10-17 PROCEDURE — 25000003 PHARM REV CODE 250: Performed by: STUDENT IN AN ORGANIZED HEALTH CARE EDUCATION/TRAINING PROGRAM

## 2022-10-17 PROCEDURE — 25000003 PHARM REV CODE 250: Performed by: INTERNAL MEDICINE

## 2022-10-17 PROCEDURE — 21400001 HC TELEMETRY ROOM

## 2022-10-17 PROCEDURE — 25000003 PHARM REV CODE 250: Performed by: EMERGENCY MEDICINE

## 2022-10-17 RX ORDER — FUROSEMIDE 10 MG/ML
20 INJECTION INTRAMUSCULAR; INTRAVENOUS EVERY 4 HOURS
Status: COMPLETED | OUTPATIENT
Start: 2022-10-17 | End: 2022-10-17

## 2022-10-17 RX ORDER — HYDROCODONE BITARTRATE AND ACETAMINOPHEN 500; 5 MG/1; MG/1
TABLET ORAL
Status: DISCONTINUED | OUTPATIENT
Start: 2022-10-17 | End: 2022-10-24 | Stop reason: HOSPADM

## 2022-10-17 RX ADMIN — SPIRONOLACTONE 25 MG: 25 TABLET ORAL at 09:10

## 2022-10-17 RX ADMIN — SACUBITRIL AND VALSARTAN 1 TABLET: 24; 26 TABLET, FILM COATED ORAL at 08:10

## 2022-10-17 RX ADMIN — ANASTROZOLE 1 MG: 1 TABLET, COATED ORAL at 12:10

## 2022-10-17 RX ADMIN — AMIODARONE HYDROCHLORIDE 400 MG: 200 TABLET ORAL at 09:10

## 2022-10-17 RX ADMIN — METOPROLOL SUCCINATE 25 MG: 25 TABLET, EXTENDED RELEASE ORAL at 09:10

## 2022-10-17 RX ADMIN — FUROSEMIDE 20 MG: 10 INJECTION, SOLUTION INTRAMUSCULAR; INTRAVENOUS at 06:10

## 2022-10-17 RX ADMIN — TRAMADOL HYDROCHLORIDE 50 MG: 50 TABLET, COATED ORAL at 08:10

## 2022-10-17 RX ADMIN — FAMOTIDINE 20 MG: 20 TABLET, FILM COATED ORAL at 09:10

## 2022-10-17 RX ADMIN — SACUBITRIL AND VALSARTAN 1 TABLET: 24; 26 TABLET, FILM COATED ORAL at 09:10

## 2022-10-17 RX ADMIN — ATORVASTATIN CALCIUM 40 MG: 40 TABLET, FILM COATED ORAL at 09:10

## 2022-10-17 RX ADMIN — FUROSEMIDE 20 MG: 10 INJECTION, SOLUTION INTRAMUSCULAR; INTRAVENOUS at 02:10

## 2022-10-17 RX ADMIN — FUROSEMIDE 40 MG: 40 TABLET ORAL at 09:10

## 2022-10-17 RX ADMIN — FAMOTIDINE 20 MG: 20 TABLET, FILM COATED ORAL at 08:10

## 2022-10-17 RX ADMIN — PANTOPRAZOLE SODIUM 40 MG: 40 TABLET, DELAYED RELEASE ORAL at 09:10

## 2022-10-17 RX ADMIN — MELATONIN TAB 3 MG 6 MG: 3 TAB at 08:10

## 2022-10-17 NOTE — PROGRESS NOTES
[facility] Hospital Medicine  Progress Note    Patient Name: Laura Jacobs  MRN: [id number]  Patient Class: IP- Inpatient   Admission Date: 10/14/2022  Length of Stay: 3 days  Attending Physician: Ruben Brooks Sr., MD  Primary Care Provider: Ruben Brooks Sr, MD        Subjective:     Principal Problem:Closed intertrochanteric fracture of hip, left, initial encounter        HPI:  71 year old Black Female reported to [facility] E.R[facility] after her left leg gave out and she fell in the street near her home. She reported to the E.R. with left hip pain.  X-ray of Left Hip showed Left Hip Fracture.  Ortho. was consulted.      Overview/Hospital Course:  71 year old Black Female was admitted to the hospital for Left Hip Fracture.  Ortho. was consulted and performed a Left Hip Fracture Repair surgery on 10/14/2022.  Patient is without complaints at the present time.  Patient is currently receiving a blood transfusion of 2 units of PRBC's for unstable anemia following the surgery.  I will consult [facility] for Rehab.      Interval History:     Review of Systems   Constitutional:  Negative for activity change, appetite change, chills, diaphoresis, fatigue, fever and unexpected weight change.   HENT:  Negative for congestion, dental problem, drooling, ear discharge, ear pain, facial swelling, hearing loss, mouth sores, nosebleeds, postnasal drip, rhinorrhea, sinus pressure, sinus pain, sneezing, sore throat, tinnitus, trouble swallowing and voice change.    Eyes:  Negative for photophobia, pain, discharge, redness, itching and visual disturbance.   Respiratory:  Negative for apnea, cough, choking, chest tightness, shortness of breath, wheezing and stridor.    Cardiovascular:  Negative for chest pain, palpitations and leg swelling.   Gastrointestinal:  Negative for abdominal distention, abdominal pain, anal bleeding, blood in stool, constipation, diarrhea, nausea, rectal pain and vomiting.   Endocrine:  Negative for cold intolerance, heat intolerance, polydipsia, polyphagia and polyuria.   Genitourinary:  Negative for decreased urine volume, difficulty urinating, dysuria, enuresis, flank pain, frequency, genital sores, hematuria and urgency.   Musculoskeletal:  Positive for gait problem. Negative for arthralgias, back pain, joint swelling, myalgias, neck pain and neck stiffness.   Skin:  Negative for color change, pallor, rash and wound.   Allergic/Immunologic: Negative for environmental allergies, food allergies and immunocompromised state.   Neurological:  Positive for weakness. Negative for dizziness, tremors, seizures, syncope, facial asymmetry, speech difficulty, light-headedness, numbness and headaches.   Hematological:  Negative for adenopathy. Does not bruise/bleed easily.   Psychiatric/Behavioral:  Negative for agitation, behavioral problems, confusion, decreased concentration, dysphoric mood, hallucinations, self-injury, sleep disturbance and suicidal ideas. The patient is not nervous/anxious and is not hyperactive.    Objective:     Vital Signs (Most Recent):  Temp: 98.5 °F (36.9 °C) (10/17/22 1151)  Pulse: 63 (10/17/22 1151)  Resp: 18 (10/17/22 1053)  BP: 127/75 (10/17/22 1151)  SpO2: 97 % (10/17/22 1053) Vital Signs (24h Range):  Temp:  [97.7 °F (36.5 °C)-98.6 °F (37 °C)] 98.5 °F (36.9 °C)  Pulse:  [62-75] 63  Resp:  [17-18] 18  SpO2:  [96 %-98 %] 97 %  BP: (112-137)/(62-80) 127/75     Weight: 70.3 kg (155 lb)  Body mass index is 24.27 kg/m².    Intake/Output Summary (Last 24 hours) at 10/17/2022 1338  Last data filed at 10/17/2022 0605  Gross per 24 hour   Intake 120 ml   Output 700 ml   Net -580 ml      Physical Exam  Constitutional:       Appearance: Normal appearance.   HENT:      Head: Normocephalic and atraumatic.      Nose: Nose normal.   Eyes:      Extraocular Movements: Extraocular movements intact.      Pupils: Pupils are equal, round, and reactive to light.   Cardiovascular:      Rate and  Rhythm: Normal rate and regular rhythm.      Pulses: Normal pulses.      Heart sounds: Normal heart sounds.   Pulmonary:      Effort: Pulmonary effort is normal.      Breath sounds: Normal breath sounds.   Abdominal:      General: Abdomen is flat. Bowel sounds are normal.      Palpations: Abdomen is soft.   Musculoskeletal:      Cervical back: Normal range of motion and neck supple.      Comments: Decreased ROM in Left Hip;   Skin:     General: Skin is warm and dry.   Neurological:      General: No focal deficit present.      Mental Status: She is alert and oriented to person, place, and time.      Motor: Weakness present.      Gait: Gait abnormal.       Significant Labs: All pertinent labs within the past 24 hours have been reviewed.    Significant Imaging: I have reviewed all pertinent imaging results/findings within the past 24 hours.      Assessment/Plan:      No notes have been filed under this hospital service.  Service: Hospital Medicine    VTE Risk Mitigation (From admission, onward)         Ordered     IP VTE HIGH RISK PATIENT  Once         10/14/22 1455     Place sequential compression device  Until discontinued         10/14/22 1455                Discharge Planning   AMANDA:      Code Status: Full Code   Is the patient medically ready for discharge?:     Reason for patient still in hospital (select all that apply): Patient trending condition                     Ruben Brooks Sr, MD  Department of Hospital Medicine   [facility]

## 2022-10-17 NOTE — SUBJECTIVE & OBJECTIVE
Interval History:     Review of Systems   Constitutional:  Negative for activity change, appetite change, chills, diaphoresis, fatigue, fever and unexpected weight change.   HENT:  Negative for congestion, dental problem, drooling, ear discharge, ear pain, facial swelling, hearing loss, mouth sores, nosebleeds, postnasal drip, rhinorrhea, sinus pressure, sinus pain, sneezing, sore throat, tinnitus, trouble swallowing and voice change.    Eyes:  Negative for photophobia, pain, discharge, redness, itching and visual disturbance.   Respiratory:  Negative for apnea, cough, choking, chest tightness, shortness of breath, wheezing and stridor.    Cardiovascular:  Negative for chest pain, palpitations and leg swelling.   Gastrointestinal:  Negative for abdominal distention, abdominal pain, anal bleeding, blood in stool, constipation, diarrhea, nausea, rectal pain and vomiting.   Endocrine: Negative for cold intolerance, heat intolerance, polydipsia, polyphagia and polyuria.   Genitourinary:  Negative for decreased urine volume, difficulty urinating, dysuria, enuresis, flank pain, frequency, genital sores, hematuria and urgency.   Musculoskeletal:  Positive for gait problem. Negative for arthralgias, back pain, joint swelling, myalgias, neck pain and neck stiffness.   Skin:  Negative for color change, pallor, rash and wound.   Allergic/Immunologic: Negative for environmental allergies, food allergies and immunocompromised state.   Neurological:  Positive for weakness. Negative for dizziness, tremors, seizures, syncope, facial asymmetry, speech difficulty, light-headedness, numbness and headaches.   Hematological:  Negative for adenopathy. Does not bruise/bleed easily.   Psychiatric/Behavioral:  Negative for agitation, behavioral problems, confusion, decreased concentration, dysphoric mood, hallucinations, self-injury, sleep disturbance and suicidal ideas. The patient is not nervous/anxious and is not hyperactive.     Objective:     Vital Signs (Most Recent):  Temp: 98.5 °F (36.9 °C) (10/17/22 1151)  Pulse: 63 (10/17/22 1151)  Resp: 18 (10/17/22 1053)  BP: 127/75 (10/17/22 1151)  SpO2: 97 % (10/17/22 1053) Vital Signs (24h Range):  Temp:  [97.7 °F (36.5 °C)-98.6 °F (37 °C)] 98.5 °F (36.9 °C)  Pulse:  [62-75] 63  Resp:  [17-18] 18  SpO2:  [96 %-98 %] 97 %  BP: (112-137)/(62-80) 127/75     Weight: 70.3 kg (155 lb)  Body mass index is 24.27 kg/m².    Intake/Output Summary (Last 24 hours) at 10/17/2022 1338  Last data filed at 10/17/2022 0605  Gross per 24 hour   Intake 120 ml   Output 700 ml   Net -580 ml      Physical Exam  Constitutional:       Appearance: Normal appearance.   HENT:      Head: Normocephalic and atraumatic.      Nose: Nose normal.   Eyes:      Extraocular Movements: Extraocular movements intact.      Pupils: Pupils are equal, round, and reactive to light.   Cardiovascular:      Rate and Rhythm: Normal rate and regular rhythm.      Pulses: Normal pulses.      Heart sounds: Normal heart sounds.   Pulmonary:      Effort: Pulmonary effort is normal.      Breath sounds: Normal breath sounds.   Abdominal:      General: Abdomen is flat. Bowel sounds are normal.      Palpations: Abdomen is soft.   Musculoskeletal:      Cervical back: Normal range of motion and neck supple.      Comments: Decreased ROM in Left Hip;   Skin:     General: Skin is warm and dry.   Neurological:      General: No focal deficit present.      Mental Status: She is alert and oriented to person, place, and time.      Motor: Weakness present.      Gait: Gait abnormal.       Significant Labs: All pertinent labs within the past 24 hours have been reviewed.    Significant Imaging: I have reviewed all pertinent imaging results/findings within the past 24 hours.

## 2022-10-17 NOTE — HPI
71 year old Black Female reported to [facility] E.R. after her left leg gave out and she fell in the street [place]. She reported to the E.R. with left hip pain.  X-ray of Left Hip showed Left Hip Fracture.  Ortho. was consulted and performed Hip Fracture repair surgery on 10/14/2022.  Patient is without complaints except left hip pain.

## 2022-10-17 NOTE — PLAN OF CARE
Dr Torres would like for pt to go to [facility] and is medically ready. Referral sent and I will update Nely

## 2022-10-17 NOTE — PT/OT/SLP PROGRESS
Physical Therapy         Treatment        Laura Jacobs   MRN: [id number]     PT Received On: 10/17/22  PT Start Time: 0956     PT Stop Time: 1020    PT Total Time (min): 24 min       Billable Minutes:  Therapeutic Activity 24  Total Minutes: 24    Treatment Type: Treatment  PT/PTA: PTA     PTA Visit Number: 1       General Precautions: Standard, fall  Orthopedic Precautions: Orthopedic Precautions : LLE weight bearing as tolerated   Braces:           Subjective:  Communicated with NSG prior to session.    Pain/Comfort  Location - Side 1: Left  Location 1: leg  Pain Addressed 1: Reposition, Distraction    Objective:  Patient found in bed with HOB elevated, with Patient found with: SCD (pt with no c/o weakness/dizziness upon arrival).    Functional Mobility:  Bed Mobility:   Supine to sit: Minimal Assistance   Sit to supine: Maximum Assistance   Rolling: Activity did not occur   Scooting: Moderate Assistance    Balance:   Static Sit: FAIR+: Able to take MINIMAL challenges from all directions  Dynamic Sit:  FAIR+: Maintains balance through MINIMAL excursions of active trunk motion  Static Stand: POOR: Needs MODERATE assist to maintain  Dynamic stand: POOR: Needs MOD (moderate) assist during gait    Transfer Training:  Sit to stand:Moderate Assistance with Rolling Walker . 2 trials from EOB and 1 trial from bedside chair.   Bed <> Chair:  Squat Pivot and Step Transfer with Moderate Assistance with No Assistive Device and Rolling Walker . Pt able to take pregait steps to bedside chair with RW. Pt very unsteady and required constant steadying assistance roughly 4ft to chair. Once pt in chair she c/o severe dizziness. BP assessed where it read 79/43 with HR of 75. Pt then assisted B2B via squat pivot T/F modA.         Additional Treatment:  Once pt back in supine, BP assessed again where it read 118/65 with HR of 67. NSG aware of how pt did with PT.    Activity Tolerance:  Patient tolerated treatment well and  Treatment limited secondary to medical complications .    Patient left supine with all lines intact, call button in reach, and bed alarm on.    Assessment:  Laura Jacobs is a 71 y.o. female with a medical diagnosis of Closed intertrochanteric fracture of hip, left, initial encounter. She presents with decreased safety awareness and remains a fall risk. NSG stated pt scheduled to get 2 units of blood today. Pt disappointed she could not do more with PT and asked multiple times for therapy to return tomorrow.   Pt would benefit from further rehab therapy services to increase overall independence level and assist in getting pt closer to PLOF.      Rehab potential is good.    Activity tolerance: Good    Discharge recommendations: Discharge Facility/Level of Care Needs: rehabilitation facility, nursing facility, skilled     Equipment recommendations: Equipment Needed After Discharge: bedside commode, walker, rolling     GOALS:   Multidisciplinary Problems       Physical Therapy Goals          Problem: Physical Therapy    Goal Priority Disciplines Outcome Goal Variances Interventions   Physical Therapy Goal     PT, PT/OT Ongoing, Progressing     Description: STGs:     1. Pt to perform bed mobility with Min A  2. Pt to perform sit<>stand at RW with Min A  3. Pt to perform stand-pivot transfer at RW with Min A  4. Pt to ambulate 50' at RW with Min A                       PLAN:    Patient to be seen BID  to address the above listed problems via gait training, therapeutic activities, therapeutic exercises  Plan of Care expires: 11/14/22  Plan of Care reviewed with: patient         10/17/2022

## 2022-10-17 NOTE — PROGRESS NOTES
[facility]                       [address] [address]    PATIENT NAME:       JI HILLMAN  YOB: 1950  CSN:                [id number]   MRN:                [id number]  ADMIT DATE:         10/14/2022 11:58:00  PHYSICIAN:          Juan F Mena MD                            PROGRESS NOTE    DATE:  10/16/2022 00:00:00    SUBJECTIVE:  This is a 71-year-old  female who had a fall while   going to the store.  She tripped and fell, resulting in a fracture of the left   intertrochanteric.  Therefore, Surgery was involved.  Patient underwent surgery   and they repaired the fracture.  Patient is presently in bed, and they are going   to start physical therapy soon today.    OBJECTIVE:  GENERAL:  She is alert, oriented, not in any acute distress.  She is   eating fairly well.   HEART:  Patient had S1, S2.  No murmur or gallop.    CHEST:  Clear.  No wheezing or rales.   ABDOMEN:  Soft and nontender with good bowel sounds.   EXTREMITIES:  Extremity superior with good range of motion.  Extremity inferior,   patient has limitation of motion of the left leg secondary to fracture of the   left hip.    IMPRESSION:    1. Left hip fracture.    2. Hypertension.    3. Hyperlipidemia.    4. Osteoarthritis.    5. Deep venous thrombosis.    6. History of left breast cancer.    PLAN:  We will continue present care.  Waiting to see when patient is going to   start physical therapy.        ______________________________  Juan F Mena MD    CHL/AQS  DD:  10/16/2022  Time:  06:09PM  DT:  10/16/2022  Time:  07:36PM  Job #:  [id number]      PROGRESS NOTE

## 2022-10-17 NOTE — HOSPITAL COURSE
71 year old Black Female was admitted to the hospital for Left Hip Fracture.  Ortho. was consulted and performed a Left Hip Fracture Repair surgery on 10/14/2022.  Patient is without complaints at the present time, except left hip pain.  Patient is currently receiving a blood transfusion of 2 units of PRBC's for unstable anemia following the surgery.    Patient is being discharged to [facility] today for inpatient rehab.

## 2022-10-17 NOTE — PLAN OF CARE
10/17/22 1717   Discharge Assessment   Assessment Type Discharge Planning Assessment   Confirmed Demographics Correct on Facesheet   Source of Information patient   Does patient/caregiver understand observation status   (inpt)   Reason For Admission fx left hip   Lives With child(bhavana), adult   Facility Arrived From: home   Do you expect to return to your current living situation? No  (inpt rehab)   Do you have help at home or someone to help you manage your care at home? Yes   Who are your caregiver(s) and their phone number(s)? alisha haynes   Prior to hospitilization cognitive status: Alert/Oriented;No Deficits   Current cognitive status: Alert/Oriented;No Deficits   Walking or Climbing Stairs Difficulty ambulation difficulty, requires equipment;ambulation difficulty, assistance 1 person   Dressing/Bathing Difficulty bathing difficulty, assistance 1 person   Equipment Currently Used at Home cane, straight   Readmission within 30 days? No   Patient currently being followed by outpatient case management? No   Do you currently have service(s) that help you manage your care at home? No   Do you take prescription medications? Yes   Do you have prescription coverage? Yes   Coverage [employer]   Do you have any problems affording any of your prescribed medications? No   Who is going to help you get home at discharge? alisha haynes   How do you get to doctors appointments? family or friend will provide;public transportation   Are you on dialysis? No   Discharge Plan A Rehab   Discharge Plan B Rehab   DME Needed Upon Discharge  none   Discharge Plan discussed with: Patient   Discharge Barriers Identified None   Alcohol Use   Q1: How often do you have a drink containing alcohol? Never   Q2: How many drinks containing alcohol do you have on a typical day when you are drinking? None   Q3: How often do you have six or more drinks on one occasion? Never   Relationship/Environment   Name(s) of Who Lives With Patient erick raya  reina lott   Referral sent to [facility]   PCP is Dr. Brooks

## 2022-10-18 LAB
ERYTHROCYTE [DISTWIDTH] IN BLOOD BY AUTOMATED COUNT: 14.5 % (ref 11.5–17)
HCT VFR BLD AUTO: 33.6 % (ref 37–47)
HGB BLD-MCNC: 11.1 GM/DL (ref 12–16)
MCH RBC QN AUTO: 32.6 PG (ref 27–31)
MCHC RBC AUTO-ENTMCNC: 33 MG/DL (ref 33–36)
MCV RBC AUTO: 98.5 FL (ref 80–94)
NRBC BLD AUTO-RTO: 0 %
PLATELET # BLD AUTO: 263 X10(3)/MCL (ref 130–400)
PMV BLD AUTO: 9.5 FL (ref 7.4–10.4)
RBC # BLD AUTO: 3.41 X10(6)/MCL (ref 4.2–5.4)
WBC # SPEC AUTO: 8.1 X10(3)/MCL (ref 4.5–11.5)

## 2022-10-18 PROCEDURE — 25000003 PHARM REV CODE 250: Performed by: EMERGENCY MEDICINE

## 2022-10-18 PROCEDURE — 36415 COLL VENOUS BLD VENIPUNCTURE: CPT | Performed by: INTERNAL MEDICINE

## 2022-10-18 PROCEDURE — 25000003 PHARM REV CODE 250: Performed by: INTERNAL MEDICINE

## 2022-10-18 PROCEDURE — 21400001 HC TELEMETRY ROOM

## 2022-10-18 PROCEDURE — 97530 THERAPEUTIC ACTIVITIES: CPT | Mod: CQ

## 2022-10-18 PROCEDURE — 85027 COMPLETE CBC AUTOMATED: CPT | Performed by: INTERNAL MEDICINE

## 2022-10-18 PROCEDURE — 97116 GAIT TRAINING THERAPY: CPT | Mod: CQ

## 2022-10-18 PROCEDURE — 25000003 PHARM REV CODE 250: Performed by: STUDENT IN AN ORGANIZED HEALTH CARE EDUCATION/TRAINING PROGRAM

## 2022-10-18 RX ADMIN — MELATONIN TAB 3 MG 6 MG: 3 TAB at 08:10

## 2022-10-18 RX ADMIN — SACUBITRIL AND VALSARTAN 1 TABLET: 24; 26 TABLET, FILM COATED ORAL at 08:10

## 2022-10-18 RX ADMIN — AMIODARONE HYDROCHLORIDE 400 MG: 200 TABLET ORAL at 09:10

## 2022-10-18 RX ADMIN — FAMOTIDINE 20 MG: 20 TABLET, FILM COATED ORAL at 08:10

## 2022-10-18 RX ADMIN — TRAMADOL HYDROCHLORIDE 50 MG: 50 TABLET, COATED ORAL at 10:10

## 2022-10-18 RX ADMIN — SACUBITRIL AND VALSARTAN 1 TABLET: 24; 26 TABLET, FILM COATED ORAL at 09:10

## 2022-10-18 RX ADMIN — TRAMADOL HYDROCHLORIDE 50 MG: 50 TABLET, COATED ORAL at 03:10

## 2022-10-18 RX ADMIN — FUROSEMIDE 40 MG: 40 TABLET ORAL at 09:10

## 2022-10-18 RX ADMIN — TRAMADOL HYDROCHLORIDE 50 MG: 50 TABLET, COATED ORAL at 09:10

## 2022-10-18 RX ADMIN — ANASTROZOLE 1 MG: 1 TABLET, COATED ORAL at 09:10

## 2022-10-18 RX ADMIN — PANTOPRAZOLE SODIUM 40 MG: 40 TABLET, DELAYED RELEASE ORAL at 09:10

## 2022-10-18 RX ADMIN — ATORVASTATIN CALCIUM 40 MG: 40 TABLET, FILM COATED ORAL at 09:10

## 2022-10-18 RX ADMIN — METOPROLOL SUCCINATE 25 MG: 25 TABLET, EXTENDED RELEASE ORAL at 09:10

## 2022-10-18 RX ADMIN — SPIRONOLACTONE 25 MG: 25 TABLET ORAL at 09:10

## 2022-10-18 RX ADMIN — FAMOTIDINE 20 MG: 20 TABLET, FILM COATED ORAL at 09:10

## 2022-10-18 NOTE — PROGRESS NOTES
Ortho Progress Note    Orthopaedic Injuries:  Left intertrochanteric hip fracture    Orthopaedic Surgeries:  10/14/2022:  Intramedullary nail to left hip for intertrochanteric femur fracture    S:  No acute events overnight.  No complaints today.  No chest pain shortness of breath or calf pain    O:   Vitals:    10/18/22 0937   BP:    Pulse:    Resp: 18   Temp:      Recent Labs     10/18/22  0401   WBC 8.1   HGB 11.1*   HCT 33.6*        Recent Labs     10/17/22  0446      K 4.6   CO2 26   BUN 9.8     No results for input(s): ESR, CRP in the last 72 hours.    PE:  Gen: A+Ox3, NAD  Card: RRR by RP  Lungs: nonlabored breathing, symmetric chest rise  Abd: S/NT/ND  Left lower extremity:  Dressing was removed and wound examined.  The incision is clean dry and intact without any drainage or signs of infection.  Calf is soft and compressible without any pain.  No pain with logroll of the hip.  She can flex her toes but is unable to extend the toes. The foot    X-ray of the left hip shows well-fixed intramedullary richard with fixed intertrochanteric femur fracture    A/P:  Postop day 4 status post left IM nail for intertrochanteric femur fracture and acute blood loss anemia  - DVT ppx:  Per primary team  - Abx plan:  No further antibiotics needed  - PT/OT- weight-bearing as tolerated.  She would benefit from a AFO to keep her ankle in neutral while she mobilizes.  - Pain control  ?  Weight-bearing status:  Weight-bearing as tolerated

## 2022-10-18 NOTE — PT/OT/SLP PROGRESS
Physical Therapy         Treatment        Laura Jacobs   MRN: [id number]     PT Received On: 10/18/22  PT Start Time: 0855     PT Stop Time: 0918    PT Total Time (min): 23 min       Billable Minutes:  Gait [id number] and Therapeutic Activity 13  Total Minutes: 23    Treatment Type: Treatment  PT/PTA: PTA     PTA Visit Number: 2       General Precautions: Standard, fall  Orthopedic Precautions: Orthopedic Precautions : LLE weight bearing as tolerated   Braces:           Subjective:  Communicated with NSG prior to session.    Pain/Comfort  Location - Side 1: Left  Location 1: leg  Pain Addressed 1: Reposition, Distraction    Objective:  Patient found in bed with HOB elevated, with Patient found with: SCD    Functional Mobility:  Bed Mobility:   Supine to sit: Moderate Assistance. modAx2    Sit to supine: Maximum Assistance. maxAx2   Rolling: Activity did not occur   Scooting: Moderate Assistance    Balance:   Static Sit: FAIR+: Able to take MINIMAL challenges from all directions  Dynamic Sit:  FAIR+: Maintains balance through MINIMAL excursions of active trunk motion  Static Stand: POOR: Needs MODERATE assist to maintain  Dynamic stand: POOR: Needs MOD (moderate) assist during gait    Transfer Training:  Sit to stand:Moderate Assistance with Rolling Walker . modAx2. 2 trials done from EOB  Bed <> Chair:  Squat Pivot and Step Transfer with Moderate Assistance and Maximum Assistance with No Assistive Device and Rolling Walker . Pt stood from EOB and stand step T/F EOB>bedside chair mod-maxA. Pt with forward flexed posture 2/2 leg pain.   After amb trial pt became dizzy and felt faint (pt with eyes closed at this time and appeared to be close to passing out). Therapist assisted pt from bedside chair>EOB via squat pivot T/F maxAx2.     Gait Training:  Pt amb 3ft with RW modA with chair following closely behind. Pt required constant steadying assistance. Pt presents with forward lean and required max vcs/tcs to  correct.      Additional Treatment:  BP unable to be assessed until after she was T/F back into supine. Pt /67 with HR of 60 after mobility. Pt stated she felt better back in supine. Nsg made aware.     Activity Tolerance:  Patient tolerated treatment well, Patient limited by pain, and Treatment limited secondary to medical complications .    Patient left supine with all lines intact, call button in reach, and bed alarm on.    Assessment:  Laura Jacobs is a 71 y.o. female with a medical diagnosis of Closed intertrochanteric fracture of hip, left, initial encounter. She presents with decreased activity tolerance 2/2 pain and dizziness.   Pt would benefit from rehab pr SNF placement upon d/c to assist with getting pt closer to PLOF and increase independence level.     Rehab potential is good.    Activity tolerance: Fair    Discharge recommendations: Discharge Facility/Level of Care Needs: rehabilitation facility, nursing facility, skilled     Equipment recommendations: Equipment Needed After Discharge: bedside commode, walker, rolling     GOALS:   Multidisciplinary Problems       Physical Therapy Goals          Problem: Physical Therapy    Goal Priority Disciplines Outcome Goal Variances Interventions   Physical Therapy Goal     PT, PT/OT Ongoing, Progressing     Description: STGs:     1. Pt to perform bed mobility with Min A  2. Pt to perform sit<>stand at RW with Min A  3. Pt to perform stand-pivot transfer at RW with Min A  4. Pt to ambulate 50' at RW with Min A                       PLAN:    Patient to be seen BID  to address the above listed problems via gait training, therapeutic activities, therapeutic exercises  Plan of Care expires: 11/14/22  Plan of Care reviewed with: patient         10/18/2022

## 2022-10-18 NOTE — PROGRESS NOTES
Ortho Progress Note    Orthopaedic Injuries:  Left intertrochanteric hip fracture    Orthopaedic Surgeries:  10/14/2022:  Intramedullary nail to left hip for intertrochanteric femur fracture    S:  No acute events overnight.  Did well with PT. No complaints today    O:   Vitals:    10/17/22 2032   BP:    Pulse:    Resp: 18   Temp:      Recent Labs     10/17/22  0446   WBC 9.0   HGB 7.0*   HCT 21.1*        Recent Labs     10/17/22  0446      K 4.6   CO2 26   BUN 9.8     No results for input(s): ESR, CRP in the last 72 hours.    PE:  Gen: A+Ox3, NAD  Card: RRR by RP  Lungs: nonlabored breathing, symmetric chest rise  Abd: S/NT/ND  Left lower extremity:  Surgical dressing is in place without any drainage.  Calf is soft and compressible without any pain.  No pain with logroll of the hip.  The foot    X-ray of the left hip shows well-fixed intramedullary richard with fixed intertrochanteric femur fracture    A/P:  Postop day 3 status post left IM nail for intertrochanteric femur fracture  - DVT ppx:  Per primary team  - Abx plan:  No further antibiotics needed  - PT/OT- weight-bearing as tolerated  - Pain control  ?  Weight-bearing status:  Weight-bearing as tolerated

## 2022-10-18 NOTE — PLAN OF CARE
Problem: Adult Inpatient Plan of Care  Goal: Patient-Specific Goal (Individualized)  Outcome: Ongoing, Progressing  Flowsheets (Taken 10/17/2022 2233)  Patient-Specific Goals (Include Timeframe): TO GO TO REHAB SOON     Problem: Fall Injury Risk  Goal: Absence of Fall and Fall-Related Injury  Outcome: Ongoing, Progressing     Problem: Skin Injury Risk Increased  Goal: Skin Health and Integrity  Outcome: Ongoing, Progressing

## 2022-10-19 PROCEDURE — 97530 THERAPEUTIC ACTIVITIES: CPT | Mod: CQ

## 2022-10-19 PROCEDURE — 25000003 PHARM REV CODE 250: Performed by: INTERNAL MEDICINE

## 2022-10-19 PROCEDURE — 25000003 PHARM REV CODE 250: Performed by: STUDENT IN AN ORGANIZED HEALTH CARE EDUCATION/TRAINING PROGRAM

## 2022-10-19 PROCEDURE — 21400001 HC TELEMETRY ROOM

## 2022-10-19 PROCEDURE — 25000003 PHARM REV CODE 250: Performed by: EMERGENCY MEDICINE

## 2022-10-19 PROCEDURE — 97166 OT EVAL MOD COMPLEX 45 MIN: CPT

## 2022-10-19 RX ORDER — BISACODYL 10 MG
10 SUPPOSITORY, RECTAL RECTAL DAILY PRN
Status: DISCONTINUED | OUTPATIENT
Start: 2022-10-19 | End: 2022-10-24 | Stop reason: HOSPADM

## 2022-10-19 RX ORDER — POLYETHYLENE GLYCOL 3350 17 G/17G
17 POWDER, FOR SOLUTION ORAL DAILY
Status: DISCONTINUED | OUTPATIENT
Start: 2022-10-19 | End: 2022-10-24 | Stop reason: HOSPADM

## 2022-10-19 RX ADMIN — TRAMADOL HYDROCHLORIDE 50 MG: 50 TABLET, COATED ORAL at 04:10

## 2022-10-19 RX ADMIN — FAMOTIDINE 20 MG: 20 TABLET, FILM COATED ORAL at 08:10

## 2022-10-19 RX ADMIN — FUROSEMIDE 40 MG: 40 TABLET ORAL at 09:10

## 2022-10-19 RX ADMIN — SACUBITRIL AND VALSARTAN 1 TABLET: 24; 26 TABLET, FILM COATED ORAL at 08:10

## 2022-10-19 RX ADMIN — SACUBITRIL AND VALSARTAN 1 TABLET: 24; 26 TABLET, FILM COATED ORAL at 09:10

## 2022-10-19 RX ADMIN — METOPROLOL SUCCINATE 25 MG: 25 TABLET, EXTENDED RELEASE ORAL at 09:10

## 2022-10-19 RX ADMIN — POLYETHYLENE GLYCOL 3350 17 G: 17 POWDER, FOR SOLUTION ORAL at 09:10

## 2022-10-19 RX ADMIN — HYDROCODONE BITARTRATE AND ACETAMINOPHEN 1 TABLET: 5; 325 TABLET ORAL at 08:10

## 2022-10-19 RX ADMIN — TRAMADOL HYDROCHLORIDE 50 MG: 50 TABLET, COATED ORAL at 09:10

## 2022-10-19 RX ADMIN — TRAMADOL HYDROCHLORIDE 50 MG: 50 TABLET, COATED ORAL at 03:10

## 2022-10-19 RX ADMIN — AMIODARONE HYDROCHLORIDE 400 MG: 200 TABLET ORAL at 09:10

## 2022-10-19 RX ADMIN — BISACODYL 10 MG: 10 SUPPOSITORY RECTAL at 03:10

## 2022-10-19 RX ADMIN — FAMOTIDINE 20 MG: 20 TABLET, FILM COATED ORAL at 09:10

## 2022-10-19 RX ADMIN — ATORVASTATIN CALCIUM 40 MG: 40 TABLET, FILM COATED ORAL at 09:10

## 2022-10-19 RX ADMIN — ANASTROZOLE 1 MG: 1 TABLET, COATED ORAL at 09:10

## 2022-10-19 RX ADMIN — SPIRONOLACTONE 25 MG: 25 TABLET ORAL at 09:10

## 2022-10-19 RX ADMIN — PANTOPRAZOLE SODIUM 40 MG: 40 TABLET, DELAYED RELEASE ORAL at 09:10

## 2022-10-19 NOTE — PT/OT/SLP EVAL
Occupational Therapy   Evaluation    Name: Laura Jacobs  MRN: [id number]  Admitting Diagnosis:  Closed intertrochanteric fracture of hip, left, initial encounter  Recent Surgery: Procedure(s) (LRB):  INSERTION, INTRAMEDULLARY СВЕТЛАНА, FEMUR (Left) 5 Days Post-Op    Recommendations:     Discharge Recommendations: rehabilitation facility  Discharge Equipment Recommendations:  walker, rolling, bedside commode, hip kit  Barriers to discharge:  Other (Comment) (Severity of deficits)    Assessment:     Laura Jacobs is a 71 y.o. female with a medical diagnosis of Closed intertrochanteric fracture of hip, left, initial encounter. Performance deficits affecting function: weakness, gait instability, impaired endurance, impaired balance, impaired self care skills, pain, impaired functional mobility. Pt significantly limited by pain. Pt did not reported dizziness during today's session. BP was monitored throughout. Pt would benefit from rehab placement at d/c in order to maximize IND c ADLs and mobility.   Rehab Prognosis: Good; patient would benefit from acute skilled OT services to address these deficits and reach maximum level of function.       Plan:     Patient to be seen 5 x/week, daily to address the above listed problems via therapeutic activities, therapeutic exercises, self-care/home management  Plan of Care Expires: 11/02/22  Plan of Care Reviewed with: patient    Subjective     Chief Complaint: Pain in LLE   Patient/Family Comments/goals: To return to PLOF     Occupational Profile:  Living Environment: Pt lives in a [facility] c her grandchildren. Has a walk in shower.   Previous level of function: IND c ADLs and mobility using a cane.   Roles and Routines: Reported she was still cooking and cleaning.   Equipment Used at Home:  cane, straight  Assistance upon Discharge: Pt would benefit from rehab placement at [place].     Pain/Comfort:  Location - Side 1: Left  Location 1: leg  Pain Addressed 1: Reposition  BP in  supine: 117/64 HR: 60 BP seated EOB: 144/99 HR: 67  Patients cultural, spiritual, Gnosticism conflicts given the current situation: no    Objective:     Communicated with: RN prior to session.  Patient found supine with PureWick upon OT entry to room.    General Precautions: Standard, fall   Orthopedic Precautions:LLE weight bearing as tolerated   Braces: N/A  Respiratory Status: Room air    Occupational Performance:    Bed Mobility:    Patient completed Supine to Sit with minimum assistance to manage LLE    Functional Mobility/Transfers:  Patient completed Sit <> Stand Transfer with moderate assistance  with  rolling walker   Patient completed Bed <> Chair Transfer using Step Transfer technique with moderate assistance with rolling walker  Functional Mobility: Mod A stand step t/f to chair using RW    Activities of Daily Living:  Upper Body Dressing: independence Doff/don gown   Lower Body Dressing: maximal assistance Pt able to doff R sock in fig 4; Unable to doff L sock and required Max A to join.     Cognitive/Visual Perceptual:  Cognitive/Psychosocial Skills:     -       Safety awareness/insight to disability: impaired   -       Mood/Affect/Coping skills/emotional control: Cooperative    Physical Exam:  Upper Extremity Range of Motion:     -       Right Upper Extremity: WNL  -       Left Upper Extremity: WNL  LUE in sleeve for swelling; Pt reported hx of lymph node removal       Treatment & Education:  Pt educated on OT POC- verbalized understanding.     Patient left up in chair with all lines intact and call button in reach    GOALS:   Multidisciplinary Problems       Occupational Therapy Goals          Problem: Occupational Therapy    Goal Priority Disciplines Outcome Interventions   Occupational Therapy Goal     OT, PT/OT Ongoing, Progressing    Description: Goals to be met by: 11/2    Patient will increase functional independence with ADLs by performing:    LE Dressing with Stand-by Assistance c AE as  needed.   Grooming while standing at sink with Stand-by Assistance.  Toileting from bedside commode with Stand-by Assistance for hygiene and clothing management.   Toilet transfer to bedside commode with Stand-by Assistance.                         History:     Past Medical History:   Diagnosis Date    Cancer     breast CA s/p chemo and L masectomy     CHF (congestive heart failure)     History of DVT (deep vein thrombosis)     HLD (hyperlipidemia)     Hypertension     Osteoarthritis     Venous insufficiency          Past Surgical History:   Procedure Laterality Date    HYSTERECTOMY      INSERTION OF PACEMAKER      INTRAMEDULLARY RODDING OF FEMUR Left 10/14/2022    Procedure: INSERTION, INTRAMEDULLARY СВЕТЛАНА, FEMUR;  Surgeon: Ankush Alex MD;  Location: [facility];  Service: Orthopedics;  Laterality: Left;    JOINT REPLACEMENT Bilateral     Knee    MASTECTOMY Left 2019       Time Tracking:     OT Date of Treatment: 10/19/22  OT Start Time: 0903  OT Stop Time: 0921  OT Total Time (min): 18 min    Billable Minutes:Evaluation Moderate complexity     10/19/2022

## 2022-10-19 NOTE — PROGRESS NOTES
[facility] Hospital Medicine  Progress Note    Patient Name: Laura Jacobs  MRN: [id number]  Patient Class: IP- Inpatient   Admission Date: 10/14/2022  Length of Stay: 4 days  Attending Physician: Ruben Brooks Sr., MD  Primary Care Provider: Ruben Brooks Sr, MD        Subjective:     Principal Problem:Closed intertrochanteric fracture of hip, left, initial encounter        HPI:  71 year old Black Female reported to [facility] E.[facility] after her left leg gave out and she fell in the street near [place] home. She reported to the E.R. with left hip pain.  X-ray of Left Hip showed Left Hip Fracture.  Ortho. was consulted and performed Hip Fracture repair surgery on 10/14/2022.  Patient is without complaints except left hip pain.      Overview/Hospital Course:  71 year old Black Female was admitted to the hospital for Left Hip Fracture.  Ortho. was consulted and performed a Left Hip Fracture Repair surgery on 10/14/2022.  Patient is without complaints at the present time, except left hip pain.  Patient is currently receiving a blood transfusion of 2 units of PRBC's for unstable anemia following the surgery.  I am waiting for [facility] eval.      Interval History:     Review of Systems   Constitutional:  Negative for activity change, appetite change, chills, diaphoresis, fatigue, fever and unexpected weight change.   HENT:  Negative for congestion, dental problem, drooling, ear discharge, ear pain, facial swelling, hearing loss, mouth sores, nosebleeds, postnasal drip, rhinorrhea, sinus pressure, sinus pain, sneezing, sore throat, tinnitus, trouble swallowing and voice change.    Eyes:  Negative for photophobia, pain, discharge, redness, itching and visual disturbance.   Respiratory:  Negative for apnea, cough, choking, chest tightness, shortness of breath, wheezing and stridor.    Cardiovascular:  Negative for chest pain, palpitations and leg swelling.   Gastrointestinal:  Negative for abdominal  distention, abdominal pain, anal bleeding, blood in stool, constipation, diarrhea, nausea, rectal pain and vomiting.   Endocrine: Negative for cold intolerance, heat intolerance, polydipsia, polyphagia and polyuria.   Genitourinary:  Negative for decreased urine volume, difficulty urinating, dysuria, enuresis, flank pain, frequency, genital sores, hematuria and urgency.   Musculoskeletal:  Positive for gait problem. Negative for arthralgias, back pain, joint swelling, myalgias, neck pain and neck stiffness.        Left hip pain;   Skin:  Negative for color change, pallor, rash and wound.   Allergic/Immunologic: Negative for environmental allergies, food allergies and immunocompromised state.   Neurological:  Positive for weakness. Negative for dizziness, tremors, seizures, syncope, facial asymmetry, speech difficulty, light-headedness, numbness and headaches.   Hematological:  Negative for adenopathy. Does not bruise/bleed easily.   Psychiatric/Behavioral:  Negative for agitation, behavioral problems, confusion, decreased concentration, dysphoric mood, hallucinations, self-injury, sleep disturbance and suicidal ideas. The patient is not nervous/anxious and is not hyperactive.    Objective:     Vital Signs (Most Recent):  Temp: 97.9 °F (36.6 °C) (10/18/22 1453)  Pulse: (!) 59 (10/18/22 1453)  Resp: 18 (10/18/22 1553)  BP: 111/60 (10/18/22 1453)  SpO2: 98 % (10/18/22 1049)   Vital Signs (24h Range):  Temp:  [97.7 °F (36.5 °C)-98.4 °F (36.9 °C)] 97.9 °F (36.6 °C)  Pulse:  [58-63] 59  Resp:  [18] 18  SpO2:  [97 %-98 %] 98 %  BP: (111-127)/(60-80) 111/60     Weight: 70.3 kg (155 lb)  Body mass index is 24.27 kg/m².    Intake/Output Summary (Last 24 hours) at 10/18/2022 1912  Last data filed at 10/18/2022 1424  Gross per 24 hour   Intake --   Output 1650 ml   Net -1650 ml      Physical Exam  Constitutional:       Appearance: Normal appearance.   HENT:      Head: Normocephalic and atraumatic.      Nose: Nose normal.    Eyes:      Extraocular Movements: Extraocular movements intact.      Pupils: Pupils are equal, round, and reactive to light.   Cardiovascular:      Rate and Rhythm: Normal rate and regular rhythm.      Pulses: Normal pulses.      Heart sounds: Normal heart sounds.   Pulmonary:      Effort: Pulmonary effort is normal.      Breath sounds: Normal breath sounds.   Abdominal:      General: Abdomen is flat. Bowel sounds are normal.      Palpations: Abdomen is soft.   Musculoskeletal:         General: Signs of injury present.      Cervical back: Normal range of motion and neck supple.   Skin:     General: Skin is warm and dry.   Neurological:      General: No focal deficit present.      Mental Status: She is alert and oriented to person, place, and time.      Motor: Weakness present.      Gait: Gait abnormal.       Significant Labs: All pertinent labs within the past 24 hours have been reviewed.    Significant Imaging: I have reviewed all pertinent imaging results/findings within the past 24 hours.      Assessment/Plan:      No notes have been filed under this hospital service.  Service: Hospital Medicine    VTE Risk Mitigation (From admission, onward)         Ordered     IP VTE HIGH RISK PATIENT  Once         10/14/22 1455     Place sequential compression device  Until discontinued         10/14/22 1455                Discharge Planning   AMANDA:      Code Status: Full Code   Is the patient medically ready for discharge?:     Reason for patient still in hospital (select all that apply): Patient trending condition  Discharge Plan A: Rehab                  Ruben Brooks Sr, MD  Department of Hospital Medicine   [facility]

## 2022-10-19 NOTE — SUBJECTIVE & OBJECTIVE
Interval History:     Review of Systems   Constitutional:  Negative for activity change, appetite change, chills, diaphoresis, fatigue, fever and unexpected weight change.   HENT:  Negative for congestion, dental problem, drooling, ear discharge, ear pain, facial swelling, hearing loss, mouth sores, nosebleeds, postnasal drip, rhinorrhea, sinus pressure, sinus pain, sneezing, sore throat, tinnitus, trouble swallowing and voice change.    Eyes:  Negative for photophobia, pain, discharge, redness, itching and visual disturbance.   Respiratory:  Negative for apnea, cough, choking, chest tightness, shortness of breath, wheezing and stridor.    Cardiovascular:  Negative for chest pain, palpitations and leg swelling.   Gastrointestinal:  Negative for abdominal distention, abdominal pain, anal bleeding, blood in stool, constipation, diarrhea, nausea, rectal pain and vomiting.   Endocrine: Negative for cold intolerance, heat intolerance, polydipsia, polyphagia and polyuria.   Genitourinary:  Negative for decreased urine volume, difficulty urinating, dysuria, enuresis, flank pain, frequency, genital sores, hematuria and urgency.   Musculoskeletal:  Positive for gait problem. Negative for arthralgias, back pain, joint swelling, myalgias, neck pain and neck stiffness.        Left hip pain;   Skin:  Negative for color change, pallor, rash and wound.   Allergic/Immunologic: Negative for environmental allergies, food allergies and immunocompromised state.   Neurological:  Positive for weakness. Negative for dizziness, tremors, seizures, syncope, facial asymmetry, speech difficulty, light-headedness, numbness and headaches.   Hematological:  Negative for adenopathy. Does not bruise/bleed easily.   Psychiatric/Behavioral:  Negative for agitation, behavioral problems, confusion, decreased concentration, dysphoric mood, hallucinations, self-injury, sleep disturbance and suicidal ideas. The patient is not nervous/anxious and is not  hyperactive.    Objective:     Vital Signs (Most Recent):  Temp: 97.9 °F (36.6 °C) (10/18/22 1453)  Pulse: (!) 59 (10/18/22 1453)  Resp: 18 (10/18/22 1553)  BP: 111/60 (10/18/22 1453)  SpO2: 98 % (10/18/22 1049)   Vital Signs (24h Range):  Temp:  [97.7 °F (36.5 °C)-98.4 °F (36.9 °C)] 97.9 °F (36.6 °C)  Pulse:  [58-63] 59  Resp:  [18] 18  SpO2:  [97 %-98 %] 98 %  BP: (111-127)/(60-80) 111/60     Weight: 70.3 kg (155 lb)  Body mass index is 24.27 kg/m².    Intake/Output Summary (Last 24 hours) at 10/18/2022 1912  Last data filed at 10/18/2022 1424  Gross per 24 hour   Intake --   Output 1650 ml   Net -1650 ml      Physical Exam  Constitutional:       Appearance: Normal appearance.   HENT:      Head: Normocephalic and atraumatic.      Nose: Nose normal.   Eyes:      Extraocular Movements: Extraocular movements intact.      Pupils: Pupils are equal, round, and reactive to light.   Cardiovascular:      Rate and Rhythm: Normal rate and regular rhythm.      Pulses: Normal pulses.      Heart sounds: Normal heart sounds.   Pulmonary:      Effort: Pulmonary effort is normal.      Breath sounds: Normal breath sounds.   Abdominal:      General: Abdomen is flat. Bowel sounds are normal.      Palpations: Abdomen is soft.   Musculoskeletal:         General: Signs of injury present.      Cervical back: Normal range of motion and neck supple.   Skin:     General: Skin is warm and dry.   Neurological:      General: No focal deficit present.      Mental Status: She is alert and oriented to person, place, and time.      Motor: Weakness present.      Gait: Gait abnormal.       Significant Labs: All pertinent labs within the past 24 hours have been reviewed.    Significant Imaging: I have reviewed all pertinent imaging results/findings within the past 24 hours.

## 2022-10-19 NOTE — PROGRESS NOTES
[facility] Hospital Medicine  Progress Note    Patient Name: Laura Jacobs  MRN: [id number]  Patient Class: IP- Inpatient   Admission Date: 10/14/2022  Length of Stay: 5 days  Attending Physician: Ruben Brooks Sr., MD  Primary Care Provider: Ruben Brooks Sr, MD        Subjective:     Principal Problem:Closed intertrochanteric fracture of hip, left, initial encounter        HPI:  71 year old Black Female reported to [facility] E.R. after her left leg gave out and she fell in the street near her home. She reported to the E.R. with left hip pain.  X-ray of Left Hip showed Left Hip Fracture.  Ortho. was consulted and performed Hip Fracture repair surgery on 10/14/2022.  Patient is without complaints except left hip pain.      Overview/Hospital Course:  71 year old Black Female was admitted to the hospital for Left Hip Fracture.  Ortho. was consulted and performed a Left Hip Fracture Repair surgery on 10/14/2022.  Patient is without complaints at the present time, except left hip pain.  Patient is currently receiving a blood transfusion of 2 units of PRBC's for unstable anemia following the surgery.  I am waiting for [facility] eval.      Interval History:     Review of Systems   Constitutional:  Negative for activity change, appetite change, chills, diaphoresis, fatigue, fever and unexpected weight change.   HENT:  Negative for congestion, dental problem, drooling, ear discharge, ear pain, facial swelling, hearing loss, mouth sores, nosebleeds, postnasal drip, rhinorrhea, sinus pressure, sinus pain, sneezing, sore throat, tinnitus, trouble swallowing and voice change.    Eyes:  Negative for photophobia, pain, discharge, redness, itching and visual disturbance.   Respiratory:  Negative for apnea, cough, choking, chest tightness, shortness of breath, wheezing and stridor.    Cardiovascular:  Negative for chest pain, palpitations and leg swelling.   Gastrointestinal:  Negative for abdominal  distention, abdominal pain, anal bleeding, blood in stool, constipation, diarrhea, nausea, rectal pain and vomiting.   Endocrine: Negative for cold intolerance, heat intolerance, polydipsia, polyphagia and polyuria.   Genitourinary:  Negative for decreased urine volume, difficulty urinating, dysuria, enuresis, flank pain, frequency, genital sores, hematuria and urgency.   Musculoskeletal:  Positive for gait problem. Negative for arthralgias, back pain, joint swelling, myalgias, neck pain and neck stiffness.   Skin:  Negative for color change, pallor, rash and wound.   Allergic/Immunologic: Negative for environmental allergies, food allergies and immunocompromised state.   Neurological:  Positive for weakness. Negative for dizziness, tremors, seizures, syncope, facial asymmetry, speech difficulty, light-headedness, numbness and headaches.   Hematological:  Negative for adenopathy. Does not bruise/bleed easily.   Psychiatric/Behavioral:  Negative for agitation, behavioral problems, confusion, decreased concentration, dysphoric mood, hallucinations, self-injury, sleep disturbance and suicidal ideas. The patient is not nervous/anxious and is not hyperactive.    Objective:     Vital Signs (Most Recent):  Temp: 97.9 °F (36.6 °C) (10/19/22 1516)  Pulse: (!) 56 (10/19/22 1516)  Resp: 18 (10/19/22 1559)  BP: 100/62 (10/19/22 1516)  SpO2: 95 % (10/19/22 1516)   Vital Signs (24h Range):  Temp:  [97.9 °F (36.6 °C)-98.5 °F (36.9 °C)] 97.9 °F (36.6 °C)  Pulse:  [56-62] 56  Resp:  [16-19] 18  SpO2:  [93 %-98 %] 95 %  BP: ()/(53-99) 100/62     Weight: 70.3 kg (155 lb)  Body mass index is 24.27 kg/m².    Intake/Output Summary (Last 24 hours) at 10/19/2022 1817  Last data filed at 10/19/2022 1343  Gross per 24 hour   Intake --   Output 1150 ml   Net -1150 ml      Physical Exam  Constitutional:       Appearance: Normal appearance.   HENT:      Head: Normocephalic and atraumatic.      Nose: Nose normal.   Eyes:      Extraocular  Movements: Extraocular movements intact.      Pupils: Pupils are equal, round, and reactive to light.   Cardiovascular:      Rate and Rhythm: Normal rate and regular rhythm.      Pulses: Normal pulses.      Heart sounds: Normal heart sounds.   Pulmonary:      Effort: Pulmonary effort is normal.      Breath sounds: Normal breath sounds.   Abdominal:      General: Abdomen is flat. Bowel sounds are normal.      Palpations: Abdomen is soft.   Musculoskeletal:         General: Normal range of motion.      Cervical back: Normal range of motion and neck supple.   Skin:     General: Skin is warm and dry.   Neurological:      General: No focal deficit present.      Mental Status: She is alert and oriented to person, place, and time.      Motor: Weakness present.      Gait: Gait abnormal.       Significant Labs: All pertinent labs within the past 24 hours have been reviewed.    Significant Imaging: I have reviewed all pertinent imaging results/findings within the past 24 hours.      Assessment/Plan:      No notes have been filed under this hospital service.  Service: Hospital Medicine    VTE Risk Mitigation (From admission, onward)         Ordered     IP VTE HIGH RISK PATIENT  Once         10/14/22 1455     Place sequential compression device  Until discontinued         10/14/22 1455                Discharge Planning   AMANDA:      Code Status: Full Code   Is the patient medically ready for discharge?:     Reason for patient still in hospital (select all that apply): Patient trending condition  Discharge Plan A: Rehab                  Ruben Brooks Sr, MD  Department of Hospital Medicine   [facility]

## 2022-10-19 NOTE — PT/OT/SLP PROGRESS
Physical Therapy         Treatment        Laura Jacobs   MRN: [id number]     PT Received On: 10/19/22  PT Start Time: 0900     PT Stop Time: 0923    PT Total Time (min): 23 min       Billable Minutes:  Therapeutic Activity 23  Total Minutes: 23    Treatment Type: Treatment  PT/PTA: PTA     PTA Visit Number: 3       General Precautions: Standard, fall  Orthopedic Precautions: Orthopedic Precautions : LLE weight bearing as tolerated   Braces:           Subjective:  Communicated with NSG prior to session.    Pain/Comfort  Location - Side 1: Left  Location 1: leg  Pain Addressed 1: Reposition, Distraction    Objective:  Patient found in bed with HOB elevated, with Patient found with: PureWick    Supine: BP: 117/64 with HR of 60  Sitting: /99 with HR of 67  Standing: unable to get accurate reading (NSG present)      Functional Mobility:  Bed Mobility:   Supine to sit: Minimal Assistance   Sit to supine: Activity did not occur   Rolling: Activity did not occur   Scooting: Minimal Assistance    Balance:   Static Sit: FAIR+: Able to take MINIMAL challenges from all directions  Dynamic Sit:  FAIR+: Maintains balance through MINIMAL excursions of active trunk motion  Static Stand: POOR+: Needs MINIMAL assist to maintain  Dynamic stand: POOR+: Needs MIN (minimal ) assist during gait    Transfer Training:  Sit to stand:Moderate Assistance with Rolling Walker .  Bed <> Chair:  Step Transfer with Minimal Assistance with Rolling Walker . Pt T/F EOB>bedside chair. Pt required increased time and heavy vcs for safety.       Additional Treatment:      Activity Tolerance:  Patient tolerated treatment well and Patient limited by pain    Patient left up in chair with call button in reach.    Assessment:  Laura Jacobs is a 71 y.o. female with a medical diagnosis of Closed intertrochanteric fracture of hip, left, initial encounter. She presents with decreased safety awareness and remains a fall risk. Pt also requires  increased time for all activities.   Pt with small vomiting episode at end of tx session that was resolved quickly. Pt stating she felt fine just having increased pain in L leg. NSG aware.     Pt would benefit from further rehab or SNF therapy services to increase overall independence level and assist in getting pt closer to PLOF.      Rehab potential is good.    Activity tolerance: Good    Discharge recommendations: Discharge Facility/Level of Care Needs: rehabilitation facility, nursing facility, skilled     Equipment recommendations: Equipment Needed After Discharge: walker, rolling, bedside commode     GOALS:   Multidisciplinary Problems       Physical Therapy Goals          Problem: Physical Therapy    Goal Priority Disciplines Outcome Goal Variances Interventions   Physical Therapy Goal     PT, PT/OT Ongoing, Progressing     Description: STGs:     1. Pt to perform bed mobility with Min A  2. Pt to perform sit<>stand at RW with Min A  3. Pt to perform stand-pivot transfer at RW with Min A  4. Pt to ambulate 50' at RW with Min A                       PLAN:    Patient to be seen BID  to address the above listed problems via gait training, therapeutic activities, therapeutic exercises  Plan of Care expires: 11/14/22  Plan of Care reviewed with: patient         10/19/2022

## 2022-10-19 NOTE — SUBJECTIVE & OBJECTIVE
Interval History:     Review of Systems   Constitutional:  Negative for activity change, appetite change, chills, diaphoresis, fatigue, fever and unexpected weight change.   HENT:  Negative for congestion, dental problem, drooling, ear discharge, ear pain, facial swelling, hearing loss, mouth sores, nosebleeds, postnasal drip, rhinorrhea, sinus pressure, sinus pain, sneezing, sore throat, tinnitus, trouble swallowing and voice change.    Eyes:  Negative for photophobia, pain, discharge, redness, itching and visual disturbance.   Respiratory:  Negative for apnea, cough, choking, chest tightness, shortness of breath, wheezing and stridor.    Cardiovascular:  Negative for chest pain, palpitations and leg swelling.   Gastrointestinal:  Negative for abdominal distention, abdominal pain, anal bleeding, blood in stool, constipation, diarrhea, nausea, rectal pain and vomiting.   Endocrine: Negative for cold intolerance, heat intolerance, polydipsia, polyphagia and polyuria.   Genitourinary:  Negative for decreased urine volume, difficulty urinating, dysuria, enuresis, flank pain, frequency, genital sores, hematuria and urgency.   Musculoskeletal:  Positive for gait problem. Negative for arthralgias, back pain, joint swelling, myalgias, neck pain and neck stiffness.   Skin:  Negative for color change, pallor, rash and wound.   Allergic/Immunologic: Negative for environmental allergies, food allergies and immunocompromised state.   Neurological:  Positive for weakness. Negative for dizziness, tremors, seizures, syncope, facial asymmetry, speech difficulty, light-headedness, numbness and headaches.   Hematological:  Negative for adenopathy. Does not bruise/bleed easily.   Psychiatric/Behavioral:  Negative for agitation, behavioral problems, confusion, decreased concentration, dysphoric mood, hallucinations, self-injury, sleep disturbance and suicidal ideas. The patient is not nervous/anxious and is not hyperactive.     Objective:     Vital Signs (Most Recent):  Temp: 97.9 °F (36.6 °C) (10/19/22 1516)  Pulse: (!) 56 (10/19/22 1516)  Resp: 18 (10/19/22 1559)  BP: 100/62 (10/19/22 1516)  SpO2: 95 % (10/19/22 1516)   Vital Signs (24h Range):  Temp:  [97.9 °F (36.6 °C)-98.5 °F (36.9 °C)] 97.9 °F (36.6 °C)  Pulse:  [56-62] 56  Resp:  [16-19] 18  SpO2:  [93 %-98 %] 95 %  BP: ()/(53-99) 100/62     Weight: 70.3 kg (155 lb)  Body mass index is 24.27 kg/m².    Intake/Output Summary (Last 24 hours) at 10/19/2022 1817  Last data filed at 10/19/2022 1343  Gross per 24 hour   Intake --   Output 1150 ml   Net -1150 ml      Physical Exam  Constitutional:       Appearance: Normal appearance.   HENT:      Head: Normocephalic and atraumatic.      Nose: Nose normal.   Eyes:      Extraocular Movements: Extraocular movements intact.      Pupils: Pupils are equal, round, and reactive to light.   Cardiovascular:      Rate and Rhythm: Normal rate and regular rhythm.      Pulses: Normal pulses.      Heart sounds: Normal heart sounds.   Pulmonary:      Effort: Pulmonary effort is normal.      Breath sounds: Normal breath sounds.   Abdominal:      General: Abdomen is flat. Bowel sounds are normal.      Palpations: Abdomen is soft.   Musculoskeletal:         General: Normal range of motion.      Cervical back: Normal range of motion and neck supple.   Skin:     General: Skin is warm and dry.   Neurological:      General: No focal deficit present.      Mental Status: She is alert and oriented to person, place, and time.      Motor: Weakness present.      Gait: Gait abnormal.       Significant Labs: All pertinent labs within the past 24 hours have been reviewed.    Significant Imaging: I have reviewed all pertinent imaging results/findings within the past 24 hours.

## 2022-10-19 NOTE — PLAN OF CARE
Problem: Occupational Therapy  Goal: Occupational Therapy Goal  Description: Goals to be met by: 11/2    Patient will increase functional independence with ADLs by performing:    LE Dressing with Stand-by Assistance c AE as needed.   Grooming while standing at sink with Stand-by Assistance.  Toileting from bedside commode with Stand-by Assistance for hygiene and clothing management.   Toilet transfer to bedside commode with Stand-by Assistance.    Outcome: Ongoing, Progressing

## 2022-10-20 PROCEDURE — 25000003 PHARM REV CODE 250: Performed by: EMERGENCY MEDICINE

## 2022-10-20 PROCEDURE — 25000003 PHARM REV CODE 250: Performed by: STUDENT IN AN ORGANIZED HEALTH CARE EDUCATION/TRAINING PROGRAM

## 2022-10-20 PROCEDURE — 25000003 PHARM REV CODE 250: Performed by: INTERNAL MEDICINE

## 2022-10-20 PROCEDURE — 97116 GAIT TRAINING THERAPY: CPT | Mod: CQ

## 2022-10-20 PROCEDURE — 97535 SELF CARE MNGMENT TRAINING: CPT

## 2022-10-20 PROCEDURE — 97530 THERAPEUTIC ACTIVITIES: CPT | Mod: CQ

## 2022-10-20 PROCEDURE — 21400001 HC TELEMETRY ROOM

## 2022-10-20 RX ADMIN — SACUBITRIL AND VALSARTAN 1 TABLET: 24; 26 TABLET, FILM COATED ORAL at 09:10

## 2022-10-20 RX ADMIN — PANTOPRAZOLE SODIUM 40 MG: 40 TABLET, DELAYED RELEASE ORAL at 09:10

## 2022-10-20 RX ADMIN — SPIRONOLACTONE 25 MG: 25 TABLET ORAL at 09:10

## 2022-10-20 RX ADMIN — MELATONIN TAB 3 MG 6 MG: 3 TAB at 09:10

## 2022-10-20 RX ADMIN — POLYETHYLENE GLYCOL 3350 17 G: 17 POWDER, FOR SOLUTION ORAL at 09:10

## 2022-10-20 RX ADMIN — FAMOTIDINE 20 MG: 20 TABLET, FILM COATED ORAL at 09:10

## 2022-10-20 RX ADMIN — METOPROLOL SUCCINATE 25 MG: 25 TABLET, EXTENDED RELEASE ORAL at 09:10

## 2022-10-20 RX ADMIN — FUROSEMIDE 40 MG: 40 TABLET ORAL at 09:10

## 2022-10-20 RX ADMIN — ATORVASTATIN CALCIUM 40 MG: 40 TABLET, FILM COATED ORAL at 09:10

## 2022-10-20 RX ADMIN — HYDROCODONE BITARTRATE AND ACETAMINOPHEN 1 TABLET: 5; 325 TABLET ORAL at 06:10

## 2022-10-20 RX ADMIN — ANASTROZOLE 1 MG: 1 TABLET, COATED ORAL at 09:10

## 2022-10-20 RX ADMIN — TRAMADOL HYDROCHLORIDE 50 MG: 50 TABLET, COATED ORAL at 09:10

## 2022-10-20 RX ADMIN — AMIODARONE HYDROCHLORIDE 400 MG: 200 TABLET ORAL at 09:10

## 2022-10-20 NOTE — PT/OT/SLP PROGRESS
Physical Therapy         Treatment        Laura Jacobs   MRN: [id number]     PT Received On: 10/20/22  PT Start Time: 0909     PT Stop Time: 0937    PT Total Time (min): 28 min       Billable Minutes:  Gait [id number] and Therapeutic Activity 10  Total Minutes: 28    Treatment Type: Treatment  PT/PTA: PTA     PTA Visit Number: 4       General Precautions: Standard, fall  Orthopedic Precautions: Orthopedic Precautions : LLE weight bearing as tolerated   Braces:           Subjective:  Communicated with NSG prior to session.    Pain/Comfort  Location - Side 1: Left  Location 1: leg  Pain Addressed 1: Reposition, Distraction    Objective:  Patient found in bed with HOB elevated, with Patient found with: PureWick    BP prior to mobility: 131/88 with HR of 66  BP after mobility: 136/84 with HR of 60    Functional Mobility:  Bed Mobility:   Supine to sit: Moderate Assistance   Sit to supine: Moderate Assistance   Rolling: Activity did not occur   Scooting: Moderate Assistance    Balance:   Static Sit: FAIR-: Maintains without assist but inconsistent   Dynamic Sit:  FAIR+: Maintains balance through MINIMAL excursions of active trunk motion  Static Stand: POOR: Needs MODERATE assist to maintain  Dynamic stand: POOR: Needs MOD (moderate) assist during gait    Transfer Training:  Sit to stand:Moderate Assistance with Rolling Walker . 2 trials done from EOB and 2 trials from bedside chair.  Bed <> Chair:  Step Transfer with Moderate Assistance with Rolling Walker . Pt T/F EOB<>bedside chair. Pt required assistance managing RW for both T/Fs.     Gait Training:  Pt amb 6ft with RW min-modA. Pt required max encouragement to cont. Pt c/o severe dizziness and blurry vision at end of amb trial tx terminated and pt assisted B2B.      Additional Treatment:    Activity Tolerance:  Patient tolerated treatment well, Patient limited by fatigue, Patient limited by pain, and Treatment limited secondary to medical complications  .    Patient left with bed in chair position with all lines intact and call button in reach.    Assessment:  Laura Jacobs is a 71 y.o. female with a medical diagnosis of Closed intertrochanteric fracture of hip, left, initial encounter. She presents with decreased safety awareness and remains a fall risk. Pt with dizziness/blurry vision that did not subside with rest. Pt also with increased nausea. Pt initially stated this was new and then stated later this was also happening at home. Personal sitter present in room confirming this would happen to pt at home as well. Pt assisted B2B at end of tx session to ensure safety. NSG aware.     Rehab potential is good.    Activity tolerance: Good    Discharge recommendations: Discharge Facility/Level of Care Needs: nursing facility, skilled, rehabilitation facility     Equipment recommendations: Equipment Needed After Discharge: walker, rolling, bedside commode     GOALS:   Multidisciplinary Problems       Physical Therapy Goals          Problem: Physical Therapy    Goal Priority Disciplines Outcome Goal Variances Interventions   Physical Therapy Goal     PT, PT/OT Ongoing, Progressing     Description: STGs:     1. Pt to perform bed mobility with Min A  2. Pt to perform sit<>stand at RW with Min A  3. Pt to perform stand-pivot transfer at RW with Min A  4. Pt to ambulate 50' at RW with Min A                       PLAN:    Patient to be seen BID  to address the above listed problems via gait training, therapeutic activities, therapeutic exercises  Plan of Care expires: 11/14/22  Plan of Care reviewed with: patient         10/20/2022

## 2022-10-20 NOTE — PLAN OF CARE
Problem: Adult Inpatient Plan of Care  Goal: Plan of Care Review  Outcome: Ongoing, Progressing  Goal: Patient-Specific Goal (Individualized)  Outcome: Ongoing, Progressing  Goal: Absence of Hospital-Acquired Illness or Injury  Outcome: Ongoing, Progressing     Problem: Fall Injury Risk  Goal: Absence of Fall and Fall-Related Injury  Outcome: Ongoing, Progressing     Problem: Skin Injury Risk Increased  Goal: Skin Health and Integrity  Outcome: Ongoing, Progressing

## 2022-10-20 NOTE — PHYSICIAN QUERY
PT Name: Laura Jacobs  MR #: [id number]    DOCUMENTATION CLARIFICATION      CDS/: Rocio Blair RN BSN              Contact information:[email or web address]    This form is a permanent document in the medical record.      Query Date: October 20, 2022    By submitting this query, we are merely seeking further clarification of documentation. Please utilize your independent clinical judgment when addressing the question(s) below.    The Medical Record contains the following:   Indicators  Supporting Clinical Findings Location in Medical Record   x Anemia documented unstable anemia  10/17/22 Hospital Medicine progress note   x H&H 10/14 hgb 11.4  hct  34.8  10/17 hgb 7.0    hct  21.1     BP                    HR      GI bleeding documented      Acute bleeding (Non GI site)     x Transfusion(s) Transfuse 2 u PRBC 10/17/22 Transfusion summary   x Acute/Chronic illness Left Intertrochanteric Femur fracture  10/14/22 Op    Treatments     x Other Post -Op Diagnosis:  Left Intertrochanteric Femur fracture   Procedure: Intramedullary Nail left Hip  Estimated Blood Loss: 75cc 10/14/22 Op     Provider, please clarify Anemia diagnosis:  [ x  ] Acute blood loss anemia    [   ] Acute blood loss anemia expected post-operatively    [   ] Precipitous drop in Hematocrit   [   ] Anemia, unspecified    [   ] Other Hematological Diagnosis (please specify): _________________   [   ] Clinically Undetermined              Please document in your progress notes daily for the duration of treatment, until resolved, and include in your discharge summary.    Form No. [id number]

## 2022-10-20 NOTE — PLAN OF CARE
Updated clinicals sent to [facility], submitted and awaiting insurance auth.  Finn Hernandez at [facility]

## 2022-10-20 NOTE — PROGRESS NOTES
"Inpatient Nutrition Evaluation    Admit Date: 10/14/2022   Total duration of encounter: 6 days    Nutrition Recommendation/Prescription     Continue heart healthy diet as tolerated  RD to monitor po intake and weight changes    Nutrition Assessment     Chart Review    Reason Seen: length of stay    Diagnosis: Closed intertrochanteric fracture of hip, left, initial encounter      Relevant Medical History: HTN, CHF, L breast cancer, HLD, osteoarthritis, DVT    Nutrition-Related Medications: suppository PRN, Pepcid BID, Lasix daily, Zofran PRN, Protonix daily, Miralax daily, NaCl PRN    Nutrition-Related Labs: no new labs      Diet Order: Diet heart healthy  Oral Supplement Order: none  Appetite/Oral Intake: good/% of meals  Factors Affecting Nutritional Intake: vomiting  Food/Judaism/Cultural Preferences: none reported  Food Allergies: none reported    Skin Integrity: incision  Wound(s):       Comments    10/20: pt reports good appetite, eating most of meals, with an episode of vomiting this AM. UBW reported "100 something" with weight fluctuations. Latest weight of 70.3 kg (155 lb) on 10/15 and previous weight of 70 kg (154 lb) on 6/25. Will continue to monitor.    Anthropometrics    Height: 5' 7.01" (170.2 cm) Height Method: Estimated  Last Weight: 70.3 kg (155 lb) (10/15/22 0745) Weight Method: Bed Scale  BMI (Calculated): 24.3  BMI Classification: normal (BMI 18.5-24.9)     Ideal Body Weight (IBW), Female: 135.05 lb     % Ideal Body Weight, Female (lb): 114.77 %                             Usual Weight Provided By: EMR weight history    Wt Readings from Last 5 Encounters:   10/15/22 70.3 kg (155 lb)   06/25/22 70 kg (154 lb 5.2 oz)   05/06/22 63.5 kg (139 lb 15.9 oz)   07/03/17 81 kg (178 lb 9.2 oz)     Weight Change(s) Since Admission:  Admit Weight: 70.3 kg (155 lb) (10/14/22 1152)      Patient Education    Not applicable.    Monitoring & Evaluation     Dietitian will monitor food and beverage intake and " weight change.  Nutrition Risk/Follow-Up: low (follow-up in 5-7 days)  Patients assigned 'low nutrition risk' status do not qualify for a full nutritional assessment but will be monitored and re-evaluated in a 5-7 day time period. Please consult if re-evaluation needed sooner.    Kitty Nelson, Registration Eligible, Provisional LDN

## 2022-10-20 NOTE — PT/OT/SLP PROGRESS
Occupational Therapy   Treatment    Name: Laura Jacobs  MRN: [id number]  Admitting Diagnosis:  Closed intertrochanteric fracture of hip, left, initial encounter  6 Days Post-Op    Recommendations:     Discharge Recommendations: rehabilitation facility  Discharge Equipment Recommendations:  walker, rolling, bedside commode, hip kit  Barriers to discharge:  Other (Comment) (Severity of deficits)    Assessment:     Laura Jacobs is a 71 y.o. female with a medical diagnosis of Closed intertrochanteric fracture of hip, left, initial encounter.  She presents with the following performance deficits affecting function: impaired endurance, impaired self care skills, impaired functional mobility, impaired balance, decreased lower extremity function, orthopedic precautions, weakness.   Pt w/ good motivation to participate in OT, however poor endurance and emesis ultimately limiting her participation in today's session.    Rehab Prognosis:  Good; patient would benefit from acute skilled OT services to address these deficits and reach maximum level of function.       Plan:     Patient to be seen 5 x/week, daily to address the above listed problems via therapeutic activities, therapeutic exercises, self-care/home management  Plan of Care Expires: 11/02/22  Plan of Care Reviewed with: patient    Subjective     Pain/Comfort:  Pain Rating 1: 0/10    Objective:     Communicated with: RN prior to session.  Patient found HOB elevated with ANDRIA Goldsmith upon OT entry to room.    General Precautions: Standard, fall   Orthopedic Precautions:LLE weight bearing as tolerated   Braces: N/A  Respiratory Status: Room air    Vitals:  BP: 110/64, HR: 55     Occupational Performance:     Bed Mobility:    Patient completed Supine to Sit with contact guard assistance  Patient completed Sit to Supine with minimum assistance     Activities of Daily Living:  Lower Body Dressing: moderate assistance to doff L sock w/ reacher while seated  EOB. Pt requiring moderate verbal and tactile cues throughout. After successful doffing of L sock, pt w/ belching and ultimately emesis. Upon cessation of symptoms, returned pt to supine, elevated HOB, and notified RN.    Treatment & Education:  Provided pt w/ a brief description of hip kit equipment, and demonstrated appropriate use of reacher to doff socks. Pt with fair carryover, requiring additional verbal cues to successfully complete task.  Was unable to introduce sock aide today, but left in room to review in the future.     Patient left HOB elevated with all lines intact, call button in reach, RN notified, and emesis bag in reach.    GOALS:   Multidisciplinary Problems       Occupational Therapy Goals          Problem: Occupational Therapy    Goal Priority Disciplines Outcome Interventions   Occupational Therapy Goal     OT, PT/OT Ongoing, Progressing    Description: Goals to be met by: 11/2    Patient will increase functional independence with ADLs by performing:    LE Dressing with Stand-by Assistance c AE as needed.   Grooming while standing at sink with Stand-by Assistance.  Toileting from bedside commode with Stand-by Assistance for hygiene and clothing management.   Toilet transfer to bedside commode with Stand-by Assistance.                         Time Tracking:     OT Date of Treatment: 10/20/22  OT Start Time: 1445  OT Stop Time: 1459  OT Total Time (min): 14 min    Billable Minutes:Self Care/Home Management 14 min    OT/ANGELICA: OT     ANGELICA Visit Number: 1    10/20/2022

## 2022-10-21 LAB
ALBUMIN SERPL-MCNC: 2.7 GM/DL (ref 3.4–4.8)
ALBUMIN/GLOB SERPL: 0.7 RATIO (ref 1.1–2)
ALP SERPL-CCNC: 208 UNIT/L (ref 40–150)
ALT SERPL-CCNC: 141 UNIT/L (ref 0–55)
AST SERPL-CCNC: 159 UNIT/L (ref 5–34)
BASOPHILS # BLD AUTO: 0.05 X10(3)/MCL (ref 0–0.2)
BASOPHILS NFR BLD AUTO: 0.5 %
BILIRUBIN DIRECT+TOT PNL SERPL-MCNC: 1.7 MG/DL
BUN SERPL-MCNC: 19.6 MG/DL (ref 9.8–20.1)
CALCIUM SERPL-MCNC: 9.6 MG/DL (ref 8.4–10.2)
CHLORIDE SERPL-SCNC: 94 MMOL/L (ref 98–107)
CO2 SERPL-SCNC: 30 MMOL/L (ref 23–31)
CREAT SERPL-MCNC: 1.03 MG/DL (ref 0.55–1.02)
EOSINOPHIL # BLD AUTO: 0.15 X10(3)/MCL (ref 0–0.9)
EOSINOPHIL NFR BLD AUTO: 1.5 %
ERYTHROCYTE [DISTWIDTH] IN BLOOD BY AUTOMATED COUNT: 14.3 % (ref 11.5–17)
GFR SERPLBLD CREATININE-BSD FMLA CKD-EPI: 59 MLS/MIN/1.73/M2
GLOBULIN SER-MCNC: 4 GM/DL (ref 2.4–3.5)
GLUCOSE SERPL-MCNC: 123 MG/DL (ref 82–115)
HCT VFR BLD AUTO: 40.7 % (ref 37–47)
HGB BLD-MCNC: 13.4 GM/DL (ref 12–16)
IMM GRANULOCYTES # BLD AUTO: 0.07 X10(3)/MCL (ref 0–0.04)
IMM GRANULOCYTES NFR BLD AUTO: 0.7 %
LYMPHOCYTES # BLD AUTO: 1.18 X10(3)/MCL (ref 0.6–4.6)
LYMPHOCYTES NFR BLD AUTO: 11.7 %
MCH RBC QN AUTO: 32.4 PG (ref 27–31)
MCHC RBC AUTO-ENTMCNC: 32.9 MG/DL (ref 33–36)
MCV RBC AUTO: 98.3 FL (ref 80–94)
MONOCYTES # BLD AUTO: 0.83 X10(3)/MCL (ref 0.1–1.3)
MONOCYTES NFR BLD AUTO: 8.2 %
NEUTROPHILS # BLD AUTO: 7.8 X10(3)/MCL (ref 2.1–9.2)
NEUTROPHILS NFR BLD AUTO: 77.4 %
NRBC BLD AUTO-RTO: 0 %
PLATELET # BLD AUTO: 342 X10(3)/MCL (ref 130–400)
PMV BLD AUTO: 8.9 FL (ref 7.4–10.4)
POCT GLUCOSE: 86 MG/DL (ref 70–110)
POTASSIUM SERPL-SCNC: 5 MMOL/L (ref 3.5–5.1)
PROT SERPL-MCNC: 6.7 GM/DL (ref 5.8–7.6)
RBC # BLD AUTO: 4.14 X10(6)/MCL (ref 4.2–5.4)
SODIUM SERPL-SCNC: 134 MMOL/L (ref 136–145)
WBC # SPEC AUTO: 10.1 X10(3)/MCL (ref 4.5–11.5)

## 2022-10-21 PROCEDURE — 80053 COMPREHEN METABOLIC PANEL: CPT | Performed by: INTERNAL MEDICINE

## 2022-10-21 PROCEDURE — 97164 PT RE-EVAL EST PLAN CARE: CPT

## 2022-10-21 PROCEDURE — 27000221 HC OXYGEN, UP TO 24 HOURS

## 2022-10-21 PROCEDURE — 93010 EKG 12-LEAD: ICD-10-PCS | Mod: ,,, | Performed by: INTERNAL MEDICINE

## 2022-10-21 PROCEDURE — 25000003 PHARM REV CODE 250: Performed by: INTERNAL MEDICINE

## 2022-10-21 PROCEDURE — 36415 COLL VENOUS BLD VENIPUNCTURE: CPT | Performed by: INTERNAL MEDICINE

## 2022-10-21 PROCEDURE — 93005 ELECTROCARDIOGRAM TRACING: CPT

## 2022-10-21 PROCEDURE — 99900035 HC TECH TIME PER 15 MIN (STAT)

## 2022-10-21 PROCEDURE — 93010 ELECTROCARDIOGRAM REPORT: CPT | Mod: ,,, | Performed by: INTERNAL MEDICINE

## 2022-10-21 PROCEDURE — 94761 N-INVAS EAR/PLS OXIMETRY MLT: CPT

## 2022-10-21 PROCEDURE — 25000003 PHARM REV CODE 250: Performed by: EMERGENCY MEDICINE

## 2022-10-21 PROCEDURE — 21400001 HC TELEMETRY ROOM

## 2022-10-21 PROCEDURE — 85025 COMPLETE CBC W/AUTO DIFF WBC: CPT | Performed by: INTERNAL MEDICINE

## 2022-10-21 RX ORDER — FUROSEMIDE 20 MG/1
20 TABLET ORAL DAILY
Status: DISCONTINUED | OUTPATIENT
Start: 2022-10-22 | End: 2022-10-24 | Stop reason: HOSPADM

## 2022-10-21 RX ADMIN — MELATONIN TAB 3 MG 6 MG: 3 TAB at 08:10

## 2022-10-21 RX ADMIN — SACUBITRIL AND VALSARTAN 1 TABLET: 24; 26 TABLET, FILM COATED ORAL at 08:10

## 2022-10-21 RX ADMIN — FAMOTIDINE 20 MG: 20 TABLET, FILM COATED ORAL at 08:10

## 2022-10-21 NOTE — PROGRESS NOTES
[facility] Hospital Medicine  Progress Note    Patient Name: Laura Jacobs  MRN: [id number]  Patient Class: IP- Inpatient   Admission Date: 10/14/2022  Length of Stay: 6 days  Attending Physician: Ruben Brooks Sr., MD  Primary Care Provider: Ruben Brooks Sr, MD        Subjective:     Principal Problem:Closed intertrochanteric fracture of hip, left, initial encounter        HPI:  71 year old Black Female reported to [facility] E.R. after her left leg gave out and she fell in the street [place] home. She reported to the E.R. with left hip pain.  X-ray of Left Hip showed Left Hip Fracture.  Ortho. was consulted and performed Hip Fracture repair surgery on 10/14/2022.  Patient is without complaints except left hip pain.      Overview/Hospital Course:  71 year old Black Female was admitted to the hospital for Left Hip Fracture.  Ortho. was consulted and performed a Left Hip Fracture Repair surgery on 10/14/2022.  Patient is without complaints at the present time, except left hip pain.  Patient is currently receiving a blood transfusion of 2 units of PRBC's for unstable anemia following the surgery.  I am waiting for [facility] eval.      Interval History:     Review of Systems   Constitutional:  Negative for activity change, appetite change, chills, diaphoresis, fatigue, fever and unexpected weight change.   HENT:  Negative for congestion, dental problem, drooling, ear discharge, ear pain, facial swelling, hearing loss, mouth sores, nosebleeds, postnasal drip, rhinorrhea, sinus pressure, sinus pain, sneezing, sore throat, tinnitus, trouble swallowing and voice change.    Eyes:  Negative for photophobia, pain, discharge, redness, itching and visual disturbance.   Respiratory:  Negative for apnea, cough, choking, chest tightness, shortness of breath, wheezing and stridor.    Cardiovascular:  Negative for chest pain, palpitations and leg swelling.   Gastrointestinal:  Negative for abdominal  distention, abdominal pain, anal bleeding, blood in stool, constipation, diarrhea, nausea, rectal pain and vomiting.   Endocrine: Negative for cold intolerance, heat intolerance, polydipsia, polyphagia and polyuria.   Genitourinary:  Negative for decreased urine volume, difficulty urinating, dysuria, enuresis, flank pain, frequency, genital sores, hematuria and urgency.   Musculoskeletal:  Positive for gait problem. Negative for arthralgias, back pain, joint swelling, myalgias, neck pain and neck stiffness.   Skin:  Negative for color change, pallor, rash and wound.   Allergic/Immunologic: Negative for environmental allergies, food allergies and immunocompromised state.   Neurological:  Positive for weakness. Negative for dizziness, tremors, seizures, syncope, facial asymmetry, speech difficulty, light-headedness, numbness and headaches.   Hematological:  Negative for adenopathy. Does not bruise/bleed easily.   Psychiatric/Behavioral:  Negative for agitation, behavioral problems, confusion, decreased concentration, dysphoric mood, hallucinations, self-injury, sleep disturbance and suicidal ideas. The patient is not nervous/anxious and is not hyperactive.    Objective:     Vital Signs (Most Recent):  Temp: 97.6 °F (36.4 °C) (10/20/22 1500)  Pulse: 62 (10/20/22 1500)  Resp: 19 (10/20/22 1136)  BP: 96/67 (10/20/22 1500)  SpO2: (!) 94 % (10/20/22 1500)   Vital Signs (24h Range):  Temp:  [97.5 °F (36.4 °C)-98.1 °F (36.7 °C)] 97.6 °F (36.4 °C)  Pulse:  [55-62] 62  Resp:  [18-20] 19  SpO2:  [90 %-100 %] 94 %  BP: ()/(55-73) 96/67     Weight: 70.3 kg (155 lb)  Body mass index is 24.27 kg/m².    Intake/Output Summary (Last 24 hours) at 10/20/2022 1913  Last data filed at 10/20/2022 1419  Gross per 24 hour   Intake 720 ml   Output 900 ml   Net -180 ml      Physical Exam  Constitutional:       Appearance: Normal appearance.   HENT:      Head: Normocephalic and atraumatic.      Nose: Nose normal.   Eyes:      Extraocular  Movements: Extraocular movements intact.      Pupils: Pupils are equal, round, and reactive to light.   Cardiovascular:      Rate and Rhythm: Normal rate and regular rhythm.      Pulses: Normal pulses.      Heart sounds: Normal heart sounds.   Pulmonary:      Effort: Pulmonary effort is normal.      Breath sounds: Normal breath sounds.   Abdominal:      General: Abdomen is flat. Bowel sounds are normal.      Palpations: Abdomen is soft.   Musculoskeletal:         General: Normal range of motion.      Cervical back: Normal range of motion and neck supple.   Skin:     General: Skin is warm and dry.   Neurological:      General: No focal deficit present.      Mental Status: She is alert and oriented to person, place, and time.      Motor: Weakness present.      Gait: Gait abnormal.       Significant Labs: All pertinent labs within the past 24 hours have been reviewed.    Significant Imaging: I have reviewed all pertinent imaging results/findings within the past 24 hours.      Assessment/Plan:      No notes have been filed under this hospital service.  Service: Hospital Medicine    VTE Risk Mitigation (From admission, onward)         Ordered     IP VTE HIGH RISK PATIENT  Once         10/14/22 1455     Place sequential compression device  Until discontinued         10/14/22 1455                Discharge Planning   AMANDA:      Code Status: Full Code   Is the patient medically ready for discharge?:     Reason for patient still in hospital (select all that apply): Patient trending condition  Discharge Plan A: Rehab                  Ruben Brooks Sr, MD  Department of Hospital Medicine   [facility]

## 2022-10-21 NOTE — PROGRESS NOTES
[facility] Hospital Medicine  Progress Note    Patient Name: Laura Jacobs  MRN: [id number]  Patient Class: IP- Inpatient   Admission Date: 10/14/2022  Length of Stay: 7 days  Attending Physician: Ruben Brooks Sr., MD  Primary Care Provider: Ruben Brooks Sr, MD        Subjective:     Principal Problem:Closed intertrochanteric fracture of hip, left, initial encounter        HPI:  71 year old Black Female reported to [facility] E.R. after her left leg gave out and she fell in the street [place] home. She reported to the E.R. with left hip pain.  X-ray of Left Hip showed Left Hip Fracture.  Ortho. was consulted and performed Hip Fracture repair surgery on 10/14/2022.  Patient is without complaints except left hip pain.      Overview/Hospital Course:  71 year old Black Female was admitted to the hospital for Left Hip Fracture.  Ortho. was consulted and performed a Left Hip Fracture Repair surgery on 10/14/2022.  Patient is without complaints at the present time, except left hip pain.  Patient is currently receiving a blood transfusion of 2 units of PRBC's for unstable anemia following the surgery.  Patient was accepted to [facility], but she had a syncopal episode today so I postponed her transfer to [facility] until Monday, October 24, 2022.      Interval History:     Review of Systems   Constitutional:  Negative for activity change, appetite change, chills, diaphoresis, fatigue, fever and unexpected weight change.   HENT:  Negative for congestion, dental problem, drooling, ear discharge, ear pain, facial swelling, hearing loss, mouth sores, nosebleeds, postnasal drip, rhinorrhea, sinus pressure, sinus pain, sneezing, sore throat, tinnitus, trouble swallowing and voice change.    Eyes:  Negative for photophobia, pain, discharge, redness, itching and visual disturbance.   Respiratory:  Negative for apnea, cough, choking, chest tightness, shortness of breath, wheezing and stridor.     Cardiovascular:  Negative for chest pain, palpitations and leg swelling.   Gastrointestinal:  Negative for abdominal distention, abdominal pain, anal bleeding, blood in stool, constipation, diarrhea, nausea, rectal pain and vomiting.   Endocrine: Negative for cold intolerance, heat intolerance, polydipsia, polyphagia and polyuria.   Genitourinary:  Negative for decreased urine volume, difficulty urinating, dysuria, enuresis, flank pain, frequency, genital sores, hematuria and urgency.   Musculoskeletal:  Positive for gait problem. Negative for arthralgias, back pain, joint swelling, myalgias, neck pain and neck stiffness.        Left hip pain;   Skin:  Negative for color change, pallor, rash and wound.   Allergic/Immunologic: Negative for environmental allergies, food allergies and immunocompromised state.   Neurological:  Positive for syncope and weakness. Negative for dizziness, tremors, seizures, facial asymmetry, speech difficulty, light-headedness, numbness and headaches.   Hematological:  Negative for adenopathy. Does not bruise/bleed easily.   Psychiatric/Behavioral:  Negative for agitation, behavioral problems, confusion, decreased concentration, dysphoric mood, hallucinations, self-injury, sleep disturbance and suicidal ideas. The patient is not nervous/anxious and is not hyperactive.    Objective:     Vital Signs (Most Recent):  Temp: 98.1 °F (36.7 °C) (10/21/22 1519)  Pulse: 60 (10/21/22 1519)  Resp: 18 (10/21/22 1519)  BP: (!) 95/58 (10/21/22 1519)  SpO2: 95 % (10/21/22 1519)   Vital Signs (24h Range):  Temp:  [97.3 °F (36.3 °C)-98.1 °F (36.7 °C)] 98.1 °F (36.7 °C)  Pulse:  [55-60] 60  Resp:  [17-19] 18  SpO2:  [95 %-99 %] 95 %  BP: ()/(58-78) 95/58     Weight: 70.3 kg (155 lb)  Body mass index is 24.27 kg/m².    Intake/Output Summary (Last 24 hours) at 10/21/2022 3098  Last data filed at 10/21/2022 1315  Gross per 24 hour   Intake 960 ml   Output 300 ml   Net 660 ml      Physical  Exam  Constitutional:       Appearance: Normal appearance.   HENT:      Head: Normocephalic and atraumatic.      Nose: Nose normal.   Eyes:      Extraocular Movements: Extraocular movements intact.      Pupils: Pupils are equal, round, and reactive to light.   Cardiovascular:      Rate and Rhythm: Normal rate and regular rhythm.      Pulses: Normal pulses.      Heart sounds: Normal heart sounds.   Pulmonary:      Effort: Pulmonary effort is normal.      Breath sounds: Normal breath sounds.   Abdominal:      General: Abdomen is flat. Bowel sounds are normal.      Palpations: Abdomen is soft.   Musculoskeletal:         General: Normal range of motion.      Cervical back: Normal range of motion and neck supple.   Skin:     General: Skin is warm and dry.   Neurological:      General: No focal deficit present.      Mental Status: She is alert and oriented to person, place, and time.      Motor: Weakness present.      Gait: Gait abnormal.       Significant Labs: All pertinent labs within the past 24 hours have been reviewed.    Significant Imaging: I have reviewed all pertinent imaging results/findings within the past 24 hours.      Assessment/Plan:      No notes have been filed under this hospital service.  Service: Hospital Medicine    VTE Risk Mitigation (From admission, onward)         Ordered     IP VTE HIGH RISK PATIENT  Once         10/14/22 1455     Place sequential compression device  Until discontinued         10/14/22 1455                Discharge Planning   AMANDA:      Code Status: Full Code   Is the patient medically ready for discharge?:     Reason for patient still in hospital (select all that apply): Patient trending condition  Discharge Plan A: Rehab                  Ruben Brooks Sr, MD  Department of Hospital Medicine   [facility]

## 2022-10-21 NOTE — PT/OT/SLP RE-EVAL
Physical Therapy Re-evaluation    Patient Name:  Laura Jacobs   MRN:  [id number]    Recommendations:     Discharge Recommendations:  rehabilitation facility   Discharge Equipment Recommendations: walker, rolling   Barriers to discharge:  acuity of illness    Assessment:     Laura Jacobs is a 71 y.o. female admitted with a medical diagnosis of Closed intertrochanteric fracture of hip, left, initial encounter.  She presents with the following impairments/functional limitations:  weakness, impaired endurance, impaired self care skills, impaired functional mobility, gait instability, impaired balance, decreased safety awareness, pain, orthopedic precautions. Pt tolerated treatment well, however, as PT was leaving the room while pt was up in chair, pt became unresponsive so staff assist button was pressed and nurses arrived quickly after and took over pt care. Nurses took vitals (/87, HR 65, SpO2 99%). Pt became responsive to nurses and PT left the room with pt in nursing care.     Rehab Prognosis:  Good; patient would benefit from acute skilled PT services to address these deficits and reach maximum level of function.      Recent Surgery: Procedure(s) (LRB):  INSERTION, INTRAMEDULLARY СВЕТЛАНА, FEMUR (Left) 7 Days Post-Op    Plan:     During this hospitalization, patient to be seen daily (/BID) to address the above listed problems via gait training, therapeutic activities, therapeutic exercises, neuromuscular re-education  Plan of Care Expires:  11/20/22  Plan of Care Reviewed with: patient    Subjective     Communicated with RN prior to session.  Patient found HOB elevated with Agus, peripheral IV, telemetry, SCD upon PT entry to room, agreeable to evaluation.      Chief Complaint: tired  Patient comments/goals: to get stronger  Pain/Comfort:  Pain Rating 1: 0/10    Patients cultural, spiritual, Adventist conflicts given the current situation: no      Objective:     Patient found with: Agus  peripheral IV, telemetry, SCD     General Precautions: Standard, fall   Orthopedic Precautions:LLE weight bearing as tolerated   Braces:    Respiratory Status: Room air    Exams:  Cognitive Exam:  Patient is oriented to Person, Place, Time, and Situation  RLE ROM: WFL  RLE Strength: WFL  LLE ROM: WFL  LLE Strength: WFL    Functional Mobility:  Bed Mobility:     Supine to Sit: minimum assistance  Transfers:     Sit to Stand:  minimum assistance with rolling walker  Gait: pt ambulated 25 ft with a slow step to gt pattern with a RW and Miky.    AM-PAC 6 CLICK MOBILITY  Total Score:17     Patient left up in chair with all lines intact, call button in reach, and RN present.    GOALS:   Multidisciplinary Problems       Physical Therapy Goals          Problem: Physical Therapy    Goal Priority Disciplines Outcome Goal Variances Interventions   Physical Therapy Goal     PT, PT/OT Ongoing, Progressing     Description: STGs:     1. Pt to perform bed mobility with Modified Independent  2. Pt to perform sit<>stand at RW with Modified Independent  3. Pt to perform stand-pivot transfer at RW with Modified Independent  4. Pt to ambulate 100' at RW with Miky                       History:     Past Medical History:   Diagnosis Date    Cancer     breast CA s/p chemo and L masectomy     CHF (congestive heart failure)     History of DVT (deep vein thrombosis)     HLD (hyperlipidemia)     Hypertension     Osteoarthritis     Venous insufficiency        Past Surgical History:   Procedure Laterality Date    HYSTERECTOMY      INSERTION OF PACEMAKER      INTRAMEDULLARY RODDING OF FEMUR Left 10/14/2022    Procedure: INSERTION, INTRAMEDULLARY СВЕТЛАНА, FEMUR;  Surgeon: Ankush Alex MD;  Location: [facility];  Service: Orthopedics;  Laterality: Left;    JOINT REPLACEMENT Bilateral     Knee    MASTECTOMY Left 2019       Time Tracking:     PT Received On: 10/21/22  PT Start Time: 1011     PT Stop Time: 1024  PT Total Time (min): 13 min     Billable  Minutes: Re-eval 13 minutes      10/21/2022

## 2022-10-21 NOTE — SUBJECTIVE & OBJECTIVE
Interval History:     Review of Systems   Constitutional:  Negative for activity change, appetite change, chills, diaphoresis, fatigue, fever and unexpected weight change.   HENT:  Negative for congestion, dental problem, drooling, ear discharge, ear pain, facial swelling, hearing loss, mouth sores, nosebleeds, postnasal drip, rhinorrhea, sinus pressure, sinus pain, sneezing, sore throat, tinnitus, trouble swallowing and voice change.    Eyes:  Negative for photophobia, pain, discharge, redness, itching and visual disturbance.   Respiratory:  Negative for apnea, cough, choking, chest tightness, shortness of breath, wheezing and stridor.    Cardiovascular:  Negative for chest pain, palpitations and leg swelling.   Gastrointestinal:  Negative for abdominal distention, abdominal pain, anal bleeding, blood in stool, constipation, diarrhea, nausea, rectal pain and vomiting.   Endocrine: Negative for cold intolerance, heat intolerance, polydipsia, polyphagia and polyuria.   Genitourinary:  Negative for decreased urine volume, difficulty urinating, dysuria, enuresis, flank pain, frequency, genital sores, hematuria and urgency.   Musculoskeletal:  Positive for gait problem. Negative for arthralgias, back pain, joint swelling, myalgias, neck pain and neck stiffness.   Skin:  Negative for color change, pallor, rash and wound.   Allergic/Immunologic: Negative for environmental allergies, food allergies and immunocompromised state.   Neurological:  Positive for weakness. Negative for dizziness, tremors, seizures, syncope, facial asymmetry, speech difficulty, light-headedness, numbness and headaches.   Hematological:  Negative for adenopathy. Does not bruise/bleed easily.   Psychiatric/Behavioral:  Negative for agitation, behavioral problems, confusion, decreased concentration, dysphoric mood, hallucinations, self-injury, sleep disturbance and suicidal ideas. The patient is not nervous/anxious and is not hyperactive.     Objective:     Vital Signs (Most Recent):  Temp: 97.6 °F (36.4 °C) (10/20/22 1500)  Pulse: 62 (10/20/22 1500)  Resp: 19 (10/20/22 1136)  BP: 96/67 (10/20/22 1500)  SpO2: (!) 94 % (10/20/22 1500)   Vital Signs (24h Range):  Temp:  [97.5 °F (36.4 °C)-98.1 °F (36.7 °C)] 97.6 °F (36.4 °C)  Pulse:  [55-62] 62  Resp:  [18-20] 19  SpO2:  [90 %-100 %] 94 %  BP: ()/(55-73) 96/67     Weight: 70.3 kg (155 lb)  Body mass index is 24.27 kg/m².    Intake/Output Summary (Last 24 hours) at 10/20/2022 1913  Last data filed at 10/20/2022 1419  Gross per 24 hour   Intake 720 ml   Output 900 ml   Net -180 ml      Physical Exam  Constitutional:       Appearance: Normal appearance.   HENT:      Head: Normocephalic and atraumatic.      Nose: Nose normal.   Eyes:      Extraocular Movements: Extraocular movements intact.      Pupils: Pupils are equal, round, and reactive to light.   Cardiovascular:      Rate and Rhythm: Normal rate and regular rhythm.      Pulses: Normal pulses.      Heart sounds: Normal heart sounds.   Pulmonary:      Effort: Pulmonary effort is normal.      Breath sounds: Normal breath sounds.   Abdominal:      General: Abdomen is flat. Bowel sounds are normal.      Palpations: Abdomen is soft.   Musculoskeletal:         General: Normal range of motion.      Cervical back: Normal range of motion and neck supple.   Skin:     General: Skin is warm and dry.   Neurological:      General: No focal deficit present.      Mental Status: She is alert and oriented to person, place, and time.      Motor: Weakness present.      Gait: Gait abnormal.       Significant Labs: All pertinent labs within the past 24 hours have been reviewed.    Significant Imaging: I have reviewed all pertinent imaging results/findings within the past 24 hours.

## 2022-10-21 NOTE — SUBJECTIVE & OBJECTIVE
Interval History:     Review of Systems   Constitutional:  Negative for activity change, appetite change, chills, diaphoresis, fatigue, fever and unexpected weight change.   HENT:  Negative for congestion, dental problem, drooling, ear discharge, ear pain, facial swelling, hearing loss, mouth sores, nosebleeds, postnasal drip, rhinorrhea, sinus pressure, sinus pain, sneezing, sore throat, tinnitus, trouble swallowing and voice change.    Eyes:  Negative for photophobia, pain, discharge, redness, itching and visual disturbance.   Respiratory:  Negative for apnea, cough, choking, chest tightness, shortness of breath, wheezing and stridor.    Cardiovascular:  Negative for chest pain, palpitations and leg swelling.   Gastrointestinal:  Negative for abdominal distention, abdominal pain, anal bleeding, blood in stool, constipation, diarrhea, nausea, rectal pain and vomiting.   Endocrine: Negative for cold intolerance, heat intolerance, polydipsia, polyphagia and polyuria.   Genitourinary:  Negative for decreased urine volume, difficulty urinating, dysuria, enuresis, flank pain, frequency, genital sores, hematuria and urgency.   Musculoskeletal:  Positive for gait problem. Negative for arthralgias, back pain, joint swelling, myalgias, neck pain and neck stiffness.        Left hip pain;   Skin:  Negative for color change, pallor, rash and wound.   Allergic/Immunologic: Negative for environmental allergies, food allergies and immunocompromised state.   Neurological:  Positive for syncope and weakness. Negative for dizziness, tremors, seizures, facial asymmetry, speech difficulty, light-headedness, numbness and headaches.   Hematological:  Negative for adenopathy. Does not bruise/bleed easily.   Psychiatric/Behavioral:  Negative for agitation, behavioral problems, confusion, decreased concentration, dysphoric mood, hallucinations, self-injury, sleep disturbance and suicidal ideas. The patient is not nervous/anxious and is not  hyperactive.    Objective:     Vital Signs (Most Recent):  Temp: 98.1 °F (36.7 °C) (10/21/22 1519)  Pulse: 60 (10/21/22 1519)  Resp: 18 (10/21/22 1519)  BP: (!) 95/58 (10/21/22 1519)  SpO2: 95 % (10/21/22 1519)   Vital Signs (24h Range):  Temp:  [97.3 °F (36.3 °C)-98.1 °F (36.7 °C)] 98.1 °F (36.7 °C)  Pulse:  [55-60] 60  Resp:  [17-19] 18  SpO2:  [95 %-99 %] 95 %  BP: ()/(58-78) 95/58     Weight: 70.3 kg (155 lb)  Body mass index is 24.27 kg/m².    Intake/Output Summary (Last 24 hours) at 10/21/2022 1748  Last data filed at 10/21/2022 1315  Gross per 24 hour   Intake 960 ml   Output 300 ml   Net 660 ml      Physical Exam  Constitutional:       Appearance: Normal appearance.   HENT:      Head: Normocephalic and atraumatic.      Nose: Nose normal.   Eyes:      Extraocular Movements: Extraocular movements intact.      Pupils: Pupils are equal, round, and reactive to light.   Cardiovascular:      Rate and Rhythm: Normal rate and regular rhythm.      Pulses: Normal pulses.      Heart sounds: Normal heart sounds.   Pulmonary:      Effort: Pulmonary effort is normal.      Breath sounds: Normal breath sounds.   Abdominal:      General: Abdomen is flat. Bowel sounds are normal.      Palpations: Abdomen is soft.   Musculoskeletal:         General: Normal range of motion.      Cervical back: Normal range of motion and neck supple.   Skin:     General: Skin is warm and dry.   Neurological:      General: No focal deficit present.      Mental Status: She is alert and oriented to person, place, and time.      Motor: Weakness present.      Gait: Gait abnormal.       Significant Labs: All pertinent labs within the past 24 hours have been reviewed.    Significant Imaging: I have reviewed all pertinent imaging results/findings within the past 24 hours.

## 2022-10-21 NOTE — PLAN OF CARE
STGs:     1. Pt to perform bed mobility with Modified Independent  2. Pt to perform sit<>stand at RW with Modified Independent  3. Pt to perform stand-pivot transfer at RW with Modified Independent  4. Pt to ambulate 100' at RW with Ariana      POC updated 10/21/2022 secondary to pt progress.

## 2022-10-22 PROCEDURE — 25000003 PHARM REV CODE 250: Performed by: INTERNAL MEDICINE

## 2022-10-22 PROCEDURE — 25000003 PHARM REV CODE 250: Performed by: EMERGENCY MEDICINE

## 2022-10-22 PROCEDURE — 21400001 HC TELEMETRY ROOM

## 2022-10-22 RX ADMIN — SACUBITRIL AND VALSARTAN 1 TABLET: 24; 26 TABLET, FILM COATED ORAL at 08:10

## 2022-10-22 RX ADMIN — POLYETHYLENE GLYCOL 3350 17 G: 17 POWDER, FOR SOLUTION ORAL at 09:10

## 2022-10-22 RX ADMIN — ATORVASTATIN CALCIUM 40 MG: 40 TABLET, FILM COATED ORAL at 09:10

## 2022-10-22 RX ADMIN — FAMOTIDINE 20 MG: 20 TABLET, FILM COATED ORAL at 09:10

## 2022-10-22 RX ADMIN — PANTOPRAZOLE SODIUM 40 MG: 40 TABLET, DELAYED RELEASE ORAL at 09:10

## 2022-10-22 RX ADMIN — FAMOTIDINE 20 MG: 20 TABLET, FILM COATED ORAL at 08:10

## 2022-10-22 RX ADMIN — AMIODARONE HYDROCHLORIDE 400 MG: 200 TABLET ORAL at 09:10

## 2022-10-22 NOTE — PLAN OF CARE
Problem: Adult Inpatient Plan of Care  Goal: Patient-Specific Goal (Individualized)  Outcome: Ongoing, Progressing  Flowsheets (Taken 10/21/2022 2240)  Patient-Specific Goals (Include Timeframe): for my blood pressure to come up tonight     Problem: Fall Injury Risk  Goal: Absence of Fall and Fall-Related Injury  Outcome: Ongoing, Progressing     Problem: Skin Injury Risk Increased  Goal: Skin Health and Integrity  Outcome: Ongoing, Progressing

## 2022-10-23 PROCEDURE — 25000003 PHARM REV CODE 250: Performed by: EMERGENCY MEDICINE

## 2022-10-23 PROCEDURE — 21400001 HC TELEMETRY ROOM

## 2022-10-23 PROCEDURE — 25000003 PHARM REV CODE 250: Performed by: STUDENT IN AN ORGANIZED HEALTH CARE EDUCATION/TRAINING PROGRAM

## 2022-10-23 PROCEDURE — 97530 THERAPEUTIC ACTIVITIES: CPT | Mod: CQ

## 2022-10-23 PROCEDURE — 25000003 PHARM REV CODE 250: Performed by: INTERNAL MEDICINE

## 2022-10-23 PROCEDURE — 97530 THERAPEUTIC ACTIVITIES: CPT | Mod: CO

## 2022-10-23 RX ADMIN — SACUBITRIL AND VALSARTAN 1 TABLET: 24; 26 TABLET, FILM COATED ORAL at 09:10

## 2022-10-23 RX ADMIN — ATORVASTATIN CALCIUM 40 MG: 40 TABLET, FILM COATED ORAL at 08:10

## 2022-10-23 RX ADMIN — FAMOTIDINE 20 MG: 20 TABLET, FILM COATED ORAL at 08:10

## 2022-10-23 RX ADMIN — TRAMADOL HYDROCHLORIDE 50 MG: 50 TABLET, COATED ORAL at 09:10

## 2022-10-23 RX ADMIN — AMIODARONE HYDROCHLORIDE 400 MG: 200 TABLET ORAL at 08:10

## 2022-10-23 RX ADMIN — PANTOPRAZOLE SODIUM 40 MG: 40 TABLET, DELAYED RELEASE ORAL at 08:10

## 2022-10-23 RX ADMIN — TRAMADOL HYDROCHLORIDE 50 MG: 50 TABLET, COATED ORAL at 04:10

## 2022-10-23 RX ADMIN — FAMOTIDINE 20 MG: 20 TABLET, FILM COATED ORAL at 09:10

## 2022-10-23 RX ADMIN — POLYETHYLENE GLYCOL 3350 17 G: 17 POWDER, FOR SOLUTION ORAL at 08:10

## 2022-10-23 NOTE — PT/OT/SLP PROGRESS
Occupational Therapy   Treatment    Name: Laura Jacobs  MRN: [id number]  Admitting Diagnosis:  Closed intertrochanteric fracture of hip, left, initial encounter  9 Days Post-Op    Recommendations:     Discharge Recommendations: rehabilitation facility  Discharge Equipment Recommendations:  walker, rolling  Barriers to discharge:  severity of deficits     Assessment:     Laura Jacobs is a 71 y.o. female with a medical diagnosis of Closed intertrochanteric fracture of hip, left, initial encounter. Performance deficits affecting function are weakness, impaired sensation, impaired self care skills, impaired functional mobility, gait instability, impaired balance, decreased safety awareness, impaired coordination.     Rehab Prognosis:  Good; patient would benefit from acute skilled OT services to address these deficits and reach maximum level of function.       Plan:     Patient to be seen 5 x/week, daily to address the above listed problems via therapeutic activities, therapeutic exercises, self-care/home management  Plan of Care Expires: 11/02/22  Plan of Care Reviewed with: patient    Subjective     Pain/Comfort:       Objective:     Communicated with: RN prior to session.  Patient found HOB elevated with   upon OT entry to room.    General Precautions: Standard, fall   Orthopedic Precautions:LLE weight bearing as tolerated   Braces: N/A  Respiratory Status: Room air     Occupational Performance:     Bed Mobility:    Patient completed Supine to Sit with moderate assistance  Patient completed Sit to Supine with moderate assistance     Functional Mobility/Transfers:  Patient completed Sit <> Stand Transfer  x2 attempts with moderate assistance  with  rolling walker        Treatment & Education:  Educated Pt on OT goals and POC.    Patient left HOB elevated with all lines intact and call button in reach    GOALS:   Multidisciplinary Problems       Occupational Therapy Goals          Problem: Occupational  Therapy    Goal Priority Disciplines Outcome Interventions   Occupational Therapy Goal     OT, PT/OT Ongoing, Progressing    Description: Goals to be met by: 11/2    Patient will increase functional independence with ADLs by performing:    LE Dressing with Stand-by Assistance c AE as needed.   Grooming while standing at sink with Stand-by Assistance.  Toileting from bedside commode with Stand-by Assistance for hygiene and clothing management.   Toilet transfer to bedside commode with Stand-by Assistance.                         Time Tracking:     OT Date of Treatment: 10/23/22  OT Start Time: 1200  OT Stop Time: 1212  OT Total Time (min): 12 min    Billable Minutes:Therapeutic Activity 12    OT/ANGELICA: ANGELICA     ANGELICA Visit Number: 2    10/23/2022

## 2022-10-23 NOTE — PT/OT/SLP PROGRESS
Physical Therapy         Treatment        Laura Jacobs   MRN: [id number]     PT Received On: 10/23/22  PT Start Time: 1216     PT Stop Time: 1228    PT Total Time (min): 12 min       Billable Minutes:  Therapeutic Activity 12  Total Minutes: 12    Treatment Type: Treatment  PT/PTA: PTA     PTA Visit Number: 1       General Precautions: Standard, fall  Orthopedic Precautions: Orthopedic Precautions : LLE weight bearing as tolerated   Braces:      Spiritual, Cultural Beliefs, Adventism Practices, Values that Affect Care: no    Subjective:  Communicated with NSG prior to session.    Pain/Comfort  Location - Side 1: Left  Location 1: leg  Pain Addressed 1: Reposition, Distraction    Objective:  Patient found in bed with HOB slightly elevated, with Patient found with: Agus, telemetry, SCD    BP supine: 109/67 with HR of 74  BP sitting EOB: 131/92 with HR of 84  BP after standin/75 with HR of 76      Functional Mobility:  Bed Mobility:   Supine to sit: Minimal Assistance   Sit to supine: Activity did not occur   Rolling: Activity did not occur   Scooting: Minimal Assistance    Balance:   Static Sit: GOOD: Takes MODERATE challenges from all directions  Dynamic Sit:  GOOD: Maintains balance through MODERATE excursions of active trunk movement  Static Stand: POOR+: Needs MINIMAL assist to maintain  Dynamic stand: POOR+: Needs MIN (minimal ) assist during gait    Transfer Training:  Sit to stand:Minimal Assistance with Rolling Walker .  Bed <> Chair:  Step Transfer with Minimal Assistance with Rolling Walker . Pt required increased time to complete T/F. Pt required max vcs for upright posture and assistance managing RW while turning to sit in chair.        Additional Treatment:  Pt not feeling up to ambulating a this time. NSG aware pt UIC.     Activity Tolerance:  Patient tolerated treatment well, Patient limited by fatigue, and Patient limited by pain    Patient left up in chair with all lines intact and  call button in reach.    Assessment:  Laura Jacobs is a 71 y.o. female with a medical diagnosis of Closed intertrochanteric fracture of hip, left, initial encounter. She presents with decreased safety awareness and remains a fall risk.   Pt would benefit from further rehab or SNF therapy services to increase overall independence level and assist in getting pt closer to PLOF.  Rehab vs SNF recs pending pt progress with PT.     Rehab potential is good.    Activity tolerance: Good    Discharge recommendations: Discharge Facility/Level of Care Needs: rehabilitation facility, nursing facility, skilled     Equipment recommendations: Equipment Needed After Discharge: walker, rolling, bedside commode     GOALS:   Multidisciplinary Problems       Physical Therapy Goals          Problem: Physical Therapy    Goal Priority Disciplines Outcome Goal Variances Interventions   Physical Therapy Goal     PT, PT/OT Ongoing, Progressing     Description: STGs:     1. Pt to perform bed mobility with Modified Independent  2. Pt to perform sit<>stand at RW with Modified Independent  3. Pt to perform stand-pivot transfer at RW with Modified Independent  4. Pt to ambulate 100' at RW with Ariana                       PLAN:    Patient to be seen daily  to address the above listed problems via gait training, therapeutic activities, therapeutic exercises, neuromuscular re-education  Plan of Care expires: 11/20/22  Plan of Care reviewed with: patient         10/23/2022

## 2022-10-23 NOTE — PROGRESS NOTES
[facility]                       [address] [address]    PATIENT NAME:       JI HILLMAN  YOB: 1950  CSN:                [id number]   MRN:                [id number]  ADMIT DATE:         10/14/2022 11:58:00  PHYSICIAN:          Juan F Mena MD                            PROGRESS NOTE    DATE:      SUBJECTIVE:  Patient, who is a 71-year-old  female with history   of left femur fracture.  Had surgery done; however, during the hospitalization,   the patient has been having a low blood count at times and required transfusion.    Therefore, today patient is fairly stable with no acute distress and is well.    PHYSICAL EXAMINATION:  VITAL SIGNS:  At the time I had seen the patient vital signs normal.  HEART:  Patient has S1, S2 with no murmur or gallop.  CHEST:  Appeared to be fairly clear.  No wheezing or rales.  ABDOMEN:  Soft, nontender.  Good bowel sounds.    EXTREMITIES:  Superior, good range of motion of the extremity.  Inferior, no   edema noted except some limitation of motion in the lower left lower extremity   due to the left hip fracture.    IMPRESSION:  At this point patient has:  1. Anemia.  2. Hypertension.  3. Hyperlipidemia.  4. Osteoarthritis.  5. Left breast cancer.  6. The venous thrombosis.  7. Left intertrochanteric hip fracture.    PLAN:    1. Will continue present therapy.   2. Probably patient will be discharged on Monday.      ______________________________  Juan F Mena MD    CHL/AQS  DD:  10/22/2022  Time:  08:05PM  DT:  10/22/2022  Time:  10:14PM  Job #:  [id number]      PROGRESS NOTE

## 2022-10-24 VITALS
RESPIRATION RATE: 18 BRPM | HEIGHT: 67 IN | SYSTOLIC BLOOD PRESSURE: 95 MMHG | TEMPERATURE: 98 F | WEIGHT: 155 LBS | DIASTOLIC BLOOD PRESSURE: 56 MMHG | OXYGEN SATURATION: 96 % | BODY MASS INDEX: 24.33 KG/M2 | HEART RATE: 66 BPM

## 2022-10-24 PROCEDURE — 25000003 PHARM REV CODE 250: Performed by: EMERGENCY MEDICINE

## 2022-10-24 PROCEDURE — 25000003 PHARM REV CODE 250: Performed by: INTERNAL MEDICINE

## 2022-10-24 PROCEDURE — 63600175 PHARM REV CODE 636 W HCPCS: Performed by: EMERGENCY MEDICINE

## 2022-10-24 RX ADMIN — FAMOTIDINE 20 MG: 20 TABLET, FILM COATED ORAL at 10:10

## 2022-10-24 RX ADMIN — AMIODARONE HYDROCHLORIDE 400 MG: 200 TABLET ORAL at 10:10

## 2022-10-24 RX ADMIN — SPIRONOLACTONE 25 MG: 25 TABLET ORAL at 10:10

## 2022-10-24 RX ADMIN — SACUBITRIL AND VALSARTAN 1 TABLET: 24; 26 TABLET, FILM COATED ORAL at 10:10

## 2022-10-24 RX ADMIN — METOPROLOL SUCCINATE 25 MG: 25 TABLET, EXTENDED RELEASE ORAL at 10:10

## 2022-10-24 RX ADMIN — ATORVASTATIN CALCIUM 40 MG: 40 TABLET, FILM COATED ORAL at 10:10

## 2022-10-24 RX ADMIN — FUROSEMIDE 20 MG: 20 TABLET ORAL at 09:10

## 2022-10-24 RX ADMIN — POLYETHYLENE GLYCOL 3350 17 G: 17 POWDER, FOR SOLUTION ORAL at 10:10

## 2022-10-24 RX ADMIN — ANASTROZOLE 1 MG: 1 TABLET, COATED ORAL at 10:10

## 2022-10-24 RX ADMIN — ONDANSETRON 4 MG: 2 INJECTION INTRAMUSCULAR; INTRAVENOUS at 04:10

## 2022-10-24 RX ADMIN — PANTOPRAZOLE SODIUM 40 MG: 40 TABLET, DELAYED RELEASE ORAL at 10:10

## 2022-10-24 NOTE — NURSING
"IV discontinued- cath tip intact.   Report given to BALTAZAR Means at [facility].   All personal items packed, accounted for, and sent with patient.   Patient states that she is "very thankful for the care received".   "

## 2022-10-24 NOTE — DISCHARGE SUMMARY
[facility] Hospital Medicine  Discharge Summary      Patient Name: Laura Jacobs  MRN: [id number]  Patient Class: IP- Inpatient  Admission Date: 10/14/2022  Hospital Length of Stay: 10 days  Discharge Date and Time:  10/24/2022 2:27 PM  Attending Physician: Jalen Che Sr., MD   Discharging Provider: Jalen Che Sr, MD  Primary Care Provider: Jalen Che Sr, MD      HPI:   71 year old Black Female reported to [facility] E.[facility] after her left leg gave out and she fell in the street near her home. She reported to the E.R. with left hip pain.  X-ray of Left Hip showed Left Hip Fracture.  Ortho. was consulted and performed Hip Fracture repair surgery on 10/14/2022.  Patient is without complaints except left hip pain.      Procedure(s) (LRB):  INSERTION, INTRAMEDULLARY СВЕТЛАНА, FEMUR (Left)      Hospital Course:   71 year old Black Female was admitted to the hospital for Left Hip Fracture.  Ortho. was consulted and performed a Left Hip Fracture Repair surgery on 10/14/2022.  Patient is without complaints at the present time, except left hip pain.  Patient is currently receiving a blood transfusion of 2 units of PRBC's for unstable anemia following the surgery.    Patient is being discharged to [facility] today for inpatient rehab.       Goals of Care Treatment Preferences:  Code Status: Full Code      Consults:   Consults (From admission, onward)        Status Ordering Provider     IP consult to case management  Once        Provider:  (Not yet assigned)    Acknowledged JALEN CHE SR     Inpatient consult to Orthopedic Surgery  Once        Provider:  Ankush Alex MD    Completed CRISTADISDEJA III, FOSTER C          No new Assessment & Plan notes have been filed under this hospital service since the last note was generated.  Service: Hospital Medicine    Final Active Diagnoses:    Diagnosis Date Noted POA    PRINCIPAL PROBLEM:  Closed intertrochanteric fracture of hip, left, initial  encounter [S72.142A] 10/15/2022 Yes      Problems Resolved During this Admission:       Discharged Condition: stable    Disposition:     Follow Up:   Follow-up Information     Ruben Brooks Sr, MD Follow up in 1 month(s).    Specialty: Internal Medicine  Contact information:  [address] [facility]  [place] [address]  [phone]                       Patient Instructions:   No discharge procedures on file.    Significant Diagnostic Studies: Labs: All labs within the past 24 hours have been reviewed    Pending Diagnostic Studies:     None         Medications:  Reconciled Home Medications:      Medication List      CONTINUE taking these medications    amiodarone 400 MG tablet  Commonly known as: PACERONE  Take 400 mg by mouth once daily.     anastrozole 1 mg Tab  Commonly known as: ARIMIDEX  Take 1 mg by mouth once daily.     atorvastatin 40 MG tablet  Commonly known as: LIPITOR  Take 40 mg by mouth once daily.     dexlansoprazole 60 mg capsule  Commonly known as: DEXILANT  Take 60 mg by mouth once daily.     ENTRESTO 24-26 mg per tablet  Generic drug: sacubitriL-valsartan  Take 1 tablet by mouth 2 (two) times daily.     furosemide 40 MG tablet  Commonly known as: LASIX  Take 40 mg by mouth once daily.     HYDROcodone-acetaminophen 5-325 mg per tablet  Commonly known as: NORCO  Take 1 tablet by mouth every 4 (four) hours as needed for Pain.     metoprolol succinate 25 MG 24 hr tablet  Commonly known as: TOPROL-XL  Take 25 mg by mouth once daily.     spironolactone 25 MG tablet  Commonly known as: ALDACTONE  Take 25 mg by mouth once daily.        STOP taking these medications    aspirin 81 MG EC tablet  Commonly known as: ECOTRIN     rivaroxaban 20 mg Tab  Commonly known as: XARELTO            Indwelling Lines/Drains at time of discharge:   Lines/Drains/Airways     None                 Time spent on the discharge of patient: 20 minutes         Ruben Brooks Sr, MD  Department of Hospital Medicine  [facility]

## 2022-10-24 NOTE — PROGRESS NOTES
[facility]                       [address] [address]    PATIENT NAME:       JI HILLMAN  YOB: 1950  CSN:                [id number]   MRN:                [id number]  ADMIT DATE:         10/14/2022 11:58:00  PHYSICIAN:          Juan F Mena MD                            PROGRESS NOTE    DATE:  10/23/2022 00:00:00    SUBJECTIVE:  The patient is a 71-year-old  female with history   of left femur fracture.  Had surgery done.  However, post surgery, patient has   been having problems with low hemoglobin and hematocrit.  Transfusion has been   done, and the last blood test today showed that patient has a hemoglobin of   13.4, hematocrit 40.7.  Therefore, patient basically is stable enough and ready   for discharge.    OBJECTIVE:  GENERAL APPEARANCE:  Today, she is fairly alert, oriented.   HEART:  Patient has S1, S2.  No murmur or gallop.    CHEST:  Clear.  No wheezing or rales.    ABDOMEN:  Soft, nontender.  Good bowel sounds.  EXTREMITIES:  Superiorly, good range of motion.  Inferiorly, limited motion of   the left leg secondary to left hip fracture.    IMPRESSION:    1. Resolved anemia.    2. Hypertension.    3. Hyperlipidemia.    4. Osteoarthritis.    5. Left intertrochanteric hip fracture.    6. Breast cancer.    PLAN:  Continue present therapy.  Possibly, patient will be discharged tomorrow.        ______________________________  Juan F Mena MD    CHL/AQS  DD:  10/23/2022  Time:  08:17PM  DT:  10/23/2022  Time:  08:34PM  Job #:  [id number]      PROGRESS NOTE

## 2022-10-24 NOTE — PT/OT/SLP PROGRESS
Attempted to see pt for PT tx. Pt declined tx session and stated she wanted save her energy to do therapy once she got to rehab later today. CM confirmed pt schedule to d/c later today to rehab.

## 2022-10-24 NOTE — PLAN OF CARE
PT HAS BEEN ACCEPTED TO [facility],  OBTAINING DC ORDERS FROM DR. CHE

## 2022-10-25 ENCOUNTER — PATIENT OUTREACH (OUTPATIENT)
Dept: ADMINISTRATIVE | Facility: CLINIC | Age: 72
End: 2022-10-25
Payer: COMMERCIAL

## 2022-11-30 ENCOUNTER — OFFICE VISIT (OUTPATIENT)
Dept: ORTHOPEDICS | Facility: CLINIC | Age: 72
End: 2022-11-30
Payer: COMMERCIAL

## 2022-11-30 ENCOUNTER — HOSPITAL ENCOUNTER (OUTPATIENT)
Dept: RADIOLOGY | Facility: CLINIC | Age: 72
Discharge: HOME | End: 2022-11-30
Attending: STUDENT IN AN ORGANIZED HEALTH CARE EDUCATION/TRAINING PROGRAM
Payer: COMMERCIAL

## 2022-11-30 DIAGNOSIS — M25.552 LEFT HIP PAIN: ICD-10-CM

## 2022-11-30 DIAGNOSIS — M25.552 LEFT HIP PAIN: Primary | ICD-10-CM

## 2022-11-30 DIAGNOSIS — S72.142D DISPLACED INTERTROCHANTERIC FRACTURE OF LEFT FEMUR, SUBSEQUENT ENCOUNTER FOR CLOSED FRACTURE WITH ROUTINE HEALING: ICD-10-CM

## 2022-11-30 PROCEDURE — 4010F ACE/ARB THERAPY RXD/TAKEN: CPT | Mod: CPTII,,, | Performed by: STUDENT IN AN ORGANIZED HEALTH CARE EDUCATION/TRAINING PROGRAM

## 2022-11-30 PROCEDURE — 73502 XR HIP WITH PELVIS WHEN PERFORMED, 2 OR 3 VIEWS LEFT: ICD-10-PCS | Mod: LT,,, | Performed by: STUDENT IN AN ORGANIZED HEALTH CARE EDUCATION/TRAINING PROGRAM

## 2022-11-30 PROCEDURE — 99024 POSTOP FOLLOW-UP VISIT: CPT | Mod: ,,, | Performed by: STUDENT IN AN ORGANIZED HEALTH CARE EDUCATION/TRAINING PROGRAM

## 2022-11-30 PROCEDURE — 3288F FALL RISK ASSESSMENT DOCD: CPT | Mod: CPTII,,, | Performed by: STUDENT IN AN ORGANIZED HEALTH CARE EDUCATION/TRAINING PROGRAM

## 2022-11-30 PROCEDURE — 3288F PR FALLS RISK ASSESSMENT DOCUMENTED: ICD-10-PCS | Mod: CPTII,,, | Performed by: STUDENT IN AN ORGANIZED HEALTH CARE EDUCATION/TRAINING PROGRAM

## 2022-11-30 PROCEDURE — 1101F PT FALLS ASSESS-DOCD LE1/YR: CPT | Mod: CPTII,,, | Performed by: STUDENT IN AN ORGANIZED HEALTH CARE EDUCATION/TRAINING PROGRAM

## 2022-11-30 PROCEDURE — 99024 PR POST-OP FOLLOW-UP VISIT: ICD-10-PCS | Mod: ,,, | Performed by: STUDENT IN AN ORGANIZED HEALTH CARE EDUCATION/TRAINING PROGRAM

## 2022-11-30 PROCEDURE — 4010F PR ACE/ARB THEARPY RXD/TAKEN: ICD-10-PCS | Mod: CPTII,,, | Performed by: STUDENT IN AN ORGANIZED HEALTH CARE EDUCATION/TRAINING PROGRAM

## 2022-11-30 PROCEDURE — 73502 X-RAY EXAM HIP UNI 2-3 VIEWS: CPT | Mod: LT,,, | Performed by: STUDENT IN AN ORGANIZED HEALTH CARE EDUCATION/TRAINING PROGRAM

## 2022-11-30 PROCEDURE — 1101F PR PT FALLS ASSESS DOC 0-1 FALLS W/OUT INJ PAST YR: ICD-10-PCS | Mod: CPTII,,, | Performed by: STUDENT IN AN ORGANIZED HEALTH CARE EDUCATION/TRAINING PROGRAM

## 2022-11-30 NOTE — PROGRESS NOTES
Postop Clinic Note    Surgery:  10/14/2022 left hip intramedullary nail placement for intertrochanteric femur fracture    History of present illness:    Patient is doing very well.  She is walking with a rolling walker.  She has no pain in her hip.  She has no complaints today.      Past Surgical History:   Procedure Laterality Date    HYSTERECTOMY      INSERTION OF PACEMAKER      INTRAMEDULLARY RODDING OF FEMUR Left 10/14/2022    Procedure: INSERTION, INTRAMEDULLARY СВЕТЛАНА, FEMUR;  Surgeon: Ankush Alex MD;  Location: [facility];  Service: Orthopedics;  Laterality: Left;    JOINT REPLACEMENT Bilateral     Knee    MASTECTOMY Left 2019           Examination:    Vital Signs:  There were no vitals filed for this visit.    There is no height or weight on file to calculate BMI.    Constitution:   Well-developed, well nourished patient in no acute distress.  Neurological:   Alert and oriented x 3 and cooperative to examination.     Psychiatric/Behavior: Normal mental status.  Respiratory:   No shortness of breath.  Eyes:    Extraoccular muscles intact  Skin:    No scars, rash or suspicious lesions.    MSK:   Left lower extremity:  Surgical incision over the lateral thighs healed without any signs of infection.  Thigh is soft compressible.  Leg is soft and compressible.  She is able to dorsiflex and plantar flex the foot.  Her foot is warm well perfused    Imaging:   X-ray of the left hip shows intramedullary nail in place with no loosening or failure.  Fracture is healing     Assessment:  Status post above surgery    Plan:  She can continue weight-bearing as tolerated with a walker.  She is no restrictions from my standpoint.  She will come back and see me in 3 months for repeat x-ray of the left hip    Follow Up:  3 months  Xray at next visit:  Left hip

## 2022-12-29 ENCOUNTER — DOCUMENTATION ONLY (OUTPATIENT)
Dept: ADMINISTRATIVE | Facility: HOSPITAL | Age: 72
End: 2022-12-29
Payer: COMMERCIAL

## 2023-03-01 ENCOUNTER — OFFICE VISIT (OUTPATIENT)
Dept: ORTHOPEDICS | Facility: CLINIC | Age: 73
End: 2023-03-01
Payer: COMMERCIAL

## 2023-03-01 ENCOUNTER — HOSPITAL ENCOUNTER (OUTPATIENT)
Dept: RADIOLOGY | Facility: CLINIC | Age: 73
Discharge: HOME | End: 2023-03-01
Attending: STUDENT IN AN ORGANIZED HEALTH CARE EDUCATION/TRAINING PROGRAM
Payer: COMMERCIAL

## 2023-03-01 DIAGNOSIS — M25.552 LEFT HIP PAIN: ICD-10-CM

## 2023-03-01 DIAGNOSIS — S72.142D DISPLACED INTERTROCHANTERIC FRACTURE OF LEFT FEMUR, SUBSEQUENT ENCOUNTER FOR CLOSED FRACTURE WITH ROUTINE HEALING: Primary | ICD-10-CM

## 2023-03-01 PROCEDURE — 4010F PR ACE/ARB THEARPY RXD/TAKEN: ICD-10-PCS | Mod: CPTII,,, | Performed by: STUDENT IN AN ORGANIZED HEALTH CARE EDUCATION/TRAINING PROGRAM

## 2023-03-01 PROCEDURE — 99213 PR OFFICE/OUTPT VISIT, EST, LEVL III, 20-29 MIN: ICD-10-PCS | Mod: ,,, | Performed by: STUDENT IN AN ORGANIZED HEALTH CARE EDUCATION/TRAINING PROGRAM

## 2023-03-01 PROCEDURE — 73502 XR HIP WITH PELVIS WHEN PERFORMED, 2 OR 3 VIEWS LEFT: ICD-10-PCS | Mod: LT,,, | Performed by: STUDENT IN AN ORGANIZED HEALTH CARE EDUCATION/TRAINING PROGRAM

## 2023-03-01 PROCEDURE — 1159F PR MEDICATION LIST DOCUMENTED IN MEDICAL RECORD: ICD-10-PCS | Mod: CPTII,,, | Performed by: STUDENT IN AN ORGANIZED HEALTH CARE EDUCATION/TRAINING PROGRAM

## 2023-03-01 PROCEDURE — 3288F FALL RISK ASSESSMENT DOCD: CPT | Mod: CPTII,,, | Performed by: STUDENT IN AN ORGANIZED HEALTH CARE EDUCATION/TRAINING PROGRAM

## 2023-03-01 PROCEDURE — 1101F PT FALLS ASSESS-DOCD LE1/YR: CPT | Mod: CPTII,,, | Performed by: STUDENT IN AN ORGANIZED HEALTH CARE EDUCATION/TRAINING PROGRAM

## 2023-03-01 PROCEDURE — 1159F MED LIST DOCD IN RCRD: CPT | Mod: CPTII,,, | Performed by: STUDENT IN AN ORGANIZED HEALTH CARE EDUCATION/TRAINING PROGRAM

## 2023-03-01 PROCEDURE — 1125F AMNT PAIN NOTED PAIN PRSNT: CPT | Mod: CPTII,,, | Performed by: STUDENT IN AN ORGANIZED HEALTH CARE EDUCATION/TRAINING PROGRAM

## 2023-03-01 PROCEDURE — 4010F ACE/ARB THERAPY RXD/TAKEN: CPT | Mod: CPTII,,, | Performed by: STUDENT IN AN ORGANIZED HEALTH CARE EDUCATION/TRAINING PROGRAM

## 2023-03-01 PROCEDURE — 99213 OFFICE O/P EST LOW 20 MIN: CPT | Mod: ,,, | Performed by: STUDENT IN AN ORGANIZED HEALTH CARE EDUCATION/TRAINING PROGRAM

## 2023-03-01 PROCEDURE — 3288F PR FALLS RISK ASSESSMENT DOCUMENTED: ICD-10-PCS | Mod: CPTII,,, | Performed by: STUDENT IN AN ORGANIZED HEALTH CARE EDUCATION/TRAINING PROGRAM

## 2023-03-01 PROCEDURE — 1101F PR PT FALLS ASSESS DOC 0-1 FALLS W/OUT INJ PAST YR: ICD-10-PCS | Mod: CPTII,,, | Performed by: STUDENT IN AN ORGANIZED HEALTH CARE EDUCATION/TRAINING PROGRAM

## 2023-03-01 PROCEDURE — 73502 X-RAY EXAM HIP UNI 2-3 VIEWS: CPT | Mod: LT,,, | Performed by: STUDENT IN AN ORGANIZED HEALTH CARE EDUCATION/TRAINING PROGRAM

## 2023-03-01 PROCEDURE — 1125F PR PAIN SEVERITY QUANTIFIED, PAIN PRESENT: ICD-10-PCS | Mod: CPTII,,, | Performed by: STUDENT IN AN ORGANIZED HEALTH CARE EDUCATION/TRAINING PROGRAM

## 2023-03-03 NOTE — PROGRESS NOTES
Postop Clinic Note    Surgery:  10/14/2022 left hip intramedullary nail placement for intertrochanteric femur fracture    History of present illness:    Patient is doing very well.  She is walking with a rolling walker.  She has no pain in her hip.  She has no complaints today. She would like to start walking without a walker.       Past Surgical History:   Procedure Laterality Date    HYSTERECTOMY      INSERTION OF PACEMAKER      INTRAMEDULLARY RODDING OF FEMUR Left 10/14/2022    Procedure: INSERTION, INTRAMEDULLARY СВЕТЛАНА, FEMUR;  Surgeon: Ankush Alex MD;  Location: [facility];  Service: Orthopedics;  Laterality: Left;    JOINT REPLACEMENT Bilateral     Knee    MASTECTOMY Left 2019           Examination:    Vital Signs:    Vitals:    03/01/23 1037   PainSc:   8       There is no height or weight on file to calculate BMI.    Constitution:   Well-developed, well nourished patient in no acute distress.  Neurological:   Alert and oriented x 3 and cooperative to examination.     Psychiatric/Behavior: Normal mental status.  Respiratory:   No shortness of breath.  Eyes:    Extraoccular muscles intact  Skin:    No scars, rash or suspicious lesions.    MSK:   Left lower extremity:  Surgical incision over the lateral thighs healed without any signs of infection.  Thigh is soft compressible.  Leg is soft and compressible.  She is able to dorsiflex and plantar flex the foot.  Her foot is warm well perfused. She has returned to her baseline shuffling gait without a walker    Imaging:   X-ray of the left hip shows intramedullary nail in place with no loosening or failure.  Fracture is healing     Assessment:  Status post above surgery    Plan:  She can continue weight-bearing as tolerated with a walker.  She is no restrictions from my standpoint.  She can follow up as needed     Follow Up:  As needed   Xray at next visit:  Left hip

## 2023-03-10 ENCOUNTER — LAB VISIT (OUTPATIENT)
Dept: LAB | Facility: HOSPITAL | Age: 73
End: 2023-03-10
Payer: COMMERCIAL

## 2023-03-10 DIAGNOSIS — C50.911 MALIGNANT NEOPLASM OF RIGHT FEMALE BREAST, UNSPECIFIED ESTROGEN RECEPTOR STATUS, UNSPECIFIED SITE OF BREAST: ICD-10-CM

## 2023-03-10 DIAGNOSIS — C50.911 MALIGNANT NEOPLASM OF RIGHT FEMALE BREAST, UNSPECIFIED ESTROGEN RECEPTOR STATUS, UNSPECIFIED SITE OF BREAST: Primary | ICD-10-CM

## 2023-03-10 DIAGNOSIS — E11.9 DIABETES MELLITUS WITHOUT COMPLICATION: ICD-10-CM

## 2023-03-10 DIAGNOSIS — E78.00 HIGH CHOLESTEROL: Primary | ICD-10-CM

## 2023-03-10 DIAGNOSIS — E55.9 VITAMIN D DEFICIENCY: ICD-10-CM

## 2023-03-10 LAB
ALBUMIN SERPL-MCNC: 3.8 G/DL (ref 3.4–4.8)
ALBUMIN/GLOB SERPL: 1 RATIO (ref 1.1–2)
ALP SERPL-CCNC: 104 UNIT/L (ref 40–150)
ALT SERPL-CCNC: 14 UNIT/L (ref 0–55)
APPEARANCE UR: CLEAR
AST SERPL-CCNC: 16 UNIT/L (ref 5–34)
BASOPHILS # BLD AUTO: 0.03 X10(3)/MCL (ref 0–0.2)
BASOPHILS NFR BLD AUTO: 0.4 %
BILIRUB UR QL STRIP.AUTO: NEGATIVE MG/DL
BILIRUBIN DIRECT+TOT PNL SERPL-MCNC: 0.3 MG/DL
BUN SERPL-MCNC: 16 MG/DL (ref 9.8–20.1)
CALCIUM SERPL-MCNC: 9.8 MG/DL (ref 8.4–10.2)
CHLORIDE SERPL-SCNC: 106 MMOL/L (ref 98–107)
CHOLEST SERPL-MCNC: 131 MG/DL
CHOLEST/HDLC SERPL: 3 {RATIO} (ref 0–5)
CO2 SERPL-SCNC: 27 MMOL/L (ref 23–31)
COLOR UR AUTO: YELLOW
CREAT SERPL-MCNC: 0.85 MG/DL (ref 0.55–1.02)
DEPRECATED CALCIDIOL+CALCIFEROL SERPL-MC: 75.5 NG/ML (ref 30–80)
EOSINOPHIL # BLD AUTO: 0.15 X10(3)/MCL (ref 0–0.9)
EOSINOPHIL NFR BLD AUTO: 2.1 %
ERYTHROCYTE [DISTWIDTH] IN BLOOD BY AUTOMATED COUNT: 14 % (ref 11.5–17)
EST. AVERAGE GLUCOSE BLD GHB EST-MCNC: 116.9 MG/DL
GFR SERPLBLD CREATININE-BSD FMLA CKD-EPI: >60 MLS/MIN/1.73/M2
GLOBULIN SER-MCNC: 3.9 GM/DL (ref 2.4–3.5)
GLUCOSE SERPL-MCNC: 84 MG/DL (ref 82–115)
GLUCOSE UR QL STRIP.AUTO: NEGATIVE MG/DL
HBA1C MFR BLD: 5.7 %
HCT VFR BLD AUTO: 41.7 % (ref 37–47)
HDLC SERPL-MCNC: 48 MG/DL (ref 35–60)
HGB BLD-MCNC: 13 G/DL (ref 12–16)
IMM GRANULOCYTES # BLD AUTO: 0.02 X10(3)/MCL (ref 0–0.04)
IMM GRANULOCYTES NFR BLD AUTO: 0.3 %
KETONES UR QL STRIP.AUTO: NEGATIVE MG/DL
LDLC SERPL CALC-MCNC: 73 MG/DL (ref 50–140)
LEUKOCYTE ESTERASE UR QL STRIP.AUTO: NEGATIVE UNIT/L
LYMPHOCYTES # BLD AUTO: 1.14 X10(3)/MCL (ref 0.6–4.6)
LYMPHOCYTES NFR BLD AUTO: 15.6 %
MCH RBC QN AUTO: 28.1 PG
MCHC RBC AUTO-ENTMCNC: 31.2 G/DL (ref 33–36)
MCV RBC AUTO: 90.3 FL (ref 80–94)
MONOCYTES # BLD AUTO: 0.42 X10(3)/MCL (ref 0.1–1.3)
MONOCYTES NFR BLD AUTO: 5.7 %
NEUTROPHILS # BLD AUTO: 5.55 X10(3)/MCL (ref 2.1–9.2)
NEUTROPHILS NFR BLD AUTO: 75.9 %
NITRITE UR QL STRIP.AUTO: NEGATIVE
PH UR STRIP.AUTO: 6.5 [PH]
PLATELET # BLD AUTO: 219 X10(3)/MCL (ref 130–400)
PMV BLD AUTO: 10.5 FL (ref 7.4–10.4)
POTASSIUM SERPL-SCNC: 4.1 MMOL/L (ref 3.5–5.1)
PROT SERPL-MCNC: 7.7 GM/DL (ref 5.8–7.6)
PROT UR QL STRIP.AUTO: NEGATIVE MG/DL
RBC # BLD AUTO: 4.62 X10(6)/MCL (ref 4.2–5.4)
RBC UR QL AUTO: NEGATIVE UNIT/L
SODIUM SERPL-SCNC: 141 MMOL/L (ref 136–145)
SP GR UR STRIP.AUTO: 1.02
TRIGL SERPL-MCNC: 48 MG/DL (ref 37–140)
TSH SERPL-ACNC: 1.88 UIU/ML (ref 0.35–4.94)
UROBILINOGEN UR STRIP-ACNC: 0.2 MG/DL
VLDLC SERPL CALC-MCNC: 10 MG/DL
WBC # SPEC AUTO: 7.3 X10(3)/MCL (ref 4.5–11.5)

## 2023-03-10 PROCEDURE — 82306 VITAMIN D 25 HYDROXY: CPT

## 2023-03-10 PROCEDURE — 36415 COLL VENOUS BLD VENIPUNCTURE: CPT

## 2023-03-10 PROCEDURE — 84443 ASSAY THYROID STIM HORMONE: CPT

## 2023-03-10 PROCEDURE — 85025 COMPLETE CBC W/AUTO DIFF WBC: CPT

## 2023-03-10 PROCEDURE — 81003 URINALYSIS AUTO W/O SCOPE: CPT

## 2023-03-10 PROCEDURE — 80053 COMPREHEN METABOLIC PANEL: CPT

## 2023-03-10 PROCEDURE — 83036 HEMOGLOBIN GLYCOSYLATED A1C: CPT

## 2023-03-10 PROCEDURE — 80061 LIPID PANEL: CPT

## 2023-03-13 ENCOUNTER — CLINICAL SUPPORT (OUTPATIENT)
Dept: HEMATOLOGY/ONCOLOGY | Facility: CLINIC | Age: 73
End: 2023-03-13
Payer: COMMERCIAL

## 2023-03-13 VITALS
HEIGHT: 65 IN | RESPIRATION RATE: 18 BRPM | SYSTOLIC BLOOD PRESSURE: 147 MMHG | HEART RATE: 57 BPM | WEIGHT: 242.31 LBS | DIASTOLIC BLOOD PRESSURE: 82 MMHG | BODY MASS INDEX: 40.37 KG/M2 | OXYGEN SATURATION: 96 % | TEMPERATURE: 98 F

## 2023-03-13 DIAGNOSIS — C50.911 MALIGNANT NEOPLASM OF RIGHT FEMALE BREAST, UNSPECIFIED ESTROGEN RECEPTOR STATUS, UNSPECIFIED SITE OF BREAST: Primary | ICD-10-CM

## 2023-03-13 DIAGNOSIS — Z79.811 AROMATASE INHIBITOR USE: ICD-10-CM

## 2023-03-13 DIAGNOSIS — Z79.811 LONG TERM CURRENT USE OF AROMATASE INHIBITOR: ICD-10-CM

## 2023-03-13 PROCEDURE — 99999 PR PBB SHADOW E&M-EST. PATIENT-LVL IV: ICD-10-PCS | Mod: PBBFAC,,,

## 2023-03-13 PROCEDURE — 99999 PR PBB SHADOW E&M-EST. PATIENT-LVL IV: CPT | Mod: PBBFAC,,,

## 2023-03-13 PROCEDURE — 99214 OFFICE O/P EST MOD 30 MIN: CPT | Mod: S$GLB,,,

## 2023-03-13 PROCEDURE — 99214 PR OFFICE/OUTPT VISIT, EST, LEVL IV, 30-39 MIN: ICD-10-PCS | Mod: S$GLB,,,

## 2023-03-13 NOTE — PROGRESS NOTES
[facility] Hematology/Oncology  PROGRESS NOTE      Subjective:         NAME: Klarissa Salazar : 1950     72 y.o. female   MRN: [id number]      Chief Complaint:    Chief Complaint   Patient presents with    Breast Cancer     Pt reports no issues/concerns         Current Treatment : Arimidex 2017     History of Present Illness:   Ms. Salazar is a 68 yo BF with PMH of HTN and hyperlipidemia who had an abnormal mammogram in 2017. It was recommended that she undergo additional imaging and US of R breast done on 3/3/17 demonstrated a 1.1 x 1.0 cm suspicious, solid, irregular mass at the 930 position. She was referred to Dr. Farmer who performed a needle localization and excision of R breast mass on 3/15/17. Pathology revealed invasive ductal carcinoma, grade 1. ER/KY both positive, Her 2 negative. After much discussion, decision was made to proceed with complete mastectomy and sentinel lymph node biopsy, which she had done on 3/30/17. Path from this procedure was negative for residual malignancy in the breast and sentinel lymph node was negative, but 1 of 16 additional lymph nodes removed had metastatic carcinoma present. She has been referred to oncology to discuss additional treatment options.     Treatment completed:   R mastectomy and SLNB 3/30/17   Path: No residual carcinoma identified. Ashley lymph node negative. 1 of 16 other lymph nodes with metastatic carcinoma.      Interval History:  Pt is here today for follow up of ER + breast ca dx 3/2017 s/p right mastectomy currently on arimidex, Calcium and Vit D   She has no new concerns. She is tolerating Arimidex.  Does report mild hot flashes that are stable and unchanged.  Denies any new pain, unintentional weight loss. She denies shortness of breath, chest pain, dizziness of headaches.       ROS:   Review of Systems   Constitutional:  Negative for appetite change, chills, fatigue, fever and unexpected weight change.   HENT:  Negative for facial  swelling, mouth sores, nosebleeds, sinus pressure/congestion and sore throat.    Eyes:  Negative for photophobia and pain.   Respiratory:  Negative for cough, chest tightness, shortness of breath and wheezing.    Cardiovascular:  Negative for chest pain, palpitations and leg swelling.   Gastrointestinal:  Negative for abdominal pain, blood in stool, change in bowel habit, constipation, diarrhea, nausea, vomiting and change in bowel habit.   Endocrine: Negative.    Genitourinary:  Negative for dysuria, frequency, hematuria, hot flashes, pelvic pain, urgency and vaginal pain.   Musculoskeletal:  Negative for arthralgias, gait problem, joint swelling and myalgias.   Integumentary:  Negative for pallor, rash, wound, breast mass, breast discharge and breast tenderness.   Allergic/Immunologic: Negative.  Negative for immunocompromised state.   Neurological:  Negative for dizziness, vertigo, syncope, weakness and numbness.   Hematological:  Negative for adenopathy. Does not bruise/bleed easily.   Psychiatric/Behavioral:  Negative for behavioral problems and dysphoric mood. The patient is not nervous/anxious.    All other systems reviewed and are negative.  Breast: Negative for mass and tenderness       Allergies:  Review of patient's allergies indicates:   Allergen Reactions    Clindamycin      Other reaction(s): Angioedema    Lisinopril      Other reaction(s): Angioedema    Sulfa (sulfonamide antibiotics)     Sulfamethoxazole-trimethoprim      Other reaction(s): Angioedema       Medications:    Current Outpatient Medications:     amiodarone (PACERONE) 200 MG Tab, Take 400 mg by mouth once daily., Disp: , Rfl:     amLODIPine (NORVASC) 10 MG tablet, Take 5 mg by mouth once daily., Disp: , Rfl:     anastrozole (ARIMIDEX) 1 mg Tab, TAKE 1 TABLET BY MOUTH DAILY, Disp: 30 tablet, Rfl: 6    aspirin (ECOTRIN) 81 MG EC tablet, Take 81 mg by mouth once daily., Disp: , Rfl:     atorvastatin (LIPITOR) 40 MG tablet, Take 40 mg by  "mouth once daily., Disp: , Rfl:     cetirizine (ZYRTEC) 10 MG tablet, Take 10 mg by mouth nightly., Disp: , Rfl:     cloNIDine (CATAPRES) 0.1 MG tablet, Take 0.1 mg by mouth 3 (three) times daily., Disp: , Rfl:     dexlansoprazole (DEXILANT) 60 mg capsule, Take 1 capsule by mouth once daily at 6am., Disp: , Rfl:     ENTRESTO 24-26 mg per tablet, Take 1 tablet by mouth 2 (two) times daily., Disp: , Rfl:     folic acid (FOLVITE) 1 MG tablet, Take 1,000 mcg by mouth once daily., Disp: , Rfl:     furosemide (LASIX) 20 MG tablet, Take 20 mg by mouth once daily., Disp: , Rfl:     metoprolol succinate (TOPROL-XL) 25 MG 24 hr tablet, Take 25 mg by mouth once daily., Disp: , Rfl:     pravastatin (PRAVACHOL) 20 MG tablet, Take 20 mg by mouth once daily., Disp: , Rfl:     spironolactone (ALDACTONE) 25 MG tablet, Take 25 mg by mouth once daily., Disp: , Rfl:     verapamiL (CALAN-SR) 240 MG CR tablet, Take 240 mg by mouth once daily., Disp: , Rfl:     VITAMIN D2 1,250 mcg (50,000 unit) capsule, Take 50,000 Units by mouth every 7 days., Disp: , Rfl:     XARELTO 20 mg Tab, Take 20 mg by mouth once daily., Disp: , Rfl:     PMHx/PSHx Updates:  Past Medical History:   Diagnosis Date    High cholesterol     HTN (hypertension)     Malignant neoplasm of right female breast      Past Surgical History:   Procedure Laterality Date    BREAST LUMPECTOMY W/ NEEDLE LOCALIZATION Right     HYSTERECTOMY      MASTECTOMY W/ SENTINEL NODE BIOPSY Right        Family History:   Family History   Problem Relation Age of Onset    Diabetes Mother     Hypertension Mother     Lung cancer Mother        Social History:   Social History     Socioeconomic History    Marital status:    Tobacco Use    Smoking status: Never    Smokeless tobacco: Never   Substance and Sexual Activity    Alcohol use: Yes    Drug use: Never             Objective:     Vitals:  Blood pressure (!) 147/82, pulse (!) 57, temperature 98.1 °F (36.7 °C), resp. rate 18, height 5' 5" " (1.651 m), weight 109.9 kg (242 lb 4.6 oz), SpO2 96 %.    Physical Examination:   Physical Exam  Vitals reviewed.   Constitutional:       Appearance: Normal appearance.   HENT:      Head: Normocephalic and atraumatic.      Right Ear: External ear normal.      Left Ear: External ear normal.      Nose: Nose normal.      Mouth/Throat:      Mouth: Mucous membranes are moist.      Pharynx: Oropharynx is clear.   Eyes:      Extraocular Movements: Extraocular movements intact.      Conjunctiva/sclera: Conjunctivae normal.      Pupils: Pupils are equal, round, and reactive to light.   Cardiovascular:      Rate and Rhythm: Normal rate and regular rhythm.      Pulses: Normal pulses.      Heart sounds: Normal heart sounds.   Pulmonary:      Effort: Pulmonary effort is normal.      Breath sounds: Normal breath sounds.   Chest:      Comments: Keloid formations noted along right breast mastectomy incision line, well-healed.  Keloids also noted to outer quadrant of left breast.  No adenopathy, masses, rashes, discharge, or nipple inversion noted.  Abdominal:      General: Abdomen is flat. Bowel sounds are normal.      Palpations: Abdomen is soft.   Musculoskeletal:         General: Normal range of motion.      Cervical back: Normal range of motion and neck supple.   Lymphadenopathy:      Comments: No adenopathy noted bilaterally   Skin:     General: Skin is warm and dry.   Neurological:      General: No focal deficit present.      Mental Status: She is alert and oriented to person, place, and time.   Psychiatric:         Mood and Affect: Mood normal.         Behavior: Behavior normal.         Thought Content: Thought content normal.         Judgment: Judgment normal.         Labs:   Lab Visit on 03/10/2023   Component Date Value Ref Range Status    Sodium Level 03/10/2023 141  136 - 145 mmol/L Final    Potassium Level 03/10/2023 4.1  3.5 - 5.1 mmol/L Final    Chloride 03/10/2023 106  98 - 107 mmol/L Final    Carbon Dioxide  03/10/2023 27  23 - 31 mmol/L Final    Glucose Level 03/10/2023 84  82 - 115 mg/dL Final    Blood Urea Nitrogen 03/10/2023 16.0  9.8 - 20.1 mg/dL Final    Creatinine 03/10/2023 0.85  0.55 - 1.02 mg/dL Final    Calcium Level Total 03/10/2023 9.8  8.4 - 10.2 mg/dL Final    Protein Total 03/10/2023 7.7 (H)  5.8 - 7.6 gm/dL Final    Albumin Level 03/10/2023 3.8  3.4 - 4.8 g/dL Final    Globulin 03/10/2023 3.9 (H)  2.4 - 3.5 gm/dL Final    Albumin/Globulin Ratio 03/10/2023 1.0 (L)  1.1 - 2.0 ratio Final    Bilirubin Total 03/10/2023 0.3  <=1.5 mg/dL Final    Alkaline Phosphatase 03/10/2023 104  40 - 150 unit/L Final    Alanine Aminotransferase 03/10/2023 14  0 - 55 unit/L Final    Aspartate Aminotransferase 03/10/2023 16  5 - 34 unit/L Final    eGFR 03/10/2023 >60  mls/min/1.73/m2 Final    WBC 03/10/2023 7.3  4.5 - 11.5 x10(3)/mcL Final    RBC 03/10/2023 4.62  4.20 - 5.40 x10(6)/mcL Final    Hgb 03/10/2023 13.0  12.0 - 16.0 g/dL Final    Hct 03/10/2023 41.7  37.0 - 47.0 % Final    MCV 03/10/2023 90.3  80.0 - 94.0 fL Final    MCH 03/10/2023 28.1  pg Final    MCHC 03/10/2023 31.2 (L)  33.0 - 36.0 g/dL Final    RDW 03/10/2023 14.0  11.5 - 17.0 % Final    Platelet 03/10/2023 219  130 - 400 x10(3)/mcL Final    MPV 03/10/2023 10.5 (H)  7.4 - 10.4 fL Final    Neut % 03/10/2023 75.9  % Final    Lymph % 03/10/2023 15.6  % Final    Mono % 03/10/2023 5.7  % Final    Eos % 03/10/2023 2.1  % Final    Basophil % 03/10/2023 0.4  % Final    Lymph # 03/10/2023 1.14  0.6 - 4.6 x10(3)/mcL Final    Neut # 03/10/2023 5.55  2.1 - 9.2 x10(3)/mcL Final    Mono # 03/10/2023 0.42  0.1 - 1.3 x10(3)/mcL Final    Eos # 03/10/2023 0.15  0 - 0.9 x10(3)/mcL Final    Baso # 03/10/2023 0.03  0 - 0.2 x10(3)/mcL Final    IG# 03/10/2023 0.02  0 - 0.04 x10(3)/mcL Final    IG% 03/10/2023 0.3  % Final    Color, UA 03/10/2023 Yellow  Yellow, Light-Yellow, Dark Yellow, Deisy, Straw Final    Appearance, UA 03/10/2023 Clear  Clear Final    Specific [place], UA  03/10/2023 1.020   Final    pH, UA 03/10/2023 6.5  5.0 - 8.5 Final    Protein, UA 03/10/2023 Negative  Negative mg/dL Final    Glucose, UA 03/10/2023 Negative  Negative, Normal mg/dL Final    Ketones, UA 03/10/2023 Negative  Negative mg/dL Final    Blood, UA 03/10/2023 Negative  Negative unit/L Final    Bilirubin, UA 03/10/2023 Negative  Negative mg/dL Final    Urobilinogen, UA 03/10/2023 0.2  0.2, 1.0, Normal mg/dL Final    Nitrites, UA 03/10/2023 Negative  Negative Final    Leukocyte Esterase, UA 03/10/2023 Negative  Negative unit/L Final    Cholesterol Total 03/10/2023 131  <=200 mg/dL Final    HDL Cholesterol 03/10/2023 48  35 - 60 mg/dL Final    Triglyceride 03/10/2023 48  37 - 140 mg/dL Final    Cholesterol/HDL Ratio 03/10/2023 3  0 - 5 Final    Very Low Density Lipoprotein 03/10/2023 10   Final    LDL Cholesterol 03/10/2023 73.00  50.00 - 140.00 mg/dL Final    Thyroid Stimulating Hormone 03/10/2023 1.876  0.350 - 4.940 uIU/mL Final    Vit D 25 OH 03/10/2023 75.5  30.0 - 80.0 ng/mL Final    Hemoglobin A1c 03/10/2023 5.7  <=7.0 % Final    Estimated Average Glucose 03/10/2023 116.9  mg/dL Final         Assessment/Plan:   Right breast cancer, T1N1M0, ER/MA positive and Her2 negative.   S/p mastectomy and lymph node biopsy on 3/30/17. Because Oncotype recurrence score was 8, endocrine therapy alone was recommended.   She started adjuvant AI at the end of May 2017 and continues to tolerate it well. The hot flashes she reports are infrequent and mild.  Today pt is clinically stable, has YESENIA on exam and her labs are unremarkable. Recommend she continue current treatment with Arimidex.  Bone health.   DEXA 7/2021 reveals boarderline osteopenia - slightly worse than previous. Rec she continue OTC Petar/Vit D supplementation.   Discussed Prolia advised patient she will need to get dental clearance, pt states that she will wait for now  Follow-up DEXA currently scheduled for 07/2023    Mammograms  [facility] 3/2022- Recommend  annual supplemental breast screening with dynamic contrast enhanced breast MRI in this patient with a personal history of breast cancer and heterogeneously dense breast tissue. Ideally, mammography and breast MRI are alternated every 6 months.  However, patient is unable to tolerate due to severe claustrophobia. She declines breast MRI at this time.   Annual screening mammogram due 03/2023, but patient did move date to coincide with bone density exam scheduled for 07/2023      PLAN:  Continue Arimidex for 7 - 10 years given patient initially had 1+ LN, currently tolerating with no issues.  DEXA in mammogram scheduled for 07/2023.  RTC 6 months with NP for FU/lab      Discussion:     I have explained all of the above in detail and the patient understands all of the current recommendation(s). I have answered all of their questions to the best of my ability and to their complete satisfaction.   The patient is to continue with the current management plan.      Electronically signed by STELLA Kuo

## 2023-03-29 ENCOUNTER — HOSPITAL ENCOUNTER (INPATIENT)
Facility: HOSPITAL | Age: 73
LOS: 2 days | Discharge: HOME HEALTH | DRG: 640 | End: 2023-04-01
Attending: EMERGENCY MEDICINE | Admitting: STUDENT IN AN ORGANIZED HEALTH CARE EDUCATION/TRAINING PROGRAM
Payer: COMMERCIAL

## 2023-03-29 DIAGNOSIS — R00.1 BRADYCARDIA: ICD-10-CM

## 2023-03-29 DIAGNOSIS — R29.6 FREQUENT FALLS: ICD-10-CM

## 2023-03-29 DIAGNOSIS — R53.1 WEAKNESS: ICD-10-CM

## 2023-03-29 DIAGNOSIS — R27.0 ATAXIA: ICD-10-CM

## 2023-03-29 DIAGNOSIS — R07.9 CHEST PAIN: ICD-10-CM

## 2023-03-29 DIAGNOSIS — R53.1 GENERALIZED WEAKNESS: ICD-10-CM

## 2023-03-29 DIAGNOSIS — E86.0 DEHYDRATION: Primary | ICD-10-CM

## 2023-03-29 DIAGNOSIS — R42 DIZZINESS: ICD-10-CM
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
## 2023-03-29 DIAGNOSIS — R79.89 TROPONIN I ABOVE REFERENCE RANGE: ICD-10-CM

## 2023-03-29 LAB
ALBUMIN SERPL-MCNC: 3.4 G/DL (ref 3.4–4.8)
ALBUMIN/GLOB SERPL: 0.9 RATIO (ref 1.1–2)
ALP SERPL-CCNC: 193 UNIT/L (ref 40–150)
ALT SERPL-CCNC: 70 UNIT/L (ref 0–55)
AST SERPL-CCNC: 33 UNIT/L (ref 5–34)
BASOPHILS # BLD AUTO: 0.03 X10(3)/MCL (ref 0–0.2)
BASOPHILS NFR BLD AUTO: 0.6 %
BILIRUBIN DIRECT+TOT PNL SERPL-MCNC: 0.5 MG/DL
BUN SERPL-MCNC: 30.8 MG/DL (ref 9.8–20.1)
CALCIUM SERPL-MCNC: 8.7 MG/DL (ref 8.4–10.2)
CHLORIDE SERPL-SCNC: 109 MMOL/L (ref 98–107)
CO2 SERPL-SCNC: 20 MMOL/L (ref 23–31)
CREAT SERPL-MCNC: 1.58 MG/DL (ref 0.55–1.02)
EOSINOPHIL # BLD AUTO: 0.45 X10(3)/MCL (ref 0–0.9)
EOSINOPHIL NFR BLD AUTO: 9.7 %
ERYTHROCYTE [DISTWIDTH] IN BLOOD BY AUTOMATED COUNT: 14.2 % (ref 11.5–17)
GFR SERPLBLD CREATININE-BSD FMLA CKD-EPI: 35 MLS/MIN/1.73/M2
GLOBULIN SER-MCNC: 3.8 GM/DL (ref 2.4–3.5)
GLUCOSE SERPL-MCNC: 83 MG/DL (ref 82–115)
HCT VFR BLD AUTO: 39.9 % (ref 37–47)
HGB BLD-MCNC: 13.3 G/DL (ref 12–16)
IMM GRANULOCYTES # BLD AUTO: 0.01 X10(3)/MCL (ref 0–0.04)
IMM GRANULOCYTES NFR BLD AUTO: 0.2 %
LYMPHOCYTES # BLD AUTO: 1.33 X10(3)/MCL (ref 0.6–4.6)
LYMPHOCYTES NFR BLD AUTO: 28.5 %
MCH RBC QN AUTO: 33.1 PG (ref 27–31)
MCHC RBC AUTO-ENTMCNC: 33.3 G/DL (ref 33–36)
MCV RBC AUTO: 99.3 FL (ref 80–94)
MONOCYTES # BLD AUTO: 0.47 X10(3)/MCL (ref 0.1–1.3)
MONOCYTES NFR BLD AUTO: 10.1 %
NEUTROPHILS # BLD AUTO: 2.37 X10(3)/MCL (ref 2.1–9.2)
NEUTROPHILS NFR BLD AUTO: 50.9 %
NRBC BLD AUTO-RTO: 0 %
PLATELET # BLD AUTO: 225 X10(3)/MCL (ref 130–400)
PMV BLD AUTO: 9.4 FL (ref 7.4–10.4)
POTASSIUM SERPL-SCNC: 4 MMOL/L (ref 3.5–5.1)
PROT SERPL-MCNC: 7.2 GM/DL (ref 5.8–7.6)
RBC # BLD AUTO: 4.02 X10(6)/MCL (ref 4.2–5.4)
SODIUM SERPL-SCNC: 138 MMOL/L (ref 136–145)
WBC # SPEC AUTO: 4.7 X10(3)/MCL (ref 4.5–11.5)

## 2023-03-29 PROCEDURE — 80053 COMPREHEN METABOLIC PANEL: CPT | Performed by: STUDENT IN AN ORGANIZED HEALTH CARE EDUCATION/TRAINING PROGRAM

## 2023-03-29 PROCEDURE — 93010 EKG 12-LEAD: ICD-10-PCS | Mod: ,,, | Performed by: STUDENT IN AN ORGANIZED HEALTH CARE EDUCATION/TRAINING PROGRAM

## 2023-03-29 PROCEDURE — 93005 ELECTROCARDIOGRAM TRACING: CPT

## 2023-03-29 PROCEDURE — 93010 ELECTROCARDIOGRAM REPORT: CPT | Mod: ,,, | Performed by: STUDENT IN AN ORGANIZED HEALTH CARE EDUCATION/TRAINING PROGRAM

## 2023-03-29 PROCEDURE — 99285 EMERGENCY DEPT VISIT HI MDM: CPT | Mod: 25

## 2023-03-29 PROCEDURE — 85025 COMPLETE CBC W/AUTO DIFF WBC: CPT | Performed by: STUDENT IN AN ORGANIZED HEALTH CARE EDUCATION/TRAINING PROGRAM

## 2023-03-29 NOTE — Clinical Note
Diagnosis: Weakness [[id number]]   Future Attending Provider: IRISH VILLALOBOS [[id number]]   Admitting Provider:: IRISH VILLALOBOS [[id number]]

## 2023-03-30 LAB
ALBUMIN SERPL-MCNC: 2.8 G/DL (ref 3.4–4.8)
ALBUMIN/GLOB SERPL: 0.9 RATIO (ref 1.1–2)
ALP SERPL-CCNC: 141 UNIT/L (ref 40–150)
ALT SERPL-CCNC: 53 UNIT/L (ref 0–55)
APPEARANCE UR: CLEAR
AST SERPL-CCNC: 26 UNIT/L (ref 5–34)
AV INDEX (PROSTH): 0.56
AV MEAN GRADIENT: 6 MMHG
AV PEAK GRADIENT: 12 MMHG
AV VELOCITY RATIO: 0.56
BACTERIA #/AREA URNS AUTO: ABNORMAL /HPF
BASOPHILS # BLD AUTO: 0.04 X10(3)/MCL (ref 0–0.2)
BASOPHILS NFR BLD AUTO: 0.9 %
BILIRUB UR QL STRIP.AUTO: NEGATIVE MG/DL
BILIRUBIN DIRECT+TOT PNL SERPL-MCNC: 0.4 MG/DL
BUN SERPL-MCNC: 28.8 MG/DL (ref 9.8–20.1)
CALCIUM SERPL-MCNC: 8.2 MG/DL (ref 8.4–10.2)
CHLORIDE SERPL-SCNC: 113 MMOL/L (ref 98–107)
CO2 SERPL-SCNC: 20 MMOL/L (ref 23–31)
COLOR UR AUTO: ABNORMAL
CREAT SERPL-MCNC: 1.24 MG/DL (ref 0.55–1.02)
CV ECHO LV RWT: 0.38 CM
DOP CALC AO PEAK VEL: 1.76 M/S
DOP CALC AO VTI: 39.2 CM
DOP CALC LVOT PEAK VEL: 0.98 M/S
DOP CALCLVOT PEAK VEL VTI: 22 CM
E WAVE DECELERATION TIME: 246 MSEC
E/A RATIO: 0.98
E/E' RATIO: 10.93 M/S
ECHO LV POSTERIOR WALL: 0.92 CM (ref 0.6–1.1)
EJECTION FRACTION: 40 %
EOSINOPHIL # BLD AUTO: 0.46 X10(3)/MCL (ref 0–0.9)
EOSINOPHIL NFR BLD AUTO: 10.6 %
ERYTHROCYTE [DISTWIDTH] IN BLOOD BY AUTOMATED COUNT: 14.4 % (ref 11.5–17)
FRACTIONAL SHORTENING: 19 % (ref 28–44)
GFR SERPLBLD CREATININE-BSD FMLA CKD-EPI: 47 MLS/MIN/1.73/M2
GLOBULIN SER-MCNC: 3 GM/DL (ref 2.4–3.5)
GLUCOSE SERPL-MCNC: 79 MG/DL (ref 82–115)
GLUCOSE UR QL STRIP.AUTO: NEGATIVE MG/DL
HCT VFR BLD AUTO: 37.2 % (ref 37–47)
HGB BLD-MCNC: 12.4 G/DL (ref 12–16)
IMM GRANULOCYTES # BLD AUTO: 0.01 X10(3)/MCL (ref 0–0.04)
IMM GRANULOCYTES NFR BLD AUTO: 0.2 %
INTERVENTRICULAR SEPTUM: 0.74 CM (ref 0.6–1.1)
KETONES UR QL STRIP.AUTO: NEGATIVE MG/DL
LEFT ATRIUM SIZE: 3.3 CM
LEFT ATRIUM VOLUME MOD: 76.6 CM3
LEFT INTERNAL DIMENSION IN SYSTOLE: 3.98 CM (ref 2.1–4)
LEFT VENTRICLE DIASTOLIC VOLUME: 113 ML
LEFT VENTRICLE SYSTOLIC VOLUME: 69.2 ML
LEFT VENTRICULAR INTERNAL DIMENSION IN DIASTOLE: 4.9 CM (ref 3.5–6)
LEFT VENTRICULAR MASS: 137.59 G
LEUKOCYTE ESTERASE UR QL STRIP.AUTO: ABNORMAL UNIT/L
LV LATERAL E/E' RATIO: 9.11 M/S
LV SEPTAL E/E' RATIO: 13.67 M/S
LVOT MG: 2 MMHG
LVOT MV: 0.6 CM/S
LYMPHOCYTES # BLD AUTO: 1.37 X10(3)/MCL (ref 0.6–4.6)
LYMPHOCYTES NFR BLD AUTO: 31.7 %
MAGNESIUM SERPL-MCNC: 1.9 MG/DL (ref 1.6–2.6)
MCH RBC QN AUTO: 33.1 PG (ref 27–31)
MCHC RBC AUTO-ENTMCNC: 33.3 G/DL (ref 33–36)
MCV RBC AUTO: 99.2 FL (ref 80–94)
MONOCYTES # BLD AUTO: 0.33 X10(3)/MCL (ref 0.1–1.3)
MONOCYTES NFR BLD AUTO: 7.6 %
MV PEAK A VEL: 0.84 M/S
MV PEAK E VEL: 0.82 M/S
NEUTROPHILS # BLD AUTO: 2.11 X10(3)/MCL (ref 2.1–9.2)
NEUTROPHILS NFR BLD AUTO: 49 %
NITRITE UR QL STRIP.AUTO: NEGATIVE
NRBC BLD AUTO-RTO: 0 %
PH UR STRIP.AUTO: 5.5 [PH]
PHOSPHATE SERPL-MCNC: 3.3 MG/DL (ref 2.3–4.7)
PLATELET # BLD AUTO: 250 X10(3)/MCL (ref 130–400)
PMV BLD AUTO: 9.5 FL (ref 7.4–10.4)
POTASSIUM SERPL-SCNC: 3.8 MMOL/L (ref 3.5–5.1)
PROT SERPL-MCNC: 5.8 GM/DL (ref 5.8–7.6)
PROT UR QL STRIP.AUTO: NEGATIVE MG/DL
PV PEAK VELOCITY: 0.93 CM/S
RA PRESSURE: 8 MMHG
RBC # BLD AUTO: 3.75 X10(6)/MCL (ref 4.2–5.4)
RBC #/AREA URNS AUTO: <5 /HPF
RBC UR QL AUTO: NEGATIVE UNIT/L
RIGHT VENTRICULAR END-DIASTOLIC DIMENSION: 3.84 CM
SODIUM SERPL-SCNC: 141 MMOL/L (ref 136–145)
SP GR UR STRIP.AUTO: 1.02 (ref 1–1.03)
SQUAMOUS #/AREA URNS AUTO: 8 /HPF
T3FREE SERPL-MCNC: 1.57 PG/ML (ref 1.57–3.91)
T4 FREE SERPL-MCNC: 1.25 NG/DL (ref 0.7–1.48)
TDI LATERAL: 0.09 M/S
TDI SEPTAL: 0.06 M/S
TDI: 0.08 M/S
TRICUSPID ANNULAR PLANE SYSTOLIC EXCURSION: 2.69 CM
TROPONIN I SERPL-MCNC: 0.01 NG/ML (ref 0–0.04)
TROPONIN I SERPL-MCNC: 0.02 NG/ML (ref 0–0.04)
TROPONIN I SERPL-MCNC: 0.03 NG/ML (ref 0–0.04)
TROPONIN I SERPL-MCNC: 0.05 NG/ML (ref 0–0.04)
TSH SERPL-ACNC: 0.01 UIU/ML (ref 0.35–4.94)
UROBILINOGEN UR STRIP-ACNC: 1 MG/DL
WBC # SPEC AUTO: 4.3 X10(3)/MCL (ref 4.5–11.5)
WBC #/AREA URNS AUTO: 9 /HPF

## 2023-03-30 PROCEDURE — 84481 FREE ASSAY (FT-3): CPT

## 2023-03-30 PROCEDURE — 85025 COMPLETE CBC W/AUTO DIFF WBC: CPT | Performed by: NURSE PRACTITIONER

## 2023-03-30 PROCEDURE — 25000003 PHARM REV CODE 250: Performed by: PHYSICIAN ASSISTANT

## 2023-03-30 PROCEDURE — 80053 COMPREHEN METABOLIC PANEL: CPT | Performed by: NURSE PRACTITIONER

## 2023-03-30 PROCEDURE — 84484 ASSAY OF TROPONIN QUANT: CPT | Mod: 91 | Performed by: EMERGENCY MEDICINE

## 2023-03-30 PROCEDURE — 96360 HYDRATION IV INFUSION INIT: CPT

## 2023-03-30 PROCEDURE — 84100 ASSAY OF PHOSPHORUS: CPT | Performed by: NURSE PRACTITIONER

## 2023-03-30 PROCEDURE — 11000001 HC ACUTE MED/SURG PRIVATE ROOM

## 2023-03-30 PROCEDURE — 81001 URINALYSIS AUTO W/SCOPE: CPT | Performed by: EMERGENCY MEDICINE

## 2023-03-30 PROCEDURE — 84443 ASSAY THYROID STIM HORMONE: CPT

## 2023-03-30 PROCEDURE — 25000003 PHARM REV CODE 250: Performed by: EMERGENCY MEDICINE

## 2023-03-30 PROCEDURE — 84439 ASSAY OF FREE THYROXINE: CPT

## 2023-03-30 PROCEDURE — 83735 ASSAY OF MAGNESIUM: CPT | Performed by: NURSE PRACTITIONER

## 2023-03-30 RX ORDER — TALC
6 POWDER (GRAM) TOPICAL NIGHTLY PRN
Status: DISCONTINUED | OUTPATIENT
Start: 2023-03-30 | End: 2023-04-01 | Stop reason: HOSPADM

## 2023-03-30 RX ORDER — POLYETHYLENE GLYCOL 3350 17 G/17G
17 POWDER, FOR SOLUTION ORAL 2 TIMES DAILY PRN
Status: DISCONTINUED | OUTPATIENT
Start: 2023-03-30 | End: 2023-04-01 | Stop reason: HOSPADM

## 2023-03-30 RX ORDER — SIMETHICONE 80 MG
1 TABLET,CHEWABLE ORAL 4 TIMES DAILY PRN
Status: DISCONTINUED | OUTPATIENT
Start: 2023-03-30 | End: 2023-04-01 | Stop reason: HOSPADM

## 2023-03-30 RX ORDER — ONDANSETRON 2 MG/ML
4 INJECTION INTRAMUSCULAR; INTRAVENOUS EVERY 4 HOURS PRN
Status: DISCONTINUED | OUTPATIENT
Start: 2023-03-30 | End: 2023-04-01 | Stop reason: HOSPADM

## 2023-03-30 RX ORDER — MECLIZINE HYDROCHLORIDE 25 MG/1
25 TABLET ORAL 2 TIMES DAILY PRN
Status: ON HOLD | COMMUNITY
Start: 2023-03-07 | End: 2023-04-01 | Stop reason: HOSPADM

## 2023-03-30 RX ORDER — CELECOXIB 200 MG/1
200 CAPSULE ORAL
Status: ON HOLD | COMMUNITY
Start: 2023-03-24 | End: 2023-04-01 | Stop reason: HOSPADM

## 2023-03-30 RX ORDER — ERGOCALCIFEROL 1.25 MG/1
50000 CAPSULE ORAL
Status: ON HOLD | COMMUNITY
Start: 2023-03-20 | End: 2023-11-27 | Stop reason: SDUPTHER

## 2023-03-30 RX ORDER — ACETAMINOPHEN 325 MG/1
650 TABLET ORAL EVERY 6 HOURS PRN
Status: DISCONTINUED | OUTPATIENT
Start: 2023-03-30 | End: 2023-04-01 | Stop reason: HOSPADM

## 2023-03-30 RX ORDER — GABAPENTIN 100 MG/1
CAPSULE ORAL
Status: ON HOLD | COMMUNITY
Start: 2023-03-24 | End: 2023-04-01 | Stop reason: HOSPADM

## 2023-03-30 RX ORDER — NALOXONE HCL 0.4 MG/ML
0.02 VIAL (ML) INJECTION
Status: DISCONTINUED | OUTPATIENT
Start: 2023-03-30 | End: 2023-04-01 | Stop reason: HOSPADM

## 2023-03-30 RX ORDER — AMLODIPINE BESYLATE 5 MG/1
5 TABLET ORAL
Status: ON HOLD | COMMUNITY
Start: 2023-03-22 | End: 2023-04-01 | Stop reason: HOSPADM

## 2023-03-30 RX ORDER — ASPIRIN 325 MG
325 TABLET, DELAYED RELEASE (ENTERIC COATED) ORAL
Status: COMPLETED | OUTPATIENT
Start: 2023-03-30 | End: 2023-03-30

## 2023-03-30 RX ORDER — SODIUM CHLORIDE 9 MG/ML
INJECTION, SOLUTION INTRAVENOUS CONTINUOUS
Status: DISCONTINUED | OUTPATIENT
Start: 2023-03-30 | End: 2023-04-01 | Stop reason: HOSPADM

## 2023-03-30 RX ORDER — PROCHLORPERAZINE EDISYLATE 5 MG/ML
5 INJECTION INTRAMUSCULAR; INTRAVENOUS EVERY 6 HOURS PRN
Status: DISCONTINUED | OUTPATIENT
Start: 2023-03-30 | End: 2023-04-01 | Stop reason: HOSPADM

## 2023-03-30 RX ORDER — MAG HYDROX/ALUMINUM HYD/SIMETH 200-200-20
30 SUSPENSION, ORAL (FINAL DOSE FORM) ORAL 4 TIMES DAILY PRN
Status: DISCONTINUED | OUTPATIENT
Start: 2023-03-30 | End: 2023-04-01 | Stop reason: HOSPADM

## 2023-03-30 RX ADMIN — SODIUM CHLORIDE: 9 INJECTION, SOLUTION INTRAVENOUS at 10:03

## 2023-03-30 RX ADMIN — SODIUM CHLORIDE 1000 ML: 9 INJECTION, SOLUTION INTRAVENOUS at 01:03

## 2023-03-30 RX ADMIN — ASPIRIN 325 MG: 325 TABLET, COATED ORAL at 03:03

## 2023-03-30 NOTE — ED PROVIDER NOTES
Encounter Date: 3/29/2023    SCRIBE #1 NOTE: I, Alejandra Corado, am scribing for, and in the presence of,  Zeina Alves MD. I have scribed the following portions of the note - Other sections scribed: HPI, ROS, PE, EKG.     History     Chief Complaint   Patient presents with    Fall     AASI c/o of recent falls over several days, last fall this afternoon. C/o of weakness. -LOC, denies hitting head, -BT. Denies any complaints of pain, no obvious injuries noted. Ambulates with cane at home. GCS 15     72 year old female with a hx of breast cancer s/p radiation and left mastectomy, CHF, DVT, HLD, HTN, and osteoarthritis presents to the ED for frequent falls over the past several days. Per the patient's neighbor, the patient has fallen 3 times in the past 3 days and has been confused about her medications. The patient was seen by her PCP Dr. Torres today and her lasix was changed to PRN. She says that falling is normal for her, but then says that her falls started Monday. She denies pain, hitting her head, or losing consciousness. She reports that one fall was mechanical and some falls are from lower extremity weakness. She uses a cane at home and lives with her nephew.     The history is provided by the patient and a caregiver. No  was used.   Fall  The accident occurred today. The fall occurred while standing. Distance fallen: ground level. She was Ambulatory at the scene. Pertinent negatives include no neck pain, no back pain, no fever, no abdominal pain, no nausea, no vomiting, no hematuria, no headaches and no loss of consciousness. She has tried nothing for the symptoms.   Review of patient's allergies indicates:   Allergen Reactions    Sulfa (sulfonamide antibiotics)      Past Medical History:   Diagnosis Date    Cancer     breast CA s/p chemo and L masectomy     CHF (congestive heart failure)     History of DVT (deep vein thrombosis)     HLD (hyperlipidemia)     Hypertension      Osteoarthritis     Venous insufficiency      Past Surgical History:   Procedure Laterality Date    HYSTERECTOMY      INSERTION OF PACEMAKER      INTRAMEDULLARY RODDING OF FEMUR Left 10/14/2022    Procedure: INSERTION, INTRAMEDULLARY СВЕТЛАНА, FEMUR;  Surgeon: Ankush Alex MD;  Location: [facility];  Service: Orthopedics;  Laterality: Left;    JOINT REPLACEMENT Bilateral     Knee    MASTECTOMY Left 2019     Family History   Family history unknown: Yes     Social History     Tobacco Use    Smoking status: Never    Smokeless tobacco: Never   Substance Use Topics    Alcohol use: Not Currently    Drug use: Never     Review of Systems   Constitutional:  Negative for fatigue and fever.   Eyes:  Negative for visual disturbance.   Respiratory:  Negative for chest tightness and shortness of breath.    Cardiovascular:  Negative for chest pain.   Gastrointestinal:  Negative for abdominal pain, diarrhea, nausea and vomiting.   Genitourinary:  Negative for dysuria and hematuria.   Musculoskeletal:  Negative for back pain and neck pain.   Skin:  Negative for rash.   Allergic/Immunologic: Negative for immunocompromised state.   Neurological:  Positive for weakness. Negative for loss of consciousness, syncope and headaches.   All other systems reviewed and are negative.    Physical Exam     Initial Vitals [03/29/23 2024]   BP Pulse Resp Temp SpO2   107/66 (!) 54 16 97.3 °F (36.3 °C) 97 %      MAP       --         Physical Exam    Nursing note and vitals reviewed.  Constitutional: She appears well-developed and well-nourished. She is not diaphoretic. No distress.   HENT:   Head: Normocephalic and atraumatic.   Nose: Nose normal.   Mouth/Throat: Oropharynx is clear and moist.   Eyes: Conjunctivae and EOM are normal. Pupils are equal, round, and reactive to light.   Neck: Trachea normal. Neck supple.   Normal range of motion.  Cardiovascular:  Normal rate, regular rhythm, normal heart sounds and intact distal pulses.           No murmur  heard.  Pulmonary/Chest: Breath sounds normal. No respiratory distress. She has no wheezes. She has no rhonchi. She has no rales. She exhibits no tenderness.   Abdominal: Abdomen is soft. Bowel sounds are normal. She exhibits no distension and no mass. There is no abdominal tenderness. There is no rebound and no guarding.   Musculoskeletal:         General: No tenderness. Normal range of motion.      Cervical back: Normal range of motion and neck supple.      Lumbar back: Normal. Normal range of motion.      Comments: Lymphedema to the left arm, pt is wearing a lymphedema sleeve      Neurological: She is alert. She has normal strength. No cranial nerve deficit or sensory deficit.   Bilateral lower extremities are generally weak, bilateral lower extremities are ataxic    Skin: Skin is warm and dry. Capillary refill takes less than 2 seconds. No abscess noted. No erythema. No pallor.   Psychiatric: She has a normal mood and affect.   Pt is evasive of questioning        ED Course   Procedures  Labs Reviewed   COMPREHENSIVE METABOLIC PANEL - Abnormal; Notable for the following components:       Result Value    Chloride 109 (*)     Carbon Dioxide 20 (*)     Blood Urea Nitrogen 30.8 (*)     Creatinine 1.58 (*)     Globulin 3.8 (*)     Albumin/Globulin Ratio 0.9 (*)     Alkaline Phosphatase 193 (*)     Alanine Aminotransferase 70 (*)     All other components within normal limits   CBC WITH DIFFERENTIAL - Abnormal; Notable for the following components:    RBC 4.02 (*)     MCV 99.3 (*)     MCH 33.1 (*)     All other components within normal limits   URINALYSIS, REFLEX TO URINE CULTURE - Abnormal; Notable for the following components:    Leukocyte Esterase, UA 1+ (*)     All other components within normal limits   TROPONIN I - Abnormal; Notable for the following components:    Troponin-I 0.049 (*)     All other components within normal limits   URINALYSIS, MICROSCOPIC - Abnormal; Notable for the following components:    WBC,  UA 9 (*)     Squamous Epithelial Cells, UA 8 (*)     All other components within normal limits   CBC W/ AUTO DIFFERENTIAL    Narrative:     The following orders were created for panel order CBC auto differential.  Procedure                               Abnormality         Status                     ---------                               -----------         ------                     CBC with Differential[[id number]]        Abnormal            Final result                 Please view results for these tests on the individual orders.   TROPONIN I   COMPREHENSIVE METABOLIC PANEL   MAGNESIUM   PHOSPHORUS   CBC W/ AUTO DIFFERENTIAL    Narrative:     The following orders were created for panel order CBC with Automated Differential.  Procedure                               Abnormality         Status                     ---------                               -----------         ------                     CBC with Differential[[id number]]                            In process                   Please view results for these tests on the individual orders.   CBC WITH DIFFERENTIAL     EKG Readings: (Independently Interpreted)   Initial Reading: No STEMI. Rhythm: Sinus Bradycardia. Heart Rate: 48. Ectopy: No Ectopy. Conduction: 1st Degree AV Block. ST Segments: Normal ST Segments. T Waves: Normal. Axis: Left Axis Deviation. Clinical Impression: Sinus Bradycardia   Performed at 20:32 3/29/23     Imaging Results              X-Ray Chest AP Portable (In process)                      CT Head Without Contrast (Preliminary result)  Result time 03/30/23 01:30:06      Preliminary result by Srinivasa Veloz Jr., MD (03/30/23 01:30:06)                   Narrative:    START OF REPORT:  TECHNIQUE: CT OF THE HEAD WAS PERFORMED WITHOUT INTRAVENOUS CONTRAST WITH AXIAL AS WELL AS CORONAL AND SAGITTAL IMAGES.    COMPARISON: NONE.    DOSAGE INFORMATION: AUTOMATED EXPOSURE CONTROL WAS UTILIZED.    CLINICAL HISTORY: FALL (AASI C/O OF RECENT  FALLS OVER SEVERAL DAYS, LAST FALL THIS AFTERNOON. C/O OF WEAKNESS. -LOC, DENIES HITTING HEAD, -BT. DENIES ANY COMPLAINTS OF PAIN, NO OBVIOUS INJURIES NOTED. AMBULATES WITH CANE AT HOME. GCS 15).    Findings:  Hemorrhage: No acute intracranial hemorrhage is seen.  CSF spaces: The ventricles, sulci and basal cisterns all appear mildly prominent consistent with global cerebral atrophy.  Brain parenchyma: Moderate microvascular change is seen in portions of the periventricular and deep white matter tracts.  Cerebellum: Unremarkable.  Sella and skull base: The sella appears to be within normal limits for age.  Intracranial calcifications: Incidental note is made of bilateral choroid plexus calcification. Incidental note is made of some pineal region calcification. Incidental note is made of some calcification of the falx. Incidental note is made of bilateral basal ganglia calcifcation.  Calvarium: No acute linear or depressed skull fracture is seen.    Maxillofacial Structures:  Paranasal sinuses: The visualized paranasal sinuses appear clear with no significant mucoperiosteal thickening or air fluid levels identified.  Orbits: The orbits appear unremarkable.  Temporal bones and mastoids: The right mastoid air cells are clear. Note is made of post operative changes in the left mastoid air cells.  TMJ: The mandibular condyles appear normally placed with respect to the mandibular fossa.      Impression:  1. No acute intracranial process identified. Details and findings as noted above.                          Preliminary result by Interface, Rad Results In (03/30/23 01:30:06)                   Narrative:    START OF REPORT:  TECHNIQUE: CT OF THE HEAD WAS PERFORMED WITHOUT INTRAVENOUS CONTRAST WITH AXIAL AS WELL AS CORONAL AND SAGITTAL IMAGES.    COMPARISON: NONE.    DOSAGE INFORMATION: AUTOMATED EXPOSURE CONTROL WAS UTILIZED.    CLINICAL HISTORY: FALL (AASI C/O OF RECENT FALLS OVER SEVERAL DAYS, LAST FALL THIS AFTERNOON.  C/O OF WEAKNESS. -LOC, DENIES HITTING HEAD, -BT. DENIES ANY COMPLAINTS OF PAIN, NO OBVIOUS INJURIES NOTED. AMBULATES WITH CANE AT HOME. GCS 15).    Findings:  Hemorrhage: No acute intracranial hemorrhage is seen.  CSF spaces: The ventricles, sulci and basal cisterns all appear mildly prominent consistent with global cerebral atrophy.  Brain parenchyma: Moderate microvascular change is seen in portions of the periventricular and deep white matter tracts.  Cerebellum: Unremarkable.  Sella and skull base: The sella appears to be within normal limits for age.  Intracranial calcifications: Incidental note is made of bilateral choroid plexus calcification. Incidental note is made of some pineal region calcification. Incidental note is made of some calcification of the falx. Incidental note is made of bilateral basal ganglia calcifcation.  Calvarium: No acute linear or depressed skull fracture is seen.    Maxillofacial Structures:  Paranasal sinuses: The visualized paranasal sinuses appear clear with no significant mucoperiosteal thickening or air fluid levels identified.  Orbits: The orbits appear unremarkable.  Temporal bones and mastoids: The right mastoid air cells are clear. Note is made of post operative changes in the left mastoid air cells.  TMJ: The mandibular condyles appear normally placed with respect to the mandibular fossa.      Impression:  1. No acute intracranial process identified. Details and findings as noted above.                                      X-Rays:   Independently Interpreted Readings:   Chest X-Ray: Normal heart size.  No infiltrates.  No acute abnormalities.   Medications   melatonin tablet 6 mg (has no administration in time range)   ondansetron injection 4 mg (has no administration in time range)   prochlorperazine injection Soln 5 mg (has no administration in time range)   polyethylene glycol packet 17 g (has no administration in time range)   acetaminophen tablet 650 mg (has no  administration in time range)   simethicone chewable tablet 80 mg (has no administration in time range)   aluminum-magnesium hydroxide-simethicone 200-200-20 mg/5 mL suspension 30 mL (has no administration in time range)   naloxone 0.4 mg/mL injection 0.02 mg (has no administration in time range)   sodium chloride 0.9% bolus 1,000 mL 1,000 mL (0 mLs Intravenous Stopped 3/30/23 0200)   aspirin EC tablet 325 mg (325 mg Oral Given 3/30/23 0318)     Medical Decision Making  Differential diagnosis includes, but is not limited to, dehydration, electrolyte derangement, anemia, syncope, CVA, deconditioning   To evaluate the CBC, CMP, urinalysis, troponin, EKG, chest x-ray, CT head ordered reviewed  Troponin mildly elevated without any ischemic changes on her EKG or chest pain or shortness of breath.  Slight dehydration for which she received IV fluids.  Full-dose aspirin administered for elevated troponin.  She was unable to ambulate due to her severely ataxic gait and has had numerous frequent falls.  She will be admitted for further evaluation and management    Problems Addressed:  Ataxia: acute illness or injury that poses a threat to life or bodily functions  Bradycardia: undiagnosed new problem with uncertain prognosis  Dehydration: acute illness or injury that poses a threat to life or bodily functions  Fall: acute illness or injury that poses a threat to life or bodily functions  Frequent falls: acute illness or injury that poses a threat to life or bodily functions  Generalized weakness: acute illness or injury that poses a threat to life or bodily functions  Troponin I above reference range: acute illness or injury that poses a threat to life or bodily functions  Weakness: acute illness or injury that poses a threat to life or bodily functions    Amount and/or Complexity of Data Reviewed  Independent Historian: caregiver     Details: Per the patient's neighbor, the patient has fallen 3 times in the past 3 days and  has been confused about her medications. The patient was seen by her PCP Dr. Torres today and her lasix was changed to PRN.  External Data Reviewed: notes.  Labs: ordered.  Radiology: ordered and independent interpretation performed.  ECG/medicine tests: ordered and independent interpretation performed.    Risk  OTC drugs.  Prescription drug management.  Decision regarding hospitalization.       Medical Decision Making:   History:   Old Medical Records: I decided to obtain old medical records.  Old Records Summarized: records from clinic visits.  Initial Assessment:   See HPI  Independently Interpreted Test(s):   I have ordered and independently interpreted X-rays - see prior notes.  I have ordered and independently interpreted EKG Reading(s) - see prior notes  Clinical Tests:   Lab Tests: Ordered and Reviewed  The following lab test(s) were unremarkable: CBC, CMP, Urinalysis and Troponin  Radiological Study: Ordered and Reviewed  Medical Tests: Ordered and Reviewed  Other:   I have discussed this case with another health care provider.        Scribe Attestation:   Scribe #1: I performed the above scribed service and the documentation accurately describes the services I performed. I attest to the accuracy of the note.  Comments: Attending:   Physician Attestation Statement for Scribe #1: I, Zeina Alves MD, personally performed the services described in this documentation. All medical record entries made by the scribe were at my direction and in my presence.  I have reviewed the chart and agree that the record reflects my personal performance and is accurate and complete.        Attending Attestation:           Physician Attestation for Scribe:  Physician Attestation Statement for Scribe #1: I, reviewed documentation, as scribed by Alejandra Corado in my presence, and it is both accurate and complete.           ED Course as of 03/30/23 0545   u Mar 30, 2023   0231 Denies chest pain [BS]   0233 Hospitalist  consulted [BS]      ED Course User Index  [BS] Zeina Alves MD                 Clinical Impression:   Final diagnoses:  [R53.1] Weakness  [W19.XXXA] Fall  [E86.0] Dehydration (Primary)  [R00.1] Bradycardia  [R29.6] Frequent falls  [R77.8] Troponin I above reference range  [R27.0] Ataxia  [R53.1] Generalized weakness        ED Disposition Condition    Observation Stable                Zeina Alves MD  03/30/23 0542

## 2023-03-30 NOTE — H&P
[facility] Hospital Medicine History & Physical Examination       Patient Name: Laura Jacobs  MRN: [id number]  Patient Class: OP- Observation   Admission Date: 3/29/2023   Admitting Physician: Chuckie Zaldivar MD   Length of Stay: 0  Attending Physician: Sabas Bragg MD  Primary Care Provider: Ruben Brooks Sr, MD  Face-to-Face encounter date: 03/30/2023  Code Status: Full Code  Chief Complaint: Fall (AASI c/o of recent falls over several days, last fall this afternoon. C/o of weakness. -LOC, denies hitting head, -BT. Denies any complaints of pain, no obvious injuries noted. Ambulates with cane at home. GCS 15)        Patient information was obtained from patient, patient's family, past medical records and ER records.     HISTORY OF PRESENT ILLNESS:   Laura Jacobs is a 72 y.o. Black or  female with a past medical history of hypertension, hyperlipidemia, congestive heart failure, venous insufficiency, DVT and breast cancer status post chemo and left mastectomy. The patient presented to [facility] on 3/29/2023 with a primary complaint of frequent falls.  Patient reports having one fall on 3/27/2023 and again yesterday (3/29/2023).  Patient states she is unsure why she is falling but thinks it could be due to her tripping.  She reports dizziness when walking. Denies complaints of chest pain, shortness a breath, vision changes, headache, abdominal pain, nausea, vomiting, diarrhea, burning with urination, urinary frequency.    Upon presentation to the ED, temperature 97.3° F, heart rate 54, blood pressure 107/66, respiratory rate 16, SpO2 97% on room air.  Labs with WBC 4.7, CO2 20, BUN/creatinine 30.8/1.58 (10/0.86 in December 2022), alkaline phosphatase 193, ALT 70, initial troponin 0.049.  UA with 8 squamous epithelial cells, 9 WBC, 1+ leukocyte esterase. EKG with sinus bradycardia with 1st degree AV block and left ventricular hypertrophy with QRS widening. CT  head without acute intracranial process.  Chest x-ray with no acute cardiopulmonary process.  Patient was given full-dose aspirin.  Patient admitted to hospital medicine services for further medical management.    PAST MEDICAL HISTORY:   Hypertension  Hyperlipidemia  Congestive heart failure  Venous insufficiency  DVT  Breast cancer status post chemo left mastectomy    PAST SURGICAL HISTORY:     Past Surgical History:   Procedure Laterality Date    HYSTERECTOMY      INSERTION OF PACEMAKER      INTRAMEDULLARY RODDING OF FEMUR Left 10/14/2022    Procedure: INSERTION, INTRAMEDULLARY СВЕТЛАНА, FEMUR;  Surgeon: Ankush Alex MD;  Location: [facility];  Service: Orthopedics;  Laterality: Left;    JOINT REPLACEMENT Bilateral     Knee    MASTECTOMY Left 2019       ALLERGIES:   Sulfa (sulfonamide antibiotics)    FAMILY HISTORY:   Reviewed and negative    SOCIAL HISTORY:   Denies tobacco, alcohol or illicit drug use    HOME MEDICATIONS:     Prior to Admission medications    Medication Sig Start Date End Date Taking? Authorizing Provider   anastrozole (ARIMIDEX) 1 mg Tab Take 1 mg by mouth once daily.   Yes Historical Provider   atorvastatin (LIPITOR) 40 MG tablet Take 40 mg by mouth once daily.   Yes Historical Provider   dexlansoprazole (DEXILANT) 60 mg capsule Take 60 mg by mouth once daily.   Yes Historical Provider   furosemide (LASIX) 40 MG tablet Take 40 mg by mouth once daily.   Yes Historical Provider   sacubitriL-valsartan (ENTRESTO) 24-26 mg per tablet Take 1 tablet by mouth 2 (two) times daily.   Yes Historical Provider   amiodarone (PACERONE) 400 MG tablet Take 400 mg by mouth once daily.    Historical Provider   amLODIPine (NORVASC) 5 MG tablet Take 5 mg by mouth. 3/22/23   Historical Provider   HYDROcodone-acetaminophen (NORCO) 5-325 mg per tablet Take 1 tablet by mouth every 4 (four) hours as needed for Pain. 10/2/22   Tervor Rodriguez III, MD   metoprolol succinate (TOPROL-XL) 25 MG 24 hr tablet Take 25 mg by mouth  once daily.    Historical Provider   spironolactone (ALDACTONE) 25 MG tablet Take 25 mg by mouth once daily.    Historical Provider       REVIEW OF SYSTEMS:   Except as documented, all other systems reviewed and negative     PHYSICAL EXAM:     VITAL SIGNS: 24 HRS MIN & MAX LAST   Temp  Min: 97.3 °F (36.3 °C)  Max: 97.3 °F (36.3 °C) 97.3 °F (36.3 °C)   BP  Min: 107/66  Max: 145/87 111/62   Pulse  Min: 47  Max: 54  (!) 48     Resp  Min: 16  Max: 22 (!) 21     SpO2  Min: 95 %  Max: 100 % 95 %       General appearance: Well-developed, well-nourished female in no apparent distress.  No family at bedside.  HEENT: Atraumatic head. Moist mucous membranes of oral cavity.  Lungs: Clear to auscultation bilaterally.   Heart:  Bradycardic rate and rhythm.  No edema bilateral lower extremities. Compression stocking to LUE for swelling.   Abdomen: Soft, non-distended.   Extremities: No cyanosis, clubbing. No deformities.  Skin: No Rash. Warm and dry.  Neuro: Awake, alert and oriented. Motor and sensory exams grossly intact.  Psych/mental status: Appropriate mood and affect. Cooperative. Responds appropriately to questions.       LABS AND IMAGING:     Recent Labs   Lab 03/29/23 2056 03/30/23  0853   WBC 4.7 4.3*   RBC 4.02* 3.75*   HGB 13.3 12.4   HCT 39.9 37.2   MCV 99.3* 99.2*   MCH 33.1* 33.1*   MCHC 33.3 33.3   RDW 14.2 14.4    250   MPV 9.4 9.5       Recent Labs   Lab 03/29/23 2056 03/30/23  0533    141   K 4.0 3.8   CO2 20* 20*   BUN 30.8* 28.8*   CREATININE 1.58* 1.24*   CALCIUM 8.7 8.2*   MG  --  1.90   ALBUMIN 3.4 2.8*   ALKPHOS 193* 141   ALT 70* 53   AST 33 26   BILITOT 0.5 0.4       Microbiology Results (last 7 days)       ** No results found for the last 168 hours. **             X-Ray Chest AP Portable  Narrative: EXAMINATION:  XR CHEST AP PORTABLE    CLINICAL HISTORY:  fall;    TECHNIQUE:  Single view of the chest    COMPARISON:  10/14/2022    FINDINGS:  Left-sided cardiac device remains.  Right-sided  MediPort is in place.  No focal opacification.  Impression: No acute cardiopulmonary process.    Electronically signed by: Moy Andino  Date:    03/30/2023  Time:    07:09  CT Head Without Contrast  Narrative: EXAMINATION:  CT HEAD WITHOUT CONTRAST    CLINICAL HISTORY:  Syncope, recurrent;    TECHNIQUE:  Low dose axial images were obtained through the head.  Coronal and sagittal reformations were also performed. Contrast was not administered.    Automatic exposure control was utilized to reduce the patient's radiation dose.    DLP= 833    COMPARISON:  05/06/2022    FINDINGS:  Hemorrhage: No acute intracranial hemorrhage is seen.    CSF spaces: The ventricles, sulci and basal cisterns all appear mildly prominent consistent with global cerebral atrophy.    Brain parenchyma: Moderate microvascular change is seen in portions of the periventricular and deep white matter tracts.    Cerebellum: Unremarkable.    Sella and skull base: The sella appears to be within normal limits for age.    Intracranial calcifications: Incidental note is made of bilateral choroid plexus calcification. Incidental note is made of some pineal region calcification. Incidental note is made of some calcification of the falx. Incidental note is made of bilateral basal ganglia calcifcation.    Calvarium: No acute linear or depressed skull fracture is seen.    Maxillofacial Structures:    Paranasal sinuses: The visualized paranasal sinuses appear clear with no significant mucoperiosteal thickening or air fluid levels identified.    Orbits: The orbits appear unremarkable.    Temporal bones and mastoids: The right mastoid air cells are clear. Note is made of post operative changes in the left mastoid air cells.    TMJ: The mandibular condyles appear normally placed with respect to the mandibular fossa.  Impression: Impression:    1. No acute intracranial process identified. Details and findings as noted above.    Electronically signed by: Moy  Thu  Date:    03/30/2023  Time:    06:59        ASSESSMENT & PLAN:   Assessment:  Recurrent falls, ? Secondary to symptomatic bradycardia  Sinus bradycardia   Acute kidney injury   NSTEMI  History of central hypertension, hyperlipidemia, congestive heart failure without exacerbation, venous insufficiency, DVT and breast cancer status post chemo and left mastectomy    Plan:  - MRI of the brain ordered.  Follow results   - IV fluid hydration   - Trend troponin   - Telemetry monitoring  - Echo ordered  - Cardiology consulted.  Appreciate recommendations  - Physical and occupational therapy   - Resume appropriate home medications when deemed necessary   - Labs in AM      VTE Prophylaxis: will be placed on SCD for DVT prophylaxis and will be advised to be as mobile as possible and sit in a chair as tolerated      __________________________________________________________________________  INPATIENT LIST OF MEDICATIONS     Current Facility-Administered Medications:     0.9%  NaCl infusion, , Intravenous, Continuous, Sharon Pillai PA-C    acetaminophen tablet 650 mg, 650 mg, Oral, Q6H PRN, Clemencia Montoya AGACNP-BC    aluminum-magnesium hydroxide-simethicone 200-200-20 mg/5 mL suspension 30 mL, 30 mL, Oral, QID PRN, SATISH MartinCNP-BC    melatonin tablet 6 mg, 6 mg, Oral, Nightly PRN, FELICIA MartinP-BC    naloxone 0.4 mg/mL injection 0.02 mg, 0.02 mg, Intravenous, PRN, SATISH MartinCNP-BC    ondansetron injection 4 mg, 4 mg, Intravenous, Q4H PRN, Clemencia Montoya AGACNP-BC    polyethylene glycol packet 17 g, 17 g, Oral, BID PRN, FELICIA MartinP-BC    prochlorperazine injection Soln 5 mg, 5 mg, Intravenous, Q6H PRN, FELICIA MartinP-BC    simethicone chewable tablet 80 mg, 1 tablet, Oral, QID PRN, Clemencia G Jan, AGACNP-BC    Current Outpatient Medications:     anastrozole (ARIMIDEX) 1 mg Tab, Take 1 mg by mouth once daily., Disp: , Rfl:     atorvastatin (LIPITOR) 40 MG tablet,  Take 40 mg by mouth once daily., Disp: , Rfl:     dexlansoprazole (DEXILANT) 60 mg capsule, Take 60 mg by mouth once daily., Disp: , Rfl:     furosemide (LASIX) 40 MG tablet, Take 40 mg by mouth once daily., Disp: , Rfl:     sacubitriL-valsartan (ENTRESTO) 24-26 mg per tablet, Take 1 tablet by mouth 2 (two) times daily., Disp: , Rfl:     amiodarone (PACERONE) 400 MG tablet, Take 400 mg by mouth once daily., Disp: , Rfl:     amLODIPine (NORVASC) 5 MG tablet, Take 5 mg by mouth., Disp: , Rfl:     HYDROcodone-acetaminophen (NORCO) 5-325 mg per tablet, Take 1 tablet by mouth every 4 (four) hours as needed for Pain., Disp: 8 tablet, Rfl: 0    metoprolol succinate (TOPROL-XL) 25 MG 24 hr tablet, Take 25 mg by mouth once daily., Disp: , Rfl:     spironolactone (ALDACTONE) 25 MG tablet, Take 25 mg by mouth once daily., Disp: , Rfl:       Scheduled Meds:  Continuous Infusions:   sodium chloride 0.9%       PRN Meds:.acetaminophen, aluminum-magnesium hydroxide-simethicone, melatonin, naloxone, ondansetron, polyethylene glycol, prochlorperazine, simethicone      Discharge Planning and Disposition: Anticipated discharge to be determined.    ISharon PA, have reviewed and discussed the case with Dr. Sabas Bragg.    Please see the following addendum for further assessment and plan from there attending MD. Sharon Pillai PA-C  03/30/2023

## 2023-03-30 NOTE — ED NOTES
Neighbor called to check on patient: states went to PCP today, told to take Lasix PRN. States sometimes gets confused with meds and may have been taking too much

## 2023-03-30 NOTE — ED NOTES
Hospitalist paged to notify of blood pressures trending low. Pt is aaox4, no acute distress, no complaints at this time, she denies cp, sob, weakness. Requesting to go home.

## 2023-03-30 NOTE — NURSING
Pt and Dede(neighbor) able to answer questions. Pt had a fall at home and PCP told her to come in. Hx of L breast mastectomy, defibrillator, and stroke. Denies CP, SOB, weakness. Completed med req w/ neighbor and she will call back if there was any meds missing.

## 2023-03-30 NOTE — PLAN OF CARE
03/30/23 1435   Discharge Assessment   Assessment Type Discharge Planning Assessment   Confirmed/corrected address, phone number and insurance Yes   Confirmed Demographics Correct on Facesheet   Source of Information patient   Communicated AMANDA with patient/caregiver Date not available/Unable to determine   Reason For Admission Weakness and falls   People in Home alone  (Pt lives alone in a single story home with one step to enter. Pt has sitters that stay with pt from 8:30-3:00. Jani (pt's nephews) stay with pt at night)   Do you expect to return to your current living situation? Yes   Do you have help at home or someone to help you manage your care at home? Yes   Who are your caregiver(s) and their phone number(s)? Sitters during the day from 8:30-3:00   Prior to hospitilization cognitive status: Alert/Oriented   Current cognitive status: Alert/Oriented   Walking or Climbing Stairs ambulation difficulty, requires equipment   Mobility Management RW   Home Layout Able to live on 1st floor   Equipment Currently Used at Home walker, rolling   Patient currently being followed by outpatient case management? No   Do you currently have service(s) that help you manage your care at home? Yes   How Many hours does patient receive services 37   Name and Contact number of agency sitter agency   Who is going to help you get home at discharge? family   How do you get to doctors appointments? family or friend will provide   Are you on dialysis? No   Discharge Plan A Home with family   Discharge Plan B Home Health   DME Needed Upon Discharge  other (see comments)  (TBD)   Discharge Barriers Identified None   Housing Stability   In the last 12 months, was there a time when you were not able to pay the mortgage or rent on time? N   Transportation Needs   In the past 12 months, has lack of transportation kept you from medical appointments or from getting medications? no   Food Insecurity   Within the past 12 months, you  worried that your food would run out before you got the money to buy more. Never true     Pt's PCP is Dr Brooks. Her  is her son, Andrzej ([phone]). She is unsure of the pharmacy that she uses. Family drives pt where she needs to go. Pt does not drive. Pt is disabled. CM will follow for any dc needs.

## 2023-03-30 NOTE — CONSULTS
[facility] - Emergency Dept  Cardiology  Consult Note    Patient Name: Laura Jacobs  MRN: [id number]  Admission Date: 3/29/2023  Hospital Length of Stay: 0 days  Code Status: Full Code   Attending Provider: Ricardo Bass  Consulting Provider: Zane Wilkinson MD  Primary Care Physician: Ruben Brooks Sr, MD  Principal Problem:<principal problem not specified>    Patient information was obtained from patient and ER records.     Subjective:     Chief Complaint:  Symptomatic bradycardia     HPI: 72 y.o. Black or  female with a past medical history of hypertension, hyperlipidemia, congestive heart failure, venous insufficiency, DVT and breast cancer status post chemo and left mastectomy.  Patient admitted to [facility] 3/29/2023 due to falls.      PMH: hypertension, hyperlipidemia, congestive heart failure, venous insufficiency, DVT and breast cancer status post chemo and left mastectomy  PSH: left hip fracture repair, AICD placement, [facility]  Social History:   Family History:     Previous Cardiac Diagnostics:     [id number]-95/30/2023  The left ventricle is normal in size with mildly decreased systolic function.  The estimated ejection fraction is 40%.  Grade II left ventricular diastolic dysfunction.  Aortic valve sclerosis without stenosis. Peak AV velocity 1.8 m/sec.  Mild mitral regurgitation.  Mild pulmonic regurgitation.  Normal right ventricular size with normal right ventricular systolic function.    Left heart catheterization-03/10/2022    Left heart catheterization using right radial approach demonstrated widely patent coronary anatomy with severe left  ventricular systolic dysfunction. The EF was 20-25% with EDP of 5-10 mm Hg. A total of 40 mL contrast delivered.    Review of patient's allergies indicates:   Allergen Reactions    Sulfa (sulfonamide antibiotics)        No current facility-administered medications on file prior to encounter.     Current  Outpatient Medications on File Prior to Encounter   Medication Sig    anastrozole (ARIMIDEX) 1 mg Tab Take 1 mg by mouth once daily.    atorvastatin (LIPITOR) 40 MG tablet Take 40 mg by mouth once daily.    dexlansoprazole (DEXILANT) 60 mg capsule Take 60 mg by mouth once daily.    furosemide (LASIX) 40 MG tablet Take 40 mg by mouth once daily.    sacubitriL-valsartan (ENTRESTO) 24-26 mg per tablet Take 1 tablet by mouth 2 (two) times daily.    amiodarone (PACERONE) 400 MG tablet Take 400 mg by mouth once daily.    amLODIPine (NORVASC) 5 MG tablet Take 5 mg by mouth.    HYDROcodone-acetaminophen (NORCO) 5-325 mg per tablet Take 1 tablet by mouth every 4 (four) hours as needed for Pain.    metoprolol succinate (TOPROL-XL) 25 MG 24 hr tablet Take 25 mg by mouth once daily.    spironolactone (ALDACTONE) 25 MG tablet Take 25 mg by mouth once daily.       Review of Systems:  Review of Systems  As stated above    Objective:     Vital Signs (Most Recent):  Temp: 97.3 °F (36.3 °C) (03/29/23 2024)  Pulse: (!) 47 (03/30/23 0959)  Resp: 13 (03/30/23 0959)  BP: (!) 91/53 (03/30/23 0959)  SpO2: 100 % (03/30/23 0959)   Vital Signs (24h Range):  Temp:  [97.3 °F (36.3 °C)] 97.3 °F (36.3 °C)  Pulse:  [47-54] 47  Resp:  [13-22] 13  SpO2:  [95 %-100 %] 100 %  BP: ()/(53-87) 91/53     Weight: 55.3 kg (122 lb)  Body mass index is 19.1 kg/m².    SpO2: 100 %       No intake or output data in the 24 hours ending 03/30/23 1045    Lines/Drains/Airways       Peripheral Intravenous Line  Duration                  Peripheral IV - Single Lumen 03/30/23 0105 22 G Posterior;Right Hand <1 day                      Significant Labs: CMP   Recent Labs   Lab 03/29/23 2056 03/30/23  0533    141   K 4.0 3.8   CO2 20* 20*   BUN 30.8* 28.8*   CREATININE 1.58* 1.24*   CALCIUM 8.7 8.2*   ALBUMIN 3.4 2.8*   BILITOT 0.5 0.4   ALKPHOS 193* 141   AST 33 26   ALT 70* 53    and CBC   Recent Labs   Lab 03/29/23 2056 03/30/23  0853   WBC 4.7 4.3*   HGB  13.3 12.4   HCT 39.9 37.2    250         Significant Imaging: Echocardiogram: Transthoracic echo (TTE) complete (Cupid Only):   Results for orders placed or performed during the hospital encounter of 03/29/23   Echo   Result Value Ref Range    TDI SEPTAL 0.06 m/s    LV LATERAL E/E' RATIO 9.11 m/s    LV SEPTAL E/E' RATIO 13.67 m/s    Right Atrial Pressure (from IVC) 8 mmHg    EF 40 %    Left Ventricular Outflow Tract Mean Velocity 0.60 cm/s    Left Ventricular Outflow Tract Mean Gradient 2.00 mmHg    TDI LATERAL 0.09 m/s    PV PEAK VELOCITY 0.93 cm/s    LVIDd 4.90 3.5 - 6.0 cm    IVS 0.74 0.6 - 1.1 cm    Posterior Wall 0.92 0.6 - 1.1 cm    LVIDs 3.98 2.1 - 4.0 cm    FS 19 28 - 44 %    LV mass 137.59 g    LA size 3.30 cm    RVDD 3.84 cm    TAPSE 2.69 cm    Left Ventricle Relative Wall Thickness 0.38 cm    AV mean gradient 6 mmHg    AV Velocity Ratio 0.56     AV index (prosthetic) 0.56     E/A ratio 0.98     Mean e' 0.08 m/s    E wave deceleration time 246.00 msec    LVOT peak kaushik 0.98 m/s    LVOT peak VTI 22.00 cm    Ao peak kaushik 1.76 m/s    Ao VTI 39.2 cm    AV peak gradient 12 mmHg    E/E' ratio 10.93 m/s    MV Peak E Kaushik 0.82 m/s    MV Peak A Kaushik 0.84 m/s    LV Systolic Volume 69.20 mL    LV Diastolic Volume 113.00 mL    LA volume (mod) 76.60 cm3    Narrative    · The left ventricle is normal in size with mildly decreased systolic   function.  · The estimated ejection fraction is 40%.  · Grade II left ventricular diastolic dysfunction.  · Aortic valve sclerosis without stenosis. Peak AV velocity 1.8 m/sec.  · Mild mitral regurgitation.  · Mild pulmonic regurgitation.  · Normal right ventricular size with normal right ventricular systolic   function.        Imaging Results              X-Ray Chest AP Portable (Final result)  Result time 03/30/23 07:09:30      Final result by Moy Andino MD (03/30/23 07:09:30)                   Impression:      No acute cardiopulmonary process.      Electronically signed  by: Moy Andino  Date:    03/30/2023  Time:    07:09               Narrative:    EXAMINATION:  XR CHEST AP PORTABLE    CLINICAL HISTORY:  fall;    TECHNIQUE:  Single view of the chest    COMPARISON:  10/14/2022    FINDINGS:  Left-sided cardiac device remains.  Right-sided MediPort is in place.  No focal opacification.                                       CT Head Without Contrast (Final result)  Result time 03/30/23 06:59:59      Final result by Moy Andino MD (03/30/23 06:59:59)                   Impression:    Impression:    1. No acute intracranial process identified. Details and findings as noted above.      Electronically signed by: Moy Andino  Date:    03/30/2023  Time:    06:59               Narrative:    EXAMINATION:  CT HEAD WITHOUT CONTRAST    CLINICAL HISTORY:  Syncope, recurrent;    TECHNIQUE:  Low dose axial images were obtained through the head.  Coronal and sagittal reformations were also performed. Contrast was not administered.    Automatic exposure control was utilized to reduce the patient's radiation dose.    DLP= 833    COMPARISON:  05/06/2022    FINDINGS:  Hemorrhage: No acute intracranial hemorrhage is seen.    CSF spaces: The ventricles, sulci and basal cisterns all appear mildly prominent consistent with global cerebral atrophy.    Brain parenchyma: Moderate microvascular change is seen in portions of the periventricular and deep white matter tracts.    Cerebellum: Unremarkable.    Sella and skull base: The sella appears to be within normal limits for age.    Intracranial calcifications: Incidental note is made of bilateral choroid plexus calcification. Incidental note is made of some pineal region calcification. Incidental note is made of some calcification of the falx. Incidental note is made of bilateral basal ganglia calcifcation.    Calvarium: No acute linear or depressed skull fracture is seen.    Maxillofacial Structures:    Paranasal sinuses: The visualized paranasal  sinuses appear clear with no significant mucoperiosteal thickening or air fluid levels identified.    Orbits: The orbits appear unremarkable.    Temporal bones and mastoids: The right mastoid air cells are clear. Note is made of post operative changes in the left mastoid air cells.    TMJ: The mandibular condyles appear normally placed with respect to the mandibular fossa.                        Preliminary result by Srinivasa Veloz Jr., MD (03/30/23 01:30:06)                   Narrative:    START OF REPORT:  TECHNIQUE: CT OF THE HEAD WAS PERFORMED WITHOUT INTRAVENOUS CONTRAST WITH AXIAL AS WELL AS CORONAL AND SAGITTAL IMAGES.    COMPARISON: NONE.    DOSAGE INFORMATION: AUTOMATED EXPOSURE CONTROL WAS UTILIZED.    CLINICAL HISTORY: FALL (AASI C/O OF RECENT FALLS OVER SEVERAL DAYS, LAST FALL THIS AFTERNOON. C/O OF WEAKNESS. -LOC, DENIES HITTING HEAD, -BT. DENIES ANY COMPLAINTS OF PAIN, NO OBVIOUS INJURIES NOTED. AMBULATES WITH CANE AT HOME. GCS 15).    Findings:  Hemorrhage: No acute intracranial hemorrhage is seen.  CSF spaces: The ventricles, sulci and basal cisterns all appear mildly prominent consistent with global cerebral atrophy.  Brain parenchyma: Moderate microvascular change is seen in portions of the periventricular and deep white matter tracts.  Cerebellum: Unremarkable.  Sella and skull base: The sella appears to be within normal limits for age.  Intracranial calcifications: Incidental note is made of bilateral choroid plexus calcification. Incidental note is made of some pineal region calcification. Incidental note is made of some calcification of the falx. Incidental note is made of bilateral basal ganglia calcifcation.  Calvarium: No acute linear or depressed skull fracture is seen.    Maxillofacial Structures:  Paranasal sinuses: The visualized paranasal sinuses appear clear with no significant mucoperiosteal thickening or air fluid levels identified.  Orbits: The orbits appear  unremarkable.  Temporal bones and mastoids: The right mastoid air cells are clear. Note is made of post operative changes in the left mastoid air cells.  TMJ: The mandibular condyles appear normally placed with respect to the mandibular fossa.      Impression:  1. No acute intracranial process identified. Details and findings as noted above.                          Preliminary result by Interface, Rad Results In (03/30/23 01:30:06)                   Narrative:    START OF REPORT:  TECHNIQUE: CT OF THE HEAD WAS PERFORMED WITHOUT INTRAVENOUS CONTRAST WITH AXIAL AS WELL AS CORONAL AND SAGITTAL IMAGES.    COMPARISON: NONE.    DOSAGE INFORMATION: AUTOMATED EXPOSURE CONTROL WAS UTILIZED.    CLINICAL HISTORY: FALL (AASI C/O OF RECENT FALLS OVER SEVERAL DAYS, LAST FALL THIS AFTERNOON. C/O OF WEAKNESS. -LOC, DENIES HITTING HEAD, -BT. DENIES ANY COMPLAINTS OF PAIN, NO OBVIOUS INJURIES NOTED. AMBULATES WITH CANE AT HOME. GCS 15).    Findings:  Hemorrhage: No acute intracranial hemorrhage is seen.  CSF spaces: The ventricles, sulci and basal cisterns all appear mildly prominent consistent with global cerebral atrophy.  Brain parenchyma: Moderate microvascular change is seen in portions of the periventricular and deep white matter tracts.  Cerebellum: Unremarkable.  Sella and skull base: The sella appears to be within normal limits for age.  Intracranial calcifications: Incidental note is made of bilateral choroid plexus calcification. Incidental note is made of some pineal region calcification. Incidental note is made of some calcification of the falx. Incidental note is made of bilateral basal ganglia calcifcation.  Calvarium: No acute linear or depressed skull fracture is seen.    Maxillofacial Structures:  Paranasal sinuses: The visualized paranasal sinuses appear clear with no significant mucoperiosteal thickening or air fluid levels identified.  Orbits: The orbits appear unremarkable.  Temporal bones and mastoids: The  right mastoid air cells are clear. Note is made of post operative changes in the left mastoid air cells.  TMJ: The mandibular condyles appear normally placed with respect to the mandibular fossa.      Impression:  1. No acute intracranial process identified. Details and findings as noted above.                                          EKG:  No results found for this visit on 03/29/23.    Telemetry:      Physical Exam:  Physical Exam    General:  Well developed, well nourished, no acute respiratory distress  Head: Normocephalic, atraumatic  Eyes: PERRL, EOMI, anicteric sclera  Throat: No posterior pharyngeal erythema or exudate, no tonsillar exudate  Neck: supple, normal ROM, no thyromegaly   CVS:  RRR, S1 and S2 normal, no murmurs, no added heart sounds, rubs, gallops, 2+ peripheral pulses  Resp:  Lungs clear to auscultation bilaterally, no wheezes, rales, or rhonci  GI:  Abdomen soft, non-tender, non-distended, normoactive bowel sounds  MSK: lymphedema in LUE  Skin:  No rashes, ulcers, erythema  Neuro:  Alert and oriented x3, No focal neuro deficits, CNII-XII grossly intact  Psych:  Appropriate mood and affect       Home Medications:   No current facility-administered medications on file prior to encounter.     Current Outpatient Medications on File Prior to Encounter   Medication Sig Dispense Refill    anastrozole (ARIMIDEX) 1 mg Tab Take 1 mg by mouth once daily.      atorvastatin (LIPITOR) 40 MG tablet Take 40 mg by mouth once daily.      dexlansoprazole (DEXILANT) 60 mg capsule Take 60 mg by mouth once daily.      furosemide (LASIX) 40 MG tablet Take 40 mg by mouth once daily.      sacubitriL-valsartan (ENTRESTO) 24-26 mg per tablet Take 1 tablet by mouth 2 (two) times daily.      amiodarone (PACERONE) 400 MG tablet Take 400 mg by mouth once daily.      amLODIPine (NORVASC) 5 MG tablet Take 5 mg by mouth.      HYDROcodone-acetaminophen (NORCO) 5-325 mg per tablet Take 1 tablet by mouth every 4 (four) hours as  needed for Pain. 8 tablet 0    metoprolol succinate (TOPROL-XL) 25 MG 24 hr tablet Take 25 mg by mouth once daily.      spironolactone (ALDACTONE) 25 MG tablet Take 25 mg by mouth once daily.         Current Inpatient Medications:    Current Facility-Administered Medications:     0.9%  NaCl infusion, , Intravenous, Continuous, Sharon Pillai PA-C, Last Rate: 75 mL/hr at 03/30/23 1024, New Bag at 03/30/23 1024    acetaminophen tablet 650 mg, 650 mg, Oral, Q6H PRN, Clemencia Montoya AGACNP-BC    aluminum-magnesium hydroxide-simethicone 200-200-20 mg/5 mL suspension 30 mL, 30 mL, Oral, QID PRN, Clemencia Montoya AGACNP-BC    melatonin tablet 6 mg, 6 mg, Oral, Nightly PRN, FELICIA MartinP-BC    naloxone 0.4 mg/mL injection 0.02 mg, 0.02 mg, Intravenous, PRN, Clemencia Montoya AGACNP-BC    ondansetron injection 4 mg, 4 mg, Intravenous, Q4H PRN, Clemencia Montoya AGACNP-BC    polyethylene glycol packet 17 g, 17 g, Oral, BID PRN, Clemencia Montoya AGACNP-BC    prochlorperazine injection Soln 5 mg, 5 mg, Intravenous, Q6H PRN, Clemencia Montoya AGACNP-BC    simethicone chewable tablet 80 mg, 1 tablet, Oral, QID PRN, Clemencia Montoya AGADENNISEP-BC    Current Outpatient Medications:     anastrozole (ARIMIDEX) 1 mg Tab, Take 1 mg by mouth once daily., Disp: , Rfl:     atorvastatin (LIPITOR) 40 MG tablet, Take 40 mg by mouth once daily., Disp: , Rfl:     dexlansoprazole (DEXILANT) 60 mg capsule, Take 60 mg by mouth once daily., Disp: , Rfl:     furosemide (LASIX) 40 MG tablet, Take 40 mg by mouth once daily., Disp: , Rfl:     sacubitriL-valsartan (ENTRESTO) 24-26 mg per tablet, Take 1 tablet by mouth 2 (two) times daily., Disp: , Rfl:     amiodarone (PACERONE) 400 MG tablet, Take 400 mg by mouth once daily., Disp: , Rfl:     amLODIPine (NORVASC) 5 MG tablet, Take 5 mg by mouth., Disp: , Rfl:     HYDROcodone-acetaminophen (NORCO) 5-325 mg per tablet, Take 1 tablet by mouth every 4 (four) hours as needed for Pain., Disp: 8  tablet, Rfl: 0    metoprolol succinate (TOPROL-XL) 25 MG 24 hr tablet, Take 25 mg by mouth once daily., Disp: , Rfl:     spironolactone (ALDACTONE) 25 MG tablet, Take 25 mg by mouth once daily., Disp: , Rfl:            Assessment:     IMPRESSION:  Syncope  Fall  Hypotension 2/2 to polyphrmacy   heart failure air recovered ejection fraction (EF 40%)  Left Breast Cancer s/p left masectomy  Hypertension  History of DVT    PLAN:     PLAN:  -interrogate AICD  -left heart catheterization performed las sure was negative for  occlusion  -hold home blood pressure meds for soft blood pressure. Restart when blood pressure improves.  Also be cautious with pain medications as this can also lower blood pressure.  - troponin has downtrended  - will continue to follow and adjust BP meds    Thank you for your consult.     Zane Wilkinson MD  Cardiology  [facility] - Emergency Dept  03/30/2023 10:45 AM

## 2023-03-31 LAB
ALBUMIN SERPL-MCNC: 2.5 G/DL (ref 3.4–4.8)
ALBUMIN/GLOB SERPL: 0.9 RATIO (ref 1.1–2)
ALP SERPL-CCNC: 109 UNIT/L (ref 40–150)
ALT SERPL-CCNC: 44 UNIT/L (ref 0–55)
AST SERPL-CCNC: 30 UNIT/L (ref 5–34)
BASOPHILS # BLD AUTO: 0.04 X10(3)/MCL (ref 0–0.2)
BASOPHILS NFR BLD AUTO: 0.9 %
BILIRUBIN DIRECT+TOT PNL SERPL-MCNC: 0.5 MG/DL
BUN SERPL-MCNC: 19.4 MG/DL (ref 9.8–20.1)
CALCIUM SERPL-MCNC: 7.9 MG/DL (ref 8.4–10.2)
CHLORIDE SERPL-SCNC: 118 MMOL/L (ref 98–107)
CO2 SERPL-SCNC: 19 MMOL/L (ref 23–31)
CREAT SERPL-MCNC: 0.88 MG/DL (ref 0.55–1.02)
EOSINOPHIL # BLD AUTO: 0.4 X10(3)/MCL (ref 0–0.9)
EOSINOPHIL NFR BLD AUTO: 8.6 %
ERYTHROCYTE [DISTWIDTH] IN BLOOD BY AUTOMATED COUNT: 14.6 % (ref 11.5–17)
GFR SERPLBLD CREATININE-BSD FMLA CKD-EPI: >60 MLS/MIN/1.73/M2
GLOBULIN SER-MCNC: 2.7 GM/DL (ref 2.4–3.5)
GLUCOSE SERPL-MCNC: 78 MG/DL (ref 82–115)
HCT VFR BLD AUTO: 31.4 % (ref 37–47)
HGB BLD-MCNC: 10.4 G/DL (ref 12–16)
IMM GRANULOCYTES # BLD AUTO: 0.01 X10(3)/MCL (ref 0–0.04)
IMM GRANULOCYTES NFR BLD AUTO: 0.2 %
LYMPHOCYTES # BLD AUTO: 1.52 X10(3)/MCL (ref 0.6–4.6)
LYMPHOCYTES NFR BLD AUTO: 32.5 %
MAGNESIUM SERPL-MCNC: 1.6 MG/DL (ref 1.6–2.6)
MCH RBC QN AUTO: 32.4 PG (ref 27–31)
MCHC RBC AUTO-ENTMCNC: 33.1 G/DL (ref 33–36)
MCV RBC AUTO: 97.8 FL (ref 80–94)
MONOCYTES # BLD AUTO: 0.3 X10(3)/MCL (ref 0.1–1.3)
MONOCYTES NFR BLD AUTO: 6.4 %
NEUTROPHILS # BLD AUTO: 2.4 X10(3)/MCL (ref 2.1–9.2)
NEUTROPHILS NFR BLD AUTO: 51.4 %
NRBC BLD AUTO-RTO: 0 %
PLATELET # BLD AUTO: 220 X10(3)/MCL (ref 130–400)
PMV BLD AUTO: 9.7 FL (ref 7.4–10.4)
POTASSIUM SERPL-SCNC: 4.3 MMOL/L (ref 3.5–5.1)
PROT SERPL-MCNC: 5.2 GM/DL (ref 5.8–7.6)
RBC # BLD AUTO: 3.21 X10(6)/MCL (ref 4.2–5.4)
SODIUM SERPL-SCNC: 142 MMOL/L (ref 136–145)
TROPONIN I SERPL-MCNC: 0.03 NG/ML (ref 0–0.04)
WBC # SPEC AUTO: 4.7 X10(3)/MCL (ref 4.5–11.5)

## 2023-03-31 PROCEDURE — 11000001 HC ACUTE MED/SURG PRIVATE ROOM

## 2023-03-31 PROCEDURE — 25000003 PHARM REV CODE 250: Performed by: STUDENT IN AN ORGANIZED HEALTH CARE EDUCATION/TRAINING PROGRAM

## 2023-03-31 PROCEDURE — 97162 PT EVAL MOD COMPLEX 30 MIN: CPT

## 2023-03-31 PROCEDURE — 85025 COMPLETE CBC W/AUTO DIFF WBC: CPT | Performed by: STUDENT IN AN ORGANIZED HEALTH CARE EDUCATION/TRAINING PROGRAM

## 2023-03-31 PROCEDURE — 83735 ASSAY OF MAGNESIUM: CPT | Performed by: STUDENT IN AN ORGANIZED HEALTH CARE EDUCATION/TRAINING PROGRAM

## 2023-03-31 PROCEDURE — 84484 ASSAY OF TROPONIN QUANT: CPT | Performed by: EMERGENCY MEDICINE

## 2023-03-31 PROCEDURE — 97166 OT EVAL MOD COMPLEX 45 MIN: CPT

## 2023-03-31 PROCEDURE — 80053 COMPREHEN METABOLIC PANEL: CPT | Performed by: STUDENT IN AN ORGANIZED HEALTH CARE EDUCATION/TRAINING PROGRAM

## 2023-03-31 RX ORDER — METOPROLOL SUCCINATE 25 MG/1
25 TABLET, EXTENDED RELEASE ORAL DAILY
Status: DISCONTINUED | OUTPATIENT
Start: 2023-03-31 | End: 2023-04-01 | Stop reason: HOSPADM

## 2023-03-31 RX ORDER — ANASTROZOLE 1 MG/1
1 TABLET ORAL DAILY
Status: DISCONTINUED | OUTPATIENT
Start: 2023-03-31 | End: 2023-04-01 | Stop reason: HOSPADM

## 2023-03-31 RX ORDER — AMIODARONE HYDROCHLORIDE 200 MG/1
400 TABLET ORAL DAILY
Status: DISCONTINUED | OUTPATIENT
Start: 2023-03-31 | End: 2023-03-31

## 2023-03-31 RX ADMIN — SACUBITRIL AND VALSARTAN 1 TABLET: 24; 26 TABLET, FILM COATED ORAL at 08:03

## 2023-03-31 RX ADMIN — METOPROLOL SUCCINATE 25 MG: 25 TABLET, EXTENDED RELEASE ORAL at 11:03

## 2023-03-31 RX ADMIN — SACUBITRIL AND VALSARTAN 1 TABLET: 24; 26 TABLET, FILM COATED ORAL at 11:03

## 2023-03-31 RX ADMIN — ANASTROZOLE 1 MG: 1 TABLET, COATED ORAL at 11:03

## 2023-03-31 NOTE — PT/OT/SLP EVAL
Physical Therapy Evaluation    Patient Name:  Laura Jacobs   MRN:  [id number]    Recommendations:     Discharge Recommendations: home health PT   Discharge Equipment Recommendations: walker, rolling   Barriers to discharge: None    Assessment:     Laura Jacobs is a 72 y.o. female admitted for frequent falls. Patient reports living with nephews in [facility] with 1 step to enter. She requires assistance with ADL's and ambulates with cane. She has HH that comes in daily.  She presents with the following impairments/functional limitations: weakness, impaired functional mobility, gait instability, impaired balance, decreased safety awareness. She was SBA for bed mobility. Ambulated 20 ft with SPC & CGA. Poor safety awareness with occasional LOB. She will need RW & HH PT at discharge. Progress as tolerated.     Rehab Prognosis: Good; patient would benefit from acute skilled PT services to address these deficits and reach maximum level of function.    Recent Surgery: * No surgery found *      Plan:     During this hospitalization, patient to be seen 3 x/week (3-5x/week) to address the identified rehab impairments via gait training, therapeutic activities, therapeutic exercises, neuromuscular re-education and progress toward the following goals:    Plan of Care Expires:  04/30/23    Subjective     Chief Complaint: none  Patient/Family Comments/goals:  none  Pain/Comfort:  Pain Rating 1: 0/10    Patients cultural, spiritual, Mosque conflicts given the current situation: no    Living Environment:  Lives with nephews in [facility] with 1 step to enter.   Prior to admission, patient required assistance with ADL's .  Equipment used at home: cane, straight.  DME owned (not currently used): none.  Upon discharge, patient will have assistance from family.    Objective:     Communicated with nurse prior to session.  Patient found supine with telemetry, peripheral IV  upon PT entry to room.    General Precautions: Standard,  fall  Orthopedic Precautions:N/A   Braces: N/A  Respiratory Status: Room air    Exams:  Cognitive Exam:  Patient is oriented to Person, Place, Time, and Situation  Sensation:    -       Intact  RLE ROM: WFL  RLE Strength: 4/5 grossly  LLE ROM: WFL  LLE Strength: 4/5 grossly    Functional Mobility:  Bed Mobility:     Supine to Sit: stand by assistance  Sit to Supine: stand by assistance  Transfers:     Sit to Stand:  contact guard assistance with straight cane  Gait: 20 ft with SPC & CGA.   Balance: poor      AM-PAC 6 CLICK MOBILITY  Total Score:20     Patient left HOB elevated with all lines intact and call button in reach.    GOALS:   Multidisciplinary Problems       Physical Therapy Goals          Problem: Physical Therapy    Goal Priority Disciplines Outcome Goal Variances Interventions   Physical Therapy Goal     PT, PT/OT Ongoing, Progressing     Description: Goals to be met by: 23     Patient will increase functional independence with mobility by performin. Gait  x 300 feet with Modified [place] using Rolling Walker.   2. Increased functional strength to 5/5 in BLE's.                         History:     Past Medical History:   Diagnosis Date    Cancer     breast CA s/p chemo and L masectomy     CHF (congestive heart failure)     History of DVT (deep vein thrombosis)     HLD (hyperlipidemia)     Hypertension     Osteoarthritis     Venous insufficiency        Past Surgical History:   Procedure Laterality Date    HYSTERECTOMY      INSERTION OF PACEMAKER      INTRAMEDULLARY RODDING OF FEMUR Left 10/14/2022    Procedure: INSERTION, INTRAMEDULLARY СВЕТЛАНА, FEMUR;  Surgeon: Ankush Alex MD;  Location: [facility];  Service: Orthopedics;  Laterality: Left;    JOINT REPLACEMENT Bilateral     Knee    MASTECTOMY Left        Time Tracking:     PT Received On: 23  PT Start Time: 1024     PT Stop Time: 1044  PT Total Time (min): 20 min     Billable Minutes: Evaluation 20 minutes      2023

## 2023-03-31 NOTE — PLAN OF CARE
Problem: Physical Therapy  Goal: Physical Therapy Goal  Description: Goals to be met by: 23     Patient will increase functional independence with mobility by performin. Gait  x 300 feet with Modified [place] using Rolling Walker.   2. Increased functional strength to 5/5 in BLE's.    Outcome: Ongoing, Progressing

## 2023-03-31 NOTE — NURSING
Nurses Note -- 4 Eyes      3/30/2023   9:33 PM      Skin assessed during: Admit      [x] No Pressure Injuries Present    []Prevention Measures Documented      [] Yes- Altered Skin Integrity Present or Discovered   [] LDA Added if Not in Epic (Describe Wound)   [] New Altered Skin Integrity was Present on Admit and Documented in LDA   [] Wound Image Taken    Wound Care Consulted? No    Attending Nurse:  Alyssa Huynh RN     Second RN/Staff Member:  Nancy Berman CNA

## 2023-03-31 NOTE — PT/OT/SLP EVAL
Occupational Therapy   Evaluation    Name: Laura Jacobs  MRN: [id number]  Admitting Diagnosis: <principal problem not specified>  Recent Surgery: * No surgery found *      Recommendations:     Discharge Recommendations: home with home health  Discharge Equipment Recommendations:     Barriers to discharge:       Assessment:     Laura Jacobs is a 72 y.o. female with a medical diagnosis of syncope with symptomatic bradycardia, fall, hypotension 2/2 polypharmacy.  She presents with the following performance deficits affecting function: weakness, impaired endurance, impaired self care skills, impaired functional mobility.      HR remained above 50s throughout ambulation to bathroom with RW; no LOB; recommend home with HH, pt has sitters throughout the day     Rehab Prognosis: Good; patient would benefit from acute skilled OT services to address these deficits and reach maximum level of function.       Plan:     Patient to be seen 3 x/week, 5 x/week to address the above listed problems via self-care/home management, therapeutic activities, therapeutic exercises  Plan of Care Expires:    Plan of Care Reviewed with: patient    Subjective     Chief Complaint: I want to go home   Patient/Family Comments/goals: go home     Occupational Profile:  Living Environment: 1 story   Previous level of function: indep   Roles and Routines: disabled; no driving   Equipment Used at Home: walker, rolling, cane, straight  Assistance upon Discharge: sitters and nephews there but they work during the day     Pain/Comfort:  Pain Rating 1: 0/10    Patients cultural, spiritual, Catholic conflicts given the current situation:      Objective:     Communicated with: nrsg prior to session.  Patient found HOB elevated with   upon OT entry to room.    General Precautions: Standard,    Orthopedic Precautions:    Braces:    Respiratory Status: Room air    Occupational Performance:    Bed Mobility:    Patient completed Supine to Sit with  independence  Patient completed Sit to Supine with independence    Functional Mobility/Transfers:  Patient completed Toilet Transfer Step Transfer technique with stand by assistance with  rolling walker  Functional Mobility: no LOB     Activities of Daily Living:  Lower Body Dressing: stand by assistance    Toileting: stand by assistance      Cognitive/Visual Perceptual:  Cognitive/Psychosocial Skills:     -       Oriented to: Person, Place, Time, and Situation     Physical Exam:  Upper Extremity Range of Motion:     -       Right Upper Extremity: WFL  -       Left Upper Extremity: WFL  Edema in LUE chronic         Patient left HOB elevated with all lines intact and call button in reach    GOALS:   Multidisciplinary Problems       Occupational Therapy Goals          Problem: Occupational Therapy    Goal Priority Disciplines Outcome Interventions   Occupational Therapy Goal     OT, PT/OT Ongoing, Progressing    Description: Goals to be met by: 4/7/2023     Patient will increase functional independence with ADLs by performing:    LE Dressing with Modified [place].  Grooming while standing with Modified [place].  Toileting from toilet with Modified [place] for hygiene and clothing management.   Toilet transfer to toilet with Modified [place].                         History:     Past Medical History:   Diagnosis Date    Cancer     breast CA s/p chemo and L masectomy     CHF (congestive heart failure)     History of DVT (deep vein thrombosis)     HLD (hyperlipidemia)     Hypertension     Osteoarthritis     Venous insufficiency          Past Surgical History:   Procedure Laterality Date    HYSTERECTOMY      INSERTION OF PACEMAKER      INTRAMEDULLARY RODDING OF FEMUR Left 10/14/2022    Procedure: INSERTION, INTRAMEDULLARY СВЕТЛАНА, FEMUR;  Surgeon: Ankush Alex MD;  Location: [facility];  Service: Orthopedics;  Laterality: Left;    JOINT REPLACEMENT Bilateral     Knee    MASTECTOMY Left 2019       Time  Tracking:     OT Date of Treatment:    OT Start Time: 1054  OT Stop Time: 1104  OT Total Time (min): 10 min    Billable Minutes:Evaluation Moderate Complexity     3/31/2023

## 2023-03-31 NOTE — PLAN OF CARE
Problem: Occupational Therapy  Goal: Occupational Therapy Goal  Description: Goals to be met by: 4/7/2023     Patient will increase functional independence with ADLs by performing:    LE Dressing with Modified [place].  Grooming while standing with Modified [place].  Toileting from toilet with Modified [place] for hygiene and clothing management.   Toilet transfer to toilet with Modified [place].    Outcome: Ongoing, Progressing

## 2023-03-31 NOTE — PROGRESS NOTES
[facility]  Hospital Medicine Progress Note        Chief Complaint: Inpatient Follow-up for Dizziness    HPI:   Mrs Jacobs is a 72 year old lady with PMH of hypertension, hyperlipidemia, congestive heart failure, venous insufficiency, DVT and breast cancer status post chemo and left mastectomy. The patient presented to [facility] on 3/29/2023 with a primary complaint of frequent falls.  Patient reports having one fall on 3/27/2023 and again yesterday (3/29/2023).  Patient states she is unsure why she is falling but thinks it could be due to her tripping.  She reports dizziness when walking. Upon presentation to the ED, her VS showed temperature 97.3° F, heart rate 54, blood pressure 107/66, respiratory rate 16, SpO2 97% on room air.  Labs showed WBC 4.7, CO2 20, BUN/creatinine 30.8/1.58 (10/0.86 in December 2022), alkaline phosphatase 193, ALT 70, initial troponin 0.049.  UA with 8 squamous epithelial cells, 9 WBC, 1+ leukocyte esterase. EKG with sinus bradycardia with 1st degree AV block and left ventricular hypertrophy with QRS widening. CT head without acute intracranial process.  CXR with no acute cardiopulmonary process.  Patient was given full-dose aspirin.  Patient admitted to hospital medicine services for further medical management. MRI Brain ordered but unable to be completed until 04/01 because of her pacemaker. Carotids US negative. Echocardiogram showed EF 40%, grade 2 LV diastolic dysfunction, mild MR, mild [place].  Cardiology consulted from the ER; recommended holding home blood pressure medications and adding Entresto and beta blocker back 1st.  Cardiology also thought that patient's symptoms were likely due to polypharmacy.  LHC from last year showing no obstructive coronary disease.  ICD interrogated in the ER with strips being transmitted to company for interpretation.    Interval Hx:   Today, Mrs. Winter stated that she was doing well and had no new complaints.  She  reported she was ready to go home.  PT work with patient and recommended home health with PT on discharge.  Will keep patient in the hospital until tomorrow for MRI brain and if normal will plan for discharge with home health.  Adding beta blocker and Entresto back today.  Will continue monitoring blood pressure and heart rate.    Objective/physical exam:  General: alert lady lying comfortably in bed, in no acute distress  HENT: oral and oropharyngeal mucosa moist, pink, with no erythema or exudates, no ear pain or discharge  Neck: normal neck movement, no lymph nodes or swellings, no JVD or Carotid bruit  Respiratory: clear breathing sounds bilaterally, no crackles, rales, ronchi or wheezes  Cardiovascular: clear S1 and S2, no murmurs, rubs or gallops  Peripheral Vascular: no lesions, ulcers or erosions, normal peripheral pulses and no pedal edema  Gastrointestinal: soft, non-tender, non-distended abdomen, no guarding, rigidity or rebound tenderness, normal bowel sounds  Integumentary: normal skin color, no rashes or lesions  Neuro: AAO x 3; motor strength 5/5 in B/L UEs & LEs; sensation intact to gross and fine touch B/L; CN II-XII grossly intact    VITAL SIGNS: 24 HRS MIN & MAX LAST   Temp  Min: 97.4 °F (36.3 °C)  Max: 98.3 °F (36.8 °C) 98.3 °F (36.8 °C)   BP  Min: 110/64  Max: 138/70 114/61   Pulse  Min: 48  Max: 56  (!) 52     Resp  Min: 14  Max: 18 18   SpO2  Min: 97 %  Max: 100 % 100 %       Recent Labs   Lab 03/29/23 2056 03/30/23  0853 03/31/23  0441   WBC 4.7 4.3* 4.7   RBC 4.02* 3.75* 3.21*   HGB 13.3 12.4 10.4*   HCT 39.9 37.2 31.4*   MCV 99.3* 99.2* 97.8*   MCH 33.1* 33.1* 32.4*   MCHC 33.3 33.3 33.1   RDW 14.2 14.4 14.6    250 220   MPV 9.4 9.5 9.7       Recent Labs   Lab 03/29/23 2056 03/30/23  0533 03/31/23  0441    141 142   K 4.0 3.8 4.3   CO2 20* 20* 19*   BUN 30.8* 28.8* 19.4   CREATININE 1.58* 1.24* 0.88   CALCIUM 8.7 8.2* 7.9*   MG  --  1.90 1.60   ALBUMIN 3.4 2.8* 2.5*   ALKPHOS  193* 141 109   ALT 70* 53 44   AST 33 26 30   BILITOT 0.5 0.4 0.5     Scheduled Med:   anastrozole  1 mg Oral Daily    metoprolol succinate  25 mg Oral Daily    sacubitriL-valsartan  1 tablet Oral BID        Continuous Infusions:   sodium chloride 0.9% 75 mL/hr at 03/30/23 1024        PRN Meds:  acetaminophen, aluminum-magnesium hydroxide-simethicone, melatonin, naloxone, ondansetron, polyethylene glycol, prochlorperazine, simethicone     Assessment/Plan:  Dizziness, Recurrent Falls 2/2 possible Polypharmacy vs Intravascular Depletion  Sinus Bradycardia  [place]  NSTEMI Type 2 2/2 Intravascular Depletion  HTN  HLD  CHF   Chronic Venous Insufficiency   Prior DVT   Breast Cancer s/p Chemotherapy and L Mastectomy    Patient continues to be admitted for close monitoring   HR continues to be in the 50s/min but patient reporting no symptoms  CIS on board; following recommendations   PT/OT on board; following recommendations  Patient will be planned for discharge with home health  B/L U/S carotids negative; echocardiogram showing EF 40%, grade 2 LV DD, mild MS, mild MR  Added Entresto 2426 mg b.i.d. and metoprolol succinate 25 mg daily today  Patient continued on anastrozole 1 mg daily   Patient on IV NS 75 mL/hour; will discontinue today   Continue monitoring patient's symptoms   MRI brain pending for tomorrow; if normal will plan for discharge with home health    VTE prophylaxis: SCDs    Patient condition:  Stable    Anticipated discharge and Disposition:     DC tomorrow post MRI; if normal    All diagnosis and differential diagnosis have been reviewed; assessment and plan has been documented; I have personally reviewed the labs and test results that are presently available; I have reviewed the patients medication list; I have reviewed the consulting providers response and recommendations. I have reviewed or attempted to review medical records based upon their availability    All of the patient's questions have been   addressed and answered. Patient's is agreeable to the above stated plan. I will continue to monitor closely and make adjustments to medical management as needed.  _____________________________________________________________________    Nutrition Status:  Cardiac    Radiology:  Echo  · The left ventricle is normal in size with mildly decreased systolic   function.  · The estimated ejection fraction is 40%.  · Grade II left ventricular diastolic dysfunction.  · Aortic valve sclerosis without stenosis. Peak AV velocity 1.8 m/sec.  · Mild mitral regurgitation.  · Mild pulmonic regurgitation.  · Normal right ventricular size with normal right ventricular systolic   function.     X-Ray Chest AP Portable  Narrative: EXAMINATION:  XR CHEST AP PORTABLE    CLINICAL HISTORY:  fall;    TECHNIQUE:  Single view of the chest    COMPARISON:  10/14/2022    FINDINGS:  Left-sided cardiac device remains.  Right-sided MediPort is in place.  No focal opacification.  Impression: No acute cardiopulmonary process.    Electronically signed by: Moy Andino  Date:    03/30/2023  Time:    07:09  CT Head Without Contrast  Narrative: EXAMINATION:  CT HEAD WITHOUT CONTRAST    CLINICAL HISTORY:  Syncope, recurrent;    TECHNIQUE:  Low dose axial images were obtained through the head.  Coronal and sagittal reformations were also performed. Contrast was not administered.    Automatic exposure control was utilized to reduce the patient's radiation dose.    DLP= 833    COMPARISON:  05/06/2022    FINDINGS:  Hemorrhage: No acute intracranial hemorrhage is seen.    CSF spaces: The ventricles, sulci and basal cisterns all appear mildly prominent consistent with global cerebral atrophy.    Brain parenchyma: Moderate microvascular change is seen in portions of the periventricular and deep white matter tracts.    Cerebellum: Unremarkable.    Sella and skull base: The sella appears to be within normal limits for age.    Intracranial calcifications:  Incidental note is made of bilateral choroid plexus calcification. Incidental note is made of some pineal region calcification. Incidental note is made of some calcification of the falx. Incidental note is made of bilateral basal ganglia calcifcation.    Calvarium: No acute linear or depressed skull fracture is seen.    Maxillofacial Structures:    Paranasal sinuses: The visualized paranasal sinuses appear clear with no significant mucoperiosteal thickening or air fluid levels identified.    Orbits: The orbits appear unremarkable.    Temporal bones and mastoids: The right mastoid air cells are clear. Note is made of post operative changes in the left mastoid air cells.    TMJ: The mandibular condyles appear normally placed with respect to the mandibular fossa.  Impression: Impression:    1. No acute intracranial process identified. Details and findings as noted above.    Electronically signed by: Moy Andino  Date:    03/30/2023  Time:    06:59      Sabas Bragg MD   03/31/2023

## 2023-03-31 NOTE — PROGRESS NOTES
[facility] - [facility] Medical Telemetry  Cardiology  Progress Note    Patient Name: Laura Jacobs  MRN: [id number]  Admission Date: 3/29/2023  Hospital Length of Stay: 1 days  Code Status: Full Code   Attending Physician: Sabas Bragg MD   Primary Care Physician: Ruben Brooks Sr, MD  Expected Discharge Date:   Principal Problem:<principal problem not specified>    Subjective:     Brief HPI:  72 y.o. Black or  female with a past medical history of hypertension, hyperlipidemia, congestive heart failure, venous insufficiency, DVT and breast cancer status post chemo and left mastectomy.  Patient admitted to [facility] 3/29/2023 due to falls. Patient had falls on 3/27 and 3/28. Patient stated that she felt dizzy and weak before falling. Patients neighbor noticed that has been weaker over the past couple of day. While going through [facility] rec most recently she had been prescribed clonidine, amiodarone, entresto, metoprolol, cardizem and  amlodipine. Denies CP, SOB, ROMO, nausea, vomiting or peripheral edema.    Hospital Course:     3/31/2023- NAEO. Patient states she is doing well. Able to ambulate with walker without symptoms of dizziness or syncope. Denies CP, SOB, ROMO, nausea, vomiting or peripheral edema.    ROS    As stated above    Objective:     Vital Signs (Most Recent):  Temp: 97.4 °F (36.3 °C) (03/31/23 1117)  Pulse: (!) 56 (03/31/23 1117)  Resp: 18 (03/31/23 0425)  BP: 110/64 (03/31/23 1117)  SpO2: 97 % (03/31/23 1117)   Vital Signs (24h Range):  Temp:  [97.4 °F (36.3 °C)-98.2 °F (36.8 °C)] 97.4 °F (36.3 °C)  Pulse:  [48-56] 56  Resp:  [12-18] 18  SpO2:  [97 %-100 %] 97 %  BP: (110-138)/(55-78) 110/64     Weight: 54.9 kg (121 lb)  Body mass index is 18.95 kg/m².    SpO2: 97 %       No intake or output data in the 24 hours ending 03/31/23 1354    Lines/Drains/Airways       Peripheral Intravenous Line  Duration                  Peripheral IV - Single Lumen 03/30/23 0105 22 G  Posterior;Right Hand 1 day                    Significant Labs:   Recent Results (from the past 72 hour(s))   Comprehensive metabolic panel    Collection Time: 03/29/23  8:56 PM   Result Value Ref Range    Sodium Level 138 136 - 145 mmol/L    Potassium Level 4.0 3.5 - 5.1 mmol/L    Chloride 109 (H) 98 - 107 mmol/L    Carbon Dioxide 20 (L) 23 - 31 mmol/L    Glucose Level 83 82 - 115 mg/dL    Blood Urea Nitrogen 30.8 (H) 9.8 - 20.1 mg/dL    Creatinine 1.58 (H) 0.55 - 1.02 mg/dL    Calcium Level Total 8.7 8.4 - 10.2 mg/dL    Protein Total 7.2 5.8 - 7.6 gm/dL    Albumin Level 3.4 3.4 - 4.8 g/dL    Globulin 3.8 (H) 2.4 - 3.5 gm/dL    Albumin/Globulin Ratio 0.9 (L) 1.1 - 2.0 ratio    Bilirubin Total 0.5 <=1.5 mg/dL    Alkaline Phosphatase 193 (H) 40 - 150 unit/L    Alanine Aminotransferase 70 (H) 0 - 55 unit/L    Aspartate Aminotransferase 33 5 - 34 unit/L    eGFR 35 mls/min/1.73/m2   CBC with Differential    Collection Time: 03/29/23  8:56 PM   Result Value Ref Range    WBC 4.7 4.5 - 11.5 x10(3)/mcL    RBC 4.02 (L) 4.20 - 5.40 x10(6)/mcL    Hgb 13.3 12.0 - 16.0 g/dL    Hct 39.9 37.0 - 47.0 %    MCV 99.3 (H) 80.0 - 94.0 fL    MCH 33.1 (H) 27.0 - 31.0 pg    MCHC 33.3 33.0 - 36.0 g/dL    RDW 14.2 11.5 - 17.0 %    Platelet 225 130 - 400 x10(3)/mcL    MPV 9.4 7.4 - 10.4 fL    Neut % 50.9 %    Lymph % 28.5 %    Mono % 10.1 %    Eos % 9.7 %    Basophil % 0.6 %    Lymph # 1.33 0.6 - 4.6 x10(3)/mcL    Neut # 2.37 2.1 - 9.2 x10(3)/mcL    Mono # 0.47 0.1 - 1.3 x10(3)/mcL    Eos # 0.45 0 - 0.9 x10(3)/mcL    Baso # 0.03 0 - 0.2 x10(3)/mcL    IG# 0.01 0 - 0.04 x10(3)/mcL    IG% 0.2 %    NRBC% 0.0 %   Troponin I    Collection Time: 03/30/23 12:57 AM   Result Value Ref Range    Troponin-I 0.049 (H) 0.000 - 0.045 ng/mL   Urinalysis, Reflex to Urine Culture    Collection Time: 03/30/23  1:06 AM    Specimen: Urine   Result Value Ref Range    Color, UA Dark Yellow Yellow, Light-Yellow, Dark Yellow, Sara, Straw    Appearance, UA Clear Clear     Specific Gravity, UA 1.021 1.005 - 1.030    pH, UA 5.5 5.0 - 8.5    Protein, UA Negative Negative mg/dL    Glucose, UA Negative Negative, Normal mg/dL    Ketones, UA Negative Negative mg/dL    Blood, UA Negative Negative unit/L    Bilirubin, UA Negative Negative mg/dL    Urobilinogen, UA 1.0 0.2, 1.0, Normal mg/dL    Nitrites, UA Negative Negative    Leukocyte Esterase, UA 1+ (A) Negative unit/L   Urinalysis, Microscopic    Collection Time: 03/30/23  1:06 AM   Result Value Ref Range    RBC, UA <5 <=5 /HPF    WBC, UA 9 (H) <=5 /HPF    Squamous Epithelial Cells, UA 8 (H) <=5 /HPF    Bacteria, UA None Seen None Seen, Rare, Occasional /HPF   Troponin I    Collection Time: 03/30/23  5:33 AM   Result Value Ref Range    Troponin-I 0.024 0.000 - 0.045 ng/mL   Comprehensive Metabolic Panel (CMP)    Collection Time: 03/30/23  5:33 AM   Result Value Ref Range    Sodium Level 141 136 - 145 mmol/L    Potassium Level 3.8 3.5 - 5.1 mmol/L    Chloride 113 (H) 98 - 107 mmol/L    Carbon Dioxide 20 (L) 23 - 31 mmol/L    Glucose Level 79 (L) 82 - 115 mg/dL    Blood Urea Nitrogen 28.8 (H) 9.8 - 20.1 mg/dL    Creatinine 1.24 (H) 0.55 - 1.02 mg/dL    Calcium Level Total 8.2 (L) 8.4 - 10.2 mg/dL    Protein Total 5.8 5.8 - 7.6 gm/dL    Albumin Level 2.8 (L) 3.4 - 4.8 g/dL    Globulin 3.0 2.4 - 3.5 gm/dL    Albumin/Globulin Ratio 0.9 (L) 1.1 - 2.0 ratio    Bilirubin Total 0.4 <=1.5 mg/dL    Alkaline Phosphatase 141 40 - 150 unit/L    Alanine Aminotransferase 53 0 - 55 unit/L    Aspartate Aminotransferase 26 5 - 34 unit/L    eGFR 47 mls/min/1.73/m2   Magnesium    Collection Time: 03/30/23  5:33 AM   Result Value Ref Range    Magnesium Level 1.90 1.60 - 2.60 mg/dL   Phosphorus    Collection Time: 03/30/23  5:33 AM   Result Value Ref Range    Phosphorus Level 3.3 2.3 - 4.7 mg/dL   CBC with Differential    Collection Time: 03/30/23  8:53 AM   Result Value Ref Range    WBC 4.3 (L) 4.5 - 11.5 x10(3)/mcL    RBC 3.75 (L) 4.20 - 5.40 x10(6)/mcL     Hgb 12.4 12.0 - 16.0 g/dL    Hct 37.2 37.0 - 47.0 %    MCV 99.2 (H) 80.0 - 94.0 fL    MCH 33.1 (H) 27.0 - 31.0 pg    MCHC 33.3 33.0 - 36.0 g/dL    RDW 14.4 11.5 - 17.0 %    Platelet 250 130 - 400 x10(3)/mcL    MPV 9.5 7.4 - 10.4 fL    Neut % 49.0 %    Lymph % 31.7 %    Mono % 7.6 %    Eos % 10.6 %    Basophil % 0.9 %    Lymph # 1.37 0.6 - 4.6 x10(3)/mcL    Neut # 2.11 2.1 - 9.2 x10(3)/mcL    Mono # 0.33 0.1 - 1.3 x10(3)/mcL    Eos # 0.46 0 - 0.9 x10(3)/mcL    Baso # 0.04 0 - 0.2 x10(3)/mcL    IG# 0.01 0 - 0.04 x10(3)/mcL    IG% 0.2 %    NRBC% 0.0 %   Troponin I    Collection Time: 03/30/23  9:01 AM   Result Value Ref Range    Troponin-I 0.034 0.000 - 0.045 ng/mL   TSH    Collection Time: 03/30/23  9:01 AM   Result Value Ref Range    Thyroid Stimulating Hormone 0.014 (L) 0.350 - 4.940 uIU/mL   T3, Free (OLG)    Collection Time: 03/30/23  9:01 AM   Result Value Ref Range    T3 Free 1.57 1.57 - 3.91 pg/mL   T4, Free    Collection Time: 03/30/23  9:01 AM   Result Value Ref Range    Thyroxine Free 1.25 0.70 - 1.48 ng/dL   Echo    Collection Time: 03/30/23 10:54 AM   Result Value Ref Range    TDI SEPTAL 0.06 m/s    LV LATERAL E/E' RATIO 9.11 m/s    LV SEPTAL E/E' RATIO 13.67 m/s    Right Atrial Pressure (from IVC) 8 mmHg    EF 40 %    Left Ventricular Outflow Tract Mean Velocity 0.60 cm/s    Left Ventricular Outflow Tract Mean Gradient 2.00 mmHg    TDI LATERAL 0.09 m/s    PV PEAK VELOCITY 0.93 cm/s    LVIDd 4.90 3.5 - 6.0 cm    IVS 0.74 0.6 - 1.1 cm    Posterior Wall 0.92 0.6 - 1.1 cm    LVIDs 3.98 2.1 - 4.0 cm    FS 19 28 - 44 %    LV mass 137.59 g    LA size 3.30 cm    RVDD 3.84 cm    TAPSE 2.69 cm    Left Ventricle Relative Wall Thickness 0.38 cm    AV mean gradient 6 mmHg    AV Velocity Ratio 0.56     AV index (prosthetic) 0.56     E/A ratio 0.98     Mean e' 0.08 m/s    E wave deceleration time 246.00 msec    LVOT peak sinai 0.98 m/s    LVOT peak VTI 22.00 cm    Ao peak sinai 1.76 m/s    Ao VTI 39.2 cm    AV peak gradient  12 mmHg    E/E' ratio 10.93 m/s    MV Peak E Kaushik 0.82 m/s    MV Peak A Kaushik 0.84 m/s    LV Systolic Volume 69.20 mL    LV Diastolic Volume 113.00 mL    LA volume (mod) 76.60 cm3   Troponin I    Collection Time: 03/30/23  5:46 PM   Result Value Ref Range    Troponin-I 0.013 0.000 - 0.045 ng/mL   Troponin I    Collection Time: 03/31/23  4:41 AM   Result Value Ref Range    Troponin-I 0.026 0.000 - 0.045 ng/mL   Comprehensive Metabolic Panel    Collection Time: 03/31/23  4:41 AM   Result Value Ref Range    Sodium Level 142 136 - 145 mmol/L    Potassium Level 4.3 3.5 - 5.1 mmol/L    Chloride 118 (H) 98 - 107 mmol/L    Carbon Dioxide 19 (L) 23 - 31 mmol/L    Glucose Level 78 (L) 82 - 115 mg/dL    Blood Urea Nitrogen 19.4 9.8 - 20.1 mg/dL    Creatinine 0.88 0.55 - 1.02 mg/dL    Calcium Level Total 7.9 (L) 8.4 - 10.2 mg/dL    Protein Total 5.2 (L) 5.8 - 7.6 gm/dL    Albumin Level 2.5 (L) 3.4 - 4.8 g/dL    Globulin 2.7 2.4 - 3.5 gm/dL    Albumin/Globulin Ratio 0.9 (L) 1.1 - 2.0 ratio    Bilirubin Total 0.5 <=1.5 mg/dL    Alkaline Phosphatase 109 40 - 150 unit/L    Alanine Aminotransferase 44 0 - 55 unit/L    Aspartate Aminotransferase 30 5 - 34 unit/L    eGFR >60 mls/min/1.73/m2   Magnesium    Collection Time: 03/31/23  4:41 AM   Result Value Ref Range    Magnesium Level 1.60 1.60 - 2.60 mg/dL   CBC with Differential    Collection Time: 03/31/23  4:41 AM   Result Value Ref Range    WBC 4.7 4.5 - 11.5 x10(3)/mcL    RBC 3.21 (L) 4.20 - 5.40 x10(6)/mcL    Hgb 10.4 (L) 12.0 - 16.0 g/dL    Hct 31.4 (L) 37.0 - 47.0 %    MCV 97.8 (H) 80.0 - 94.0 fL    MCH 32.4 (H) 27.0 - 31.0 pg    MCHC 33.1 33.0 - 36.0 g/dL    RDW 14.6 11.5 - 17.0 %    Platelet 220 130 - 400 x10(3)/mcL    MPV 9.7 7.4 - 10.4 fL    Neut % 51.4 %    Lymph % 32.5 %    Mono % 6.4 %    Eos % 8.6 %    Basophil % 0.9 %    Lymph # 1.52 0.6 - 4.6 x10(3)/mcL    Neut # 2.40 2.1 - 9.2 x10(3)/mcL    Mono # 0.30 0.1 - 1.3 x10(3)/mcL    Eos # 0.40 0 - 0.9 x10(3)/mcL    Baso # 0.04  0 - 0.2 x10(3)/mcL    IG# 0.01 0 - 0.04 x10(3)/mcL    IG% 0.2 %    NRBC% 0.0 %   CV Ultrasound Bilateral Doppler Carotid    Collection Time: 03/31/23  9:54 AM   Result Value Ref Range    Left ICA/CCA ratio 1.18     Right ICA/CCA ratio 1.32     Left Highest ICA 93.00     Left Highest CCA 79     Right Highest ICA 98.00     Right Highest CCA 74     Right Highest EDV 22.00     LT Highest EDV 27.00     Right CCA prox sys 67 cm/s    Right CCA prox alford 18 cm/s    Right CCA dist sys 74 cm/s    Right CCA dist alford 18 cm/s    Right ICA prox sys 61 cm/s    Right ICA prox alford 14 cm/s    Right ICA mid sys 98 cm/s    Right ICA mid alford 22 cm/s    Right ICA dist sys 79 cm/s    Right ICA dist alford 19 cm/s    Right ECA sys 75 cm/s    Right vertebral sys 48 cm/s    Left CCA prox sys 76 cm/s    Left CCA prox alford 17 cm/s    Left CCA dist sys 79 cm/s    Left CCA dist alford 18 cm/s    Left ICA prox sys 84 cm/s    Left ICA prox alford 19 cm/s    Left ICA mid sys 74 cm/s    Left ICA mid alford 25 cm/s    Left ICA dist sys 93 cm/s    Left ICA dist alford 27 cm/s    Left ECA sys 70 cm/s    Left vertebral sys 48 cm/s    Right vertebral alford 6 cm/s    Right ECA alford 12 cm/s    Left vertebral alford 10 cm/s    Left ECA alford 11 cm/s       Telemetry:      Physical Exam    General:  Well developed, well nourished, no acute respiratory distress  Head: Normocephalic, atraumatic  Eyes: PERRL, EOMI, anicteric sclera  Neck: supple, normal ROM, no thyromegaly   CVS:  RRR, S1 and S2 normal, no murmurs, no added heart sounds, rubs, gallops, 2+ peripheral pulses  Resp:  Lungs clear to auscultation bilaterally, no wheezes, rales, or rhonci  GI:  Abdomen soft, non-tender, non-distended, normoactive bowel sounds  MSK:  ambulates with a walker. Lymphedema in LUE  Skin:  No rashes, ulcers, erythema  Neuro:  Alert and oriented x3, No focal neuro deficits, CNII-XII grossly intact  Psych:  Appropriate mood and affect     Current Inpatient Medications:    Current  Facility-Administered Medications:     0.9%  NaCl infusion, , Intravenous, Continuous, Sharon Pillai PA-C, Last Rate: 75 mL/hr at 03/30/23 1024, New Bag at 03/30/23 1024    acetaminophen tablet 650 mg, 650 mg, Oral, Q6H PRN, FELICIA MartinP-BC    aluminum-magnesium hydroxide-simethicone 200-200-20 mg/5 mL suspension 30 mL, 30 mL, Oral, QID PRN, DORENE Martin-BC    anastrozole tablet 1 mg, 1 mg, Oral, Daily, Sabas Bragg MD, 1 mg at 03/31/23 1108    melatonin tablet 6 mg, 6 mg, Oral, Nightly PRN, DORENE Martin-BC    metoprolol succinate (TOPROL-XL) 24 hr tablet 25 mg, 25 mg, Oral, Daily, Sabas Bragg MD, 25 mg at 03/31/23 1108    naloxone 0.4 mg/mL injection 0.02 mg, 0.02 mg, Intravenous, PRN, FELICIA MartinP-BC    ondansetron injection 4 mg, 4 mg, Intravenous, Q4H PRN, FELICIA MartinP-BC    polyethylene glycol packet 17 g, 17 g, Oral, BID PRN, FELICIA MartinP-BC    prochlorperazine injection Soln 5 mg, 5 mg, Intravenous, Q6H PRN, FELICIA MartinP-BC    sacubitriL-valsartan 24-26 mg per tablet 1 tablet, 1 tablet, Oral, BID, Sabas Bragg MD, 1 tablet at 03/31/23 1108    simethicone chewable tablet 80 mg, 1 tablet, Oral, QID PRN, DORENE Martin-BC    VTE Risk Mitigation (From admission, onward)           Ordered     IP VTE HIGH RISK PATIENT  Once         03/30/23 0417     Place sequential compression device  Until discontinued         03/30/23 0417     Reason for No Pharmacological VTE Prophylaxis  Once        Question:  Reasons:  Answer:  Already adequately anticoagulated on oral Anticoagulants    03/30/23 0417                    Assessment:     Syncope  Fall  Hypotension 2/2 to polyphrmacy   heart failure air recovered ejection fraction (EF 40%)  Left Breast Cancer s/p left masectomy  Hypertension  History of DVT    Plan:     -interrogate AICD  -left heart catheterization performed lash sure was negative for  occlusion  - continue entresto  24-26 mg BID and metoprolol succinate 25 mg BID.  - no further cardiac workup is needed. Will be available for consult when needed for further cardiac problems.  - Thanks for the consult    Zane Wilkinson MD  Cardiology  [facility] - [facility] Medical Telemetry  03/31/2023

## 2023-04-01 VITALS
OXYGEN SATURATION: 96 % | SYSTOLIC BLOOD PRESSURE: 93 MMHG | TEMPERATURE: 99 F | WEIGHT: 121 LBS | DIASTOLIC BLOOD PRESSURE: 62 MMHG | BODY MASS INDEX: 18.95 KG/M2 | RESPIRATION RATE: 18 BRPM | HEART RATE: 60 BPM

## 2023-04-01 PROBLEM — R42 DIZZINESS: Status: ACTIVE | Noted: 2023-04-01

## 2023-04-01 LAB
ANION GAP SERPL CALC-SCNC: 5 MEQ/L
BASOPHILS # BLD AUTO: 0.03 X10(3)/MCL (ref 0–0.2)
BASOPHILS NFR BLD AUTO: 0.6 %
BUN SERPL-MCNC: 14.2 MG/DL (ref 9.8–20.1)
CALCIUM SERPL-MCNC: 8.5 MG/DL (ref 8.4–10.2)
CHLORIDE SERPL-SCNC: 111 MMOL/L (ref 98–107)
CO2 SERPL-SCNC: 25 MMOL/L (ref 23–31)
CREAT SERPL-MCNC: 0.93 MG/DL (ref 0.55–1.02)
CREAT/UREA NIT SERPL: 15
EOSINOPHIL # BLD AUTO: 0.23 X10(3)/MCL (ref 0–0.9)
EOSINOPHIL NFR BLD AUTO: 4.5 %
ERYTHROCYTE [DISTWIDTH] IN BLOOD BY AUTOMATED COUNT: 14.6 % (ref 11.5–17)
GFR SERPLBLD CREATININE-BSD FMLA CKD-EPI: >60 MLS/MIN/1.73/M2
GLUCOSE SERPL-MCNC: 117 MG/DL (ref 82–115)
HCT VFR BLD AUTO: 33.1 % (ref 37–47)
HGB BLD-MCNC: 11.1 G/DL (ref 12–16)
IMM GRANULOCYTES # BLD AUTO: 0.02 X10(3)/MCL (ref 0–0.04)
IMM GRANULOCYTES NFR BLD AUTO: 0.4 %
LYMPHOCYTES # BLD AUTO: 1.02 X10(3)/MCL (ref 0.6–4.6)
LYMPHOCYTES NFR BLD AUTO: 19.8 %
MAGNESIUM SERPL-MCNC: 1.6 MG/DL (ref 1.6–2.6)
MCH RBC QN AUTO: 32.3 PG (ref 27–31)
MCHC RBC AUTO-ENTMCNC: 33.5 G/DL (ref 33–36)
MCV RBC AUTO: 96.2 FL (ref 80–94)
MONOCYTES # BLD AUTO: 0.45 X10(3)/MCL (ref 0.1–1.3)
MONOCYTES NFR BLD AUTO: 8.7 %
NEUTROPHILS # BLD AUTO: 3.4 X10(3)/MCL (ref 2.1–9.2)
NEUTROPHILS NFR BLD AUTO: 66 %
NRBC BLD AUTO-RTO: 0 %
PLATELET # BLD AUTO: 235 X10(3)/MCL (ref 130–400)
PMV BLD AUTO: 9.8 FL (ref 7.4–10.4)
POTASSIUM SERPL-SCNC: 4.4 MMOL/L (ref 3.5–5.1)
RBC # BLD AUTO: 3.44 X10(6)/MCL (ref 4.2–5.4)
SODIUM SERPL-SCNC: 141 MMOL/L (ref 136–145)
WBC # SPEC AUTO: 5.2 X10(3)/MCL (ref 4.5–11.5)

## 2023-04-01 PROCEDURE — 83735 ASSAY OF MAGNESIUM: CPT | Performed by: STUDENT IN AN ORGANIZED HEALTH CARE EDUCATION/TRAINING PROGRAM

## 2023-04-01 PROCEDURE — 80048 BASIC METABOLIC PNL TOTAL CA: CPT | Performed by: STUDENT IN AN ORGANIZED HEALTH CARE EDUCATION/TRAINING PROGRAM

## 2023-04-01 PROCEDURE — 25000003 PHARM REV CODE 250: Performed by: STUDENT IN AN ORGANIZED HEALTH CARE EDUCATION/TRAINING PROGRAM

## 2023-04-01 PROCEDURE — 85025 COMPLETE CBC W/AUTO DIFF WBC: CPT | Performed by: STUDENT IN AN ORGANIZED HEALTH CARE EDUCATION/TRAINING PROGRAM

## 2023-04-01 RX ORDER — SACUBITRIL AND VALSARTAN 24; 26 MG/1; MG/1
1 TABLET, FILM COATED ORAL 2 TIMES DAILY
Qty: 60 TABLET | Refills: 3 | Status: ON HOLD | OUTPATIENT
Start: 2023-04-01 | End: 2023-11-27 | Stop reason: SDUPTHER

## 2023-04-01 RX ORDER — METOPROLOL SUCCINATE 25 MG/1
25 TABLET, EXTENDED RELEASE ORAL DAILY
Qty: 30 TABLET | Refills: 6 | Status: ON HOLD | OUTPATIENT
Start: 2023-04-01 | End: 2023-11-27

## 2023-04-01 RX ORDER — POLYETHYLENE GLYCOL 3350 17 G/17G
17 POWDER, FOR SOLUTION ORAL 2 TIMES DAILY PRN
Qty: 15 EACH | Refills: 0 | Status: ON HOLD | OUTPATIENT
Start: 2023-04-01 | End: 2023-11-27

## 2023-04-01 RX ADMIN — ANASTROZOLE 1 MG: 1 TABLET, COATED ORAL at 08:04

## 2023-04-01 RX ADMIN — METOPROLOL SUCCINATE 25 MG: 25 TABLET, EXTENDED RELEASE ORAL at 08:04

## 2023-04-01 RX ADMIN — SACUBITRIL AND VALSARTAN 1 TABLET: 24; 26 TABLET, FILM COATED ORAL at 08:04

## 2023-04-01 NOTE — PLAN OF CARE
04/01/23 0944   Final Note   Assessment Type Final Discharge Note   Anticipated Discharge Disposition Home-Health   Hospital Resources/Appts/Education Provided Post-Acute resouces added to AVS   Post-Acute Status   Post-Acute Authorization Home Health   Home Health Status Referrals Sent  (Resume with Flory)   Patient choice form signed by patient/caregiver List with quality metrics by geographic area provided   Discharge Delays None known at this time

## 2023-04-01 NOTE — NURSING
Patient is scheduled for an MRI today and is pending discharge. Called MRI and was informed that nurse on Friday was explained that this MRI had to be done at [facility] due to her pacemaker that is not compatible to the machine. Informed today by tech that [facility] is closed on the weekends. Called ext [phone] and left a detailed message. Dr. Bragg here and made aware.

## 2023-04-02 LAB
LEFT CCA DIST DIAS: 18 CM/S
LEFT CCA DIST SYS: 79 CM/S
LEFT CCA PROX DIAS: 17 CM/S
LEFT CCA PROX SYS: 76 CM/S
LEFT ECA DIAS: 11 CM/S
LEFT ECA SYS: 70 CM/S
LEFT ICA DIST DIAS: 27 CM/S
LEFT ICA DIST SYS: 93 CM/S
LEFT ICA MID DIAS: 25 CM/S
LEFT ICA MID SYS: 74 CM/S
LEFT ICA PROX DIAS: 19 CM/S
LEFT ICA PROX SYS: 84 CM/S
LEFT VERTEBRAL DIAS: 10 CM/S
LEFT VERTEBRAL SYS: 48 CM/S
OHS CV CAROTID RIGHT ICA EDV HIGHEST: 22
OHS CV CAROTID ULTRASOUND LEFT ICA/CCA RATIO: 1.18
OHS CV CAROTID ULTRASOUND RIGHT ICA/CCA RATIO: 1.32
OHS CV PV CAROTID LEFT HIGHEST CCA: 79
OHS CV PV CAROTID LEFT HIGHEST ICA: 93
OHS CV PV CAROTID RIGHT HIGHEST CCA: 74
OHS CV PV CAROTID RIGHT HIGHEST ICA: 98
OHS CV US CAROTID LEFT HIGHEST EDV: 27
RIGHT CCA DIST DIAS: 18 CM/S
RIGHT CCA DIST SYS: 74 CM/S
RIGHT CCA PROX DIAS: 18 CM/S
RIGHT CCA PROX SYS: 67 CM/S
RIGHT ECA DIAS: 12 CM/S
RIGHT ECA SYS: 75 CM/S
RIGHT ICA DIST DIAS: 19 CM/S
RIGHT ICA DIST SYS: 79 CM/S
RIGHT ICA MID DIAS: 22 CM/S
RIGHT ICA MID SYS: 98 CM/S
RIGHT ICA PROX DIAS: 14 CM/S
RIGHT ICA PROX SYS: 61 CM/S
RIGHT VERTEBRAL DIAS: 6 CM/S
RIGHT VERTEBRAL SYS: 48 CM/S

## 2023-04-02 NOTE — DISCHARGE SUMMARY
[facility]  Hospital Medicine Discharge Summary    Admit Date: 3/29/2023  Discharge Date and Time: 4/1/20237:14 PM  Admitting Physician:  Team  Discharging Physician: Sabas Bragg MD.  Primary Care Physician: Ruben Brooks Sr, MD  Consults: Cardiology    Discharge Diagnoses:  Dizziness, Recurrent Falls 2/2 possible Polypharmacy vs Intravascular Depletion  Sinus Bradycardia  [place]  NSTEMI Type 2 2/2 Intravascular Depletion  HTN  HLD  CHF   Chronic Venous Insufficiency   Prior DVT   Breast Cancer s/p Chemotherapy and L Mastectomy    Hospital Course:   Mrs Jacobs is a 72 year old lady with PMH of hypertension, hyperlipidemia, congestive heart failure, venous insufficiency, DVT and breast cancer status post chemo and left mastectomy. The patient presented to [facility] on 3/29/2023 with a primary complaint of frequent falls.  Patient reports having one fall on 3/27/2023 and again yesterday (3/29/2023).  Patient states she is unsure why she is falling but thinks it could be due to her tripping.  She reports dizziness when walking. Upon presentation to the ED, her VS showed temperature 97.3° F, heart rate 54, blood pressure 107/66, respiratory rate 16, SpO2 97% on room air.  Labs showed WBC 4.7, CO2 20, BUN/creatinine 30.8/1.58 (10/0.86 in December 2022), alkaline phosphatase 193, ALT 70, initial troponin 0.049.  UA with 8 squamous epithelial cells, 9 WBC, 1+ leukocyte esterase. EKG with sinus bradycardia with 1st degree AV block and left ventricular hypertrophy with QRS widening. CT head without acute intracranial process.  CXR with no acute cardiopulmonary process.  Patient was given full-dose aspirin.  Patient admitted to hospital medicine services for further medical management. MRI Brain ordered but unable to be completed until 04/01 because of her pacemaker. Carotids US negative. Echocardiogram showed EF 40%, grade 2 LV diastolic dysfunction, mild MR, mild AZ.  Cardiology consulted from  the ER; recommended holding home blood pressure medications and adding Entresto and beta blocker back 1st.  Cardiology also thought that patient's symptoms were likely due to polypharmacy.  LHC from last year showing no obstructive coronary disease.  ICD interrogated in the ER with strips transmitted to company for interpretation. PT work with patient and recommended home health with PT on discharge.  Patient's Entresto and metoprolol succinate were added back on 03/31.  Initial plan was to keep Mrs. Jacobs in the hospital till 4/1 in order for her to get her MRI brain; however due to [facility] being closed on the weekend unable to get MRI with patient not even on schedule next week.  Will plan for discharge today with outpatient MRI brain with without contrast to be scheduled in the upcoming week.  Will contact case management and will call [facility] on Monday to expedite appointment.  Patient otherwise doing well and had no active complaints.  She insisted that she was ready to go home and that she be discharged.    Pt was seen and examined on the day of discharge.  Mrs. Winter stated that she was doing well and had no new complaints.  Having regular bowel movements and able to tolerate p.o. intake.  She stated that she would like to go home and will be able to come back for her MRI brain in the upcoming week once scheduled as she lives with her nephew can bring her to her appointments.  Will plan for discharge today.  Consulted case management to set up home health for patient for continued physical therapy at home.  Will discontinue Amiodarone, Lasix, Amlodipine, Celecoxib, Gabapentin, Spironolactone, Meclizine for now.    Vitals:  VITAL SIGNS: 24 HRS MIN & MAX LAST   Temp  Min: 97.5 °F (36.4 °C)  Max: 98.5 °F (36.9 °C) 98.5 °F (36.9 °C)   BP  Min: 90/47  Max: 123/64 93/62   Pulse  Min: 48  Max: 60  60   Resp  Min: 18  Max: 18 18   SpO2  Min: 96 %  Max: 98 % 96 %       Physical Exam:  General: alert lady lying  comfortably in bed, in no acute distress  HENT: oral and oropharyngeal mucosa moist, pink, with no erythema or exudates, no ear pain or discharge  Neck: normal neck movement, no lymph nodes or swellings, no JVD or Carotid bruit  Respiratory: clear breathing sounds bilaterally, no crackles, rales, ronchi or wheezes  Cardiovascular: clear S1 and S2, no murmurs, rubs or gallops  Peripheral Vascular: no lesions, ulcers or erosions, normal peripheral pulses and no pedal edema  Gastrointestinal: soft, non-tender, non-distended abdomen, no guarding, rigidity or rebound tenderness, normal bowel sounds  Integumentary: normal skin color, no rashes or lesions  Neuro: AAO x 3; motor strength 5/5 in B/L UEs & LEs; sensation intact to gross and fine touch B/L; CN II-XII grossly intact    Procedures Performed: No admission procedures for hospital encounter.     Significant Diagnostic Studies: See Full reports for all details    Recent Labs   Lab 03/30/23  0853 03/31/23 0441 04/01/23  0600   WBC 4.3* 4.7 5.2   RBC 3.75* 3.21* 3.44*   HGB 12.4 10.4* 11.1*   HCT 37.2 31.4* 33.1*   MCV 99.2* 97.8* 96.2*   MCH 33.1* 32.4* 32.3*   MCHC 33.3 33.1 33.5   RDW 14.4 14.6 14.6    220 235   MPV 9.5 9.7 9.8       Recent Labs   Lab 03/29/23 2056 03/30/23  0533 03/31/23  0441 04/01/23  0600    141 142 141   K 4.0 3.8 4.3 4.4   CO2 20* 20* 19* 25   BUN 30.8* 28.8* 19.4 14.2   CREATININE 1.58* 1.24* 0.88 0.93   CALCIUM 8.7 8.2* 7.9* 8.5   MG  --  1.90 1.60 1.60   ALBUMIN 3.4 2.8* 2.5*  --    ALKPHOS 193* 141 109  --    ALT 70* 53 44  --    AST 33 26 30  --    BILITOT 0.5 0.4 0.5  --         Microbiology Results (last 7 days)       ** No results found for the last 168 hours. **             Echo  · The left ventricle is normal in size with mildly decreased systolic   function.  · The estimated ejection fraction is 40%.  · Grade II left ventricular diastolic dysfunction.  · Aortic valve sclerosis without stenosis. Peak AV velocity 1.8  m/sec.  · Mild mitral regurgitation.  · Mild pulmonic regurgitation.  · Normal right ventricular size with normal right ventricular systolic   function.     X-Ray Chest AP Portable  Narrative: EXAMINATION:  XR CHEST AP PORTABLE    CLINICAL HISTORY:  fall;    TECHNIQUE:  Single view of the chest    COMPARISON:  10/14/2022    FINDINGS:  Left-sided cardiac device remains.  Right-sided MediPort is in place.  No focal opacification.  Impression: No acute cardiopulmonary process.    Electronically signed by: Moy Andino  Date:    03/30/2023  Time:    07:09  CT Head Without Contrast  Narrative: EXAMINATION:  CT HEAD WITHOUT CONTRAST    CLINICAL HISTORY:  Syncope, recurrent;    TECHNIQUE:  Low dose axial images were obtained through the head.  Coronal and sagittal reformations were also performed. Contrast was not administered.    Automatic exposure control was utilized to reduce the patient's radiation dose.    DLP= 833    COMPARISON:  05/06/2022    FINDINGS:  Hemorrhage: No acute intracranial hemorrhage is seen.    CSF spaces: The ventricles, sulci and basal cisterns all appear mildly prominent consistent with global cerebral atrophy.    Brain parenchyma: Moderate microvascular change is seen in portions of the periventricular and deep white matter tracts.    Cerebellum: Unremarkable.    Sella and skull base: The sella appears to be within normal limits for age.    Intracranial calcifications: Incidental note is made of bilateral choroid plexus calcification. Incidental note is made of some pineal region calcification. Incidental note is made of some calcification of the falx. Incidental note is made of bilateral basal ganglia calcifcation.    Calvarium: No acute linear or depressed skull fracture is seen.    Maxillofacial Structures:    Paranasal sinuses: The visualized paranasal sinuses appear clear with no significant mucoperiosteal thickening or air fluid levels identified.    Orbits: The orbits appear  unremarkable.    Temporal bones and mastoids: The right mastoid air cells are clear. Note is made of post operative changes in the left mastoid air cells.    TMJ: The mandibular condyles appear normally placed with respect to the mandibular fossa.  Impression: Impression:    1. No acute intracranial process identified. Details and findings as noted above.    Electronically signed by: Moy Andino  Date:    03/30/2023  Time:    06:59         Medication List        START taking these medications      polyethylene glycol 17 gram Pwpk  Commonly known as: GLYCOLAX  Take 17 g by mouth 2 (two) times daily as needed.            CONTINUE taking these medications      anastrozole 1 mg Tab  Commonly known as: ARIMIDEX     atorvastatin 40 MG tablet  Commonly known as: LIPITOR     dexlansoprazole 60 mg capsule  Commonly known as: DEXILANT     ENTRESTO 24-26 mg per tablet  Generic drug: sacubitriL-valsartan  Take 1 tablet by mouth 2 (two) times daily.     HYDROcodone-acetaminophen 5-325 mg per tablet  Commonly known as: NORCO  Take 1 tablet by mouth every 4 (four) hours as needed for Pain.     metoprolol succinate 25 MG 24 hr tablet  Commonly known as: TOPROL-XL  Take 1 tablet (25 mg total) by mouth once daily.     VITAMIN D2 50,000 unit Cap  Generic drug: ergocalciferol            STOP taking these medications      amiodarone 400 MG tablet  Commonly known as: PACERONE     amLODIPine 5 MG tablet  Commonly known as: NORVASC     celecoxib 200 MG capsule  Commonly known as: CeleBREX     furosemide 40 MG tablet  Commonly known as: LASIX     gabapentin 100 MG capsule  Commonly known as: NEURONTIN     meclizine 25 mg tablet  Commonly known as: ANTIVERT     spironolactone 25 MG tablet  Commonly known as: ALDACTONE               Where to Get Your Medications        You can get these medications from any pharmacy    Bring a paper prescription for each of these medications  ENTRESTO 24-26 mg per tablet  metoprolol succinate 25 MG 24 hr  tablet  polyethylene glycol 17 gram Pwpk          Explained in detail to the patient about the discharge plan, medications, and follow-up visits. Pt understands and agrees with the treatment plan  Discharge Disposition: Home-Health Care Svc   Discharged Condition: stable  Diet-    Medications Per DC med rec  Activities as tolerated   Contact information for after-discharge care       Home Medical Care       [facility] .    Service: Home Health Services  Contact information:  [id number]  [facility], [address]  [facility] [address]  [phone]                                 For further questions contact hospitalist office    Discharge time 33 minutes    For worsening symptoms, chest pain, shortness of breath, increased abdominal pain, high grade fever, stroke or stroke like symptoms, immediately go to the nearest Emergency Room or call 911 as soon as possible.      Sabas Price M.D, on 4/1/2023. at 7:14 PM.

## 2023-04-03 ENCOUNTER — PATIENT OUTREACH (OUTPATIENT)
Dept: ADMINISTRATIVE | Facility: CLINIC | Age: 73
End: 2023-04-03
Payer: MEDICARE

## 2023-04-03 NOTE — DISCHARGE INSTRUCTIONS
Please take steroids as prescribed and follow-up with your family doctor in the next 4-5 days to let them know if your symptoms are improving.    If the steroids help you  may benefit from physical therapy or additional medications to help with your symptoms such as gabapentin.    Your hip x-ray today shows no concerning findings   Rhomboid Transposition Flap Text: The defect edges were debeveled with a #15 scalpel blade.  Given the location of the defect and the proximity to free margins a rhomboid transposition flap was deemed most appropriate.  Using a sterile surgical marker, an appropriate rhomboid flap was drawn incorporating the defect.    The area thus outlined was incised deep to adipose tissue with a #15 scalpel blade.  The skin margins were undermined to an appropriate distance in all directions utilizing iris scissors.

## 2023-04-03 NOTE — PROGRESS NOTES
C3 nurse spoke with Laura Jacobs  for a TCC post hospital discharge follow up call. Patient unable to do medication verification. Stated her niece does her medications and she's @ work. Stated Dr. Brooks advised her on medications to take and what medications not to take.The patient had a scheduled [facility] appointment with Ruben Brooks Sr, MD  on 04/03/2023.

## 2023-04-03 NOTE — PLAN OF CARE
Notified by CM on call and MD that patient needed an OP MRI scheduled. Order already placed in system. Called scheduling and gave Pacemaker info. [employer] Model # A219 Emblem, and serial #: [id number] and it is MRI compatible. They will call the patient to schedule.

## 2023-04-04 NOTE — PROGRESS NOTES
Patient had hospital follow up with Ruben Brooks Sr, MD yesterday 04/03/2023.  [facility] phone number provided to patient.

## 2023-07-25 ENCOUNTER — HOSPITAL ENCOUNTER (OUTPATIENT)
Dept: RADIOLOGY | Facility: HOSPITAL | Age: 73
Discharge: HOME | End: 2023-07-25
Attending: INTERNAL MEDICINE
Payer: COMMERCIAL

## 2023-07-25 DIAGNOSIS — Z79.811 LONG TERM CURRENT USE OF AROMATASE INHIBITOR: ICD-10-CM

## 2023-07-25 DIAGNOSIS — Z12.31 SCREENING MAMMOGRAM FOR HIGH-RISK PATIENT: ICD-10-CM

## 2023-07-25 PROCEDURE — 77067 MAMMO DIGITAL SCREENING LEFT WITH TOMO: ICD-10-PCS | Mod: 26,52,, | Performed by: STUDENT IN AN ORGANIZED HEALTH CARE EDUCATION/TRAINING PROGRAM

## 2023-07-25 PROCEDURE — 77080 DXA BONE DENSITY AXIAL: CPT | Mod: TC

## 2023-07-25 PROCEDURE — 77063 MAMMO DIGITAL SCREENING LEFT WITH TOMO: ICD-10-PCS | Mod: 26,52,, | Performed by: STUDENT IN AN ORGANIZED HEALTH CARE EDUCATION/TRAINING PROGRAM

## 2023-07-25 PROCEDURE — 77063 BREAST TOMOSYNTHESIS BI: CPT | Mod: 26,52,, | Performed by: STUDENT IN AN ORGANIZED HEALTH CARE EDUCATION/TRAINING PROGRAM

## 2023-07-25 PROCEDURE — 77067 SCR MAMMO BI INCL CAD: CPT | Mod: TC,52

## 2023-07-25 PROCEDURE — 77067 SCR MAMMO BI INCL CAD: CPT | Mod: 26,52,, | Performed by: STUDENT IN AN ORGANIZED HEALTH CARE EDUCATION/TRAINING PROGRAM

## 2023-09-11 NOTE — PROGRESS NOTES
[facility] Hematology/Oncology  PROGRESS NOTE      Subjective:         NAME: Klarissa Salazar : 1950     72 y.o. female   MRN: [id number]      Chief Complaint:    Chief Complaint   Patient presents with    Breast Cancer     F/u with labs-- Patient reports no new concerns          Current Treatment : Arimidex 2017     History of Present Illness:   Ms. Salazar is a 70 yo BF with PMH of HTN and hyperlipidemia who had an abnormal mammogram in 2017. It was recommended that she undergo additional imaging and US of R breast done on 3/3/17 demonstrated a 1.1 x 1.0 cm suspicious, solid, irregular mass at the 930 position. She was referred to Dr. Farmer who performed a needle localization and excision of R breast mass on 3/15/17. Pathology revealed invasive ductal carcinoma, grade 1. ER/AR both positive, Her 2 negative. After much discussion, decision was made to proceed with complete mastectomy and sentinel lymph node biopsy, which she had done on 3/30/17. Path from this procedure was negative for residual malignancy in the breast and sentinel lymph node was negative, but 1 of 16 additional lymph nodes removed had metastatic carcinoma present. She has been referred to oncology to discuss additional treatment options.     Treatment completed:   R mastectomy and SLNB 3/30/17   Path: No residual carcinoma identified. Welch lymph node negative. 1 of 16 other lymph nodes with metastatic carcinoma.      Interval History:  Pt is here today for follow up of ER + breast ca dx 3/2017 s/p right mastectomy currently on arimidex, Calcium and Vit D   She has no new concerns. She is tolerating Arimidex.  Does report mild hot flashes that are stable and unchanged.  Denies any new pain, unintentional weight loss. She denies shortness of breath, chest pain, dizziness of headaches.       ROS:   Review of Systems   Constitutional:  Negative for appetite change, chills, fatigue, fever and unexpected weight change.   HENT:  Negative  for facial swelling, mouth sores, nosebleeds, sinus pressure/congestion and sore throat.    Eyes:  Negative for photophobia and pain.   Respiratory:  Negative for cough, chest tightness, shortness of breath and wheezing.    Cardiovascular:  Negative for chest pain, palpitations and leg swelling.   Gastrointestinal:  Negative for abdominal pain, blood in stool, change in bowel habit, constipation, diarrhea, nausea, vomiting and change in bowel habit.   Endocrine: Negative.    Genitourinary:  Negative for dysuria, frequency, hematuria, hot flashes, pelvic pain, urgency and vaginal pain.   Musculoskeletal:  Negative for arthralgias, gait problem, joint swelling and myalgias.   Integumentary:  Negative for pallor, rash, wound, breast mass, breast discharge and breast tenderness.   Allergic/Immunologic: Negative.  Negative for immunocompromised state.   Neurological:  Negative for dizziness, vertigo, syncope, weakness and numbness.   Hematological:  Negative for adenopathy. Does not bruise/bleed easily.   Psychiatric/Behavioral:  Negative for behavioral problems and dysphoric mood. The patient is not nervous/anxious.    All other systems reviewed and are negative.  Breast: Negative for mass and tenderness         Allergies:  Review of patient's allergies indicates:   Allergen Reactions    Clindamycin      Other reaction(s): Angioedema    Lisinopril      Other reaction(s): Angioedema    Sulfa (sulfonamide antibiotics)     Sulfamethoxazole-trimethoprim      Other reaction(s): Angioedema       Medications:    Current Outpatient Medications:     amiodarone (PACERONE) 200 MG Tab, Take 400 mg by mouth once daily., Disp: , Rfl:     amLODIPine (NORVASC) 5 MG tablet, Take 5 mg by mouth., Disp: , Rfl:     anastrozole (ARIMIDEX) 1 mg Tab, TAKE 1 TABLET BY MOUTH DAILY, Disp: 30 tablet, Rfl: 6    aspirin (ECOTRIN) 81 MG EC tablet, Take 81 mg by mouth once daily., Disp: , Rfl:     atorvastatin (LIPITOR) 40 MG tablet, Take 40 mg by  mouth once daily., Disp: , Rfl:     cetirizine (ZYRTEC) 10 MG tablet, Take 10 mg by mouth nightly., Disp: , Rfl:     cloNIDine (CATAPRES) 0.1 MG tablet, Take 0.1 mg by mouth 3 (three) times daily., Disp: , Rfl:     dexlansoprazole (DEXILANT) 60 mg capsule, Take 1 capsule by mouth once daily at 6am., Disp: , Rfl:     ENTRESTO 24-26 mg per tablet, Take 1 tablet by mouth 2 (two) times daily., Disp: , Rfl:     ENULOSE 10 gram/15 mL solution, TAKE 30 ML BY MOUTH EVERY DAY, Disp: , Rfl:     FARXIGA 5 mg Tab tablet, Take 5 mg by mouth., Disp: , Rfl:     folic acid (FOLVITE) 1 MG tablet, Take 1,000 mcg by mouth once daily., Disp: , Rfl:     furosemide (LASIX) 20 MG tablet, Take 20 mg by mouth once daily., Disp: , Rfl:     gabapentin (NEURONTIN) 100 MG capsule, Take 100 mg by mouth 2 (two) times daily., Disp: , Rfl:     metoprolol succinate (TOPROL-XL) 25 MG 24 hr tablet, Take 25 mg by mouth once daily., Disp: , Rfl:     pravastatin (PRAVACHOL) 20 MG tablet, Take 20 mg by mouth once daily., Disp: , Rfl:     pregabalin (LYRICA) 50 MG capsule, TAKE 1 CAPSULE BY MOUTH AT NIGHT, Disp: , Rfl:     spironolactone (ALDACTONE) 25 MG tablet, Take 25 mg by mouth once daily., Disp: , Rfl:     verapamiL (CALAN-SR) 240 MG CR tablet, Take 240 mg by mouth once daily., Disp: , Rfl:     VITAMIN D2 1,250 mcg (50,000 unit) capsule, Take 50,000 Units by mouth every 7 days., Disp: , Rfl:     XARELTO 20 mg Tab, Take 20 mg by mouth once daily., Disp: , Rfl:     PMHx/PSHx Updates:  Past Medical History:   Diagnosis Date    High cholesterol     HTN (hypertension)     Malignant neoplasm of right female breast      Past Surgical History:   Procedure Laterality Date    BREAST LUMPECTOMY W/ NEEDLE LOCALIZATION Right     HYSTERECTOMY      MASTECTOMY W/ SENTINEL NODE BIOPSY Right        Family History:   Family History   Problem Relation Age of Onset    Diabetes Mother     Hypertension Mother     Lung cancer Mother        Social History:   Social History  "    Socioeconomic History    Marital status:    Tobacco Use    Smoking status: Never    Smokeless tobacco: Never   Substance and Sexual Activity    Alcohol use: Yes    Drug use: Never             Objective:     Vitals:  Blood pressure 135/79, pulse (!) 56, resp. rate 20, height 5' 5" (1.651 m), weight 110.8 kg (244 lb 3.2 oz), SpO2 98 %.    Physical Examination:   Physical Exam  Vitals reviewed.   Constitutional:       Appearance: Normal appearance.   HENT:      Head: Normocephalic and atraumatic.      Right Ear: External ear normal.      Left Ear: External ear normal.      Nose: Nose normal.      Mouth/Throat:      Mouth: Mucous membranes are moist.      Pharynx: Oropharynx is clear.   Eyes:      Extraocular Movements: Extraocular movements intact.      Conjunctiva/sclera: Conjunctivae normal.      Pupils: Pupils are equal, round, and reactive to light.   Cardiovascular:      Rate and Rhythm: Normal rate and regular rhythm.      Pulses: Normal pulses.      Heart sounds: Normal heart sounds.   Pulmonary:      Effort: Pulmonary effort is normal.      Breath sounds: Normal breath sounds.   Chest:      Comments: Keloid formations noted along right breast mastectomy incision line, well-healed.  Keloids also noted to outer quadrant of left breast.  No adenopathy, masses, rashes, discharge, or nipple inversion noted.  Abdominal:      General: Abdomen is flat. Bowel sounds are normal.      Palpations: Abdomen is soft.   Musculoskeletal:         General: Normal range of motion.      Cervical back: Normal range of motion and neck supple.   Lymphadenopathy:      Comments: No adenopathy noted bilaterally   Skin:     General: Skin is warm and dry.   Neurological:      General: No focal deficit present.      Mental Status: She is alert and oriented to person, place, and time.   Psychiatric:         Mood and Affect: Mood normal.         Behavior: Behavior normal.         Thought Content: Thought content normal.         " Judgment: Judgment normal.         Labs:   Lab Visit on 09/13/2023   Component Date Value Ref Range Status    Sodium Level 09/13/2023 141  136 - 145 mmol/L Final    Potassium Level 09/13/2023 4.2  3.5 - 5.1 mmol/L Final    Chloride 09/13/2023 106  98 - 107 mmol/L Final    Carbon Dioxide 09/13/2023 26  23 - 31 mmol/L Final    Glucose Level 09/13/2023 97  82 - 115 mg/dL Final    Blood Urea Nitrogen 09/13/2023 10.0  9.8 - 20.1 mg/dL Final    Creatinine 09/13/2023 0.95  0.55 - 1.02 mg/dL Final    Calcium Level Total 09/13/2023 9.7  8.4 - 10.2 mg/dL Final    Protein Total 09/13/2023 7.4  5.8 - 7.6 gm/dL Final    Albumin Level 09/13/2023 3.6  3.4 - 4.8 g/dL Final    Globulin 09/13/2023 3.8 (H)  2.4 - 3.5 gm/dL Final    Albumin/Globulin Ratio 09/13/2023 0.9 (L)  1.1 - 2.0 ratio Final    Bilirubin Total 09/13/2023 0.3  <=1.5 mg/dL Final    Alkaline Phosphatase 09/13/2023 100  40 - 150 unit/L Final    Alanine Aminotransferase 09/13/2023 15  0 - 55 unit/L Final    Aspartate Aminotransferase 09/13/2023 18  5 - 34 unit/L Final    eGFR 09/13/2023 >60  mls/min/1.73/m2 Final    WBC 09/13/2023 4.54  4.50 - 11.50 x10(3)/mcL Final    RBC 09/13/2023 4.55  4.20 - 5.40 x10(6)/mcL Final    Hgb 09/13/2023 12.8  12.0 - 16.0 g/dL Final    Hct 09/13/2023 40.7  37.0 - 47.0 % Final    MCV 09/13/2023 89.5  80.0 - 94.0 fL Final    MCH 09/13/2023 28.1  27.0 - 31.0 pg Final    MCHC 09/13/2023 31.4 (L)  33.0 - 36.0 g/dL Final    RDW 09/13/2023 13.9  11.5 - 17.0 % Final    Platelet 09/13/2023 213  130 - 400 x10(3)/mcL Final    MPV 09/13/2023 9.8  7.4 - 10.4 fL Final    Neut % 09/13/2023 67.2  % Final    Lymph % 09/13/2023 22.0  % Final    Mono % 09/13/2023 8.4  % Final    Eos % 09/13/2023 1.8  % Final    Basophil % 09/13/2023 0.4  % Final    Lymph # 09/13/2023 1.00  0.6 - 4.6 x10(3)/mcL Final    Neut # 09/13/2023 3.05  2.1 - 9.2 x10(3)/mcL Final    Mono # 09/13/2023 0.38  0.1 - 1.3 x10(3)/mcL Final    Eos # 09/13/2023 0.08  0 - 0.9 x10(3)/mcL Final     Baso # 09/13/2023 0.02  <=0.2 x10(3)/mcL Final    IG# 09/13/2023 0.01  0 - 0.04 x10(3)/mcL Final    IG% 09/13/2023 0.2  % Final         Assessment/Plan:   Right breast cancer, T1N1M0, ER/WA positive and Her2 negative.   S/p mastectomy and lymph node biopsy on 3/30/17. Because Oncotype recurrence score was 8, endocrine therapy alone was recommended.   She started adjuvant AI at the end of May 2017 and continues to tolerate it well. The hot flashes she reports are infrequent and mild.  Today pt is clinically stable, has YESENIA on exam and her labs are unremarkable. Recommend she continue current treatment with Arimidex.  Bone health.  DEXA 7/2023 showed normal bone density. Rec she continue OTC Petar/Vit D supplementation.   Follow-up DEXA due 07/2025    Mammograms  MMG 3/2022- Recommend annual supplemental breast screening with dynamic contrast enhanced breast MRI in this patient with a personal history of breast cancer and heterogeneously dense breast tissue. Ideally, mammography and breast MRI are alternated every 6 months.  However, patient is unable to tolerate due to severe claustrophobia. She declines breast MRI at this time.   Annual screening mammogram completed on 07/2023 was reviewed with patient in detail.  No evidence of malignancy noted.  Annual screening mammogram due 07/2024.  Order placed today.      PLAN:  Labs and exam are stable.   Continue Arimidex for 7 - 10 years given patient initially had 1+ LN, currently tolerating with no issues.  Mammogram due 07/2024.  Order placed today.    DEXA due 07/2025.  RTC 6 months with NP for FU. No lab needed.       Discussion:     I have explained all of the above in detail and the patient understands all of the current recommendation(s). I have answered all of their questions to the best of my ability and to their complete satisfaction.   The patient is to continue with the current management plan.      Electronically signed by STELLA Kuo

## 2023-09-13 ENCOUNTER — OFFICE VISIT (OUTPATIENT)
Dept: HEMATOLOGY/ONCOLOGY | Facility: CLINIC | Age: 73
End: 2023-09-13
Payer: COMMERCIAL

## 2023-09-13 VITALS
BODY MASS INDEX: 40.68 KG/M2 | DIASTOLIC BLOOD PRESSURE: 79 MMHG | SYSTOLIC BLOOD PRESSURE: 135 MMHG | RESPIRATION RATE: 20 BRPM | HEIGHT: 65 IN | HEART RATE: 56 BPM | OXYGEN SATURATION: 98 % | WEIGHT: 244.19 LBS

## 2023-09-13 DIAGNOSIS — Z79.811 AROMATASE INHIBITOR USE: ICD-10-CM

## 2023-09-13 DIAGNOSIS — C50.911 MALIGNANT NEOPLASM OF RIGHT FEMALE BREAST, UNSPECIFIED ESTROGEN RECEPTOR STATUS, UNSPECIFIED SITE OF BREAST: Primary | ICD-10-CM

## 2023-09-13 DIAGNOSIS — Z12.31 SCREENING MAMMOGRAM FOR HIGH-RISK PATIENT: ICD-10-CM

## 2023-09-13 DIAGNOSIS — Z79.811 LONG TERM CURRENT USE OF AROMATASE INHIBITOR: ICD-10-CM

## 2023-09-13 PROCEDURE — 1126F AMNT PAIN NOTED NONE PRSNT: CPT | Mod: CPTII,S$GLB,,

## 2023-09-13 PROCEDURE — 99999 PR PBB SHADOW E&M-EST. PATIENT-LVL V: CPT | Mod: PBBFAC,,,

## 2023-09-13 PROCEDURE — 4010F PR ACE/ARB THEARPY RXD/TAKEN: ICD-10-PCS | Mod: CPTII,S$GLB,,

## 2023-09-13 PROCEDURE — 99999 PR PBB SHADOW E&M-EST. PATIENT-LVL V: ICD-10-PCS | Mod: PBBFAC,,,

## 2023-09-13 PROCEDURE — 3075F PR MOST RECENT SYSTOLIC BLOOD PRESS GE 130-139MM HG: ICD-10-PCS | Mod: CPTII,S$GLB,,

## 2023-09-13 PROCEDURE — 1126F PR PAIN SEVERITY QUANTIFIED, NO PAIN PRESENT: ICD-10-PCS | Mod: CPTII,S$GLB,,

## 2023-09-13 PROCEDURE — 1159F PR MEDICATION LIST DOCUMENTED IN MEDICAL RECORD: ICD-10-PCS | Mod: CPTII,S$GLB,,

## 2023-09-13 PROCEDURE — 3078F DIAST BP <80 MM HG: CPT | Mod: CPTII,S$GLB,,

## 2023-09-13 PROCEDURE — 3288F PR FALLS RISK ASSESSMENT DOCUMENTED: ICD-10-PCS | Mod: CPTII,S$GLB,,

## 2023-09-13 PROCEDURE — 1101F PT FALLS ASSESS-DOCD LE1/YR: CPT | Mod: CPTII,S$GLB,,

## 2023-09-13 PROCEDURE — 3044F HG A1C LEVEL LT 7.0%: CPT | Mod: CPTII,S$GLB,,

## 2023-09-13 PROCEDURE — 3044F PR MOST RECENT HEMOGLOBIN A1C LEVEL <7.0%: ICD-10-PCS | Mod: CPTII,S$GLB,,

## 2023-09-13 PROCEDURE — 99214 PR OFFICE/OUTPT VISIT, EST, LEVL IV, 30-39 MIN: ICD-10-PCS | Mod: S$GLB,,,

## 2023-09-13 PROCEDURE — 3008F BODY MASS INDEX DOCD: CPT | Mod: CPTII,S$GLB,,

## 2023-09-13 PROCEDURE — 3075F SYST BP GE 130 - 139MM HG: CPT | Mod: CPTII,S$GLB,,

## 2023-09-13 PROCEDURE — 1159F MED LIST DOCD IN RCRD: CPT | Mod: CPTII,S$GLB,,

## 2023-09-13 PROCEDURE — 3008F PR BODY MASS INDEX (BMI) DOCUMENTED: ICD-10-PCS | Mod: CPTII,S$GLB,,

## 2023-09-13 PROCEDURE — 1160F RVW MEDS BY RX/DR IN RCRD: CPT | Mod: CPTII,S$GLB,,

## 2023-09-13 PROCEDURE — 3078F PR MOST RECENT DIASTOLIC BLOOD PRESSURE < 80 MM HG: ICD-10-PCS | Mod: CPTII,S$GLB,,

## 2023-09-13 PROCEDURE — 99214 OFFICE O/P EST MOD 30 MIN: CPT | Mod: S$GLB,,,

## 2023-09-13 PROCEDURE — 1160F PR REVIEW ALL MEDS BY PRESCRIBER/CLIN PHARMACIST DOCUMENTED: ICD-10-PCS | Mod: CPTII,S$GLB,,

## 2023-09-13 PROCEDURE — 4010F ACE/ARB THERAPY RXD/TAKEN: CPT | Mod: CPTII,S$GLB,,

## 2023-09-13 PROCEDURE — 3288F FALL RISK ASSESSMENT DOCD: CPT | Mod: CPTII,S$GLB,,

## 2023-09-13 PROCEDURE — 1101F PR PT FALLS ASSESS DOC 0-1 FALLS W/OUT INJ PAST YR: ICD-10-PCS | Mod: CPTII,S$GLB,,

## 2023-09-13 RX ORDER — GABAPENTIN 100 MG/1
100 CAPSULE ORAL 2 TIMES DAILY
COMMUNITY
Start: 2023-07-24

## 2023-09-13 RX ORDER — DAPAGLIFLOZIN 5 MG/1
5 TABLET, FILM COATED ORAL
COMMUNITY
Start: 2023-07-25

## 2023-09-13 RX ORDER — AMLODIPINE BESYLATE 5 MG/1
5 TABLET ORAL
COMMUNITY
Start: 2023-08-28

## 2023-09-13 RX ORDER — PREGABALIN 50 MG/1
CAPSULE ORAL
COMMUNITY
Start: 2023-08-12

## 2023-09-13 RX ORDER — LACTULOSE 10 G/15ML
SOLUTION ORAL; RECTAL
COMMUNITY
Start: 2023-09-08

## 2023-10-26 DIAGNOSIS — C50.911 MALIGNANT NEOPLASM OF RIGHT FEMALE BREAST, UNSPECIFIED ESTROGEN RECEPTOR STATUS, UNSPECIFIED SITE OF BREAST: ICD-10-CM

## 2023-10-26 DIAGNOSIS — Z79.811 LONG TERM CURRENT USE OF AROMATASE INHIBITOR: ICD-10-CM

## 2023-10-26 DIAGNOSIS — C50.919 MALIGNANT NEOPLASM OF FEMALE BREAST, UNSPECIFIED ESTROGEN RECEPTOR STATUS, UNSPECIFIED LATERALITY, UNSPECIFIED SITE OF BREAST: ICD-10-CM

## 2023-10-26 RX ORDER — ANASTROZOLE 1 MG/1
TABLET ORAL
Qty: 30 TABLET | Refills: 6 | Status: SHIPPED | OUTPATIENT
Start: 2023-10-26

## 2023-11-06 ENCOUNTER — HOSPITAL ENCOUNTER (OUTPATIENT)
Dept: RADIOLOGY | Facility: HOSPITAL | Age: 73
Discharge: HOME | End: 2023-11-06
Attending: PHYSICIAN ASSISTANT
Payer: MEDICARE

## 2023-11-06 DIAGNOSIS — Z12.31 BREAST CANCER SCREENING BY MAMMOGRAM: ICD-10-CM

## 2023-11-06 PROCEDURE — 77063 BREAST TOMOSYNTHESIS BI: CPT | Mod: 26,52,, | Performed by: STUDENT IN AN ORGANIZED HEALTH CARE EDUCATION/TRAINING PROGRAM

## 2023-11-06 PROCEDURE — 77063 MAMMO DIGITAL SCREENING RIGHT WITH TOMO: ICD-10-PCS | Mod: 26,52,, | Performed by: STUDENT IN AN ORGANIZED HEALTH CARE EDUCATION/TRAINING PROGRAM

## 2023-11-06 PROCEDURE — 77067 MAMMO DIGITAL SCREENING RIGHT WITH TOMO: ICD-10-PCS | Mod: 26,52,, | Performed by: STUDENT IN AN ORGANIZED HEALTH CARE EDUCATION/TRAINING PROGRAM

## 2023-11-06 PROCEDURE — 77067 SCR MAMMO BI INCL CAD: CPT | Mod: 26,52,, | Performed by: STUDENT IN AN ORGANIZED HEALTH CARE EDUCATION/TRAINING PROGRAM

## 2023-11-06 PROCEDURE — 77067 SCR MAMMO BI INCL CAD: CPT | Mod: TC,52

## 2023-11-21 ENCOUNTER — HOSPITAL ENCOUNTER (INPATIENT)
Facility: HOSPITAL | Age: 73
LOS: 5 days | Discharge: HOME HEALTH | DRG: 641 | End: 2023-11-27
Attending: STUDENT IN AN ORGANIZED HEALTH CARE EDUCATION/TRAINING PROGRAM | Admitting: INTERNAL MEDICINE
Payer: MEDICARE

## 2023-11-21 DIAGNOSIS — R07.9 CHEST PAIN: ICD-10-CM

## 2023-11-21 DIAGNOSIS — R53.1 GENERALIZED WEAKNESS: ICD-10-CM

## 2023-11-21 DIAGNOSIS — R55 SYNCOPE: Primary | ICD-10-CM

## 2023-11-21 DIAGNOSIS — R55 VASOVAGAL SYNCOPE: ICD-10-CM

## 2023-11-21 LAB
ALBUMIN SERPL-MCNC: 4 G/DL (ref 3.4–4.8)
ALBUMIN/GLOB SERPL: 0.9 RATIO (ref 1.1–2)
ALP SERPL-CCNC: 137 UNIT/L (ref 40–150)
ALT SERPL-CCNC: 21 UNIT/L (ref 0–55)
AST SERPL-CCNC: 24 UNIT/L (ref 5–34)
BASOPHILS # BLD AUTO: 0.03 X10(3)/MCL
BASOPHILS NFR BLD AUTO: 0.6 %
BILIRUB SERPL-MCNC: 0.6 MG/DL
BNP BLD-MCNC: 799.8 PG/ML
BUN SERPL-MCNC: 28.3 MG/DL (ref 9.8–20.1)
CALCIUM SERPL-MCNC: 9.4 MG/DL (ref 8.4–10.2)
CHLORIDE SERPL-SCNC: 107 MMOL/L (ref 98–107)
CO2 SERPL-SCNC: 23 MMOL/L (ref 23–31)
CREAT SERPL-MCNC: 1.48 MG/DL (ref 0.55–1.02)
EOSINOPHIL # BLD AUTO: 0.53 X10(3)/MCL (ref 0–0.9)
EOSINOPHIL NFR BLD AUTO: 10.2 %
ERYTHROCYTE [DISTWIDTH] IN BLOOD BY AUTOMATED COUNT: 14.9 % (ref 11.5–17)
ETHANOL SERPL-MCNC: <10 MG/DL
GFR SERPLBLD CREATININE-BSD FMLA CKD-EPI: 38 MLS/MIN/1.73/M2
GLOBULIN SER-MCNC: 4.3 GM/DL (ref 2.4–3.5)
GLUCOSE SERPL-MCNC: 117 MG/DL (ref 82–115)
HCT VFR BLD AUTO: 40.3 % (ref 37–47)
HGB BLD-MCNC: 12.6 G/DL (ref 12–16)
IMM GRANULOCYTES # BLD AUTO: 0.02 X10(3)/MCL (ref 0–0.04)
IMM GRANULOCYTES NFR BLD AUTO: 0.4 %
INR PPP: 1
LIPASE SERPL-CCNC: 26 U/L
LYMPHOCYTES # BLD AUTO: 1.21 X10(3)/MCL (ref 0.6–4.6)
LYMPHOCYTES NFR BLD AUTO: 23.2 %
MAGNESIUM SERPL-MCNC: 2.1 MG/DL (ref 1.6–2.6)
MCH RBC QN AUTO: 33 PG (ref 27–31)
MCHC RBC AUTO-ENTMCNC: 31.3 G/DL (ref 33–36)
MCV RBC AUTO: 105.5 FL (ref 80–94)
MONOCYTES # BLD AUTO: 0.26 X10(3)/MCL (ref 0.1–1.3)
MONOCYTES NFR BLD AUTO: 5 %
NEUTROPHILS # BLD AUTO: 3.17 X10(3)/MCL (ref 2.1–9.2)
NEUTROPHILS NFR BLD AUTO: 60.6 %
NRBC BLD AUTO-RTO: 0 %
PLATELET # BLD AUTO: 238 X10(3)/MCL (ref 130–400)
PMV BLD AUTO: 9.5 FL (ref 7.4–10.4)
POTASSIUM SERPL-SCNC: 4.2 MMOL/L (ref 3.5–5.1)
PROT SERPL-MCNC: 8.3 GM/DL (ref 5.8–7.6)
PROTHROMBIN TIME: 12.8 SECONDS (ref 11.4–14)
RBC # BLD AUTO: 3.82 X10(6)/MCL (ref 4.2–5.4)
SODIUM SERPL-SCNC: 140 MMOL/L (ref 136–145)
TROPONIN I SERPL-MCNC: 0.04 NG/ML (ref 0–0.04)
WBC # SPEC AUTO: 5.22 X10(3)/MCL (ref 4.5–11.5)

## 2023-11-21 PROCEDURE — 84484 ASSAY OF TROPONIN QUANT: CPT | Performed by: STUDENT IN AN ORGANIZED HEALTH CARE EDUCATION/TRAINING PROGRAM

## 2023-11-21 PROCEDURE — 96360 HYDRATION IV INFUSION INIT: CPT

## 2023-11-21 PROCEDURE — 85025 COMPLETE CBC W/AUTO DIFF WBC: CPT | Performed by: STUDENT IN AN ORGANIZED HEALTH CARE EDUCATION/TRAINING PROGRAM

## 2023-11-21 PROCEDURE — 93005 ELECTROCARDIOGRAM TRACING: CPT

## 2023-11-21 PROCEDURE — 80179 DRUG ASSAY SALICYLATE: CPT

## 2023-11-21 PROCEDURE — 83880 ASSAY OF NATRIURETIC PEPTIDE: CPT | Performed by: STUDENT IN AN ORGANIZED HEALTH CARE EDUCATION/TRAINING PROGRAM

## 2023-11-21 PROCEDURE — 80053 COMPREHEN METABOLIC PANEL: CPT | Performed by: STUDENT IN AN ORGANIZED HEALTH CARE EDUCATION/TRAINING PROGRAM

## 2023-11-21 PROCEDURE — 83690 ASSAY OF LIPASE: CPT | Performed by: STUDENT IN AN ORGANIZED HEALTH CARE EDUCATION/TRAINING PROGRAM

## 2023-11-21 PROCEDURE — 85610 PROTHROMBIN TIME: CPT | Performed by: STUDENT IN AN ORGANIZED HEALTH CARE EDUCATION/TRAINING PROGRAM

## 2023-11-21 PROCEDURE — 99285 EMERGENCY DEPT VISIT HI MDM: CPT | Mod: 25

## 2023-11-21 PROCEDURE — 84439 ASSAY OF FREE THYROXINE: CPT | Performed by: STUDENT IN AN ORGANIZED HEALTH CARE EDUCATION/TRAINING PROGRAM

## 2023-11-21 PROCEDURE — 83735 ASSAY OF MAGNESIUM: CPT | Performed by: STUDENT IN AN ORGANIZED HEALTH CARE EDUCATION/TRAINING PROGRAM

## 2023-11-21 PROCEDURE — 82077 ASSAY SPEC XCP UR&BREATH IA: CPT | Performed by: STUDENT IN AN ORGANIZED HEALTH CARE EDUCATION/TRAINING PROGRAM

## 2023-11-22 PROBLEM — N17.9 AKI (ACUTE KIDNEY INJURY): Status: ACTIVE | Noted: 2023-11-22

## 2023-11-22 LAB
ALBUMIN SERPL-MCNC: 3 G/DL (ref 3.4–4.8)
ALBUMIN/GLOB SERPL: 0.9 RATIO (ref 1.1–2)
ALP SERPL-CCNC: 106 UNIT/L (ref 40–150)
ALT SERPL-CCNC: 16 UNIT/L (ref 0–55)
AMMONIA PLAS-MSCNC: 58.4 UMOL/L (ref 18–72)
AMPHET UR QL SCN: NEGATIVE
ANION GAP SERPL CALC-SCNC: 7 MEQ/L
APPEARANCE UR: CLEAR
AST SERPL-CCNC: 19 UNIT/L (ref 5–34)
AV INDEX (PROSTH): 0.57
AV MEAN GRADIENT: 8 MMHG
AV PEAK GRADIENT: 15 MMHG
AV VALVE AREA BY VELOCITY RATIO: 1.88 CM²
AV VALVE AREA: 1.77 CM²
AV VELOCITY RATIO: 0.6
BACTERIA #/AREA URNS AUTO: ABNORMAL /HPF
BARBITURATE SCN PRESENT UR: NEGATIVE
BASOPHILS # BLD AUTO: 0.03 X10(3)/MCL
BASOPHILS NFR BLD AUTO: 0.7 %
BENZODIAZ UR QL SCN: NEGATIVE
BILIRUB SERPL-MCNC: 0.4 MG/DL
BILIRUB UR QL STRIP.AUTO: NEGATIVE
BSA FOR ECHO PROCEDURE: 1.75 M2
BUN SERPL-MCNC: 22.4 MG/DL (ref 9.8–20.1)
BUN SERPL-MCNC: 26.5 MG/DL (ref 9.8–20.1)
CALCIUM SERPL-MCNC: 8 MG/DL (ref 8.4–10.2)
CALCIUM SERPL-MCNC: 8.4 MG/DL (ref 8.4–10.2)
CANNABINOIDS UR QL SCN: NEGATIVE
CHLORIDE SERPL-SCNC: 111 MMOL/L (ref 98–107)
CHLORIDE SERPL-SCNC: 111 MMOL/L (ref 98–107)
CO2 SERPL-SCNC: 22 MMOL/L (ref 23–31)
CO2 SERPL-SCNC: 25 MMOL/L (ref 23–31)
COCAINE UR QL SCN: NEGATIVE
COLOR UR AUTO: ABNORMAL
CREAT SERPL-MCNC: 1.14 MG/DL (ref 0.55–1.02)
CREAT SERPL-MCNC: 1.17 MG/DL (ref 0.55–1.02)
CREAT/UREA NIT SERPL: 19
CV ECHO LV RWT: 0.4 CM
DOP CALC AO PEAK VEL: 1.92 M/S
DOP CALC AO VTI: 41.4 CM
DOP CALC LVOT AREA: 3.1 CM2
DOP CALC LVOT DIAMETER: 1.99 CM
DOP CALC LVOT PEAK VEL: 1.16 M/S
DOP CALC LVOT STROKE VOLUME: 73.36 CM3
DOP CALC MV VTI: 32.6 CM
DOP CALCLVOT PEAK VEL VTI: 23.6 CM
E WAVE DECELERATION TIME: 188.84 MSEC
E/A RATIO: 0.95
ECHO LV POSTERIOR WALL: 0.88 CM (ref 0.6–1.1)
EOSINOPHIL # BLD AUTO: 0.47 X10(3)/MCL (ref 0–0.9)
EOSINOPHIL NFR BLD AUTO: 10.4 %
ERYTHROCYTE [DISTWIDTH] IN BLOOD BY AUTOMATED COUNT: 15 % (ref 11.5–17)
EST. AVERAGE GLUCOSE BLD GHB EST-MCNC: 102.5 MG/DL
FENTANYL UR QL SCN: NEGATIVE
FOLATE SERPL-MCNC: 9.9 NG/ML (ref 7–31.4)
FRACTIONAL SHORTENING: 23 % (ref 28–44)
GFR SERPLBLD CREATININE-BSD FMLA CKD-EPI: 50 MLS/MIN/1.73/M2
GFR SERPLBLD CREATININE-BSD FMLA CKD-EPI: 52 MLS/MIN/1.73/M2
GLOBULIN SER-MCNC: 3.3 GM/DL (ref 2.4–3.5)
GLUCOSE SERPL-MCNC: 103 MG/DL (ref 82–115)
GLUCOSE SERPL-MCNC: 69 MG/DL (ref 82–115)
GLUCOSE UR QL STRIP.AUTO: ABNORMAL
HBA1C MFR BLD: 5.2 %
HCT VFR BLD AUTO: 34.4 % (ref 37–47)
HGB BLD-MCNC: 11 G/DL (ref 12–16)
HIV 1+2 AB+HIV1 P24 AG SERPL QL IA: NONREACTIVE
HR MV ECHO: 66 BPM
HYALINE CASTS #/AREA URNS LPF: ABNORMAL /LPF
IMM GRANULOCYTES # BLD AUTO: 0.01 X10(3)/MCL (ref 0–0.04)
IMM GRANULOCYTES NFR BLD AUTO: 0.2 %
INTERVENTRICULAR SEPTUM: 0.78 CM (ref 0.6–1.1)
IVC DIAMETER: 2 CM
KETONES UR QL STRIP.AUTO: NEGATIVE
LEFT ATRIUM SIZE: 3.78 CM
LEFT ATRIUM VOLUME INDEX MOD: 32.7 ML/M2
LEFT ATRIUM VOLUME MOD: 56.82 CM3
LEFT CCA DIST DIAS: 20 CM/S
LEFT CCA DIST SYS: 68 CM/S
LEFT CCA PROX DIAS: 25 CM/S
LEFT CCA PROX SYS: 72 CM/S
LEFT ECA DIAS: 6 CM/S
LEFT ECA SYS: 55 CM/S
LEFT ICA DIST DIAS: 23 CM/S
LEFT ICA DIST SYS: 64 CM/S
LEFT ICA MID DIAS: 18 CM/S
LEFT ICA MID SYS: 64 CM/S
LEFT ICA PROX DIAS: 19 CM/S
LEFT ICA PROX SYS: 63 CM/S
LEFT INTERNAL DIMENSION IN SYSTOLE: 3.4 CM (ref 2.1–4)
LEFT VENTRICLE DIASTOLIC VOLUME INDEX: 50.28 ML/M2
LEFT VENTRICLE DIASTOLIC VOLUME: 87.49 ML
LEFT VENTRICLE MASS INDEX: 66 G/M2
LEFT VENTRICLE SYSTOLIC VOLUME INDEX: 27.2 ML/M2
LEFT VENTRICLE SYSTOLIC VOLUME: 47.32 ML
LEFT VENTRICULAR INTERNAL DIMENSION IN DIASTOLE: 4.4 CM (ref 3.5–6)
LEFT VENTRICULAR MASS: 114.88 G
LEFT VERTEBRAL DIAS: 12 CM/S
LEFT VERTEBRAL SYS: 36 CM/S
LEUKOCYTE ESTERASE UR QL STRIP.AUTO: NEGATIVE
LV LATERAL E/E' RATIO: 8.44 M/S
LVOT MG: 2.45 MMHG
LVOT MV: 0.7 CM/S
LYMPHOCYTES # BLD AUTO: 1 X10(3)/MCL (ref 0.6–4.6)
LYMPHOCYTES NFR BLD AUTO: 22.1 %
MCH RBC QN AUTO: 34 PG (ref 27–31)
MCHC RBC AUTO-ENTMCNC: 32 G/DL (ref 33–36)
MCV RBC AUTO: 106.2 FL (ref 80–94)
MDMA UR QL SCN: NEGATIVE
MONOCYTES # BLD AUTO: 0.46 X10(3)/MCL (ref 0.1–1.3)
MONOCYTES NFR BLD AUTO: 10.2 %
MV MEAN GRADIENT: 2 MMHG
MV PEAK A VEL: 0.8 M/S
MV PEAK E VEL: 0.76 M/S
MV PEAK GRADIENT: 4 MMHG
MV VALVE AREA BY CONTINUITY EQUATION: 2.25 CM2
NEUTROPHILS # BLD AUTO: 2.55 X10(3)/MCL (ref 2.1–9.2)
NEUTROPHILS NFR BLD AUTO: 56.4 %
NITRITE UR QL STRIP.AUTO: NEGATIVE
NRBC BLD AUTO-RTO: 0 %
OHS CV CAROTID RIGHT ICA EDV HIGHEST: 17
OHS CV CAROTID ULTRASOUND LEFT ICA/CCA RATIO: 0.94
OHS CV CAROTID ULTRASOUND RIGHT ICA/CCA RATIO: 1.08
OHS CV PV CAROTID LEFT HIGHEST CCA: 72
OHS CV PV CAROTID LEFT HIGHEST ICA: 64
OHS CV PV CAROTID RIGHT HIGHEST CCA: 60
OHS CV PV CAROTID RIGHT HIGHEST ICA: 64
OHS CV US CAROTID LEFT HIGHEST EDV: 23
OHS LV EJECTION FRACTION SIMPSONS BIPLANE MOD: 52 %
OPIATES UR QL SCN: NEGATIVE
PCP UR QL: NEGATIVE
PH UR STRIP.AUTO: 5.5 [PH]
PH UR: 5.5 [PH] (ref 3–11)
PISA MRMAX VEL: 5.08 M/S
PISA TR MAX VEL: 2.52 M/S
PLATELET # BLD AUTO: 214 X10(3)/MCL (ref 130–400)
PMV BLD AUTO: 9.5 FL (ref 7.4–10.4)
POCT GLUCOSE: 140 MG/DL (ref 70–110)
POCT GLUCOSE: 66 MG/DL (ref 70–110)
POTASSIUM SERPL-SCNC: 4.3 MMOL/L (ref 3.5–5.1)
POTASSIUM SERPL-SCNC: 4.4 MMOL/L (ref 3.5–5.1)
PROT SERPL-MCNC: 6.3 GM/DL (ref 5.8–7.6)
PROT UR QL STRIP.AUTO: NEGATIVE
RA MAJOR: 4.41 CM
RA PRESSURE ESTIMATED: 3 MMHG
RBC # BLD AUTO: 3.24 X10(6)/MCL (ref 4.2–5.4)
RBC #/AREA URNS AUTO: ABNORMAL /HPF
RBC UR QL AUTO: NEGATIVE
RIGHT CCA DIST DIAS: 15 CM/S
RIGHT CCA DIST SYS: 59 CM/S
RIGHT CCA PROX DIAS: 14 CM/S
RIGHT CCA PROX SYS: 60 CM/S
RIGHT ECA DIAS: 11 CM/S
RIGHT ECA SYS: 47 CM/S
RIGHT ICA DIST DIAS: 15 CM/S
RIGHT ICA DIST SYS: 59 CM/S
RIGHT ICA MID DIAS: 17 CM/S
RIGHT ICA MID SYS: 64 CM/S
RIGHT ICA PROX DIAS: 12 CM/S
RIGHT ICA PROX SYS: 53 CM/S
RIGHT VERTEBRAL DIAS: 10 CM/S
RIGHT VERTEBRAL SYS: 39 CM/S
RV TB RVSP: 6 MMHG
SALICYLATES SERPL-MCNC: <5 MG/DL (ref 15–30)
SODIUM SERPL-SCNC: 142 MMOL/L (ref 136–145)
SODIUM SERPL-SCNC: 143 MMOL/L (ref 136–145)
SP GR UR STRIP.AUTO: 1.01 (ref 1–1.03)
SQUAMOUS #/AREA URNS LPF: ABNORMAL /HPF
T PALLIDUM AB SER QL: NONREACTIVE
T4 FREE SERPL-MCNC: 1.36 NG/DL (ref 0.7–1.48)
TDI LATERAL: 0.09 M/S
TR MAX PG: 25 MMHG
TRICUSPID ANNULAR PLANE SYSTOLIC EXCURSION: 2.14 CM
TSH SERPL-ACNC: 1.91 UIU/ML (ref 0.35–4.94)
TV REST PULMONARY ARTERY PRESSURE: 28 MMHG
UROBILINOGEN UR STRIP-ACNC: NORMAL
VIT B12 SERPL-MCNC: 324 PG/ML (ref 213–816)
WBC # SPEC AUTO: 4.52 X10(3)/MCL (ref 4.5–11.5)
WBC #/AREA URNS AUTO: ABNORMAL /HPF
Z-SCORE OF LEFT VENTRICULAR DIMENSION IN END DIASTOLE: -0.91
Z-SCORE OF LEFT VENTRICULAR DIMENSION IN END SYSTOLE: 1.04

## 2023-11-22 PROCEDURE — 97161 PT EVAL LOW COMPLEX 20 MIN: CPT

## 2023-11-22 PROCEDURE — 82746 ASSAY OF FOLIC ACID SERUM: CPT

## 2023-11-22 PROCEDURE — 87077 CULTURE AEROBIC IDENTIFY: CPT | Performed by: INTERNAL MEDICINE

## 2023-11-22 PROCEDURE — 87389 HIV-1 AG W/HIV-1&-2 AB AG IA: CPT | Performed by: STUDENT IN AN ORGANIZED HEALTH CARE EDUCATION/TRAINING PROGRAM

## 2023-11-22 PROCEDURE — 80053 COMPREHEN METABOLIC PANEL: CPT

## 2023-11-22 PROCEDURE — 25000003 PHARM REV CODE 250: Performed by: INTERNAL MEDICINE

## 2023-11-22 PROCEDURE — 83880 ASSAY OF NATRIURETIC PEPTIDE: CPT

## 2023-11-22 PROCEDURE — 81001 URINALYSIS AUTO W/SCOPE: CPT | Mod: XB | Performed by: STUDENT IN AN ORGANIZED HEALTH CARE EDUCATION/TRAINING PROGRAM

## 2023-11-22 PROCEDURE — 80377 DRUG/SUBSTANCE NOS 7/MORE: CPT

## 2023-11-22 PROCEDURE — 25000003 PHARM REV CODE 250: Performed by: STUDENT IN AN ORGANIZED HEALTH CARE EDUCATION/TRAINING PROGRAM

## 2023-11-22 PROCEDURE — 85025 COMPLETE CBC W/AUTO DIFF WBC: CPT

## 2023-11-22 PROCEDURE — 82140 ASSAY OF AMMONIA: CPT | Performed by: STUDENT IN AN ORGANIZED HEALTH CARE EDUCATION/TRAINING PROGRAM

## 2023-11-22 PROCEDURE — 87040 BLOOD CULTURE FOR BACTERIA: CPT | Performed by: INTERNAL MEDICINE

## 2023-11-22 PROCEDURE — 83036 HEMOGLOBIN GLYCOSYLATED A1C: CPT

## 2023-11-22 PROCEDURE — 80307 DRUG TEST PRSMV CHEM ANLYZR: CPT | Performed by: STUDENT IN AN ORGANIZED HEALTH CARE EDUCATION/TRAINING PROGRAM

## 2023-11-22 PROCEDURE — 21400001 HC TELEMETRY ROOM

## 2023-11-22 PROCEDURE — 87154 CUL TYP ID BLD PTHGN 6+ TRGT: CPT | Performed by: INTERNAL MEDICINE

## 2023-11-22 PROCEDURE — 93005 ELECTROCARDIOGRAM TRACING: CPT

## 2023-11-22 PROCEDURE — 84443 ASSAY THYROID STIM HORMONE: CPT

## 2023-11-22 PROCEDURE — 63600175 PHARM REV CODE 636 W HCPCS

## 2023-11-22 PROCEDURE — 97165 OT EVAL LOW COMPLEX 30 MIN: CPT

## 2023-11-22 PROCEDURE — 86780 TREPONEMA PALLIDUM: CPT | Performed by: STUDENT IN AN ORGANIZED HEALTH CARE EDUCATION/TRAINING PROGRAM

## 2023-11-22 PROCEDURE — 82607 VITAMIN B-12: CPT

## 2023-11-22 RX ORDER — ENOXAPARIN SODIUM 100 MG/ML
40 INJECTION SUBCUTANEOUS EVERY 24 HOURS
Status: DISCONTINUED | OUTPATIENT
Start: 2023-11-22 | End: 2023-11-27 | Stop reason: HOSPADM

## 2023-11-22 RX ORDER — IBUPROFEN 200 MG
16 TABLET ORAL
Status: DISCONTINUED | OUTPATIENT
Start: 2023-11-22 | End: 2023-11-27 | Stop reason: HOSPADM

## 2023-11-22 RX ORDER — GLUCAGON 1 MG
1 KIT INJECTION
Status: DISCONTINUED | OUTPATIENT
Start: 2023-11-22 | End: 2023-11-27 | Stop reason: HOSPADM

## 2023-11-22 RX ORDER — SODIUM CHLORIDE, SODIUM LACTATE, POTASSIUM CHLORIDE, CALCIUM CHLORIDE 600; 310; 30; 20 MG/100ML; MG/100ML; MG/100ML; MG/100ML
INJECTION, SOLUTION INTRAVENOUS CONTINUOUS
Status: DISCONTINUED | OUTPATIENT
Start: 2023-11-22 | End: 2023-11-22

## 2023-11-22 RX ORDER — NALOXONE HCL 0.4 MG/ML
0.02 VIAL (ML) INJECTION
Status: DISCONTINUED | OUTPATIENT
Start: 2023-11-22 | End: 2023-11-27 | Stop reason: HOSPADM

## 2023-11-22 RX ORDER — MUPIROCIN 20 MG/G
OINTMENT TOPICAL 2 TIMES DAILY
Status: COMPLETED | OUTPATIENT
Start: 2023-11-22 | End: 2023-11-26

## 2023-11-22 RX ORDER — SODIUM CHLORIDE 0.9 % (FLUSH) 0.9 %
10 SYRINGE (ML) INJECTION EVERY 12 HOURS PRN
Status: DISCONTINUED | OUTPATIENT
Start: 2023-11-22 | End: 2023-11-27 | Stop reason: HOSPADM

## 2023-11-22 RX ORDER — IBUPROFEN 200 MG
24 TABLET ORAL
Status: DISCONTINUED | OUTPATIENT
Start: 2023-11-22 | End: 2023-11-27 | Stop reason: HOSPADM

## 2023-11-22 RX ADMIN — SODIUM CHLORIDE, POTASSIUM CHLORIDE, SODIUM LACTATE AND CALCIUM CHLORIDE: 600; 310; 30; 20 INJECTION, SOLUTION INTRAVENOUS at 07:11

## 2023-11-22 RX ADMIN — MUPIROCIN: 20 OINTMENT TOPICAL at 01:11

## 2023-11-22 RX ADMIN — SODIUM CHLORIDE 250 ML: 9 INJECTION, SOLUTION INTRAVENOUS at 12:11

## 2023-11-22 RX ADMIN — ENOXAPARIN SODIUM 40 MG: 40 INJECTION SUBCUTANEOUS at 04:11

## 2023-11-22 RX ADMIN — MUPIROCIN: 20 OINTMENT TOPICAL at 09:11

## 2023-11-22 NOTE — PT/OT/SLP EVAL
"Physical Therapy Evaluation & Discharge Note    Patient Name:  Laura Jacobs   MRN:  [id number]    Recommendations     Therapy Intensity Recommendations at Discharge: Low Intensity Therapy  Discharge Equipment Recommendations: walker, rolling   Equipment to be obtained for discharge: none.  Barriers to discharge: none    Assessment     Laura Jacobs is a 72 y.o. female admitted with a medical diagnosis of Vasovagal syncope.    She presents with the following impairments/functional limitations:  gait instability, impaired balance.    Patient discharged to 'in-house' with Supervision with use of RW.   Patient's current level of function is at baseline (PLOF). With Supervision from family and sitter for most activities.   Patient does not require further acute PT services.     Recent Surgery: * No surgery found *      Pt educated about use of RW at home to decrease fall risk and improve balance during gait and standing activities.     Plan     Patient discharged from acute PT Services on 11/22/23.    Based on current functional levels observed, patient does not require further acute PT services.    Re-consult PT Services if patient's status changes and therapy services warranted.    Subjective     Communicated with patient's nurse prior to session.    Patient agreeable to participate in evaluation.     Chief Complaint: "I feel much better than when I came in."  Patient/Family Comments/goals: "I wish that I could go home today."  Pain/Comfort:  Pain Rating 1: 0/10  Pain Rating Post-Intervention 1: 0/10    Patients cultural, spiritual, Yarsanism conflicts given the current situation: no    Social History  Living Environment: Patient lives with their Son, nephew and notes having a sitter every day  in a single level home, with no steps.   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  Equipment Used at Home: walker, rolling, cane, " straight  Equipment owned (not currently used): none.  Assistance Upon Discharge: family and sitter(s).    Objective     Patient found supine in bed with peripheral IV, telemetry  upon PT entry to room.    General Precautions: Standard, fall   Orthopedic Precautions:N/A   Braces: N/A  Respiratory Status: room air    Vitals : with no dizziness or lightheadedness noted  At Rest (pre-session) in supine  BP  130/72   HR  59   O2 Sat %  98%      With Activity in sitting   BP  144/79   HR  61   O2 Sat %       Vitals in standing   BP  132/832   HR  61   O2 Sat       Vitals at end of session   BP  142/82   HR  62   O2 Sat        Exams:  Orientation: Patient is oriented to person, place, time, situation  Commands: Patient follows commands consistently  Fine Motor Coordination:     -     Intact: Rapid alternating ankle DF/PF  Gross Motor Coordination: decreased during gait in B hips and B LE's grossly   Sensation:    -     Intact: light/touch bilat lower extremity  BILAT UE ROM/strength - defer to OT - see OT note for details  RLE ROM: WFL  RLE Strength: WFL  LLE ROM: WFL  LLE Strength: WFL    Functional Mobility:    Bed Mobility:  Supine to Sit: independence    Transfers:  Sit to Stand: stand by assistance with no assistive device    Gait:  Patient ambulated 130ft with straight cane and contact guard assistance with increased unsteadiness and severe B trendelenburg gait pattern. Performed 130ft with use of RW with Supervision.   Patient demonstrates occasional unsteady gait, decreased carmen, decreased step length, narrow base of support, and decreased weight shift. (B circumduction during gait) (improved balance with use of RW, educated to use Rw at home for safety)     Other Mobility:  not assessed    Balance:  Sit  Static: GOOD-: Takes MODERATE challenges from all directions but inconsistently  Dynamic: GOOD-: Incosistently Maintains balance through MODERATE excursions of active trunk movement,     Stand  Static: GOOD:  Takes MODERATE challenges from all directions  Dynamic: GOOD: Needs SUPERVISION only during gait and able to self right with moderate LOB    Additional Treatment Session  n/a    Patient left sitting edge of bed with all lines intact, call button in reach, tray table at bedside, and patient' nurse notified.    Education     White board updated regarding patient's safest level of mobility with staff assistance.    Goals - No STG's or LTG's established secondary to patient was seen for evaluation and discharge     Multidisciplinary Problems       Physical Therapy Goals       Not on file                    History     Past Medical History:   Diagnosis Date    Cancer     breast CA s/p chemo and L masectomy     Cardiomyopathy     CHF (congestive heart failure)     History of breast cancer     History of DVT (deep vein thrombosis)     HLD (hyperlipidemia)     Hypertension     Lymphedema     Osteoarthritis     Venous insufficiency      Past Surgical History:   Procedure Laterality Date    HYSTERECTOMY      INSERTION OF PACEMAKER      INTRAMEDULLARY RODDING OF FEMUR Left 10/14/2022    Procedure: INSERTION, INTRAMEDULLARY СВЕТЛАНА, FEMUR;  Surgeon: Ankush Alex MD;  Location: [facility];  Service: Orthopedics;  Laterality: Left;    JOINT REPLACEMENT Bilateral     Knee    MASTECTOMY Left 2019     Time Tracking     PT Received On: 11/22/23  PT Start Time: 0929     PT Stop Time: 0953  PT Total Time (min): 24 min     Billable Minutes: Evaluation 24 11/22/2023

## 2023-11-22 NOTE — H&P
[facility] Medicine Wards History & Physical Note     Resident Team: [facility] Medicine List 3  Attending Physician: Edgar Pratt MD  Resident: Mariela Trujillo MD  Intern: Yamil Jaime MD     Date of Admit: 11/21/2023    Chief Complaint     Loss of Consciousness (She  had  one  syncope  episode  wittness  by  the  son  and one  witnessed by the  Ambulance. Patient lethargic  but  does  wake up  and  follow commands)       Subjective:      History of Present Illness:  Laura Jacobs is a 72 y.o.  female with a history of CHF and pacemaker placement who presented on 11/21/2023  with c/o weakness onset today while eating dinner.  She explains that she was sitting at the table when she began to feel weak suddenly and passed out.  States that her son was present and did not allow her to hit the ground.  She denies any head trauma.  Says this happened in the past.  She denies any chest pain, abdominal pain, shortness for breath, or any other complaints.      In the ED, her pulse ranged from 56-60 and her systolic blood pressure ranged from  and diastolic ranged from 54-80. H/H 12.6/40. Cr 1.48 (0.93 in April 2023). . EtOH nl.     Past Medical History:  Past Medical History:   Diagnosis Date    Cancer     breast CA s/p chemo and L masectomy     Cardiomyopathy     CHF (congestive heart failure)     History of breast cancer     History of DVT (deep vein thrombosis)     HLD (hyperlipidemia)     Hypertension     Lymphedema     Osteoarthritis     Venous insufficiency        Past Surgical History:  Past Surgical History:   Procedure Laterality Date    HYSTERECTOMY      INSERTION OF PACEMAKER      INTRAMEDULLARY RODDING OF FEMUR Left 10/14/2022    Procedure: INSERTION, INTRAMEDULLARY СВЕТЛАНА, FEMUR;  Surgeon: Ankush Alex MD;  Location: [facility];  Service: Orthopedics;  Laterality: Left;    JOINT REPLACEMENT Bilateral     Knee    MASTECTOMY Left 2019       Family History:  Family History   Family history unknown: Yes        Social History:  Social History     Tobacco Use    Smoking status: Never    Smokeless tobacco: Never   Substance Use Topics    Alcohol use: Not Currently    Drug use: Never       Allergies:  Review of patient's allergies indicates:   Allergen Reactions    Sulfa (sulfonamide antibiotics)        Home Medications:  Prior to Admission medications    Medication Sig Start Date End Date Taking? Authorizing Provider   anastrozole (ARIMIDEX) 1 mg Tab Take 1 mg by mouth once daily.    Provider, Historical   atorvastatin (LIPITOR) 40 MG tablet Take 40 mg by mouth once daily.    Provider, Historical   dexlansoprazole (DEXILANT) 60 mg capsule Take 60 mg by mouth once daily.    Provider, Historical   HYDROcodone-acetaminophen (NORCO) 5-325 mg per tablet Take 1 tablet by mouth every 4 (four) hours as needed for Pain. 10/2/22   Trevor Rodriguez III, MD   metoprolol succinate (TOPROL-XL) 25 MG 24 hr tablet Take 1 tablet (25 mg total) by mouth once daily. 23   Sabas Bragg MD   polyethylene glycol (GLYCOLAX) 17 gram PwPk Take 17 g by mouth 2 (two) times daily as needed. 23   Sabas Bragg MD   sacubitriL-valsartan (ENTRESTO) 24-26 mg per tablet Take 1 tablet by mouth 2 (two) times daily. 23   Sabas Bragg MD   VITAMIN D2 1,250 mcg (50,000 unit) capsule Take 50,000 Units by mouth every 7 days. 3/20/23   Provider, Historical         Review of Systems:  Review of Systems   Constitutional:  Positive for malaise/fatigue. Negative for chills and fever.   Respiratory:  Negative for cough.    Cardiovascular:  Negative for chest pain and palpitations.   Gastrointestinal:  Negative for abdominal pain, nausea and vomiting.   Genitourinary:  Negative for dysuria.   Neurological:  Positive for loss of consciousness. Negative for dizziness and headaches.          Objective:   Last 24 Hour Vital Signs:  BP  Min: 97/54  Max: 125/72  Temp  Av.9 °F (36.6 °C)  Min: 97.9 °F (36.6 °C)  Max: 97.9 °F (36.6 °C)  Pulse  Avg:  61.2  Min: 56  Max: 67  Resp  Av  Min: 10  Max: 23  SpO2  Av.8 %  Min: 99 %  Max: 100 %  There is no height or weight on file to calculate BMI.  No intake/output data recorded.    Physical Examination:  Physical Exam  Constitutional:       General: She is not in acute distress.     Appearance: Normal appearance. She is not ill-appearing.   Cardiovascular:      Rate and Rhythm: Regular rhythm. Bradycardia present.      Pulses: Normal pulses.      Heart sounds: No murmur heard.  Pulmonary:      Effort: Pulmonary effort is normal. No respiratory distress.      Breath sounds: Normal breath sounds. No wheezing.   Abdominal:      General: There is no distension.      Palpations: Abdomen is soft.      Tenderness: There is no abdominal tenderness.   Musculoskeletal:      Comments: 5/5 BUE strength   Skin:     Capillary Refill: Capillary refill takes less than 2 seconds.   Neurological:      General: No focal deficit present.      Mental Status: She is oriented to person, place, and time.   Psychiatric:      Comments: Somnolent, but arousable          Laboratory:  Most Recent Data:  CBC:   Lab Results   Component Value Date    WBC 5.22 2023    HGB 12.6 2023    HCT 40.3 2023     2023    .5 (H) 2023    RDW 14.9 2023     WBC Differential:   Recent Labs   Lab 23  2144   WBC 5.22   HGB 12.6   HCT 40.3      .5*     BMP:   Lab Results   Component Value Date     2023    K 4.2 2023    CL 98 (L) 2021    CO2 23 2023    BUN 28.3 (H) 2023    CREATININE 1.48 (H) 2023    CALCIUM 9.4 2023    MG 2.10 2023    PHOS 3.3 2023     LFTs:   Lab Results   Component Value Date    ALBUMIN 4.0 2023    BILITOT 0.6 2023    AST 24 2023    ALKPHOS 137 2023    ALT 21 2023     Coags:   Lab Results   Component Value Date    INR 1.0 2023    PROTIME 12.8 2023    PTT 28.9 03/10/2022  "    FLP: No results found for: "CHOL", "HDL", "LDLCALC", "TRIG", "CHOLHDL"  DM:   Lab Results   Component Value Date    CREATININE 1.48 (H) 11/21/2023     Thyroid:   Lab Results   Component Value Date    TSH 0.014 (L) 03/30/2023      Anemia: No results found for: "IRON", "TIBC", "FERRITIN", "SATURATEDIRO"  No results found for: "[id number]"    Lab Results   Component Value Date    FOLATE 6.6 (L) 08/17/2022        Cardiac:   Lab Results   Component Value Date    TROPONINI 0.045 11/21/2023    .8 (H) 11/21/2023     Urinalysis:   Lab Results   Component Value Date    COLORU DK YELLOW 03/06/2022    PHUA 5.5 03/30/2023    NITRITE Negative 03/06/2022    KETONESU Negative 03/06/2022    UROBILINOGEN 1.0 03/30/2023    WBCUA 9 (H) 03/30/2023       Trended Lab Data:  Recent Labs   Lab 11/21/23 2144   WBC 5.22   HGB 12.6   HCT 40.3      .5*   RDW 14.9      K 4.2   CO2 23   BUN 28.3*   CREATININE 1.48*   ALBUMIN 4.0   BILITOT 0.6   AST 24   ALKPHOS 137   ALT 21       Trended Cardiac Data:  Recent Labs   Lab 11/21/23 2144   TROPONINI 0.045   .8*       Microbiology Data:  Microbiology Results (last 7 days)       ** No results found for the last 168 hours. **             Other Results:  EKG:   Results for orders placed or performed during the hospital encounter of 03/29/23   EKG and show to ED MD    Collection Time: 03/29/23  8:32 PM    Narrative    Test Reason : R53.1,    Vent. Rate : 048 BPM     Atrial Rate : 048 BPM     P-R Int : 246 ms          QRS Dur : 138 ms      QT Int : 550 ms       P-R-T Axes : 039 -55 054 degrees     QTc Int : 491 ms    Sinus bradycardia with 1st degree A-V block  Voltage criteria for left ventricular hypertrophy  Abnormal ECG  Confirmed by Ricardo Bass MD ([id number]) on 4/1/2023 5:08:26 PM    Referred By:             Confirmed By:Ricardo Bass MD       Radiology:  Imaging Results              CT Head Without Contrast (Preliminary result)  Result time 11/21/23 23:29:07 "      Preliminary result by Srinivasa Veloz Jr., MD (11/21/23 23:29:07)                   Narrative:    START OF REPORT:  Technique: CT of the head was performed without intravenous contrast with axial as well as coronal and sagittal images.    Comparison: Comparison is with study [id number] 01:16:46.    Dosage Information: Automated exposure control was utilized.    Clinical history: Loss of Consciousness (She had one syncope episode wittness by the son and one witnessed by the Ambulance. Patient lethargic but does wake up and follow commands).    Findings: Overall, no significant interval change as compared with the prior examination.  Artifact: There is mild artifact on some of the images.  Hemorrhage: No acute intracranial hemorrhage is seen.  CSF spaces: The ventricles, sulci and basal cisterns all appear mildly prominent consistent with global cerebral atrophy.  Brain parenchyma: There is preservation of the grey white junction throughout. Mild microvascular change is seen in portions of the periventricular and deep white matter tracts.  Cerebellum: Unremarkable.  Sella and skull base: The sella appears to be within normal limits for age.  Herniation: None.  Intracranial calcifications: Incidental note is made of bilateral choroid plexus calcification. Incidental note is made of some pineal region calcification. Incidental note is made of some calcification of the falx. Incidental note is made of subtle bilateral basal ganglia calcifcation.  Calvarium: No acute linear or depressed skull fracture is seen.    Maxillofacial Structures:  Paranasal sinuses: A medium sized retention cyst or polyp in the right maxillary sinus.  Orbits: The orbits appear unremarkable.  Zygomatic arches: The zygomatic arches are intact and unremarkable.  Temporal bones and mastoids: There is suggestion of left mastoidectomy.  TMJ: The mandibular condyles appear normally placed with respect to the mandibular  fossa.      Impression:  1. No acute intracranial process identified. Details and other findings as noted above.                                         X-Ray Chest AP Portable (Preliminary result)  Result time 11/21/23 23:08:47      Wet Read by Edgar Pratt MD (11/21/23 23:08:47, [facility], Emergency Medicine)    Stable chest                                         Assessment & Plan:     Syncope  - Syncopal episode at home, no head trauma  - EKG showed NSR, 62bpm, 1st degree AV block, and LAD  - Head CT showed now acute abnormalities.  - Will order echo, carotid u/s  - Will also get UDS, TSH, Salicylate level  - Orthostatic vitals in AM    [place]  - Cr 1.48, elevated from 0.93 4/01/2023  - Will treat with IV fluids  - Repeat CMP in AM    CODE STATUS: Full code  Diet: Adult regular  DVT Prophylaxis:   Anticoagulants   Medication Route Frequency             Yamil Jaime MD  [facility] HO-1

## 2023-11-22 NOTE — PROGRESS NOTES
"Inpatient Nutrition Evaluation    Admit Date: 2023   Total duration of encounter: 1 day    Nutrition Recommendation/Prescription     Will change diet to cardiac diet  Pt education on diet/complete  Will order vanilla boost tid; Boost (provides 240 kcal, 10 g protein per serving)   MVI/fe  Biweekly wt  Will monitor nutrition status     Nutrition Assessment     Chart Review    Reason Seen: continuous nutrition monitoring    Malnutrition Screening Tool Results   Have you recently lost weight without trying?: No  Have you been eating poorly because of a decreased appetite?: No   MST Score: 0     Diagnosis:  Syncope, [place]    Relevant Medical History: CHF, pacemaker, breast CA, HLD, HTN, lymphedema, osteoporosis     Nutrition-Related Medications: IVF LR @ 75ml/hr    Nutrition-Related Labs:  () H/H 11.0/34.4(L) Gluc 69 Bun 26 Cr 1.1     Diet Order: Diet Adult Regular  Oral Supplement Order: none  Appetite/Oral Intake: good/% of meals  Factors Affecting Nutritional Intake: none identified  Food/Judaism/Cultural Preferences: none reported  Food Allergies: none reported       Wound(s):   none reported     Comments    () Pt eating lunch while on rounds; reported eating ok ; no N/V; #; current wt 148#; ? Elevated/ fluid. Reviewed cardiac diet. Pt would like oral supplement; will order.     Anthropometrics    Height: 5' 5" (165.1 cm) Height Method: Estimated  Last Weight: 67.1 kg (148 lb) (23 0749) Weight Method: Bed Scale  BMI (Calculated): 24.6  BMI Classification: normal (BMI 18.5-24.9)     Ideal Body Weight (IBW), Female: 125 lb     % Ideal Body Weight, Female (lb): 118.4 %                    Usual Body Weight (UBW), k.3 kg  % Usual Body Weight: 109.74     Usual Weight Provided By: patient and EMR weight history    Wt Readings from Last 5 Encounters:   23 67.1 kg (148 lb)   23 54.9 kg (121 lb)   10/15/22 70.3 kg (155 lb)   22 70 kg (154 lb 5.2 oz)   22 63.5 kg " (139 lb 15.9 oz)     Weight Change(s) Since Admission:  Admit Weight: 61.2 kg (135 lb) (11/22/23 0139)  #    Patient Education    Education Provided: heart healthy diet  Teaching Method: explanation and printed materials  Comprehension: verbalizes understanding  Barriers to Learning: none evident  Expected Compliance: fair  Comments: All questions were answered and dietitian's contact information was provided.     Monitoring & Evaluation     Dietitian will monitor food and beverage intake, weight, and food/nutrition knowledge skill.  Nutrition Risk/Follow-Up: low (follow-up in 5-7 days)  Patients assigned 'low nutrition risk' status do not qualify for a full nutritional assessment but will be monitored and re-evaluated in a 5-7 day time period. Please consult if re-evaluation needed sooner.

## 2023-11-22 NOTE — PT/OT/SLP EVAL
"Occupational Therapy   Evaluation and Discharge Note    Name: Laura Jacobs  MRN: [id number]  Admitting Diagnosis: Vasovagal syncope  Recent Surgery: * No surgery found *      Recommendations:     Discharge Recommendations: Low Intensity Therapy  Discharge Equipment Recommendations:  walker, rolling, shower chair  Barriers to discharge:  None    Assessment:     Laura Jacobs is a 72 y.o. female with a medical diagnosis of Vasovagal syncope.  She presents with fully oriented, no issues with BP monitored throughout session in all positions, and able to perform ADLs without assistance. Performance deficits affecting function: unsteady gait (PT addressed with training to use RW for increased safety)  .      Rehab Prognosis:  no acute services recommended ; patient would benefit from home health skilled OT services to assess home environment, improve strength and endurance, and reach maximum level of function.       Plan:     No acute OT services warranted at this time.    Subjective     Chief Complaint: no complaints  Patient/Family Comments/goals: "I'd like to go home as soon as they say I'm okay"    Occupational Profile:  Living Environment: [facility] with her son and nephews, 1 step to enter, tub/shower combo with shower chair  Previous level of function: I/mod I with ADLs using shower chair and with mobility using straight cane primarily and has RW to use as needed  Roles and Routines: pt does light cooking and cleaning, does not drive but will accompany family members for shopping, stated she had a sitter to assist with cooking/cleaning and is in transition with sitters currently  Equipment Used at Home: bedside commode, shower chair, walker, rolling, cane, straight  Assistance upon Discharge: new sitter, son and nephew    Pain/Comfort:  Pain Rating 1: 0/10  Pain Rating Post-Intervention 1: 0/10    Patients cultural, spiritual, Oriental orthodox conflicts given the current situation: no    Objective: "     Communicated with: nurse Joy prior to session.  Patient found HOB elevated with telemetry, pulse ox (continuous), peripheral IV upon OT entry to room.    General Precautions: Standard, fall  Orthopedic Precautions: N/A  Braces: N/A  Respiratory Status: Room air    Occupational Performance:    Bed Mobility:    Patient completed Supine to Sit with modified independence    Functional Mobility/Transfers:  Patient completed Sit <> Stand Transfer with supervision  with  straight cane   Functional Mobility: SPV to ambulate short distances in room using RW    Activities of Daily Living:  Feeding:  independence seated EOB  Grooming: supervision standing at sink for grooming activities  Upper Body Dressing: modified independence seated EOB  Lower Body Dressing: stand by assistance seated EOB  Toileting: supervision at commode in restroom    Cognitive/Visual Perceptual:  Cognitive/Psychosocial Skills:     -       Oriented to: Person, Place, Time, and Situation   -       Follows Commands/attention:Follows multistep  commands  -       Safety awareness/insight to disability: intact   -       Mood/Affect/Coping skills/emotional control: Appropriate to situation, Cooperative, and Pleasant    Physical Exam:  BUE AROM WFL, strength 4/5 d/t deconditioning    Treatment & Education:  Pt. educated on orientation to environment, use of call bell for assist with transfers OOB or for any other needs due to fall risk.  Pt verbalized understanding.      Patient left sitting edge of bed with all lines intact, call button in reach, nurse notified, and pt eating breakfast.    GOALS:   Multidisciplinary Problems       Occupational Therapy Goals       Not on file                    History:     Past Medical History:   Diagnosis Date    Cancer     breast CA s/p chemo and L masectomy     Cardiomyopathy     CHF (congestive heart failure)     History of breast cancer     History of DVT (deep vein thrombosis)     HLD (hyperlipidemia)      Hypertension     Lymphedema     Osteoarthritis     Venous insufficiency          Past Surgical History:   Procedure Laterality Date    HYSTERECTOMY      INSERTION OF PACEMAKER      INTRAMEDULLARY RODDING OF FEMUR Left 10/14/2022    Procedure: INSERTION, INTRAMEDULLARY СВЕТЛАНА, FEMUR;  Surgeon: Ankush Alex MD;  Location: [facility];  Service: Orthopedics;  Laterality: Left;    JOINT REPLACEMENT Bilateral     Knee    MASTECTOMY Left 2019       Time Tracking:     OT Date of Treatment: 11/22/23  OT Start Time: 0928  OT Stop Time: 0953  OT Total Time (min): 25 min    Billable Minutes:Evaluation 25 11/22/2023

## 2023-11-22 NOTE — PLAN OF CARE
11/22/23 0946   Discharge Assessment   Assessment Type Discharge Planning Assessment   Confirmed/corrected address, phone number and insurance Yes   Confirmed Demographics Correct on Facesheet   Source of Information patient   When was your last doctors appointment?   (Ruben Brooks)   Reason For Admission Syncope, Generalized weakness, Chest pain   People in Home child(bhavana), adult;other relative(s)   Facility Arrived From: Home   Do you expect to return to your current living situation? Yes   Do you have help at home or someone to help you manage your care at home? Yes   Who are your caregiver(s) and their phone number(s)? Caregivers 8:30-3 ([facility] [phone]); Son, Andrzej Jacobs [phone]; Niece, Ksenia Jacobs [phone]); [id number]   Prior to hospitilization cognitive status: Alert/Oriented   Current cognitive status: Alert/Oriented   Walking or Climbing Stairs ambulation difficulty, requires equipment   Mobility Management RW   Dressing/Bathing bathing difficulty, assistance 1 person   Dressing/Bathing Management Caregiver   Home Layout Able to live on 1st floor   Equipment Currently Used at Home walker, rolling;cane, straight   Readmission within 30 days? No   Patient currently being followed by outpatient case management? No   Do you currently have service(s) that help you manage your care at home? Yes   How Many hours does patient receive services 32   Name and Contact number of agency [facility] ([facility] [phone]); HH (Pt/fly unable to provide name)   Is the pt/caregiver preference to resume services with current agency Yes   Do you take prescription medications? Yes  (RACQUEL Lee)   Do you have prescription coverage? Yes   Coverage [employer] M/C; M/D   Do you have any problems affording any of your prescribed medications? No   Is the patient taking medications as prescribed? yes   Who is going to help you get home at discharge? Family   How do you get to doctors appointments? family or friend  will provide   Are you on dialysis? No   DME Needed Upon Discharge    (Pending PT eval)   Discharge Plan discussed with: Patient   Transition of Care Barriers None   Discharge Plan A Home with family;Home Health   Physical Activity   On average, how many days per week do you engage in moderate to strenuous exercise (like a brisk walk)? 0 days   On average, how many minutes do you engage in exercise at this level? 0 min   Financial Resource Strain   How hard is it for you to pay for the very basics like food, housing, medical care, and heating? Not hard   Housing Stability   In the last 12 months, was there a time when you were not able to pay the mortgage or rent on time? N   In the last 12 months, how many places have you lived? 1   In the last 12 months, was there a time when you did not have a steady place to sleep or slept in a shelter (including now)? N   Transportation Needs   In the past 12 months, has lack of transportation kept you from medical appointments or from getting medications? no   In the past 12 months, has lack of transportation kept you from meetings, work, or from getting things needed for daily living? No   Food Insecurity   Within the past 12 months, you worried that your food would run out before you got the money to buy more. Never true   Within the past 12 months, the food you bought just didn't last and you didn't have money to get more. Never true   Stress   Do you feel stress - tense, restless, nervous, or anxious, or unable to sleep at night because your mind is troubled all the time - these days? Not at all   Social Connections   In a typical week, how many times do you talk on the phone with family, friends, or neighbors? More than 3   How often do you get together with friends or relatives? More than 3   Do you belong to any clubs or organizations such as Congregational groups, unions, fraternal or athletic groups, or school groups? No   How often do you attend meetings of the clubs or  organizations you belong to? Never   Are you , , , , never , or living with a partner? Never marrie   Alcohol Use   Q1: How often do you have a drink containing alcohol? Never   Q2: How many drinks containing alcohol do you have on a typical day when you are drinking? None   OTHER   Name(s) of People in Home Son, Nikolay Jacobs; Nephew, Gorge     Pt single with 2 sons; Unable to read or write; Pt receives 32 hrs [facility] at home in addition to HH services ([facility]/Pt unable to provide name of agency); L/M for pt's primary caregiver, [employer]olis, requesting return call for additional information; Pt has good support from fly members who provide transportation and assistance as needed.

## 2023-11-23 LAB
ACINETOBACTER CALCOACETICUS-BAUMANNII COMPLEX (OHS): NOT DETECTED
ALBUMIN SERPL-MCNC: 2.6 G/DL (ref 3.4–4.8)
ALBUMIN/GLOB SERPL: 0.9 RATIO (ref 1.1–2)
ALP SERPL-CCNC: 111 UNIT/L (ref 40–150)
ALT SERPL-CCNC: 14 UNIT/L (ref 0–55)
ANION GAP SERPL CALC-SCNC: 7 MEQ/L
AST SERPL-CCNC: 17 UNIT/L (ref 5–34)
BACTEROIDES FRAGILIS (OHS): NOT DETECTED
BASOPHILS # BLD AUTO: 0.03 X10(3)/MCL
BASOPHILS NFR BLD AUTO: 0.7 %
BILIRUB SERPL-MCNC: 0.2 MG/DL
BUN SERPL-MCNC: 19.7 MG/DL (ref 9.8–20.1)
BUN SERPL-MCNC: 22.7 MG/DL (ref 9.8–20.1)
C AURIS DNA BLD POS QL NAA+NON-PROBE: NOT DETECTED
C GATTII+NEOFOR DNA CSF QL NAA+NON-PROBE: NOT DETECTED
CALCIUM SERPL-MCNC: 8.1 MG/DL (ref 8.4–10.2)
CALCIUM SERPL-MCNC: 8.9 MG/DL (ref 8.4–10.2)
CANDIDA ALBICANS (OHS): NOT DETECTED
CANDIDA GLABRATA (OHS): NOT DETECTED
CANDIDA KRUSEI (OHS): NOT DETECTED
CANDIDA PARAPSILOSIS (OHS): NOT DETECTED
CANDIDA TROPICALIS (OHS): NOT DETECTED
CHLORIDE SERPL-SCNC: 110 MMOL/L (ref 98–107)
CHLORIDE SERPL-SCNC: 112 MMOL/L (ref 98–107)
CO2 SERPL-SCNC: 25 MMOL/L (ref 23–31)
CO2 SERPL-SCNC: 26 MMOL/L (ref 23–31)
CORTIS SERPL-SCNC: 13.9 UG/DL
CREAT SERPL-MCNC: 1.28 MG/DL (ref 0.55–1.02)
CREAT SERPL-MCNC: 1.29 MG/DL (ref 0.55–1.02)
CREAT/UREA NIT SERPL: 15
CTX-M (OHS): ABNORMAL
ENTEROBACTER CLOACAE COMPLEX (OHS): NOT DETECTED
ENTEROBACTERALES (OHS): NOT DETECTED
ENTEROCOCCUS FAECALIS (OHS): NOT DETECTED
ENTEROCOCCUS FAECIUM (OHS): NOT DETECTED
EOSINOPHIL # BLD AUTO: 0.45 X10(3)/MCL (ref 0–0.9)
EOSINOPHIL NFR BLD AUTO: 10.9 %
ERYTHROCYTE [DISTWIDTH] IN BLOOD BY AUTOMATED COUNT: 15 % (ref 11.5–17)
ESCHERICHIA COLI (OHS): NOT DETECTED
GFR SERPLBLD CREATININE-BSD FMLA CKD-EPI: 45 MLS/MIN/1.73/M2
GFR SERPLBLD CREATININE-BSD FMLA CKD-EPI: 45 MLS/MIN/1.73/M2
GLOBULIN SER-MCNC: 2.8 GM/DL (ref 2.4–3.5)
GLUCOSE SERPL-MCNC: 105 MG/DL (ref 82–115)
GLUCOSE SERPL-MCNC: 57 MG/DL (ref 82–115)
GP B STREP DNA CSF QL NAA+NON-PROBE: NOT DETECTED
HAEM INFLU DNA CSF QL NAA+NON-PROBE: NOT DETECTED
HCT VFR BLD AUTO: 31.5 % (ref 37–47)
HGB BLD-MCNC: 9.9 G/DL (ref 12–16)
HOLD SPECIMEN: NORMAL
HOLD SPECIMEN: NORMAL
IMM GRANULOCYTES # BLD AUTO: 0.01 X10(3)/MCL (ref 0–0.04)
IMM GRANULOCYTES NFR BLD AUTO: 0.2 %
IMP (OHS): ABNORMAL
KLEBSIELLA AEROGENES (OHS): NOT DETECTED
KLEBSIELLA OXYTOCA (OHS): NOT DETECTED
KLEBSIELLA PNEUMONIAE GROUP (OHS): NOT DETECTED
KPC (OHS): ABNORMAL
L MONOCYTOG DNA CSF QL NAA+NON-PROBE: NOT DETECTED
LYMPHOCYTES # BLD AUTO: 1.08 X10(3)/MCL (ref 0.6–4.6)
LYMPHOCYTES NFR BLD AUTO: 26.1 %
MCH RBC QN AUTO: 32.7 PG (ref 27–31)
MCHC RBC AUTO-ENTMCNC: 31.4 G/DL (ref 33–36)
MCR-1 (OHS): ABNORMAL
MCV RBC AUTO: 104 FL (ref 80–94)
MECA/C (OHS): ABNORMAL
MECA/C AND MREJ (MRSA)(OHS): ABNORMAL
MONOCYTES # BLD AUTO: 0.45 X10(3)/MCL (ref 0.1–1.3)
MONOCYTES NFR BLD AUTO: 10.9 %
N MEN DNA CSF QL NAA+NON-PROBE: NOT DETECTED
NDM (OHS): ABNORMAL
NEUTROPHILS # BLD AUTO: 2.12 X10(3)/MCL (ref 2.1–9.2)
NEUTROPHILS NFR BLD AUTO: 51.2 %
NRBC BLD AUTO-RTO: 0 %
OXA-48-LIKE (OHS): ABNORMAL
PLATELET # BLD AUTO: 206 X10(3)/MCL (ref 130–400)
PMV BLD AUTO: 9.6 FL (ref 7.4–10.4)
POCT GLUCOSE: 104 MG/DL (ref 70–110)
POCT GLUCOSE: 125 MG/DL (ref 70–110)
POTASSIUM SERPL-SCNC: 4.8 MMOL/L (ref 3.5–5.1)
POTASSIUM SERPL-SCNC: 4.9 MMOL/L (ref 3.5–5.1)
PROT SERPL-MCNC: 5.4 GM/DL (ref 5.8–7.6)
PROTEUS SPP. (OHS): NOT DETECTED
PSEUDOMONAS AERUGINOSA (OHS): NOT DETECTED
RBC # BLD AUTO: 3.03 X10(6)/MCL (ref 4.2–5.4)
S ENT+BONG DNA STL QL NAA+NON-PROBE: NOT DETECTED
S PNEUM DNA CSF QL NAA+NON-PROBE: NOT DETECTED
SERRATIA MARCESCENS (OHS): NOT DETECTED
SODIUM SERPL-SCNC: 143 MMOL/L (ref 136–145)
SODIUM SERPL-SCNC: 143 MMOL/L (ref 136–145)
STAPHYLOCOCCUS AUREUS (OHS): NOT DETECTED
STAPHYLOCOCCUS EPIDERMIDIS (OHS): NOT DETECTED
STAPHYLOCOCCUS LUGDUNENSIS (OHS): NOT DETECTED
STAPHYLOCOCCUS SPP. (OHS): DETECTED
STENOTROPHOMONAS MALTOPHILIA (OHS): NOT DETECTED
STREPTOCOCCUS PYOGENES (GROUP A)(OHS): NOT DETECTED
STREPTOCOCCUS SPP. (OHS): NOT DETECTED
VANA/B (OHS): ABNORMAL
VIM (OHS): ABNORMAL
WBC # SPEC AUTO: 4.14 X10(3)/MCL (ref 4.5–11.5)

## 2023-11-23 PROCEDURE — 63600175 PHARM REV CODE 636 W HCPCS

## 2023-11-23 PROCEDURE — 80053 COMPREHEN METABOLIC PANEL: CPT

## 2023-11-23 PROCEDURE — 87040 BLOOD CULTURE FOR BACTERIA: CPT

## 2023-11-23 PROCEDURE — 85025 COMPLETE CBC W/AUTO DIFF WBC: CPT

## 2023-11-23 PROCEDURE — 94761 N-INVAS EAR/PLS OXIMETRY MLT: CPT

## 2023-11-23 PROCEDURE — 63600175 PHARM REV CODE 636 W HCPCS: Performed by: INTERNAL MEDICINE

## 2023-11-23 PROCEDURE — 82533 TOTAL CORTISOL: CPT

## 2023-11-23 PROCEDURE — 25000003 PHARM REV CODE 250: Performed by: INTERNAL MEDICINE

## 2023-11-23 PROCEDURE — 21400001 HC TELEMETRY ROOM

## 2023-11-23 RX ORDER — SACUBITRIL AND VALSARTAN 24; 26 MG/1; MG/1
1 TABLET, FILM COATED ORAL DAILY
Qty: 60 TABLET | Refills: 3 | Status: CANCELLED | OUTPATIENT
Start: 2023-11-23

## 2023-11-23 RX ADMIN — MUPIROCIN: 20 OINTMENT TOPICAL at 09:11

## 2023-11-23 RX ADMIN — ENOXAPARIN SODIUM 40 MG: 40 INJECTION SUBCUTANEOUS at 04:11

## 2023-11-23 RX ADMIN — SODIUM CHLORIDE, POTASSIUM CHLORIDE, SODIUM LACTATE AND CALCIUM CHLORIDE 1000 ML: 600; 310; 30; 20 INJECTION, SOLUTION INTRAVENOUS at 08:11

## 2023-11-23 RX ADMIN — MUPIROCIN: 20 OINTMENT TOPICAL at 02:11

## 2023-11-23 RX ADMIN — VANCOMYCIN HYDROCHLORIDE 1500 MG: 1.5 INJECTION, POWDER, LYOPHILIZED, FOR SOLUTION INTRAVENOUS at 02:11

## 2023-11-23 NOTE — PROGRESS NOTES
Pharmacokinetic Initial Assessment: IV Vancomycin    Assessment/Plan:    Initiate intravenous vancomycin with loading dose of 1500 mg once followed by a maintenance dose of vancomycin 750 mg IV every 24 hours  Desired empiric serum trough concentration is 15 to 20 mcg/mL  Draw vancomycin trough level 60 min prior to third dose on 11/25 at approximately 1300  Pharmacy will continue to follow and monitor vancomycin.      Please contact pharmacy at extension [phone] with any questions regarding this assessment.     Thank you for the consult,   Jamil Jaiden       Patient brief summary:  Laura Jacobs is a 72 y.o. female initiated on antimicrobial therapy with IV Vancomycin for treatment of suspected bacteremia    Drug Allergies:   Review of patient's allergies indicates:   Allergen Reactions    Sulfa (sulfonamide antibiotics)        Actual Body Weight:   67.1 kg    Renal Function:   Estimated Creatinine Clearance: 36.5 mL/min (A) (based on SCr of 1.29 mg/dL (H)).,     CBC (last 72 hours):  Recent Labs   Lab Result Units 11/21/23 2144 11/22/23 0400 11/22/23  0404 11/23/23  0316   WBC x10(3)/mcL 5.22 4.52  --  4.14*   Hgb g/dL 12.6 11.0*  --  9.9*   Hemoglobin A1c %  --   --  5.2  --    Hct % 40.3 34.4*  --  31.5*   Platelet x10(3)/mcL 238 214  --  206   Mono % % 5.0 10.2  --  10.9   Eos % % 10.2 10.4  --  10.9   Basophil % % 0.6 0.7  --  0.7       Metabolic Panel (last 72 hours):  Recent Labs   Lab Result Units 11/21/23 2144 11/22/23  0400 11/22/23  0733 11/22/23  1451 11/23/23  0316   Sodium Level mmol/L 140 142  --  143 143   Potassium Level mmol/L 4.2 4.4  --  4.3 4.8   Chloride mmol/L 107 111*  --  111* 112*   Carbon Dioxide mmol/L 23 22*  --  25 25   Glucose Level mg/dL 117* 69*  --  103 105   Glucose, UA   --   --  4+*  --   --    Blood Urea Nitrogen mg/dL 28.3* 26.5*  --  22.4* 22.7*   Creatinine mg/dL 1.48* 1.14*  --  1.17* 1.29*   Albumin Level g/dL 4.0 3.0*  --   --  2.6*   Bilirubin Total mg/dL 0.6 0.4   "--   --  0.2   Alkaline Phosphatase unit/L 137 106  --   --  111   Aspartate Aminotransferase unit/L 24 19  --   --  17   Alanine Aminotransferase unit/L 21 16  --   --  14   Magnesium Level mg/dL 2.10  --   --   --   --        Drug levels (last 3 results):  No results for input(s): "VANCOMYCINRA", "VANCORANDOM", "VANCOMYCINPE", "VANCOPEAK", "VANCOMYCINTR", "VANCOTROUGH" in the last 72 hours.    Microbiologic Results:  Microbiology Results (last 7 days)       Procedure Component Value Units Date/Time    Blood Culture [[id number]] Collected: 11/23/23 1159    Order Status: Sent Specimen: Blood from Antecubital, Right     Blood Culture [[id number]] Collected: 11/23/23 1159    Order Status: Sent Specimen: Blood from Hand, Right     Blood Culture [[id number]]     Order Status: Canceled Specimen: Blood     Blood Culture [[id number]]     Order Status: Canceled Specimen: Blood     BCID2 Panel [[id number]]  (Abnormal) Collected: 11/22/23 0750    Order Status: Completed Specimen: Blood from [facility] Updated: 11/23/23 0946     CTX-M (ESBL ) N/A     IMP (Cabapenemase ) N/A     KPC resistance gene (Carbapenemase ) N/A     mcr-1 N/A     mecA ID N/A     Comment: Note: Antimicrobial resistance can occur via multiple mechanisms. A Not Detected result for antimicrobial resistance gene(s) does not indicate antimicrobial susceptibility. Subculturing is required for species identification and susceptibility testing of   isolates.        mecA/C and MREJ (MRSA) gene N/A     NDM (Carbapenemase ) N/A     OXA-48-like (Carbapenemase ) N/A     Marleni/B (VRE gene) N/A     VIM (Carbapenemase ) N/A     Enterococcus faecalis Not Detected     Enterococcus faecium Not Detected     Listeria monocytogenes Not Detected     Staphylococcus spp. Detected     Staphylococcus aureus Not Detected     Staphylococcus epidermidis Not Detected     Staphylococcus lugdunensis Not Detected     Streptococcus spp. Not " Detected     Streptococcus agalactiae (Group B) Not Detected     Streptococcus pneumoniae Not Detected     Streptococcus pyogenes (Group A) Not Detected     Acinetobacter calcoaceticus/baumannii complex Not Detected     Bacteroides fragilis Not Detected     Enterobacterales Not Detected     Enterobacter cloacae complex Not Detected     Escherichia coli Not Detected     Klebsiella aerogenes Not Detected     Klebsiella oxytoca Not Detected     Klebsiella pneumoniae group Not Detected     Proteus spp. Not Detected     Salmonella spp. Not Detected     Serratia marcescens Not Detected     Haemophilus influenzae Not Detected     Neisseria meningitidis Not Detected     Pseudomonas aeruginosa Not Detected     Stenotrophomonas maltophilia Not Detected     Candida albicans Not Detected     Candida auris Not Detected     Candida glabrata Not Detected     Candida krusei Not Detected     Candida parapsilosis Not Detected     Candida tropicalis Not Detected     Cryptococcus neoformans/gattii Not Detected    Narrative:      The [place] [employer] BCID2 Panel is a multiplexed nucleic acid test intended for the use with [employer] or [employer] for the simultaneous qualitative detection and identification of multiple bacterial and yeast nucleic acids and select genetic determinants associated with antimicrobial resistance.  The [employer] BCID2 Panel test is performed directly on blood culture samples identified as positive by a continuous monitoring blood culture system.  Results are intended to be interpreted in conjunction with Gram stain results.    Blood Culture [[id number]] Collected: 11/22/23 0750    Order Status: Resulted Specimen: Blood from [facility] Updated: 11/22/23 0808    Blood Culture [[id number]]     Order Status: Canceled Specimen: Blood from [facility]

## 2023-11-23 NOTE — PROGRESS NOTES
[facility] Medicine Wards Progress Note     Resident Team: [facility] Medicine List 4  Attending Physician: Mallika Jerry MD  Resident: Edgar Muro  Intern: Maik Xiong    Subjective:      Brief HPI:  Laura Jacobs is a 72 y.o.  female with a history of CHF and pacemaker placement who presented on 11/21/2023  with c/o weakness onset today while eating dinner.  She explains that she was sitting at the table when she began to feel weak suddenly and passed out.  States that her son was present and did not allow her to hit the ground.  She denies any head trauma.  Patient is unaware of downtime.  She denies seizure-like activity or history of prior seizures.  She also denies any prodrome we will symptoms such as dizziness.  Says this happened in the past.  She denies any chest pain, abdominal pain, shortness for breath, or any other complaints.  Patient does state that she is noticed on several occasions that her blood glucose level has been low in the 60s.  Son brought in patient's home medication and she is currently taking Farxiga.       In the ED, her pulse ranged from 56-60 and her systolic blood pressure ranged from  and diastolic ranged from 54-80. H/H 12.6/40. Cr 1.48 (0.93 in April 2023). . EtOH nl.     Interval History:  No acute events overnight.  Vital signs stable.  Patient with 4+ glycosuria on UA obtained in ED. Patient denies history of diabetes or taking any antihyperglycemic medications.  However, contact patient's son today to bring in patient's home medications and she is currently taking Farxiga which could be causing hypoglycemic episodes leading to syncope.  Also today, patient's blood cultures resulted positive in 2/2 vials for staph spp.  Patient with [facility].  Patient started on empiric antibiotic therapy and repeat blood cultures obtained.  We will obtain ID consult tomorrow when services available for further recommendations.  Patient currently afebrile and with no  leukocytosis.    Review of Systems:  ROS completed and negative except as indicated above.     Objective:     Last 24 Hour Vital Signs:  BP  Min: 98/62  Max: 152/91  Temp  Av.4 °F (36.9 °C)  Min: 97.9 °F (36.6 °C)  Max: 98.9 °F (37.2 °C)  Pulse  Av  Min: 63  Max: 77  Resp  Av  Min: 18  Max: 18  SpO2  Av.3 %  Min: 98 %  Max: 100 %  I/O last 3 completed shifts:  In: 1538.9 [P.O.:720; I.V.:606; IV Piggyback:212.9]  Out: 1150 [Urine:1150]    Physical Examination:  Physical Exam  Vitals reviewed.   Constitutional:       Appearance: Normal appearance.   Cardiovascular:      Rate and Rhythm: Normal rate and regular rhythm.      Pulses: Normal pulses.      Heart sounds: No murmur heard.     No friction rub. No gallop.   Pulmonary:      Effort: Pulmonary effort is normal.      Breath sounds: Normal breath sounds. No wheezing, rhonchi or rales.   Abdominal:      General: Abdomen is flat.      Palpations: Abdomen is soft.   Musculoskeletal:         General: Normal range of motion.      Comments: Port-A-Cath in place without significant tenderness, swelling, redness   Skin:     General: Skin is warm and dry.      Findings: No bruising.   Neurological:      General: No focal deficit present.      Mental Status: She is alert. Mental status is at baseline.   Psychiatric:         Mood and Affect: Mood normal.         Behavior: Behavior normal.       Laboratory:  Most Recent Data:  CBC:   Lab Results   Component Value Date    WBC 4.14 (L) 2023    HGB 9.9 (L) 2023    HCT 31.5 (L) 2023     2023    .0 (H) 2023    RDW 15.0 2023     WBC Differential:   Recent Labs   Lab 23  2144 23  0400 23  0316   WBC 5.22 4.52 4.14*   HGB 12.6 11.0* 9.9*   HCT 40.3 34.4* 31.5*    214 206   .5* 106.2* 104.0*     BMP:   Lab Results   Component Value Date     2023    K 4.8 2023    CL 98 (L) 2021    CO2 25 2023    BUN 22.7 (H)  "11/23/2023    CREATININE 1.29 (H) 11/23/2023    CALCIUM 8.1 (L) 11/23/2023    MG 2.10 11/21/2023    PHOS 3.3 03/30/2023     LFTs:   Lab Results   Component Value Date    ALBUMIN 2.6 (L) 11/23/2023    BILITOT 0.2 11/23/2023    AST 17 11/23/2023    ALKPHOS 111 11/23/2023    ALT 14 11/23/2023     Coags:   Lab Results   Component Value Date    INR 1.0 11/21/2023    PROTIME 12.8 11/21/2023    PTT 28.9 03/10/2022     FLP: No results found for: "CHOL", "HDL", "LDLCALC", "TRIG", "CHOLHDL"  DM:   Lab Results   Component Value Date    HGBA1C 5.2 11/22/2023    CREATININE 1.29 (H) 11/23/2023     Thyroid:   Lab Results   Component Value Date    TSH 1.912 11/22/2023     Anemia:   Lab Results   Component Value Date    RDAHYVNL62 324 11/22/2023    FOLATE 9.9 11/22/2023     Cardiac:   Lab Results   Component Value Date    TROPONINI 0.045 11/21/2023    .8 (H) 11/21/2023     Radiology:  Imaging Results              CT Head Without Contrast (Final result)  Result time 11/22/23 07:20:03      Final result by Benita Marinelli MD (11/22/23 07:20:03)                   Impression:      1. No acute intracranial abnormality.  2. Chronic microvascular ischemic changes.  No significant change from the [facility] interpretation      Electronically signed by: Benita Marinelli  Date:    11/22/2023  Time:    07:20               Narrative:    EXAMINATION:  CT HEAD WITHOUT CONTRAST    CLINICAL HISTORY:  Mental status change, unknown cause;    TECHNIQUE:  Axial scans were obtained from skull base to the vertex.    Coronal and sagittal reconstructions obtained from the axial data.    Automatic exposure control was utilized to limit radiation dose.    Contrast: None    Radiation Dose:    Total DLP: 904 mGy*cm    COMPARISON:  CT head dated 03/30/2023    FINDINGS:  There is diffuse parenchymal volume loss.  There is no acute intracranial hemorrhage or edema. The gray-white matter differentiation is preserved.  Patchy hypodensities in the " subcortical and periventricular white matter likely represent chronic microvascular ischemic changes.    There is no mass effect or midline shift.  There is diffuse parenchymal volume loss.  The basal cisterns are patent. There is no abnormal extra-axial fluid collection.  Carotid artery calcifications are noted.    There is no displaced fracture identified.  There are changes of prior left-sided mastoidectomy.                        Preliminary result by Srinivasa Veloz Jr., MD (11/21/23 23:29:07)                   Impression:    1. No acute intracranial process identified. Details and other findings as noted above.               Narrative:    START OF REPORT:  Technique: CT of the head was performed without intravenous contrast with axial as well as coronal and sagittal images.    Comparison: Comparison is with study [id number] 01:16:46.    Dosage Information: Automated exposure control was utilized.    Clinical history: Loss of Consciousness (She had one syncope episode wittness by the son and one witnessed by the Ambulance. Patient lethargic but does wake up and follow commands).    Findings: Overall, no significant interval change as compared with the prior examination.  Artifact: There is mild artifact on some of the images.  Hemorrhage: No acute intracranial hemorrhage is seen.  CSF spaces: The ventricles, sulci and basal cisterns all appear mildly prominent consistent with global cerebral atrophy.  Brain parenchyma: There is preservation of the grey white junction throughout. Mild microvascular change is seen in portions of the periventricular and deep white matter tracts.  Cerebellum: Unremarkable.  Sella and skull base: The sella appears to be within normal limits for age.  Herniation: None.  Intracranial calcifications: Incidental note is made of bilateral choroid plexus calcification. Incidental note is made of some pineal region calcification. Incidental note is made of some calcification of  the falx. Incidental note is made of subtle bilateral basal ganglia calcifcation.  Calvarium: No acute linear or depressed skull fracture is seen.    Maxillofacial Structures:  Paranasal sinuses: A medium sized retention cyst or polyp in the right maxillary sinus.  Orbits: The orbits appear unremarkable.  Zygomatic arches: The zygomatic arches are intact and unremarkable.  Temporal bones and mastoids: There is suggestion of left mastoidectomy.  TMJ: The mandibular condyles appear normally placed with respect to the mandibular fossa.                                         X-Ray Chest AP Portable (Final result)  Result time 11/22/23 08:37:34      Final result by Jony Beasley MD (11/22/23 08:37:34)                   Impression:      No acute findings.      Electronically signed by: Jony Beasley  Date:    11/22/2023  Time:    08:37               Narrative:    EXAMINATION:  XR CHEST AP PORTABLE    CLINICAL HISTORY:  Syncope;    COMPARISON:  30 March 2023    FINDINGS:  Frontal view of the chest was obtained. Right Port-A-Cath and left-sided AICD are stable.  The heart is not enlarged.  There is aortic atherosclerosis.  Suspect some mild bibasilar atelectasis.  Grossly clear lungs.  No pneumothorax.                        Wet Read by Edgar Pratt MD (11/21/23 23:08:47, [facility], Emergency Medicine)    Stable chest                                      Current Medications:     Infusions:       Scheduled:   enoxparin  40 mg Subcutaneous Daily    mupirocin   Nasal BID        PRN:  dextrose 10%, dextrose 10%, glucagon (human recombinant), glucose, glucose, naloxone, sodium chloride 0.9%      Assessment & Plan:     1) Syncope      Bradycardia      Hypoglycemia      First-degree AV block  - Syncopal episode at home, no head trauma  - EKG showed NSR, 62bpm, 1st degree AV block, and LAD  - Head CT showed now acute abnormalities.  - echo 11/23 displays EF of 50-55%  - carotid ultrasound 11/22  unremarkable  - patient found to be hypoglycemic with a POCT glucose of 66 on 11/22; patient with 4+ glycosuria on UA conducted in the ED  -patient's son brought in home medications and patient is taking Farxiga    2) [place]  - Cr 1.48, elevated from 0.93 4/01/2023 on admission  - Cr 1.29 today after receiving IV fluids  - continue to avoid nephrotoxic medications    3) 2/2 positive blood cultures for Staph. spp  - awaiting further ID  - patient with current Port-A-Cath in place  - patient afebrile with no leukocytosis  - we will repeat blood cultures today  -start patient on empiric antibiotic therapy with vancomycin  -we will consult ID for further recommendations when services next available  -patient may require line removal, 72hr line holiday, and line replacement      CODE STATUS: Full  Access: PIV  Antibiotics: Vanc  Diet: Adult regular  DVT Prophylaxis: Lovenox  GI Prophylaxis: N/A  Fluids: N/A    Disposition:  On discharge, patient's metoprolol and Farxiga we will be adjusted to avoid future syncopal episodes until follow up with PCP.  Patient will now require further workup due to positive blood culture results.  Disposition on this is pending.  [place] improving with IVF.      Maik Xiong, DO  Internal Medicine [id number]

## 2023-11-24 LAB
ALBUMIN SERPL-MCNC: 3.3 G/DL (ref 3.4–4.8)
ALBUMIN/GLOB SERPL: 0.8 RATIO (ref 1.1–2)
ALP SERPL-CCNC: 122 UNIT/L (ref 40–150)
ALT SERPL-CCNC: 20 UNIT/L (ref 0–55)
AST SERPL-CCNC: 26 UNIT/L (ref 5–34)
BASOPHILS # BLD AUTO: 0.03 X10(3)/MCL
BASOPHILS NFR BLD AUTO: 0.5 %
BILIRUB SERPL-MCNC: 0.4 MG/DL
BUN SERPL-MCNC: 16.2 MG/DL (ref 9.8–20.1)
CALCIUM SERPL-MCNC: 9 MG/DL (ref 8.4–10.2)
CHLORIDE SERPL-SCNC: 104 MMOL/L (ref 98–107)
CO2 SERPL-SCNC: 27 MMOL/L (ref 23–31)
CREAT SERPL-MCNC: 1.17 MG/DL (ref 0.55–1.02)
EOSINOPHIL # BLD AUTO: 0.46 X10(3)/MCL (ref 0–0.9)
EOSINOPHIL NFR BLD AUTO: 8.2 %
ERYTHROCYTE [DISTWIDTH] IN BLOOD BY AUTOMATED COUNT: 14.7 % (ref 11.5–17)
GFR SERPLBLD CREATININE-BSD FMLA CKD-EPI: 50 MLS/MIN/1.73/M2
GLOBULIN SER-MCNC: 4 GM/DL (ref 2.4–3.5)
GLUCOSE SERPL-MCNC: 96 MG/DL (ref 82–115)
HCT VFR BLD AUTO: 39.1 % (ref 37–47)
HGB BLD-MCNC: 12.3 G/DL (ref 12–16)
HOLD SPECIMEN: NORMAL
HOLD SPECIMEN: NORMAL
IMM GRANULOCYTES # BLD AUTO: 0.01 X10(3)/MCL (ref 0–0.04)
IMM GRANULOCYTES NFR BLD AUTO: 0.2 %
LYMPHOCYTES # BLD AUTO: 1 X10(3)/MCL (ref 0.6–4.6)
LYMPHOCYTES NFR BLD AUTO: 17.7 %
MCH RBC QN AUTO: 33 PG (ref 27–31)
MCHC RBC AUTO-ENTMCNC: 31.5 G/DL (ref 33–36)
MCV RBC AUTO: 104.8 FL (ref 80–94)
MONOCYTES # BLD AUTO: 0.42 X10(3)/MCL (ref 0.1–1.3)
MONOCYTES NFR BLD AUTO: 7.4 %
NEUTROPHILS # BLD AUTO: 3.72 X10(3)/MCL (ref 2.1–9.2)
NEUTROPHILS NFR BLD AUTO: 66 %
NRBC BLD AUTO-RTO: 0 %
PLATELET # BLD AUTO: 250 X10(3)/MCL (ref 130–400)
PMV BLD AUTO: 9.3 FL (ref 7.4–10.4)
POCT GLUCOSE: 114 MG/DL (ref 70–110)
POCT GLUCOSE: 69 MG/DL (ref 70–110)
POCT GLUCOSE: 87 MG/DL (ref 70–110)
POCT GLUCOSE: 98 MG/DL (ref 70–110)
POTASSIUM SERPL-SCNC: 5.1 MMOL/L (ref 3.5–5.1)
PROT SERPL-MCNC: 7.3 GM/DL (ref 5.8–7.6)
RBC # BLD AUTO: 3.73 X10(6)/MCL (ref 4.2–5.4)
SODIUM SERPL-SCNC: 139 MMOL/L (ref 136–145)
WBC # SPEC AUTO: 5.64 X10(3)/MCL (ref 4.5–11.5)

## 2023-11-24 PROCEDURE — 21400001 HC TELEMETRY ROOM

## 2023-11-24 PROCEDURE — 94761 N-INVAS EAR/PLS OXIMETRY MLT: CPT

## 2023-11-24 PROCEDURE — 63600175 PHARM REV CODE 636 W HCPCS

## 2023-11-24 PROCEDURE — 63600175 PHARM REV CODE 636 W HCPCS: Performed by: INTERNAL MEDICINE

## 2023-11-24 PROCEDURE — 25000003 PHARM REV CODE 250: Performed by: INTERNAL MEDICINE

## 2023-11-24 PROCEDURE — 80053 COMPREHEN METABOLIC PANEL: CPT

## 2023-11-24 PROCEDURE — 85025 COMPLETE CBC W/AUTO DIFF WBC: CPT

## 2023-11-24 PROCEDURE — 25000003 PHARM REV CODE 250

## 2023-11-24 RX ORDER — SODIUM CHLORIDE 0.9 % (FLUSH) 0.9 %
10 SYRINGE (ML) INJECTION EVERY 6 HOURS
Status: DISCONTINUED | OUTPATIENT
Start: 2023-11-25 | End: 2023-11-27 | Stop reason: HOSPADM

## 2023-11-24 RX ORDER — SODIUM CHLORIDE 0.9 % (FLUSH) 0.9 %
10 SYRINGE (ML) INJECTION
Status: DISCONTINUED | OUTPATIENT
Start: 2023-11-24 | End: 2023-11-27 | Stop reason: HOSPADM

## 2023-11-24 RX ADMIN — CEFAZOLIN 2 G: 2 INJECTION, POWDER, FOR SOLUTION INTRAMUSCULAR; INTRAVENOUS at 02:11

## 2023-11-24 RX ADMIN — MUPIROCIN: 20 OINTMENT TOPICAL at 09:11

## 2023-11-24 RX ADMIN — ENOXAPARIN SODIUM 40 MG: 40 INJECTION SUBCUTANEOUS at 05:11

## 2023-11-24 RX ADMIN — VANCOMYCIN HYDROCHLORIDE 750 MG: 750 INJECTION, POWDER, LYOPHILIZED, FOR SOLUTION INTRAVENOUS at 01:11

## 2023-11-24 RX ADMIN — HEPARIN SODIUM: 5000 INJECTION, SOLUTION INTRAVENOUS; SUBCUTANEOUS at 05:11

## 2023-11-24 RX ADMIN — MUPIROCIN: 20 OINTMENT TOPICAL at 08:11

## 2023-11-24 NOTE — PROGRESS NOTES
[facility] Medicine Wards Progress Note     Resident Team: [facility] Medicine List 4  Attending Physician: Mallika Jerry MD  Resident: Edgar Muro  Intern: Maik Xiong    Subjective:      Brief HPI:  Laura Jacobs is a 72 y.o.  female with a history of CHF and pacemaker placement who presented on 2023  with c/o weakness onset today while eating dinner.  She explains that she was sitting at the table when she began to feel weak suddenly and passed out.  States that her son was present and did not allow her to hit the ground.  She denies any head trauma.  Patient is unaware of downtime.  She denies seizure-like activity or history of prior seizures.  She also denies any prodrome we will symptoms such as dizziness.  Says this happened in the past.  She denies any chest pain, abdominal pain, shortness for breath, or any other complaints.  Patient does state that she is noticed on several occasions that her blood glucose level has been low in the 60s.  Son brought in patient's home medication and she is currently taking Farxiga.       In the ED, her pulse ranged from 56-60 and her systolic blood pressure ranged from  and diastolic ranged from 54-80. H/H 12.6/40. Cr 1.48 (0.93 in 2023). . EtOH nl.     Interval History:  No acute events overnight.  Vital signs stable. Patient's blood cultures resulted positive in 2/2 vials for staph spp.  Patient with MediPort.  Patient started on empiric antibiotic therapy and repeat blood cultures obtained.  We will discuss case with Infectious Disease today for further recommendations.  It is very likely that patient will need line removal, line holiday, and replacement with surgery.  Patient remains afebrile overnight.    Review of Systems:  ROS completed and negative except as indicated above.     Objective:     Last 24 Hour Vital Signs:  BP  Min: 111/51  Max: 154/83  Temp  Av °F (36.7 °C)  Min: 97.7 °F (36.5 °C)  Max: 98.3 °F (36.8 °C)  Pulse  Avg:  63.4  Min: 57  Max: 67  Resp  Av  Min: 18  Max: 18  SpO2  Av.4 %  Min: 98 %  Max: 100 %  I/O last 3 completed shifts:  In: 1326 [P.O.:720; I.V.:606]  Out: 1150 [Urine:1150]    Physical Examination:  Physical Exam  Vitals reviewed.   Constitutional:       Appearance: Normal appearance.   Cardiovascular:      Rate and Rhythm: Normal rate and regular rhythm.      Pulses: Normal pulses.      Heart sounds: No murmur heard.     No friction rub. No gallop.   Pulmonary:      Effort: Pulmonary effort is normal.      Breath sounds: Normal breath sounds. No wheezing, rhonchi or rales.   Abdominal:      General: Abdomen is flat.      Palpations: Abdomen is soft.   Musculoskeletal:         General: Normal range of motion.      Comments: Port-A-Cath in place without significant tenderness, swelling, redness   Skin:     General: Skin is warm and dry.      Findings: No bruising.   Neurological:      General: No focal deficit present.      Mental Status: She is alert. Mental status is at baseline.   Psychiatric:         Mood and Affect: Mood normal.         Behavior: Behavior normal.       Laboratory:  Most Recent Data:  CBC:   Lab Results   Component Value Date    WBC 4.14 (L) 2023    HGB 9.9 (L) 2023    HCT 31.5 (L) 2023     2023    .0 (H) 2023    RDW 15.0 2023     WBC Differential:   Recent Labs   Lab 23  2144 23  0400 23  0316   WBC 5.22 4.52 4.14*   HGB 12.6 11.0* 9.9*   HCT 40.3 34.4* 31.5*    214 206   .5* 106.2* 104.0*       BMP:   Lab Results   Component Value Date     2023    K 4.9 2023    CL 98 (L) 2021    CO2 26 2023    BUN 19.7 2023    CREATININE 1.28 (H) 2023    CALCIUM 8.9 2023    MG 2.10 2023    PHOS 3.3 2023     LFTs:   Lab Results   Component Value Date    ALBUMIN 2.6 (L) 2023    BILITOT 0.2 2023    AST 17 2023    ALKPHOS 111 2023    ALT 14  "11/23/2023     Coags:   Lab Results   Component Value Date    INR 1.0 11/21/2023    PROTIME 12.8 11/21/2023    PTT 28.9 03/10/2022     FLP: No results found for: "CHOL", "HDL", "LDLCALC", "TRIG", "CHOLHDL"  DM:   Lab Results   Component Value Date    HGBA1C 5.2 11/22/2023    CREATININE 1.28 (H) 11/23/2023     Thyroid:   Lab Results   Component Value Date    TSH 1.912 11/22/2023     Anemia:   Lab Results   Component Value Date    GIWLLZSW39 324 11/22/2023    FOLATE 9.9 11/22/2023     Cardiac:   Lab Results   Component Value Date    TROPONINI 0.045 11/21/2023    .8 (H) 11/21/2023     Radiology:  Imaging Results              CT Head Without Contrast (Final result)  Result time 11/22/23 07:20:03      Final result by Benita Marinelli MD (11/22/23 07:20:03)                   Impression:      1. No acute intracranial abnormality.  2. Chronic microvascular ischemic changes.  No significant change from the [facility] interpretation      Electronically signed by: Benita Marinelli  Date:    11/22/2023  Time:    07:20               Narrative:    EXAMINATION:  CT HEAD WITHOUT CONTRAST    CLINICAL HISTORY:  Mental status change, unknown cause;    TECHNIQUE:  Axial scans were obtained from skull base to the vertex.    Coronal and sagittal reconstructions obtained from the axial data.    Automatic exposure control was utilized to limit radiation dose.    Contrast: None    Radiation Dose:    Total DLP: 904 mGy*cm    COMPARISON:  CT head dated 03/30/2023    FINDINGS:  There is diffuse parenchymal volume loss.  There is no acute intracranial hemorrhage or edema. The gray-white matter differentiation is preserved.  Patchy hypodensities in the subcortical and periventricular white matter likely represent chronic microvascular ischemic changes.    There is no mass effect or midline shift.  There is diffuse parenchymal volume loss.  The basal cisterns are patent. There is no abnormal extra-axial fluid collection.  Carotid artery " calcifications are noted.    There is no displaced fracture identified.  There are changes of prior left-sided mastoidectomy.                        Preliminary result by Srinivasa Veloz Jr., MD (11/21/23 23:29:07)                   Impression:    1. No acute intracranial process identified. Details and other findings as noted above.               Narrative:    START OF REPORT:  Technique: CT of the head was performed without intravenous contrast with axial as well as coronal and sagittal images.    Comparison: Comparison is with study [id number] 01:16:46.    Dosage Information: Automated exposure control was utilized.    Clinical history: Loss of Consciousness (She had one syncope episode wittness by the son and one witnessed by the Ambulance. Patient lethargic but does wake up and follow commands).    Findings: Overall, no significant interval change as compared with the prior examination.  Artifact: There is mild artifact on some of the images.  Hemorrhage: No acute intracranial hemorrhage is seen.  CSF spaces: The ventricles, sulci and basal cisterns all appear mildly prominent consistent with global cerebral atrophy.  Brain parenchyma: There is preservation of the grey white junction throughout. Mild microvascular change is seen in portions of the periventricular and deep white matter tracts.  Cerebellum: Unremarkable.  Sella and skull base: The sella appears to be within normal limits for age.  Herniation: None.  Intracranial calcifications: Incidental note is made of bilateral choroid plexus calcification. Incidental note is made of some pineal region calcification. Incidental note is made of some calcification of the falx. Incidental note is made of subtle bilateral basal ganglia calcifcation.  Calvarium: No acute linear or depressed skull fracture is seen.    Maxillofacial Structures:  Paranasal sinuses: A medium sized retention cyst or polyp in the right maxillary sinus.  Orbits: The orbits  appear unremarkable.  Zygomatic arches: The zygomatic arches are intact and unremarkable.  Temporal bones and mastoids: There is suggestion of left mastoidectomy.  TMJ: The mandibular condyles appear normally placed with respect to the mandibular fossa.                                         X-Ray Chest AP Portable (Final result)  Result time 11/22/23 08:37:34      Final result by Jony Beasley MD (11/22/23 08:37:34)                   Impression:      No acute findings.      Electronically signed by: Jony Beasley  Date:    11/22/2023  Time:    08:37               Narrative:    EXAMINATION:  XR CHEST AP PORTABLE    CLINICAL HISTORY:  Syncope;    COMPARISON:  30 March 2023    FINDINGS:  Frontal view of the chest was obtained. Right Port-A-Cath and left-sided AICD are stable.  The heart is not enlarged.  There is aortic atherosclerosis.  Suspect some mild bibasilar atelectasis.  Grossly clear lungs.  No pneumothorax.                        Wet Read by Edgar Pratt MD (11/21/23 23:08:47, [facility], Emergency Medicine)    Stable chest                                      Current Medications:     Infusions:       Scheduled:   enoxparin  40 mg Subcutaneous Daily    mupirocin   Nasal BID    vancomycin (VANCOCIN) IV (PEDS and ADULTS)  750 mg Intravenous Q24H        PRN:  dextrose 10%, dextrose 10%, glucagon (human recombinant), glucose, glucose, naloxone, sodium chloride 0.9%      Assessment & Plan:     1) Syncope      Bradycardia      Hypoglycemia      First-degree AV block  - Syncopal episode at home, no head trauma  - EKG showed NSR, 62bpm, 1st degree AV block, and LAD  - Head CT showed now acute abnormalities.  - echo 11/23 displays EF of 50-55%  - carotid ultrasound 11/22 unremarkable  - patient found to be hypoglycemic with a POCT glucose of 66 on 11/22; patient with 4+ glycosuria on UA conducted in the ED  -patient's son brought in home medications and patient is taking  Farxiga  -likely cause of syncope is polypharmacy, we will adjust patient's medications prior to discharge as appropriate    2) Acute on Chronic Kidney Injury  - Cr 1.48, elevated from 0.93 4/01/2023 on admission  - continue to avoid nephrotoxic medications  -labs pending this morning, continue to follow    3) 2/2 positive blood cultures for Staph. spp  - awaiting further ID  - patient with current Port-A-Cath in place  - patient afebrile with no leukocytosis  - repeat blood cultures pending  -patient currently on day 2 of vancomycin  -discussed the case with Infectious Disease today who recommended vanc lock of line and transitioned from vancomycin to Ancef 2 q.8 hours.  Contacted pharmacy about vanc lock and they we will get back to me on how to execute.  -patient may require line removal, 72hr line holiday, and line replacement    4) Macrocytic Anemia  -H/H has been at baseline  -patient has been receiving chemotherapy for breast cancer  -  -B12 and folate within normal limits    CODE STATUS: Full  Access: PIV  Antibiotics: Vanc  Diet: Adult regular  DVT Prophylaxis: Lovenox  GI Prophylaxis: N/A  Fluids: N/A    Disposition:  We will need to adjust patient's home antibiotic regimen prior to discharge.  We will discuss case today with Infectious Disease for further recommendations.  Patient may require line removal, line holiday, and line replacement before discharge.    Maik Xiong,   Internal Medicine HO-1

## 2023-11-24 NOTE — NURSING
Long called paged with no answer, will continue to contact. X4 attempts for peripherial IV insertion with no success. MD to be made aware with possible anticipation of Mediport line removal per MD progress notes. Vanc/Heparin lock initiated per MD order as well.

## 2023-11-25 LAB
ALBUMIN SERPL-MCNC: 3 G/DL (ref 3.4–4.8)
ALBUMIN/GLOB SERPL: 0.8 RATIO (ref 1.1–2)
ALP SERPL-CCNC: 110 UNIT/L (ref 40–150)
ALT SERPL-CCNC: 16 UNIT/L (ref 0–55)
AST SERPL-CCNC: 23 UNIT/L (ref 5–34)
BASOPHILS # BLD AUTO: 0.03 X10(3)/MCL
BASOPHILS NFR BLD AUTO: 0.6 %
BILIRUB SERPL-MCNC: 0.3 MG/DL
BUN SERPL-MCNC: 18.6 MG/DL (ref 9.8–20.1)
CALCIUM SERPL-MCNC: 8.3 MG/DL (ref 8.4–10.2)
CHLORIDE SERPL-SCNC: 108 MMOL/L (ref 98–107)
CO2 SERPL-SCNC: 19 MMOL/L (ref 23–31)
CREAT SERPL-MCNC: 1.45 MG/DL (ref 0.55–1.02)
EOSINOPHIL # BLD AUTO: 0.53 X10(3)/MCL (ref 0–0.9)
EOSINOPHIL NFR BLD AUTO: 11.4 %
ERYTHROCYTE [DISTWIDTH] IN BLOOD BY AUTOMATED COUNT: 14.8 % (ref 11.5–17)
GFR SERPLBLD CREATININE-BSD FMLA CKD-EPI: 39 MLS/MIN/1.73/M2
GLOBULIN SER-MCNC: 3.6 GM/DL (ref 2.4–3.5)
GLUCOSE SERPL-MCNC: 95 MG/DL (ref 82–115)
HCT VFR BLD AUTO: 31 % (ref 37–47)
HGB BLD-MCNC: 9.8 G/DL (ref 12–16)
IMM GRANULOCYTES # BLD AUTO: 0.01 X10(3)/MCL (ref 0–0.04)
IMM GRANULOCYTES NFR BLD AUTO: 0.2 %
LYMPHOCYTES # BLD AUTO: 1.25 X10(3)/MCL (ref 0.6–4.6)
LYMPHOCYTES NFR BLD AUTO: 27 %
MCH RBC QN AUTO: 33 PG (ref 27–31)
MCHC RBC AUTO-ENTMCNC: 31.6 G/DL (ref 33–36)
MCV RBC AUTO: 104.4 FL (ref 80–94)
MONOCYTES # BLD AUTO: 0.6 X10(3)/MCL (ref 0.1–1.3)
MONOCYTES NFR BLD AUTO: 13 %
NEUTROPHILS # BLD AUTO: 2.21 X10(3)/MCL (ref 2.1–9.2)
NEUTROPHILS NFR BLD AUTO: 47.8 %
NRBC BLD AUTO-RTO: 0 %
NT-PROBNP SERPL IA-MCNC: 4111 PG/ML
PLATELET # BLD AUTO: 209 X10(3)/MCL (ref 130–400)
PMV BLD AUTO: 9.9 FL (ref 7.4–10.4)
POCT GLUCOSE: 108 MG/DL (ref 70–110)
POCT GLUCOSE: 109 MG/DL (ref 70–110)
POCT GLUCOSE: 110 MG/DL (ref 70–110)
POTASSIUM SERPL-SCNC: 5.4 MMOL/L (ref 3.5–5.1)
PROT SERPL-MCNC: 6.6 GM/DL (ref 5.8–7.6)
RBC # BLD AUTO: 2.97 X10(6)/MCL (ref 4.2–5.4)
SODIUM SERPL-SCNC: 138 MMOL/L (ref 136–145)
WBC # SPEC AUTO: 4.63 X10(3)/MCL (ref 4.5–11.5)

## 2023-11-25 PROCEDURE — 85025 COMPLETE CBC W/AUTO DIFF WBC: CPT

## 2023-11-25 PROCEDURE — 94761 N-INVAS EAR/PLS OXIMETRY MLT: CPT

## 2023-11-25 PROCEDURE — 63600175 PHARM REV CODE 636 W HCPCS

## 2023-11-25 PROCEDURE — 76937 US GUIDE VASCULAR ACCESS: CPT

## 2023-11-25 PROCEDURE — 80053 COMPREHEN METABOLIC PANEL: CPT

## 2023-11-25 PROCEDURE — 25000003 PHARM REV CODE 250: Performed by: INTERNAL MEDICINE

## 2023-11-25 PROCEDURE — A4216 STERILE WATER/SALINE, 10 ML: HCPCS

## 2023-11-25 PROCEDURE — 25000003 PHARM REV CODE 250

## 2023-11-25 PROCEDURE — 21400001 HC TELEMETRY ROOM

## 2023-11-25 PROCEDURE — 36410 VNPNXR 3YR/> PHY/QHP DX/THER: CPT

## 2023-11-25 PROCEDURE — C1751 CATH, INF, PER/CENT/MIDLINE: HCPCS

## 2023-11-25 RX ORDER — SODIUM CHLORIDE 0.9 % (FLUSH) 0.9 %
10 SYRINGE (ML) INJECTION
Status: DISCONTINUED | OUTPATIENT
Start: 2023-11-25 | End: 2023-11-27 | Stop reason: HOSPADM

## 2023-11-25 RX ORDER — SODIUM CHLORIDE 0.9 % (FLUSH) 0.9 %
10 SYRINGE (ML) INJECTION EVERY 6 HOURS
Status: DISCONTINUED | OUTPATIENT
Start: 2023-11-25 | End: 2023-11-27 | Stop reason: HOSPADM

## 2023-11-25 RX ADMIN — SODIUM CHLORIDE, PRESERVATIVE FREE 10 ML: 5 INJECTION INTRAVENOUS at 01:11

## 2023-11-25 RX ADMIN — CEFAZOLIN 2 G: 2 INJECTION, POWDER, FOR SOLUTION INTRAMUSCULAR; INTRAVENOUS at 01:11

## 2023-11-25 RX ADMIN — MUPIROCIN: 20 OINTMENT TOPICAL at 08:11

## 2023-11-25 RX ADMIN — CEFAZOLIN 2 G: 2 INJECTION, POWDER, FOR SOLUTION INTRAMUSCULAR; INTRAVENOUS at 04:11

## 2023-11-25 RX ADMIN — SODIUM CHLORIDE, PRESERVATIVE FREE 10 ML: 5 INJECTION INTRAVENOUS at 12:11

## 2023-11-25 RX ADMIN — MUPIROCIN: 20 OINTMENT TOPICAL at 10:11

## 2023-11-25 RX ADMIN — CEFAZOLIN 2 G: 2 INJECTION, POWDER, FOR SOLUTION INTRAMUSCULAR; INTRAVENOUS at 08:11

## 2023-11-25 RX ADMIN — SODIUM CHLORIDE, PRESERVATIVE FREE 10 ML: 5 INJECTION INTRAVENOUS at 06:11

## 2023-11-25 RX ADMIN — ENOXAPARIN SODIUM 40 MG: 40 INJECTION SUBCUTANEOUS at 05:11

## 2023-11-25 RX ADMIN — SODIUM CHLORIDE, PRESERVATIVE FREE 10 ML: 5 INJECTION INTRAVENOUS at 05:11

## 2023-11-25 NOTE — PLAN OF CARE
Problem: Adult Inpatient Plan of Care  Goal: Plan of Care Review  Outcome: Ongoing, Progressing  Flowsheets (Taken 11/25/2023 0749)  Plan of Care Reviewed With: patient  Goal: Absence of Hospital-Acquired Illness or Injury  Outcome: Ongoing, Progressing  Intervention: Identify and Manage Fall Risk  Flowsheets (Taken 11/25/2023 0749)  Safety Promotion/Fall Prevention:   assistive device/personal item within reach   side rails raised x 2   supervised activity  Intervention: Prevent Skin Injury  Flowsheets (Taken 11/25/2023 0749)  Body Position:   position changed independently   30 degrees  Intervention: Prevent and Manage VTE (Venous Thromboembolism) Risk  Flowsheets (Taken 11/25/2023 0749)  Activity Management: Rolling - L1  Intervention: Prevent Infection  Flowsheets (Taken 11/25/2023 0749)  Infection Prevention:   single patient room provided   hand hygiene promoted   rest/sleep promoted  Goal: Optimal Comfort and Wellbeing  Outcome: Ongoing, Progressing  Intervention: Monitor Pain and Promote Comfort  Flowsheets (Taken 11/25/2023 0749)  Pain Management Interventions:   around-the-clock dosing utilized   quiet environment facilitated   relaxation techniques promoted  Intervention: Provide Person-Centered Care  Flowsheets (Taken 11/25/2023 0749)  Trust Relationship/Rapport:   choices provided   reassurance provided   care explained   emotional support provided   empathic listening provided   questions answered   questions encouraged   thoughts/feelings acknowledged     Problem: Infection  Goal: Absence of Infection Signs and Symptoms  Outcome: Ongoing, Progressing

## 2023-11-25 NOTE — PROCEDURES
"Laura Jacobs is a 72 y.o. female patient.    Temp: 98 °F (36.7 °C) (11/24/23 2320)  Pulse: 66 (11/24/23 2320)  Resp: 18 (11/24/23 2320)  BP: 114/75 (11/24/23 2320)  SpO2: 97 % (11/25/23 0000)  Weight: 67.1 kg (148 lb) (11/22/23 0749)  Height: 5' 5" (165.1 cm) (11/22/23 0749)    PICC  Date/Time: 11/25/2023 3:09 AM  Performed by: Taz Dennis RN  Consent Done: Yes  Time out: Immediately prior to procedure a time out was called to verify the correct patient, procedure, equipment, support staff and site/side marked as required  Indications: med administration and vascular access  Anesthesia: local infiltration  Local anesthetic: lidocaine 1% without epinephrine  Anesthetic Total (mL): 5  Preparation: skin prepped with ChloraPrep  Skin prep agent dried: skin prep agent completely dried prior to procedure  Sterile barriers: all five maximum sterile barriers used - cap, mask, sterile gown, sterile gloves, and large sterile sheet  Hand hygiene: hand hygiene performed prior to central venous catheter insertion  Location details: right basilic  Catheter type: single lumen  Catheter size: 4 Fr  Catheter Length: 15cm    Ultrasound guidance: yes  Vessel Caliber: medium and patent, compressibility normal  Needle advanced into vessel with real time Ultrasound guidance.  Guidewire confirmed in vessel.  Sterile sheath used.  Number of attempts: 1  Post-procedure: blood return through all ports, chlorhexidine patch and sterile dressing applied    Assessment: free fluid flow          Name Taz Dennis  11/25/2023    "

## 2023-11-25 NOTE — PROGRESS NOTES
[facility] Medicine Wards Progress Note     Resident Team: [facility] Medicine List 4  Attending Physician: Mallika Jerry MD  Resident: Edgar Muro  Intern: Maik Xiong    Subjective:      Brief HPI:  Laura Jacobs is a 72 y.o.  female with a history of CHF and pacemaker placement who presented on 2023  with c/o weakness onset today while eating dinner.  She explains that she was sitting at the table when she began to feel weak suddenly and passed out.  States that her son was present and did not allow her to hit the ground.  She denies any head trauma.  Patient is unaware of downtime.  She denies seizure-like activity or history of prior seizures.  She also denies any prodrome we will symptoms such as dizziness.  Says this happened in the past.  She denies any chest pain, abdominal pain, shortness for breath, or any other complaints.  Patient does state that she is noticed on several occasions that her blood glucose level has been low in the 60s.  Son brought in patient's home medication and she is currently taking Farxiga.       In the ED, her pulse ranged from 56-60 and her systolic blood pressure ranged from  and diastolic ranged from 54-80. H/H 12.6/40. Cr 1.48 (0.93 in 2023). . EtOH nl.     Interval History:  No acute events overnight.  Vital signs stable. Patient's blood cultures resulted positive in 2/2 vials for staph spp. From cultures taken on . Patient with MediPort.  Discussed the case with ID yesterday who recommended vanc lock and switch to Ancef (D2).  Repeat culture pending.  We will obtain formal ID consult on Monday when services next available.  Patient will also need AMANDA on Monday.    Review of Systems:  ROS completed and negative except as indicated above.     Objective:     Last 24 Hour Vital Signs:  BP  Min: 105/63  Max: 142/80  Temp  Av °F (36.7 °C)  Min: 97.8 °F (36.6 °C)  Max: 98.2 °F (36.8 °C)  Pulse  Av  Min: 55  Max: 71  Resp  Av  Min:  18  Max: 18  SpO2  Av.6 %  Min: 97 %  Max: 100 %  Weight  Av.7 kg (144 lb 12.8 oz)  Min: 65.7 kg (144 lb 12.8 oz)  Max: 65.7 kg (144 lb 12.8 oz)  I/O last 3 completed shifts:  In: 2362.5 [P.O.:2315; IV Piggyback:47.5]  Out: -     Physical Examination:  Physical Exam  Vitals reviewed.   Constitutional:       Appearance: Normal appearance.   Cardiovascular:      Rate and Rhythm: Normal rate and regular rhythm.      Pulses: Normal pulses.      Heart sounds: No murmur heard.     No friction rub. No gallop.   Pulmonary:      Effort: Pulmonary effort is normal.      Breath sounds: Normal breath sounds. No wheezing, rhonchi or rales.   Abdominal:      General: Abdomen is flat.      Palpations: Abdomen is soft.   Musculoskeletal:         General: Normal range of motion.      Comments: Port-A-Cath in place without significant tenderness, swelling, redness   Skin:     General: Skin is warm and dry.      Findings: No bruising.   Neurological:      General: No focal deficit present.      Mental Status: She is alert. Mental status is at baseline.   Psychiatric:         Mood and Affect: Mood normal.         Behavior: Behavior normal.     Laboratory:  Most Recent Data:  CBC:   Lab Results   Component Value Date    WBC 4.63 2023    HGB 9.8 (L) 2023    HCT 31.0 (L) 2023     2023    .4 (H) 2023    RDW 14.8 2023     WBC Differential:   Recent Labs   Lab 23  2144 23  0400 23  0316 23  1300 23  0440   WBC 5.22 4.52 4.14* 5.64 4.63   HGB 12.6 11.0* 9.9* 12.3 9.8*   HCT 40.3 34.4* 31.5* 39.1 31.0*    214 206 250 209   .5* 106.2* 104.0* 104.8* 104.4*       BMP:   Lab Results   Component Value Date     2023    K 5.1 2023    CL 98 (L) 2021    CO2 27 2023    BUN 16.2 2023    CREATININE 1.17 (H) 2023    CALCIUM 9.0 2023    MG 2.10 2023    PHOS 3.3 2023     LFTs:   Lab Results  "  Component Value Date    ALBUMIN 3.3 (L) 11/24/2023    BILITOT 0.4 11/24/2023    AST 26 11/24/2023    ALKPHOS 122 11/24/2023    ALT 20 11/24/2023     Coags:   Lab Results   Component Value Date    INR 1.0 11/21/2023    PROTIME 12.8 11/21/2023    PTT 28.9 03/10/2022     FLP: No results found for: "CHOL", "HDL", "LDLCALC", "TRIG", "CHOLHDL"  DM:   Lab Results   Component Value Date    HGBA1C 5.2 11/22/2023    CREATININE 1.17 (H) 11/24/2023     Thyroid:   Lab Results   Component Value Date    TSH 1.912 11/22/2023     Anemia:   Lab Results   Component Value Date    ZAIWYQGP85 324 11/22/2023    FOLATE 9.9 11/22/2023     Cardiac:   Lab Results   Component Value Date    TROPONINI 0.045 11/21/2023    .8 (H) 11/21/2023     Radiology:  Imaging Results              CT Head Without Contrast (Final result)  Result time 11/22/23 07:20:03      Final result by Benita Marinelli MD (11/22/23 07:20:03)                   Impression:      1. No acute intracranial abnormality.  2. Chronic microvascular ischemic changes.  No significant change from the [facility] interpretation      Electronically signed by: Benita Marinelli  Date:    11/22/2023  Time:    07:20               Narrative:    EXAMINATION:  CT HEAD WITHOUT CONTRAST    CLINICAL HISTORY:  Mental status change, unknown cause;    TECHNIQUE:  Axial scans were obtained from skull base to the vertex.    Coronal and sagittal reconstructions obtained from the axial data.    Automatic exposure control was utilized to limit radiation dose.    Contrast: None    Radiation Dose:    Total DLP: 904 mGy*cm    COMPARISON:  CT head dated 03/30/2023    FINDINGS:  There is diffuse parenchymal volume loss.  There is no acute intracranial hemorrhage or edema. The gray-white matter differentiation is preserved.  Patchy hypodensities in the subcortical and periventricular white matter likely represent chronic microvascular ischemic changes.    There is no mass effect or midline shift.  " There is diffuse parenchymal volume loss.  The basal cisterns are patent. There is no abnormal extra-axial fluid collection.  Carotid artery calcifications are noted.    There is no displaced fracture identified.  There are changes of prior left-sided mastoidectomy.                        Preliminary result by Srinivasa Veloz Jr., MD (11/21/23 23:29:07)                   Impression:    1. No acute intracranial process identified. Details and other findings as noted above.               Narrative:    START OF REPORT:  Technique: CT of the head was performed without intravenous contrast with axial as well as coronal and sagittal images.    Comparison: Comparison is with study [id number] 01:16:46.    Dosage Information: Automated exposure control was utilized.    Clinical history: Loss of Consciousness (She had one syncope episode wittness by the son and one witnessed by the Ambulance. Patient lethargic but does wake up and follow commands).    Findings: Overall, no significant interval change as compared with the prior examination.  Artifact: There is mild artifact on some of the images.  Hemorrhage: No acute intracranial hemorrhage is seen.  CSF spaces: The ventricles, sulci and basal cisterns all appear mildly prominent consistent with global cerebral atrophy.  Brain parenchyma: There is preservation of the grey white junction throughout. Mild microvascular change is seen in portions of the periventricular and deep white matter tracts.  Cerebellum: Unremarkable.  Sella and skull base: The sella appears to be within normal limits for age.  Herniation: None.  Intracranial calcifications: Incidental note is made of bilateral choroid plexus calcification. Incidental note is made of some pineal region calcification. Incidental note is made of some calcification of the falx. Incidental note is made of subtle bilateral basal ganglia calcifcation.  Calvarium: No acute linear or depressed skull fracture is  seen.    Maxillofacial Structures:  Paranasal sinuses: A medium sized retention cyst or polyp in the right maxillary sinus.  Orbits: The orbits appear unremarkable.  Zygomatic arches: The zygomatic arches are intact and unremarkable.  Temporal bones and mastoids: There is suggestion of left mastoidectomy.  TMJ: The mandibular condyles appear normally placed with respect to the mandibular fossa.                                         X-Ray Chest AP Portable (Final result)  Result time 11/22/23 08:37:34      Final result by Jony Beasley MD (11/22/23 08:37:34)                   Impression:      No acute findings.      Electronically signed by: Jony Beasley  Date:    11/22/2023  Time:    08:37               Narrative:    EXAMINATION:  XR CHEST AP PORTABLE    CLINICAL HISTORY:  Syncope;    COMPARISON:  30 March 2023    FINDINGS:  Frontal view of the chest was obtained. Right Port-A-Cath and left-sided AICD are stable.  The heart is not enlarged.  There is aortic atherosclerosis.  Suspect some mild bibasilar atelectasis.  Grossly clear lungs.  No pneumothorax.                        Wet Read by Edgar Pratt MD (11/21/23 23:08:47, [facility], Emergency Medicine)    Stable chest                                      Current Medications:     Infusions:       Scheduled:   ceFAZolin (ANCEF) IVPB  2 g Intravenous Q8H    enoxparin  40 mg Subcutaneous Daily    mupirocin   Nasal BID    sodium chloride 0.9%  10 mL Intravenous Q6H    sodium chloride 0.9%  10 mL Intravenous Q6H    vancomycin (VANCOCIN) 4 mg, heparin (porcine) 5,000 Units IV catheter lock (total volume 2 mL)   Intra-Catheter Q48H        PRN:  dextrose 10%, dextrose 10%, glucagon (human recombinant), glucose, glucose, naloxone, sodium chloride 0.9%, Flushing PICC/Midline Protocol **AND** sodium chloride 0.9% **AND** sodium chloride 0.9%, Flushing PICC/Midline Protocol **AND** sodium chloride 0.9% **AND** sodium chloride  0.9%      Assessment & Plan:     1) Syncope      Bradycardia      Hypoglycemia      First-degree AV block  - Syncopal episode at home, no head trauma  - EKG showed NSR, 62bpm, 1st degree AV block, and LAD  - Head CT showed now acute abnormalities.  - echo 11/23 displays EF of 50-55%  - carotid ultrasound 11/22 unremarkable  - patient found to be hypoglycemic with a POCT glucose of 66 on 11/22; patient with 4+ glycosuria on UA conducted in the ED  -patient's son brought in home medications and patient is taking Farxiga  -likely cause of syncope is polypharmacy, we will adjust patient's medications prior to discharge as appropriate    2) Acute on Chronic Kidney Injury  -Cr 1.48, elevated from 0.93 4/01/2023 on admission  -continue to avoid nephrotoxic medications  -labs pending this morning    3) 2/2 positive blood cultures for Staph. spp  - patient with current Port-A-Cath in place  - repeat blood cultures pending  -vancomycin was discontinued yesterday and patient was started on Ancef 2 g TID (Day 2)  -vanc lock on Port-A-Cath was also initiated.  Data suggest 48 hour duration due to decreased effectiveness after this time window.  -we will obtain official ID consult on Monday when service is next available  -patient may still require line removal, 72hr line holiday, and line replacement    4) Macrocytic Anemia  -H/H has been at baseline  -patient has been receiving chemotherapy for breast cancer  -  -B12 and folate within normal limits    CODE STATUS: Full  Access: PIV  Antibiotics: Ancef  Diet: Adult regular  DVT Prophylaxis: Lovenox  GI Prophylaxis: N/A  Fluids: N/A    Disposition:  We will need to adjust patient's home medication regimen prior to discharge.  Patient currently with vanc lock and on Ancef 2 TID.Formal ID consult and TTE on Monday.    Maik Xiong,   Internal Medicine HO-1

## 2023-11-25 NOTE — PROCEDURES
"Laura Jacobs is a 72 y.o. female patient.    Temp: 98 °F (36.7 °C) (11/24/23 2320)  Pulse: 66 (11/24/23 2320)  Resp: 18 (11/24/23 2320)  BP: 114/75 (11/24/23 2320)  SpO2: 97 % (11/25/23 0000)  Weight: 67.1 kg (148 lb) (11/22/23 0749)  Height: 5' 5" (165.1 cm) (11/22/23 0749)    <PICC>      Name Taz Dennis RN  11/25/2023    "

## 2023-11-26 LAB
ALBUMIN SERPL-MCNC: 3.2 G/DL (ref 3.4–4.8)
ALBUMIN/GLOB SERPL: 0.8 RATIO (ref 1.1–2)
ALP SERPL-CCNC: 117 UNIT/L (ref 40–150)
ALT SERPL-CCNC: 14 UNIT/L (ref 0–55)
AST SERPL-CCNC: 24 UNIT/L (ref 5–34)
BACTERIA BLD CULT: ABNORMAL
BACTERIA BLD CULT: ABNORMAL
BASOPHILS # BLD AUTO: 0.06 X10(3)/MCL
BASOPHILS NFR BLD AUTO: 1.1 %
BILIRUB SERPL-MCNC: 0.4 MG/DL
BUN SERPL-MCNC: 23.1 MG/DL (ref 9.8–20.1)
CALCIUM SERPL-MCNC: 8.8 MG/DL (ref 8.4–10.2)
CHLORIDE SERPL-SCNC: 106 MMOL/L (ref 98–107)
CO2 SERPL-SCNC: 22 MMOL/L (ref 23–31)
CREAT SERPL-MCNC: 1.3 MG/DL (ref 0.55–1.02)
EOSINOPHIL # BLD AUTO: 0.58 X10(3)/MCL (ref 0–0.9)
EOSINOPHIL NFR BLD AUTO: 10.9 %
ERYTHROCYTE [DISTWIDTH] IN BLOOD BY AUTOMATED COUNT: 14.6 % (ref 11.5–17)
GFR SERPLBLD CREATININE-BSD FMLA CKD-EPI: 44 MLS/MIN/1.73/M2
GLOBULIN SER-MCNC: 3.8 GM/DL (ref 2.4–3.5)
GLUCOSE SERPL-MCNC: 67 MG/DL (ref 82–115)
GRAM STN SPEC: ABNORMAL
HCT VFR BLD AUTO: 39 % (ref 37–47)
HGB BLD-MCNC: 12.2 G/DL (ref 12–16)
IMM GRANULOCYTES # BLD AUTO: 0.02 X10(3)/MCL (ref 0–0.04)
IMM GRANULOCYTES NFR BLD AUTO: 0.4 %
LYMPHOCYTES # BLD AUTO: 1.76 X10(3)/MCL (ref 0.6–4.6)
LYMPHOCYTES NFR BLD AUTO: 33 %
MCH RBC QN AUTO: 32.7 PG (ref 27–31)
MCHC RBC AUTO-ENTMCNC: 31.3 G/DL (ref 33–36)
MCV RBC AUTO: 104.6 FL (ref 80–94)
MONOCYTES # BLD AUTO: 0.51 X10(3)/MCL (ref 0.1–1.3)
MONOCYTES NFR BLD AUTO: 9.6 %
NEUTROPHILS # BLD AUTO: 2.41 X10(3)/MCL (ref 2.1–9.2)
NEUTROPHILS NFR BLD AUTO: 45 %
NRBC BLD AUTO-RTO: 0 %
PLATELET # BLD AUTO: 222 X10(3)/MCL (ref 130–400)
PMV BLD AUTO: 9.9 FL (ref 7.4–10.4)
POCT GLUCOSE: 100 MG/DL (ref 70–110)
POCT GLUCOSE: 112 MG/DL (ref 70–110)
POCT GLUCOSE: 114 MG/DL (ref 70–110)
POCT GLUCOSE: 121 MG/DL (ref 70–110)
POCT GLUCOSE: 78 MG/DL (ref 70–110)
POCT GLUCOSE: 95 MG/DL (ref 70–110)
POTASSIUM SERPL-SCNC: 4.9 MMOL/L (ref 3.5–5.1)
PROT SERPL-MCNC: 7 GM/DL (ref 5.8–7.6)
RBC # BLD AUTO: 3.73 X10(6)/MCL (ref 4.2–5.4)
SODIUM SERPL-SCNC: 139 MMOL/L (ref 136–145)
WBC # SPEC AUTO: 5.34 X10(3)/MCL (ref 4.5–11.5)

## 2023-11-26 PROCEDURE — 25000003 PHARM REV CODE 250

## 2023-11-26 PROCEDURE — A4216 STERILE WATER/SALINE, 10 ML: HCPCS

## 2023-11-26 PROCEDURE — 80053 COMPREHEN METABOLIC PANEL: CPT

## 2023-11-26 PROCEDURE — 63600175 PHARM REV CODE 636 W HCPCS

## 2023-11-26 PROCEDURE — 94761 N-INVAS EAR/PLS OXIMETRY MLT: CPT

## 2023-11-26 PROCEDURE — 21400001 HC TELEMETRY ROOM

## 2023-11-26 PROCEDURE — 85025 COMPLETE CBC W/AUTO DIFF WBC: CPT

## 2023-11-26 RX ADMIN — ENOXAPARIN SODIUM 40 MG: 40 INJECTION SUBCUTANEOUS at 05:11

## 2023-11-26 RX ADMIN — CEFAZOLIN 2 G: 2 INJECTION, POWDER, FOR SOLUTION INTRAMUSCULAR; INTRAVENOUS at 09:11

## 2023-11-26 RX ADMIN — HEPARIN SODIUM: 5000 INJECTION, SOLUTION INTRAVENOUS; SUBCUTANEOUS at 03:11

## 2023-11-26 RX ADMIN — SODIUM CHLORIDE, PRESERVATIVE FREE 10 ML: 5 INJECTION INTRAVENOUS at 12:11

## 2023-11-26 RX ADMIN — MUPIROCIN: 20 OINTMENT TOPICAL at 09:11

## 2023-11-26 RX ADMIN — MUPIROCIN: 20 OINTMENT TOPICAL at 10:11

## 2023-11-26 RX ADMIN — SODIUM CHLORIDE, PRESERVATIVE FREE 10 ML: 5 INJECTION INTRAVENOUS at 06:11

## 2023-11-26 RX ADMIN — CEFAZOLIN 2 G: 2 INJECTION, POWDER, FOR SOLUTION INTRAMUSCULAR; INTRAVENOUS at 05:11

## 2023-11-26 RX ADMIN — CEFAZOLIN 2 G: 2 INJECTION, POWDER, FOR SOLUTION INTRAMUSCULAR; INTRAVENOUS at 12:11

## 2023-11-26 RX ADMIN — SODIUM CHLORIDE, PRESERVATIVE FREE 10 ML: 5 INJECTION INTRAVENOUS at 05:11

## 2023-11-26 NOTE — PROGRESS NOTES
[facility] Medicine Wards Progress Note     Resident Team: [facility] Medicine List 4  Attending Physician: Mallika Jerry MD  Resident: Edgar Muro  Intern: Maik Xiong    Subjective:      Brief HPI:  Laura Jacobs is a 72 y.o.  female with a history of CHF and pacemaker placement who presented on 2023  with c/o weakness onset today while eating dinner.  She explains that she was sitting at the table when she began to feel weak suddenly and passed out.  States that her son was present and did not allow her to hit the ground.  She denies any head trauma.  Patient is unaware of downtime.  She denies seizure-like activity or history of prior seizures.  She also denies any prodrome we will symptoms such as dizziness.  Says this happened in the past.  She denies any chest pain, abdominal pain, shortness for breath, or any other complaints.  Patient does state that she is noticed on several occasions that her blood glucose level has been low in the 60s.  Son brought in patient's home medication and she is currently taking Farxiga.       In the ED, her pulse ranged from 56-60 and her systolic blood pressure ranged from  and diastolic ranged from 54-80. H/H 12.6/40. Cr 1.48 (0.93 in 2023). . EtOH nl.     Interval History:  No acute events overnight.  Vital signs stable. Patient's blood cultures resulted positive in 2/2 vials for staph spp. From cultures taken on . Patient with MediPort.  Formal ID consult tomorrow.  Completed 48 hours of vancomycin lock.  Now currently on Ancef (D3).  Repeat culture no growth at 48.   Patient will also need AMANDA on Monday.    Review of Systems:  ROS completed and negative except as indicated above.     Objective:     Last 24 Hour Vital Signs:  BP  Min: 102/54  Max: 119/69  Temp  Av.1 °F (36.7 °C)  Min: 97.8 °F (36.6 °C)  Max: 98.2 °F (36.8 °C)  Pulse  Av.8  Min: 54  Max: 63  Resp  Av  Min: 18  Max: 18  SpO2  Av.9 %  Min: 98 %  Max: 100  %  Weight  Av.9 kg (143 lb 1.6 oz)  Min: 64.9 kg (143 lb 1.6 oz)  Max: 64.9 kg (143 lb 1.6 oz)  I/O last 3 completed shifts:  In: 1154.5 [P.O.:960; IV Piggyback:194.5]  Out: -     Physical Examination:  Physical Exam  Vitals reviewed.   Constitutional:       Appearance: Normal appearance.   Cardiovascular:      Rate and Rhythm: Normal rate and regular rhythm.      Pulses: Normal pulses.      Heart sounds: No murmur heard.     No friction rub. No gallop.   Pulmonary:      Effort: Pulmonary effort is normal.      Breath sounds: Normal breath sounds. No wheezing, rhonchi or rales.   Abdominal:      General: Abdomen is flat.      Palpations: Abdomen is soft.   Musculoskeletal:         General: Normal range of motion.      Comments: Port-A-Cath in place without significant tenderness, swelling, redness   Skin:     General: Skin is warm and dry.      Findings: No bruising.   Neurological:      General: No focal deficit present.      Mental Status: She is alert. Mental status is at baseline.   Psychiatric:         Mood and Affect: Mood normal.         Behavior: Behavior normal.       Laboratory:  Most Recent Data:  CBC:   Lab Results   Component Value Date    WBC 5.34 2023    HGB 12.2 2023    HCT 39.0 2023     2023    .6 (H) 2023    RDW 14.6 2023     WBC Differential:   Recent Labs   Lab 23  0400 23  0316 23  1300 23  0440 23  0345   WBC 4.52 4.14* 5.64 4.63 5.34   HGB 11.0* 9.9* 12.3 9.8* 12.2   HCT 34.4* 31.5* 39.1 31.0* 39.0    206 250 209 222   .2* 104.0* 104.8* 104.4* 104.6*       BMP:   Lab Results   Component Value Date     2023    K 4.9 2023    CL 98 (L) 2021    CO2 22 (L) 2023    BUN 23.1 (H) 2023    CREATININE 1.30 (H) 2023    CALCIUM 8.8 2023    MG 2.10 2023    PHOS 3.3 2023     LFTs:   Lab Results   Component Value Date    ALBUMIN 3.2 (L) 2023     "BILITOT 0.4 11/26/2023    AST 24 11/26/2023    ALKPHOS 117 11/26/2023    ALT 14 11/26/2023     Coags:   Lab Results   Component Value Date    INR 1.0 11/21/2023    PROTIME 12.8 11/21/2023    PTT 28.9 03/10/2022     FLP: No results found for: "CHOL", "HDL", "LDLCALC", "TRIG", "CHOLHDL"  DM:   Lab Results   Component Value Date    HGBA1C 5.2 11/22/2023    CREATININE 1.30 (H) 11/26/2023     Thyroid:   Lab Results   Component Value Date    TSH 1.912 11/22/2023     Anemia:   Lab Results   Component Value Date    [id number] 324 11/22/2023    FOLATE 9.9 11/22/2023     Cardiac:   Lab Results   Component Value Date    TROPONINI 0.045 11/21/2023    .8 (H) 11/21/2023     Radiology:  Imaging Results              CT Head Without Contrast (Final result)  Result time 11/22/23 07:20:03      Final result by Benita Marinelli MD (11/22/23 07:20:03)                   Impression:      1. No acute intracranial abnormality.  2. Chronic microvascular ischemic changes.  [facility]      Electronically signed by: Benita Marinelli  Date:    11/22/2023  Time:    07:20               Narrative:    EXAMINATION:  CT HEAD WITHOUT CONTRAST    CLINICAL HISTORY:  Mental status change, unknown cause;    TECHNIQUE:  Axial scans were obtained from skull base to the vertex.    Coronal and sagittal reconstructions obtained from the axial data.    Automatic exposure control was utilized to limit radiation dose.    Contrast: None    Radiation Dose:    Total DLP: 904 mGy*cm    COMPARISON:  CT head dated 03/30/2023    FINDINGS:  There is diffuse parenchymal volume loss.  There is no acute intracranial hemorrhage or edema. The gray-white matter differentiation is preserved.  Patchy hypodensities in the subcortical and periventricular white matter likely represent chronic microvascular ischemic changes.    There is no mass effect or midline shift.  There is diffuse parenchymal volume loss.  The basal " cisterns are patent. There is no abnormal extra-axial fluid collection.  Carotid artery calcifications are noted.    There is no displaced fracture identified.  There are changes of prior left-sided mastoidectomy.                        Preliminary result by Srinivasa Veloz Jr., MD (11/21/23 23:29:07)                   Impression:    1. No acute intracranial process identified. Details and other findings as noted above.               Narrative:    START OF REPORT:  Technique: CT of the head was performed without intravenous contrast with axial as well as coronal and sagittal images.    Comparison: Comparison is with study [id number] 01:16:46.    Dosage Information: Automated exposure control was utilized.    Clinical history: Loss of Consciousness (She had one syncope episode wittness by the son and one witnessed by the Ambulance. Patient lethargic but does wake up and follow commands).    Findings: Overall, no significant interval change as compared with the prior examination.  Artifact: There is mild artifact on some of the images.  Hemorrhage: No acute intracranial hemorrhage is seen.  CSF spaces: The ventricles, sulci and basal cisterns all appear mildly prominent consistent with global cerebral atrophy.  Brain parenchyma: There is preservation of the grey white junction throughout. Mild microvascular change is seen in portions of the periventricular and deep white matter tracts.  Cerebellum: Unremarkable.  Sella and skull base: The sella appears to be within normal limits for age.  Herniation: None.  Intracranial calcifications: Incidental note is made of bilateral choroid plexus calcification. Incidental note is made of some pineal region calcification. Incidental note is made of some calcification of the falx. Incidental note is made of subtle bilateral basal ganglia calcifcation.  Calvarium: No acute linear or depressed skull fracture is seen.    Maxillofacial Structures:  Paranasal sinuses: A  medium sized retention cyst or polyp in the right maxillary sinus.  Orbits: The orbits appear unremarkable.  Zygomatic arches: The zygomatic arches are intact and unremarkable.  Temporal bones and mastoids: There is suggestion of left mastoidectomy.  TMJ: The mandibular condyles appear normally placed with respect to the mandibular fossa.                                         X-Ray Chest AP Portable (Final result)  Result time 11/22/23 08:37:34      Final result by Jony Beasley MD (11/22/23 08:37:34)                   Impression:      No acute findings.      Electronically signed by: Jony Beasley  Date:    11/22/2023  Time:    08:37               Narrative:    EXAMINATION:  XR CHEST AP PORTABLE    CLINICAL HISTORY:  Syncope;    COMPARISON:  30 March 2023    FINDINGS:  Frontal view of the chest was obtained. Right Port-A-Cath and left-sided AICD are stable.  The heart is not enlarged.  There is aortic atherosclerosis.  Suspect some mild bibasilar atelectasis.  Grossly clear lungs.  No pneumothorax.                        Wet Read by Edgar Pratt MD (11/21/23 23:08:47, [facility], Emergency Medicine)    Stable chest                                      Current Medications:     Infusions:       Scheduled:   ceFAZolin (ANCEF) IVPB  2 g Intravenous Q8H    enoxparin  40 mg Subcutaneous Daily    mupirocin   Nasal BID    sodium chloride 0.9%  10 mL Intravenous Q6H    sodium chloride 0.9%  10 mL Intravenous Q6H    vancomycin (VANCOCIN) 4 mg, heparin (porcine) 5,000 Units IV catheter lock (total volume 2 mL)   Intra-Catheter Q48H        PRN:  dextrose 10%, dextrose 10%, glucagon (human recombinant), glucose, glucose, naloxone, sodium chloride 0.9%, Flushing PICC/Midline Protocol **AND** sodium chloride 0.9% **AND** sodium chloride 0.9%, Flushing PICC/Midline Protocol **AND** sodium chloride 0.9% **AND** sodium chloride 0.9%      Assessment & Plan:     1) Syncope      Bradycardia       Hypoglycemia      First-degree AV block  - Syncopal episode at home, no head trauma  - EKG showed NSR, 62bpm, 1st degree AV block, and LAD  - Head CT showed now acute abnormalities.  - echo 11/23 displays EF of 50-55%  - carotid ultrasound 11/22 unremarkable  - patient found to be hypoglycemic with a POCT glucose of 66 on 11/22; patient with 4+ glycosuria on UA conducted in the ED  -patient's son brought in home medications and patient is taking Farxiga  -likely cause of syncope is polypharmacy, we will adjust patient's medications prior to discharge as appropriate    2) Acute on Chronic Kidney Injury  -Cr 1.48, elevated from 0.93 4/01/2023 on admission  -continue to avoid nephrotoxic medications  -labs pending this morning    3) 2/2 positive blood cultures for Staph. spp  - patient with current Port-A-Cath in place  - repeat blood cultures pending  -vancomycin was discontinued yesterday and patient was started on Ancef 2 g TID (Day 2)  -vanc lock on Port-A-Cath was also initiated.  Data suggest 48 hour duration due to decreased effectiveness after this time window.  -we will obtain official ID consult on Monday when service is next available  -patient may still require line removal, 72hr line holiday, and line replacement    4) Macrocytic Anemia  -H/H has been at baseline  -patient has been receiving chemotherapy for breast cancer  -  -B12 and folate within normal limits    CODE STATUS: Full  Access: PIV  Antibiotics: Ancef  Diet: Adult regular  DVT Prophylaxis: Lovenox  GI Prophylaxis: N/A  Fluids: N/A    Disposition:  We will need to adjust patient's home medication regimen prior to discharge.  Patient completely vanc lock and on Ancef 2 TID.Formal ID consult and TTE on Monday.    Edgar Muro MD  Internal Medicine - PGY-2

## 2023-11-27 VITALS
HEIGHT: 65 IN | BODY MASS INDEX: 23.85 KG/M2 | SYSTOLIC BLOOD PRESSURE: 121 MMHG | OXYGEN SATURATION: 100 % | WEIGHT: 143.13 LBS | HEART RATE: 60 BPM | DIASTOLIC BLOOD PRESSURE: 72 MMHG | TEMPERATURE: 98 F | RESPIRATION RATE: 18 BRPM

## 2023-11-27 LAB
ALBUMIN SERPL-MCNC: 2.8 G/DL (ref 3.4–4.8)
ALBUMIN/GLOB SERPL: 0.8 RATIO (ref 1.1–2)
ALP SERPL-CCNC: 117 UNIT/L (ref 40–150)
ALT SERPL-CCNC: 9 UNIT/L (ref 0–55)
ANION GAP SERPL CALC-SCNC: 7 MEQ/L
AST SERPL-CCNC: 21 UNIT/L (ref 5–34)
BASOPHILS # BLD AUTO: 0.03 X10(3)/MCL
BASOPHILS NFR BLD AUTO: 0.6 %
BILIRUB SERPL-MCNC: 0.2 MG/DL
BUN SERPL-MCNC: 31.3 MG/DL (ref 9.8–20.1)
BUN SERPL-MCNC: 31.3 MG/DL (ref 9.8–20.1)
CALCIUM SERPL-MCNC: 8.1 MG/DL (ref 8.4–10.2)
CALCIUM SERPL-MCNC: 9 MG/DL (ref 8.4–10.2)
CHLORIDE SERPL-SCNC: 107 MMOL/L (ref 98–107)
CHLORIDE SERPL-SCNC: 108 MMOL/L (ref 98–107)
CHLORPROPAMIDE SERPL QL: NEGATIVE
CO2 SERPL-SCNC: 25 MMOL/L (ref 23–31)
CO2 SERPL-SCNC: 26 MMOL/L (ref 23–31)
CREAT SERPL-MCNC: 1.37 MG/DL (ref 0.55–1.02)
CREAT SERPL-MCNC: 1.51 MG/DL (ref 0.55–1.02)
CREAT/UREA NIT SERPL: 23
EOSINOPHIL # BLD AUTO: 0.41 X10(3)/MCL (ref 0–0.9)
EOSINOPHIL NFR BLD AUTO: 8 %
ERYTHROCYTE [DISTWIDTH] IN BLOOD BY AUTOMATED COUNT: 14.5 % (ref 11.5–17)
GFR SERPLBLD CREATININE-BSD FMLA CKD-EPI: 37 MLS/MIN/1.73/M2
GFR SERPLBLD CREATININE-BSD FMLA CKD-EPI: 42 MLS/MIN/1.73/M2
GLIMEPIRIDE SERPL QL: NEGATIVE
GLIPIZIDE SERPL QL: NEGATIVE
GLOBULIN SER-MCNC: 3.5 GM/DL (ref 2.4–3.5)
GLUCOSE SERPL-MCNC: 115 MG/DL (ref 82–115)
GLUCOSE SERPL-MCNC: 86 MG/DL (ref 82–115)
GLYBURIDE SERPL QL: NEGATIVE
HAV IGM SERPL QL IA: NONREACTIVE
HBV CORE IGM SERPL QL IA: NONREACTIVE
HBV SURFACE AB SER-ACNC: 0.55 MIU/ML
HBV SURFACE AB SERPL IA-ACNC: NONREACTIVE M[IU]/ML
HBV SURFACE AG SERPL QL IA: NONREACTIVE
HCT VFR BLD AUTO: 35.1 % (ref 37–47)
HCV AB SERPL QL IA: NONREACTIVE
HGB BLD-MCNC: 11.1 G/DL (ref 12–16)
IMM GRANULOCYTES # BLD AUTO: 0.02 X10(3)/MCL (ref 0–0.04)
IMM GRANULOCYTES NFR BLD AUTO: 0.4 %
LYMPHOCYTES # BLD AUTO: 1.43 X10(3)/MCL (ref 0.6–4.6)
LYMPHOCYTES NFR BLD AUTO: 28 %
M PIOGLITAZONE: NEGATIVE
M ROSIGLITAZONE: NEGATIVE
MCH RBC QN AUTO: 32.8 PG (ref 27–31)
MCHC RBC AUTO-ENTMCNC: 31.6 G/DL (ref 33–36)
MCV RBC AUTO: 103.8 FL (ref 80–94)
MONOCYTES # BLD AUTO: 0.69 X10(3)/MCL (ref 0.1–1.3)
MONOCYTES NFR BLD AUTO: 13.5 %
NATEGLINIDE SERPL QL: NEGATIVE
NEUTROPHILS # BLD AUTO: 2.53 X10(3)/MCL (ref 2.1–9.2)
NEUTROPHILS NFR BLD AUTO: 49.5 %
NRBC BLD AUTO-RTO: 0 %
PLATELET # BLD AUTO: 251 X10(3)/MCL (ref 130–400)
PMV BLD AUTO: 9.7 FL (ref 7.4–10.4)
POCT GLUCOSE: 111 MG/DL (ref 70–110)
POCT GLUCOSE: 63 MG/DL (ref 70–110)
POCT GLUCOSE: 93 MG/DL (ref 70–110)
POTASSIUM SERPL-SCNC: 4.5 MMOL/L (ref 3.5–5.1)
POTASSIUM SERPL-SCNC: 4.7 MMOL/L (ref 3.5–5.1)
PROT SERPL-MCNC: 6.3 GM/DL (ref 5.8–7.6)
RBC # BLD AUTO: 3.38 X10(6)/MCL (ref 4.2–5.4)
REPAGLINIDE SERPL QL: NEGATIVE
SODIUM SERPL-SCNC: 139 MMOL/L (ref 136–145)
SODIUM SERPL-SCNC: 140 MMOL/L (ref 136–145)
TOLAZAMIDE SERPL QL: NEGATIVE
TOLBUTAMIDE SERPL QL: NEGATIVE
WBC # SPEC AUTO: 5.11 X10(3)/MCL (ref 4.5–11.5)

## 2023-11-27 PROCEDURE — 94761 N-INVAS EAR/PLS OXIMETRY MLT: CPT

## 2023-11-27 PROCEDURE — A4216 STERILE WATER/SALINE, 10 ML: HCPCS

## 2023-11-27 PROCEDURE — 85025 COMPLETE CBC W/AUTO DIFF WBC: CPT

## 2023-11-27 PROCEDURE — 25000003 PHARM REV CODE 250

## 2023-11-27 PROCEDURE — 80053 COMPREHEN METABOLIC PANEL: CPT

## 2023-11-27 PROCEDURE — 86706 HEP B SURFACE ANTIBODY: CPT | Performed by: NURSE PRACTITIONER

## 2023-11-27 PROCEDURE — 63600175 PHARM REV CODE 636 W HCPCS

## 2023-11-27 PROCEDURE — 80074 ACUTE HEPATITIS PANEL: CPT | Performed by: NURSE PRACTITIONER

## 2023-11-27 RX ORDER — GABAPENTIN 100 MG/1
100 CAPSULE ORAL 2 TIMES DAILY
Status: ON HOLD | COMMUNITY
Start: 2023-10-23 | End: 2023-11-27 | Stop reason: HOSPADM

## 2023-11-27 RX ORDER — ANASTROZOLE 1 MG/1
1 TABLET ORAL DAILY
COMMUNITY
Start: 2023-07-24

## 2023-11-27 RX ORDER — CELECOXIB 200 MG/1
200 CAPSULE ORAL DAILY
Status: ON HOLD | COMMUNITY
Start: 2023-10-23 | End: 2023-11-27 | Stop reason: SDUPTHER

## 2023-11-27 RX ORDER — SPIRONOLACTONE 25 MG/1
25 TABLET ORAL DAILY
COMMUNITY

## 2023-11-27 RX ORDER — SACUBITRIL AND VALSARTAN 24; 26 MG/1; MG/1
1 TABLET, FILM COATED ORAL 2 TIMES DAILY
COMMUNITY

## 2023-11-27 RX ORDER — METOPROLOL SUCCINATE 25 MG/1
12.5 TABLET, EXTENDED RELEASE ORAL 2 TIMES DAILY
OUTPATIENT
Start: 2023-11-27

## 2023-11-27 RX ORDER — ATORVASTATIN CALCIUM 40 MG/1
40 TABLET, FILM COATED ORAL DAILY
Status: DISCONTINUED | OUTPATIENT
Start: 2023-11-27 | End: 2023-11-27 | Stop reason: HOSPADM

## 2023-11-27 RX ORDER — DAPAGLIFLOZIN 5 MG/1
5 TABLET, FILM COATED ORAL DAILY
Status: ON HOLD | COMMUNITY
End: 2023-11-27 | Stop reason: HOSPADM

## 2023-11-27 RX ORDER — CELECOXIB 200 MG/1
200 CAPSULE ORAL DAILY
COMMUNITY

## 2023-11-27 RX ORDER — AMIODARONE HYDROCHLORIDE 200 MG/1
400 TABLET ORAL DAILY
COMMUNITY
Start: 2023-10-23

## 2023-11-27 RX ADMIN — CEFAZOLIN 2 G: 2 INJECTION, POWDER, FOR SOLUTION INTRAMUSCULAR; INTRAVENOUS at 06:11

## 2023-11-27 RX ADMIN — SODIUM CHLORIDE, PRESERVATIVE FREE 10 ML: 5 INJECTION INTRAVENOUS at 05:11

## 2023-11-27 RX ADMIN — SODIUM CHLORIDE, POTASSIUM CHLORIDE, SODIUM LACTATE AND CALCIUM CHLORIDE 250 ML: 600; 310; 30; 20 INJECTION, SOLUTION INTRAVENOUS at 02:11

## 2023-11-27 RX ADMIN — CEFAZOLIN 2 G: 2 INJECTION, POWDER, FOR SOLUTION INTRAMUSCULAR; INTRAVENOUS at 03:11

## 2023-11-27 RX ADMIN — SODIUM CHLORIDE, PRESERVATIVE FREE 10 ML: 5 INJECTION INTRAVENOUS at 12:11

## 2023-11-27 RX ADMIN — ATORVASTATIN CALCIUM 40 MG: 40 TABLET, FILM COATED ORAL at 09:11

## 2023-11-27 RX ADMIN — SODIUM CHLORIDE, PRESERVATIVE FREE 10 ML: 5 INJECTION INTRAVENOUS at 02:11

## 2023-11-27 RX ADMIN — SODIUM CHLORIDE, PRESERVATIVE FREE 10 ML: 5 INJECTION INTRAVENOUS at 06:11

## 2023-11-27 RX ADMIN — SODIUM CHLORIDE, POTASSIUM CHLORIDE, SODIUM LACTATE AND CALCIUM CHLORIDE 500 ML: 600; 310; 30; 20 INJECTION, SOLUTION INTRAVENOUS at 06:11

## 2023-11-27 NOTE — PLAN OF CARE
11/27/23 1513   Medicare Message   Important Message from Medicare regarding Discharge Appeal Rights Given to patient/caregiver;Explained to patient/caregiver;Signed/date by patient/caregiver   Date IMM was signed 11/27/23   Time IMM was signed 1513        primary ca of endometrium s/p 2 sux resection (2009 &2018)  with chemo and radiotherapy  with metastasis to lung with 2cm nodule s/p JENNIFER lobectomy   pt follows up with Dr. Rose Crowley  pt has new 5 mm lung nodule and cecal mass. Referred to GI for colonoscopy, anastrazole held since May 23. primary ca of endometrium s/p 2 sux resection (2009 &2018)  with chemo and radiotherapy  with metastasis to lung with 2cm nodule s/p JENNIFER lobectomy   pt follows up with Dr. Rose Crowley  pt has new 5 mm lung nodule and cecal mass.   Referred to out patient GI (Dr. Perry) for colonoscopy.  anastrazole held since May 23.

## 2023-11-27 NOTE — CONSULTS
[facility]   Inpatient Infectious Diseases Consultation    Physician requesting consultation: Mallika Jerry MD  Service requesting consultation: Internal Medicine  Reason for consultation:  Positive blood culture    Historian: Patient and Electronic Medical Record    Isolation Status: No active isolations     HPI:     Ms Jacobs is a 72 yr old BF with past medical history that is significant for HTN, CHF, prior DVT, CAD with history of AICD placement, and history of breast cancer with a left breast mastectomy with retained RT upper CW Port-a-cath (completed chemo/radiation per pt several years ago) who presented to the ER with c/o weakness and possible syncopal episode. Pt had blood culture x 1 from Port-a-cath that was drawn on arrival and came back positive for a coag-negative Staph, along with gram positive rods.  No additional peripheral stick was done at that time and Media Redefinedin device was not used during collection. Peripheral blood cultures x 2 were done the following date prior to initial antibiotic administration and are NGTD x 96 hours.  Patient has been afebrile with no leukocytosis throughout the hospital stay.  TTE was done and did not show any significant concerns.  At present, patient is feeling better.  She reports weakness and fatigue has resolved. Denies fever, chills, night sweats, rash, HA, vision changes, dizziness, CP/SOB, cough, N/V/D, abdominal pain, or urinary complaints.  Patient states her oncologist that she sees is affiliated with [facility].  Port-A-Cath is flushed monthly (last reported time was October 2023), but not used for anything else at this time. Patient is currently on Cefazolin and Vanc lock per catheter. ID has been consulted for positive blood culture.  Pt lives in [place] with son and nephew. Pet - dog. No travel / no sick contacts. No odd hobbies.        Antibiotic history:  Vancomycin IV (trough 15-20) - 11/23/23 to 11/24/23  Vancomycin lock -  11/24/23 to present  Cefazolin 2 garms IV q 8 hours - 11/27/23 to present      Past Medical History/Past Surgical History/Social History     Past Medical History:   Diagnosis Date    Cancer     breast CA s/p chemo and L masectomy     Cardiomyopathy     CHF (congestive heart failure)     History of breast cancer     History of DVT (deep vein thrombosis)     HLD (hyperlipidemia)     Hypertension     Lymphedema     Osteoarthritis     Venous insufficiency        Past Surgical History:   Procedure Laterality Date    HYSTERECTOMY      INSERTION OF PACEMAKER      INTRAMEDULLARY RODDING OF FEMUR Left 10/14/2022    Procedure: INSERTION, INTRAMEDULLARY СВЕТЛАНА, FEMUR;  Surgeon: Ankush Alex MD;  Location: [facility];  Service: Orthopedics;  Laterality: Left;    JOINT REPLACEMENT Bilateral     Knee    MASTECTOMY Left 2019        FAMILY HISTORY:  Family history is unknown by patient.     SOCIAL HISTORY:   Social History     Socioeconomic History    Marital status: Single   Tobacco Use    Smoking status: Never    Smokeless tobacco: Never   Substance and Sexual Activity    Alcohol use: Not Currently    Drug use: Never    Sexual activity: Not Currently   Social History Narrative    ** Merged History Encounter **          Social Determinants of Health     Financial Resource Strain: Low Risk  (11/22/2023)    Overall Financial Resource Strain (CARDIA)     Difficulty of Paying Living Expenses: Not hard at all   Food Insecurity: No Food Insecurity (11/22/2023)    Hunger Vital Sign     Worried About Running Out of Food in the Last Year: Never true     Ran Out of Food in the Last Year: Never true   Transportation Needs: No Transportation Needs (11/22/2023)    PRAPARE - Transportation     Lack of Transportation (Medical): No     Lack of Transportation (Non-Medical): No   Physical Activity: Inactive (11/22/2023)    Exercise Vital Sign     Days of Exercise per Week: 0 days     Minutes of Exercise per Session: 0 min   Stress: No Stress Concern  Present (11/22/2023)    [place] [employer] - Occupational Stress Questionnaire     Feeling of Stress : Not at all   Social Connections: Unknown (11/22/2023)    Social Connection and Isolation Panel [NHANES]     Frequency of Communication with Friends and Family: More than three times a week     Frequency of Social Gatherings with Friends and Family: More than three times a week     Active Member of Clubs or Organizations: No     Attends Club or Organization Meetings: Never     Marital Status: Never    Housing Stability: Low Risk  (11/22/2023)    Housing Stability Vital Sign     Unable to Pay for Housing in the Last Year: No     Number of Places Lived in the Last Year: 1     Unstable Housing in the Last Year: No        ALLERGIES:   Review of patient's allergies indicates:   Allergen Reactions    Sulfa (sulfonamide antibiotics)          Review of Systems     Review of Systems   Constitutional:  Positive for malaise/fatigue (improved). Negative for chills and fever.   HENT:  Negative for congestion, ear pain, hearing loss and sore throat.    Eyes:  Negative for blurred vision, photophobia and pain.   Respiratory:  Negative for cough, hemoptysis, sputum production and shortness of breath.    Cardiovascular:  Negative for chest pain, palpitations and leg swelling.   Gastrointestinal:  Negative for abdominal pain, constipation, diarrhea, nausea and vomiting.   Genitourinary:  Negative for dysuria, flank pain, frequency, hematuria and urgency.   Musculoskeletal:  Negative for back pain, joint pain, myalgias and neck pain.   Skin:  Negative for itching and rash.   Neurological:  Positive for weakness (improved). Negative for dizziness, seizures and headaches.   Endo/Heme/Allergies:  Does not bruise/bleed easily.   Psychiatric/Behavioral:  Negative for depression, hallucinations and substance abuse.          MEDICATIONS:   Scheduled Meds:   atorvastatin  40 mg Oral Daily    ceFAZolin (ANCEF) IVPB   2 g Intravenous Q8H    enoxparin  40 mg Subcutaneous Daily    lactated ringers  500 mL Intravenous Once    sodium chloride 0.9%  10 mL Intravenous Q6H    sodium chloride 0.9%  10 mL Intravenous Q6H    vancomycin (VANCOCIN) 4 mg, heparin (porcine) 5,000 Units IV catheter lock (total volume 2 mL)   Intra-Catheter Q48H     Continuous Infusions:  PRN Meds:.dextrose 10%, dextrose 10%, glucagon (human recombinant), glucose, glucose, naloxone, sodium chloride 0.9%, Flushing PICC/Midline Protocol **AND** sodium chloride 0.9% **AND** sodium chloride 0.9%, Flushing PICC/Midline Protocol **AND** sodium chloride 0.9% **AND** sodium chloride 0.9%    Physical exam:     Temp:  [97.4 °F (36.3 °C)-98.5 °F (36.9 °C)] 97.4 °F (36.3 °C)  Pulse:  [57-71] 64  Resp:  [18] 18  SpO2:  [98 %-100 %] 100 %  BP: ()/(52-75) 142/73     GENERAL: Elderly BF who is A&Ox3, NAD, does not appear ill, poor historian  HEENT: atraumatic, normocephalic, anicteric, moist oral mucosa without lesions, exudate, or erythema; no thrush  LUNGS: breathing unlabored, lungs CTA bilateral  HEART: RRR; no murmur, rub, or gallop  ABDOMEN: abdomen soft, nondistended, BS noted x 4 quadrants, no tympany, no rebound, guarding, or organomegaly  : no CVA tenderness  EXTREMITIES: no clubbing or cyanosis; LT arm edema  SKIN: no rashes or lesions  NEURO: speech fluent and intact, facial symmetry preserved, no tremor  PSYCH: cooperative, normal mood and affect  LINES: RT CW port a cath; RT midline without s/s infection       LABS:     I have personally reviewed patient's labs.  Pertinent results noted below.    CBC  Recent Labs     11/24/23  1300 11/25/23  0440 11/26/23  0345 11/27/23  0359   WBC 5.64 4.63 5.34 5.11   HGB 12.3 9.8* 12.2 11.1*   HCT 39.1 31.0* 39.0 35.1*    209 222 251       Differential  Recent Labs   Lab 11/27/23  0359   WBC 5.11   HGB 11.1*   HCT 35.1*           Basic Metabolic Panel  Recent Labs     11/24/23  1300 11/25/23  0913  11/26/23  0345 11/27/23  0359    138 139 140   K 5.1 5.4* 4.9 4.7   CO2 27 19* 22* 26   BUN 16.2 18.6 23.1* 31.3*   CREATININE 1.17* 1.45* 1.30* 1.51*        Hepatic Panel  Lab Results   Component Value Date    ALT 9 11/27/2023    AST 21 11/27/2023    ALKPHOS 117 11/27/2023    BILITOT 0.2 11/27/2023        Urinalysis:  Results for orders placed or performed during the hospital encounter of 11/21/23   Urinalysis, Reflex to Urine Culture    Specimen: Urine, Clean Catch   Result Value Ref Range    Color, UA Light-Yellow Yellow, Light-Yellow, Dark Yellow, Sara, Straw    Appearance, UA Clear Clear    Specific Gravity, UA 1.013 1.005 - 1.030    pH, UA 5.5 5.0 - 8.5    Protein, UA Negative Negative    Glucose, UA 4+ (A) Negative, Normal    Ketones, UA Negative Negative    Blood, UA Negative Negative    Bilirubin, UA Negative Negative    Urobilinogen, UA Normal 0.2, 1.0, Normal    Nitrites, UA Negative Negative    Leukocyte Esterase, UA Negative Negative    WBC, UA 0-5 None Seen, 0-2, 3-5, 0-5 /HPF    Bacteria, UA None Seen None Seen /HPF    Squamous Epithelial Cells, UA Trace (A) None Seen /HPF    Hyaline Casts, UA None Seen None Seen /lpf    RBC, UA None Seen None Seen, 0-2, 3-5, 0-5 /HPF       Estimated Creatinine Clearance: 31.2 mL/min (A) (based on SCr of 1.51 mg/dL (H)).       MICRO AND PATHOLOGY:   Colonization:  11/22/2023 blood culture from [facility] with Staph hominis and Gram-positive rods (2/2 bottles in broth only)  11/23/23 blood cultures NGTD      IMAGING:     I have personally reviewed patient's imaging. Pertinent results noted below.  CT Head Without Contrast   Final Result      1. No acute intracranial abnormality.   2. Chronic microvascular ischemic changes.   No significant change from the [facility] interpretation         Electronically signed by: Benita Marinelli   Date:    11/22/2023   Time:    07:20      X-Ray Chest AP Portable   ED Interpretation   Stable chest        Final Result      No acute  findings.         Electronically signed by: Jony Beasley   Date:    11/22/2023   Time:    08:37            IMPRESSION     Positive blood culture (coag neg Staph and GP rods) - contaminant  Retained right upper chest wall Port-A-Cath   AICD placement   Possible syncope  Hypoglycemia   Acute on chronic kidney injury      RECOMMENDATIONS:    At this time, positive blood culture that was found is thought to be a contaminant.  Unfortunately only 1 blood culture was drawn from the Port-A-Cath site (no peripheral draw was done) and the IO Turbinein device was not used during collection. It grew out 2 different species. Thankfully, blood cultures x 2 were repeated the next day prior to antibiotic administration.  These resulted NGTD x 96 hours.    Patient has been afebrile without leukocytosis throughout the hospitalization.  Not sure of the exact cause of her possible syncopal episode, but do not believe that it is infectious.  Patient is feeling a lot better since presentation.  Recommend to D/C all antibiotic administration at this time.  Case discussed at length with patient and niece.  Also discussed that if Port-A-Cath has not being used for anything significant, removal would be recommended.  But will defer this to Oncology services.  From an ID standpoint, patient is cleared for discharge.  Patient and niece are aware that if patient began to feel ill, she is to return asap.  ID will sign off at this time.  Thank you for the consult.  Please call for any additional questions.      Tiny Elliott, FNP-C  [facility] Infectious Diseases

## 2023-11-27 NOTE — PLAN OF CARE
Problem: Adult Inpatient Plan of Care  Goal: Plan of Care Review  Outcome: Met  Goal: Patient-Specific Goal (Individualized)  Outcome: Met  Goal: Absence of Hospital-Acquired Illness or Injury  Outcome: Met  Goal: Optimal Comfort and Wellbeing  Outcome: Met  Goal: Readiness for Transition of Care  Outcome: Met     Problem: Fluid and Electrolyte Imbalance (Acute Kidney Injury/Impairment)  Goal: Fluid and Electrolyte Balance  Outcome: Met     Problem: Oral Intake Inadequate (Acute Kidney Injury/Impairment)  Goal: Optimal Nutrition Intake  Outcome: Met     Problem: Renal Function Impairment (Acute Kidney Injury/Impairment)  Goal: Effective Renal Function  Outcome: Met     Problem: Infection  Goal: Absence of Infection Signs and Symptoms  Outcome: Met     Problem: Fall Injury Risk  Goal: Absence of Fall and Fall-Related Injury  Outcome: Met

## 2023-11-27 NOTE — DISCHARGE SUMMARY
[facility] Internal Medicine Discharge Summary    Admitting Physician: Mallika Jerry MD  Attending Physician: Mallika Jerry MD  Date of Admit: 11/21/2023  Date of Discharge: 11/27/2023    Condition: Good  Outcome: Condition has improved and patient is now ready for discharge.  DISPOSITION: Home or Self Care    Discharge Diagnoses     Patient Active Problem List   Diagnosis    Vasovagal syncope    Closed intertrochanteric fracture of hip, left, initial encounter    Dizziness    AUSTIN (acute kidney injury)     Principal Problem:  Vasovagal syncope    Consultants and Procedures     Consultants:  IP CONSULT TO INTERNAL MEDICINE  IP CONSULT TO SOCIAL WORK/CASE MANAGEMENT  IP CONSULT TO MIDLINE TEAM  IP CONSULT TO MIDLINE TEAM  IP CONSULT TO CARDIOLOGY  IP CONSULT TO INFECTIOUS DISEASES    Procedures:   * No surgery found *     Brief Admission History      Laura Jacobs is a 72 y.o.  female with a history of CHF and pacemaker placement who presented on 11/21/2023  with c/o weakness onset today while eating dinner.  She explains that she was sitting at the table when she began to feel weak suddenly and passed out.  States that her son was present and did not allow her to hit the ground.  She denies any head trauma.  Patient is unaware of downtime.  She denies seizure-like activity or history of prior seizures.  She also denies any prodrome we will symptoms such as dizziness.  Says this happened in the past.  She denies any chest pain, abdominal pain, shortness for breath, or any other complaints.  Patient does state that she is noticed on several occasions that her blood glucose level has been low in the 60s.  Son brought in patient's home medication and she is currently taking Farxiga.       In the ED, her pulse ranged from 56-60 and her systolic blood pressure ranged from  and diastolic ranged from 54-80. H/H 12.6/40. Cr 1.48 (0.93 in April 2023). . EtOH nl.     Hospital Course with Pertinent Findings  "    Patient was admitted patient was admitted to the internal medicine service for syncope workup.  Patient was hypotensive, bradycardic, and hypoglycemic on admission and during early hospitalization.  Patient with 4+ glycosuria obtained on UA in the ED but denied history of diabetes or taking any antihyperglycemic medications.  Contacted the patient's family to bring in home medications and patient has been taking Farxiga 5 mg daily.  Patient also taking metoprolol 25 mg daily.  Repeat echo displayed recovered EF of 50-55%.  Suspect that polypharmacy contributed to syncope.  Cultures taken on admission also positive for staph in 2/2 vials.  Patient was started on IV vanc initially.  After case with Infectious Disease, patient was transitioned to Ancef and vanc lock was initiated on Port-A-Cath.  Formal infectious disease consult was obtained on 11/27 and contaminant was suspected.  Patient was deemed acceptable for discharge with medication changes to prevent syncope until follow-up with her PCP.    Discharge physical exam:  Vitals  BP: 121/72  Temp: 98.3 °F (36.8 °C)  Temp Source: Oral  Pulse: 60  Resp: 18  SpO2: 100 %  Height: 5' 5" (165.1 cm)  Weight: 64.9 kg (143 lb 1.6 oz)      Physical Exam  Vitals reviewed.   Constitutional:       Appearance: Normal appearance.   Cardiovascular:      Rate and Rhythm: Normal rate and regular rhythm.      Pulses: Normal pulses.      Heart sounds: No murmur heard.     No friction rub. No gallop.   Pulmonary:      Effort: Pulmonary effort is normal.      Breath sounds: No wheezing, rhonchi or rales.   Abdominal:      General: Abdomen is flat. There is no distension.      Palpations: Abdomen is soft.      Tenderness: There is no abdominal tenderness.      Comments: Port-A-Cath in place with no significant swelling, tenderness, redness, drainage   Musculoskeletal:         General: No swelling or tenderness.   Skin:     General: Skin is warm and dry.   Neurological:      General: " No focal deficit present.      Mental Status: She is alert. Mental status is at baseline.   Psychiatric:         Mood and Affect: Mood normal.         Behavior: Behavior normal.       TIME SPENT ON DISCHARGE: 60 minutes    Discharge Medications        Medication List        CONTINUE taking these medications      amiodarone 200 MG Tab  Commonly known as: PACERONE     anastrozole 1 mg Tab  Commonly known as: ARIMIDEX     celecoxib 200 MG capsule  Commonly known as: CeleBREX     dexlansoprazole 60 mg capsule  Commonly known as: DEXILANT     ENTRESTO 24-26 mg per tablet  Generic drug: sacubitriL-valsartan     spironolactone 25 MG tablet  Commonly known as: ALDACTONE            STOP taking these medications      FARXIGA 5 mg Tab tablet  Generic drug: dapagliflozin propanediol     gabapentin 100 MG capsule  Commonly known as: NEURONTIN              Discharge Information:     -patient is to follow up with PCP in approximately 1-2 weeks  -patient is to take metoprolol 12.5 mg b.i.d. instead a 25 mg q.d.  -patient is to hold Farxiga until next appointment  -educated patient on proper oral hydration  -ED precautions given to patient.  She voiced understanding.    Maik Xiong, DO  [facility] Internal Medicine PGY-1

## 2023-11-27 NOTE — PROGRESS NOTES
[facility] Medicine Wards Progress Note     Resident Team: [facility] Medicine List 4  Attending Physician: Mallika Jerry MD  Resident: Edgar Muro  Intern: Maik Xiong    Subjective:      Brief HPI:  Laura Jacobs is a 72 y.o.  female with a history of CHF and pacemaker placement who presented on 2023  with c/o weakness onset today while eating dinner.  She explains that she was sitting at the table when she began to feel weak suddenly and passed out.  States that her son was present and did not allow her to hit the ground.  She denies any head trauma.  Patient is unaware of downtime.  She denies seizure-like activity or history of prior seizures.  She also denies any prodrome we will symptoms such as dizziness.  Says this happened in the past.  She denies any chest pain, abdominal pain, shortness for breath, or any other complaints.  Patient does state that she is noticed on several occasions that her blood glucose level has been low in the 60s.  Son brought in patient's home medication and she is currently taking Farxiga.       In the ED, her pulse ranged from 56-60 and her systolic blood pressure ranged from  and diastolic ranged from 54-80. H/H 12.6/40. Cr 1.48 (0.93 in 2023). . EtOH nl.     Interval History:  No acute events overnight.  Vital signs stable.  Further ID from initial cultures positive for staph hominis and gram positive rods.  Repeat blood cultures negative at 72 hours.  We will obtain formal infectious disease consult today for further recommendations and cardiology consult for AMANDA. Patient will need med reconciliation before discharge.    Review of Systems:  ROS completed and negative except as indicated above.     Objective:     Last 24 Hour Vital Signs:  BP  Min: 90/52  Max: 127/75  Temp  Av.1 °F (36.7 °C)  Min: 97.6 °F (36.4 °C)  Max: 98.5 °F (36.9 °C)  Pulse  Av.8  Min: 57  Max: 71  Resp  Av  Min: 18  Max: 18  SpO2  Av.1 %  Min: 98 %  Max:  100 %  I/O last 3 completed shifts:  In: 674.5 [P.O.:480; IV Piggyback:194.5]  Out: -     Physical Examination:  Physical Exam  Vitals reviewed.   Constitutional:       Appearance: Normal appearance.   Cardiovascular:      Rate and Rhythm: Normal rate and regular rhythm.      Pulses: Normal pulses.      Heart sounds: No murmur heard.     No friction rub. No gallop.   Pulmonary:      Effort: Pulmonary effort is normal.      Breath sounds: Normal breath sounds. No wheezing, rhonchi or rales.   Abdominal:      General: Abdomen is flat.      Palpations: Abdomen is soft.   Musculoskeletal:         General: Normal range of motion.      Comments: Port-A-Cath in place without significant tenderness, swelling, redness   Skin:     General: Skin is warm and dry.      Findings: No bruising.   Neurological:      General: No focal deficit present.      Mental Status: She is alert. Mental status is at baseline.   Psychiatric:         Mood and Affect: Mood normal.         Behavior: Behavior normal.       Laboratory:  Most Recent Data:  CBC:   Lab Results   Component Value Date    WBC 5.11 11/27/2023    HGB 11.1 (L) 11/27/2023    HCT 35.1 (L) 11/27/2023     11/27/2023    .8 (H) 11/27/2023    RDW 14.5 11/27/2023     WBC Differential:   Recent Labs   Lab 11/23/23  0316 11/24/23  1300 11/25/23  0440 11/26/23  0345 11/27/23  0359   WBC 4.14* 5.64 4.63 5.34 5.11   HGB 9.9* 12.3 9.8* 12.2 11.1*   HCT 31.5* 39.1 31.0* 39.0 35.1*    250 209 222 251   .0* 104.8* 104.4* 104.6* 103.8*       BMP:   Lab Results   Component Value Date     11/26/2023    K 4.9 11/26/2023    CL 98 (L) 01/04/2021    CO2 22 (L) 11/26/2023    BUN 23.1 (H) 11/26/2023    CREATININE 1.30 (H) 11/26/2023    CALCIUM 8.8 11/26/2023    MG 2.10 11/21/2023    PHOS 3.3 03/30/2023     LFTs:   Lab Results   Component Value Date    ALBUMIN 3.2 (L) 11/26/2023    BILITOT 0.4 11/26/2023    AST 24 11/26/2023    ALKPHOS 117 11/26/2023    ALT 14  "11/26/2023     Coags:   Lab Results   Component Value Date    INR 1.0 11/21/2023    PROTIME 12.8 11/21/2023    PTT 28.9 03/10/2022     FLP: No results found for: "CHOL", "HDL", "LDLCALC", "TRIG", "CHOLHDL"  DM:   Lab Results   Component Value Date    HGBA1C 5.2 11/22/2023    CREATININE 1.30 (H) 11/26/2023     Thyroid:   Lab Results   Component Value Date    TSH 1.912 11/22/2023     Anemia:   Lab Results   Component Value Date    KCITSUYW73 324 11/22/2023    FOLATE 9.9 11/22/2023     Cardiac:   Lab Results   Component Value Date    TROPONINI 0.045 11/21/2023    .8 (H) 11/21/2023     Radiology:  Imaging Results              CT Head Without Contrast (Final result)  Result time 11/22/23 07:20:03      Final result by Benita Marinelli MD (11/22/23 07:20:03)                   Impression:      1. No acute intracranial abnormality.  2. Chronic microvascular ischemic changes.  No significant change from the [facility] interpretation      Electronically signed by: Benita Marinelli  Date:    11/22/2023  Time:    07:20               Narrative:    EXAMINATION:  CT HEAD WITHOUT CONTRAST    CLINICAL HISTORY:  Mental status change, unknown cause;    TECHNIQUE:  Axial scans were obtained from skull base to the vertex.    Coronal and sagittal reconstructions obtained from the axial data.    Automatic exposure control was utilized to limit radiation dose.    Contrast: None    Radiation Dose:    Total DLP: 904 mGy*cm    COMPARISON:  CT head dated 03/30/2023    FINDINGS:  There is diffuse parenchymal volume loss.  There is no acute intracranial hemorrhage or edema. The gray-white matter differentiation is preserved.  Patchy hypodensities in the subcortical and periventricular white matter likely represent chronic microvascular ischemic changes.    There is no mass effect or midline shift.  There is diffuse parenchymal volume loss.  The basal cisterns are patent. There is no abnormal extra-axial fluid collection.  Carotid artery " calcifications are noted.    There is no displaced fracture identified.  There are changes of prior left-sided mastoidectomy.                        Preliminary result by Srinivasa Veloz Jr., MD (11/21/23 23:29:07)                   Impression:    1. No acute intracranial process identified. Details and other findings as noted above.               Narrative:    START OF REPORT:  Technique: CT of the head was performed without intravenous contrast with axial as well as coronal and sagittal images.    Comparison: Comparison is with study [id number] 01:16:46.    Dosage Information: Automated exposure control was utilized.    Clinical history: Loss of Consciousness (She had one syncope episode wittness by the son and one witnessed by the Ambulance. Patient lethargic but does wake up and follow commands).    Findings: Overall, no significant interval change as compared with the prior examination.  Artifact: There is mild artifact on some of the images.  Hemorrhage: No acute intracranial hemorrhage is seen.  CSF spaces: The ventricles, sulci and basal cisterns all appear mildly prominent consistent with global cerebral atrophy.  Brain parenchyma: There is preservation of the grey white junction throughout. Mild microvascular change is seen in portions of the periventricular and deep white matter tracts.  Cerebellum: Unremarkable.  Sella and skull base: The sella appears to be within normal limits for age.  Herniation: None.  Intracranial calcifications: Incidental note is made of bilateral choroid plexus calcification. Incidental note is made of some pineal region calcification. Incidental note is made of some calcification of the falx. Incidental note is made of subtle bilateral basal ganglia calcifcation.  Calvarium: No acute linear or depressed skull fracture is seen.    Maxillofacial Structures:  Paranasal sinuses: A medium sized retention cyst or polyp in the right maxillary sinus.  Orbits: The orbits  appear unremarkable.  Zygomatic arches: The zygomatic arches are intact and unremarkable.  Temporal bones and mastoids: There is suggestion of left mastoidectomy.  TMJ: The mandibular condyles appear normally placed with respect to the mandibular fossa.                                         X-Ray Chest AP Portable (Final result)  Result time 11/22/23 08:37:34      Final result by Jony Beasley MD (11/22/23 08:37:34)                   Impression:      No acute findings.      Electronically signed by: Jony Beasley  Date:    11/22/2023  Time:    08:37               Narrative:    EXAMINATION:  XR CHEST AP PORTABLE    CLINICAL HISTORY:  Syncope;    COMPARISON:  30 March 2023    FINDINGS:  Frontal view of the chest was obtained. Right Port-A-Cath and left-sided AICD are stable.  The heart is not enlarged.  There is aortic atherosclerosis.  Suspect some mild bibasilar atelectasis.  Grossly clear lungs.  No pneumothorax.                        Wet Read by Edgar rPatt MD (11/21/23 23:08:47, [facility], Emergency Medicine)    Stable chest                                      Current Medications:     Infusions:       Scheduled:   ceFAZolin (ANCEF) IVPB  2 g Intravenous Q8H    enoxparin  40 mg Subcutaneous Daily    sodium chloride 0.9%  10 mL Intravenous Q6H    sodium chloride 0.9%  10 mL Intravenous Q6H    vancomycin (VANCOCIN) 4 mg, heparin (porcine) 5,000 Units IV catheter lock (total volume 2 mL)   Intra-Catheter Q48H        PRN:  dextrose 10%, dextrose 10%, glucagon (human recombinant), glucose, glucose, naloxone, sodium chloride 0.9%, Flushing PICC/Midline Protocol **AND** sodium chloride 0.9% **AND** sodium chloride 0.9%, Flushing PICC/Midline Protocol **AND** sodium chloride 0.9% **AND** sodium chloride 0.9%      Assessment & Plan:     1) Syncope      Bradycardia      Hypoglycemia      First-degree AV block  - Syncopal episode at home, no head trauma  - EKG showed NSR, 62bpm, 1st degree  AV block, and LAD  - Head CT showed now acute abnormalities.  - echo 11/23 displays EF of 50-55%  - carotid ultrasound 11/22 unremarkable  - patient found to be hypoglycemic with a POCT glucose of 66 on 11/22; patient with 4+ glycosuria on UA conducted in the ED  -patient's son brought in home medications and patient is taking Farxiga  -likely cause of syncope is polypharmacy, we will adjust patient's medications prior to discharge as appropriate    2) Acute on Chronic Kidney Injury  -Cr 1.48, elevated from 0.93 4/01/2023 on admission  -continue to avoid nephrotoxic medications  -serum creatinine trended upwards to 1.51 this morning, reinitiate gentle hydration    3) 2/2 positive blood cultures for Staph. spp  - patient with current Port-A-Cath in place  - repeat blood cultures negative at 72 hours  -vancomycin was discontinued yesterday and patient was started on Ancef 2 g TID (Day 4)  -vanc lock on Port-A-Cath was also initiated  -ID consult today for further recommendations  -cardiology consult for AMANDA today    4) Macrocytic Anemia  -H/H has been at baseline  -patient has been receiving chemotherapy for breast cancer  -  -B12 and folate within normal limits    CODE STATUS: Full  Access: PIV  Antibiotics: Ancef  Diet: Adult regular  DVT Prophylaxis: Lovenox  GI Prophylaxis: N/A  Fluids: N/A    Disposition:  We will need to adjust patient's home medication regimen prior to discharge.  Consult placed to Infectious Disease for further recommendations.  Consult placed to Cardiology for AMANDA.      Maik Xiong,   Internal Medicine - PGY-1

## 2023-11-28 ENCOUNTER — PATIENT OUTREACH (OUTPATIENT)
Dept: ADMINISTRATIVE | Facility: CLINIC | Age: 73
End: 2023-11-28
Payer: MEDICARE

## 2023-11-28 LAB
BACTERIA BLD CULT: NORMAL
BACTERIA BLD CULT: NORMAL

## 2023-11-28 NOTE — PROGRESS NOTES
C3 nurse spoke with Laura Jacobs  for a TCC post hospital discharge follow up call. The patient has a scheduled HOSFU appointment with Ruben Brooks Sr., MD  on 12/04/2023 @ 1045 am.

## 2023-12-05 NOTE — ANESTHESIA POSTPROCEDURE EVALUATION
Anesthesia Post Evaluation    Patient: Laura Jacobs    Procedure(s) Performed: Procedure(s) (LRB):  INSERTION, INTRAMEDULLARY СВЕТЛАНА, FEMUR (Left)    Final Anesthesia Type: general      Patient location during evaluation: PACU  Patient participation: Yes- Able to Participate  Level of consciousness: awake and alert  Post-procedure vital signs: reviewed and stable  Pain management: adequate  Airway patency: patent  GONZALEZ mitigation strategies: Multimodal analgesia  PONV status at discharge: No PONV  Anesthetic complications: no      Cardiovascular status: blood pressure returned to baseline and hemodynamically stable  Respiratory status: unassisted  Hydration status: euvolemic  Follow-up not needed.          Vitals Value Taken Time   BP 92/57 10/14/22 2025   Temp 36.1 °C (97 °F) 10/14/22 1801   Pulse 66 10/14/22 2025   Resp 16 10/14/22 2126   SpO2 96 % 10/14/22 2025         Event Time   Out of Recovery 20:15:00         Pain/Germain Score: Pain Rating Prior to Med Admin: 6 (10/14/2022  9:26 PM)  Pain Rating Post Med Admin: 4 (10/14/2022  2:55 PM)  Germain Score: 8 (10/14/2022  6:01 PM)        
Plan: Recent flare at thanksgiving. She applied Mupirocin and she thinks it was helpful. \\nWe discussed possible causes including staph, HSV. \\nShe will call for urgent appointment when flaring. Will plan to perform a culture. \\nWe also discussed having her see ENT as they are better able to visualize the tissue within the nose.
Detail Level: Zone

## 2024-01-02 ENCOUNTER — HOSPITAL ENCOUNTER (EMERGENCY)
Facility: HOSPITAL | Age: 74
Discharge: HOME | End: 2024-01-02
Attending: STUDENT IN AN ORGANIZED HEALTH CARE EDUCATION/TRAINING PROGRAM
Payer: COMMERCIAL

## 2024-01-02 VITALS
BODY MASS INDEX: 38.89 KG/M2 | OXYGEN SATURATION: 99 % | WEIGHT: 242 LBS | HEART RATE: 79 BPM | RESPIRATION RATE: 16 BRPM | SYSTOLIC BLOOD PRESSURE: 154 MMHG | TEMPERATURE: 98 F | DIASTOLIC BLOOD PRESSURE: 90 MMHG | HEIGHT: 66 IN

## 2024-01-02 DIAGNOSIS — A09 INFECTIOUS DIARRHEA: Primary | ICD-10-CM

## 2024-01-02 LAB
ALBUMIN SERPL-MCNC: 3.9 G/DL (ref 3.4–4.8)
ALBUMIN/GLOB SERPL: 1 RATIO (ref 1.1–2)
ALP SERPL-CCNC: 122 UNIT/L (ref 40–150)
ALT SERPL-CCNC: 20 UNIT/L (ref 0–55)
APPEARANCE UR: ABNORMAL
AST SERPL-CCNC: 21 UNIT/L (ref 5–34)
BACTERIA #/AREA URNS AUTO: ABNORMAL /HPF
BASOPHILS # BLD AUTO: 0.03 X10(3)/MCL
BASOPHILS NFR BLD AUTO: 0.3 %
BILIRUB SERPL-MCNC: 0.3 MG/DL
BILIRUB UR QL STRIP.AUTO: NEGATIVE
BUN SERPL-MCNC: 13 MG/DL (ref 9.8–20.1)
C DIFF TOX A+B STL QL IA: NEGATIVE
CALCIUM SERPL-MCNC: 9.9 MG/DL (ref 8.4–10.2)
CHLORIDE SERPL-SCNC: 104 MMOL/L (ref 98–107)
CLOSTRIDIUM DIFFICILE GDH ANTIGEN (OHS): NEGATIVE
CO2 SERPL-SCNC: 27 MMOL/L (ref 23–31)
COLOR UR AUTO: YELLOW
CREAT SERPL-MCNC: 0.97 MG/DL (ref 0.55–1.02)
EOSINOPHIL # BLD AUTO: 0.07 X10(3)/MCL (ref 0–0.9)
EOSINOPHIL NFR BLD AUTO: 0.8 %
ERYTHROCYTE [DISTWIDTH] IN BLOOD BY AUTOMATED COUNT: 13.5 % (ref 11.5–17)
GFR SERPLBLD CREATININE-BSD FMLA CKD-EPI: >60 MLS/MIN/1.73/M2
GLOBULIN SER-MCNC: 4 GM/DL (ref 2.4–3.5)
GLUCOSE SERPL-MCNC: 108 MG/DL (ref 82–115)
GLUCOSE UR QL STRIP.AUTO: NEGATIVE
HCT VFR BLD AUTO: 45.3 % (ref 37–47)
HGB BLD-MCNC: 14.4 G/DL (ref 12–16)
IMM GRANULOCYTES # BLD AUTO: 0.01 X10(3)/MCL (ref 0–0.04)
IMM GRANULOCYTES NFR BLD AUTO: 0.1 %
KETONES UR QL STRIP.AUTO: NEGATIVE
LEUKOCYTE ESTERASE UR QL STRIP.AUTO: NEGATIVE
LYMPHOCYTES # BLD AUTO: 0.8 X10(3)/MCL (ref 0.6–4.6)
LYMPHOCYTES NFR BLD AUTO: 8.7 %
MCH RBC QN AUTO: 28.5 PG (ref 27–31)
MCHC RBC AUTO-ENTMCNC: 31.8 G/DL (ref 33–36)
MCV RBC AUTO: 89.7 FL (ref 80–94)
MONOCYTES # BLD AUTO: 0.45 X10(3)/MCL (ref 0.1–1.3)
MONOCYTES NFR BLD AUTO: 4.9 %
NEUTROPHILS # BLD AUTO: 7.82 X10(3)/MCL (ref 2.1–9.2)
NEUTROPHILS NFR BLD AUTO: 85.2 %
NITRITE UR QL STRIP.AUTO: NEGATIVE
PH UR STRIP.AUTO: 5.5 [PH]
PLATELET # BLD AUTO: 214 X10(3)/MCL (ref 130–400)
PMV BLD AUTO: 10 FL (ref 7.4–10.4)
POTASSIUM SERPL-SCNC: 3.7 MMOL/L (ref 3.5–5.1)
PROT SERPL-MCNC: 7.9 GM/DL (ref 5.8–7.6)
PROT UR QL STRIP.AUTO: NEGATIVE
RBC # BLD AUTO: 5.05 X10(6)/MCL (ref 4.2–5.4)
RBC #/AREA URNS AUTO: ABNORMAL /HPF
RBC UR QL AUTO: ABNORMAL
SODIUM SERPL-SCNC: 141 MMOL/L (ref 136–145)
SP GR UR STRIP.AUTO: 1.01 (ref 1–1.03)
SQUAMOUS #/AREA URNS AUTO: ABNORMAL /HPF
UROBILINOGEN UR STRIP-ACNC: 0.2
WBC # SPEC AUTO: 9.18 X10(3)/MCL (ref 4.5–11.5)
WBC #/AREA URNS AUTO: ABNORMAL /HPF
WRIGHT STN SPEC: ABNORMAL

## 2024-01-02 PROCEDURE — 85025 COMPLETE CBC W/AUTO DIFF WBC: CPT | Performed by: STUDENT IN AN ORGANIZED HEALTH CARE EDUCATION/TRAINING PROGRAM

## 2024-01-02 PROCEDURE — 86318 IA INFECTIOUS AGENT ANTIBODY: CPT | Performed by: STUDENT IN AN ORGANIZED HEALTH CARE EDUCATION/TRAINING PROGRAM

## 2024-01-02 PROCEDURE — 99283 EMERGENCY DEPT VISIT LOW MDM: CPT

## 2024-01-02 PROCEDURE — 81003 URINALYSIS AUTO W/O SCOPE: CPT | Performed by: STUDENT IN AN ORGANIZED HEALTH CARE EDUCATION/TRAINING PROGRAM

## 2024-01-02 PROCEDURE — 80053 COMPREHEN METABOLIC PANEL: CPT | Performed by: STUDENT IN AN ORGANIZED HEALTH CARE EDUCATION/TRAINING PROGRAM

## 2024-01-02 PROCEDURE — 87205 SMEAR GRAM STAIN: CPT | Performed by: STUDENT IN AN ORGANIZED HEALTH CARE EDUCATION/TRAINING PROGRAM

## 2024-01-02 PROCEDURE — 87507 IADNA-DNA/RNA PROBE TQ 12-25: CPT | Performed by: STUDENT IN AN ORGANIZED HEALTH CARE EDUCATION/TRAINING PROGRAM

## 2024-01-02 RX ORDER — AZITHROMYCIN 500 MG/1
500 TABLET, FILM COATED ORAL DAILY
Qty: 4 TABLET | Refills: 0 | Status: SHIPPED | OUTPATIENT
Start: 2024-01-02 | End: 2024-01-06

## 2024-01-03 LAB
ADV 40+41 DNA STL QL NAA+NON-PROBE: NOT DETECTED
ASTRO TYP 1-8 RNA STL QL NAA+NON-PROBE: NOT DETECTED
C CAYETANENSIS DNA STL QL NAA+NON-PROBE: NOT DETECTED
C COLI+JEJ+UPSA DNA STL QL NAA+NON-PROBE: NOT DETECTED
CRYPTOSP DNA STL QL NAA+NON-PROBE: NOT DETECTED
E HISTOLYT DNA STL QL NAA+NON-PROBE: NOT DETECTED
EAEC PAA PLAS AGGR+AATA ST NAA+NON-PRB: NOT DETECTED
EC STX1+STX2 GENES STL QL NAA+NON-PROBE: NOT DETECTED
EPEC EAE GENE STL QL NAA+NON-PROBE: DETECTED
ETEC LTA+ST1A+ST1B TOX ST NAA+NON-PROBE: NOT DETECTED
G LAMBLIA DNA STL QL NAA+NON-PROBE: NOT DETECTED
NOROVIRUS GI+II RNA STL QL NAA+NON-PROBE: DETECTED
P SHIGELLOIDES DNA STL QL NAA+NON-PROBE: NOT DETECTED
RVA RNA STL QL NAA+NON-PROBE: NOT DETECTED
S ENT+BONG DNA STL QL NAA+NON-PROBE: NOT DETECTED
SAPO I+II+IV+V RNA STL QL NAA+NON-PROBE: NOT DETECTED
SHIGELLA SP+EIEC IPAH ST NAA+NON-PROBE: NOT DETECTED
V CHOL+PARA+VUL DNA STL QL NAA+NON-PROBE: NOT DETECTED
V CHOLERAE DNA STL QL NAA+NON-PROBE: NOT DETECTED
Y ENTEROCOL DNA STL QL NAA+NON-PROBE: NOT DETECTED

## 2024-01-03 NOTE — ED PROVIDER NOTES
Encounter Date: 1/2/2024       History     Chief Complaint   Patient presents with    Abdominal Pain     Pt c/o abd cramping and diarrhea that started today, states she has blood on tissue after she wiped herself x 2 today. Pt states she has had abd cramp and diarrhea on and off since 12/25/2023.     HPI    73-year-old female with a past medical history of hypertension hyperlipidemia and history of breast cancer presents emergency department for abdominal cramping and diarrhea.  Patient states she had issues with diarrhea and cramping since before Dyllan.  States it usually lasts a day or 2 and resolves on its own.  States it happened again today so want to get checked out.  She noted that after having multiple bowel movements today when she wiped she had some blood on the tissue.  Denies any blood mixed into her stool.  Denies any current abdominal pain at this time.    Review of patient's allergies indicates:   Allergen Reactions    Clindamycin      Other reaction(s): Angioedema    Lisinopril      Other reaction(s): Angioedema    Sulfa (sulfonamide antibiotics)     Sulfamethoxazole-trimethoprim      Other reaction(s): Angioedema     Past Medical History:   Diagnosis Date    High cholesterol     HTN (hypertension)     Malignant neoplasm of right female breast      Past Surgical History:   Procedure Laterality Date    BREAST LUMPECTOMY W/ NEEDLE LOCALIZATION Right     HYSTERECTOMY      MASTECTOMY W/ SENTINEL NODE BIOPSY Right      Family History   Problem Relation Age of Onset    Diabetes Mother     Hypertension Mother     Lung cancer Mother      Social History     Tobacco Use    Smoking status: Never    Smokeless tobacco: Never   Substance Use Topics    Alcohol use: Yes    Drug use: Never     Review of Systems   Constitutional:  Negative for fever.   Respiratory:  Negative for cough.    Cardiovascular:  Negative for chest pain.   Gastrointestinal:  Positive for diarrhea. Negative for abdominal pain,  constipation, nausea and vomiting.        Abdominal cramping   Neurological:  Negative for headaches.   All other systems reviewed and are negative.      Physical Exam     Initial Vitals [01/02/24 1744]   BP Pulse Resp Temp SpO2   (!) 166/83 80 20 98.3 °F (36.8 °C) 95 %      MAP       --         Physical Exam    Nursing note and vitals reviewed.  Constitutional: She appears well-developed and well-nourished. She is Obese . No distress.   Cardiovascular:  Normal rate and regular rhythm.           Pulmonary/Chest: Breath sounds normal. No respiratory distress. She has no wheezes. She has no rhonchi. She has no rales.   Abdominal: Abdomen is soft. There is no abdominal tenderness. There is no rebound and no guarding.   Musculoskeletal:         General: No tenderness. Normal range of motion.     Neurological: She is alert and oriented to person, place, and time. She has normal strength.   Skin: Skin is warm. Capillary refill takes less than 2 seconds.         ED Course   Procedures  Labs Reviewed   RIOJAS'S STAIN - Abnormal; Notable for the following components:       Result Value    Riojas's Stain Moderate WBC seen (*)     All other components within normal limits   COMPREHENSIVE METABOLIC PANEL - Abnormal; Notable for the following components:    Protein Total 7.9 (*)     Globulin 4.0 (*)     Albumin/Globulin Ratio 1.0 (*)     All other components within normal limits   URINALYSIS, REFLEX TO URINE CULTURE - Abnormal; Notable for the following components:    Appearance, UA Slightly Cloudy (*)     Blood, UA 3+ (*)     All other components within normal limits   CBC WITH DIFFERENTIAL - Abnormal; Notable for the following components:    MCHC 31.8 (*)     All other components within normal limits   URINALYSIS, MICROSCOPIC - Abnormal; Notable for the following components:    RBC, UA 21-50 (*)     All other components within normal limits   CLOSTRIDIUM DIFFICILE TOXIN A AND B, EIA - Normal   CBC W/ AUTO DIFFERENTIAL     Narrative:     The following orders were created for panel order CBC auto differential.  Procedure                               Abnormality         Status                     ---------                               -----------         ------                     CBC with Differential[[id number]]       Abnormal            Final result                 Please view results for these tests on the individual orders.   GI PANEL          Imaging Results    None          Medications - No data to display  Medical Decision Making  differential diagnosis  Gastroenteritis, infectious diarrhea, C diff, metabolic derangement,  as well as multiple other possible etiologies      Problems Addressed:  Infectious diarrhea: acute illness or injury    Amount and/or Complexity of Data Reviewed  Labs: ordered. Decision-making details documented in ED Course.    Risk  Prescription drug management.               ED Course as of 01/02/24 2047 Tue Jan 02, 2024 2031 C DIFF TOXIN: Negative [BS]   2031 C DIFF ANTIGEN: Negative [BS]   2031 WBC: 9.18 [BS]   2031 Hemoglobin: 14.4 [BS]   2031 Hematocrit: 45.3 [BS]   2031 Platelet Count: 214 [BS]   2031 Sodium: 141 [BS]   2031 Potassium: 3.7 [BS]   2031 Chloride: 104 [BS]   2031 CO2: 27 [BS]   2031 Glucose: 108 [BS]   2031 BUN: 13.0 [BS]   2043 VANN'S STAIN(!): Moderate WBC seen [BS]   2046 Patient has positive white blood cells in her stool.  Will give some azithromycin for a few days.  Stool culture pending [BS]      ED Course User Index  [BS] Jama Galvez MD                           Clinical Impression:  Final diagnoses:  [A09] Infectious diarrhea (Primary)          ED Disposition Condition    Discharge Stable          ED Prescriptions       Medication Sig Dispense Start Date End Date Auth. Provider    azithromycin (ZITHROMAX) 500 MG tablet Take 1 tablet (500 mg total) by mouth once daily. for 4 days 4 tablet 1/2/2024 1/6/2024 Jama Galvez MD          Follow-up Information        Follow up With Specialties Details Why Contact Info    Drew Morse MD Family Medicine Schedule an appointment as soon as possible for a visit   [address]  [place] [address]  [phone]      [facility]  Emergency Dept Emergency Medicine Go to  If symptoms worsen [address] [place] [facility] [id number] [phone]             Jama Galvez MD  01/02/24 2049

## 2024-01-25 ENCOUNTER — HOSPITAL ENCOUNTER (EMERGENCY)
Facility: HOSPITAL | Age: 74
Discharge: HOME | End: 2024-01-25
Attending: EMERGENCY MEDICINE
Payer: MEDICARE

## 2024-01-25 VITALS
DIASTOLIC BLOOD PRESSURE: 71 MMHG | HEIGHT: 65 IN | BODY MASS INDEX: 21.66 KG/M2 | SYSTOLIC BLOOD PRESSURE: 103 MMHG | OXYGEN SATURATION: 98 % | TEMPERATURE: 98 F | WEIGHT: 130 LBS | HEART RATE: 63 BPM | RESPIRATION RATE: 18 BRPM

## 2024-01-25 DIAGNOSIS — S09.90XA MINOR HEAD INJURY, INITIAL ENCOUNTER: Primary | ICD-10-CM

## 2024-01-25 DIAGNOSIS — R55 SYNCOPE: ICD-10-CM

## 2024-01-25 PROCEDURE — 93005 ELECTROCARDIOGRAM TRACING: CPT

## 2024-01-25 PROCEDURE — 99284 EMERGENCY DEPT VISIT MOD MDM: CPT | Mod: 25

## 2024-01-25 PROCEDURE — 93010 ELECTROCARDIOGRAM REPORT: CPT | Mod: ,,, | Performed by: INTERNAL MEDICINE

## 2024-01-26 NOTE — FIRST PROVIDER EVALUATION
"Medical screening examination initiated.  I have conducted a focused provider triage encounter, findings are as follows:    Chief Complaint   Patient presents with    Fall     EMS reports a syncopal episode, generalized weakness, and a fall striking head, denies blood thinners, GCS 15. Hematoma to posterior head.        Brief history of present illness:  73 y.o. female presents to the E.D. with c/o syncopal episode with generalized weakness. Patient struck her head. No history of blood thinners.     Vitals:    01/25/24 1849   BP: 123/79   Pulse: 92   Resp: 20   Temp: 98.4 °F (36.9 °C)   TempSrc: Oral   SpO2: 99%   Weight: 59 kg (130 lb)   Height: 5' 5" (1.651 m)       Pertinent physical exam:  Awake, Alert, Oriented, Non labored breathing. Posterior scalp swelling    Brief workup plan:  labs, EKG, imaging     Preliminary workup initiated; this workup will be continued and followed by the physician or advanced practice provider that is assigned to the patient when roomed.  "

## 2024-01-26 NOTE — ED PROVIDER NOTES
Encounter Date: 1/25/2024    SCRIBE #1 NOTE: I, Ahsan Larson, am scribing for, and in the presence of,  Trevor Rodriguez III, MD. I have scribed the following portions of the note - Other sections scribed: HPI, ROS, PE.       History     Chief Complaint   Patient presents with    Fall     EMS reports a syncopal episode, generalized weakness, and a fall striking head, denies blood thinners, GCS 15. Hematoma to posterior head.      Patient is a 74 y/o female with a history of breast cancer, cardiomyopathy, CHD, DVT, HLD, HTN presenting to the ED via EMS following a fall. Pt states that she lost her balance while walking today and fell, hitting her head. She denies LOC, blood thinner use, headache, neck pain, hip pain, chest pain, SOB, weakness, fever, chills.  Per EMS, pt was walking when she became weak and fell backwards, hitting her head on the concrete. EMS reports positive LOC, saying that pt went unconscious for about 30 seconds to 1 minute prior to the fall.    The history is provided by the patient and the EMS personnel.     Review of patient's allergies indicates:   Allergen Reactions    Sulfa (sulfonamide antibiotics)      Patient unsure if allergic to Sulfa     Past Medical History:   Diagnosis Date    Cancer     breast CA s/p chemo and L masectomy     Cardiomyopathy     CHF (congestive heart failure)     History of breast cancer     History of DVT (deep vein thrombosis)     HLD (hyperlipidemia)     Hypertension     Lymphedema     Osteoarthritis     Venous insufficiency      Past Surgical History:   Procedure Laterality Date    HYSTERECTOMY      INSERTION OF PACEMAKER      INTRAMEDULLARY RODDING OF FEMUR Left 10/14/2022    Procedure: INSERTION, INTRAMEDULLARY СВЕТЛАНА, FEMUR;  Surgeon: Ankush Alex MD;  Location: [facility];  Service: Orthopedics;  Laterality: Left;    JOINT REPLACEMENT Bilateral     Knee    MASTECTOMY Left 2019     Family History   Family history unknown: Yes     Social History     Tobacco Use     Smoking status: Never    Smokeless tobacco: Never   Substance Use Topics    Alcohol use: Not Currently    Drug use: Never     Review of Systems   Constitutional:  Negative for chills, fatigue, fever and unexpected weight change.   HENT:  Negative for congestion and rhinorrhea.    Eyes:  Negative for pain.   Respiratory:  Negative for chest tightness, shortness of breath and wheezing.    Cardiovascular:  Negative for chest pain.   Gastrointestinal:  Negative for abdominal pain, constipation, diarrhea, nausea and vomiting.   Genitourinary:  Negative for dysuria.   Musculoskeletal:  Negative for back pain and neck pain.   Skin:  Negative for rash.   Allergic/Immunologic: Negative for environmental allergies, food allergies and immunocompromised state.   Neurological:  Negative for dizziness, syncope, speech difficulty, weakness and headaches.   Hematological:  Does not bruise/bleed easily.   Psychiatric/Behavioral:  Negative for sleep disturbance and suicidal ideas.        Physical Exam     Initial Vitals [01/25/24 1849]   BP Pulse Resp Temp SpO2   123/79 92 20 98.4 °F (36.9 °C) 99 %      MAP       --         Physical Exam    Nursing note and vitals reviewed.  Constitutional: She appears well-developed and well-nourished.   HENT:   Head: Normocephalic and atraumatic.   Mouth/Throat: Oropharynx is clear and moist.   Eyes: Conjunctivae are normal. Pupils are equal, round, and reactive to light.   Neck: Neck supple.   Full ROM of the neck without hesitation   Normal range of motion.  Cardiovascular:  Normal rate, regular rhythm and normal heart sounds.           Pulmonary/Chest: Breath sounds normal. She has no wheezes. She has no rhonchi. She has no rales.   Abdominal: Abdomen is soft. Bowel sounds are normal.   Musculoskeletal:         General: Edema present. No tenderness. Normal range of motion.      Cervical back: Normal range of motion and neck supple. No tenderness.      Comments: Left arm is edematous,  asymmetric from the right arm     Neurological: She is alert and oriented to person, place, and time. GCS score is 15. GCS eye subscore is 4. GCS verbal subscore is 5. GCS motor subscore is 6.   Skin: Skin is warm and dry. Capillary refill takes less than 2 seconds.   Psychiatric: She has a normal mood and affect. Her behavior is normal. Judgment and thought content normal.         ED Course   Procedures  Labs Reviewed - No data to display       Imaging Results    None          Medications - No data to display  Medical Decision Making  The differential diagnosis includes, but is not limited to: minor head injury, skull fracture, intracranial injury.    Neurologically intact no headache but patient is 73 years old with head trauma with significant impact so a CT scan was done it was negative patient will be discharged patient states that she did not pass out beforehand syncope some how is in the chart her EKG is normal.  EKG shows no change for her.  Without complaints at discharge    Problems Addressed:  Minor head injury, initial encounter: complicated acute illness or injury with systemic symptoms    Amount and/or Complexity of Data Reviewed  Independent Historian: EMS     Details: Per EMS, pt was walking when she became weak and fell backwards, hitting her head on the concrete. EMS reports positive LOC, saying that pt went unconscious for about 30 seconds to 1 minute prior to the fall.  Labs: ordered.  Radiology: ordered.            Scribe Attestation:   Scribe #1: I performed the above scribed service and the documentation accurately describes the services I performed. I attest to the accuracy of the note.    Attending Attestation:           Physician Attestation for Scribe:  Physician Attestation Statement for Scribe #1: I, Trevor Rodriguez III, MD, reviewed documentation, as scribed by Ahsan Larson in my presence, and it is both accurate and complete.                                    Clinical  Impression:  Final diagnoses:  [R55] Syncope                 Trevor Rodriguez III, MD  01/25/24 2022

## 2024-02-19 ENCOUNTER — LAB VISIT (OUTPATIENT)
Dept: LAB | Facility: HOSPITAL | Age: 74
End: 2024-02-19
Attending: FAMILY MEDICINE
Payer: COMMERCIAL

## 2024-02-19 DIAGNOSIS — Z12.11 SCREENING FOR COLON CANCER: ICD-10-CM

## 2024-02-19 DIAGNOSIS — E55.9 VITAMIN D DEFICIENCY: ICD-10-CM

## 2024-02-19 DIAGNOSIS — E78.00 HIGH CHOLESTEROL: Primary | ICD-10-CM

## 2024-02-19 LAB
ALBUMIN SERPL-MCNC: 3.5 G/DL (ref 3.4–4.8)
ALBUMIN/GLOB SERPL: 0.9 RATIO (ref 1.1–2)
ALP SERPL-CCNC: 100 UNIT/L (ref 40–150)
ALT SERPL-CCNC: 18 UNIT/L (ref 0–55)
APPEARANCE UR: CLEAR
AST SERPL-CCNC: 15 UNIT/L (ref 5–34)
BASOPHILS # BLD AUTO: 0.01 X10(3)/MCL
BASOPHILS NFR BLD AUTO: 0.3 %
BILIRUB SERPL-MCNC: 0.2 MG/DL
BILIRUB UR QL STRIP.AUTO: NEGATIVE
BUN SERPL-MCNC: 12 MG/DL (ref 9.8–20.1)
CALCIUM SERPL-MCNC: 9.6 MG/DL (ref 8.4–10.2)
CHLORIDE SERPL-SCNC: 105 MMOL/L (ref 98–107)
CHOLEST SERPL-MCNC: 117 MG/DL
CHOLEST/HDLC SERPL: 3 {RATIO} (ref 0–5)
CO2 SERPL-SCNC: 29 MMOL/L (ref 23–31)
COLOR UR AUTO: YELLOW
CREAT SERPL-MCNC: 0.81 MG/DL (ref 0.55–1.02)
DEPRECATED CALCIDIOL+CALCIFEROL SERPL-MC: 45.4 NG/ML (ref 30–80)
EOSINOPHIL # BLD AUTO: 0.08 X10(3)/MCL (ref 0–0.9)
EOSINOPHIL NFR BLD AUTO: 2.1 %
ERYTHROCYTE [DISTWIDTH] IN BLOOD BY AUTOMATED COUNT: 13.7 % (ref 11.5–17)
EST. AVERAGE GLUCOSE BLD GHB EST-MCNC: 116.9 MG/DL
GFR SERPLBLD CREATININE-BSD FMLA CKD-EPI: >60 MLS/MIN/1.73/M2
GLOBULIN SER-MCNC: 3.8 GM/DL (ref 2.4–3.5)
GLUCOSE SERPL-MCNC: 86 MG/DL (ref 82–115)
GLUCOSE UR QL STRIP.AUTO: NEGATIVE
HBA1C MFR BLD: 5.7 %
HCT VFR BLD AUTO: 41.3 % (ref 37–47)
HDLC SERPL-MCNC: 40 MG/DL (ref 35–60)
HEMOCCULT SP1 STL QL: NEGATIVE
HEMOCCULT SP2 STL QL: NEGATIVE
HEMOCCULT SP3 STL QL: NEGATIVE
HGB BLD-MCNC: 13.2 G/DL (ref 12–16)
IMM GRANULOCYTES # BLD AUTO: 0 X10(3)/MCL (ref 0–0.04)
IMM GRANULOCYTES NFR BLD AUTO: 0 %
KETONES UR QL STRIP.AUTO: NEGATIVE
LDLC SERPL CALC-MCNC: 69 MG/DL (ref 50–140)
LEUKOCYTE ESTERASE UR QL STRIP.AUTO: NEGATIVE
LYMPHOCYTES # BLD AUTO: 0.89 X10(3)/MCL (ref 0.6–4.6)
LYMPHOCYTES NFR BLD AUTO: 22.9 %
MCH RBC QN AUTO: 28.8 PG (ref 27–31)
MCHC RBC AUTO-ENTMCNC: 32 G/DL (ref 33–36)
MCV RBC AUTO: 90 FL (ref 80–94)
MONOCYTES # BLD AUTO: 0.31 X10(3)/MCL (ref 0.1–1.3)
MONOCYTES NFR BLD AUTO: 8 %
NEUTROPHILS # BLD AUTO: 2.59 X10(3)/MCL (ref 2.1–9.2)
NEUTROPHILS NFR BLD AUTO: 66.7 %
NITRITE UR QL STRIP.AUTO: NEGATIVE
PH UR STRIP.AUTO: 6.5 [PH]
PLATELET # BLD AUTO: 228 X10(3)/MCL (ref 130–400)
PMV BLD AUTO: 9.9 FL (ref 7.4–10.4)
POTASSIUM SERPL-SCNC: 3.9 MMOL/L (ref 3.5–5.1)
PROT SERPL-MCNC: 7.3 GM/DL (ref 5.8–7.6)
PROT UR QL STRIP.AUTO: NEGATIVE
RBC # BLD AUTO: 4.59 X10(6)/MCL (ref 4.2–5.4)
RBC UR QL AUTO: NEGATIVE
SODIUM SERPL-SCNC: 140 MMOL/L (ref 136–145)
SP GR UR STRIP.AUTO: 1.01 (ref 1–1.03)
T4 FREE SERPL-MCNC: 0.97 NG/DL (ref 0.7–1.48)
TRIGL SERPL-MCNC: 42 MG/DL (ref 37–140)
TSH SERPL-ACNC: 2.33 UIU/ML (ref 0.35–4.94)
UROBILINOGEN UR STRIP-ACNC: 0.2
VLDLC SERPL CALC-MCNC: 8 MG/DL
WBC # SPEC AUTO: 3.88 X10(3)/MCL (ref 4.5–11.5)

## 2024-02-19 PROCEDURE — 85025 COMPLETE CBC W/AUTO DIFF WBC: CPT

## 2024-02-19 PROCEDURE — 80053 COMPREHEN METABOLIC PANEL: CPT

## 2024-02-19 PROCEDURE — 80061 LIPID PANEL: CPT

## 2024-02-19 PROCEDURE — 81003 URINALYSIS AUTO W/O SCOPE: CPT

## 2024-02-19 PROCEDURE — 82306 VITAMIN D 25 HYDROXY: CPT

## 2024-02-19 PROCEDURE — 82270 OCCULT BLOOD FECES: CPT

## 2024-02-19 PROCEDURE — 84443 ASSAY THYROID STIM HORMONE: CPT

## 2024-02-19 PROCEDURE — 36415 COLL VENOUS BLD VENIPUNCTURE: CPT

## 2024-02-19 PROCEDURE — 84439 ASSAY OF FREE THYROXINE: CPT

## 2024-02-19 PROCEDURE — 83036 HEMOGLOBIN GLYCOSYLATED A1C: CPT

## 2024-02-26 PROBLEM — N17.9 AKI (ACUTE KIDNEY INJURY): Status: RESOLVED | Noted: 2023-11-22 | Resolved: 2024-02-26

## 2024-03-01 NOTE — PROGRESS NOTES
[facility] Hematology/Oncology  PROGRESS NOTE      Subjective:         NAME: Klarissa Salazar : 1950     73 y.o. female   MRN: [id number]      Chief Complaint:    Chief Complaint   Patient presents with    Breast Cancer     Pt reports no new complaints         Current Treatment : Arimidex 2017     History of Present Illness:   Ms. Salazar is a 70 yo BF with PMH of HTN and hyperlipidemia who had an abnormal mammogram in 2017. It was recommended that she undergo additional imaging and US of R breast done on 3/3/17 demonstrated a 1.1 x 1.0 cm suspicious, solid, irregular mass at the 930 position. She was referred to Dr. Farmer who performed a needle localization and excision of R breast mass on 3/15/17. Pathology revealed invasive ductal carcinoma, grade 1. ER/MS both positive, Her 2 negative. After much discussion, decision was made to proceed with complete mastectomy and sentinel lymph node biopsy, which she had done on 3/30/17. Path from this procedure was negative for residual malignancy in the breast and sentinel lymph node was negative, but 1 of 16 additional lymph nodes removed had metastatic carcinoma present. She has been referred to oncology to discuss additional treatment options.     Treatment completed:   R mastectomy and SLNB 3/30/17   Path: No residual carcinoma identified. [place] lymph node negative. 1 of 16 other lymph nodes with metastatic carcinoma.      Interval History:  She returns to the clinic today for a 6 month follow-up regarding her prior history of ER + breast ca dx 3/2017 s/p right mastectomy. She continues to take her arimidex daily with no complaints or concerns. She continues to take her Calcium and Vit D daily as well. She would like to continue her therapy for 10 years and she is tolerating well. She is currently scheduled for a mammogram on 2024. She would like to stay on 6 months appts as well. Denies any new pain, unintentional weight loss. She denies shortness of  breath, chest pain, dizziness of headaches.       ROS:   Review of Systems   Constitutional:  Negative for appetite change, chills, fatigue, fever and unexpected weight change.   HENT:  Negative for facial swelling, mouth sores, nosebleeds, sinus pressure/congestion and sore throat.    Eyes:  Negative for photophobia and pain.   Respiratory:  Negative for cough, chest tightness, shortness of breath and wheezing.    Cardiovascular:  Negative for chest pain, palpitations and leg swelling.   Gastrointestinal:  Negative for abdominal pain, blood in stool, change in bowel habit, constipation, diarrhea, nausea and vomiting.   Endocrine: Negative.    Genitourinary:  Negative for dysuria, frequency, hematuria, hot flashes, pelvic pain, urgency and vaginal pain.   Musculoskeletal:  Negative for arthralgias, gait problem, joint swelling and myalgias.   Integumentary:  Negative for pallor, rash, wound, breast mass, breast discharge and breast tenderness.   Allergic/Immunologic: Negative.  Negative for immunocompromised state.   Neurological:  Negative for dizziness, vertigo, syncope, weakness and numbness.   Hematological:  Negative for adenopathy. Does not bruise/bleed easily.   Psychiatric/Behavioral:  Negative for behavioral problems and dysphoric mood. The patient is not nervous/anxious.    All other systems reviewed and are negative.  Breast: Negative for mass and tenderness         Allergies:  Review of patient's allergies indicates:   Allergen Reactions    Clindamycin      Other reaction(s): Angioedema    Lisinopril      Other reaction(s): Angioedema    Sulfa (sulfonamide antibiotics)     Sulfamethoxazole-trimethoprim      Other reaction(s): Angioedema       Medications:    Current Outpatient Medications:     amLODIPine (NORVASC) 5 MG tablet, Take 5 mg by mouth., Disp: , Rfl:     anastrozole (ARIMIDEX) 1 mg Tab, TAKE 1 TABLET BY MOUTH DAILY, Disp: 30 tablet, Rfl: 6    aspirin (ECOTRIN) 81 MG EC tablet, Take 81 mg by  mouth once daily., Disp: , Rfl:     cloNIDine (CATAPRES) 0.1 MG tablet, Take 0.1 mg by mouth 3 (three) times daily., Disp: , Rfl:     dexlansoprazole (DEXILANT) 60 mg capsule, Take 1 capsule by mouth once daily at 6am., Disp: , Rfl:     furosemide (LASIX) 20 MG tablet, Take 20 mg by mouth once daily., Disp: , Rfl:     pravastatin (PRAVACHOL) 20 MG tablet, Take 20 mg by mouth once daily., Disp: , Rfl:     pregabalin (LYRICA) 50 MG capsule, TAKE 1 CAPSULE BY MOUTH AT NIGHT, Disp: , Rfl:     verapamiL (CALAN-SR) 240 MG CR tablet, Take 240 mg by mouth once daily., Disp: , Rfl:     amiodarone (PACERONE) 200 MG Tab, Take 400 mg by mouth once daily., Disp: , Rfl:     atorvastatin (LIPITOR) 40 MG tablet, Take 40 mg by mouth once daily., Disp: , Rfl:     cetirizine (ZYRTEC) 10 MG tablet, Take 10 mg by mouth nightly., Disp: , Rfl:     ENTRESTO 24-26 mg per tablet, Take 1 tablet by mouth 2 (two) times daily., Disp: , Rfl:     ENULOSE 10 gram/15 mL solution, TAKE 30 ML BY MOUTH EVERY DAY, Disp: , Rfl:     FARXIGA 5 mg Tab tablet, Take 5 mg by mouth., Disp: , Rfl:     folic acid (FOLVITE) 1 MG tablet, Take 1,000 mcg by mouth once daily., Disp: , Rfl:     gabapentin (NEURONTIN) 100 MG capsule, Take 100 mg by mouth 2 (two) times daily., Disp: , Rfl:     metoprolol succinate (TOPROL-XL) 25 MG 24 hr tablet, Take 25 mg by mouth once daily., Disp: , Rfl:     spironolactone (ALDACTONE) 25 MG tablet, Take 25 mg by mouth once daily., Disp: , Rfl:     VITAMIN D2 1,250 mcg (50,000 unit) capsule, Take 50,000 Units by mouth every 7 days., Disp: , Rfl:     XARELTO 20 mg Tab, Take 20 mg by mouth once daily., Disp: , Rfl:     PMHx/PSHx Updates:  Past Medical History:   Diagnosis Date    High cholesterol     HTN (hypertension)     Malignant neoplasm of right female breast      Past Surgical History:   Procedure Laterality Date    BREAST LUMPECTOMY W/ NEEDLE LOCALIZATION Right     HYSTERECTOMY      MASTECTOMY W/ SENTINEL NODE BIOPSY Right   "      Family History:   Family History   Problem Relation Age of Onset    Diabetes Mother     Hypertension Mother     Lung cancer Mother        Social History:   Social History     Socioeconomic History    Marital status:    Tobacco Use    Smoking status: Never    Smokeless tobacco: Never   Substance and Sexual Activity    Alcohol use: Yes    Drug use: Never             Objective:     Vitals:  Blood pressure 136/81, pulse (!) 54, temperature 98.2 °F (36.8 °C), resp. rate 18, height 5' 4.02" (1.626 m), weight 112.5 kg (248 lb), SpO2 95 %.    Physical Examination:   Physical Exam  Vitals reviewed.   Constitutional:       Appearance: Normal appearance.   HENT:      Head: Normocephalic and atraumatic.      Right Ear: External ear normal.      Left Ear: External ear normal.      Nose: Nose normal.      Mouth/Throat:      Mouth: Mucous membranes are moist.      Pharynx: Oropharynx is clear.   Eyes:      Extraocular Movements: Extraocular movements intact.      Conjunctiva/sclera: Conjunctivae normal.      Pupils: Pupils are equal, round, and reactive to light.   Cardiovascular:      Rate and Rhythm: Normal rate and regular rhythm.      Pulses: Normal pulses.      Heart sounds: Normal heart sounds.   Pulmonary:      Effort: Pulmonary effort is normal.      Breath sounds: Normal breath sounds.   Chest:      Comments: Keloid formations noted along right breast mastectomy incision line, well-healed.  Keloids also noted to outer quadrant of left breast.  No adenopathy, masses, rashes, discharge, or nipple inversion noted.  Abdominal:      General: Abdomen is flat. Bowel sounds are normal.      Palpations: Abdomen is soft.   Musculoskeletal:         General: Normal range of motion.      Cervical back: Normal range of motion and neck supple.   Lymphadenopathy:      Comments: No adenopathy noted bilaterally   Skin:     General: Skin is warm and dry.   Neurological:      General: No focal deficit present.      Mental " Status: She is alert and oriented to person, place, and time.   Psychiatric:         Mood and Affect: Mood normal.         Behavior: Behavior normal.         Thought Content: Thought content normal.         Judgment: Judgment normal.           Labs:   No visits with results within 1 Week(s) from this visit.   Latest known visit with results is:   Lab Visit on 02/19/2024   Component Date Value Ref Range Status    Color, UA 02/19/2024 Yellow  Yellow, Light-Yellow, Dark Yellow, Deisy, Straw Final    Appearance, UA 02/19/2024 Clear  Clear Final    Specific Gravity, UA 02/19/2024 1.015  1.005 - 1.030 Final    pH, UA 02/19/2024 6.5  5.0 - 8.5 Final    Protein, UA 02/19/2024 Negative  Negative Final    Glucose, UA 02/19/2024 Negative  Negative, Normal Final    Ketones, UA 02/19/2024 Negative  Negative Final    Blood, UA 02/19/2024 Negative  Negative Final    Bilirubin, UA 02/19/2024 Negative  Negative Final    Urobilinogen, UA 02/19/2024 0.2  0.2, 1.0, Normal Final    Nitrites, UA 02/19/2024 Negative  Negative Final    Leukocyte Esterase, UA 02/19/2024 Negative  Negative Final    Occult Blood Stool 1 02/19/2024 Negative  Negative Final    Occult Blood Stool 2 02/19/2024 Negative  Negative, N/A Final    Occult Blood Stool 3 02/19/2024 Negative  Negative, N/A Final    Sodium Level 02/19/2024 140  136 - 145 mmol/L Final    Potassium Level 02/19/2024 3.9  3.5 - 5.1 mmol/L Final    Chloride 02/19/2024 105  98 - 107 mmol/L Final    Carbon Dioxide 02/19/2024 29  23 - 31 mmol/L Final    Glucose Level 02/19/2024 86  82 - 115 mg/dL Final    Blood Urea Nitrogen 02/19/2024 12.0  9.8 - 20.1 mg/dL Final    Creatinine 02/19/2024 0.81  0.55 - 1.02 mg/dL Final    Calcium Level Total 02/19/2024 9.6  8.4 - 10.2 mg/dL Final    Protein Total 02/19/2024 7.3  5.8 - 7.6 gm/dL Final    Albumin Level 02/19/2024 3.5  3.4 - 4.8 g/dL Final    Globulin 02/19/2024 3.8 (H)  2.4 - 3.5 gm/dL Final    Albumin/Globulin Ratio 02/19/2024 0.9 (L)  1.1 - 2.0  ratio Final    Bilirubin Total 02/19/2024 0.2  <=1.5 mg/dL Final    Alkaline Phosphatase 02/19/2024 100  40 - 150 unit/L Final    Alanine Aminotransferase 02/19/2024 18  0 - 55 unit/L Final    Aspartate Aminotransferase 02/19/2024 15  5 - 34 unit/L Final    eGFR 02/19/2024 >60  mls/min/1.73/m2 Final    TSH 02/19/2024 2.332  0.350 - 4.940 uIU/mL Final    Thyroxine Free 02/19/2024 0.97  0.70 - 1.48 ng/dL Final    Hemoglobin A1c 02/19/2024 5.7  <=7.0 % Final    Estimated Average Glucose 02/19/2024 116.9  mg/dL Final    Cholesterol Total 02/19/2024 117  <=200 mg/dL Final    HDL Cholesterol 02/19/2024 40  35 - 60 mg/dL Final    Triglyceride 02/19/2024 42  37 - 140 mg/dL Final    Cholesterol/HDL Ratio 02/19/2024 3  0 - 5 Final    Very Low Density Lipoprotein 02/19/2024 8   Final    LDL Cholesterol 02/19/2024 69.00  50.00 - 140.00 mg/dL Final    Vit D 25 OH 02/19/2024 45.4  30.0 - 80.0 ng/mL Final    WBC 02/19/2024 3.88 (L)  4.50 - 11.50 x10(3)/mcL Final    RBC 02/19/2024 4.59  4.20 - 5.40 x10(6)/mcL Final    Hgb 02/19/2024 13.2  12.0 - 16.0 g/dL Final    Hct 02/19/2024 41.3  37.0 - 47.0 % Final    MCV 02/19/2024 90.0  80.0 - 94.0 fL Final    MCH 02/19/2024 28.8  27.0 - 31.0 pg Final    MCHC 02/19/2024 32.0 (L)  33.0 - 36.0 g/dL Final    RDW 02/19/2024 13.7  11.5 - 17.0 % Final    Platelet 02/19/2024 228  130 - 400 x10(3)/mcL Final    MPV 02/19/2024 9.9  7.4 - 10.4 fL Final    Neut % 02/19/2024 66.7  % Final    Lymph % 02/19/2024 22.9  % Final    Mono % 02/19/2024 8.0  % Final    Eos % 02/19/2024 2.1  % Final    Basophil % 02/19/2024 0.3  % Final    Lymph # 02/19/2024 0.89  0.6 - 4.6 x10(3)/mcL Final    Neut # 02/19/2024 2.59  2.1 - 9.2 x10(3)/mcL Final    Mono # 02/19/2024 0.31  0.1 - 1.3 x10(3)/mcL Final    Eos # 02/19/2024 0.08  0 - 0.9 x10(3)/mcL Final    Baso # 02/19/2024 0.01  <=0.2 x10(3)/mcL Final    IG# 02/19/2024 0.00  0 - 0.04 x10(3)/mcL Final    IG% 02/19/2024 0.0  % Final         Assessment/Plan:   Right  breast cancer, T1N1M0, ER/TX positive and Her2 negative.   S/p mastectomy and lymph node biopsy on 3/30/17. Because Oncotype recurrence score was 8, endocrine therapy alone was recommended.   She started adjuvant AI at the end of May 2017 and continues to tolerate it well. The hot flashes she reports are infrequent and mild.  Today pt is clinically stable, has YESENIA on exam and her labs are unremarkable. Recommend she continue current treatment with Arimidex.  Bone health.  DEXA 7/2023 showed normal bone density. Rec she continue OTC Petar/Vit D supplementation.   Follow-up DEXA due 07/2025    Mammograms  MMG 3/2022- Recommend annual supplemental breast screening with dynamic contrast enhanced breast MRI in this patient with a personal history of breast cancer and heterogeneously dense breast tissue. Ideally, mammography and breast MRI are alternated every 6 months.  However, patient is unable to tolerate due to severe claustrophobia. She declines breast MRI at this time.   Annual screening mammogram completed on 07/2023 was reviewed with patient in detail.  No evidence of malignancy noted.  Annual screening mammogram due 07/2024.  Currently scheduled for 8/1/2024.      PLAN:  Labs and exam are stable.   Continue Arimidex for 7 - 10 years given patient initially had 1+ LN, currently tolerating with no issues.  Mammogram scheduled for 8/1/2024.  DEXA due 07/2025.  RTC 6 months with NP for FU/lab       Discussion:     I have explained all of the above in detail and the patient understands all of the current recommendation(s). I have answered all of their questions to the best of my ability and to their complete satisfaction.   The patient is to continue with the current management plan.      Electronically signed by STELLA Kuo

## 2024-03-04 ENCOUNTER — OFFICE VISIT (OUTPATIENT)
Dept: HEMATOLOGY/ONCOLOGY | Facility: CLINIC | Age: 74
End: 2024-03-04
Payer: COMMERCIAL

## 2024-03-04 VITALS
WEIGHT: 248 LBS | RESPIRATION RATE: 18 BRPM | DIASTOLIC BLOOD PRESSURE: 81 MMHG | TEMPERATURE: 98 F | SYSTOLIC BLOOD PRESSURE: 136 MMHG | HEIGHT: 64 IN | BODY MASS INDEX: 42.34 KG/M2 | HEART RATE: 54 BPM | OXYGEN SATURATION: 95 %

## 2024-03-04 DIAGNOSIS — Z12.31 SCREENING MAMMOGRAM FOR HIGH-RISK PATIENT: ICD-10-CM

## 2024-03-04 DIAGNOSIS — C50.911 MALIGNANT NEOPLASM OF RIGHT FEMALE BREAST, UNSPECIFIED ESTROGEN RECEPTOR STATUS, UNSPECIFIED SITE OF BREAST: Primary | ICD-10-CM

## 2024-03-04 DIAGNOSIS — Z79.811 LONG TERM CURRENT USE OF AROMATASE INHIBITOR: ICD-10-CM

## 2024-03-04 DIAGNOSIS — Z79.811 AROMATASE INHIBITOR USE: ICD-10-CM

## 2024-03-04 PROCEDURE — 3008F BODY MASS INDEX DOCD: CPT | Mod: CPTII,S$GLB,,

## 2024-03-04 PROCEDURE — 1101F PT FALLS ASSESS-DOCD LE1/YR: CPT | Mod: CPTII,S$GLB,,

## 2024-03-04 PROCEDURE — 99215 OFFICE O/P EST HI 40 MIN: CPT | Mod: S$GLB,,,

## 2024-03-04 PROCEDURE — 4010F ACE/ARB THERAPY RXD/TAKEN: CPT | Mod: CPTII,S$GLB,,

## 2024-03-04 PROCEDURE — 1126F AMNT PAIN NOTED NONE PRSNT: CPT | Mod: CPTII,S$GLB,,

## 2024-03-04 PROCEDURE — 3075F SYST BP GE 130 - 139MM HG: CPT | Mod: CPTII,S$GLB,,

## 2024-03-04 PROCEDURE — 99999 PR PBB SHADOW E&M-EST. PATIENT-LVL V: CPT | Mod: PBBFAC,,,

## 2024-03-04 PROCEDURE — 1159F MED LIST DOCD IN RCRD: CPT | Mod: CPTII,S$GLB,,

## 2024-03-04 PROCEDURE — 3288F FALL RISK ASSESSMENT DOCD: CPT | Mod: CPTII,S$GLB,,

## 2024-03-04 PROCEDURE — 1160F RVW MEDS BY RX/DR IN RCRD: CPT | Mod: CPTII,S$GLB,,

## 2024-03-04 PROCEDURE — 3079F DIAST BP 80-89 MM HG: CPT | Mod: CPTII,S$GLB,,

## 2024-03-04 PROCEDURE — 3044F HG A1C LEVEL LT 7.0%: CPT | Mod: CPTII,S$GLB,,

## 2024-05-21 DIAGNOSIS — Z79.811 LONG TERM CURRENT USE OF AROMATASE INHIBITOR: ICD-10-CM

## 2024-05-21 DIAGNOSIS — C50.911 MALIGNANT NEOPLASM OF RIGHT FEMALE BREAST, UNSPECIFIED ESTROGEN RECEPTOR STATUS, UNSPECIFIED SITE OF BREAST: ICD-10-CM

## 2024-05-21 DIAGNOSIS — C50.919 MALIGNANT NEOPLASM OF FEMALE BREAST, UNSPECIFIED ESTROGEN RECEPTOR STATUS, UNSPECIFIED LATERALITY, UNSPECIFIED SITE OF BREAST: ICD-10-CM

## 2024-05-22 RX ORDER — ANASTROZOLE 1 MG/1
TABLET ORAL
Qty: 30 TABLET | Refills: 6 | Status: SHIPPED | OUTPATIENT
Start: 2024-05-22

## 2024-08-01 ENCOUNTER — HOSPITAL ENCOUNTER (OUTPATIENT)
Dept: RADIOLOGY | Facility: HOSPITAL | Age: 74
Discharge: HOME | End: 2024-08-01
Payer: COMMERCIAL

## 2024-08-01 DIAGNOSIS — Z12.31 SCREENING MAMMOGRAM FOR HIGH-RISK PATIENT: ICD-10-CM

## 2024-08-01 PROCEDURE — 77063 BREAST TOMOSYNTHESIS BI: CPT | Mod: TC,52

## 2024-08-01 PROCEDURE — 77067 SCR MAMMO BI INCL CAD: CPT | Mod: TC,52

## 2024-10-02 NOTE — PROGRESS NOTES
[facility] Hematology/Oncology  PROGRESS NOTE      Subjective:         NAME: Klarissa Salazar : 1950     73 y.o. female   MRN: [id number]      Chief Complaint:            Current Treatment : Arimidex 2017     History of Present Illness:   Ms. Salazar is a 68 yo BF with PMH of HTN and hyperlipidemia who had an abnormal mammogram in 2017. It was recommended that she undergo additional imaging and US of R breast done on 3/3/17 demonstrated a 1.1 x 1.0 cm suspicious, solid, irregular mass at the 930 position. She was referred to Dr. Farmer who performed a needle localization and excision of R breast mass on 3/15/17. Pathology revealed invasive ductal carcinoma, grade 1. ER/[place] both positive, Her 2 negative. After much discussion, decision was made to proceed with complete mastectomy and sentinel lymph node biopsy, which she had done on 3/30/17. Path from this procedure was negative for residual malignancy in the breast and sentinel lymph node was negative, but 1 of 16 additional lymph nodes removed had metastatic carcinoma present. She has been referred to oncology to discuss additional treatment options.     Treatment completed:   R mastectomy and SLNB 3/30/17   Path: No residual carcinoma identified. [place] lymph node negative. 1 of 16 other lymph nodes with metastatic carcinoma.      Interval History:      10/7/2024:  Ms. Salazar is here today for her follow-up and labs regarding her right breast ER/[place] +, HER 2- invasive ductal carcinoma, s/p right mastectomy.  Today, she reports feeling well.    She reports compliance with Anastrozole 1 mg PO QD along with calcium and vitamin D.  She will continue AI for 10 years.  She denies any joint pain, depression, hot flashes, vaginal dryness, fatigue, headaches, insomnia. She denies any new onset of headaches, cough, SOB, chest pain, abdominal pain, or bone pain.  She reports no new lumps, masses, nipple discharge, skin changes or axillary nodules on the left  breast, and no masses, nodules on right chest wall.  Mammogram screening, 8/6/2024, with no evidence of malignancy.  Labs reviewed with patient.  Eating and drinking.  Weight is stable.  No reports of hospitalizations, infections, or illnesses.    She returns to the clinic today for a 6 month follow-up regarding her prior history of ER + breast ca dx 3/2017 s/p right mastectomy. She continues to take her arimidex daily with no complaints or concerns. She continues to take her Calcium and Vit D daily as well. She would like to continue her therapy for 10 years and she is tolerating well. She is currently scheduled for a mammogram on 8/1/2024. She would like to stay on 6 months appts as well. Denies any new pain, unintentional weight loss. She denies shortness of breath, chest pain, dizziness of headaches.       ROS:   Review of Systems   Constitutional:  Negative for appetite change, chills, fatigue, fever and unexpected weight change.   HENT:  Negative for facial swelling, mouth sores, nosebleeds, sinus pressure/congestion and sore throat.    Eyes:  Negative for photophobia and pain.   Respiratory:  Negative for cough, chest tightness, shortness of breath and wheezing.    Cardiovascular:  Negative for chest pain, palpitations and leg swelling.   Gastrointestinal:  Negative for abdominal pain, blood in stool, change in bowel habit, constipation, diarrhea, nausea and vomiting.   Endocrine: Negative.    Genitourinary:  Negative for dysuria, frequency, hematuria, hot flashes, pelvic pain, urgency and vaginal pain.   Musculoskeletal:  Negative for arthralgias, gait problem, joint swelling and myalgias.   Integumentary:  Negative for pallor, rash, wound, breast mass, breast discharge and breast tenderness.   Allergic/Immunologic: Negative.  Negative for immunocompromised state.   Neurological:  Negative for dizziness, vertigo, syncope, weakness and numbness.   Hematological:  Negative for adenopathy. Does not bruise/bleed  easily.   Psychiatric/Behavioral:  Negative for behavioral problems and dysphoric mood. The patient is not nervous/anxious.    All other systems reviewed and are negative.  Breast: Negative for mass and tenderness         Allergies:  Review of patient's allergies indicates:   Allergen Reactions    Clindamycin      Other reaction(s): Angioedema    Lisinopril      Other reaction(s): Angioedema    Sulfa (sulfonamide antibiotics)     Sulfamethoxazole-trimethoprim      Other reaction(s): Angioedema       Medications:    Current Outpatient Medications:     amiodarone (PACERONE) 200 MG Tab, Take 400 mg by mouth once daily., Disp: , Rfl:     amLODIPine (NORVASC) 5 MG tablet, Take 5 mg by mouth., Disp: , Rfl:     anastrozole (ARIMIDEX) 1 mg Tab, TAKE 1 TABLET BY MOUTH DAILY, Disp: 30 tablet, Rfl: 6    aspirin (ECOTRIN) 81 MG EC tablet, Take 81 mg by mouth once daily., Disp: , Rfl:     atorvastatin (LIPITOR) 40 MG tablet, Take 40 mg by mouth once daily., Disp: , Rfl:     cetirizine (ZYRTEC) 10 MG tablet, Take 10 mg by mouth nightly., Disp: , Rfl:     cloNIDine (CATAPRES) 0.1 MG tablet, Take 0.1 mg by mouth 3 (three) times daily., Disp: , Rfl:     dexlansoprazole (DEXILANT) 60 mg capsule, Take 1 capsule by mouth once daily at 6am., Disp: , Rfl:     ENTRESTO 24-26 mg per tablet, Take 1 tablet by mouth 2 (two) times daily., Disp: , Rfl:     ENULOSE 10 gram/15 mL solution, TAKE 30 ML BY MOUTH EVERY DAY, Disp: , Rfl:     FARXIGA 5 mg Tab tablet, Take 5 mg by mouth., Disp: , Rfl:     folic acid (FOLVITE) 1 MG tablet, Take 1,000 mcg by mouth once daily., Disp: , Rfl:     furosemide (LASIX) 20 MG tablet, Take 20 mg by mouth once daily., Disp: , Rfl:     gabapentin (NEURONTIN) 100 MG capsule, Take 100 mg by mouth 2 (two) times daily., Disp: , Rfl:     metoprolol succinate (TOPROL-XL) 25 MG 24 hr tablet, Take 25 mg by mouth once daily., Disp: , Rfl:     pravastatin (PRAVACHOL) 20 MG tablet, Take 20 mg by mouth once daily., Disp: , Rfl:  "    pregabalin (LYRICA) 50 MG capsule, TAKE 1 CAPSULE BY MOUTH AT NIGHT, Disp: , Rfl:     spironolactone (ALDACTONE) 25 MG tablet, Take 25 mg by mouth once daily., Disp: , Rfl:     verapamiL (CALAN-SR) 240 MG CR tablet, Take 240 mg by mouth once daily., Disp: , Rfl:     VITAMIN D2 1,250 mcg (50,000 unit) capsule, Take 50,000 Units by mouth every 7 days., Disp: , Rfl:     XARELTO 20 mg Tab, Take 20 mg by mouth once daily., Disp: , Rfl:     PMHx/PSHx Updates:  Past Medical History:   Diagnosis Date    High cholesterol     HTN (hypertension)     Malignant neoplasm of right female breast      Past Surgical History:   Procedure Laterality Date    BREAST LUMPECTOMY W/ NEEDLE LOCALIZATION Right     HYSTERECTOMY      MASTECTOMY W/ SENTINEL NODE BIOPSY Right        Family History:   Family History   Problem Relation Name Age of Onset    Diabetes Mother      Hypertension Mother      Lung cancer Mother         Social History:   Social History     Socioeconomic History    Marital status:    Tobacco Use    Smoking status: Never    Smokeless tobacco: Never   Substance and Sexual Activity    Alcohol use: Yes    Drug use: Never             Objective:     Vitals:  Blood pressure (!) 147/78, pulse (!) 54, temperature 98 °F (36.7 °C), temperature source Oral, resp. rate 16, height 5' 5.51" (1.664 m), weight 114 kg (251 lb 4.8 oz), SpO2 100%.    Physical Examination:   Physical Exam  Vitals reviewed.   Constitutional:       Appearance: Normal appearance.   HENT:      Head: Normocephalic and atraumatic.      Right Ear: External ear normal.      Left Ear: External ear normal.      Nose: Nose normal.      Mouth/Throat:      Mouth: Mucous membranes are moist.      Pharynx: Oropharynx is clear.   Eyes:      Extraocular Movements: Extraocular movements intact.      Conjunctiva/sclera: Conjunctivae normal.      Pupils: Pupils are equal, round, and reactive to light.   Cardiovascular:      Rate and Rhythm: Normal rate and regular " rhythm.      Pulses: Normal pulses.      Heart sounds: Normal heart sounds.   Pulmonary:      Effort: Pulmonary effort is normal.      Breath sounds: Normal breath sounds.   Chest:      Comments: Keloid formations noted along right breast mastectomy incision line, well-healed.  Keloids also noted to outer quadrant of left breast.  No adenopathy, masses, rashes, discharge, or nipple inversion noted.  Abdominal:      General: Abdomen is flat. Bowel sounds are normal.      Palpations: Abdomen is soft.   Musculoskeletal:         General: Normal range of motion.      Cervical back: Normal range of motion and neck supple.   Lymphadenopathy:      Comments: No adenopathy noted bilaterally   Skin:     General: Skin is warm and dry.   Neurological:      General: No focal deficit present.      Mental Status: She is alert and oriented to person, place, and time.   Psychiatric:         Mood and Affect: Mood normal.         Behavior: Behavior normal.         Thought Content: Thought content normal.         Judgment: Judgment normal.           Labs:   Lab Visit on 10/04/2024   Component Date Value Ref Range Status    Sodium 10/04/2024 143  136 - 145 mmol/L Final    Potassium 10/04/2024 3.6  3.5 - 5.1 mmol/L Final    Chloride 10/04/2024 108 (H)  98 - 107 mmol/L Final    CO2 10/04/2024 29  23 - 31 mmol/L Final    Glucose 10/04/2024 97  82 - 115 mg/dL Final    Blood Urea Nitrogen 10/04/2024 14.0  9.8 - 20.1 mg/dL Final    Creatinine 10/04/2024 0.83  0.55 - 1.02 mg/dL Final    Calcium 10/04/2024 9.2  8.4 - 10.2 mg/dL Final    Protein Total 10/04/2024 7.1  5.8 - 7.6 gm/dL Final    Albumin 10/04/2024 3.6  3.4 - 4.8 g/dL Final    Globulin 10/04/2024 3.5  2.4 - 3.5 gm/dL Final    Albumin/Globulin Ratio 10/04/2024 1.0 (L)  1.1 - 2.0 ratio Final    Bilirubin Total 10/04/2024 0.3  <=1.5 mg/dL Final    ALP 10/04/2024 85  40 - 150 unit/L Final    ALT 10/04/2024 12  0 - 55 unit/L Final    AST 10/04/2024 15  5 - 34 unit/L Final    eGFR  10/04/2024 >60  mL/min/1.73/m2 Final    Anion Gap 10/04/2024 6.0  mEq/L Final    BUN/Creatinine Ratio 10/04/2024 17   Final    Magnesium Level 10/04/2024 2.20  1.60 - 2.60 mg/dL Final    Phosphorus Level 10/04/2024 2.9  2.3 - 4.7 mg/dL Final    WBC 10/04/2024 5.23  4.50 - 11.50 x10(3)/mcL Final    RBC 10/04/2024 4.23  4.20 - 5.40 x10(6)/mcL Final    Hgb 10/04/2024 12.3  12.0 - 16.0 g/dL Final    Hct 10/04/2024 38.7  37.0 - 47.0 % Final    MCV 10/04/2024 91.5  80.0 - 94.0 fL Final    MCH 10/04/2024 29.1  27.0 - 31.0 pg Final    MCHC 10/04/2024 31.8 (L)  33.0 - 36.0 g/dL Final    RDW 10/04/2024 14.3  11.5 - 17.0 % Final    Platelet 10/04/2024 193  130 - 400 x10(3)/mcL Final    MPV 10/04/2024 9.8  7.4 - 10.4 fL Final    Neut % 10/04/2024 62.2  % Final    Lymph % 10/04/2024 24.9  % Final    Mono % 10/04/2024 9.8  % Final    Eos % 10/04/2024 2.5  % Final    Basophil % 10/04/2024 0.4  % Final    Lymph # 10/04/2024 1.30  0.6 - 4.6 x10(3)/mcL Final    Neut # 10/04/2024 3.26  2.1 - 9.2 x10(3)/mcL Final    Mono # 10/04/2024 0.51  0.1 - 1.3 x10(3)/mcL Final    Eos # 10/04/2024 0.13  0 - 0.9 x10(3)/mcL Final    Baso # 10/04/2024 0.02  <=0.2 x10(3)/mcL Final    IG# 10/04/2024 0.01  0 - 0.04 x10(3)/mcL Final    IG% 10/04/2024 0.2  % Final         Assessment/Plan:   Right breast cancer, T1N1M0, ER/MI positive and Her2 negative.   S/p mastectomy and lymph node biopsy on 3/30/17. Because Oncotype recurrence score was 8, endocrine therapy alone was recommended.   She started adjuvant AI at the end of May 2017 and continues to tolerate it well. The hot flashes she reports are infrequent and mild.  Today pt is clinically stable, has YESENIA on exam and her labs are unremarkable. Recommend she continue current treatment with Arimidex.  Bone health.  DEXA 7/2023 showed normal bone density. Rec she continue OTC Petar/Vit D supplementation.   Follow-up DEXA due 07/2025    Mammograms  MMG 3/2022- Recommend annual supplemental breast screening  with dynamic contrast enhanced breast MRI in this patient with a personal history of breast cancer and heterogeneously dense breast tissue. Ideally, mammography and breast MRI are alternated every 6 months.  However, patient is unable to tolerate due to severe claustrophobia. She declines breast MRI at this time.   Annual screening mammogram completed on 08/2024 was reviewed with patient in detail.  No evidence of malignancy noted.  Annual screening mammogram left breast due 08/2025.  .      PLAN:  Labs and exam are stable.   Continue Arimidex for 7 - 10 years given patient initially had 1+ LN, currently tolerating with no issues.  Mammogram scheduled for 8/2025  DEXA due 07/2025.  RTC 6 months with NP for FU/lab       Discussion:       The patient is in agreement with today's plan of care.  All questions answered.  Patient is aware to contact our office for any problems or concerns prior to next visit.      Laura Sky, MSN-ED, APRN, AGACNP-BC, OCN

## 2024-10-04 ENCOUNTER — OFFICE VISIT (OUTPATIENT)
Dept: HEMATOLOGY/ONCOLOGY | Facility: CLINIC | Age: 74
End: 2024-10-04
Payer: COMMERCIAL

## 2024-10-04 ENCOUNTER — LAB VISIT (OUTPATIENT)
Dept: LAB | Facility: HOSPITAL | Age: 74
End: 2024-10-04
Payer: MEDICARE

## 2024-10-04 VITALS
DIASTOLIC BLOOD PRESSURE: 78 MMHG | WEIGHT: 251.31 LBS | BODY MASS INDEX: 40.39 KG/M2 | HEART RATE: 54 BPM | RESPIRATION RATE: 16 BRPM | SYSTOLIC BLOOD PRESSURE: 147 MMHG | HEIGHT: 66 IN | TEMPERATURE: 98 F | OXYGEN SATURATION: 100 %

## 2024-10-04 DIAGNOSIS — Z08 ENCOUNTER FOR FOLLOW-UP SURVEILLANCE OF BREAST CANCER: Primary | ICD-10-CM

## 2024-10-04 DIAGNOSIS — C50.911 MALIGNANT NEOPLASM OF RIGHT FEMALE BREAST, UNSPECIFIED ESTROGEN RECEPTOR STATUS, UNSPECIFIED SITE OF BREAST: ICD-10-CM

## 2024-10-04 DIAGNOSIS — Z85.3 ENCOUNTER FOR FOLLOW-UP SURVEILLANCE OF BREAST CANCER: Primary | ICD-10-CM

## 2024-10-04 DIAGNOSIS — Z79.811 LONG TERM (CURRENT) USE OF AROMATASE INHIBITORS: ICD-10-CM

## 2024-10-04 LAB
ALBUMIN SERPL-MCNC: 3.6 G/DL (ref 3.4–4.8)
ALBUMIN/GLOB SERPL: 1 RATIO (ref 1.1–2)
ALP SERPL-CCNC: 85 UNIT/L (ref 40–150)
ALT SERPL-CCNC: 12 UNIT/L (ref 0–55)
ANION GAP SERPL CALC-SCNC: 6 MEQ/L
AST SERPL-CCNC: 15 UNIT/L (ref 5–34)
BASOPHILS # BLD AUTO: 0.02 X10(3)/MCL
BASOPHILS NFR BLD AUTO: 0.4 %
BILIRUB SERPL-MCNC: 0.3 MG/DL
BUN SERPL-MCNC: 14 MG/DL (ref 9.8–20.1)
CALCIUM SERPL-MCNC: 9.2 MG/DL (ref 8.4–10.2)
CHLORIDE SERPL-SCNC: 108 MMOL/L (ref 98–107)
CO2 SERPL-SCNC: 29 MMOL/L (ref 23–31)
CREAT SERPL-MCNC: 0.83 MG/DL (ref 0.55–1.02)
CREAT/UREA NIT SERPL: 17
EOSINOPHIL # BLD AUTO: 0.13 X10(3)/MCL (ref 0–0.9)
EOSINOPHIL NFR BLD AUTO: 2.5 %
ERYTHROCYTE [DISTWIDTH] IN BLOOD BY AUTOMATED COUNT: 14.3 % (ref 11.5–17)
GFR SERPLBLD CREATININE-BSD FMLA CKD-EPI: >60 ML/MIN/1.73/M2
GLOBULIN SER-MCNC: 3.5 GM/DL (ref 2.4–3.5)
GLUCOSE SERPL-MCNC: 97 MG/DL (ref 82–115)
HCT VFR BLD AUTO: 38.7 % (ref 37–47)
HGB BLD-MCNC: 12.3 G/DL (ref 12–16)
IMM GRANULOCYTES # BLD AUTO: 0.01 X10(3)/MCL (ref 0–0.04)
IMM GRANULOCYTES NFR BLD AUTO: 0.2 %
LYMPHOCYTES # BLD AUTO: 1.3 X10(3)/MCL (ref 0.6–4.6)
LYMPHOCYTES NFR BLD AUTO: 24.9 %
MAGNESIUM SERPL-MCNC: 2.2 MG/DL (ref 1.6–2.6)
MCH RBC QN AUTO: 29.1 PG (ref 27–31)
MCHC RBC AUTO-ENTMCNC: 31.8 G/DL (ref 33–36)
MCV RBC AUTO: 91.5 FL (ref 80–94)
MONOCYTES # BLD AUTO: 0.51 X10(3)/MCL (ref 0.1–1.3)
MONOCYTES NFR BLD AUTO: 9.8 %
NEUTROPHILS # BLD AUTO: 3.26 X10(3)/MCL (ref 2.1–9.2)
NEUTROPHILS NFR BLD AUTO: 62.2 %
PHOSPHATE SERPL-MCNC: 2.9 MG/DL (ref 2.3–4.7)
PLATELET # BLD AUTO: 193 X10(3)/MCL (ref 130–400)
PMV BLD AUTO: 9.8 FL (ref 7.4–10.4)
POTASSIUM SERPL-SCNC: 3.6 MMOL/L (ref 3.5–5.1)
PROT SERPL-MCNC: 7.1 GM/DL (ref 5.8–7.6)
RBC # BLD AUTO: 4.23 X10(6)/MCL (ref 4.2–5.4)
SODIUM SERPL-SCNC: 143 MMOL/L (ref 136–145)
WBC # BLD AUTO: 5.23 X10(3)/MCL (ref 4.5–11.5)

## 2024-10-04 PROCEDURE — 80053 COMPREHEN METABOLIC PANEL: CPT

## 2024-10-04 PROCEDURE — 99999 PR PBB SHADOW E&M-EST. PATIENT-LVL IV: CPT | Mod: PBBFAC,,, | Performed by: NURSE PRACTITIONER

## 2024-10-04 PROCEDURE — 84100 ASSAY OF PHOSPHORUS: CPT

## 2024-10-04 PROCEDURE — 83735 ASSAY OF MAGNESIUM: CPT

## 2024-10-04 PROCEDURE — 85025 COMPLETE CBC W/AUTO DIFF WBC: CPT

## 2024-10-04 PROCEDURE — 36415 COLL VENOUS BLD VENIPUNCTURE: CPT

## 2024-10-08 ENCOUNTER — LAB VISIT (OUTPATIENT)
Dept: LAB | Facility: HOSPITAL | Age: 74
End: 2024-10-08
Attending: INTERNAL MEDICINE
Payer: MEDICARE

## 2024-10-08 DIAGNOSIS — E55.9 AVITAMINOSIS D: ICD-10-CM

## 2024-10-08 DIAGNOSIS — N18.9 ANEMIA OF CHRONIC RENAL FAILURE, UNSPECIFIED CKD STAGE: ICD-10-CM

## 2024-10-08 DIAGNOSIS — E07.9 DISEASE OF THYROID GLAND: ICD-10-CM

## 2024-10-08 DIAGNOSIS — N18.32 CHRONIC KIDNEY DISEASE (CKD) STAGE G3B/A1, MODERATELY DECREASED GLOMERULAR FILTRATION RATE (GFR) BETWEEN 30-44 ML/MIN/1.73 SQUARE METER AND ALBUMINURIA CREATININE RATIO LESS THAN 30 MG/G: ICD-10-CM

## 2024-10-08 DIAGNOSIS — Z00.00 ROUTINE GENERAL MEDICAL EXAMINATION AT A HEALTH CARE FACILITY: Primary | ICD-10-CM

## 2024-10-08 DIAGNOSIS — D63.1 ANEMIA OF CHRONIC RENAL FAILURE, UNSPECIFIED CKD STAGE: ICD-10-CM

## 2024-10-08 DIAGNOSIS — I50.9 HEART FAILURE, UNSPECIFIED HF CHRONICITY, UNSPECIFIED HEART FAILURE TYPE: ICD-10-CM

## 2024-10-08 LAB
25(OH)D3+25(OH)D2 SERPL-MCNC: 12 NG/ML (ref 30–80)
ALBUMIN SERPL-MCNC: 3.6 G/DL (ref 3.4–4.8)
ALBUMIN/GLOB SERPL: 1.1 RATIO (ref 1.1–2)
ALP SERPL-CCNC: 132 UNIT/L (ref 40–150)
ALT SERPL-CCNC: 10 UNIT/L (ref 0–55)
ANION GAP SERPL CALC-SCNC: 8 MEQ/L
AST SERPL-CCNC: 18 UNIT/L (ref 5–34)
BILIRUB SERPL-MCNC: 0.4 MG/DL
BUN SERPL-MCNC: 19.1 MG/DL (ref 9.8–20.1)
CALCIUM SERPL-MCNC: 8.6 MG/DL (ref 8.4–10.2)
CHLORIDE SERPL-SCNC: 110 MMOL/L (ref 98–107)
CHOLEST SERPL-MCNC: 171 MG/DL
CHOLEST/HDLC SERPL: 2 {RATIO} (ref 0–5)
CO2 SERPL-SCNC: 27 MMOL/L (ref 23–31)
CREAT SERPL-MCNC: 0.97 MG/DL (ref 0.55–1.02)
CREAT/UREA NIT SERPL: 20
ERYTHROCYTE [DISTWIDTH] IN BLOOD BY AUTOMATED COUNT: 13.4 % (ref 11.5–17)
EST. AVERAGE GLUCOSE BLD GHB EST-MCNC: 105.4 MG/DL
GFR SERPLBLD CREATININE-BSD FMLA CKD-EPI: >60 ML/MIN/1.73/M2
GLOBULIN SER-MCNC: 3.3 GM/DL (ref 2.4–3.5)
GLUCOSE SERPL-MCNC: 91 MG/DL (ref 82–115)
HBA1C MFR BLD: 5.3 %
HCT VFR BLD AUTO: 38.4 % (ref 37–47)
HDLC SERPL-MCNC: 80 MG/DL (ref 35–60)
HGB BLD-MCNC: 12.5 G/DL (ref 12–16)
LDLC SERPL CALC-MCNC: 74 MG/DL (ref 50–140)
MCH RBC QN AUTO: 34.3 PG (ref 27–31)
MCHC RBC AUTO-ENTMCNC: 32.6 G/DL (ref 33–36)
MCV RBC AUTO: 105.5 FL (ref 80–94)
NRBC BLD AUTO-RTO: 0 %
PLATELET # BLD AUTO: 205 X10(3)/MCL (ref 130–400)
PMV BLD AUTO: 9.1 FL (ref 7.4–10.4)
POTASSIUM SERPL-SCNC: 4.5 MMOL/L (ref 3.5–5.1)
PROT SERPL-MCNC: 6.9 GM/DL (ref 5.8–7.6)
RBC # BLD AUTO: 3.64 X10(6)/MCL (ref 4.2–5.4)
SODIUM SERPL-SCNC: 145 MMOL/L (ref 136–145)
TRIGL SERPL-MCNC: 87 MG/DL (ref 37–140)
TSH SERPL-ACNC: 1.38 UIU/ML (ref 0.35–4.94)
VLDLC SERPL CALC-MCNC: 17 MG/DL
WBC # BLD AUTO: 4.47 X10(3)/MCL (ref 4.5–11.5)

## 2024-10-08 PROCEDURE — 83036 HEMOGLOBIN GLYCOSYLATED A1C: CPT

## 2024-10-08 PROCEDURE — 36415 COLL VENOUS BLD VENIPUNCTURE: CPT

## 2024-10-08 PROCEDURE — 82306 VITAMIN D 25 HYDROXY: CPT

## 2024-10-08 PROCEDURE — 80061 LIPID PANEL: CPT

## 2024-10-08 PROCEDURE — 84443 ASSAY THYROID STIM HORMONE: CPT

## 2024-10-08 PROCEDURE — 85027 COMPLETE CBC AUTOMATED: CPT

## 2024-10-08 PROCEDURE — 80053 COMPREHEN METABOLIC PANEL: CPT

## 2024-12-18 ENCOUNTER — HOSPITAL ENCOUNTER (OUTPATIENT)
Dept: RADIOLOGY | Facility: HOSPITAL | Age: 74
Discharge: HOME | End: 2024-12-18
Attending: INTERNAL MEDICINE
Payer: MEDICARE

## 2024-12-18 DIAGNOSIS — Z12.31 OTHER SCREENING MAMMOGRAM: ICD-10-CM

## 2024-12-18 PROCEDURE — 77063 BREAST TOMOSYNTHESIS BI: CPT | Mod: TC,52

## 2025-01-19 ENCOUNTER — HOSPITAL ENCOUNTER (INPATIENT)
Facility: HOSPITAL | Age: 75
LOS: 4 days | Discharge: HOME HEALTH | DRG: 291 | End: 2025-01-23
Attending: HOSPITALIST | Admitting: HOSPITALIST
Payer: MEDICARE

## 2025-01-19 DIAGNOSIS — R06.02 SOB (SHORTNESS OF BREATH): ICD-10-CM

## 2025-01-19 DIAGNOSIS — R06.00 DYSPNEA, UNSPECIFIED TYPE: Primary | ICD-10-CM

## 2025-01-19 DIAGNOSIS — I50.9 ACUTE ON CHRONIC CONGESTIVE HEART FAILURE, UNSPECIFIED HEART FAILURE TYPE: ICD-10-CM

## 2025-01-19 LAB
ALBUMIN SERPL-MCNC: 3.5 G/DL (ref 3.4–4.8)
ALBUMIN/GLOB SERPL: 1 RATIO (ref 1.1–2)
ALP SERPL-CCNC: 134 UNIT/L (ref 40–150)
ALT SERPL-CCNC: 10 UNIT/L (ref 0–55)
ANION GAP SERPL CALC-SCNC: 11 MEQ/L
APTT PPP: 22.6 SECONDS (ref 23.2–33.7)
AST SERPL-CCNC: 17 UNIT/L (ref 5–34)
BACTERIA #/AREA URNS AUTO: ABNORMAL /HPF
BASOPHILS # BLD AUTO: 0.03 X10(3)/MCL
BASOPHILS NFR BLD AUTO: 0.5 %
BILIRUB SERPL-MCNC: 0.7 MG/DL
BILIRUB UR QL STRIP.AUTO: NEGATIVE
BNP BLD-MCNC: 2719.7 PG/ML
BUN SERPL-MCNC: 16.7 MG/DL (ref 9.8–20.1)
CALCIUM SERPL-MCNC: 8.4 MG/DL (ref 8.4–10.2)
CHLORIDE SERPL-SCNC: 112 MMOL/L (ref 98–107)
CLARITY UR: CLEAR
CO2 SERPL-SCNC: 19 MMOL/L (ref 23–31)
COLOR UR AUTO: YELLOW
CREAT SERPL-MCNC: 1.01 MG/DL (ref 0.55–1.02)
CREAT/UREA NIT SERPL: 17
EOSINOPHIL # BLD AUTO: 0.3 X10(3)/MCL (ref 0–0.9)
EOSINOPHIL NFR BLD AUTO: 5.1 %
ERYTHROCYTE [DISTWIDTH] IN BLOOD BY AUTOMATED COUNT: 14.9 % (ref 11.5–17)
FLUAV AG UPPER RESP QL IA.RAPID: NOT DETECTED
FLUBV AG UPPER RESP QL IA.RAPID: NOT DETECTED
GFR SERPLBLD CREATININE-BSD FMLA CKD-EPI: 59 ML/MIN/1.73/M2
GLOBULIN SER-MCNC: 3.5 GM/DL (ref 2.4–3.5)
GLUCOSE SERPL-MCNC: 97 MG/DL (ref 82–115)
GLUCOSE UR QL STRIP: NORMAL
HCT VFR BLD AUTO: 35.9 % (ref 37–47)
HGB BLD-MCNC: 11.5 G/DL (ref 12–16)
HGB UR QL STRIP: NEGATIVE
IMM GRANULOCYTES # BLD AUTO: 0.01 X10(3)/MCL (ref 0–0.04)
IMM GRANULOCYTES NFR BLD AUTO: 0.2 %
INR PPP: 1.1
KETONES UR QL STRIP: NEGATIVE
LEUKOCYTE ESTERASE UR QL STRIP: 75
LYMPHOCYTES # BLD AUTO: 1.3 X10(3)/MCL (ref 0.6–4.6)
LYMPHOCYTES NFR BLD AUTO: 21.9 %
MAGNESIUM SERPL-MCNC: 1.9 MG/DL (ref 1.6–2.6)
MCH RBC QN AUTO: 32.8 PG (ref 27–31)
MCHC RBC AUTO-ENTMCNC: 32 G/DL (ref 33–36)
MCV RBC AUTO: 102.3 FL (ref 80–94)
MONOCYTES # BLD AUTO: 0.39 X10(3)/MCL (ref 0.1–1.3)
MONOCYTES NFR BLD AUTO: 6.6 %
MUCOUS THREADS URNS QL MICRO: ABNORMAL /LPF
NEUTROPHILS # BLD AUTO: 3.91 X10(3)/MCL (ref 2.1–9.2)
NEUTROPHILS NFR BLD AUTO: 65.7 %
NITRITE UR QL STRIP: NEGATIVE
NRBC BLD AUTO-RTO: 0 %
PH UR STRIP: 5.5 [PH]
PLATELET # BLD AUTO: 238 X10(3)/MCL (ref 130–400)
PMV BLD AUTO: 10.2 FL (ref 7.4–10.4)
POTASSIUM SERPL-SCNC: 4.1 MMOL/L (ref 3.5–5.1)
PROT SERPL-MCNC: 7 GM/DL (ref 5.8–7.6)
PROT UR QL STRIP: ABNORMAL
PROTHROMBIN TIME: 14 SECONDS (ref 12.5–14.5)
RBC # BLD AUTO: 3.51 X10(6)/MCL (ref 4.2–5.4)
RBC #/AREA URNS AUTO: ABNORMAL /HPF
RSV A 5' UTR RNA NPH QL NAA+PROBE: NOT DETECTED
SARS-COV-2 RNA RESP QL NAA+PROBE: NOT DETECTED
SODIUM SERPL-SCNC: 142 MMOL/L (ref 136–145)
SP GR UR STRIP.AUTO: 1.02 (ref 1–1.03)
SQUAMOUS #/AREA URNS LPF: ABNORMAL /HPF
TROPONIN I SERPL-MCNC: 0.02 NG/ML (ref 0–0.04)
UROBILINOGEN UR STRIP-ACNC: 2
WBC # BLD AUTO: 5.94 X10(3)/MCL (ref 4.5–11.5)
WBC #/AREA URNS AUTO: ABNORMAL /HPF

## 2025-01-19 PROCEDURE — 85025 COMPLETE CBC W/AUTO DIFF WBC: CPT | Performed by: PHYSICIAN ASSISTANT

## 2025-01-19 PROCEDURE — 81001 URINALYSIS AUTO W/SCOPE: CPT | Performed by: PHYSICIAN ASSISTANT

## 2025-01-19 PROCEDURE — 85610 PROTHROMBIN TIME: CPT | Performed by: PHYSICIAN ASSISTANT

## 2025-01-19 PROCEDURE — 25500020 PHARM REV CODE 255: Performed by: PHYSICIAN ASSISTANT

## 2025-01-19 PROCEDURE — 25000003 PHARM REV CODE 250: Performed by: HOSPITALIST

## 2025-01-19 PROCEDURE — 21400001 HC TELEMETRY ROOM

## 2025-01-19 PROCEDURE — 93005 ELECTROCARDIOGRAM TRACING: CPT

## 2025-01-19 PROCEDURE — 80053 COMPREHEN METABOLIC PANEL: CPT | Performed by: PHYSICIAN ASSISTANT

## 2025-01-19 PROCEDURE — 99285 EMERGENCY DEPT VISIT HI MDM: CPT | Mod: 25

## 2025-01-19 PROCEDURE — 96374 THER/PROPH/DIAG INJ IV PUSH: CPT

## 2025-01-19 PROCEDURE — 63600175 PHARM REV CODE 636 W HCPCS: Performed by: HOSPITALIST

## 2025-01-19 PROCEDURE — 85730 THROMBOPLASTIN TIME PARTIAL: CPT | Performed by: PHYSICIAN ASSISTANT

## 2025-01-19 PROCEDURE — 83880 ASSAY OF NATRIURETIC PEPTIDE: CPT | Performed by: PHYSICIAN ASSISTANT

## 2025-01-19 PROCEDURE — 0241U COVID/RSV/FLU A&B PCR: CPT | Performed by: PHYSICIAN ASSISTANT

## 2025-01-19 PROCEDURE — 63600175 PHARM REV CODE 636 W HCPCS: Performed by: PHYSICIAN ASSISTANT

## 2025-01-19 PROCEDURE — 93010 ELECTROCARDIOGRAM REPORT: CPT | Mod: ,,, | Performed by: STUDENT IN AN ORGANIZED HEALTH CARE EDUCATION/TRAINING PROGRAM

## 2025-01-19 PROCEDURE — 11000001 HC ACUTE MED/SURG PRIVATE ROOM

## 2025-01-19 PROCEDURE — 83735 ASSAY OF MAGNESIUM: CPT | Performed by: PHYSICIAN ASSISTANT

## 2025-01-19 PROCEDURE — 84484 ASSAY OF TROPONIN QUANT: CPT | Performed by: PHYSICIAN ASSISTANT

## 2025-01-19 RX ORDER — ONDANSETRON 4 MG/1
8 TABLET, ORALLY DISINTEGRATING ORAL EVERY 8 HOURS PRN
Status: DISCONTINUED | OUTPATIENT
Start: 2025-01-19 | End: 2025-01-23 | Stop reason: HOSPADM

## 2025-01-19 RX ORDER — SODIUM CHLORIDE 0.9 % (FLUSH) 0.9 %
10 SYRINGE (ML) INJECTION
Status: DISCONTINUED | OUTPATIENT
Start: 2025-01-19 | End: 2025-01-23 | Stop reason: HOSPADM

## 2025-01-19 RX ORDER — PRAVASTATIN SODIUM 40 MG/1
40 TABLET ORAL DAILY
Status: DISCONTINUED | OUTPATIENT
Start: 2025-01-20 | End: 2025-01-23 | Stop reason: HOSPADM

## 2025-01-19 RX ORDER — NAPROXEN SODIUM 220 MG/1
81 TABLET, FILM COATED ORAL DAILY
Status: DISCONTINUED | OUTPATIENT
Start: 2025-01-20 | End: 2025-01-23 | Stop reason: HOSPADM

## 2025-01-19 RX ORDER — POLYETHYLENE GLYCOL 3350 17 G/17G
17 POWDER, FOR SOLUTION ORAL DAILY
Status: DISCONTINUED | OUTPATIENT
Start: 2025-01-20 | End: 2025-01-23 | Stop reason: HOSPADM

## 2025-01-19 RX ORDER — ACETAMINOPHEN 325 MG/1
650 TABLET ORAL EVERY 8 HOURS PRN
Status: DISCONTINUED | OUTPATIENT
Start: 2025-01-19 | End: 2025-01-23 | Stop reason: HOSPADM

## 2025-01-19 RX ORDER — FAMOTIDINE 20 MG/1
20 TABLET, FILM COATED ORAL DAILY
Status: DISCONTINUED | OUTPATIENT
Start: 2025-01-20 | End: 2025-01-23 | Stop reason: HOSPADM

## 2025-01-19 RX ORDER — FUROSEMIDE 10 MG/ML
40 INJECTION INTRAMUSCULAR; INTRAVENOUS
Status: COMPLETED | OUTPATIENT
Start: 2025-01-19 | End: 2025-01-19

## 2025-01-19 RX ORDER — ENOXAPARIN SODIUM 100 MG/ML
40 INJECTION SUBCUTANEOUS EVERY 24 HOURS
Status: DISCONTINUED | OUTPATIENT
Start: 2025-01-20 | End: 2025-01-23 | Stop reason: HOSPADM

## 2025-01-19 RX ORDER — TALC
6 POWDER (GRAM) TOPICAL NIGHTLY PRN
Status: DISCONTINUED | OUTPATIENT
Start: 2025-01-19 | End: 2025-01-23 | Stop reason: HOSPADM

## 2025-01-19 RX ORDER — MUPIROCIN 20 MG/G
OINTMENT TOPICAL 2 TIMES DAILY
Status: DISCONTINUED | OUTPATIENT
Start: 2025-01-19 | End: 2025-01-23 | Stop reason: HOSPADM

## 2025-01-19 RX ORDER — FUROSEMIDE 10 MG/ML
40 INJECTION INTRAMUSCULAR; INTRAVENOUS EVERY 12 HOURS
Status: DISCONTINUED | OUTPATIENT
Start: 2025-01-19 | End: 2025-01-20

## 2025-01-19 RX ORDER — DEXTROMETHORPHAN POLISTIREX 30 MG/5 ML
1 SUSPENSION, EXTENDED RELEASE 12 HR ORAL DAILY PRN
Status: DISCONTINUED | OUTPATIENT
Start: 2025-01-19 | End: 2025-01-23 | Stop reason: HOSPADM

## 2025-01-19 RX ORDER — VERAPAMIL HYDROCHLORIDE 80 MG/1
80 TABLET ORAL 3 TIMES DAILY
Status: DISCONTINUED | OUTPATIENT
Start: 2025-01-19 | End: 2025-01-23 | Stop reason: HOSPADM

## 2025-01-19 RX ORDER — PREGABALIN 50 MG/1
50 CAPSULE ORAL 3 TIMES DAILY
Status: DISCONTINUED | OUTPATIENT
Start: 2025-01-19 | End: 2025-01-23 | Stop reason: HOSPADM

## 2025-01-19 RX ADMIN — PREGABALIN 50 MG: 50 CAPSULE ORAL at 09:01

## 2025-01-19 RX ADMIN — MUPIROCIN: 20 OINTMENT TOPICAL at 09:01

## 2025-01-19 RX ADMIN — VERAPAMIL HYDROCHLORIDE 80 MG: 80 TABLET ORAL at 09:01

## 2025-01-19 RX ADMIN — FUROSEMIDE 40 MG: 10 INJECTION, SOLUTION INTRAMUSCULAR; INTRAVENOUS at 09:01

## 2025-01-19 RX ADMIN — FUROSEMIDE 40 MG: 10 INJECTION, SOLUTION INTRAMUSCULAR; INTRAVENOUS at 01:01

## 2025-01-19 RX ADMIN — IOHEXOL 82 ML: 350 INJECTION, SOLUTION INTRAVENOUS at 01:01

## 2025-01-19 NOTE — Clinical Note
Diagnosis: Dyspnea, unspecified type [[id number]]   Future Attending Provider: NATHAN CARVAJAL [[id number]]   Admit to which facility:: [facility] [[id number]]   Special Needs:: Fall Risk [15]

## 2025-01-19 NOTE — ED PROVIDER NOTES
Encounter Date: 1/19/2025       History     Chief Complaint   Patient presents with    Loss of Consciousness     Pt endorses near syncope and intermittent shortness of breath X 1 week. GCS 15 on arrival      See MDM for details.      The history is provided by the patient and the EMS personnel. No  was used.     Review of patient's allergies indicates:   Allergen Reactions    Sulfa (sulfonamide antibiotics)      Patient unsure if allergic to Sulfa     Past Medical History:   Diagnosis Date    Cancer     breast CA s/p chemo and L masectomy     Cardiomyopathy     CHF (congestive heart failure)     History of breast cancer     History of DVT (deep vein thrombosis)     HLD (hyperlipidemia)     Hypertension     Lymphedema     Osteoarthritis     Venous insufficiency      Past Surgical History:   Procedure Laterality Date    HYSTERECTOMY      INSERTION OF PACEMAKER      INTRAMEDULLARY RODDING OF FEMUR Left 10/14/2022    Procedure: INSERTION, INTRAMEDULLARY СВЕТЛАНА, FEMUR;  Surgeon: Ankush Alex MD;  Location: [facility];  Service: Orthopedics;  Laterality: Left;    JOINT REPLACEMENT Bilateral     Knee    MASTECTOMY Left 2019     Family History   Family history unknown: Yes     Social History     Tobacco Use    Smoking status: Never    Smokeless tobacco: Never   Substance Use Topics    Alcohol use: Not Currently    Drug use: Never     Review of Systems   Constitutional:  Positive for fatigue. Negative for fever.   HENT: Negative.     Eyes: Negative.    Respiratory:  Positive for shortness of breath. Negative for cough, choking, chest tightness, wheezing and stridor.    Cardiovascular: Negative.  Negative for chest pain, palpitations and leg swelling.   Gastrointestinal: Negative.    Genitourinary: Negative.    Musculoskeletal: Negative.    Neurological: Negative.        Physical Exam     Initial Vitals [01/19/25 1045]   BP Pulse Resp Temp SpO2   (!) 126/93 105 17 98.4 °F (36.9 °C) 96 %       MAP       --         Physical Exam    Nursing note and vitals reviewed.  Constitutional: She appears well-developed and well-nourished. She is not diaphoretic. No distress.   HENT:   Right Ear: Tympanic membrane and external ear normal.   Left Ear: Tympanic membrane and external ear normal.   Nose: No mucosal edema or rhinorrhea. Mouth/Throat: Oropharynx is clear and moist and mucous membranes are normal. No oropharyngeal exudate or posterior oropharyngeal edema.   Eyes: EOM are normal. Pupils are equal, round, and reactive to light. Right eye exhibits no discharge. Left eye exhibits no discharge.   Neck: Neck supple.   Normal range of motion.  Cardiovascular:  Normal rate and regular rhythm.           Pulmonary/Chest: No respiratory distress. She has decreased breath sounds. She has no wheezes. She has no rhonchi. She has no rales. She exhibits no tenderness.   Bibasilar crackles noted   Abdominal: Abdomen is soft. Bowel sounds are normal. She exhibits no distension. There is no abdominal tenderness. There is no rebound and no guarding.   Musculoskeletal:      Cervical back: Normal range of motion and neck supple.     Lymphadenopathy:        Head (right side): No submental and no tonsillar adenopathy present.        Head (left side): No submental and no tonsillar adenopathy present.     She has no cervical adenopathy.        Right cervical: No superficial cervical adenopathy present.       Left cervical: No superficial cervical adenopathy present.     She has no axillary adenopathy.   Lymphedema noted peripherally.   Skin: Skin is dry.   Psychiatric: She has a normal mood and affect. Her speech is normal and behavior is normal. Judgment and thought content normal. Cognition and memory are normal.       ED Course   Procedures  Labs Reviewed   COMPREHENSIVE METABOLIC PANEL - Abnormal       Result Value    Sodium 142      Potassium 4.1      Chloride 112 (*)     CO2 19 (*)     Glucose 97      Blood Urea Nitrogen 16.7       Creatinine 1.01      Calcium 8.4      Protein Total 7.0      Albumin 3.5      Globulin 3.5      Albumin/Globulin Ratio 1.0 (*)     Bilirubin Total 0.7            ALT 10      AST 17      eGFR 59      Anion Gap 11.0      BUN/Creatinine Ratio 17     B-TYPE NATRIURETIC PEPTIDE - Abnormal    Natriuretic Peptide 2,719.7 (*)    URINALYSIS, REFLEX TO URINE CULTURE - Abnormal    Color, UA Yellow      Appearance, UA Clear      Specific Gravity, UA 1.024      pH, UA 5.5      Protein, UA 1+ (*)     Glucose, UA Normal      Ketones, UA Negative      Blood, UA Negative      Bilirubin, UA Negative      Urobilinogen, UA 2.0 (*)     Nitrites, UA Negative      Leukocyte Esterase, UA 75 (*)     RBC, UA 0-5      WBC, UA 0-5      Bacteria, UA Trace      Squamous Epithelial Cells, UA Trace      Mucous, UA Trace (*)    CBC WITH DIFFERENTIAL - Abnormal    WBC 5.94      RBC 3.51 (*)     Hgb 11.5 (*)     Hct 35.9 (*)     .3 (*)     MCH 32.8 (*)     MCHC 32.0 (*)     RDW 14.9      Platelet 238      MPV 10.2      Neut % 65.7      Lymph % 21.9      Mono % 6.6      Eos % 5.1      Basophil % 0.5      Imm Grans % 0.2      Neut # 3.91      Lymph # 1.30      Mono # 0.39      Eos # 0.30      Baso # 0.03      Imm Gran # 0.01      NRBC% 0.0     TROPONIN I - Normal    Troponin-I 0.018     COVID/RSV/FLU A&B PCR - Normal    Influenza A PCR Not Detected      Influenza B PCR Not Detected      Respiratory Syncytial Virus PCR Not Detected      SARS-CoV-2 PCR Not Detected      Narrative:     The Xpert Xpress SARS-CoV-2/FLU/RSV plus is a rapid, multiplexed real-time PCR test intended for the simultaneous qualitative detection and differentiation of SARS-CoV-2, Influenza A, Influenza B, and respiratory syncytial virus (RSV) viral RNA in either nasopharyngeal swab or nasal swab specimens.         MAGNESIUM - Normal    Magnesium Level 1.90     CBC W/ AUTO DIFFERENTIAL    Narrative:     The following orders were created for panel order CBC auto  differential.  Procedure                               Abnormality         Status                     ---------                               -----------         ------                     CBC with Differential[[id number]]       Abnormal            Final result                 Please view results for these tests on the individual orders.   APTT   PROTIME-INR     EKG Readings: (Independently Interpreted)   Initial Reading: No STEMI. Rhythm: Sinus Tachycardia. Heart Rate: 102. Ectopy: No Ectopy.       Imaging Results              CTA Chest Non-Coronary (PE Studies) (Final result)  Result time 01/19/25 13:44:54      Final result by Moy Andino MD (01/19/25 13:44:54)                   Impression:      No evidence of pulmonary thromboembolism.  No definite evidence of infectious process      Electronically signed by: Moy Andino  Date:    01/19/2025  Time:    13:44               Narrative:    EXAMINATION:  CTA CHEST NON CORONARY (PE STUDIES)    CLINICAL HISTORY:  Pulmonary embolism (PE) suspected, high prob;    TECHNIQUE:  Axial images of the chest were obtained with contrast. Sagittal and coronal reconstructed images were available for review. 3-D MIP reconstructions were performed from source imaging.    Automatic dose control was utilized to reduce patient radiation dose.    DLP= 311    COMPARISON:  No prior imaging available for comparison.    FINDINGS:  PULMONARY ARTERY: No evidence of pulmonary thromboembolism.    AORTA: Normal in course and caliber.    THYROID GLAND: The right Thora right appears absent    AIRWAYS: Trachea is midline and tracheobronchial tree is patent.    HEART: The heart is enlarged in size. There is no pericardial effusion.    LUNGS: No pleural effusion or pneumothorax.  Mild emphysematous changes of the lungs noted.  Mild ground-glass opacity of upper lobes may be related to volume.    LYMPH NODES: There is no significant mediastinal, axillary or hilar lymphadenopathy.    BONES: No  displaced fracture. No aggressive appearing osseous lesion identified.    UPPER ABDOMEN:  The visualized abdomen is unremarkable.                                         X-Ray Chest AP Portable (Final result)  Result time 01/19/25 11:24:46      Final result by Jony Beasley MD (01/19/25 11:24:46)                   Impression:      Hilar prominence particularly on the right may relate to pulmonary vascular congestion but I cannot exclude adenopathy or mass.      Electronically signed by: Jony Beasley  Date:    01/19/2025  Time:    11:24               Narrative:    EXAMINATION:  XR CHEST AP PORTABLE    CLINICAL HISTORY:  Shortness of breath    COMPARISON:  21 November 2023    FINDINGS:  Frontal view of the chest was obtained. Heart is mildly enlarged.  Stable AICD and right Port-A-Cath.  There is hilar prominence particularly on the right.  There is no dense consolidation or pneumothorax.                                       Medications   furosemide injection 40 mg (40 mg Intravenous Given 1/19/25 1341)   iohexoL (OMNIPAQUE 350) injection 82 mL (82 mLs Intravenous Given 1/19/25 1328)     Medical Decision Making  74yoAAF w/hx of CHF, CAD, HTN, chronic bronchitis, OA, breast cancer (2017 w/mastectomy L breast, s/p active treatment)and lymphedema presents to the emergency room via EMS with shortness of breath that has been progressing over the last week.  Patient states she notices it more when she is walking.  She takes all of her medication as prescribed.  Denies nausea, vomiting, fever, chills, abdominal pain, or chest pain.  Denies any palpitations.  Denies any dizziness.  Patient feels very short winded and lightheaded when she walks too far.  Patient has a pacemaker left anterior chest wall. Not on thinners. Patient is a poor historian.     Problems Addressed:  Dyspnea, unspecified type: acute illness or injury     Details: Differential diagnosis included but not limited to:  COVID, flu, COPD, CHF, bronchitis,  pneumonia, pulmonary embolus    Likely CHF exacerbation.  Acute on chronic changes noted.  CT of the chest shows mild pulmonary congestion likely secondary to chronic changes.  No other acute findings noted on CT. Labs consistent with acute CHF- BNP 2700. Discussed with Dr. Nguyễn who agrees with plan. Patient will be admitted to hospital medicine.     Amount and/or Complexity of Data Reviewed  Labs: ordered. Decision-making details documented in ED Course.  Radiology: ordered. Decision-making details documented in ED Course.  ECG/medicine tests: ordered. Decision-making details documented in ED Course.    Risk  Prescription drug management.               ED Course as of 01/19/25 1441   Sun Jan 19, 2025   1203 Walking O2/HR =66%/126bpm. Patient recovered quickly. Not requiring supplemental oxygen.  CT PE studies ordered. [ST]   1245 BNP(!): 2,719.7 [ST]   1441 EXAMINATION:  CTA CHEST NON CORONARY (PE STUDIES)     CLINICAL HISTORY:  Pulmonary embolism (PE) suspected, high prob;     TECHNIQUE:  Axial images of the chest were obtained with contrast. Sagittal and coronal reconstructed images were available for review. 3-D MIP reconstructions were performed from source imaging.     Automatic dose control was utilized to reduce patient radiation dose.     DLP= 311     COMPARISON:  No prior imaging available for comparison.     FINDINGS:  PULMONARY ARTERY: No evidence of pulmonary thromboembolism.     AORTA: Normal in course and caliber.     THYROID GLAND: The right Thora right appears absent     AIRWAYS: Trachea is midline and tracheobronchial tree is patent.     HEART: The heart is enlarged in size. There is no pericardial effusion.     LUNGS: No pleural effusion or pneumothorax.  Mild emphysematous changes of the lungs noted.  Mild ground-glass opacity of upper lobes may be related to volume.     LYMPH NODES: There is no significant mediastinal, axillary or hilar lymphadenopathy.     BONES: No displaced fracture. No  aggressive appearing osseous lesion identified.     UPPER ABDOMEN:  The visualized abdomen is unremarkable.        Impression:     No evidence of pulmonary thromboembolism.  No definite evidence of infectious process        Electronically signed by:Moy Andino  Date:                                            01/19/2025  Time:                                           13:44      Exam Ended: 01/19/25 13:30 CST Last Resulted: 01/19/25 13:44 CST     [ST]      ED Course User Index  [ST] Patricia Palacios PA                           Clinical Impression:  Final diagnoses:  [R06.00] Dyspnea, unspecified type (Primary)  [R06.02] SOB (shortness of breath)  [I50.9] Acute on chronic congestive heart failure, unspecified heart failure type          ED Disposition Condition    Observation Stable           This note was typed partially using voice recognition software.  Please be reminded that not all corrections/addendums to grammar may have been made prior to closing of this chart.       Patricia Palacios PA  01/19/25 2772

## 2025-01-19 NOTE — H&P
[facility] Hospital Medicine History & Physical Examination       Patient Name: Laura Jacobs  MRN: [id number]  Patient Class: OP- Observation   Admission Date: 1/19/2025   Admitting Physician:  Service   Attending Physician: Landen Espinal MD  Primary Care Provider: Ruben Brooks Sr., MD  Face-to-Face encounter date: 01/19/2025  Code Status:Code Status Discussion Note   Chief Complaint: Loss of Consciousness (Pt endorses near syncope and intermittent shortness of breath X 1 week. GCS 15 on arrival )         Patient information was obtained from patient, patient's family, past medical records and ER records.     HISTORY OF PRESENT ILLNESS:   Laura Jacobs is a 74 y.o. female who  has a past medical history of Cancer, Cardiomyopathy, CHF (congestive heart failure), History of breast cancer, History of DVT (deep vein thrombosis), HLD (hyperlipidemia), Hypertension, Lymphedema, Osteoarthritis, and Venous insufficiency.. The patient presented to [facility] on 1/19/2025     74 year old female with past medical history of heart failure, breast cancer, DVT, HLD, and HTN presented to [facility] ED on 1/19/25 with complaint of worsened dyspnea and cough for that last few days.  Reported nonproductive cough, lower extremity edema and palpitations.  She has a pacer in place and follow closely by cardiology outpatient.      In the emergency room she was noted to be slightly hypotensive.  Tachypneic up to 30 breaths per minute.  Tachycardic up to 130 per ER provider.  And pacer brings her back down to 100.  Labs were significant for mild chronic anemia.  Mild metabolic acidosis but we are normal renal function.  Her BNP was 2700.  Flu RSV and COVID were negative.  Urinalysis clear.  CTA of the chest showed no pulmonary embolus.  Chest x-ray with significant pulmonary congestion.    She is started on IV Lasix.  Her cardiology team has been consulted.  Will monitor her volume status over the  next 24-48 hours.  Counseled on low-salt diet.  Will need close follow up as an outpatient.  PAST MEDICAL HISTORY:     Past Medical History:   Diagnosis Date    Cancer     breast CA s/p chemo and L masectomy     Cardiomyopathy     CHF (congestive heart failure)     History of breast cancer     History of DVT (deep vein thrombosis)     HLD (hyperlipidemia)     Hypertension     Lymphedema     Osteoarthritis     Venous insufficiency        PAST SURGICAL HISTORY:     Past Surgical History:   Procedure Laterality Date    HYSTERECTOMY      INSERTION OF PACEMAKER      INTRAMEDULLARY RODDING OF FEMUR Left 10/14/2022    Procedure: INSERTION, INTRAMEDULLARY СВЕТЛАНА, FEMUR;  Surgeon: Ankush Alex MD;  Location: [facility];  Service: Orthopedics;  Laterality: Left;    JOINT REPLACEMENT Bilateral     Knee    MASTECTOMY Left 2019       ALLERGIES:   Sulfa (sulfonamide antibiotics)    FAMILY HISTORY:   Reviewed and negative    SOCIAL HISTORY:     Social History     Tobacco Use    Smoking status: Never    Smokeless tobacco: Never   Substance Use Topics    Alcohol use: Not Currently        HOME MEDICATIONS:     Prior to Admission medications    Medication Sig Start Date End Date Taking? Authorizing Provider   amiodarone (PACERONE) 200 MG Tab Take 400 mg by mouth once daily. 10/23/23   Provider, Historical   anastrozole (ARIMIDEX) 1 mg Tab Take 1 tablet by mouth once daily. 7/24/23   Provider, Historical   celecoxib (CELEBREX) 200 MG capsule Take 200 mg by mouth once daily.    Provider, Historical   dexlansoprazole (DEXILANT) 60 mg capsule Take 60 mg by mouth once daily.    Provider, Historical   sacubitriL-valsartan (ENTRESTO) 24-26 mg per tablet Take 1 tablet by mouth 2 (two) times daily.    Provider, Historical   spironolactone (ALDACTONE) 25 MG tablet Take 25 mg by mouth once daily.    Provider, Historical       REVIEW OF SYSTEMS:   Except as documented, all other systems reviewed and negative     PHYSICAL EXAM:     VITAL SIGNS: 24 HRS  MIN & MAX LAST   Temp  Min: 98.4 °F (36.9 °C)  Max: 98.4 °F (36.9 °C) 98.4 °F (36.9 °C)   BP  Min: 112/84  Max: 126/93 112/84   Pulse  Min: 87  Max: 120  99   Resp  Min: 17  Max: 30 (!) 24   SpO2  Min: 68 %  Max: 100 % 100 %       General appearance: Well-developed, well-nourished female in no apparent distress.  HENT: Atraumatic head. Moist mucous membranes of oral cavity.  Eyes: Normal extraocular movements.   Neck: Supple.   Lungs:  Coarse breath sounds bilaterally   Heart: Regular rate and rhythm. S1 and S2 present with no murmurs/gallop/rub. No pedal edema. No JVD present.   Abdomen: Soft, non-distended, non-tender. No rebound tenderness/guarding. Bowel sounds are normal.   Extremities: No cyanosis, clubbing, +2 pitting edema to the knee  Skin: No Rash.   Neuro: Motor and sensory exams grossly intact. Good tone. Muscle strength 5/5 in all 4 extremities  Psych/mental status: Appropriate mood and affect. Responds appropriately to questions.     LABS AND IMAGING:     Recent Labs   Lab 01/19/25  1116   WBC 5.94   RBC 3.51*   HGB 11.5*   HCT 35.9*   .3*   MCH 32.8*   MCHC 32.0*   RDW 14.9      MPV 10.2       Recent Labs   Lab 01/19/25  1116      K 4.1   *   CO2 19*   BUN 16.7   CREATININE 1.01   CALCIUM 8.4   MG 1.90   ALBUMIN 3.5   ALKPHOS 134   ALT 10   AST 17   BILITOT 0.7       Microbiology Results (last 7 days)       ** No results found for the last 168 hours. **             CTA Chest Non-Coronary (PE Studies)  Narrative: EXAMINATION:  CTA CHEST NON CORONARY (PE STUDIES)    CLINICAL HISTORY:  Pulmonary embolism (PE) suspected, high prob;    TECHNIQUE:  Axial images of the chest were obtained with contrast. Sagittal and coronal reconstructed images were available for review. 3-D MIP reconstructions were performed from source imaging.    Automatic dose control was utilized to reduce patient radiation dose.    DLP= 311    COMPARISON:  No prior imaging available for  comparison.    FINDINGS:  PULMONARY ARTERY: No evidence of pulmonary thromboembolism.    AORTA: Normal in course and caliber.    THYROID GLAND: The right Thora right appears absent    AIRWAYS: Trachea is midline and tracheobronchial tree is patent.    HEART: The heart is enlarged in size. There is no pericardial effusion.    LUNGS: No pleural effusion or pneumothorax.  Mild emphysematous changes of the lungs noted.  Mild ground-glass opacity of upper lobes may be related to volume.    LYMPH NODES: There is no significant mediastinal, axillary or hilar lymphadenopathy.    BONES: No displaced fracture. No aggressive appearing osseous lesion identified.    UPPER ABDOMEN:  The visualized abdomen is unremarkable.  Impression: No evidence of pulmonary thromboembolism.  No definite evidence of infectious process    Electronically signed by: Moy Andino  Date:    01/19/2025  Time:    13:44  X-Ray Chest AP Portable  Narrative: EXAMINATION:  XR CHEST AP PORTABLE    CLINICAL HISTORY:  Shortness of breath    COMPARISON:  21 November 2023    FINDINGS:  Frontal view of the chest was obtained. Heart is mildly enlarged.  Stable AICD and right Port-A-Cath.  There is hilar prominence particularly on the right.  There is no dense consolidation or pneumothorax.  Impression: Hilar prominence particularly on the right may relate to pulmonary vascular congestion but I cannot exclude adenopathy or mass.    Electronically signed by: Jony Beasley  Date:    01/19/2025  Time:    11:24      _____________________________________  INPATIENT LIST OF MEDICATIONS   No current facility-administered medications for this encounter.    Current Outpatient Medications:     amiodarone (PACERONE) 200 MG Tab, Take 400 mg by mouth once daily., Disp: , Rfl:     anastrozole (ARIMIDEX) 1 mg Tab, Take 1 tablet by mouth once daily., Disp: , Rfl:     celecoxib (CELEBREX) 200 MG capsule, Take 200 mg by mouth once daily., Disp: , Rfl:     dexlansoprazole  (DEXILANT) 60 mg capsule, Take 60 mg by mouth once daily., Disp: , Rfl:     sacubitriL-valsartan (ENTRESTO) 24-26 mg per tablet, Take 1 tablet by mouth 2 (two) times daily., Disp: , Rfl:     spironolactone (ALDACTONE) 25 MG tablet, Take 25 mg by mouth once daily., Disp: , Rfl:       Scheduled Meds:    Continuous Infusions:  PRN Meds:.      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  Her her last note proximally 1 month ago she was off of anastrozole and no evidence of recurrence.  Will need to look further into this.  She is no longer on anticoagulation.  She does take a baby aspirin for her atrial fibrillation.    Will start her on Lasix 40 mg IV b.i.d. until Cardiology rounds.  Okay to continue amiodarone in the meantime.  Hold anastrozole, Celebrex.  Blood pressure is little soft so will hold off on Entresto.      Landen Espinal MD  3:54 PM 01/19/2025    Screening for Social Drivers for health:  Patient screened for food insecurity, housing instability, transportation needs, utility difficulties, and interpersonal safety (select all that apply as identified as concern)  []Housing or Food  []Transportation Needs  []Utility Difficulties  []Interpersonal safety  [x]None

## 2025-01-20 LAB
APICAL FOUR CHAMBER EJECTION FRACTION: 42 %
APICAL TWO CHAMBER EJECTION FRACTION: 29 %
AV INDEX (PROSTH): 0.54
AV MEAN GRADIENT: 7 MMHG
AV PEAK GRADIENT: 13 MMHG
AV VALVE AREA BY VELOCITY RATIO: 1.6 CM²
AV VALVE AREA: 1.5 CM²
AV VELOCITY RATIO: 0.56
BSA FOR ECHO PROCEDURE: 1.64 M2
CV ECHO LV RWT: 0.29 CM
DOP CALC AO PEAK VEL: 1.8 M/S
DOP CALC AO VTI: 30.6 CM
DOP CALC LVOT AREA: 2.8 CM2
DOP CALC LVOT DIAMETER: 1.9 CM
DOP CALC LVOT PEAK VEL: 1 M/S
DOP CALC LVOT STROKE VOLUME: 46.5 CM3
DOP CALC MV VTI: 25.1 CM
DOP CALCLVOT PEAK VEL VTI: 16.4 CM
E WAVE DECELERATION TIME: 201 MSEC
E/E' RATIO: 17 M/S
ECHO LV POSTERIOR WALL: 0.8 CM (ref 0.6–1.1)
FRACTIONAL SHORTENING: 14.3 % (ref 28–44)
INTERVENTRICULAR SEPTUM: 0.8 CM (ref 0.6–1.1)
LEFT ATRIUM AREA SYSTOLIC (APICAL 2 CHAMBER): 27.4 CM2
LEFT ATRIUM AREA SYSTOLIC (APICAL 4 CHAMBER): 17.6 CM2
LEFT ATRIUM SIZE: 4.1 CM
LEFT ATRIUM VOLUME INDEX MOD: 41 ML/M2
LEFT ATRIUM VOLUME MOD: 67 ML
LEFT INTERNAL DIMENSION IN SYSTOLE: 4.8 CM (ref 2.1–4)
LEFT VENTRICLE DIASTOLIC VOLUME INDEX: 91.61 ML/M2
LEFT VENTRICLE DIASTOLIC VOLUME: 151.15 ML
LEFT VENTRICLE END DIASTOLIC VOLUME APICAL 2 CHAMBER: 165 ML
LEFT VENTRICLE END DIASTOLIC VOLUME APICAL 4 CHAMBER: 191 ML
LEFT VENTRICLE END SYSTOLIC VOLUME APICAL 2 CHAMBER: 98.1 ML
LEFT VENTRICLE END SYSTOLIC VOLUME APICAL 4 CHAMBER: 41.2 ML
LEFT VENTRICLE MASS INDEX: 100 G/M2
LEFT VENTRICLE SYSTOLIC VOLUME INDEX: 64.2 ML/M2
LEFT VENTRICLE SYSTOLIC VOLUME: 105.96 ML
LEFT VENTRICULAR INTERNAL DIMENSION IN DIASTOLE: 5.6 CM (ref 3.5–6)
LEFT VENTRICULAR MASS: 165 G
LV LATERAL E/E' RATIO: 15 M/S
LV SEPTAL E/E' RATIO: 20 M/S
LVED V (TEICH): 151.15 ML
LVES V (TEICH): 105.96 ML
LVOT MG: 2 MMHG
LVOT MV: 0.63 CM/S
MV MEAN GRADIENT: 2 MMHG
MV PEAK E VEL: 1.2 M/S
MV PEAK GRADIENT: 5 MMHG
MV VALVE AREA BY CONTINUITY EQUATION: 1.85 CM2
OHS LV EJECTION FRACTION SIMPSONS BIPLANE MOD: 35 %
OHS QRS DURATION: 106 MS
OHS QTC CALCULATION: 516 MS
PISA TR MAX VEL: 2.3 M/S
PV PEAK GRADIENT: 2 MMHG
PV PEAK VELOCITY: 0.77 M/S
RA PRESSURE ESTIMATED: 3 MMHG
RV TB RVSP: 5 MMHG
TDI LATERAL: 0.08 M/S
TDI SEPTAL: 0.06 M/S
TDI: 0.07 M/S
TR MAX PG: 21 MMHG
TRICUSPID ANNULAR PLANE SYSTOLIC EXCURSION: 1.88 CM
TV REST PULMONARY ARTERY PRESSURE: 24 MMHG
Z-SCORE OF LEFT VENTRICULAR DIMENSION IN END DIASTOLE: 1.9
Z-SCORE OF LEFT VENTRICULAR DIMENSION IN END SYSTOLE: 4.06

## 2025-01-20 PROCEDURE — 25000003 PHARM REV CODE 250: Performed by: HOSPITALIST

## 2025-01-20 PROCEDURE — 25500020 PHARM REV CODE 255

## 2025-01-20 PROCEDURE — 63600175 PHARM REV CODE 636 W HCPCS: Performed by: HOSPITALIST

## 2025-01-20 PROCEDURE — 21400001 HC TELEMETRY ROOM

## 2025-01-20 PROCEDURE — 27000221 HC OXYGEN, UP TO 24 HOURS

## 2025-01-20 RX ORDER — FUROSEMIDE 40 MG/1
40 TABLET ORAL 2 TIMES DAILY
Status: DISCONTINUED | OUTPATIENT
Start: 2025-01-20 | End: 2025-01-22

## 2025-01-20 RX ADMIN — ASPIRIN 81 MG CHEWABLE TABLET 81 MG: 81 TABLET CHEWABLE at 08:01

## 2025-01-20 RX ADMIN — PREGABALIN 50 MG: 50 CAPSULE ORAL at 08:01

## 2025-01-20 RX ADMIN — PERFLUTREN 1 ML: 6.52 INJECTION, SUSPENSION INTRAVENOUS at 11:01

## 2025-01-20 RX ADMIN — VERAPAMIL HYDROCHLORIDE 80 MG: 80 TABLET ORAL at 03:01

## 2025-01-20 RX ADMIN — MUPIROCIN: 20 OINTMENT TOPICAL at 09:01

## 2025-01-20 RX ADMIN — VERAPAMIL HYDROCHLORIDE 80 MG: 80 TABLET ORAL at 09:01

## 2025-01-20 RX ADMIN — VERAPAMIL HYDROCHLORIDE 80 MG: 80 TABLET ORAL at 08:01

## 2025-01-20 RX ADMIN — PREGABALIN 50 MG: 50 CAPSULE ORAL at 03:01

## 2025-01-20 RX ADMIN — PRAVASTATIN SODIUM 40 MG: 40 TABLET ORAL at 08:01

## 2025-01-20 RX ADMIN — LINACLOTIDE 145 MCG: 145 CAPSULE, GELATIN COATED ORAL at 06:01

## 2025-01-20 RX ADMIN — FUROSEMIDE 40 MG: 40 TABLET ORAL at 08:01

## 2025-01-20 RX ADMIN — FAMOTIDINE 20 MG: 20 TABLET, FILM COATED ORAL at 09:01

## 2025-01-20 RX ADMIN — ENOXAPARIN SODIUM 40 MG: 40 INJECTION SUBCUTANEOUS at 09:01

## 2025-01-20 RX ADMIN — FUROSEMIDE 40 MG: 40 TABLET ORAL at 07:01

## 2025-01-20 RX ADMIN — PREGABALIN 50 MG: 50 CAPSULE ORAL at 09:01

## 2025-01-20 NOTE — CONSULTS
Inpatient consult to Cardiology  Consult performed by: Zuleyma Arce FNP  Consult ordered by: Landen Espinal MD  Reason for consult: HF, afib, PM        [facility] *    Cardiology  Consult Note    Patient Name: Laura Jacobs  MRN: [id number]  Admission Date: 1/19/2025  Hospital Length of Stay: 1 days  Code Status: Full Code   Attending Provider: Landen Espinal MD   Consulting Provider: MACHELLE Mann  Primary Care Physician: Ruben Brooks Sr., MD  Principal Problem:<principal problem not specified>    Patient information was obtained from patient, past medical records, and ER records.     Subjective:     Reason for Consult: HF, afib, PM    HPI: Ms. Jacobs is a 74 year old female who is known to [facility], Dr. Washington. She presents to the ER with complaints of worsened dyspnea & nonproductive cough x the last few days. She reports associated symptoms of BLE edema & palpitations. He denies CP or palps. On arrival, she was found to be hypotensive, tachypneic, & tachycardic. Significant labs include BNP 2719.7. A CXR was obtained and demonstrated pulmonary vascular congestion. CTA chest obtained and negative for PE. She was admitted to hospital medicine & started on IV lasix. [facility] has been consulted due to the patient's CHF exacerbation.       PMH: breast CA, NICMO, CHF, DVT, HTN, HLD, lymphedema, osteoarthritis   PSH: hysterectomy, SICD, femur rodding, bilateral knee replacement, left mastectomy   Family History: Denies  Social History: Denies alcohol, tobacco, or illicit drug use.     Previous Cardiac Diagnostics:   Carotid US (11.22.23):  The right internal carotid artery demonstrated no hemodynamically significant stenosis.  There is no substantial right side carotid plaque identified.  The left internal carotid artery demonstrated no hemodynamically significant stenosis.  There is a minimal amount of plaque in the left carotid bulb.  The right vertebral artery is patent with antegrade  flow.  The left vertebral artery is patent with antegrade flow.    TTE (11.22.23):  Left Ventricle: The left ventricle is normal in size. Normal wall thickness. There is low normal systolic function with a visually estimated ejection fraction of 50 - 55%.  Right Ventricle: Normal right ventricular cavity size. Systolic function is normal.  Left Atrium: Left atrium is mildly dilated.  Right Atrium: Right atrium is mildly dilated.  Mitral Valve: There is mild regurgitation.  IVC/SVC: Normal venous pressure at 3 mmHg.    Carotid US (3.31.23):  The right internal carotid artery demonstrated no hemodynamically significant stenosis  The left internal carotid artery demonstrated no hemodynamically significant stenosis.   The bilateral vertebral arteries were patent with antegrade flow.    TTE (3.30.23):  The left ventricle is normal in size with mildly decreased systolic function.  The estimated ejection fraction is 40%.  Grade II left ventricular diastolic dysfunction.  Aortic valve sclerosis without stenosis. Peak AV velocity 1.8 m/sec.  Mild mitral regurgitation.  Mild pulmonic regurgitation.  Normal right ventricular size with normal right ventricular systolic function.    LHC (3.10.22):  LMCA: patent vessel  LAD: patent type 2 vessel that gave rise to a very large patent and tortuous diagonal branch which was larger than the LAD proper.  LCX: patent nondominant vessel that gives rise to a small to medium size tortuous OM1 and a moderate to large size bifurcating and tortuous OM2.  RCA: patent large dominant vessel.  Summary:  Widely patent coronary anatomy w/ severe left ventricular systolic dysfunction.     TTE (3.6.22):  Left ventricular ejection fraction is measured at approximately 28%.  Severe global hypokinesis.  Left ventricular cavity is normal in size.  No left ventricular hypertrophy.  Diastolic function of the left ventricle is severely abnormal with restrictive pattern of mitral inflow.  Normal right  ventricle structure and function.  Left atrium is markedly enlarged.  Right atrium is moderately enlarged.  Mitral valve leaflets are moderately thickened. Moderate mitral regurgitation. No mitral stenosis.   Small echodensity on aortic valve which could represent a fibroelastoma vs vegetation vs leaflet thickening. Aortic valve is tricuspid.  Trace aortic regurgitation is present. No aortic stenosis.  There is mild tricuspid regurgitation.  Trace pulmonic regurgitation present.   Pulmonary arterial systolic pressure (PASP) is estimated to be mildly elevated (37-45 mmHg).  No evidence of pericardial effusion.  There is no previous echo for comparison.    PET (1.3.22):  This is a normal perfusion study, no perfusion defects noted. There is no evidence of ischemia. cardiomyopathy noted with low EF in rest and stress  This scan is suggestive of low risk for future cardiovascular events.   The left ventricular cavity is noted to be mildly enlarged on the stress studies. The stress left ventricular ejection fraction was calculated to be 27% and left ventricular global function is severely reduced. The rest left ventricular cavity is noted to be mildly enlarged. The rest left ventricular ejection fraction was calculated to be 18% and rest left ventricular global function is severely reduced.   Persistent hypokinesis of the anterior region, septal region, inferior region and lateral region is noted in both rest and stress studies. When compared to the resting ejection fraction (18%), the stress ejection fraction (27%) has increased.   The study quality is good.     TTE (12.15.21):  The study quality is average.   The left ventricle is moderately enlarged. Global left ventricular systolic function is moderately decreased. The left ventricular ejection fraction is 35%.   The left atrial diameter is mildly increased. Volume index is elevated at 44 ml/m2.  Moderate to severe (3+) mitral regurgitation. The ERO area pulse  doppler method measures 0.33 cm². Mitral valve PISA radius is 0.98 cm and mitral valve regurgitant volume is 49 ml.  Mild to moderate (1-2+) tricuspid regurgitation. Mild (1+) pulmonic regurgitation.   Evidence of severe pulmonary hypertension is noted. The pulmonary artery systolic pressure is 70 mmHg.       Review of patient's allergies indicates:   Allergen Reactions    Sulfa (sulfonamide antibiotics)      Patient unsure if allergic to Sulfa     No current facility-administered medications on file prior to encounter.     Current Outpatient Medications on File Prior to Encounter   Medication Sig    amiodarone (PACERONE) 200 MG Tab Take 400 mg by mouth once daily.    anastrozole (ARIMIDEX) 1 mg Tab Take 1 tablet by mouth once daily.    celecoxib (CELEBREX) 200 MG capsule Take 200 mg by mouth once daily.    dexlansoprazole (DEXILANT) 60 mg capsule Take 60 mg by mouth once daily.    sacubitriL-valsartan (ENTRESTO) 24-26 mg per tablet Take 1 tablet by mouth 2 (two) times daily.    spironolactone (ALDACTONE) 25 MG tablet Take 25 mg by mouth once daily.     Review of Systems   Respiratory:  Positive for shortness of breath.    Cardiovascular:  Negative for chest pain and palpitations.   All other systems reviewed and are negative.      Objective:     Vital Signs (Most Recent):  Temp: 99.3 °F (37.4 °C) (01/20/25 0757)  Pulse: 99 (01/20/25 0757)  Resp: 18 (01/19/25 2007)  BP: 97/75 (01/20/25 0757)  SpO2: 96 % (01/20/25 0757) Vital Signs (24h Range):  Temp:  [97.9 °F (36.6 °C)-99.3 °F (37.4 °C)] 99.3 °F (37.4 °C)  Pulse:  [] 99  Resp:  [17-30] 18  SpO2:  [68 %-100 %] 96 %  BP: ()/(53-93) 97/75   Weight: 59 kg (130 lb)  Body mass index is 21.63 kg/m².  SpO2: 96 %       Intake/Output Summary (Last 24 hours) at 1/20/2025 8047  Last data filed at 1/20/2025 0444  Gross per 24 hour   Intake 0 ml   Output 1250 ml   Net -1250 ml     Lines/Drains/Airways       Central Venous Catheter Line  Duration                   PowerPort A Cath Single Lumen 11/22/23 Subclavian Right 425 days              Peripheral Intravenous Line  Duration                  Peripheral IV - Single Lumen 01/19/25 1219 20 G Anterior;Right Forearm <1 day                  Significant Labs:   Chemistries:   Recent Labs   Lab 01/19/25  1116      K 4.1   *   CO2 19*   BUN 16.7   CREATININE 1.01   CALCIUM 8.4   BILITOT 0.7   ALKPHOS 134   ALT 10   AST 17   GLUCOSE 97   MG 1.90   TROPONINI 0.018        CBC/Anemia Labs: Coags:    Recent Labs   Lab 01/19/25  1116   WBC 5.94   HGB 11.5*   HCT 35.9*      .3*   RDW 14.9    Recent Labs   Lab 01/19/25  1454   INR 1.1   APTT 22.6*        Significant Imaging:  Imaging Results              CTA Chest Non-Coronary (PE Studies) (Final result)  Result time 01/19/25 13:44:54      Final result by Moy Andino MD (01/19/25 13:44:54)                   Impression:      No evidence of pulmonary thromboembolism.  No definite evidence of infectious process      Electronically signed by: Moy Andino  Date:    01/19/2025  Time:    13:44               Narrative:    EXAMINATION:  CTA CHEST NON CORONARY (PE STUDIES)    CLINICAL HISTORY:  Pulmonary embolism (PE) suspected, high prob;    TECHNIQUE:  Axial images of the chest were obtained with contrast. Sagittal and coronal reconstructed images were available for review. 3-D MIP reconstructions were performed from source imaging.    Automatic dose control was utilized to reduce patient radiation dose.    DLP= 311    COMPARISON:  No prior imaging available for comparison.    FINDINGS:  PULMONARY ARTERY: No evidence of pulmonary thromboembolism.    AORTA: Normal in course and caliber.    THYROID GLAND: The right Thora right appears absent    AIRWAYS: Trachea is midline and tracheobronchial tree is patent.    HEART: The heart is enlarged in size. There is no pericardial effusion.    LUNGS: No pleural effusion or pneumothorax.  Mild emphysematous changes of the  lungs noted.  Mild ground-glass opacity of upper lobes may be related to volume.    LYMPH NODES: There is no significant mediastinal, axillary or hilar lymphadenopathy.    BONES: No displaced fracture. No aggressive appearing osseous lesion identified.    UPPER ABDOMEN:  The visualized abdomen is unremarkable.                                         X-Ray Chest AP Portable (Final result)  Result time 01/19/25 11:24:46      Final result by Jony Beasley MD (01/19/25 11:24:46)                   Impression:      Hilar prominence particularly on the right may relate to pulmonary vascular congestion but I cannot exclude adenopathy or mass.      Electronically signed by: Jony Beasley  Date:    01/19/2025  Time:    11:24               Narrative:    EXAMINATION:  XR CHEST AP PORTABLE    CLINICAL HISTORY:  Shortness of breath    COMPARISON:  21 November 2023    FINDINGS:  Frontal view of the chest was obtained. Heart is mildly enlarged.  Stable AICD and right Port-A-Cath.  There is hilar prominence particularly on the right.  There is no dense consolidation or pneumothorax.                                    EKG:       Telemetry:  SR-ST    Physical Exam  HENT:      Head: Normocephalic.      Nose: Nose normal.      Mouth/Throat:      Mouth: Mucous membranes are dry.   Eyes:      Extraocular Movements: Extraocular movements intact.   Cardiovascular:      Rate and Rhythm: Normal rate and regular rhythm.      Pulses: Normal pulses.   Pulmonary:      Effort: Pulmonary effort is normal.   Abdominal:      Palpations: Abdomen is soft.   Skin:     General: Skin is warm.   Neurological:      Mental Status: She is alert and oriented to person, place, and time.   Psychiatric:         Behavior: Behavior normal.       Home Medications:   No current facility-administered medications on file prior to encounter.     Current Outpatient Medications on File Prior to Encounter   Medication Sig Dispense Refill    amiodarone (PACERONE) 200 MG  Tab Take 400 mg by mouth once daily.      anastrozole (ARIMIDEX) 1 mg Tab Take 1 tablet by mouth once daily.      celecoxib (CELEBREX) 200 MG capsule Take 200 mg by mouth once daily.      dexlansoprazole (DEXILANT) 60 mg capsule Take 60 mg by mouth once daily.      sacubitriL-valsartan (ENTRESTO) 24-26 mg per tablet Take 1 tablet by mouth 2 (two) times daily.      spironolactone (ALDACTONE) 25 MG tablet Take 25 mg by mouth once daily.       Current Schedule Inpatient Medications:   aspirin  81 mg Oral Daily    enoxparin  40 mg Subcutaneous Q24H (prophylaxis, 1700)    famotidine  20 mg Oral Daily    furosemide  40 mg Oral BID    linaCLOtide  145 mcg Oral Before breakfast    mupirocin   Nasal BID    polyethylene glycol  17 g Oral Daily    pravastatin  40 mg Oral Daily    pregabalin  50 mg Oral TID    verapamiL  80 mg Oral TID     Continuous Infusions:    Assessment:   Acute on Chronic Systolic CHF - Clinically Improved  NICMO - Recovered LVEF    - LVEF 50-55% (11.22.23)    - LVEF 28% (3.6.22)    - LVEF 35% (12.15.21)    - s/p SICD  VHD    - Mild MR (11.22.23)    - Trace AI, Mild TR (3.6.22)   HTN    - BP now soft  Hx DVT    - Previously on Xarelto   Breast CA  VI  Lymphedema    Plan:   Obtain echo.  Agree with transition to PO lasix.  Resume GDMT when BP can tolerate.     Thank you for your consult.     Zuleyma Arce, CAROLYN  Cardiology  [facility]               Cardiology Attending  01/20/2025 11:14 AM    Ms. Laura Jacobs is a 74 y.o. female who presented with   Chief Complaint   Patient presents with    Loss of Consciousness     Pt endorses near syncope and intermittent shortness of breath X 1 week. GCS 15 on arrival         Cardiology seeing patient for HF exacerbation    Allergy    Review of patient's allergies indicates:   Allergen Reactions    Sulfa (sulfonamide antibiotics)      Patient unsure if allergic to Sulfa       Current Medications    Current Facility-Administered Medications:     " acetaminophen tablet 650 mg, 650 mg, Oral, Q8H PRN, Landen Espinal MD    acetaminophen tablet 650 mg, 650 mg, Oral, Q8H PRN, Landen Espinal MD    aspirin chewable tablet 81 mg, 81 mg, Oral, Daily, Landen Espinal MD, 81 mg at 01/20/25 0846    enoxaparin injection 40 mg, 40 mg, Subcutaneous, Q24H (prophylaxis, 1700), Landen Espinal MD    famotidine tablet 20 mg, 20 mg, Oral, Daily, Landen Espinal MD    furosemide tablet 40 mg, 40 mg, Oral, BID, Landen Espinal MD, 40 mg at 01/20/25 0846    linaCLOtide capsule 145 mcg, 145 mcg, Oral, Before breakfast, Landen Espinal MD, 145 mcg at 01/20/25 0625    melatonin tablet 6 mg, 6 mg, Oral, Nightly PRN, Landen Espinal MD    mineral oil enema 1 enema, 1 enema, Rectal, Daily PRN, Landen Espinal MD    mupirocin 2 % ointment, , Nasal, BID, Landen Esipnal MD, Given at 01/19/25 2147    ondansetron disintegrating tablet 8 mg, 8 mg, Oral, Q8H PRN, Landen Espinal MD    polyethylene glycol packet 17 g, 17 g, Oral, Daily, Landen Espinal MD    pravastatin tablet 40 mg, 40 mg, Oral, Daily, Landen Espinal MD, 40 mg at 01/20/25 0846    pregabalin capsule 50 mg, 50 mg, Oral, TID, Landen Espinal MD, 50 mg at 01/20/25 0846    sodium chloride 0.9% flush 10 mL, 10 mL, Intravenous, PRN, Landen Espinal MD    verapamiL tablet 80 mg, 80 mg, Oral, TID, Landen Espinal MD, 80 mg at 01/20/25 0846    ROS    Please see HPI    Blood pressure 97/75, pulse 99, temperature 99.3 °F (37.4 °C), temperature source Axillary, resp. rate 18, height 5' 5" (1.651 m), weight 59 kg (130 lb), SpO2 96%.  PE    GEN  No acute distress  Not ill appearing   Patient is laying in bed    PSYCH   Patient is pleasant    NEURO  Awake and alert        Echocardiogram  Results for orders placed during the hospital encounter of 11/21/23    Echo    Interpretation Summary    Left Ventricle: The left ventricle is normal in size. Normal wall thickness. There is low normal systolic function with a visually estimated ejection fraction of 50 - " 55%.    Right Ventricle: Normal right ventricular cavity size. Systolic function is normal.    Left Atrium: Left atrium is mildly dilated.    Right Atrium: Right atrium is mildly dilated.    Mitral Valve: There is mild regurgitation.    IVC/SVC: Normal venous pressure at 3 mmHg.      Stress Test  No results found for this or any previous visit.     Coronary Angiogram  Results for orders placed in visit on 03/10/22    CATH LAB PROCEDURE     Monitor  No cardiac monitor results found for the past 12 months    Last BMP BMP  Lab Results   Component Value Date     01/19/2025    K 4.1 01/19/2025     (H) 01/19/2025    CO2 19 (L) 01/19/2025    BUN 16.7 01/19/2025    CREATININE 1.01 01/19/2025    CALCIUM 8.4 01/19/2025    ANIONGAP 8 01/04/2021    EGFRNORACEVR 59 01/19/2025      Lab Results   Component Value Date    CREATININE 1.01 01/19/2025    CREATININE 0.97 10/08/2024    CREATININE 1.37 (H) 11/27/2023     Lab Results   Component Value Date    TROPONINI 0.018 01/19/2025    TROPONINI 0.045 11/21/2023    TROPONINI 0.026 03/31/2023     Last CBC     Lab Results   Component Value Date    WBC 5.94 01/19/2025    HGB 11.5 (L) 01/19/2025    HCT 35.9 (L) 01/19/2025    .3 (H) 01/19/2025     01/19/2025           BNP    Lab Results   Component Value Date    BNP 2,719.7 (H) 01/19/2025    .8 (H) 11/21/2023    BNP 2,421.3 03/06/2022     Last lipids    Lab Results   Component Value Date    CHOL 171 10/08/2024    HDL 80 (H) 10/08/2024    LDL 74.00 10/08/2024    TRIG 87 10/08/2024    TOTALCHOLEST 2 10/08/2024      LFT     Lab Results   Component Value Date    ALT 10 01/19/2025    ALT 10 10/08/2024    ALT 9 11/27/2023    AST 17 01/19/2025    AST 18 10/08/2024    AST 21 11/27/2023       ASSESSMENT AND PLAN  I agree with the assessment and plan as outlined above      Ricardo Szymanski MD

## 2025-01-20 NOTE — PROGRESS NOTES
Inpatient Nutrition Assessment    Admit Date: 1/19/2025   Total duration of encounter: 1 day   Patient Age: 74 y.o.    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, DVT, HLD, HTN     Scheduled Medications:  aspirin, 81 mg, Daily  enoxparin, 40 mg, Q24H (prophylaxis, 1700)  famotidine, 20 mg, Daily  furosemide, 40 mg, BID  linaCLOtide, 145 mcg, Before breakfast  mupirocin, , BID  polyethylene glycol, 17 g, Daily  pravastatin, 40 mg, Daily  pregabalin, 50 mg, TID  verapamiL, 80 mg, TID    Continuous Infusions:   PRN Medications:  acetaminophen, 650 mg, Q8H PRN  acetaminophen, 650 mg, Q8H PRN  melatonin, 6 mg, Nightly PRN  mineral oil, 1 enema, Daily PRN  ondansetron, 8 mg, Q8H PRN  sodium chloride 0.9%, 10 mL, PRN    Calorie Containing IV Medications: no significant kcals from medications at this time    Recent Labs   Lab 01/19/25  1116      K 4.1   CALCIUM 8.4   MG 1.90   *   CO2 19*   BUN 16.7   CREATININE 1.01   EGFRNORACEVR 59   GLUCOSE 97   BILITOT 0.7   ALKPHOS 134   ALT 10   AST 17   ALBUMIN 3.5   WBC 5.94   HGB 11.5*   HCT 35.9*     Nutrition Orders:  Diet Heart Healthy      Appetite/Oral Intake: good/% of meals  Factors Affecting Nutritional Intake: none identified  Social Needs Impacting Access to Food: none identified  Food/[place]/Cultural Preferences: none reported  Food Allergies: no known food allergies  Last Bowel Movement:  "01/18/25  Wound(s):  none noted    Comments    1/20/25: Patient reports good oral intake of meals served. Denies any nausea, vomiting, diarrhea, constipation, and/or unintentional wt loss.     Anthropometrics    Height: 5' 5" (165.1 cm), Height Method: Stated  Last Weight: 59 kg (130 lb) (01/19/25 1045), Weight Method: Bed Scale  BMI (Calculated): 21.6  BMI Classification: underweight (BMI less than 22 if >65 years of age)     Ideal Body Weight (IBW), Female: 125 lb     % Ideal Body Weight, Female (lb): 104 %                             Usual Weight Provided By: patient denies unintentional weight loss    Wt Readings from Last 5 Encounters:   01/19/25 59 kg (130 lb)   01/25/24 59 kg (130 lb)   11/26/23 64.9 kg (143 lb 1.6 oz)   03/31/23 54.9 kg (121 lb)   10/15/22 70.3 kg (155 lb)     Weight Change(s) Since Admission: .  Wt Readings from Last 1 Encounters:   01/19/25 1045 59 kg (130 lb)   Admit Weight: 59 kg (130 lb) (01/19/25 1045), Weight Method: Bed Scale      Patient Education     Not applicable.    Nutrition Goals & Monitoring     Dietitian will monitor: energy intake  Discharge planning: resume home regimen  Nutrition Risk/Follow-Up: low (follow-up in 5-7 days)   Please consult if re-assessment needed sooner.     "

## 2025-01-20 NOTE — PROGRESS NOTES
[facility]  Hospital Medicine Progress Note        Chief Complaint: Inpatient Follow-up     HPI:   74 year old female with past medical history of heart failure, breast cancer, DVT, HLD, and HTN presented to [facility] ED on 1/19/25 with complaint of worsened dyspnea and cough for that last few days.  Reported nonproductive cough, lower extremity edema and palpitations.  She has a pacer in place and follow closely by cardiology outpatient.       In the emergency room she was noted to be slightly hypotensive.  Tachypneic up to 30 breaths per minute.  Tachycardic up to 130 per ER provider.  And pacer brings her back down to 100.  Labs were significant for mild chronic anemia.  Mild metabolic acidosis but we are normal renal function.  Her BNP was 2700.  Flu RSV and COVID were negative.  Urinalysis clear.  CTA of the chest showed no pulmonary embolus.  Chest x-ray with significant pulmonary congestion.     She is started on IV Lasix.  Her cardiology team has been consulted.  Will monitor her volume status over the next 24-48 hours.  Counseled on low-salt diet.  Will need close follow up as an outpatient.    Interval Hx:  Patient on room air this morning.  Blood pressure stable.  Follow up Cardiology recommendations we could potentially go home later today.  No new complaints    Objective/physical exam:  General: In no acute distress, afebrile  Chest: Clear to auscultation bilaterally  Heart: RRR, +S1, S2, no appreciable murmur  Abdomen: Soft, nontender, BS +  MSK: Warm, no lower extremity edema, no clubbing or cyanosis  Neurologic: Alert and oriented x4, Cranial nerve II-XII intact, Strength 5/5 in all 4 extremities    VITAL SIGNS: 24 HRS MIN & MAX LAST   Temp  Min: 97.9 °F (36.6 °C)  Max: 99.2 °F (37.3 °C) 97.9 °F (36.6 °C)   BP  Min: 92/53  Max: 126/93 96/66   Pulse  Min: 83  Max: 120  93   Resp  Min: 17  Max: 30 18   SpO2  Min: 68 %  Max: 100 % (!) 94 %       Recent Labs   Lab 01/19/25  1116    WBC 5.94   RBC 3.51*   HGB 11.5*   HCT 35.9*   .3*   MCH 32.8*   MCHC 32.0*   RDW 14.9      MPV 10.2       Recent Labs   Lab 01/19/25  1116      K 4.1   *   CO2 19*   BUN 16.7   CREATININE 1.01   CALCIUM 8.4   MG 1.90   ALBUMIN 3.5   ALKPHOS 134   ALT 10   AST 17   BILITOT 0.7          Microbiology Results (last 7 days)       ** No results found for the last 168 hours. **             Radiology:  CTA Chest Non-Coronary (PE Studies)  Narrative: EXAMINATION:  CTA CHEST NON CORONARY (PE STUDIES)    CLINICAL HISTORY:  Pulmonary embolism (PE) suspected, high prob;    TECHNIQUE:  Axial images of the chest were obtained with contrast. Sagittal and coronal reconstructed images were available for review. 3-D MIP reconstructions were performed from source imaging.    Automatic dose control was utilized to reduce patient radiation dose.    DLP= 311    COMPARISON:  No prior imaging available for comparison.    FINDINGS:  PULMONARY ARTERY: No evidence of pulmonary thromboembolism.    AORTA: Normal in course and caliber.    THYROID GLAND: The right Thora right appears absent    AIRWAYS: Trachea is midline and tracheobronchial tree is patent.    HEART: The heart is enlarged in size. There is no pericardial effusion.    LUNGS: No pleural effusion or pneumothorax.  Mild emphysematous changes of the lungs noted.  Mild ground-glass opacity of upper lobes may be related to volume.    LYMPH NODES: There is no significant mediastinal, axillary or hilar lymphadenopathy.    BONES: No displaced fracture. No aggressive appearing osseous lesion identified.    UPPER ABDOMEN:  The visualized abdomen is unremarkable.  Impression: No evidence of pulmonary thromboembolism.  No definite evidence of infectious process    Electronically signed by: Moy Andino  Date:    01/19/2025  Time:    13:44  X-Ray Chest AP Portable  Narrative: EXAMINATION:  XR CHEST AP PORTABLE    CLINICAL HISTORY:  Shortness of  breath    COMPARISON:  21 November 2023    FINDINGS:  Frontal view of the chest was obtained. Heart is mildly enlarged.  Stable AICD and right Port-A-Cath.  There is hilar prominence particularly on the right.  There is no dense consolidation or pneumothorax.  Impression: Hilar prominence particularly on the right may relate to pulmonary vascular congestion but I cannot exclude adenopathy or mass.    Electronically signed by: Jony Beasley  Date:    01/19/2025  Time:    11:24        Medications:  Scheduled Meds:   aspirin  81 mg Oral Daily    enoxparin  40 mg Subcutaneous Q24H (prophylaxis, 1700)    famotidine  20 mg Oral Daily    furosemide (LASIX) injection  40 mg Intravenous Q12H    linaCLOtide  145 mcg Oral Before breakfast    mupirocin   Nasal BID    polyethylene glycol  17 g Oral Daily    pravastatin  40 mg Oral Daily    pregabalin  50 mg Oral TID    verapamiL  80 mg Oral TID     Continuous Infusions:  PRN Meds:.  Current Facility-Administered Medications:     acetaminophen, 650 mg, Oral, Q8H PRN    acetaminophen, 650 mg, Oral, Q8H PRN    melatonin, 6 mg, Oral, Nightly PRN    mineral oil, 1 enema, Rectal, Daily PRN    ondansetron, 8 mg, Oral, Q8H PRN    sodium chloride 0.9%, 10 mL, Intravenous, PRN    Nutrition:  Nutrition consulted. Most recent weight and BMI monitored-     Measurements:  Wt Readings from Last 1 Encounters:   01/19/25 59 kg (130 lb)   Body mass index is 21.63 kg/m².    Patient has been screened and assessed by RD.    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    Follow up recommendations on diuretics.    Renal function stable   Transitioned to oral Lasix as she is on room air.  Seems to be  near her baseline.    Could potentially be discharged home later today  Will need close follow up with her oncologist as an outpatient    Landen Espinal MD   01/20/2025     All diagnosis and differential diagnosis have been reviewed; assessment and plan has been documented; I have personally reviewed the labs and test results that are presently available; I have reviewed the patients medication list; I have reviewed the consulting providers response and recommendations. I have reviewed or attempted to review medical records based upon their availability    All of the patient's questions have been  addressed and answered. Patient's is agreeable to the above stated plan. I will continue to monitor closely and make adjustments to medical management as needed.  _____________________________________________________________________

## 2025-01-20 NOTE — PROGRESS NOTES
Patient returned call.   Appointment canceled for tomorrow.   Pharmacist Renal Dose Adjustment Note    Laura Jacobs is a 74 y.o. female being treated with the medication Famotidine    Patient Data:    Vital Signs (Most Recent):  Temp: 97.9 °F (36.6 °C) (01/19/25 1809)  Pulse: 106 (01/19/25 1809)  Resp: 18 (01/19/25 1708)  BP: 122/77 (01/19/25 1809)  SpO2: (!) 93 % (01/19/25 1809) Vital Signs (72h Range):  Temp:  [97.9 °F (36.6 °C)-98.4 °F (36.9 °C)]   Pulse:  []   Resp:  [17-30]   BP: (112-126)/(77-93)   SpO2:  [68 %-100 %]      Recent Labs   Lab 01/19/25  1116   CREATININE 1.01     Serum creatinine: 1.01 mg/dL 01/19/25 1116  Estimated creatinine clearance: 45.3 mL/min    Famotidine dose has been changed from 20 mg 2 times daily to once daily per pharmacy renal dosing protocol for CrCl <50 ml/min    Pharmacist's Name: Cl Gonsalves  Pharmacist's Extension: [phone]

## 2025-01-21 LAB
ALBUMIN SERPL-MCNC: 2.9 G/DL (ref 3.4–4.8)
ALBUMIN/GLOB SERPL: 0.8 RATIO (ref 1.1–2)
ALP SERPL-CCNC: 105 UNIT/L (ref 40–150)
ALT SERPL-CCNC: 5 UNIT/L (ref 0–55)
ANION GAP SERPL CALC-SCNC: 8 MEQ/L
AST SERPL-CCNC: 12 UNIT/L (ref 5–34)
BILIRUB SERPL-MCNC: 0.5 MG/DL
BUN SERPL-MCNC: 19.2 MG/DL (ref 9.8–20.1)
CALCIUM SERPL-MCNC: 8.2 MG/DL (ref 8.4–10.2)
CHLORIDE SERPL-SCNC: 106 MMOL/L (ref 98–107)
CO2 SERPL-SCNC: 27 MMOL/L (ref 23–31)
CREAT SERPL-MCNC: 1.06 MG/DL (ref 0.55–1.02)
CREAT/UREA NIT SERPL: 18
GFR SERPLBLD CREATININE-BSD FMLA CKD-EPI: 56 ML/MIN/1.73/M2
GLOBULIN SER-MCNC: 3.5 GM/DL (ref 2.4–3.5)
GLUCOSE SERPL-MCNC: 84 MG/DL (ref 82–115)
MAGNESIUM SERPL-MCNC: 1.7 MG/DL (ref 1.6–2.6)
POTASSIUM SERPL-SCNC: 3.8 MMOL/L (ref 3.5–5.1)
PROT SERPL-MCNC: 6.4 GM/DL (ref 5.8–7.6)
SODIUM SERPL-SCNC: 141 MMOL/L (ref 136–145)

## 2025-01-21 PROCEDURE — 27000221 HC OXYGEN, UP TO 24 HOURS

## 2025-01-21 PROCEDURE — 21400001 HC TELEMETRY ROOM

## 2025-01-21 PROCEDURE — 36415 COLL VENOUS BLD VENIPUNCTURE: CPT

## 2025-01-21 PROCEDURE — 25000003 PHARM REV CODE 250: Performed by: HOSPITALIST

## 2025-01-21 PROCEDURE — 63600175 PHARM REV CODE 636 W HCPCS: Performed by: HOSPITALIST

## 2025-01-21 PROCEDURE — 83735 ASSAY OF MAGNESIUM: CPT

## 2025-01-21 PROCEDURE — 80053 COMPREHEN METABOLIC PANEL: CPT

## 2025-01-21 RX ADMIN — FUROSEMIDE 40 MG: 40 TABLET ORAL at 06:01

## 2025-01-21 RX ADMIN — VERAPAMIL HYDROCHLORIDE 80 MG: 80 TABLET ORAL at 09:01

## 2025-01-21 RX ADMIN — PREGABALIN 50 MG: 50 CAPSULE ORAL at 09:01

## 2025-01-21 RX ADMIN — MUPIROCIN: 20 OINTMENT TOPICAL at 09:01

## 2025-01-21 RX ADMIN — ASPIRIN 81 MG CHEWABLE TABLET 81 MG: 81 TABLET CHEWABLE at 09:01

## 2025-01-21 RX ADMIN — POLYETHYLENE GLYCOL 3350 17 G: 17 POWDER, FOR SOLUTION ORAL at 09:01

## 2025-01-21 RX ADMIN — FAMOTIDINE 20 MG: 20 TABLET, FILM COATED ORAL at 09:01

## 2025-01-21 RX ADMIN — PREGABALIN 50 MG: 50 CAPSULE ORAL at 04:01

## 2025-01-21 RX ADMIN — ENOXAPARIN SODIUM 40 MG: 40 INJECTION SUBCUTANEOUS at 05:01

## 2025-01-21 RX ADMIN — PRAVASTATIN SODIUM 40 MG: 40 TABLET ORAL at 09:01

## 2025-01-21 RX ADMIN — FUROSEMIDE 40 MG: 40 TABLET ORAL at 09:01

## 2025-01-21 RX ADMIN — LINACLOTIDE 145 MCG: 145 CAPSULE, GELATIN COATED ORAL at 05:01

## 2025-01-21 NOTE — PLAN OF CARE
01/21/25 0837   Discharge Assessment   Assessment Type Discharge Planning Assessment   Confirmed/corrected address, phone number and insurance Yes   Confirmed Demographics Correct on Facesheet   Source of Information patient;family   When was your last doctors appointment?   (2-3 months)   Communicated AMANDA with patient/caregiver Date not available/Unable to determine   People in Home child(bhavana), adult   Do you expect to return to your current living situation? Yes   Do you have help at home or someone to help you manage your care at home? No   Prior to hospitilization cognitive status: Alert/Oriented   Current cognitive status: Alert/Oriented   Walking or Climbing Stairs Difficulty yes   Walking or Climbing Stairs ambulation difficulty, requires equipment   Mobility Management cane walker   Dressing/Bathing Difficulty yes   Dressing/Bathing bathing difficulty, requires equipment   Dressing/Bathing Management shower chair   Equipment Currently Used at Home cane, straight;shower chair   Readmission within 30 days? No   Patient currently being followed by outpatient case management? No   Do you currently have service(s) that help you manage your care at home? No   Do you take prescription medications? Yes   Do you have prescription coverage? Yes   Coverage [facility]   Do you have any problems affording any of your prescribed medications? No   Is the patient taking medications as prescribed? yes   Who is going to help you get home at discharge? tbd   How do you get to doctors appointments? family or friend will provide   Are you on dialysis? No   Do you take coumadin? No   Discharge Plan A Home with family   Discharge Plan B Home;Home Health   DME Needed Upon Discharge  other (see comments)  (tbd)   Discharge Plan discussed with: Patient   Transition of Care Barriers None   Physical Activity   On average, how many days per week do you engage in moderate to strenuous exercise (like a brisk walk)? 0 days   On average,  how many minutes do you engage in exercise at this level? 0 min   Financial Resource Strain   How hard is it for you to pay for the very basics like food, housing, medical care, and heating? Not very   Housing Stability   In the last 12 months, was there a time when you were not able to pay the mortgage or rent on time? N   At any time in the past 12 months, were you homeless or living in a shelter (including now)? N   Transportation Needs   Has the lack of transportation kept you from medical appointments, meetings, work or from getting things needed for daily living? No   Food Insecurity   Within the past 12 months, you worried that your food would run out before you got the money to buy more. Never true   Stress   Do you feel stress - tense, restless, nervous, or anxious, or unable to sleep at night because your mind is troubled all the time - these days? Not at all   Social Isolation   How often do you feel lonely or isolated from those around you?  Sometimes   Alcohol Use   Q1: How often do you have a drink containing alcohol? Never   Utilities   In the past 12 months has the electric, gas, oil, or water company threatened to shut off services in your home? No   Health Literacy   How often do you need to have someone help you when you read instructions, pamphlets, or other written material from your doctor or pharmacy? Always   OTHER   Name(s) of People in Home KADYGLADYS (Son)  [phone]

## 2025-01-21 NOTE — PROGRESS NOTES
[facility] *    Cardiology  Progress Note    Patient Name: Laura Jacobs  MRN: [id number]  Admission Date: 1/19/2025  Hospital Length of Stay: 2 days  Code Status: Full Code   Attending Provider: Landen Espinal MD   Consulting Provider: MACHELLE Mann  Primary Care Physician: Ruben Brooks Sr., MD  Principal Problem:<principal problem not specified>    Patient information was obtained from patient, past medical records, and ER records.     Subjective:     Reason for Consult: HF, afib, PM    HPI: Ms. Jacobs is a 74 year old female who is known to [facility], Dr. Washington. She presents to the ER with complaints of worsened dyspnea & nonproductive cough x the last few days. She reports associated symptoms of BLE edema & palpitations. He denies CP or palps. On arrival, she was found to be hypotensive, tachypneic, & tachycardic. Significant labs include BNP 2719.7. A CXR was obtained and demonstrated pulmonary vascular congestion. CTA chest obtained and negative for PE. She was admitted to hospital medicine & started on IV lasix. [facility] has been consulted due to the patient's CHF exacerbation.       1.21.25: NAD. Denies CP or palps. + SOB. No labs to review. BP soft. SR on tele.     PMH: breast CA, NICMO, CHF, DVT, HTN, HLD, lymphedema, osteoarthritis   PSH: hysterectomy, SICD, femur rodding, bilateral knee replacement, left mastectomy   Family History: Denies  Social History: Denies alcohol, tobacco, or illicit drug use.     Previous Cardiac Diagnostics:   TTE (1.20.25):  Left Ventricle: The left ventricle is dilated. Normal wall thickness. Global hypokinesis present. There is moderately reduced systolic function with a visually estimated ejection fraction of 35 - 40%.  Right Ventricle: Normal right ventricular cavity size. Systolic function is normal.  Left Atrium: Left atrium is mildly dilated.  Right Atrium: Right atrium is mildly dilated.  Aortic Valve: The aortic valve is a trileaflet valve.  There is mild aortic valve sclerosis.  Mitral Valve: There is mitral annular calcification present. There is moderate to severe regurgitation.  Tricuspid Valve: There is mild regurgitation.  Pulmonic Valve: There is mild regurgitation.  IVC/SVC: Normal venous pressure at 3 mmHg.  Pericardium: There is a trivial effusion adjacent to the right atrium. No indication of cardiac tamponade.    Carotid US (11.22.23):  The right internal carotid artery demonstrated no hemodynamically significant stenosis.  There is no substantial right side carotid plaque identified.  The left internal carotid artery demonstrated no hemodynamically significant stenosis.  There is a minimal amount of plaque in the left carotid bulb.  The right vertebral artery is patent with antegrade flow.  The left vertebral artery is patent with antegrade flow.    TTE (11.22.23):  Left Ventricle: The left ventricle is normal in size. Normal wall thickness. There is low normal systolic function with a visually estimated ejection fraction of 50 - 55%.  Right Ventricle: Normal right ventricular cavity size. Systolic function is normal.  Left Atrium: Left atrium is mildly dilated.  Right Atrium: Right atrium is mildly dilated.  Mitral Valve: There is mild regurgitation.  IVC/SVC: Normal venous pressure at 3 mmHg.    Carotid US (3.31.23):  The right internal carotid artery demonstrated no hemodynamically significant stenosis  The left internal carotid artery demonstrated no hemodynamically significant stenosis.   The bilateral vertebral arteries were patent with antegrade flow.    TTE (3.30.23):  The left ventricle is normal in size with mildly decreased systolic function.  The estimated ejection fraction is 40%.  Grade II left ventricular diastolic dysfunction.  Aortic valve sclerosis without stenosis. Peak AV velocity 1.8 m/sec.  Mild mitral regurgitation.  Mild pulmonic regurgitation.  Normal right ventricular size with normal right ventricular systolic  function.    [facility] (3.10.22):  LMCA: patent vessel  LAD: patent type 2 vessel that gave rise to a very large patent and tortuous diagonal branch which was larger than the LAD proper.  LCX: patent nondominant vessel that gives rise to a small to medium size tortuous OM1 and a moderate to large size bifurcating and tortuous OM2.  RCA: patent large dominant vessel.  Summary:  Widely patent coronary anatomy w/ severe left ventricular systolic dysfunction.     TTE (3.6.22):  Left ventricular ejection fraction is measured at approximately 28%.  Severe global hypokinesis.  Left ventricular cavity is normal in size.  No left ventricular hypertrophy.  Diastolic function of the left ventricle is severely abnormal with restrictive pattern of mitral inflow.  Normal right ventricle structure and function.  Left atrium is markedly enlarged.  Right atrium is moderately enlarged.  Mitral valve leaflets are moderately thickened. Moderate mitral regurgitation. No mitral stenosis.   Small echodensity on aortic valve which could represent a fibroelastoma vs vegetation vs leaflet thickening. Aortic valve is tricuspid.  Trace aortic regurgitation is present. No aortic stenosis.  There is mild tricuspid regurgitation.  Trace pulmonic regurgitation present.   Pulmonary arterial systolic pressure (PASP) is estimated to be mildly elevated (37-45 mmHg).  No evidence of pericardial effusion.  There is no previous echo for comparison.    PET (1.3.22):  This is a normal perfusion study, no perfusion defects noted. There is no evidence of ischemia. cardiomyopathy noted with low EF in rest and stress  This scan is suggestive of low risk for future cardiovascular events.   The left ventricular cavity is noted to be mildly enlarged on the stress studies. The stress left ventricular ejection fraction was calculated to be 27% and left ventricular global function is severely reduced. The rest left ventricular cavity is noted to be mildly enlarged. The  rest left ventricular ejection fraction was calculated to be 18% and rest left ventricular global function is severely reduced.   Persistent hypokinesis of the anterior region, septal region, inferior region and lateral region is noted in both rest and stress studies. When compared to the resting ejection fraction (18%), the stress ejection fraction (27%) has increased.   The study quality is good.     TTE (12.15.21):  The study quality is average.   The left ventricle is moderately enlarged. Global left ventricular systolic function is moderately decreased. The left ventricular ejection fraction is 35%.   The left atrial diameter is mildly increased. Volume index is elevated at 44 ml/m2.  Moderate to severe (3+) mitral regurgitation. The ERO area pulse doppler method measures 0.33 cm². Mitral valve PISA radius is 0.98 cm and mitral valve regurgitant volume is 49 ml.  Mild to moderate (1-2+) tricuspid regurgitation. Mild (1+) pulmonic regurgitation.   Evidence of severe pulmonary hypertension is noted. The pulmonary artery systolic pressure is 70 mmHg.       Review of patient's allergies indicates:   Allergen Reactions    Sulfa (sulfonamide antibiotics)      Patient unsure if allergic to Sulfa     No current facility-administered medications on file prior to encounter.     Current Outpatient Medications on File Prior to Encounter   Medication Sig    amiodarone (PACERONE) 200 MG Tab Take 400 mg by mouth once daily.    anastrozole (ARIMIDEX) 1 mg Tab Take 1 tablet by mouth once daily.    celecoxib (CELEBREX) 200 MG capsule Take 200 mg by mouth once daily.    dexlansoprazole (DEXILANT) 60 mg capsule Take 60 mg by mouth once daily.    sacubitriL-valsartan (ENTRESTO) 24-26 mg per tablet Take 1 tablet by mouth 2 (two) times daily.    spironolactone (ALDACTONE) 25 MG tablet Take 25 mg by mouth once daily.     Review of Systems   Respiratory:  Positive for shortness of breath (improving).    Cardiovascular:  Negative for  chest pain and palpitations.   All other systems reviewed and are negative.      Objective:     Vital Signs (Most Recent):  Temp: 97.9 °F (36.6 °C) (01/21/25 0804)  Pulse: 89 (01/21/25 0804)  Resp: 18 (01/20/25 1540)  BP: 100/68 (01/21/25 0804)  SpO2: 99 % (01/21/25 0804) Vital Signs (24h Range):  Temp:  [97.7 °F (36.5 °C)-98.4 °F (36.9 °C)] 97.9 °F (36.6 °C)  Pulse:  [] 89  Resp:  [18] 18  SpO2:  [93 %-99 %] 99 %  BP: ()/(48-68) 100/68   Weight: 59 kg (130 lb)  Body mass index is 21.63 kg/m².  SpO2: 99 %       Intake/Output Summary (Last 24 hours) at 1/21/2025 1126  Last data filed at 1/21/2025 1049  Gross per 24 hour   Intake 960 ml   Output --   Net 960 ml     Lines/Drains/Airways       Peripheral Intravenous Line  Duration                  Peripheral IV - Single Lumen 01/19/25 1219 20 G Anterior;Right Forearm 1 day                  Significant Labs:   Chemistries:   Recent Labs   Lab 01/19/25  1116      K 4.1   *   CO2 19*   BUN 16.7   CREATININE 1.01   CALCIUM 8.4   BILITOT 0.7   ALKPHOS 134   ALT 10   AST 17   GLUCOSE 97   MG 1.90   TROPONINI 0.018        CBC/Anemia Labs: Coags:    Recent Labs   Lab 01/19/25  1116   WBC 5.94   HGB 11.5*   HCT 35.9*      .3*   RDW 14.9    Recent Labs   Lab 01/19/25  1454   INR 1.1   APTT 22.6*        Significant Imaging:  Imaging Results              CTA Chest Non-Coronary (PE Studies) (Final result)  Result time 01/19/25 13:44:54      Final result by Moy Andino MD (01/19/25 13:44:54)                   Impression:      No evidence of pulmonary thromboembolism.  No definite evidence of infectious process      Electronically signed by: Moy Andino  Date:    01/19/2025  Time:    13:44               Narrative:    EXAMINATION:  CTA CHEST NON CORONARY (PE STUDIES)    CLINICAL HISTORY:  Pulmonary embolism (PE) suspected, high prob;    TECHNIQUE:  Axial images of the chest were obtained with contrast. Sagittal and coronal reconstructed  images were available for review. 3-D MIP reconstructions were performed from source imaging.    Automatic dose control was utilized to reduce patient radiation dose.    DLP= 311    COMPARISON:  No prior imaging available for comparison.    FINDINGS:  PULMONARY ARTERY: No evidence of pulmonary thromboembolism.    AORTA: Normal in course and caliber.    THYROID GLAND: The right Thora right appears absent    AIRWAYS: Trachea is midline and tracheobronchial tree is patent.    HEART: The heart is enlarged in size. There is no pericardial effusion.    LUNGS: No pleural effusion or pneumothorax.  Mild emphysematous changes of the lungs noted.  Mild ground-glass opacity of upper lobes may be related to volume.    LYMPH NODES: There is no significant mediastinal, axillary or hilar lymphadenopathy.    BONES: No displaced fracture. No aggressive appearing osseous lesion identified.    UPPER ABDOMEN:  The visualized abdomen is unremarkable.                                         X-Ray Chest AP Portable (Final result)  Result time 01/19/25 11:24:46      Final result by Jony Beasley MD (01/19/25 11:24:46)                   Impression:      Hilar prominence particularly on the right may relate to pulmonary vascular congestion but I cannot exclude adenopathy or mass.      Electronically signed by: Jony Beasley  Date:    01/19/2025  Time:    11:24               Narrative:    EXAMINATION:  XR CHEST AP PORTABLE    CLINICAL HISTORY:  Shortness of breath    COMPARISON:  21 November 2023    FINDINGS:  Frontal view of the chest was obtained. Heart is mildly enlarged.  Stable AICD and right Port-A-Cath.  There is hilar prominence particularly on the right.  There is no dense consolidation or pneumothorax.                                    EKG:       Telemetry:  SR    Physical Exam  HENT:      Head: Normocephalic.      Nose: Nose normal.      Mouth/Throat:      Mouth: Mucous membranes are dry.   Eyes:      Extraocular Movements:  Extraocular movements intact.      Conjunctiva/sclera: Conjunctivae normal.   Cardiovascular:      Rate and Rhythm: Normal rate and regular rhythm.      Pulses: Normal pulses.   Pulmonary:      Effort: Pulmonary effort is normal.   Abdominal:      Palpations: Abdomen is soft.   Musculoskeletal:      Right lower leg: Edema (improving) present.      Left lower leg: Edema (improving) present.   Skin:     General: Skin is warm.   Neurological:      Mental Status: She is alert and oriented to person, place, and time. Mental status is at baseline.   Psychiatric:         Behavior: Behavior normal.       Home Medications:   No current facility-administered medications on file prior to encounter.     Current Outpatient Medications on File Prior to Encounter   Medication Sig Dispense Refill    amiodarone (PACERONE) 200 MG Tab Take 400 mg by mouth once daily.      anastrozole (ARIMIDEX) 1 mg Tab Take 1 tablet by mouth once daily.      celecoxib (CELEBREX) 200 MG capsule Take 200 mg by mouth once daily.      dexlansoprazole (DEXILANT) 60 mg capsule Take 60 mg by mouth once daily.      sacubitriL-valsartan (ENTRESTO) 24-26 mg per tablet Take 1 tablet by mouth 2 (two) times daily.      spironolactone (ALDACTONE) 25 MG tablet Take 25 mg by mouth once daily.       Current Schedule Inpatient Medications:   aspirin  81 mg Oral Daily    enoxparin  40 mg Subcutaneous Q24H (prophylaxis, 1700)    famotidine  20 mg Oral Daily    furosemide  40 mg Oral BID    linaCLOtide  145 mcg Oral Before breakfast    mupirocin   Nasal BID    polyethylene glycol  17 g Oral Daily    pravastatin  40 mg Oral Daily    pregabalin  50 mg Oral TID    verapamiL  80 mg Oral TID     Continuous Infusions:    Assessment:   Acute on Chronic Systolic CHF - Clinically Improved  NICMO    - LVEF 35-40% (1.20.25)    - LVEF 50-55% (11.22.23)    - LVEF 28% (3.6.22)    - LVEF 35% (12.15.21)    - s/p SICD  VHD    - Mod to Sev MR, Mild TR (1.20.25)    - Mild MR (11.22.23)     - Trace AI, Mild TR (3.6.22)   HTN    - BP soft  Hx DVT    - Previously on Xarelto   Breast CA  VI  Lymphedema    Plan:   Echo reviewed.   Continue PO Lasix.   Resume GDMT when BP can tolerate. BP remains soft.       Zuleyma Arce, MACHELLE  Cardiology  [facility]

## 2025-01-21 NOTE — PROGRESS NOTES
[facility] Medicine Progress Note        Chief Complaint: Inpatient Follow-up     HPI:   74 year old female with past medical history of heart failure, breast cancer, DVT, HLD, and HTN presented to [facility] ED on 1/19/25 with complaint of worsened dyspnea and cough for that last few days.  Reported nonproductive cough, lower extremity edema and palpitations.  She has a pacer in place and follow closely by cardiology outpatient.       In the emergency room she was noted to be slightly hypotensive.  Tachypneic up to 30 breaths per minute.  Tachycardic up to 130 per ER provider.  And pacer brings her back down to 100.  Labs were significant for mild chronic anemia.  Mild metabolic acidosis but we are normal renal function.  Her BNP was 2700.  Flu RSV and COVID were negative.  Urinalysis clear.  CTA of the chest showed no pulmonary embolus.  Chest x-ray with significant pulmonary congestion.     She is started on IV Lasix.  Her cardiology team has been consulted.  Will monitor her volume status over the next 24-48 hours.  Counseled on low-salt diet.  Will need close follow up as an outpatient.     Interval Hx:  No significant issues overnight.  Resting comfortably in bed.  Nursing at the bedside.  On week 1 L nasal cannula oxygen saturation 9%.     Objective/physical exam:  General: In no acute distress, afebrile  Chest: Clear to auscultation bilaterally  Heart: RRR, +S1, S2, no appreciable murmur  Abdomen: Soft, nontender, BS +  MSK: Warm, no lower extremity edema, no clubbing or cyanosis  Neurologic: Alert and oriented x4, Cranial nerve II-XII intact, Strength 5/5 in all 4 extremities    VITAL SIGNS: 24 HRS MIN & MAX LAST   Temp  Min: 97.7 °F (36.5 °C)  Max: 98.4 °F (36.9 °C) 97.9 °F (36.6 °C)   BP  Min: 83/60  Max: 100/68 100/68   Pulse  Min: 85  Max: 103  89   Resp  Min: 18  Max: 18 18   SpO2  Min: 93 %  Max: 99 % 99 %       Recent Labs   Lab 01/19/25  1116   WBC 5.94   RBC 3.51*    HGB 11.5*   HCT 35.9*   .3*   MCH 32.8*   MCHC 32.0*   RDW 14.9      MPV 10.2       Recent Labs   Lab 01/19/25  1116      K 4.1   *   CO2 19*   BUN 16.7   CREATININE 1.01   CALCIUM 8.4   MG 1.90   ALBUMIN 3.5   ALKPHOS 134   ALT 10   AST 17   BILITOT 0.7          Microbiology Results (last 7 days)       ** No results found for the last 168 hours. **             Radiology:  Echo    Left Ventricle: The left ventricle is dilated. Normal wall thickness.   Global hypokinesis present. There is moderately reduced systolic function   with a visually estimated ejection fraction of 35 - 40%.    Right Ventricle: Normal right ventricular cavity size. Systolic   function is normal.    Left Atrium: Left atrium is mildly dilated.    Right Atrium: Right atrium is mildly dilated.    Aortic Valve: The aortic valve is a trileaflet valve. There is mild   aortic valve sclerosis.    Mitral Valve: There is mitral annular calcification present. There is   moderate to severe regurgitation.    Tricuspid Valve: There is mild regurgitation.    Pulmonic Valve: There is mild regurgitation.    IVC/SVC: Normal venous pressure at 3 mmHg.    Pericardium: There is a trivial effusion adjacent to the right atrium.   No indication of cardiac tamponade.        Medications:  Scheduled Meds:   aspirin  81 mg Oral Daily    enoxparin  40 mg Subcutaneous Q24H (prophylaxis, 1700)    famotidine  20 mg Oral Daily    furosemide  40 mg Oral BID    linaCLOtide  145 mcg Oral Before breakfast    mupirocin   Nasal BID    polyethylene glycol  17 g Oral Daily    pravastatin  40 mg Oral Daily    pregabalin  50 mg Oral TID    verapamiL  80 mg Oral TID     Continuous Infusions:  PRN Meds:.  Current Facility-Administered Medications:     acetaminophen, 650 mg, Oral, Q8H PRN    acetaminophen, 650 mg, Oral, Q8H PRN    melatonin, 6 mg, Oral, Nightly PRN    mineral oil, 1 enema, Rectal, Daily PRN    ondansetron, 8 mg, Oral, Q8H PRN    sodium chloride  0.9%, 10 mL, Intravenous, PRN    Nutrition:  Nutrition consulted. Most recent weight and BMI monitored-     Measurements:  Wt Readings from Last 1 Encounters:   01/19/25 59 kg (130 lb)   Body mass index is 21.63 kg/m².    Patient has been screened and assessed by RD.    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  Continue goal-directed medical therapy.  Blood pressure stable.  Little on the lower side.  Can consider further titration of medications as an outpatient.    Continue with Lasix 40 mg b.i.d..  Weaned to room air.  Plan to discharge home tomorrow due to inclement weather and unable to safely go home today  Will need close follow up with her oncologist as an outpatient      Landen Espinal MD   01/21/2025     All diagnosis and differential diagnosis have been reviewed; assessment and plan has been documented; I have personally reviewed the labs and test results that are presently available; I have reviewed the patients medication list; I have reviewed the consulting providers response and recommendations. I have reviewed or attempted to review medical records based upon their availability    All of the patient's questions have been  addressed and answered. Patient's is agreeable to the above stated plan. I will continue to monitor closely and make adjustments to medical management as needed.  _____________________________________________________________________

## 2025-01-22 PROCEDURE — 21400001 HC TELEMETRY ROOM

## 2025-01-22 PROCEDURE — 94760 N-INVAS EAR/PLS OXIMETRY 1: CPT

## 2025-01-22 PROCEDURE — 25000003 PHARM REV CODE 250: Performed by: HOSPITALIST

## 2025-01-22 PROCEDURE — 63600175 PHARM REV CODE 636 W HCPCS: Performed by: HOSPITALIST

## 2025-01-22 PROCEDURE — 27000221 HC OXYGEN, UP TO 24 HOURS

## 2025-01-22 RX ORDER — FUROSEMIDE 40 MG/1
40 TABLET ORAL DAILY
Status: DISCONTINUED | OUTPATIENT
Start: 2025-01-22 | End: 2025-01-23 | Stop reason: HOSPADM

## 2025-01-22 RX ADMIN — FAMOTIDINE 20 MG: 20 TABLET, FILM COATED ORAL at 08:01

## 2025-01-22 RX ADMIN — PREGABALIN 50 MG: 50 CAPSULE ORAL at 08:01

## 2025-01-22 RX ADMIN — ASPIRIN 81 MG CHEWABLE TABLET 81 MG: 81 TABLET CHEWABLE at 08:01

## 2025-01-22 RX ADMIN — PRAVASTATIN SODIUM 40 MG: 40 TABLET ORAL at 08:01

## 2025-01-22 RX ADMIN — ENOXAPARIN SODIUM 40 MG: 40 INJECTION SUBCUTANEOUS at 04:01

## 2025-01-22 RX ADMIN — FUROSEMIDE 40 MG: 40 TABLET ORAL at 08:01

## 2025-01-22 RX ADMIN — MUPIROCIN: 20 OINTMENT TOPICAL at 08:01

## 2025-01-22 RX ADMIN — LINACLOTIDE 145 MCG: 145 CAPSULE, GELATIN COATED ORAL at 05:01

## 2025-01-22 RX ADMIN — PREGABALIN 50 MG: 50 CAPSULE ORAL at 04:01

## 2025-01-22 NOTE — PROGRESS NOTES
"  [facility] *    Cardiology  Progress Note    Patient Name: Laura Jacobs  MRN: [id number]  Admission Date: 1/19/2025  Hospital Length of Stay: 3 days  Code Status: Full Code   Attending Provider: Landen Espinal MD   Consulting Provider: MACHELLE Mann  Primary Care Physician: Ruben Brooks Sr., MD  Principal Problem:<principal problem not specified>    Patient information was obtained from patient, past medical records, and ER records.     Subjective:     Reason for Consult: HF, afib, PM    HPI: Ms. Jacobs is a 74 year old female who is known to [facility], Dr. Washington. She presents to the ER with complaints of worsened dyspnea & nonproductive cough x the last few days. She reports associated symptoms of BLE edema & palpitations. He denies CP or palps. On arrival, she was found to be hypotensive, tachypneic, & tachycardic. Significant labs include BNP 2719.7. A CXR was obtained and demonstrated pulmonary vascular congestion. CTA chest obtained and negative for PE. She was admitted to hospital medicine & started on IV lasix. [facility] has been consulted due to the patient's CHF exacerbation.       1.21.25: NAD. Denies CP or palps. + SOB. No labs to review. BP soft. SR on tele.   1.22.25: NAD. Denies CP, SOB, or palps. BP remains soft. SR on tele. "I feel so much better."     PMH: breast CA, NICMO, CHF, DVT, HTN, HLD, lymphedema, osteoarthritis   PSH: hysterectomy, SICD, femur rodding, bilateral knee replacement, left mastectomy   Family History: Denies  Social History: Denies alcohol, tobacco, or illicit drug use.     Previous Cardiac Diagnostics:   TTE (1.20.25):  Left Ventricle: The left ventricle is dilated. Normal wall thickness. Global hypokinesis present. There is moderately reduced systolic function with a visually estimated ejection fraction of 35 - 40%.  Right Ventricle: Normal right ventricular cavity size. Systolic function is normal.  Left Atrium: Left atrium is mildly dilated.  Right " Atrium: Right atrium is mildly dilated.  Aortic Valve: The aortic valve is a trileaflet valve. There is mild aortic valve sclerosis.  Mitral Valve: There is mitral annular calcification present. There is moderate to severe regurgitation.  Tricuspid Valve: There is mild regurgitation.  Pulmonic Valve: There is mild regurgitation.  IVC/SVC: Normal venous pressure at 3 mmHg.  Pericardium: There is a trivial effusion adjacent to the right atrium. No indication of cardiac tamponade.    Carotid US (11.22.23):  The right internal carotid artery demonstrated no hemodynamically significant stenosis.  There is no substantial right side carotid plaque identified.  The left internal carotid artery demonstrated no hemodynamically significant stenosis.  There is a minimal amount of plaque in the left carotid bulb.  The right vertebral artery is patent with antegrade flow.  The left vertebral artery is patent with antegrade flow.    TTE (11.22.23):  Left Ventricle: The left ventricle is normal in size. Normal wall thickness. There is low normal systolic function with a visually estimated ejection fraction of 50 - 55%.  Right Ventricle: Normal right ventricular cavity size. Systolic function is normal.  Left Atrium: Left atrium is mildly dilated.  Right Atrium: Right atrium is mildly dilated.  Mitral Valve: There is mild regurgitation.  IVC/SVC: Normal venous pressure at 3 mmHg.    Carotid US (3.31.23):  The right internal carotid artery demonstrated no hemodynamically significant stenosis  The left internal carotid artery demonstrated no hemodynamically significant stenosis.   The bilateral vertebral arteries were patent with antegrade flow.    TTE (3.30.23):  The left ventricle is normal in size with mildly decreased systolic function.  The estimated ejection fraction is 40%.  Grade II left ventricular diastolic dysfunction.  Aortic valve sclerosis without stenosis. Peak AV velocity 1.8 m/sec.  Mild mitral regurgitation.  Mild  pulmonic regurgitation.  Normal right ventricular size with normal right ventricular systolic function.    [facility] (3.10.22):  LMCA: patent vessel  LAD: patent type 2 vessel that gave rise to a very large patent and tortuous diagonal branch which was larger than the LAD proper.  LCX: patent nondominant vessel that gives rise to a small to medium size tortuous OM1 and a moderate to large size bifurcating and tortuous OM2.  RCA: patent large dominant vessel.  Summary:  Widely patent coronary anatomy w/ severe left ventricular systolic dysfunction.     TTE (3.6.22):  Left ventricular ejection fraction is measured at approximately 28%.  Severe global hypokinesis.  Left ventricular cavity is normal in size.  No left ventricular hypertrophy.  Diastolic function of the left ventricle is severely abnormal with restrictive pattern of mitral inflow.  Normal right ventricle structure and function.  Left atrium is markedly enlarged.  Right atrium is moderately enlarged.  Mitral valve leaflets are moderately thickened. Moderate mitral regurgitation. No mitral stenosis.   Small echodensity on aortic valve which could represent a fibroelastoma vs vegetation vs leaflet thickening. Aortic valve is tricuspid.  Trace aortic regurgitation is present. No aortic stenosis.  There is mild tricuspid regurgitation.  Trace pulmonic regurgitation present.   Pulmonary arterial systolic pressure (PASP) is estimated to be mildly elevated (37-45 mmHg).  No evidence of pericardial effusion.  There is no previous echo for comparison.    PET (1.3.22):  This is a normal perfusion study, no perfusion defects noted. There is no evidence of ischemia. cardiomyopathy noted with low EF in rest and stress  This scan is suggestive of low risk for future cardiovascular events.   The left ventricular cavity is noted to be mildly enlarged on the stress studies. The stress left ventricular ejection fraction was calculated to be 27% and left ventricular global  function is severely reduced. The rest left ventricular cavity is noted to be mildly enlarged. The rest left ventricular ejection fraction was calculated to be 18% and rest left ventricular global function is severely reduced.   Persistent hypokinesis of the anterior region, septal region, inferior region and lateral region is noted in both rest and stress studies. When compared to the resting ejection fraction (18%), the stress ejection fraction (27%) has increased.   The study quality is good.     TTE (12.15.21):  The study quality is average.   The left ventricle is moderately enlarged. Global left ventricular systolic function is moderately decreased. The left ventricular ejection fraction is 35%.   The left atrial diameter is mildly increased. Volume index is elevated at 44 ml/m2.  Moderate to severe (3+) mitral regurgitation. The ERO area pulse doppler method measures 0.33 cm². Mitral valve PISA radius is 0.98 cm and mitral valve regurgitant volume is 49 ml.  Mild to moderate (1-2+) tricuspid regurgitation. Mild (1+) pulmonic regurgitation.   Evidence of severe pulmonary hypertension is noted. The pulmonary artery systolic pressure is 70 mmHg.       Review of patient's allergies indicates:   Allergen Reactions    Sulfa (sulfonamide antibiotics)      Patient unsure if allergic to Sulfa     No current facility-administered medications on file prior to encounter.     Current Outpatient Medications on File Prior to Encounter   Medication Sig    amiodarone (PACERONE) 200 MG Tab Take 400 mg by mouth once daily.    anastrozole (ARIMIDEX) 1 mg Tab Take 1 tablet by mouth once daily.    celecoxib (CELEBREX) 200 MG capsule Take 200 mg by mouth once daily.    dexlansoprazole (DEXILANT) 60 mg capsule Take 60 mg by mouth once daily.    sacubitriL-valsartan (ENTRESTO) 24-26 mg per tablet Take 1 tablet by mouth 2 (two) times daily.    spironolactone (ALDACTONE) 25 MG tablet Take 25 mg by mouth once daily.     Review of  Systems   Respiratory:  Positive for shortness of breath (improving).    Cardiovascular:  Negative for chest pain and palpitations.   All other systems reviewed and are negative.      Objective:     Vital Signs (Most Recent):  Temp: 98 °F (36.7 °C) (01/22/25 1155)  Pulse: 95 (01/22/25 1155)  Resp: 20 (01/22/25 0517)  BP: (!) 91/59 (01/22/25 1155)  SpO2: 98 % (01/22/25 1155) Vital Signs (24h Range):  Temp:  [97.6 °F (36.4 °C)-98.8 °F (37.1 °C)] 98 °F (36.7 °C)  Pulse:  [] 95  Resp:  [18-20] 20  SpO2:  [95 %-100 %] 98 %  BP: (84-96)/(55-69) 91/59   Weight: 59 kg (130 lb)  Body mass index is 21.63 kg/m².  SpO2: 98 %       Intake/Output Summary (Last 24 hours) at 1/22/2025 1405  Last data filed at 1/21/2025 2046  Gross per 24 hour   Intake 1082 ml   Output --   Net 1082 ml     Lines/Drains/Airways       Peripheral Intravenous Line  Duration                  Peripheral IV - Single Lumen 01/19/25 1219 20 G Anterior;Right Forearm 3 days                  Significant Labs:   Chemistries:   Recent Labs   Lab 01/19/25  1116 01/21/25  1145    141   K 4.1 3.8   * 106   CO2 19* 27   BUN 16.7 19.2   CREATININE 1.01 1.06*   CALCIUM 8.4 8.2*   BILITOT 0.7 0.5   ALKPHOS 134 105   ALT 10 5   AST 17 12   GLUCOSE 97 84   MG 1.90 1.70   TROPONINI 0.018  --         CBC/Anemia Labs: Coags:    Recent Labs   Lab 01/19/25  1116   WBC 5.94   HGB 11.5*   HCT 35.9*      .3*   RDW 14.9    Recent Labs   Lab 01/19/25  1454   INR 1.1   APTT 22.6*        Significant Imaging:  Imaging Results              CTA Chest Non-Coronary (PE Studies) (Final result)  Result time 01/19/25 13:44:54      Final result by Moy Andino MD (01/19/25 13:44:54)                   Impression:      No evidence of pulmonary thromboembolism.  No definite evidence of infectious process      Electronically signed by: Moy Andino  Date:    01/19/2025  Time:    13:44               Narrative:    EXAMINATION:  CTA CHEST NON CORONARY (PE  STUDIES)    CLINICAL HISTORY:  Pulmonary embolism (PE) suspected, high prob;    TECHNIQUE:  Axial images of the chest were obtained with contrast. Sagittal and coronal reconstructed images were available for review. 3-D MIP reconstructions were performed from source imaging.    Automatic dose control was utilized to reduce patient radiation dose.    DLP= 311    COMPARISON:  No prior imaging available for comparison.    FINDINGS:  PULMONARY ARTERY: No evidence of pulmonary thromboembolism.    AORTA: Normal in course and caliber.    THYROID GLAND: The right Thora right appears absent    AIRWAYS: Trachea is midline and tracheobronchial tree is patent.    HEART: The heart is enlarged in size. There is no pericardial effusion.    LUNGS: No pleural effusion or pneumothorax.  Mild emphysematous changes of the lungs noted.  Mild ground-glass opacity of upper lobes may be related to volume.    LYMPH NODES: There is no significant mediastinal, axillary or hilar lymphadenopathy.    BONES: No displaced fracture. No aggressive appearing osseous lesion identified.    UPPER ABDOMEN:  The visualized abdomen is unremarkable.                                         X-Ray Chest AP Portable (Final result)  Result time 01/19/25 11:24:46      Final result by Jony Beasley MD (01/19/25 11:24:46)                   Impression:      Hilar prominence particularly on the right may relate to pulmonary vascular congestion but I cannot exclude adenopathy or mass.      Electronically signed by: Jony Beasley  Date:    01/19/2025  Time:    11:24               Narrative:    EXAMINATION:  XR CHEST AP PORTABLE    CLINICAL HISTORY:  Shortness of breath    COMPARISON:  21 November 2023    FINDINGS:  Frontal view of the chest was obtained. Heart is mildly enlarged.  Stable AICD and right Port-A-Cath.  There is hilar prominence particularly on the right.  There is no dense consolidation or pneumothorax.                                    EKG:        Telemetry:  SR    Physical Exam  HENT:      Head: Normocephalic.      Nose: Nose normal.      Mouth/Throat:      Mouth: Mucous membranes are dry.   Eyes:      Extraocular Movements: Extraocular movements intact.      Conjunctiva/sclera: Conjunctivae normal.   Cardiovascular:      Rate and Rhythm: Normal rate and regular rhythm.      Pulses: Normal pulses.   Pulmonary:      Effort: Pulmonary effort is normal.   Abdominal:      Palpations: Abdomen is soft.   Musculoskeletal:      Right lower leg: Edema (improving) present.      Left lower leg: Edema (improving) present.   Skin:     General: Skin is warm and dry.   Neurological:      Mental Status: She is alert and oriented to person, place, and time. Mental status is at baseline.   Psychiatric:         Behavior: Behavior normal.         Judgment: Judgment normal.       Home Medications:   No current facility-administered medications on file prior to encounter.     Current Outpatient Medications on File Prior to Encounter   Medication Sig Dispense Refill    amiodarone (PACERONE) 200 MG Tab Take 400 mg by mouth once daily.      anastrozole (ARIMIDEX) 1 mg Tab Take 1 tablet by mouth once daily.      celecoxib (CELEBREX) 200 MG capsule Take 200 mg by mouth once daily.      dexlansoprazole (DEXILANT) 60 mg capsule Take 60 mg by mouth once daily.      sacubitriL-valsartan (ENTRESTO) 24-26 mg per tablet Take 1 tablet by mouth 2 (two) times daily.      spironolactone (ALDACTONE) 25 MG tablet Take 25 mg by mouth once daily.       Current Schedule Inpatient Medications:   aspirin  81 mg Oral Daily    enoxparin  40 mg Subcutaneous Q24H (prophylaxis, 1700)    famotidine  20 mg Oral Daily    furosemide  40 mg Oral Daily    linaCLOtide  145 mcg Oral Before breakfast    mupirocin   Nasal BID    polyethylene glycol  17 g Oral Daily    pravastatin  40 mg Oral Daily    pregabalin  50 mg Oral TID    verapamiL  80 mg Oral TID     Continuous Infusions:    Assessment:   Acute on  Chronic Systolic CHF - Clinically Improved  NICMO    - LVEF 35-40% (1.20.25)    - LVEF 50-55% (11.22.23)    - LVEF 28% (3.6.22)    - LVEF 35% (12.15.21)    - s/p SICD  VHD    - Mod to Sev MR, Mild TR (1.20.25)    - Mild MR (11.22.23)    - Trace AI, Mild TR (3.6.22)   HTN    - BP soft  Hx DVT    - Previously on Xarelto   Breast CA  VI  Lymphedema    Plan:   Echo reviewed.   Continue PO Lasix. Agree with decreasing to daily.   Resume GDMT when BP can tolerate. BP remains soft.   Will f/u tomorrow to see if improvement in BP.       Zuleyma Arce, MACHELLE  Cardiology  [facility]

## 2025-01-22 NOTE — PROGRESS NOTES
[facility]  Hospital Medicine Progress Note        Chief Complaint: Inpatient Follow-up     HPI:   74 year old female with past medical history of heart failure, breast cancer, DVT, HLD, and HTN presented to [facility] ED on 1/19/25 with complaint of worsened dyspnea and cough for that last few days.  Reported nonproductive cough, lower extremity edema and palpitations.  She has a pacer in place and follow closely by cardiology outpatient.       In the emergency room she was noted to be slightly hypotensive.  Tachypneic up to 30 breaths per minute.  Tachycardic up to 130 per ER provider.  And pacer brings her back down to 100.  Labs were significant for mild chronic anemia.  Mild metabolic acidosis but we are normal renal function.  Her BNP was 2700.  Flu RSV and COVID were negative.  Urinalysis clear.  CTA of the chest showed no pulmonary embolus.  Chest x-ray with significant pulmonary congestion.     She is started on IV Lasix.  Her cardiology team has been consulted.  Will monitor her volume status over the next 24-48 hours.  Counseled on low-salt diet.  Will need close follow up as an outpatient.     Interval Hx:  No issues overnight.  Still little soft blood pressure but asymptomatic.  On nasal cannula for comfort only.     Objective/physical exam:  General: In no acute distress, afebrile  Chest: Clear to auscultation bilaterally  Heart: RRR, +S1, S2, no appreciable murmur  Abdomen: Soft, nontender, BS +  MSK: Warm, no lower extremity edema, no clubbing or cyanosis  Neurologic: Alert and oriented x4, Cranial nerve II-XII intact, Strength 5/5 in all 4 extremities    VITAL SIGNS: 24 HRS MIN & MAX LAST   Temp  Min: 97.6 °F (36.4 °C)  Max: 98.8 °F (37.1 °C) 97.6 °F (36.4 °C)   BP  Min: 75/44  Max: 100/68 (!) 88/61   Pulse  Min: 86  Max: 104  86   Resp  Min: 18  Max: 20 20   SpO2  Min: 97 %  Max: 100 % 98 %       Recent Labs   Lab 01/19/25  1116   WBC 5.94   RBC 3.51*   HGB 11.5*   HCT 35.9*    .3*   MCH 32.8*   MCHC 32.0*   RDW 14.9      MPV 10.2       Recent Labs   Lab 01/19/25  1116 01/21/25  1145    141   K 4.1 3.8   * 106   CO2 19* 27   BUN 16.7 19.2   CREATININE 1.01 1.06*   CALCIUM 8.4 8.2*   MG 1.90 1.70   ALBUMIN 3.5 2.9*   ALKPHOS 134 105   ALT 10 5   AST 17 12   BILITOT 0.7 0.5          Microbiology Results (last 7 days)       ** No results found for the last 168 hours. **             Radiology:  Echo    Left Ventricle: The left ventricle is dilated. Normal wall thickness.   Global hypokinesis present. There is moderately reduced systolic function   with a visually estimated ejection fraction of 35 - 40%.    Right Ventricle: Normal right ventricular cavity size. Systolic   function is normal.    Left Atrium: Left atrium is mildly dilated.    Right Atrium: Right atrium is mildly dilated.    Aortic Valve: The aortic valve is a trileaflet valve. There is mild   aortic valve sclerosis.    Mitral Valve: There is mitral annular calcification present. There is   moderate to severe regurgitation.    Tricuspid Valve: There is mild regurgitation.    Pulmonic Valve: There is mild regurgitation.    IVC/SVC: Normal venous pressure at 3 mmHg.    Pericardium: There is a trivial effusion adjacent to the right atrium.   No indication of cardiac tamponade.        Medications:  Scheduled Meds:   aspirin  81 mg Oral Daily    enoxparin  40 mg Subcutaneous Q24H (prophylaxis, 1700)    famotidine  20 mg Oral Daily    furosemide  40 mg Oral Daily    linaCLOtide  145 mcg Oral Before breakfast    mupirocin   Nasal BID    polyethylene glycol  17 g Oral Daily    pravastatin  40 mg Oral Daily    pregabalin  50 mg Oral TID    verapamiL  80 mg Oral TID     Continuous Infusions:  PRN Meds:.  Current Facility-Administered Medications:     acetaminophen, 650 mg, Oral, Q8H PRN    acetaminophen, 650 mg, Oral, Q8H PRN    melatonin, 6 mg, Oral, Nightly PRN    mineral oil, 1 enema, Rectal, Daily PRN     ondansetron, 8 mg, Oral, Q8H PRN    sodium chloride 0.9%, 10 mL, Intravenous, PRN    Nutrition:  Nutrition consulted. Most recent weight and BMI monitored-     Measurements:  Wt Readings from Last 1 Encounters:   01/19/25 59 kg (130 lb)   Body mass index is 21.63 kg/m².    Patient has been screened and assessed by RD.    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  Continue goal-directed medical therapy.  Blood pressure stable.  Little on the lower side.  Can consider further titration of medications as an outpatient.    Dropped Lasix to daily dosing due to soft blood pressure  DC home with home health when weather allows  Will need close follow up with her oncologist as an outpatient      Landen Espinal MD   01/22/2025     All diagnosis and differential diagnosis have been reviewed; assessment and plan has been documented; I have personally reviewed the labs and test results that are presently available; I have reviewed the patients medication list; I have reviewed the consulting providers response and recommendations. I have reviewed or attempted to review medical records based upon their availability    All of the patient's questions have been  addressed and answered. Patient's is agreeable to the above stated plan. I will continue to monitor closely and make adjustments to medical management as needed.  _____________________________________________________________________

## 2025-01-23 VITALS
HEIGHT: 65 IN | DIASTOLIC BLOOD PRESSURE: 73 MMHG | TEMPERATURE: 98 F | BODY MASS INDEX: 21.66 KG/M2 | SYSTOLIC BLOOD PRESSURE: 102 MMHG | HEART RATE: 97 BPM | RESPIRATION RATE: 20 BRPM | WEIGHT: 130 LBS | OXYGEN SATURATION: 96 %

## 2025-01-23 PROBLEM — I50.23 ACUTE ON CHRONIC SYSTOLIC (CONGESTIVE) HEART FAILURE: Status: ACTIVE | Noted: 2025-01-23

## 2025-01-23 PROCEDURE — 25000003 PHARM REV CODE 250: Performed by: HOSPITALIST

## 2025-01-23 RX ORDER — FUROSEMIDE 40 MG/1
40 TABLET ORAL DAILY
Qty: 30 TABLET | Refills: 11 | Status: SHIPPED | OUTPATIENT
Start: 2025-01-23 | End: 2026-01-23

## 2025-01-23 RX ORDER — NAPROXEN SODIUM 220 MG/1
81 TABLET, FILM COATED ORAL DAILY
Qty: 30 TABLET | Refills: 11 | Status: SHIPPED | OUTPATIENT
Start: 2025-01-23 | End: 2026-01-23

## 2025-01-23 RX ORDER — PRAVASTATIN SODIUM 40 MG/1
40 TABLET ORAL DAILY
Qty: 90 TABLET | Refills: 3 | Status: SHIPPED | OUTPATIENT
Start: 2025-01-23 | End: 2026-01-23

## 2025-01-23 RX ORDER — VERAPAMIL HYDROCHLORIDE 80 MG/1
80 TABLET ORAL 3 TIMES DAILY
Qty: 90 TABLET | Refills: 11 | Status: SHIPPED | OUTPATIENT
Start: 2025-01-23 | End: 2026-01-23

## 2025-01-23 RX ORDER — PREGABALIN 50 MG/1
50 CAPSULE ORAL 3 TIMES DAILY
Qty: 90 CAPSULE | Refills: 6 | Status: SHIPPED | OUTPATIENT
Start: 2025-01-23 | End: 2025-07-24

## 2025-01-23 RX ADMIN — VERAPAMIL HYDROCHLORIDE 80 MG: 80 TABLET ORAL at 09:01

## 2025-01-23 RX ADMIN — MUPIROCIN: 20 OINTMENT TOPICAL at 09:01

## 2025-01-23 RX ADMIN — FUROSEMIDE 40 MG: 40 TABLET ORAL at 09:01

## 2025-01-23 RX ADMIN — LINACLOTIDE 145 MCG: 145 CAPSULE, GELATIN COATED ORAL at 05:01

## 2025-01-23 RX ADMIN — PREGABALIN 50 MG: 50 CAPSULE ORAL at 09:01

## 2025-01-23 RX ADMIN — PRAVASTATIN SODIUM 40 MG: 40 TABLET ORAL at 09:01

## 2025-01-23 RX ADMIN — ASPIRIN 81 MG CHEWABLE TABLET 81 MG: 81 TABLET CHEWABLE at 09:01

## 2025-01-23 NOTE — DISCHARGE SUMMARY
"[facility] Hospital Medicine Discharge Summary    Admit Date: 1/19/2025  Discharge Date and Time: 1/23/20256:40 AM  Admitting Physician: Hospitalist team   Discharging Physician: Landen Espinal MD.  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  Reported nonproductive cough, lower extremity edema and palpitations.  She has a pacer in place and follow closely by cardiology outpatient.       In the emergency room she was noted to be slightly hypotensive.  Tachypneic up to 30 breaths per minute.  Tachycardic up to 130 per ER provider.  And pacer brings her back down to 100.  Labs were significant for mild chronic anemia.  Mild metabolic acidosis but we are normal renal function.  Her BNP was 2700.  Flu RSV and COVID were negative.  Urinalysis clear.  CTA of the chest showed no pulmonary embolus.  Chest x-ray with significant pulmonary congestion.     She is started on IV Lasix.  Her cardiology team has been consulted.  Will monitor her volume status over the next 24-48 hours.  Counseled on low-salt diet.  Will need close follow up as an outpatient.       Vitals:  Blood pressure 99/67, pulse 95, temperature 98.7 °F (37.1 °C), temperature source Oral, resp. rate 20, height 5' 5" (1.651 m), weight 59 kg (130 lb), SpO2 96%..    Physical Exam:  Awake, Alert, Oriented x 3, No new focal Neurologic deficit, Normal Affect  NC/AT, PERRLA, EOMI  Supple neck, no JVD, No cervical lymphadenopathy  Symmetrical chest, Good air entry bilaterally. No rhonchi, wheezes, crackles appreciated  RRR, No gallop, rub " or murmur  +ve Bowel sounds x4, Abd soft and non tender, no rebound, guarding or rigidity  No Cyanosis, cludding or edema, No new rash or bruises    Procedures Performed: No admission procedures for hospital encounter.     Significant Diagnostic Studies: See Full reports for all details  Admission on 01/19/2025   Component Date Value    Sodium 01/19/2025 142     Potassium 01/19/2025 4.1     Chloride 01/19/2025 112 (H)     CO2 01/19/2025 19 (L)     Glucose 01/19/2025 97     Blood Urea Nitrogen 01/19/2025 16.7     Creatinine 01/19/2025 1.01     Calcium 01/19/2025 8.4     Protein Total 01/19/2025 7.0     Albumin 01/19/2025 3.5     Globulin 01/19/2025 3.5     Albumin/Globulin Ratio 01/19/2025 1.0 (L)     Bilirubin Total 01/19/2025 0.7     ALP 01/19/2025 134     ALT 01/19/2025 10     AST 01/19/2025 17     eGFR 01/19/2025 59     Anion Gap 01/19/2025 11.0     BUN/Creatinine Ratio 01/19/2025 17     Natriuretic Peptide 01/19/2025 2,719.7 (H)     Troponin-I 01/19/2025 0.018     QRS Duration 01/19/2025 106     [facility] QTC Calculation 01/19/2025 516     Color, UA 01/19/2025 Yellow     Appearance, UA 01/19/2025 Clear     Specific Gravity, UA 01/19/2025 1.024     pH, UA 01/19/2025 5.5     Protein, UA 01/19/2025 1+ (A)     Glucose, UA 01/19/2025 Normal     Ketones, UA 01/19/2025 Negative     Blood, UA 01/19/2025 Negative     Bilirubin, UA 01/19/2025 Negative     Urobilinogen, UA 01/19/2025 2.0 (A)     Nitrites, UA 01/19/2025 Negative     Leukocyte Esterase, UA 01/19/2025 75 (A)     RBC, UA 01/19/2025 0-5     WBC, UA 01/19/2025 0-5     Bacteria, UA 01/19/2025 Trace     Squamous Epithelial Cell* 01/19/2025 Trace     Mucous, UA 01/19/2025 Trace (A)     Influenza A PCR 01/19/2025 Not Detected     Influenza B PCR 01/19/2025 Not Detected     Respiratory Syncytial Vi* 01/19/2025 Not Detected     SARS-CoV-2 PCR 01/19/2025 Not Detected     WBC 01/19/2025 5.94     RBC 01/19/2025 3.51 (L)     Hgb 01/19/2025 11.5 (L)     Hct 01/19/2025 35.9 (L)      MCV 01/19/2025 102.3 (H)     MCH 01/19/2025 32.8 (H)     MCHC 01/19/2025 32.0 (L)     RDW 01/19/2025 14.9     Platelet 01/19/2025 238     MPV 01/19/2025 10.2     Neut % 01/19/2025 65.7     Lymph % 01/19/2025 21.9     Mono % 01/19/2025 6.6     Eos % 01/19/2025 5.1     Basophil % 01/19/2025 0.5     Imm Grans % 01/19/2025 0.2     Neut # 01/19/2025 3.91     Lymph # 01/19/2025 1.30     Mono # 01/19/2025 0.39     Eos # 01/19/2025 0.30     Baso # 01/19/2025 0.03     Imm Gran # 01/19/2025 0.01     NRBC% 01/19/2025 0.0     Magnesium Level 01/19/2025 1.90     PTT 01/19/2025 22.6 (L)     PT 01/19/2025 14.0     INR 01/19/2025 1.1     BSA 01/20/2025 1.64     Blanco's Biplane MOD Ej* 01/20/2025 35     A2C EF 01/20/2025 29     A4C EF 01/20/2025 42     LVOT stroke volume 01/20/2025 46.5     LVIDd 01/20/2025 5.6     LV Systolic Volume 01/20/2025 105.96     LV Systolic Volume Index 01/20/2025 64.2     LVIDs 01/20/2025 4.8 (A)     LV ESV A2C 01/20/2025 98.10     LV Diastolic Volume 01/20/2025 151.15     LV ESV A4C 01/20/2025 41.20     LV Diastolic Volume Index 01/20/2025 91.61     LV EDV A2C 01/20/2025 165     LV EDV A4C 01/20/2025 191.00     Left Ventricular End Sys* 01/20/2025 105.96     Left Ventricular End Aida* 01/20/2025 151.15     IVS 01/20/2025 0.8     LVOT diameter 01/20/2025 1.9     LVOT area 01/20/2025 2.8     FS 01/20/2025 14.3 (A)     Left Ventricle Relative * 01/20/2025 0.29     PW 01/20/2025 0.8     LV mass 01/20/2025 165.0     LV Mass Index 01/20/2025 100.0     MV Peak E Kaushik 01/20/2025 1.20     TDI LATERAL 01/20/2025 0.08     TDI SEPTAL 01/20/2025 0.06     E/E' ratio 01/20/2025 17     TR Max Kaushik 01/20/2025 2.3     E wave deceleration time 01/20/2025 201     LV SEPTAL E/E' RATIO 01/20/2025 20.0     LV LATERAL E/E' RATIO 01/20/2025 15.0     LVOT peak kaushik 01/20/2025 1.0     Left Ventricular Outflow* 01/20/2025 0.63     Left Ventricular Outflow* 01/20/2025 2.00     TAPSE 01/20/2025 1.88     LA size 01/20/2025 4.1     LA  Vol (MOD) 01/20/2025 67     PATRICE (MOD) 01/20/2025 41     AV mean gradient 01/20/2025 7     AV peak gradient 01/20/2025 13     Ao peak sinai 01/20/2025 1.8     Ao VTI 01/20/2025 30.6     LVOT peak VTI 01/20/2025 16.4     AV valve area 01/20/2025 1.5     AV Velocity Ratio 01/20/2025 0.56     AV index (prosthetic) 01/20/2025 0.54     SHAHAB by Velocity Ratio 01/20/2025 1.6     MV mean gradient 01/20/2025 2     MV peak gradient 01/20/2025 5     MV valve area by continu* 01/20/2025 1.85     MV VTI 01/20/2025 25.1     Triscuspid Valve Regurgi* 01/20/2025 21     PV PEAK VELOCITY 01/20/2025 0.77     PV peak gradient 01/20/2025 2     Mean e' 01/20/2025 0.07     ZLVIDS 01/20/2025 4.06     ZLVIDD 01/20/2025 1.90     LA area A4C 01/20/2025 17.60     LA area A2C 01/20/2025 27.40     TV resting pulmonary art* 01/20/2025 24     RV TB RVSP 01/20/2025 5     Est. RA pres 01/20/2025 3     Sodium 01/21/2025 141     Potassium 01/21/2025 3.8     Chloride 01/21/2025 106     CO2 01/21/2025 27     Glucose 01/21/2025 84     Blood Urea Nitrogen 01/21/2025 19.2     Creatinine 01/21/2025 1.06 (H)     Calcium 01/21/2025 8.2 (L)     Protein Total 01/21/2025 6.4     Albumin 01/21/2025 2.9 (L)     Globulin 01/21/2025 3.5     Albumin/Globulin Ratio 01/21/2025 0.8 (L)     Bilirubin Total 01/21/2025 0.5     ALP 01/21/2025 105     ALT 01/21/2025 5     AST 01/21/2025 12     eGFR 01/21/2025 56     Anion Gap 01/21/2025 8.0     BUN/Creatinine Ratio 01/21/2025 18     Magnesium Level 01/21/2025 1.70         Microbiology Results (last 7 days)       ** No results found for the last 168 hours. **             Echo    Result Date: 1/20/2025    Left Ventricle: The left ventricle is dilated. Normal wall thickness. Global hypokinesis present. There is moderately reduced systolic function with a visually estimated ejection fraction of 35 - 40%.   Right Ventricle: Normal right ventricular cavity size. Systolic function is normal.   Left Atrium: Left atrium is mildly  dilated.   Right Atrium: Right atrium is mildly dilated.   Aortic Valve: The aortic valve is a trileaflet valve. There is mild aortic valve sclerosis.   Mitral Valve: There is mitral annular calcification present. There is moderate to severe regurgitation.   Tricuspid Valve: There is mild regurgitation.   Pulmonic Valve: There is mild regurgitation.   IVC/SVC: Normal venous pressure at 3 mmHg.   Pericardium: There is a trivial effusion adjacent to the right atrium. No indication of cardiac tamponade.     CTA Chest Non-Coronary (PE Studies)    Result Date: 1/19/2025  EXAMINATION: CTA CHEST NON CORONARY (PE STUDIES) CLINICAL HISTORY: Pulmonary embolism (PE) suspected, high prob; TECHNIQUE: Axial images of the chest were obtained with contrast. Sagittal and coronal reconstructed images were available for review. 3-D MIP reconstructions were performed from source imaging. Automatic dose control was utilized to reduce patient radiation dose. DLP= 311 COMPARISON: No prior imaging available for comparison. FINDINGS: PULMONARY ARTERY: No evidence of pulmonary thromboembolism. AORTA: Normal in course and caliber. THYROID GLAND: The right Thora right appears absent AIRWAYS: Trachea is midline and tracheobronchial tree is patent. HEART: The heart is enlarged in size. There is no pericardial effusion. LUNGS: No pleural effusion or pneumothorax.  Mild emphysematous changes of the lungs noted.  Mild ground-glass opacity of upper lobes may be related to volume. LYMPH NODES: There is no significant mediastinal, axillary or hilar lymphadenopathy. BONES: No displaced fracture. No aggressive appearing osseous lesion identified. UPPER ABDOMEN:  The visualized abdomen is unremarkable.     No evidence of pulmonary thromboembolism.  No definite evidence of infectious process Electronically signed by: Moy Andino Date:    01/19/2025 Time:    13:44    X-Ray Chest AP Portable    Result Date: 1/19/2025  EXAMINATION: XR CHEST AP PORTABLE  CLINICAL HISTORY: Shortness of breath COMPARISON: 21 November 2023 FINDINGS: Frontal view of the chest was obtained. Heart is mildly enlarged.  Stable AICD and right Port-A-Cath.  There is hilar prominence particularly on the right.  There is no dense consolidation or pneumothorax.     Hilar prominence particularly on the right may relate to pulmonary vascular congestion but I cannot exclude adenopathy or mass. Electronically signed by: Jony Beasley Date:    01/19/2025 Time:    11:24  - pulls last radiology orders        Medication List        START taking these medications      aspirin 81 MG Chew  Take 1 tablet (81 mg total) by mouth once daily.     furosemide 40 MG tablet  Commonly known as: LASIX  Take 1 tablet (40 mg total) by mouth once daily.     linaCLOtide 145 mcg Cap capsule  Commonly known as: LINZESS  Take 1 capsule (145 mcg total) by mouth before breakfast.  Start taking on: January 24, 2025     pravastatin 40 MG tablet  Commonly known as: PRAVACHOL  Take 1 tablet (40 mg total) by mouth once daily.     pregabalin 50 MG capsule  Commonly known as: LYRICA  Take 1 capsule (50 mg total) by mouth 3 (three) times daily.     verapamiL 80 MG tablet  Commonly known as: CALAN  Take 1 tablet (80 mg total) by mouth 3 (three) times daily.            CONTINUE taking these medications      anastrozole 1 mg Tab  Commonly known as: ARIMIDEX     celecoxib 200 MG capsule  Commonly known as: CeleBREX     dexlansoprazole 60 mg capsule  Commonly known as: DEXILANT            STOP taking these medications      amiodarone 200 MG Tab  Commonly known as: PACERONE     ENTRESTO 24-26 mg per tablet  Generic drug: sacubitriL-valsartan     spironolactone 25 MG tablet  Commonly known as: ALDACTONE               Where to Get Your Medications        These medications were sent to [facility]/pharmacy #[id number] - [place] - [address]  [address] [place] [id number]      Phone: [phone]   aspirin 81 MG  Chew  furosemide 40 MG tablet  linaCLOtide 145 mcg Cap capsule  pravastatin 40 MG tablet  pregabalin 50 MG capsule  verapamiL 80 MG tablet          Explained in detail to the patient about the discharge plan, medications, and follow-up visits. Pt understands and agrees with the treatment plan  Discharged Condition: stable  Diet: cardiac  Disposition: home with home health    Medications Per DC med rec  Activities as tolerated  Follow up with your PCP in 2 wks   For further questions contact hospitalist office    Discharge time 33 minutes    For worsening symptoms, chest pain, shortness of breath, increased abdominal pain, high grade fever, stroke or stroke like symptoms, immediately go to the nearest Emergency Room or call 911 as soon as possible.        Landen Dumont M.D, on 1/23/2025. at 6:40 AM.

## 2025-01-23 NOTE — PROGRESS NOTES
Attempted to see pt with Dr. Barrera, but patient had already been discharged. She will need close f/u with cardiology in 1 week.

## 2025-01-23 NOTE — NURSING
Pt AAOx4, VSS, educated on discharge instructions, new medications, follow up appointments, and signs and symptoms to return to the ED with. All questions answered. Pt verbalized understanding. Peripheral IV discontinued and gauze/tape applied to site with no signs of bleeding or swelling noted. Telemetry monitor discontinued and returned to box. Pt wheeled down via hospital transport to be taken home by son

## 2025-01-24 ENCOUNTER — PATIENT OUTREACH (OUTPATIENT)
Dept: ADMINISTRATIVE | Facility: CLINIC | Age: 75
End: 2025-01-24
Payer: MEDICARE

## 2025-01-24 NOTE — PROGRESS NOTES
C3 nurse attempted to contact Laura Jacobs  for a TCC post hospital discharge follow up call. No answer. No voicemail available. Called and spoke with patient's son. Stated will give patient message regarding follow up call. The patient does not have a scheduled HOSFU appointment noted. Unable to message PCP staff.

## 2025-01-24 NOTE — PROGRESS NOTES
C3 nurse attempted to contact Laura Jacobs  for a TCC post hospital discharge follow up call. No answer. Left voicemail with callback information. The patient does not have a scheduled HOSFU appointment noted. Unable to message PCP staff.

## 2025-01-27 NOTE — PROGRESS NOTES
C3 nurse spoke with Laura Jacobs  for a TCC post hospital discharge follow up call. The patient has a scheduled HOSFU appointment with Ruben Brooks Sr., MD  today, 01/27/2025 @ 3 pm.

## 2025-02-26 ENCOUNTER — HOSPITAL ENCOUNTER (OUTPATIENT)
Dept: RADIOLOGY | Facility: HOSPITAL | Age: 75
Discharge: HOME | End: 2025-02-26
Attending: NURSE PRACTITIONER
Payer: MEDICARE

## 2025-02-26 DIAGNOSIS — I50.22 CHRONIC SYSTOLIC HEART FAILURE: ICD-10-CM

## 2025-02-26 DIAGNOSIS — I42.8 OBSCURE CARDIOMYOPATHY OF AFRICA: ICD-10-CM

## 2025-02-26 PROCEDURE — 71046 X-RAY EXAM CHEST 2 VIEWS: CPT | Mod: TC

## 2025-03-09 ENCOUNTER — HOSPITAL ENCOUNTER (INPATIENT)
Facility: HOSPITAL | Age: 75
LOS: 3 days | Discharge: HOME HEALTH | DRG: 291 | End: 2025-03-13
Attending: EMERGENCY MEDICINE | Admitting: INTERNAL MEDICINE
Payer: MEDICARE

## 2025-03-09 DIAGNOSIS — R07.9 CHEST PAIN: ICD-10-CM

## 2025-03-09 DIAGNOSIS — I50.23 ACUTE ON CHRONIC SYSTOLIC CONGESTIVE HEART FAILURE: Primary | ICD-10-CM

## 2025-03-09 DIAGNOSIS — R06.02 SHORTNESS OF BREATH: ICD-10-CM

## 2025-03-09 DIAGNOSIS — I50.9 CHF (CONGESTIVE HEART FAILURE): ICD-10-CM

## 2025-03-09 PROBLEM — D64.9 ANEMIA: Status: ACTIVE | Noted: 2025-03-09

## 2025-03-09 LAB
ALBUMIN SERPL-MCNC: 3.2 G/DL (ref 3.4–4.8)
ALBUMIN/GLOB SERPL: 1.1 RATIO (ref 1.1–2)
ALP SERPL-CCNC: 172 UNIT/L (ref 40–150)
ALT SERPL-CCNC: 31 UNIT/L (ref 0–55)
ANION GAP SERPL CALC-SCNC: 12 MEQ/L
AST SERPL-CCNC: 23 UNIT/L (ref 5–34)
BACTERIA #/AREA URNS AUTO: ABNORMAL /HPF
BASOPHILS # BLD AUTO: 0.02 X10(3)/MCL
BASOPHILS NFR BLD AUTO: 0.4 %
BILIRUB SERPL-MCNC: 0.8 MG/DL
BILIRUB UR QL STRIP.AUTO: NEGATIVE
BNP BLD-MCNC: 4380 PG/ML
BUN SERPL-MCNC: 25.9 MG/DL (ref 9.8–20.1)
CALCIUM SERPL-MCNC: 8.1 MG/DL (ref 8.4–10.2)
CHLORIDE SERPL-SCNC: 107 MMOL/L (ref 98–107)
CLARITY UR: CLEAR
CO2 SERPL-SCNC: 22 MMOL/L (ref 23–31)
COLOR UR AUTO: ABNORMAL
CREAT SERPL-MCNC: 1.26 MG/DL (ref 0.55–1.02)
CREAT/UREA NIT SERPL: 21
EOSINOPHIL # BLD AUTO: 0.23 X10(3)/MCL (ref 0–0.9)
EOSINOPHIL NFR BLD AUTO: 5 %
ERYTHROCYTE [DISTWIDTH] IN BLOOD BY AUTOMATED COUNT: 16.4 % (ref 11.5–17)
GFR SERPLBLD CREATININE-BSD FMLA CKD-EPI: 45 ML/MIN/1.73/M2
GLOBULIN SER-MCNC: 3 GM/DL (ref 2.4–3.5)
GLUCOSE SERPL-MCNC: 103 MG/DL (ref 82–115)
GLUCOSE UR QL STRIP: NORMAL
HCT VFR BLD AUTO: 34.6 % (ref 37–47)
HGB BLD-MCNC: 11.3 G/DL (ref 12–16)
HGB UR QL STRIP: NEGATIVE
HYALINE CASTS #/AREA URNS LPF: ABNORMAL /LPF
IMM GRANULOCYTES # BLD AUTO: 0.01 X10(3)/MCL (ref 0–0.04)
IMM GRANULOCYTES NFR BLD AUTO: 0.2 %
KETONES UR QL STRIP: NEGATIVE
LEUKOCYTE ESTERASE UR QL STRIP: NEGATIVE
LYMPHOCYTES # BLD AUTO: 0.92 X10(3)/MCL (ref 0.6–4.6)
LYMPHOCYTES NFR BLD AUTO: 20 %
MAGNESIUM SERPL-MCNC: 1.6 MG/DL (ref 1.6–2.6)
MCH RBC QN AUTO: 32.5 PG (ref 27–31)
MCHC RBC AUTO-ENTMCNC: 32.7 G/DL (ref 33–36)
MCV RBC AUTO: 99.4 FL (ref 80–94)
MONOCYTES # BLD AUTO: 0.26 X10(3)/MCL (ref 0.1–1.3)
MONOCYTES NFR BLD AUTO: 5.7 %
MUCOUS THREADS URNS QL MICRO: ABNORMAL /LPF
NEUTROPHILS # BLD AUTO: 3.15 X10(3)/MCL (ref 2.1–9.2)
NEUTROPHILS NFR BLD AUTO: 68.7 %
NITRITE UR QL STRIP: NEGATIVE
NRBC BLD AUTO-RTO: 0 %
OHS QRS DURATION: 110 MS
OHS QTC CALCULATION: 497 MS
PH UR STRIP: 5.5 [PH]
PLATELET # BLD AUTO: 242 X10(3)/MCL (ref 130–400)
PMV BLD AUTO: 9.9 FL (ref 7.4–10.4)
POTASSIUM SERPL-SCNC: 3.8 MMOL/L (ref 3.5–5.1)
PROT SERPL-MCNC: 6.2 GM/DL (ref 5.8–7.6)
PROT UR QL STRIP: NEGATIVE
RBC # BLD AUTO: 3.48 X10(6)/MCL (ref 4.2–5.4)
RBC #/AREA URNS AUTO: ABNORMAL /HPF
SODIUM SERPL-SCNC: 141 MMOL/L (ref 136–145)
SP GR UR STRIP.AUTO: 1.01 (ref 1–1.03)
SQUAMOUS #/AREA URNS LPF: ABNORMAL /HPF
TROPONIN I SERPL-MCNC: 0.02 NG/ML (ref 0–0.04)
UROBILINOGEN UR STRIP-ACNC: NORMAL
WBC # BLD AUTO: 4.59 X10(3)/MCL (ref 4.5–11.5)
WBC #/AREA URNS AUTO: ABNORMAL /HPF

## 2025-03-09 PROCEDURE — 96365 THER/PROPH/DIAG IV INF INIT: CPT

## 2025-03-09 PROCEDURE — 93010 ELECTROCARDIOGRAM REPORT: CPT | Mod: ,,, | Performed by: INTERNAL MEDICINE

## 2025-03-09 PROCEDURE — 96375 TX/PRO/DX INJ NEW DRUG ADDON: CPT

## 2025-03-09 PROCEDURE — 84484 ASSAY OF TROPONIN QUANT: CPT | Performed by: EMERGENCY MEDICINE

## 2025-03-09 PROCEDURE — 83735 ASSAY OF MAGNESIUM: CPT | Performed by: EMERGENCY MEDICINE

## 2025-03-09 PROCEDURE — 93005 ELECTROCARDIOGRAM TRACING: CPT

## 2025-03-09 PROCEDURE — 82977 ASSAY OF GGT: CPT | Performed by: STUDENT IN AN ORGANIZED HEALTH CARE EDUCATION/TRAINING PROGRAM

## 2025-03-09 PROCEDURE — 83880 ASSAY OF NATRIURETIC PEPTIDE: CPT | Performed by: EMERGENCY MEDICINE

## 2025-03-09 PROCEDURE — 80053 COMPREHEN METABOLIC PANEL: CPT | Performed by: EMERGENCY MEDICINE

## 2025-03-09 PROCEDURE — G0378 HOSPITAL OBSERVATION PER HR: HCPCS

## 2025-03-09 PROCEDURE — 85025 COMPLETE CBC W/AUTO DIFF WBC: CPT | Performed by: EMERGENCY MEDICINE

## 2025-03-09 PROCEDURE — 63600175 PHARM REV CODE 636 W HCPCS: Performed by: EMERGENCY MEDICINE

## 2025-03-09 PROCEDURE — 81001 URINALYSIS AUTO W/SCOPE: CPT | Performed by: EMERGENCY MEDICINE

## 2025-03-09 PROCEDURE — 99285 EMERGENCY DEPT VISIT HI MDM: CPT | Mod: 25

## 2025-03-09 PROCEDURE — 63600175 PHARM REV CODE 636 W HCPCS: Performed by: STUDENT IN AN ORGANIZED HEALTH CARE EDUCATION/TRAINING PROGRAM

## 2025-03-09 PROCEDURE — 96372 THER/PROPH/DIAG INJ SC/IM: CPT | Performed by: STUDENT IN AN ORGANIZED HEALTH CARE EDUCATION/TRAINING PROGRAM

## 2025-03-09 RX ORDER — ACETAMINOPHEN 325 MG/1
650 TABLET ORAL EVERY 8 HOURS PRN
Status: DISCONTINUED | OUTPATIENT
Start: 2025-03-09 | End: 2025-03-09

## 2025-03-09 RX ORDER — BISACODYL 10 MG/1
10 SUPPOSITORY RECTAL DAILY PRN
Status: DISCONTINUED | OUTPATIENT
Start: 2025-03-09 | End: 2025-03-13 | Stop reason: HOSPADM

## 2025-03-09 RX ORDER — ONDANSETRON HYDROCHLORIDE 2 MG/ML
4 INJECTION, SOLUTION INTRAVENOUS EVERY 8 HOURS PRN
Status: DISCONTINUED | OUTPATIENT
Start: 2025-03-09 | End: 2025-03-13 | Stop reason: HOSPADM

## 2025-03-09 RX ORDER — TALC
9 POWDER (GRAM) TOPICAL NIGHTLY PRN
Status: DISCONTINUED | OUTPATIENT
Start: 2025-03-09 | End: 2025-03-13 | Stop reason: HOSPADM

## 2025-03-09 RX ORDER — FUROSEMIDE 10 MG/ML
40 INJECTION INTRAMUSCULAR; INTRAVENOUS EVERY 12 HOURS
Status: DISCONTINUED | OUTPATIENT
Start: 2025-03-10 | End: 2025-03-12

## 2025-03-09 RX ORDER — SIMETHICONE 80 MG
1 TABLET,CHEWABLE ORAL 4 TIMES DAILY PRN
Status: DISCONTINUED | OUTPATIENT
Start: 2025-03-09 | End: 2025-03-13 | Stop reason: HOSPADM

## 2025-03-09 RX ORDER — MORPHINE SULFATE 4 MG/ML
2 INJECTION, SOLUTION INTRAMUSCULAR; INTRAVENOUS EVERY 6 HOURS PRN
Refills: 0 | Status: DISCONTINUED | OUTPATIENT
Start: 2025-03-09 | End: 2025-03-13 | Stop reason: HOSPADM

## 2025-03-09 RX ORDER — ONDANSETRON 4 MG/1
8 TABLET, ORALLY DISINTEGRATING ORAL EVERY 8 HOURS PRN
Status: DISCONTINUED | OUTPATIENT
Start: 2025-03-09 | End: 2025-03-13 | Stop reason: HOSPADM

## 2025-03-09 RX ORDER — POLYETHYLENE GLYCOL 3350 17 G/17G
17 POWDER, FOR SOLUTION ORAL 3 TIMES DAILY PRN
Status: DISCONTINUED | OUTPATIENT
Start: 2025-03-09 | End: 2025-03-13 | Stop reason: HOSPADM

## 2025-03-09 RX ORDER — SODIUM CHLORIDE 0.9 % (FLUSH) 0.9 %
10 SYRINGE (ML) INJECTION
Status: DISCONTINUED | OUTPATIENT
Start: 2025-03-09 | End: 2025-03-13 | Stop reason: HOSPADM

## 2025-03-09 RX ORDER — ACETAMINOPHEN 325 MG/1
650 TABLET ORAL EVERY 4 HOURS PRN
Status: DISCONTINUED | OUTPATIENT
Start: 2025-03-09 | End: 2025-03-09

## 2025-03-09 RX ORDER — ALUMINUM HYDROXIDE, MAGNESIUM HYDROXIDE, AND SIMETHICONE 1200; 120; 1200 MG/30ML; MG/30ML; MG/30ML
30 SUSPENSION ORAL 4 TIMES DAILY PRN
Status: DISCONTINUED | OUTPATIENT
Start: 2025-03-09 | End: 2025-03-13 | Stop reason: HOSPADM

## 2025-03-09 RX ORDER — GLUCAGON 1 MG
1 KIT INJECTION
Status: DISCONTINUED | OUTPATIENT
Start: 2025-03-09 | End: 2025-03-13 | Stop reason: HOSPADM

## 2025-03-09 RX ORDER — HYDROCODONE BITARTRATE AND ACETAMINOPHEN 5; 325 MG/1; MG/1
1 TABLET ORAL EVERY 6 HOURS PRN
Refills: 0 | Status: DISCONTINUED | OUTPATIENT
Start: 2025-03-09 | End: 2025-03-13 | Stop reason: HOSPADM

## 2025-03-09 RX ORDER — ONDANSETRON HYDROCHLORIDE 2 MG/ML
4 INJECTION, SOLUTION INTRAVENOUS EVERY 8 HOURS PRN
Status: DISCONTINUED | OUTPATIENT
Start: 2025-03-09 | End: 2025-03-09

## 2025-03-09 RX ORDER — NALOXONE HCL 0.4 MG/ML
0.02 VIAL (ML) INJECTION
Status: DISCONTINUED | OUTPATIENT
Start: 2025-03-09 | End: 2025-03-13 | Stop reason: HOSPADM

## 2025-03-09 RX ORDER — FUROSEMIDE 10 MG/ML
40 INJECTION INTRAMUSCULAR; INTRAVENOUS
Status: COMPLETED | OUTPATIENT
Start: 2025-03-09 | End: 2025-03-09

## 2025-03-09 RX ORDER — IPRATROPIUM BROMIDE AND ALBUTEROL SULFATE 2.5; .5 MG/3ML; MG/3ML
3 SOLUTION RESPIRATORY (INHALATION) EVERY 6 HOURS PRN
Status: DISCONTINUED | OUTPATIENT
Start: 2025-03-09 | End: 2025-03-13 | Stop reason: HOSPADM

## 2025-03-09 RX ORDER — ANASTROZOLE 1 MG/1
1 TABLET ORAL DAILY
Status: DISCONTINUED | OUTPATIENT
Start: 2025-03-10 | End: 2025-03-13 | Stop reason: HOSPADM

## 2025-03-09 RX ORDER — ENOXAPARIN SODIUM 100 MG/ML
40 INJECTION SUBCUTANEOUS EVERY 24 HOURS
Status: DISCONTINUED | OUTPATIENT
Start: 2025-03-09 | End: 2025-03-13 | Stop reason: HOSPADM

## 2025-03-09 RX ORDER — ACETAMINOPHEN 325 MG/1
650 TABLET ORAL EVERY 4 HOURS PRN
Status: DISCONTINUED | OUTPATIENT
Start: 2025-03-09 | End: 2025-03-13 | Stop reason: HOSPADM

## 2025-03-09 RX ORDER — IBUPROFEN 200 MG
24 TABLET ORAL
Status: DISCONTINUED | OUTPATIENT
Start: 2025-03-09 | End: 2025-03-13 | Stop reason: HOSPADM

## 2025-03-09 RX ORDER — IBUPROFEN 200 MG
16 TABLET ORAL
Status: DISCONTINUED | OUTPATIENT
Start: 2025-03-09 | End: 2025-03-13 | Stop reason: HOSPADM

## 2025-03-09 RX ORDER — MAGNESIUM SULFATE HEPTAHYDRATE 40 MG/ML
2 INJECTION, SOLUTION INTRAVENOUS ONCE
Status: COMPLETED | OUTPATIENT
Start: 2025-03-09 | End: 2025-03-10

## 2025-03-09 RX ADMIN — FUROSEMIDE 40 MG: 10 INJECTION, SOLUTION INTRAVENOUS at 06:03

## 2025-03-09 RX ADMIN — ENOXAPARIN SODIUM 40 MG: 40 INJECTION SUBCUTANEOUS at 10:03

## 2025-03-09 RX ADMIN — MAGNESIUM SULFATE HEPTAHYDRATE 2 G: 40 INJECTION, SOLUTION INTRAVENOUS at 11:03

## 2025-03-09 NOTE — ED PROVIDER NOTES
Encounter Date: 3/9/2025    SCRIBE #1 NOTE: I, Ritchie Jitendra, am scribing for, and in the presence of,  Zeina Alves MD. I have scribed the following portions of the note - the EKG reading. Other sections scribed: HPI, ROS, PE.       History     Chief Complaint   Patient presents with    Shortness of Breath     Pt arrives [facility] from home c/o SOB x2 weeks. Hx of CHF & noncompliant with meds. Pt denies hx of COPD, cough, congestion, CP, & fever. BLE swelling noted in triage.      Patient is a 75 y/o female with a hx of cancer, cardiomyopathy, CHF, DVT, HTN, and HLD who presents to the ED for shortness of breath for the last 2 weeks. Pt states she is on diuretics but has still been experiencing leg swelling, shortness of breath, and orthopnea. Per chart review, pt is on furosemide 40 mg q.d. but has been noncompliant. Pt denies any decreased urine output, chest pain, or diarrhea.     The history is provided by the patient and medical records. No  was used.     Review of patient's allergies indicates:   Allergen Reactions    Sulfa (sulfonamide antibiotics)      Patient unsure if allergic to Sulfa     Past Medical History:   Diagnosis Date    Cancer     breast CA s/p chemo and L masectomy     Cardiomyopathy     CHF (congestive heart failure)     History of breast cancer     History of DVT (deep vein thrombosis)     HLD (hyperlipidemia)     Hypertension     Lymphedema     Osteoarthritis     Venous insufficiency      Past Surgical History:   Procedure Laterality Date    HYSTERECTOMY      INSERTION OF PACEMAKER      INTRAMEDULLARY RODDING OF FEMUR Left 10/14/2022    Procedure: INSERTION, INTRAMEDULLARY СВЕТЛАНА, FEMUR;  Surgeon: Ankush Alex MD;  [facility];  Service: Orthopedics;  Laterality: Left;    JOINT REPLACEMENT Bilateral     Knee    MASTECTOMY Left 2019     Family History   Family history unknown: Yes     Social History[1]  Review of Systems   Respiratory:  Positive for shortness of  breath.         Positive orthopnea    Cardiovascular:  Positive for leg swelling. Negative for chest pain.   Gastrointestinal:  Negative for diarrhea.   Genitourinary:  Negative for decreased urine volume.       Physical Exam     Initial Vitals [03/09/25 1634]   BP Pulse Resp Temp SpO2   118/81 97 (!) 24 98.1 °F (36.7 °C) 97 %      MAP       --         Physical Exam    Nursing note and vitals reviewed.  Constitutional: She appears well-developed and well-nourished. She is not diaphoretic. No distress. Nasal cannula in place.   HENT:   Head: Normocephalic and atraumatic.   Nose: Nose normal. Mouth/Throat: Oropharynx is clear and moist.   Eyes: Conjunctivae and EOM are normal. Pupils are equal, round, and reactive to light.   Neck: Trachea normal. Neck supple.   Normal range of motion.  Cardiovascular:  Regular rhythm, normal heart sounds and intact distal pulses.   Tachycardia present.         No murmur heard.  Pulmonary/Chest: No respiratory distress. She has rales. She exhibits no tenderness.   Crackles bilaterally    Abdominal: Abdomen is soft. Bowel sounds are normal. She exhibits no distension and no mass. There is no abdominal tenderness. There is no rebound and no guarding.   Musculoskeletal:         General: Edema present. No tenderness. Normal range of motion.      Cervical back: Normal range of motion and neck supple.      Lumbar back: Normal. Normal range of motion.      Right lower leg: 3+ Edema present.      Left lower leg: 3+ Edema present.     Lymphadenopathy:   Lymphedema to the left upper extremity   Neurological: She is alert and oriented to person, place, and time. She has normal strength. No cranial nerve deficit or sensory deficit.   Skin: Skin is warm and dry. Capillary refill takes less than 2 seconds. No abscess noted. No erythema. No pallor.   Psychiatric: She has a normal mood and affect. Her behavior is normal. Judgment and thought content normal.         ED Course   Procedures  Labs  Reviewed   B-TYPE NATRIURETIC PEPTIDE - Abnormal       Result Value    Natriuretic Peptide 4,380.0 (*)    COMPREHENSIVE METABOLIC PANEL - Abnormal    Sodium 141      Potassium 3.8      Chloride 107      CO2 22 (*)     Glucose 103      Blood Urea Nitrogen 25.9 (*)     Creatinine 1.26 (*)     Calcium 8.1 (*)     Protein Total 6.2      Albumin 3.2 (*)     Globulin 3.0      Albumin/Globulin Ratio 1.1      Bilirubin Total 0.8       (*)     ALT 31      AST 23      eGFR 45      Anion Gap 12.0      BUN/Creatinine Ratio 21     CBC WITH DIFFERENTIAL - Abnormal    WBC 4.59      RBC 3.48 (*)     Hgb 11.3 (*)     Hct 34.6 (*)     MCV 99.4 (*)     MCH 32.5 (*)     MCHC 32.7 (*)     RDW 16.4      Platelet 242      MPV 9.9      Neut % 68.7      Lymph % 20.0      Mono % 5.7      Eos % 5.0      Basophil % 0.4      Imm Grans % 0.2      Neut # 3.15      Lymph # 0.92      Mono # 0.26      Eos # 0.23      Baso # 0.02      Imm Gran # 0.01      NRBC% 0.0     TROPONIN I - Normal    Troponin-I 0.021     MAGNESIUM - Normal    Magnesium Level 1.60     CBC W/ AUTO DIFFERENTIAL    Narrative:     The following orders were created for panel order CBC auto differential.  Procedure                               Abnormality         Status                     ---------                               -----------         ------                     CBC with Differential[[id number]]       Abnormal            Final result                 Please view results for these tests on the individual orders.   URINALYSIS, REFLEX TO URINE CULTURE     EKG Readings: (Independently Interpreted)   Initial Reading: No STEMI. Rhythm: Normal Sinus Rhythm. Heart Rate: 93. Ectopy: PVCs. Conduction: Normal. T Waves: Normal. Axis: Left Axis Deviation. Clinical Impression: Left Ventricular Hypertrophy (LVH) and Normal Sinus Rhythm   Done at 16:34. Poor R wave progression. Nonspecific ST segment abnormalities in leads 1, AVL, V5, and V6       Imaging Results               X-Ray Chest AP Portable (Final result)  Result time 03/09/25 17:46:32      Final result by Jaxon De Jesus MD (03/09/25 17:46:32)                   Impression:      No acute disease is seen or interval changes from the previous exam      Electronically signed by: Jaxon De Jesus MD  Date:    03/09/2025  Time:    17:46               Narrative:    EXAMINATION:  XR CHEST AP PORTABLE    CLINICAL HISTORY:  shortness of breath;    TECHNIQUE:  Single frontal view of the chest was performed.    COMPARISON:  02/26/2025    FINDINGS:  Port-A-Cath in place.  Pacing device is in place.  Heart is enlarged.  There is vascular calcification noted.                                    X-Rays:   Independently Interpreted Readings:   Chest X-Ray: No infiltrates.  No acute abnormalities. Cardiomegaly present.     Medications   ondansetron injection 4 mg (has no administration in time range)   acetaminophen tablet 650 mg (has no administration in time range)   acetaminophen tablet 650 mg (has no administration in time range)   furosemide injection 40 mg (has no administration in time range)   furosemide injection 40 mg (40 mg Intravenous Given 3/9/25 1801)     Medical Decision Making  Differential diagnosis includes but is not limited to: renal failure, noncompliance, CHF, ACS, anemia, hypoalbuminemia  Cbc, cmp, trop, mag, bnp, EKG, cxr ordered and reviewed  Bnp significantly eleavted  Iv diuresis  Obs for iv diuresis due to failure of po diuresis     Problems Addressed:  Acute on chronic systolic congestive heart failure: acute illness or injury that poses a threat to life or bodily functions  Shortness of breath: acute illness or injury that poses a threat to life or bodily functions    Amount and/or Complexity of Data Reviewed  External Data Reviewed: notes.     Details: Per chart review, pt is on furosemide 40 mg q.d. but has been noncompliant.  Labs: ordered.  Radiology: ordered and independent interpretation performed.  ECG/medicine  tests: ordered and independent interpretation performed.    Risk  OTC drugs.  Prescription drug management.  Decision regarding hospitalization.            Scribe Attestation:   Scribe #1: I performed the above scribed service and the documentation accurately describes the services I performed. I attest to the accuracy of the note.  Comments: Attending:   Physician Attestation Statement for Scribe #1: Zeina PHILIP MD, personally performed the services described in this documentation. All medical record entries made by the scribe were at my direction and in my presence.  I have reviewed the chart and agree that the record reflects my personal performance and is accurate and complete.        Attending Attestation:           Physician Attestation for Scribe:  Physician Attestation Statement for Scribe #1: Joselyn PHILIP Brooke R, MD, reviewed documentation, as scribed by Ritchie Ham in my presence, and it is both accurate and complete.             ED Course as of 03/09/25 1849   Sun Mar 09, 2025   1644 H/o chf reduced ef 35-40%. Rx lasix 40 mg [BS]   1844 Diuresing some, has been taking lasix 40 mg po bid and continuing to gain weight, will obs for iv diuresis [BS]      ED Course User Index  [BS] Zeina Alves MD                           Clinical Impression:  Final diagnoses:  [R06.02] Shortness of breath  [I50.23] Acute on chronic systolic congestive heart failure (Primary)          ED Disposition Condition    Observation Stable                  [1]   Social History  Tobacco Use    Smoking status: Never    Smokeless tobacco: Never   Substance Use Topics    Alcohol use: Not Currently    Drug use: Never        Zeina Alves MD  03/09/25 1849

## 2025-03-09 NOTE — Clinical Note
Diagnosis: Shortness of breath [786.05.ICD-9-CM]   Future Attending Provider: SEUN PARTIDA [[id number]]   Admit to which facility:: [facility] [[id number]]

## 2025-03-10 LAB
ALBUMIN SERPL-MCNC: 3 G/DL (ref 3.4–4.8)
ALBUMIN/GLOB SERPL: 1 RATIO (ref 1.1–2)
ALP SERPL-CCNC: 161 UNIT/L (ref 40–150)
ALT SERPL-CCNC: 24 UNIT/L (ref 0–55)
ANION GAP SERPL CALC-SCNC: 13 MEQ/L
AST SERPL-CCNC: 25 UNIT/L (ref 5–34)
BASOPHILS # BLD AUTO: 0.03 X10(3)/MCL
BASOPHILS NFR BLD AUTO: 0.7 %
BILIRUB SERPL-MCNC: 0.6 MG/DL
BUN SERPL-MCNC: 21.4 MG/DL (ref 9.8–20.1)
CALCIUM SERPL-MCNC: 7.9 MG/DL (ref 8.4–10.2)
CHLORIDE SERPL-SCNC: 112 MMOL/L (ref 98–107)
CO2 SERPL-SCNC: 18 MMOL/L (ref 23–31)
CREAT SERPL-MCNC: 1.26 MG/DL (ref 0.55–1.02)
CREAT/UREA NIT SERPL: 17
EOSINOPHIL # BLD AUTO: 0.35 X10(3)/MCL (ref 0–0.9)
EOSINOPHIL NFR BLD AUTO: 8.5 %
ERYTHROCYTE [DISTWIDTH] IN BLOOD BY AUTOMATED COUNT: 16.6 % (ref 11.5–17)
GFR SERPLBLD CREATININE-BSD FMLA CKD-EPI: 45 ML/MIN/1.73/M2
GGT SERPL-CCNC: 128 U/L (ref 9–36)
GLOBULIN SER-MCNC: 3.1 GM/DL (ref 2.4–3.5)
GLUCOSE SERPL-MCNC: 94 MG/DL (ref 82–115)
HCT VFR BLD AUTO: 34.7 % (ref 37–47)
HGB BLD-MCNC: 11.6 G/DL (ref 12–16)
IMM GRANULOCYTES # BLD AUTO: 0.02 X10(3)/MCL (ref 0–0.04)
IMM GRANULOCYTES NFR BLD AUTO: 0.5 %
KOH PREP SPEC: NORMAL
LYMPHOCYTES # BLD AUTO: 1.17 X10(3)/MCL (ref 0.6–4.6)
LYMPHOCYTES NFR BLD AUTO: 28.4 %
MCH RBC QN AUTO: 33.2 PG (ref 27–31)
MCHC RBC AUTO-ENTMCNC: 33.4 G/DL (ref 33–36)
MCV RBC AUTO: 99.4 FL (ref 80–94)
MONOCYTES # BLD AUTO: 0.24 X10(3)/MCL (ref 0.1–1.3)
MONOCYTES NFR BLD AUTO: 5.8 %
NEUTROPHILS # BLD AUTO: 2.31 X10(3)/MCL (ref 2.1–9.2)
NEUTROPHILS NFR BLD AUTO: 56.1 %
NRBC BLD AUTO-RTO: 0 %
PLATELET # BLD AUTO: 242 X10(3)/MCL (ref 130–400)
PMV BLD AUTO: 10.5 FL (ref 7.4–10.4)
POTASSIUM SERPL-SCNC: 3.8 MMOL/L (ref 3.5–5.1)
PROT SERPL-MCNC: 6.1 GM/DL (ref 5.8–7.6)
RBC # BLD AUTO: 3.49 X10(6)/MCL (ref 4.2–5.4)
SODIUM SERPL-SCNC: 143 MMOL/L (ref 136–145)
WBC # BLD AUTO: 4.12 X10(3)/MCL (ref 4.5–11.5)

## 2025-03-10 PROCEDURE — 21400001 HC TELEMETRY ROOM

## 2025-03-10 PROCEDURE — 25000003 PHARM REV CODE 250: Performed by: STUDENT IN AN ORGANIZED HEALTH CARE EDUCATION/TRAINING PROGRAM

## 2025-03-10 PROCEDURE — 96366 THER/PROPH/DIAG IV INF ADDON: CPT

## 2025-03-10 PROCEDURE — 11000001 HC ACUTE MED/SURG PRIVATE ROOM

## 2025-03-10 PROCEDURE — 96376 TX/PRO/DX INJ SAME DRUG ADON: CPT

## 2025-03-10 PROCEDURE — 87220 TISSUE EXAM FOR FUNGI: CPT | Performed by: STUDENT IN AN ORGANIZED HEALTH CARE EDUCATION/TRAINING PROGRAM

## 2025-03-10 PROCEDURE — 25000003 PHARM REV CODE 250: Performed by: NURSE PRACTITIONER

## 2025-03-10 PROCEDURE — 63600175 PHARM REV CODE 636 W HCPCS: Performed by: STUDENT IN AN ORGANIZED HEALTH CARE EDUCATION/TRAINING PROGRAM

## 2025-03-10 PROCEDURE — 85025 COMPLETE CBC W/AUTO DIFF WBC: CPT | Performed by: STUDENT IN AN ORGANIZED HEALTH CARE EDUCATION/TRAINING PROGRAM

## 2025-03-10 PROCEDURE — 80053 COMPREHEN METABOLIC PANEL: CPT | Performed by: STUDENT IN AN ORGANIZED HEALTH CARE EDUCATION/TRAINING PROGRAM

## 2025-03-10 RX ORDER — FAMOTIDINE 20 MG/1
20 TABLET, FILM COATED ORAL DAILY
Status: DISCONTINUED | OUTPATIENT
Start: 2025-03-10 | End: 2025-03-13 | Stop reason: HOSPADM

## 2025-03-10 RX ORDER — ITRACONAZOLE 100 MG/1
200 CAPSULE ORAL DAILY
Status: DISCONTINUED | OUTPATIENT
Start: 2025-03-10 | End: 2025-03-13 | Stop reason: HOSPADM

## 2025-03-10 RX ORDER — ASPIRIN 81 MG/1
81 TABLET ORAL DAILY
Status: DISCONTINUED | OUTPATIENT
Start: 2025-03-10 | End: 2025-03-13 | Stop reason: HOSPADM

## 2025-03-10 RX ORDER — DEXLANSOPRAZOLE 60 MG/1
60 CAPSULE, DELAYED RELEASE ORAL DAILY
COMMUNITY

## 2025-03-10 RX ORDER — CYPROHEPTADINE HYDROCHLORIDE 4 MG/1
4 TABLET ORAL 2 TIMES DAILY PRN
COMMUNITY

## 2025-03-10 RX ORDER — AMLODIPINE BESYLATE 5 MG/1
5 TABLET ORAL DAILY
Status: DISCONTINUED | OUTPATIENT
Start: 2025-03-11 | End: 2025-03-12

## 2025-03-10 RX ORDER — HYDROCHLOROTHIAZIDE 25 MG/1
240 TABLET ORAL NIGHTLY
Status: DISCONTINUED | OUTPATIENT
Start: 2025-03-10 | End: 2025-03-13 | Stop reason: HOSPADM

## 2025-03-10 RX ORDER — PRAVASTATIN SODIUM 10 MG/1
20 TABLET ORAL NIGHTLY
Status: DISCONTINUED | OUTPATIENT
Start: 2025-03-10 | End: 2025-03-13 | Stop reason: HOSPADM

## 2025-03-10 RX ADMIN — ITRACONAZOLE 200 MG: 100 CAPSULE ORAL at 09:03

## 2025-03-10 RX ADMIN — FUROSEMIDE 40 MG: 10 INJECTION, SOLUTION INTRAVENOUS at 05:03

## 2025-03-10 RX ADMIN — ASPIRIN 81 MG: 81 TABLET, COATED ORAL at 02:03

## 2025-03-10 RX ADMIN — VERAPAMIL HYDROCHLORIDE 240 MG: 120 TABLET, FILM COATED, EXTENDED RELEASE ORAL at 08:03

## 2025-03-10 RX ADMIN — ANASTROZOLE 1 MG: 1 TABLET, COATED ORAL at 09:03

## 2025-03-10 RX ADMIN — ENOXAPARIN SODIUM 40 MG: 40 INJECTION SUBCUTANEOUS at 05:03

## 2025-03-10 RX ADMIN — PRAVASTATIN SODIUM 20 MG: 10 TABLET ORAL at 08:03

## 2025-03-10 RX ADMIN — FAMOTIDINE 20 MG: 20 TABLET, FILM COATED ORAL at 02:03

## 2025-03-10 RX ADMIN — ACETAMINOPHEN 650 MG: 325 TABLET, FILM COATED ORAL at 09:03

## 2025-03-10 RX ADMIN — FUROSEMIDE 40 MG: 10 INJECTION, SOLUTION INTRAVENOUS at 10:03

## 2025-03-10 NOTE — H&P
[facility] - Emergency Dept  [facility] MEDICINE - H&P ADMISSION NOTE      Patient Name: Laura Jacobs  MRN: [id number]  Patient Class: OP- Observation   Admission Date: 3/9/2025   Admitting Physician: ANIYAH Service   Attending Physician: Thairy G Reyes, DO  Primary Care Provider: Ruben Brooks Sr., MD  Face-to-Face encounter date: 03/09/2025        CHIEF COMPLAINT     Chief Complaint   Patient presents with    Shortness of Breath     Pt arrives [facility] from home c/o SOB x2 weeks. Hx of CHF & noncompliant with meds. Pt denies hx of COPD, cough, congestion, CP, & fever. BLE swelling noted in triage.        HISTORY OF PRESENTING ILLNESS   74 year old female with past medical history of heart failure, breast cancer, DVT (previously on Xarelto), HLD, and HTN presented to [facility] ED on 3/10/25 with complaint of worsened dyspnea for the last 2 weeks.  Patient poor historian, unable to obtain clear information regarding medication adherence.  PAST MEDICAL HISTORY     Past Medical History:   Diagnosis Date    Cancer     breast CA s/p chemo and L masectomy     Cardiomyopathy     CHF (congestive heart failure)     History of breast cancer     History of DVT (deep vein thrombosis)     HLD (hyperlipidemia)     Hypertension     Lymphedema     Osteoarthritis     Venous insufficiency        PAST SURGICAL HISTORY     Past Surgical History:   Procedure Laterality Date    HYSTERECTOMY      INSERTION OF PACEMAKER      INTRAMEDULLARY RODDING OF FEMUR Left 10/14/2022    Procedure: INSERTION, INTRAMEDULLARY СВЕТЛАНА, FEMUR;  Surgeon: Ankush Alex MD;  Location: [facility];  Service: Orthopedics;  Laterality: Left;    JOINT REPLACEMENT Bilateral     Knee    MASTECTOMY Left 2019       FAMILY HISTORY   Reviewed and noncontributory to this case    SOCIAL HISTORY   Social History[1]    HOME MEDICATIONS     Prior to Admission medications    Medication Sig Start Date End Date Taking? Authorizing Provider   anastrozole (ARIMIDEX) 1 mg Tab  Take 1 tablet by mouth once daily. 7/24/23   Provider, Historical   furosemide (LASIX) 40 MG tablet Take 1 tablet (40 mg total) by mouth once daily. 1/23/25 1/23/26  Landen Espinal MD   aspirin 81 MG Chew Take 1 tablet (81 mg total) by mouth once daily.  Patient not taking: Reported on 1/27/2025 1/23/25 3/9/25  Landen Espinal MD   celecoxib (CELEBREX) 200 MG capsule Take 200 mg by mouth once daily.  Patient not taking: Reported on 1/27/2025  3/9/25  Provider, Historical   dexlansoprazole (DEXILANT) 60 mg capsule Take 60 mg by mouth once daily.  Patient not taking: Reported on 1/27/2025  3/9/25  Provider, Historical   pravastatin (PRAVACHOL) 40 MG tablet Take 1 tablet (40 mg total) by mouth once daily.  Patient not taking: Reported on 1/27/2025 1/23/25 3/9/25  Landen Espinal MD   pregabalin (LYRICA) 50 MG capsule Take 1 capsule (50 mg total) by mouth 3 (three) times daily.  Patient not taking: Reported on 1/27/2025 1/23/25 3/9/25  Landen Espinal MD   verapamiL (CALAN) 80 MG tablet Take 1 tablet (80 mg total) by mouth 3 (three) times daily.  Patient not taking: Reported on 1/27/2025 1/23/25 3/9/25  Landen Espinal MD       ALLERGIES   Sulfa (sulfonamide antibiotics)    REVIEW OF SYSTEMS   Except as documented above, all other systems reviewed and negative    PHYSICAL EXAM     Vitals:    03/09/25 2202   BP: 101/76   Pulse: 86   Resp: 19   Temp:       General:  In no acute distress, resting comfortably  Head and neck:  Atraumatic, normocephalic, moist mucous membranes, supple neck  Chest:  Clear to auscultation bilaterally  Heart:  S1, S2, no appreciable murmur  Abdomen:  Soft, nontender, BS +  MSK:  Warm, trace lower extremity edema, no clubbing or cyanosis  Neuro:  Alert and oriented x4, moving all extremities with good strength  Integumentary:  Discolored skin patches that are pruritic    ASSESSMENT AND PLAN   Decompensated HFrEF  -echocardiogram from 01/20/2025 showed an EF of 35-40% with global hypokinesis and  severe regurgitation of the mitral valve  -Lasix 40 IV b.i.d. started in the emergency room, with almost 1 L output since  -at baseline patient takes 40 of Lasix daily  -echo, interrogate [employer] ICD    Tinea corporis   -KOH prep pending   -itraconazole x7 days started 3/10    [place]   -baseline creatinine 0.9-1.0   -presented with a creatinine of 1.2   -monitor renal function with diuresis    history of heart failure, breast cancer, DVT (previously on Xarelto), HLD, ICD ([employer]) and HTN     Critical care time greater than 35 minutes for decompensated heart failure    DVT prophylaxis:  Lovenox  Patient's screen for food insecurity, housing instability, transportation needs, utility difficulties, and interpersonal safety. No needs identified  __________________________________________________________________  LABS/MICRO/MEDS/DIAGNOSTICS       LABS  Recent Labs     03/09/25  1701      K 3.8   CO2 22*   BUN 25.9*   CREATININE 1.26*   GLUCOSE 103   CALCIUM 8.1*   ALKPHOS 172*   AST 23   ALT 31   ALBUMIN 3.2*     Recent Labs     03/09/25  1701   WBC 4.59   RBC 3.48*   HCT 34.6*   MCV 99.4*          MICROBIOLOGY  Microbiology Results (last 7 days)       ** No results found for the last 168 hours. **            MEDICATIONS   [START ON 3/10/2025] anastrozole  1 mg Oral Daily    enoxparin  40 mg Subcutaneous Daily    [START ON 3/10/2025] furosemide (LASIX) injection  40 mg Intravenous Q12H    magnesium sulfate 2 g IVPB  2 g Intravenous Once      INFUSIONS      DIAGNOSTIC TESTS  X-Ray Chest AP Portable   Final Result      No acute disease is seen or interval changes from the previous exam         Electronically signed by: Jaxon De Jesus MD   Date:    03/09/2025   Time:    17:46      US Abdomen Limited    (Results Pending)          Patient information was obtained from patient, patient's family, past medical records and ER records.   All diagnosis and differential diagnosis have been reviewed;  assessment and plan has been documented. I have personally reviewed the labs and test results that are presently available; I have reviewed the patients medication list. I have reviewed the consulting providers response and recommendations. I have reviewed or attempted to review medical records based upon their availability.  All of the patient's questions have been addressed and answered. Patient's is agreeable to the above stated plan. I will continue to monitor closely and make adjustments to medical management as needed.  This note was created using [employer] voice recognition software that occasionally misinterpreted phrases or words.  Please contact me if any questions may rise regarding documentation to clarify verbiage.        Thairy G Reyes, DO   Internal Medicine  Department of [facility] Medicine  [facility] - Emergency Dept         [1]   Social History  Socioeconomic History    Marital status: Single   Tobacco Use    Smoking status: Never    Smokeless tobacco: Never   Substance and Sexual Activity    Alcohol use: Not Currently    Drug use: Never    Sexual activity: Not Currently   Social History Narrative    ** Merged History Encounter **          Social Drivers of Health     Financial Resource Strain: Low Risk  (1/21/2025)    Overall Financial Resource Strain (CARDIA)     Difficulty of Paying Living Expenses: Not very hard   Food Insecurity: No Food Insecurity (1/21/2025)    Hunger Vital Sign     Worried About Running Out of Food in the Last Year: Never true     Ran Out of Food in the Last Year: Never true   Transportation Needs: No Transportation Needs (1/21/2025)    TRANSPORTATION NEEDS     Transportation : No   Physical Activity: Inactive (1/21/2025)    Exercise Vital Sign     Days of Exercise per Week: 0 days     Minutes of Exercise per Session: 0 min   Stress: No Stress Concern Present (1/21/2025)    [employer] - Occupational Stress Questionnaire     Feeling  of Stress : Not at all   Housing Stability: Unknown (1/21/2025)    Housing Stability Vital Sign     Unable to Pay for Housing in the Last Year: No     Homeless in the Last Year: No

## 2025-03-10 NOTE — PROGRESS NOTES
[facility] Medicine Progress Note        Chief Complaint: Inpatient Follow-up for     HPI:  72 do you female with past medical of heart failure, breast cancer, DVT, hypertension, hyperlipidemia admitted on March 9th with Congestive heart failure exacerbation patient was significantly tachypneic in the ER started on IV Lasix    Interval Hx:   Patient seen and examined this morning still has significant swelling in lower extremity  Case was discussed with patient's nurse and  on the floor.    Objective/physical exam:  General: In no acute distress, afebrile  Chest: Clear to auscultation bilaterally  Heart: RRR, +S1, S2, no appreciable murmur  Abdomen: Soft, nontender, BS +  MSK: Warm, 2 to 3+ edema in lower extremity   Neurologic: Alert and oriented x4,   VITAL SIGNS: 24 HRS MIN & MAX LAST   Temp  Min: 98.1 °F (36.7 °C)  Max: 98.1 °F (36.7 °C) 98.1 °F (36.7 °C)   BP  Min: 95/74  Max: 118/81 105/62   Pulse  Min: 77  Max: 102  98   Resp  Min: 14  Max: 24 14   SpO2  Min: 96 %  Max: 100 % 100 %     I have reviewed the following labs:  Recent Labs   Lab 03/09/25  1701 03/10/25  0633   WBC 4.59 4.12*   RBC 3.48* 3.49*   HGB 11.3* 11.6*   HCT 34.6* 34.7*   MCV 99.4* 99.4*   MCH 32.5* 33.2*   MCHC 32.7* 33.4   RDW 16.4 16.6    242   MPV 9.9 10.5*     Recent Labs   Lab 03/09/25  1701 03/10/25  0633    143   K 3.8 3.8    112*   CO2 22* 18*   BUN 25.9* 21.4*   CREATININE 1.26* 1.26*   CALCIUM 8.1* 7.9*   MG 1.60  --    ALBUMIN 3.2* 3.0*   ALKPHOS 172* 161*   ALT 31 24   AST 23 25   BILITOT 0.8 0.6     Microbiology Results (last 7 days)       Procedure Component Value Units Date/Time    ZAYRA Prep [[id number]] Collected: 03/10/25 0606    Order Status: Completed Specimen: Skin Updated: 03/10/25 0657     KOH Prep No fungal elements seen             See below for Radiology    Assessment/Plan:  Acute on chronic systolic heart failure  Nonischemic cardiomyopathy    Valvular heart disease moderate to severe MR   Essential hypertension   Tinea corporis   Mild acute kidney injury  Pulmonary hypertension   Hyperlipidemia  History of GONZALEZ on CPAP   History of breast cancer status post mastectomy   History of DVT   Osteoarthritis   Anemia     Continue with IV Lasix patient was still tachypneic though not requiring any oxygen at this time cardiology consulted strict input and output daily weights   Will have to keep a close watch on patient's creatinine this morning it is 1.26  2D echo has been ordered  Continue with other home medications that includes amlodipine, aspirin, statin, verapamil, anastrozole     Prophylaxis:  Lovenox    VTE prophylaxis:     Patient condition:  Stable/Fair/Guarded/ Serious/ Critical    Anticipated discharge and Disposition:         All diagnosis and differential diagnosis have been reviewed; assessment and plan has been documented; I have personally reviewed the labs and test results that are presently available; I have reviewed the patients medication list; I have reviewed the consulting providers response and recommendations. I have reviewed or attempted to review medical records based upon their availability    All of the patient's questions have been  addressed and answered. Patient's is agreeable to the above stated plan. I will continue to monitor closely and make adjustments to medical management as needed.    Portions of this note dictated using EMR integrated voice recognition software, and may be subject to voice recognition errors not corrected at proofreading. Please contact writer for clarification if needed.   _____________________________________________________________________    Malnutrition Status:  Nutrition consulted. Most recent weight and BMI monitored-     Measurements:  Wt Readings from Last 1 Encounters:   03/09/25 63.5 kg (140 lb)   Body mass index is 23.3 kg/m².    Patient has been screened and assessed by RD.    Malnutrition  Type:  Context:    Level:      Malnutrition Characteristic Summary:       Interventions/Recommendations (treatment strategy):        Scheduled Med:   [START ON 3/11/2025] amLODIPine  5 mg Oral Daily    anastrozole  1 mg Oral Daily    aspirin  81 mg Oral Daily    enoxparin  40 mg Subcutaneous Daily    famotidine  20 mg Oral Daily    furosemide (LASIX) injection  40 mg Intravenous Q12H    itraconazole  200 mg Oral Daily    pravastatin  20 mg Oral QHS    verapamiL  240 mg Oral Nightly      Continuous Infusions:     PRN Meds:    Current Facility-Administered Medications:     acetaminophen, 650 mg, Oral, Q4H PRN    albuterol-ipratropium, 3 mL, Nebulization, Q6H PRN    aluminum-magnesium hydroxide-simethicone, 30 mL, Oral, QID PRN    bisacodyL, 10 mg, Rectal, Daily PRN    dextrose 50%, 12.5 g, Intravenous, PRN    dextrose 50%, 25 g, Intravenous, PRN    glucagon (human recombinant), 1 mg, Intramuscular, PRN    glucose, 16 g, Oral, PRN    glucose, 24 g, Oral, PRN    HYDROcodone-acetaminophen, 1 tablet, Oral, Q6H PRN    melatonin, 9 mg, Oral, Nightly PRN    morphine, 2 mg, Intravenous, Q6H PRN    naloxone, 0.02 mg, Intravenous, PRN    ondansetron, 8 mg, Oral, Q8H PRN    ondansetron, 4 mg, Intravenous, Q8H PRN    polyethylene glycol, 17 g, Oral, TID PRN    simethicone, 1 tablet, Oral, QID PRN    sodium chloride 0.9%, 10 mL, Intravenous, PRN     Radiology:  I have personally reviewed the following imaging and agree with the radiologist.     US Abdomen Limited  EXAMINATION  US ABDOMEN LIMITED    CLINICAL HISTORY  elevated alp;    TECHNIQUE  Multiplanar grayscale sonographic evaluation of the right upper quadrant was performed, with focused Doppler assessment of the main portal vein.    COMPARISON  None available at the time of initial interpretation.    FINDINGS  Exam quality: Limited secondary to regional bowel gas and associated artifact and lack of adequate fasting prior to scan.    Pancreas: Poorly visualized secondary to  obscuring bowel gas and associated artifact.    Gallbladder: Gallbladder is inadequately due to nonfasting status, limiting detailed evaluation.  Within limitations, no shadowing stone or abnormal mural thickening is identified.  There is no pericholecystic fluid.  Sonographic Hernandez sign is reported negative.    Bile ducts: No intra- / extrahepatic biliary dilatation suggestive of obstructing pathology.    Liver: Size is within normal limits.  There is mildly irregular nodularity of the liver surface.  No discrete mass-like abnormality.  Unremarkable isoechoic appearance of the hepatic background, relative to the adjacent right kidney.  Normal hepatopetal flow is seen within the portal vein.    Other findings: No evidence of acute process or suspicious focal abnormality involving the right kidney; small simple anechoic cyst at the right upper renal pole measures up to 1.4 cm in diameter.  No free fluid identified. The visualized systemic vascular structures are unremarkable.    IMPRESSION  1. No convincing acute right upper quadrant process within limitations discussed above.  2. Nodular liver surface suggests component of cirrhosis.  3. Incidental simple right renal cyst.    Electronically signed by: Og Darby  Date:    03/10/2025  Time:    11:10      Nadia Hunter MD  Department of Hospital Medicine   [facility]   03/10/2025

## 2025-03-10 NOTE — CONSULTS
Inpatient consult to Cardiology  Consult performed by: Stevie Carrera FNP  Consult ordered by: Reyes, Thairy G, DO  Reason for consult: Heart Failure        [facility]    Cardiology  Consult Note    Patient Name: Laura Jacobs  MRN: [id number]  Admission Date: 3/9/2025  Hospital Length of Stay: 0 days  Code Status: Full Code   Attending Provider: Scooter Davis MD   Consulting Provider: MACHELLE Zimmerman  Primary Care Physician: Ruben Brooks Sr., MD  Principal Problem:Acute on chronic systolic (congestive) heart failure    Patient information was obtained from patient, past medical records, ER records, and primary team.     Subjective:   Chief Complaint/Reason for Consult: Heart Failure    HPI:   Ms. Jacobs is a 74 year old female, known to Dr. Dill, who presented to the hospital with worsening dyspnea for the prior 2 weeks. History of CHF with EF 30-35%. History of medication noncompliance. Hemodynamics stable. Afebrile. BNP 4380. Troponin normal. Labs Stable. CXR is grossly clear. Patient admitted to Hospital Medicine Team. Started on diuretics. [facility] consulted for HF Management.     PMH: Hypertension/HHD, Dyslipidemia, Palpitations, MO, Breast Cancer, CP, SOB/ROMO, Pulmonary Hypertension, GONZALEZ/CPAP, DVT (Xarelto), OA, CVI  PSH: Hysterectomy, Pacemaker, Fumur David Placement, Joint Replacement, Mastectomy  Family History: Father- Brain Aneurysm, Mother- Cancer, Sister- Hypertension  Social History: Tobacco- Negative, Alcohol- Negative, Substance Abuse- Negative    Previous Cardiac Diagnostics:   Echocardiogram (1.20.25):  Left Ventricle: The left ventricle is dilated. Normal wall thickness. Global hypokinesis present. There is moderately reduced systolic function with a visually estimated ejection fraction of 35 - 40%.  Right Ventricle: Normal right ventricular cavity size. Systolic function is normal.  Left Atrium: Left atrium is mildly dilated.  Right Atrium: Right  atrium is mildly dilated.  Aortic Valve: The aortic valve is a trileaflet valve. There is mild aortic valve sclerosis.  Mitral Valve: There is mitral annular calcification present. There is moderate to severe regurgitation.  Tricuspid Valve: There is mild regurgitation.  Pulmonic Valve: There is mild regurgitation.  IVC/SVC: Normal venous pressure at 3 mmHg.  Pericardium: There is a trivial effusion adjacent to the right atrium. No indication of cardiac tamponade.    Echocardiogram (3.20.24):  The study quality is average.   The left ventricle is normal in size. Global left ventricular systolic function is normal. The left ventricular ejection fraction is 55%. The left ventricle diastolic function is impaired (Grade I) with normal left atrial pressure.   Mild calcification of the aortic valve is noted with adequate cuspal excursion.  Mild mitral annular calcification is noted.   Mild (1+) mitral, tricuspid, and pulmonic regurgitation.   The pulmonary artery systolic pressure is 45 mmHg. Evidence of pulmonary hypertension is noted.     Carotid US (11.22.23):  The right internal carotid artery demonstrated no hemodynamically significant stenosis.  There is no substantial right side carotid plaque identified.  The left internal carotid artery demonstrated no hemodynamically significant stenosis.  There is a minimal amount of plaque in the left carotid bulb.  The right vertebral artery is patent with antegrade flow.  The left vertebral artery is patent with antegrade flow.    AMANDA (9.14.22):  LV systolic function, LVEF 30-35%.  Severe global hypokinesis  Mild Tricuspid regurgitation  Moderate to severe Mitral regurgitation  Mild aortic regurgitation.  There is Lambl's excrescences on aortic leaflets that is benign fibrous structure.   No evidence of infection, vegetation or abscess    SICD Placement (3.25.21):  SICD Placement Re: Primary Prevention of SCD.     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     ETT (6.9.20):  Stress EKG is normal.   Maximal exercise treadmill test (MPHR : 97 %).  The functional capacity is poor (4.6 METs).  The heart rate recovery is normal.   The study quality is below average.     Venogram (10.17.18):  No Critical Venous Compression Noted.     Review of patient's allergies indicates:   Allergen Reactions    Sulfa (sulfonamide antibiotics)      Patient unsure if allergic to Sulfa     No current facility-administered medications on file prior to encounter.     Current Outpatient Medications on File Prior to Encounter   Medication Sig    anastrozole (ARIMIDEX) 1 mg Tab Take 1 tablet by mouth once daily.    furosemide (LASIX) 40 MG tablet Take 1 tablet (40 mg total) by mouth once daily.     Review of Systems   Respiratory:  Positive for shortness of breath. Negative for chest tightness.    Cardiovascular:  Negative for chest pain.   All other systems reviewed and are negative.    Objective:     Vital Signs (Most Recent):  Temp: 98.1 °F (36.7 °C) (03/09/25 1634)  Pulse: 84 (03/10/25 0716)  Resp: 14 (03/10/25 0716)  BP: 98/72 (03/10/25 0716)  SpO2: 99 % (03/10/25 0716) Vital Signs (24h Range):  Temp:  [98.1 °F (36.7 °C)] 98.1 °F (36.7 °C)  Pulse:  [] 84  Resp:  [14-24] 14  SpO2:  [96 %-100 %] 99 %  BP: ()/(69-81) 98/72   Weight: 63.5 kg (140 lb)  Body mass index is 23.3 kg/m².  SpO2: 99 %       Intake/Output Summary (Last 24 hours) at 3/10/2025 0734  Last data filed at 3/9/2025 2054  Gross per 24 hour   Intake --   Output 900 ml   Net -900 ml     Lines/Drains/Airways       Peripheral Intravenous Line  Duration                  Peripheral IV - Single Lumen  "03/09/25 1710 20 G Anterior;Right Upper Arm <1 day                  Significant Labs:   Chemistries:   Recent Labs   Lab 03/09/25  1701 03/10/25  0633    143   K 3.8 3.8    112*   CO2 22* 18*   BUN 25.9* 21.4*   CREATININE 1.26* 1.26*   CALCIUM 8.1* 7.9*   BILITOT 0.8 0.6   ALKPHOS 172* 161*   ALT 31 24   AST 23 25   GLUCOSE 103 94   MG 1.60  --    TROPONINI 0.021  --         CBC/Anemia Labs: Coags:    Recent Labs   Lab 03/09/25  1701 03/10/25  0633   WBC 4.59 4.12*   HGB 11.3* 11.6*   HCT 34.6* 34.7*    242   MCV 99.4* 99.4*   RDW 16.4 16.6    No results for input(s): "PT", "INR", "APTT" in the last 168 hours.     Significant Imaging:  Imaging Results              US Abdomen Limited (In process)    Procedure changed from US Abdomen Complete                    X-Ray Chest AP Portable (Final result)  Result time 03/09/25 17:46:32      Final result by Jaxon De Jesus MD (03/09/25 17:46:32)                   Impression:      No acute disease is seen or interval changes from the previous exam      Electronically signed by: Jaxon De Jesus MD  Date:    03/09/2025  Time:    17:46               Narrative:    EXAMINATION:  XR CHEST AP PORTABLE    CLINICAL HISTORY:  shortness of breath;    TECHNIQUE:  Single frontal view of the chest was performed.    COMPARISON:  02/26/2025    FINDINGS:  Port-A-Cath in place.  Pacing device is in place.  Heart is enlarged.  There is vascular calcification noted.                                    EKG:       Telemetry:  SR    Physical Exam  Vitals and nursing note reviewed.   Constitutional:       Appearance: Normal appearance.   HENT:      Head: Normocephalic.      Mouth/Throat:      Mouth: Mucous membranes are moist.      Pharynx: Oropharynx is clear.   Cardiovascular:      Rate and Rhythm: Normal rate and regular rhythm.   Pulmonary:      Effort: Pulmonary effort is normal. No respiratory distress.      Breath sounds: Normal breath sounds.   Abdominal:      Palpations: " Abdomen is soft.   Musculoskeletal:         General: Normal range of motion.   Skin:     General: Skin is warm and dry.   Neurological:      Mental Status: She is alert. Mental status is at baseline.   Psychiatric:         Behavior: Behavior normal.       Home Medications:   Medications Ordered Prior to Encounter[1]  Current Schedule Inpatient Medications:   anastrozole  1 mg Oral Daily    enoxparin  40 mg Subcutaneous Daily    furosemide (LASIX) injection  40 mg Intravenous Q12H    itraconazole  200 mg Oral Daily       Assessment:   Acute on Chronic Systolic HF    - EF 35-40% (Echo 1/2025)    - ETT (6/202): Normal  Nonischemic Cardiomyopathy    - Status Post SICD    - Normal Coronaries in 2021  Valvular Heart Disease    - MR: Moderate to Severe  Hypertension/Hypertensive Heart Disease   Pulmonary Hypertension  Dyslipidemia  Chronic VI/Edema- Lymphedema  GONZALEZ/CPAP  History of Breast Cancer/Mastectomy  History of DVT (Previously Treated with Xarelto)  OA  Anemia    Plan:   Continue Diuretics as clinically Indicated  Ensure Accurate I/O & Daily Weights    Thank you for your consult.     MACHELLE Zimmerman  Cardiology  [facility]     Cont iv diuresis  Daily X ray    I agree with the findings of the complexity of problems addressed and take responsibility for the plan's risks and complications. I approved the plan documented by Stevie Carrera NP.          [1]   No current facility-administered medications on file prior to encounter.     Current Outpatient Medications on File Prior to Encounter   Medication Sig Dispense Refill    anastrozole (ARIMIDEX) 1 mg Tab Take 1 tablet by mouth once daily.      furosemide (LASIX) 40 MG tablet Take 1 tablet (40 mg total) by mouth once daily. 30 tablet 11

## 2025-03-11 LAB
ANION GAP SERPL CALC-SCNC: 10 MEQ/L
BUN SERPL-MCNC: 20.4 MG/DL (ref 9.8–20.1)
CALCIUM SERPL-MCNC: 7.7 MG/DL (ref 8.4–10.2)
CHLORIDE SERPL-SCNC: 111 MMOL/L (ref 98–107)
CO2 SERPL-SCNC: 24 MMOL/L (ref 23–31)
CREAT SERPL-MCNC: 1.13 MG/DL (ref 0.55–1.02)
CREAT/UREA NIT SERPL: 18
GFR SERPLBLD CREATININE-BSD FMLA CKD-EPI: 52 ML/MIN/1.73/M2
GLUCOSE SERPL-MCNC: 95 MG/DL (ref 82–115)
MAGNESIUM SERPL-MCNC: 1.5 MG/DL (ref 1.6–2.6)
POTASSIUM SERPL-SCNC: 3.3 MMOL/L (ref 3.5–5.1)
SODIUM SERPL-SCNC: 145 MMOL/L (ref 136–145)

## 2025-03-11 PROCEDURE — 25000003 PHARM REV CODE 250: Performed by: NURSE PRACTITIONER

## 2025-03-11 PROCEDURE — 63600175 PHARM REV CODE 636 W HCPCS: Performed by: STUDENT IN AN ORGANIZED HEALTH CARE EDUCATION/TRAINING PROGRAM

## 2025-03-11 PROCEDURE — 36415 COLL VENOUS BLD VENIPUNCTURE: CPT | Performed by: NURSE PRACTITIONER

## 2025-03-11 PROCEDURE — 25000003 PHARM REV CODE 250: Performed by: STUDENT IN AN ORGANIZED HEALTH CARE EDUCATION/TRAINING PROGRAM

## 2025-03-11 PROCEDURE — 80048 BASIC METABOLIC PNL TOTAL CA: CPT | Performed by: NURSE PRACTITIONER

## 2025-03-11 PROCEDURE — 83735 ASSAY OF MAGNESIUM: CPT | Performed by: NURSE PRACTITIONER

## 2025-03-11 PROCEDURE — 21400001 HC TELEMETRY ROOM

## 2025-03-11 PROCEDURE — 63600175 PHARM REV CODE 636 W HCPCS: Performed by: NURSE PRACTITIONER

## 2025-03-11 PROCEDURE — 11000001 HC ACUTE MED/SURG PRIVATE ROOM

## 2025-03-11 RX ORDER — MAGNESIUM SULFATE HEPTAHYDRATE 40 MG/ML
2 INJECTION, SOLUTION INTRAVENOUS ONCE
Status: COMPLETED | OUTPATIENT
Start: 2025-03-11 | End: 2025-03-11

## 2025-03-11 RX ORDER — POTASSIUM CHLORIDE 20 MEQ/1
40 TABLET, EXTENDED RELEASE ORAL ONCE
Status: COMPLETED | OUTPATIENT
Start: 2025-03-11 | End: 2025-03-11

## 2025-03-11 RX ORDER — POTASSIUM CHLORIDE 20 MEQ/1
20 TABLET, EXTENDED RELEASE ORAL ONCE
Status: COMPLETED | OUTPATIENT
Start: 2025-03-11 | End: 2025-03-11

## 2025-03-11 RX ADMIN — AMLODIPINE BESYLATE 5 MG: 5 TABLET ORAL at 09:03

## 2025-03-11 RX ADMIN — ASPIRIN 81 MG: 81 TABLET, COATED ORAL at 09:03

## 2025-03-11 RX ADMIN — POTASSIUM CHLORIDE 40 MEQ: 1500 TABLET, EXTENDED RELEASE ORAL at 09:03

## 2025-03-11 RX ADMIN — POTASSIUM CHLORIDE 20 MEQ: 1500 TABLET, EXTENDED RELEASE ORAL at 01:03

## 2025-03-11 RX ADMIN — FUROSEMIDE 40 MG: 10 INJECTION, SOLUTION INTRAVENOUS at 09:03

## 2025-03-11 RX ADMIN — FAMOTIDINE 20 MG: 20 TABLET, FILM COATED ORAL at 09:03

## 2025-03-11 RX ADMIN — ANASTROZOLE 1 MG: 1 TABLET, COATED ORAL at 09:03

## 2025-03-11 RX ADMIN — PRAVASTATIN SODIUM 20 MG: 10 TABLET ORAL at 09:03

## 2025-03-11 RX ADMIN — ENOXAPARIN SODIUM 40 MG: 40 INJECTION SUBCUTANEOUS at 04:03

## 2025-03-11 RX ADMIN — MAGNESIUM SULFATE HEPTAHYDRATE 2 G: 40 INJECTION, SOLUTION INTRAVENOUS at 11:03

## 2025-03-11 RX ADMIN — ITRACONAZOLE 200 MG: 100 CAPSULE ORAL at 09:03

## 2025-03-11 RX ADMIN — MAGNESIUM SULFATE HEPTAHYDRATE 2 G: 40 INJECTION, SOLUTION INTRAVENOUS at 09:03

## 2025-03-11 NOTE — PROGRESS NOTES
[facility]    Cardiology  Progress Note    Patient Name: Laura Jacobs  MRN: [id number]  Admission Date: 3/9/2025  Hospital Length of Stay: 1 days  Code Status: Full Code   Attending Provider: Nadia Hunter MD   Consulting Provider: MACHELLE Zimmerman  Primary Care Physician: Ruben Brooks Sr., MD  Principal Problem:Acute on chronic systolic (congestive) heart failure    Patient information was obtained from patient, past medical records, ER records, and primary team.     Subjective:   Chief Complaint/Reason for Consult: Heart Failure    HPI:   Ms. Jacobs is a 74 year old female, known to Dr. Dill, who presented to the hospital with worsening dyspnea for the prior 2 weeks. History of CHF with EF 30-35%. History of medication noncompliance. Hemodynamics stable. Afebrile. BNP 4380. Troponin normal. Labs Stable. CXR is grossly clear. Patient admitted to Hospital Medicine Team. Started on diuretics. [facility] consulted for HF Management.     Hospital Course:  3.11.25: NAD Noted. Diuresing well - 2300 ML/24 Hours. BP Stable. SR on Tele. Denies SOB but still concerned about her leg edema.    PMH: Hypertension/HHD, Dyslipidemia, Palpitations, MO, Breast Cancer, CP, SOB/ROMO, Pulmonary Hypertension, GONZALEZ/CPAP, DVT (Xarelto), OA, CVI  PSH: Hysterectomy, Pacemaker, Fumur David Placement, Joint Replacement, Mastectomy  Family History: Father- Brain Aneurysm, Mother- Cancer, Sister- Hypertension  Social History: Tobacco- Negative, Alcohol- Negative, Substance Abuse- Negative    Previous Cardiac Diagnostics:   Echocardiogram (1.20.25):  Left Ventricle: The left ventricle is dilated. Normal wall thickness. Global hypokinesis present. There is moderately reduced systolic function with a visually estimated ejection fraction of 35 - 40%.  Right Ventricle: Normal right ventricular cavity size. Systolic function is normal.  Left Atrium: Left atrium is mildly dilated.  Right Atrium: Right atrium is  mildly dilated.  Aortic Valve: The aortic valve is a trileaflet valve. There is mild aortic valve sclerosis.  Mitral Valve: There is mitral annular calcification present. There is moderate to severe regurgitation.  Tricuspid Valve: There is mild regurgitation.  Pulmonic Valve: There is mild regurgitation.  IVC/SVC: Normal venous pressure at 3 mmHg.  Pericardium: There is a trivial effusion adjacent to the right atrium. No indication of cardiac tamponade.    Echocardiogram (3.20.24):  The study quality is average.   The left ventricle is normal in size. Global left ventricular systolic function is normal. The left ventricular ejection fraction is 55%. The left ventricle diastolic function is impaired (Grade I) with normal left atrial pressure.   Mild calcification of the aortic valve is noted with adequate cuspal excursion.  Mild mitral annular calcification is noted.   Mild (1+) mitral, tricuspid, and pulmonic regurgitation.   The pulmonary artery systolic pressure is 45 mmHg. Evidence of pulmonary hypertension is noted.     Carotid US (11.22.23):  The right internal carotid artery demonstrated no hemodynamically significant stenosis.  There is no substantial right side carotid plaque identified.  The left internal carotid artery demonstrated no hemodynamically significant stenosis.  There is a minimal amount of plaque in the left carotid bulb.  The right vertebral artery is patent with antegrade flow.  The left vertebral artery is patent with antegrade flow.    AMANDA (9.14.22):  LV systolic function, LVEF 30-35%.  Severe global hypokinesis  Mild Tricuspid regurgitation  Moderate to severe Mitral regurgitation  Mild aortic regurgitation.  There is Lambl's excrescences on aortic leaflets that is benign fibrous structure.   No evidence of infection, vegetation or abscess    SICD Placement (3.25.21):  SICD Placement Re: Primary Prevention of SCD.     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     ETT (6.9.20):  Stress EKG is normal.   Maximal exercise treadmill test (MPHR : 97 %).  The functional capacity is poor (4.6 METs).  The heart rate recovery is normal.   The study quality is below average.     Venogram (10.17.18):  No Critical Venous Compression Noted.     Review of patient's allergies indicates:   Allergen Reactions    Sulfa (sulfonamide antibiotics)      Patient unsure if allergic to Sulfa     No current facility-administered medications on file prior to encounter.     Current Outpatient Medications on File Prior to Encounter   Medication Sig    anastrozole (ARIMIDEX) 1 mg Tab Take 1 tablet by mouth once daily.    apixaban (ELIQUIS) 5 mg Tab Take 5 mg by mouth 2 (two) times daily.    cyproheptadine (PERIACTIN) 4 mg tablet Take 4 mg by mouth 2 (two) times daily as needed.    dexlansoprazole (DEXILANT) 60 mg capsule Take 60 mg by mouth once daily.    furosemide (LASIX) 40 MG tablet Take 1 tablet (40 mg total) by mouth once daily.    sacubitriL-valsartan (ENTRESTO) 24-26 mg per tablet Take 1 tablet by mouth 2 (two) times daily.     Review of Systems   Respiratory:  Negative for chest tightness and shortness of breath.    Cardiovascular:  Positive for leg swelling. Negative for chest pain.   All other systems reviewed and are negative.    Objective:     Vital Signs (Most Recent):  Temp: 97.8 °F (36.6 °C) (03/11/25 0735)  Pulse: 82 (03/11/25 0735)  Resp: 18 (03/10/25 1602)  BP: 100/71 (03/11/25 0735)  SpO2: 98 % (03/11/25 0735) Vital Signs (24h Range):  Temp:  [97.6 °F (36.4 °C)-98.1 °F (36.7 °C)] 97.8 °F (36.6 °C)  Pulse:  [71-98] 82  Resp:  [14-18] 18  SpO2:  [96 %-100 %] 98 %  BP:  "()/(56-83) 100/71   Weight: 78.8 kg (173 lb 11.6 oz)  Body mass index is 28.91 kg/m².  SpO2: 98 %       Intake/Output Summary (Last 24 hours) at 3/11/2025 0739  Last data filed at 3/11/2025 0148  Gross per 24 hour   Intake --   Output 2300 ml   Net -2300 ml     Lines/Drains/Airways       Peripheral Intravenous Line  Duration                  Peripheral IV - Single Lumen 03/09/25 1710 20 G Anterior;Right Upper Arm 1 day                  Significant Labs:   Chemistries:   Recent Labs   Lab 03/09/25  1701 03/10/25  0633 03/11/25  0414    143 145   K 3.8 3.8 3.3*    112* 111*   CO2 22* 18* 24   BUN 25.9* 21.4* 20.4*   CREATININE 1.26* 1.26* 1.13*   CALCIUM 8.1* 7.9* 7.7*   BILITOT 0.8 0.6  --    ALKPHOS 172* 161*  --    ALT 31 24  --    AST 23 25  --    GLUCOSE 103 94 95   MG 1.60  --  1.50*   TROPONINI 0.021  --   --         CBC/Anemia Labs: Coags:    Recent Labs   Lab 03/09/25  1701 03/10/25  0633   WBC 4.59 4.12*   HGB 11.3* 11.6*   HCT 34.6* 34.7*    242   MCV 99.4* 99.4*   RDW 16.4 16.6    No results for input(s): "PT", "INR", "APTT" in the last 168 hours.     Significant Imaging:  Imaging Results              US Abdomen Limited (Final result)  Result time 03/10/25 11:10:23   Procedure changed from US Abdomen Complete     Final result by Og Darby MD (03/10/25 11:10:23)                   Narrative:    EXAMINATION  US ABDOMEN LIMITED    CLINICAL HISTORY  elevated alp;    TECHNIQUE  Multiplanar grayscale sonographic evaluation of the right upper quadrant was performed, with focused Doppler assessment of the main portal vein.    COMPARISON  None available at the time of initial interpretation.    FINDINGS  Exam quality: Limited secondary to regional bowel gas and associated artifact and lack of adequate fasting prior to scan.    Pancreas: Poorly visualized secondary to obscuring bowel gas and associated artifact.    Gallbladder: Gallbladder is inadequately due to nonfasting status, " limiting detailed evaluation.  Within limitations, no shadowing stone or abnormal mural thickening is identified.  There is no pericholecystic fluid.  Sonographic Hernandez sign is reported negative.    Bile ducts: No intra- / extrahepatic biliary dilatation suggestive of obstructing pathology.    Liver: Size is within normal limits.  There is mildly irregular nodularity of the liver surface.  No discrete mass-like abnormality.  Unremarkable isoechoic appearance of the hepatic background, relative to the adjacent right kidney.  Normal hepatopetal flow is seen within the portal vein.    Other findings: No evidence of acute process or suspicious focal abnormality involving the right kidney; small simple anechoic cyst at the right upper renal pole measures up to 1.4 cm in diameter.  No free fluid identified. The visualized systemic vascular structures are unremarkable.    IMPRESSION  1. No convincing acute right upper quadrant process within limitations discussed above.  2. Nodular liver surface suggests component of cirrhosis.  3. Incidental simple right renal cyst.      Electronically signed by: Og Darby  Date:    03/10/2025  Time:    11:10                                     X-Ray Chest AP Portable (Final result)  Result time 03/09/25 17:46:32      Final result by Jaxon De Jesus MD (03/09/25 17:46:32)                   Impression:      No acute disease is seen or interval changes from the previous exam      Electronically signed by: Jaxon De Jesus MD  Date:    03/09/2025  Time:    17:46               Narrative:    EXAMINATION:  XR CHEST AP PORTABLE    CLINICAL HISTORY:  shortness of breath;    TECHNIQUE:  Single frontal view of the chest was performed.    COMPARISON:  02/26/2025    FINDINGS:  Port-A-Cath in place.  Pacing device is in place.  Heart is enlarged.  There is vascular calcification noted.                                      Telemetry:  SR    Physical Exam  Vitals and nursing note reviewed.    Constitutional:       Appearance: Normal appearance.   HENT:      Head: Normocephalic.   Cardiovascular:      Rate and Rhythm: Normal rate and regular rhythm.      Heart sounds: Murmur heard.   Pulmonary:      Effort: Pulmonary effort is normal. No respiratory distress.      Comments: Posterior Bilateral Bases Diminished.   Abdominal:      Palpations: Abdomen is soft.      Tenderness: There is no guarding.   Musculoskeletal:         General: Normal range of motion.      Right lower leg: Edema present.      Left lower leg: Edema present.      Comments: Bilateral Lower Extremities Warm   Skin:     General: Skin is warm and dry.   Neurological:      Mental Status: She is alert. Mental status is at baseline.   Psychiatric:         Behavior: Behavior normal.       Home Medications:   Medications Ordered Prior to Encounter[1]  Current Schedule Inpatient Medications:   amLODIPine  5 mg Oral Daily    anastrozole  1 mg Oral Daily    aspirin  81 mg Oral Daily    enoxparin  40 mg Subcutaneous Daily    famotidine  20 mg Oral Daily    furosemide (LASIX) injection  40 mg Intravenous Q12H    itraconazole  200 mg Oral Daily    magnesium sulfate 2 g IVPB  2 g Intravenous Once    Followed by    magnesium sulfate 2 g IVPB  2 g Intravenous Once    potassium chloride  20 mEq Oral Once    potassium chloride  40 mEq Oral Once    pravastatin  20 mg Oral QHS    verapamiL  240 mg Oral Nightly       Assessment:   Acute on Chronic Systolic HF    - ETT (6/202): Normal  Nonischemic Cardiomyopathy    - EF 35-40% (Echo 1/2025)    - Status Post SICD    - Normal Coronaries in 2021  Valvular Heart Disease    - MR: Moderate to Severe  Hypertension/Hypertensive Heart Disease (BP Controlled)  Pulmonary Hypertension  Dyslipidemia    - On Statin  Chronic VI/Edema- Lymphedema  GONZALEZ/CPAP  History of Breast Cancer/Mastectomy  History of DVT (Previously Treated with Xarelto)  OA  Anemia  Hypokalemia/Hypomagnesemia    Plan:   Continue Diuretics as clinically  Indicated  Ensure Accurate I/O & Daily Weights  Replete Mag & K+  Check Chest XR This AM  Consider transition away from Amlodipine given lower extremity edema.    Thank you for your consult.     MACHELLE Zimmerman  Cardiology  [facility]     Continue Diuretics  Monitor I's and Os  Will sign off for now    I agree with the findings of the complexity of problems addressed and take responsibility for the plan's risks and complications. I approved the plan documented by Stevie Carrera NP.            [1]   No current facility-administered medications on file prior to encounter.     Current Outpatient Medications on File Prior to Encounter   Medication Sig Dispense Refill    anastrozole (ARIMIDEX) 1 mg Tab Take 1 tablet by mouth once daily.      apixaban (ELIQUIS) 5 mg Tab Take 5 mg by mouth 2 (two) times daily.      cyproheptadine (PERIACTIN) 4 mg tablet Take 4 mg by mouth 2 (two) times daily as needed.      dexlansoprazole (DEXILANT) 60 mg capsule Take 60 mg by mouth once daily.      furosemide (LASIX) 40 MG tablet Take 1 tablet (40 mg total) by mouth once daily. 30 tablet 11    sacubitriL-valsartan (ENTRESTO) 24-26 mg per tablet Take 1 tablet by mouth 2 (two) times daily.

## 2025-03-11 NOTE — PROGRESS NOTES
[facility] Medicine Progress Note        Chief Complaint: Inpatient Follow-up for     HPI: 71 y/o female with past medical of heart failure, breast cancer, DVT, hypertension, hyperlipidemia admitted on March 9th with Congestive heart failure exacerbation patient was significantly tachypneic in the ER started on IV Lasix     Interval Hx:   Patient seen and examined this morning saturating well  Case was discussed with patient's nurse and  on the floor.    Objective/physical exam:  General: In no acute distress, afebrile  Chest: Clear to auscultation bilaterally  Heart: RRR, +S1, S2, no appreciable murmur  Abdomen: Soft, nontender, BS +  MSK: Warm, 1+ edema in bilateral lower extremities  Neurologic: Alert and oriented x4,   VITAL SIGNS: 24 HRS MIN & MAX LAST   Temp  Min: 97.6 °F (36.4 °C)  Max: 98.1 °F (36.7 °C) 97.8 °F (36.6 °C)   BP  Min: 83/56  Max: 113/72 100/71   Pulse  Min: 71  Max: 98  82   Resp  Min: 14  Max: 18 18   SpO2  Min: 96 %  Max: 100 % 98 %     I have reviewed the following labs:  Recent Labs   Lab 03/09/25  1701 03/10/25  0633   WBC 4.59 4.12*   RBC 3.48* 3.49*   HGB 11.3* 11.6*   HCT 34.6* 34.7*   MCV 99.4* 99.4*   MCH 32.5* 33.2*   MCHC 32.7* 33.4   RDW 16.4 16.6    242   MPV 9.9 10.5*     Recent Labs   Lab 03/09/25  1701 03/10/25  0633 03/11/25  0414    143 145   K 3.8 3.8 3.3*    112* 111*   CO2 22* 18* 24   BUN 25.9* 21.4* 20.4*   CREATININE 1.26* 1.26* 1.13*   CALCIUM 8.1* 7.9* 7.7*   MG 1.60  --  1.50*   ALBUMIN 3.2* 3.0*  --    ALKPHOS 172* 161*  --    ALT 31 24  --    AST 23 25  --    BILITOT 0.8 0.6  --      Microbiology Results (last 7 days)       Procedure Component Value Units Date/Time    ZAYRA Prep [[id number]] Collected: 03/10/25 0606    Order Status: Completed Specimen: Skin Updated: 03/10/25 0657     KOH Prep No fungal elements seen             See below for Radiology    Assessment/Plan:  Acute on chronic systolic  heart failure  Nonischemic cardiomyopathy   Hypokalemia and hypomagnesemia  Valvular heart disease moderate to severe MR   Essential hypertension   Tinea corporis   Mild acute kidney injury  Pulmonary hypertension   Hyperlipidemia  History of GONZALEZ on CPAP   History of breast cancer status post mastectomy   History of DVT   Osteoarthritis   Anemia        Continue with Lasix, repeat chest x-ray showed no significant change lower extremity swelling is improving continue with Lasix for 1 more day   Potassium and Mag low it was supplemented  Continue with strict input and output daily weights  Creatinine is improving today  2D echo has been ordered  Continue with other home medications that includes amlodipine, aspirin, statin, verapamil, anastrozole     Potential discharge in a.m.     Prophylaxis:  Lovenox    VTE prophylaxis:     Patient condition:  Stable/Fair/Guarded/ Serious/ Critical    Anticipated discharge and Disposition:         All diagnosis and differential diagnosis have been reviewed; assessment and plan has been documented; I have personally reviewed the labs and test results that are presently available; I have reviewed the patients medication list; I have reviewed the consulting providers response and recommendations. I have reviewed or attempted to review medical records based upon their availability    All of the patient's questions have been  addressed and answered. Patient's is agreeable to the above stated plan. I will continue to monitor closely and make adjustments to medical management as needed.    Portions of this note dictated using EMR integrated voice recognition software, and may be subject to voice recognition errors not corrected at proofreading. Please contact writer for clarification if needed.   _____________________________________________________________________    Malnutrition Status:  Nutrition consulted. Most recent weight and BMI monitored-     Measurements:  Wt Readings from Last  1 Encounters:   03/11/25 78.8 kg (173 lb 11.6 oz)   Body mass index is 28.91 kg/m².    Patient has been screened and assessed by RD.    Malnutrition Type:  Context:    Level:      Malnutrition Characteristic Summary:       Interventions/Recommendations (treatment strategy):        Scheduled Med:   amLODIPine  5 mg Oral Daily    anastrozole  1 mg Oral Daily    aspirin  81 mg Oral Daily    enoxparin  40 mg Subcutaneous Daily    famotidine  20 mg Oral Daily    furosemide (LASIX) injection  40 mg Intravenous Q12H    itraconazole  200 mg Oral Daily    magnesium sulfate 2 g IVPB  2 g Intravenous Once    Followed by    magnesium sulfate 2 g IVPB  2 g Intravenous Once    potassium chloride  20 mEq Oral Once    potassium chloride  40 mEq Oral Once    pravastatin  20 mg Oral QHS    verapamiL  240 mg Oral Nightly      Continuous Infusions:     PRN Meds:    Current Facility-Administered Medications:     acetaminophen, 650 mg, Oral, Q4H PRN    albuterol-ipratropium, 3 mL, Nebulization, Q6H PRN    aluminum-magnesium hydroxide-simethicone, 30 mL, Oral, QID PRN    bisacodyL, 10 mg, Rectal, Daily PRN    dextrose 50%, 12.5 g, Intravenous, PRN    dextrose 50%, 25 g, Intravenous, PRN    glucagon (human recombinant), 1 mg, Intramuscular, PRN    glucose, 16 g, Oral, PRN    glucose, 24 g, Oral, PRN    HYDROcodone-acetaminophen, 1 tablet, Oral, Q6H PRN    melatonin, 9 mg, Oral, Nightly PRN    morphine, 2 mg, Intravenous, Q6H PRN    naloxone, 0.02 mg, Intravenous, PRN    ondansetron, 8 mg, Oral, Q8H PRN    ondansetron, 4 mg, Intravenous, Q8H PRN    polyethylene glycol, 17 g, Oral, TID PRN    simethicone, 1 tablet, Oral, QID PRN    sodium chloride 0.9%, 10 mL, Intravenous, PRN     Radiology:  I have personally reviewed the following imaging and agree with the radiologist.     X-Ray Chest 1 View  Narrative: EXAMINATION:  XR CHEST 1 VIEW    CPT 47798    CLINICAL HISTORY:  Heart Failure;    COMPARISON:  March 9, 2025    FINDINGS:  Examination  reveals cardiomediastinal silhouette and pleuroparenchymal changes to be essentially unchanged as compared with the previous exam  Impression: No significant change    Electronically signed by: Joe Easley  Date:    03/11/2025  Time:    08:28      Nadia Hunter MD  Department of Hospital Medicine   [facility]   03/11/2025

## 2025-03-12 LAB
ANION GAP SERPL CALC-SCNC: 12 MEQ/L
APICAL FOUR CHAMBER EJECTION FRACTION: 25 %
APICAL TWO CHAMBER EJECTION FRACTION: 22 %
AV INDEX (PROSTH): 0.46
AV MEAN GRADIENT: 1 MMHG
AV PEAK GRADIENT: 2 MMHG
AV VALVE AREA BY VELOCITY RATIO: 1.1 CM²
AV VALVE AREA: 1.2 CM²
AV VELOCITY RATIO: 0.43
BSA FOR ECHO PROCEDURE: 1.71 M2
BUN SERPL-MCNC: 22 MG/DL (ref 9.8–20.1)
CALCIUM SERPL-MCNC: 7.9 MG/DL (ref 8.4–10.2)
CHLORIDE SERPL-SCNC: 108 MMOL/L (ref 98–107)
CO2 SERPL-SCNC: 22 MMOL/L (ref 23–31)
CREAT SERPL-MCNC: 1.23 MG/DL (ref 0.55–1.02)
CREAT/UREA NIT SERPL: 18
CV ECHO LV RWT: 0.25 CM
DOP CALC AO PEAK VEL: 0.7 M/S
DOP CALC AO VTI: 10.2 CM
DOP CALC LVOT AREA: 2.5 CM2
DOP CALC LVOT DIAMETER: 1.8 CM
DOP CALC LVOT PEAK VEL: 0.3 M/S
DOP CALC LVOT STROKE VOLUME: 12 CM3
DOP CALC MV VTI: 18.2 CM
DOP CALCLVOT PEAK VEL VTI: 4.7 CM
E WAVE DECELERATION TIME: 119 MSEC
E/A RATIO: 2.54
E/E' RATIO: 23 M/S
ECHO LV POSTERIOR WALL: 0.7 CM (ref 0.6–1.1)
FRACTIONAL SHORTENING: 7.3 % (ref 28–44)
GFR SERPLBLD CREATININE-BSD FMLA CKD-EPI: 47 ML/MIN/1.73/M2
GLUCOSE SERPL-MCNC: 86 MG/DL (ref 82–115)
INTERVENTRICULAR SEPTUM: 0.9 CM (ref 0.6–1.1)
LEFT ATRIUM AREA SYSTOLIC (APICAL 4 CHAMBER): 18 CM2
LEFT ATRIUM SIZE: 4.3 CM
LEFT INTERNAL DIMENSION IN SYSTOLE: 5.1 CM (ref 2.1–4)
LEFT VENTRICLE DIASTOLIC VOLUME INDEX: 79.03 ML/M2
LEFT VENTRICLE DIASTOLIC VOLUME: 147 ML
LEFT VENTRICLE END DIASTOLIC VOLUME APICAL 2 CHAMBER: 160 ML
LEFT VENTRICLE END DIASTOLIC VOLUME APICAL 4 CHAMBER: 142 ML
LEFT VENTRICLE END SYSTOLIC VOLUME APICAL 4 CHAMBER: 52.8 ML
LEFT VENTRICLE MASS INDEX: 86 G/M2
LEFT VENTRICLE SYSTOLIC VOLUME INDEX: 66.7 ML/M2
LEFT VENTRICLE SYSTOLIC VOLUME: 124 ML
LEFT VENTRICULAR INTERNAL DIMENSION IN DIASTOLE: 5.5 CM (ref 3.5–6)
LEFT VENTRICULAR MASS: 160 G
LV LATERAL E/E' RATIO: 16.7 M/S
LV SEPTAL E/E' RATIO: 39 M/S
LVED V (TEICH): 147 ML
LVES V (TEICH): 124 ML
LVOT MG: 0 MMHG
LVOT MV: 0.23 CM/S
MAGNESIUM SERPL-MCNC: 2.3 MG/DL (ref 1.6–2.6)
MR PISA EROA: 0.5 CM2
MV MEAN GRADIENT: 2 MMHG
MV PEAK A VEL: 0.46 M/S
MV PEAK E VEL: 1.17 M/S
MV PEAK GRADIENT: 5 MMHG
MV VALVE AREA BY CONTINUITY EQUATION: 0.66 CM2
OHS CV RV/LV RATIO: 0.62 CM
OHS LV EJECTION FRACTION SIMPSONS BIPLANE MOD: 23 %
PISA MRMAX VEL: 3.49 M/S
PISA RADIUS: 1.1 CM
PISA TR MAX VEL: 2.3 M/S
PISA VN NYQUIST MS: 0.23 M/S
PISA VN NYQUIST: 0.23 M/S
POTASSIUM SERPL-SCNC: 4.2 MMOL/L (ref 3.5–5.1)
RA PRESSURE ESTIMATED: 15 MMHG
RA WIDTH: 5.6 CM
RIGHT VENTRICLE DIASTOLIC BASEL DIMENSION: 3.4 CM
RIGHT VENTRICULAR END-DIASTOLIC DIMENSION: 3.4 CM
RV TB RVSP: 17 MMHG
SODIUM SERPL-SCNC: 142 MMOL/L (ref 136–145)
TDI LATERAL: 0.07 M/S
TDI SEPTAL: 0.03 M/S
TDI: 0.05 M/S
TR MAX PG: 22 MMHG
TRICUSPID ANNULAR PLANE SYSTOLIC EXCURSION: 1.37 CM
TV REST PULMONARY ARTERY PRESSURE: 36 MMHG
Z-SCORE OF LEFT VENTRICULAR DIMENSION IN END DIASTOLE: 0.61
Z-SCORE OF LEFT VENTRICULAR DIMENSION IN END SYSTOLE: 3.7

## 2025-03-12 PROCEDURE — 11000001 HC ACUTE MED/SURG PRIVATE ROOM

## 2025-03-12 PROCEDURE — 25000003 PHARM REV CODE 250: Performed by: NURSE PRACTITIONER

## 2025-03-12 PROCEDURE — 83735 ASSAY OF MAGNESIUM: CPT | Performed by: NURSE PRACTITIONER

## 2025-03-12 PROCEDURE — 25000003 PHARM REV CODE 250: Performed by: STUDENT IN AN ORGANIZED HEALTH CARE EDUCATION/TRAINING PROGRAM

## 2025-03-12 PROCEDURE — 80048 BASIC METABOLIC PNL TOTAL CA: CPT | Performed by: NURSE PRACTITIONER

## 2025-03-12 PROCEDURE — 63600175 PHARM REV CODE 636 W HCPCS: Performed by: STUDENT IN AN ORGANIZED HEALTH CARE EDUCATION/TRAINING PROGRAM

## 2025-03-12 PROCEDURE — 36415 COLL VENOUS BLD VENIPUNCTURE: CPT | Performed by: NURSE PRACTITIONER

## 2025-03-12 PROCEDURE — 21400001 HC TELEMETRY ROOM

## 2025-03-12 RX ORDER — FUROSEMIDE 20 MG/1
20 TABLET ORAL DAILY
Status: DISCONTINUED | OUTPATIENT
Start: 2025-03-12 | End: 2025-03-13 | Stop reason: HOSPADM

## 2025-03-12 RX ADMIN — FAMOTIDINE 20 MG: 20 TABLET, FILM COATED ORAL at 09:03

## 2025-03-12 RX ADMIN — ANASTROZOLE 1 MG: 1 TABLET, COATED ORAL at 09:03

## 2025-03-12 RX ADMIN — ITRACONAZOLE 200 MG: 100 CAPSULE ORAL at 09:03

## 2025-03-12 RX ADMIN — ENOXAPARIN SODIUM 40 MG: 40 INJECTION SUBCUTANEOUS at 05:03

## 2025-03-12 RX ADMIN — ASPIRIN 81 MG: 81 TABLET, COATED ORAL at 09:03

## 2025-03-12 RX ADMIN — PRAVASTATIN SODIUM 20 MG: 10 TABLET ORAL at 09:03

## 2025-03-12 NOTE — PLAN OF CARE
03/12/25 1237   Discharge Assessment   Assessment Type Discharge Planning Assessment   Source of Information patient   When was your last doctors appointment?   (Ruben Torres MD is PCP)   Communicated AMANDA with patient/caregiver Date not available/Unable to determine   Reason For Admission Acute or Chronic Systolic Congestive Heart Failure   People in Home alone   Do you expect to return to your current living situation? Yes   Do you have help at home or someone to help you manage your care at home? No  (son checks on her and  Neice helps her)   Prior to hospitilization cognitive status: Unable to Assess   Current cognitive status: Alert/Oriented   Walking or Climbing Stairs Difficulty yes   Walking or Climbing Stairs ambulation difficulty, requires equipment   Mobility Management uses rolling walker to ambulate   Dressing/Bathing Difficulty no   Home Layout Able to live on 1st floor   Equipment Currently Used at Home cane, straight;walker, rolling   Readmission within 30 days? No   Patient currently being followed by outpatient case management? No   Do you currently have service(s) that help you manage your care at home? No   Do you take prescription medications? Yes   Do you have prescription coverage? Yes   Coverage [employer] Managed Medicare   Do you have any problems affording any of your prescribed medications? No   Is the patient taking medications as prescribed? yes   Who is going to help you get home at discharge? son   How do you get to doctors appointments? family or friend will provide  (Veronika Powell)   Are you on dialysis? No   Do you take coumadin? No   Discharge Plan A Home;Home Health   Discharge Plan B Home   DME Needed Upon Discharge  none   Discharge Plan discussed with: Patient   Transition of Care Barriers None   Physical Activity   On average, how many days per week do you engage in moderate to strenuous exercise (like a brisk walk)? 0 days   On average, how many minutes do  you engage in exercise at this level? 0 min   Financial Resource Strain   How hard is it for you to pay for the very basics like food, housing, medical care, and heating? Not hard   Housing Stability   In the last 12 months, was there a time when you were not able to pay the mortgage or rent on time? N   At any time in the past 12 months, were you homeless or living in a shelter (including now)? N   Transportation Needs   Has the lack of transportation kept you from medical appointments, meetings, work or from getting things needed for daily living? No   Food Insecurity   Within the past 12 months, you worried that your food would run out before you got the money to buy more. Never true   Within the past 12 months, the food you bought just didn't last and you didn't have money to get more. Never true   Stress   Do you feel stress - tense, restless, nervous, or anxious, or unable to sleep at night because your mind is troubled all the time - these days? Only a littl   Social Isolation   How often do you feel lonely or isolated from those around you?  Never   Alcohol Use   Q1: How often do you have a drink containing alcohol? Never   Q2: How many drinks containing alcohol do you have on a typical day when you are drinking? None   Q3: How often do you have six or more drinks on one occasion? Never   [employer]   In the past 12 months has the electric, gas, oil, or water company threatened to shut off services in your home? No   Health Literacy   How often do you need to have someone help you when you read instructions, pamphlets, or other written material from your doctor or pharmacy? Never     Pharmacy is [facility] on [facility]

## 2025-03-12 NOTE — PROGRESS NOTES
[facility] Medicine Progress Note        Chief Complaint: Inpatient Follow-up for     HPI: 71 y/o female with past medical of heart failure, breast cancer, DVT, hypertension, hyperlipidemia admitted on March 9th with Congestive heart failure exacerbation patient was significantly tachypneic in the ER started on IV Lasix     Interval Hx:   Patient seen and examined this morning saturating well on room air but overnight patient was hypotensive with lowest blood pressure dropping to 81 x 61 the patient remained asymptomatic all blood pressure medication and Lasix was held  Case was discussed with patient's nurse and  on the floor.    Objective/physical exam:  General: In no acute distress, afebrile  Chest: Clear to auscultation bilaterally  Heart: RRR, +S1, S2, no appreciable murmur  Abdomen: Soft, nontender, BS +  MSK: Warm, 1+ edema in bilateral lower extremities  Neurologic: Alert and oriented x4,   VITAL SIGNS: 24 HRS MIN & MAX LAST   Temp  Min: 97.7 °F (36.5 °C)  Max: 98.2 °F (36.8 °C) 98 °F (36.7 °C)   BP  Min: 81/61  Max: 93/56 91/67   Pulse  Min: 84  Max: 97  89   Resp  Min: 16  Max: 18 18   SpO2  Min: 96 %  Max: 99 % 96 %     I have reviewed the following labs:  Recent Labs   Lab 03/09/25  1701 03/10/25  0633   WBC 4.59 4.12*   RBC 3.48* 3.49*   HGB 11.3* 11.6*   HCT 34.6* 34.7*   MCV 99.4* 99.4*   MCH 32.5* 33.2*   MCHC 32.7* 33.4   RDW 16.4 16.6    242   MPV 9.9 10.5*     Recent Labs   Lab 03/09/25  1701 03/10/25  0633 03/11/25  0414 03/12/25  0558    143 145 142   K 3.8 3.8 3.3* 4.2    112* 111* 108*   CO2 22* 18* 24 22*   BUN 25.9* 21.4* 20.4* 22.0*   CREATININE 1.26* 1.26* 1.13* 1.23*   CALCIUM 8.1* 7.9* 7.7* 7.9*   MG 1.60  --  1.50* 2.30   ALBUMIN 3.2* 3.0*  --   --    ALKPHOS 172* 161*  --   --    ALT 31 24  --   --    AST 23 25  --   --    BILITOT 0.8 0.6  --   --      Microbiology Results (last 7 days)       Procedure Component  Value Units Date/Time    ZAYRA Prep [[id number]] Collected: 03/10/25 0606    Order Status: Completed Specimen: Skin Updated: 03/10/25 0657     KOH Prep No fungal elements seen             See below for Radiology    Assessment/Plan:  Acute on chronic systolic and diastolic heart failure  Nonischemic cardiomyopathy   Hypokalemia and hypomagnesemia resolved  Valvular heart disease moderate to severe MR   Essential hypertension   Tinea corporis   Mild acute kidney injury  Pulmonary hypertension   Hyperlipidemia  History of GONZALEZ on CPAP   History of breast cancer status post mastectomy   History of DVT   Osteoarthritis   Anemia      I will discontinue amlodipine we will switch to p.o. Lasix at this time I do not want to give any fluids because patient is volume overloaded but we have to balance the dose of Lasix as patient still has lower extremity swelling and chest x-ray done yesterday showed similar appearance of pulmonary edema  We will be very slow in diuresing if systolic blood pressure is close to 100 then we will start on p.o. Lasix and see how patient's blood pressure stays  Creatinine is improving   Potassium and Mag has been back to normal  Continue with Lasix, repeat chest x-ray showed no significant change lower extremity swelling is improving continue with Lasix for 1 more day   Potassium and Mag low it was supplemented  Continue with strict input and output daily weights  Creatinine is 1.23 today   2D echo shows EF of 25-30% with diastolic dysfunction   Continue with other home medications that includes aspirin, statin, verapamil, anastrozole     Potential discharge if patient can tolerate p.o. Lasix and her blood pressure stays stable     Prophylaxis:  Lovenox    VTE prophylaxis:     Patient condition:  Stable/Fair/Guarded/ Serious/ Critical    Anticipated discharge and Disposition:         All diagnosis and differential diagnosis have been reviewed; assessment and plan has been documented; I have personally  reviewed the labs and test results that are presently available; I have reviewed the patients medication list; I have reviewed the consulting providers response and recommendations. I have reviewed or attempted to review medical records based upon their availability    All of the patient's questions have been  addressed and answered. Patient's is agreeable to the above stated plan. I will continue to monitor closely and make adjustments to medical management as needed.    Portions of this note dictated using EMR integrated voice recognition software, and may be subject to voice recognition errors not corrected at proofreading. Please contact writer for clarification if needed.   _____________________________________________________________________    Malnutrition Status:  Nutrition consulted. Most recent weight and BMI monitored-     Measurements:  Wt Readings from Last 1 Encounters:   03/11/25 78.8 kg (173 lb 11.6 oz)   Body mass index is 28.91 kg/m².    Patient has been screened and assessed by RD.    Malnutrition Type:  Context:    Level:      Malnutrition Characteristic Summary:       Interventions/Recommendations (treatment strategy):        Scheduled Med:   amLODIPine  5 mg Oral Daily    anastrozole  1 mg Oral Daily    aspirin  81 mg Oral Daily    enoxparin  40 mg Subcutaneous Daily    famotidine  20 mg Oral Daily    furosemide (LASIX) injection  40 mg Intravenous Q12H    itraconazole  200 mg Oral Daily    pravastatin  20 mg Oral QHS    verapamiL  240 mg Oral Nightly      Continuous Infusions:     PRN Meds:    Current Facility-Administered Medications:     acetaminophen, 650 mg, Oral, Q4H PRN    albuterol-ipratropium, 3 mL, Nebulization, Q6H PRN    aluminum-magnesium hydroxide-simethicone, 30 mL, Oral, QID PRN    bisacodyL, 10 mg, Rectal, Daily PRN    dextrose 50%, 12.5 g, Intravenous, PRN    dextrose 50%, 25 g, Intravenous, PRN    glucagon (human recombinant), 1 mg, Intramuscular, PRN    glucose, 16 g, Oral,  PRN    glucose, 24 g, Oral, PRN    HYDROcodone-acetaminophen, 1 tablet, Oral, Q6H PRN    melatonin, 9 mg, Oral, Nightly PRN    morphine, 2 mg, Intravenous, Q6H PRN    naloxone, 0.02 mg, Intravenous, PRN    ondansetron, 8 mg, Oral, Q8H PRN    ondansetron, 4 mg, Intravenous, Q8H PRN    polyethylene glycol, 17 g, Oral, TID PRN    simethicone, 1 tablet, Oral, QID PRN    sodium chloride 0.9%, 10 mL, Intravenous, PRN     Radiology:  I have personally reviewed the following imaging and agree with the radiologist.     Echo    Left Ventricle: The left ventricle is dilated. Severe global   hypokinesis present. There is severely reduced systolic function with a   visually estimated ejection fraction of 25 - 30%. Grade III diastolic   dysfunction. Elevated left ventricular filling pressure.    Right Ventricle: The right ventricle is normal in size. Systolic   function is moderately reduced.    Left Atrium: dilated    Right Atrium: Right atrium is dilated.    Aortic Valve: The aortic valve is a trileaflet valve. 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  endocarditis   can not be completely ruled out.  Clinical correlation recommended. There   is trace aortic regurgitation.    Mitral Valve: Mildly thickened leaflets. There is annular dilation.   There is severe regurgitation with a centrally directed jet. MV EROA by   PISA is 0.50 cm2.    Tricuspid Valve: There is moderate regurgitation.    IVC/SVC: Elevated venous pressure at 15 mmHg.      Nadia Hunter MD  Department of Hospital Medicine   [facility]   03/12/2025

## 2025-03-12 NOTE — PROGRESS NOTES
Inpatient Nutrition Evaluation    Admit Date: 3/9/2025   Total duration of encounter: 3 days   Patient Age: 74 y.o.    Nutrition Recommendation/Prescription     -Continue Diet Heart Healthy Low Sodium,2gm; Fluid - 1500mL as tolerated.   -Monitor wt, labs, and intake.     Nutrition Assessment     Chart Review    Reason Seen: continuous nutrition monitoring    Malnutrition Screening Tool Results              Diagnosis:  Acute on chronic systolic heart failure  Nonischemic cardiomyopathy   Hypokalemia and hypomagnesemia  Valvular heart disease moderate to severe MR   Essential hypertension   Tinea corporis   Mild acute kidney injury  Pulmonary hypertension   Hyperlipidemia  History of GONZALEZ on CPAP   History of breast cancer status post mastectomy   History of DVT   Osteoarthritis   Anemia     Relevant Medical History:   Cancer   Cardiomyopathy   CHF (congestive heart failure)   History of breast cancer   History of DVT (deep vein thrombosis)   HLD (hyperlipidemia)   Hypertension   Lymphedema   Osteoarthritis   Venous insufficiency  Breast Cancer s/p chemo       Scheduled Medications:  amLODIPine, 5 mg, Daily  anastrozole, 1 mg, Daily  aspirin, 81 mg, Daily  enoxparin, 40 mg, Daily  famotidine, 20 mg, Daily  furosemide (LASIX) injection, 40 mg, Q12H  itraconazole, 200 mg, Daily  pravastatin, 20 mg, QHS  verapamiL, 240 mg, Nightly    Continuous Infusions:   PRN Medications:   Current Facility-Administered Medications:     acetaminophen, 650 mg, Oral, Q4H PRN    albuterol-ipratropium, 3 mL, Nebulization, Q6H PRN    aluminum-magnesium hydroxide-simethicone, 30 mL, Oral, QID PRN    bisacodyL, 10 mg, Rectal, Daily PRN    dextrose 50%, 12.5 g, Intravenous, PRN    dextrose 50%, 25 g, Intravenous, PRN    glucagon (human recombinant), 1 mg, Intramuscular, PRN    glucose, 16 g, Oral, PRN    glucose, 24 g, Oral, PRN    HYDROcodone-acetaminophen, 1 tablet, Oral, Q6H PRN    melatonin, 9 mg, Oral, Nightly PRN    morphine, 2 mg,  "Intravenous, Q6H PRN    naloxone, 0.02 mg, Intravenous, PRN    ondansetron, 8 mg, Oral, Q8H PRN    ondansetron, 4 mg, Intravenous, Q8H PRN    polyethylene glycol, 17 g, Oral, TID PRN    simethicone, 1 tablet, Oral, QID PRN    sodium chloride 0.9%, 10 mL, Intravenous, PRN    Recent Labs   Lab 03/09/25  1701 03/10/25  0633 03/11/25  0414 03/12/25  0558    143 145 142   K 3.8 3.8 3.3* 4.2   CALCIUM 8.1* 7.9* 7.7* 7.9*   MG 1.60  --  1.50* 2.30    112* 111* 108*   CO2 22* 18* 24 22*   BUN 25.9* 21.4* 20.4* 22.0*   CREATININE 1.26* 1.26* 1.13* 1.23*   EGFRNORACEVR 45 45 52 47   GLUCOSE 103 94 95 86   BILITOT 0.8 0.6  --   --    ALKPHOS 172* 161*  --   --    ALT 31 24  --   --    AST 23 25  --   --    ALBUMIN 3.2* 3.0*  --   --    WBC 4.59 4.12*  --   --    HGB 11.3* 11.6*  --   --    HCT 34.6* 34.7*  --   --      Nutrition Orders:  Diet Heart Healthy Low Sodium,2gm; Fluid - 1500mL      Appetite/Oral Intake: fair/50-75% of meals  Factors Affecting Nutritional Intake: none identified  Food/Judaism/Cultural Preferences: unable to obtain  Food Allergies: no known food allergies  Last Bowel Movement: 03/09/25  Wound(s):  skin intact per EMR    Comments    3/12/25: Pt eating ~ half of meals; noted weight gain per EMR; pt out of room during rounds; will f/u early with pt.     Anthropometrics    Height: 5' 5" (165.1 cm), Height Method: Stated  Last Weight: 78.8 kg (173 lb 11.6 oz) (03/11/25 0547), Weight Method: Bed Scale  BMI (Calculated): 28.9  BMI Classification: overweight (BMI 25-29.9)     Ideal Body Weight (IBW), Female: 125 lb     % Ideal Body Weight, Female (lb): 112 %                             Usual Weight Provided By: EMR weight history    Wt Readings from Last 5 Encounters:   03/11/25 78.8 kg (173 lb 11.6 oz)   01/19/25 59 kg (130 lb)   01/25/24 59 kg (130 lb)   11/26/23 64.9 kg (143 lb 1.6 oz)   03/31/23 54.9 kg (121 lb)     Weight Change(s) Since Admission:   Wt Readings from Last 1 Encounters: "   03/11/25 0547 78.8 kg (173 lb 11.6 oz)   03/09/25 1634 63.5 kg (140 lb)   Admit Weight: 63.5 kg (140 lb) (03/09/25 1634), Weight Method: Bed Scale    Patient Education     Not applicable.    Nutrition Goals & Monitoring     Dietitian will monitor: energy intake and weight    Nutrition Risk/Follow-Up: low (follow-up in 5-7 days)  Patients assigned 'low nutrition risk' status do not qualify for a full nutritional assessment but will be monitored and re-evaluated in a 5-7 day time period. Please consult if re-evaluation needed sooner.

## 2025-03-13 VITALS
HEIGHT: 65 IN | OXYGEN SATURATION: 97 % | BODY MASS INDEX: 30.08 KG/M2 | RESPIRATION RATE: 18 BRPM | DIASTOLIC BLOOD PRESSURE: 88 MMHG | TEMPERATURE: 98 F | WEIGHT: 180.56 LBS | SYSTOLIC BLOOD PRESSURE: 98 MMHG | HEART RATE: 88 BPM

## 2025-03-13 PROCEDURE — 25000003 PHARM REV CODE 250: Performed by: NURSE PRACTITIONER

## 2025-03-13 PROCEDURE — 25000003 PHARM REV CODE 250: Performed by: INTERNAL MEDICINE

## 2025-03-13 PROCEDURE — 25000003 PHARM REV CODE 250: Performed by: STUDENT IN AN ORGANIZED HEALTH CARE EDUCATION/TRAINING PROGRAM

## 2025-03-13 RX ORDER — VERAPAMIL HCL 240 MG
240 TABLET, EXTENDED RELEASE ORAL NIGHTLY
Qty: 30 TABLET | Refills: 11 | Status: ON HOLD | OUTPATIENT
Start: 2025-03-13 | End: 2025-03-19 | Stop reason: HOSPADM

## 2025-03-13 RX ORDER — FAMOTIDINE 20 MG/1
20 TABLET, FILM COATED ORAL DAILY
Qty: 30 TABLET | Refills: 11 | Status: SHIPPED | OUTPATIENT
Start: 2025-03-14 | End: 2026-03-14

## 2025-03-13 RX ORDER — PRAVASTATIN SODIUM 20 MG/1
20 TABLET ORAL NIGHTLY
Qty: 90 TABLET | Refills: 3 | Status: SHIPPED | OUTPATIENT
Start: 2025-03-13 | End: 2026-03-13

## 2025-03-13 RX ORDER — ASPIRIN 81 MG/1
81 TABLET ORAL DAILY
Qty: 90 TABLET | Refills: 3 | Status: SHIPPED | OUTPATIENT
Start: 2025-03-14 | End: 2026-03-14

## 2025-03-13 RX ORDER — ITRACONAZOLE 100 MG/1
200 CAPSULE ORAL DAILY
Qty: 8 CAPSULE | Refills: 0 | Status: ON HOLD | OUTPATIENT
Start: 2025-03-10 | End: 2025-03-19 | Stop reason: HOSPADM

## 2025-03-13 RX ORDER — FUROSEMIDE 20 MG/1
20 TABLET ORAL DAILY
Qty: 30 TABLET | Refills: 11 | Status: SHIPPED | OUTPATIENT
Start: 2025-03-14 | End: 2026-03-14

## 2025-03-13 RX ADMIN — ASPIRIN 81 MG: 81 TABLET, COATED ORAL at 09:03

## 2025-03-13 RX ADMIN — ANASTROZOLE 1 MG: 1 TABLET, COATED ORAL at 09:03

## 2025-03-13 RX ADMIN — FUROSEMIDE 20 MG: 20 TABLET ORAL at 09:03

## 2025-03-13 RX ADMIN — FAMOTIDINE 20 MG: 20 TABLET, FILM COATED ORAL at 09:03

## 2025-03-13 RX ADMIN — ITRACONAZOLE 200 MG: 100 CAPSULE ORAL at 09:03

## 2025-03-13 NOTE — PLAN OF CARE
Problem: Adult Inpatient Plan of Care  Goal: Plan of Care Review  Outcome: Progressing  Flowsheets (Taken 3/13/2025 1967)  Plan of Care Reviewed With: patient  Goal: Optimal Comfort and Wellbeing  Outcome: Progressing     Problem: Infection  Goal: Absence of Infection Signs and Symptoms  Outcome: Progressing     Problem: Fall Injury Risk  Goal: Absence of Fall and Fall-Related Injury  Outcome: Progressing

## 2025-03-14 ENCOUNTER — PATIENT OUTREACH (OUTPATIENT)
Dept: ADMINISTRATIVE | Facility: CLINIC | Age: 75
End: 2025-03-14
Payer: MEDICARE

## 2025-03-14 ENCOUNTER — LAB VISIT (OUTPATIENT)
Dept: LAB | Facility: HOSPITAL | Age: 75
End: 2025-03-14
Attending: NURSE PRACTITIONER
Payer: MEDICARE

## 2025-03-14 DIAGNOSIS — E78.5 HYPERLIPIDEMIA, UNSPECIFIED HYPERLIPIDEMIA TYPE: Primary | ICD-10-CM

## 2025-03-14 LAB
ANION GAP SERPL CALC-SCNC: 13 MEQ/L
BUN SERPL-MCNC: 33.9 MG/DL (ref 9.8–20.1)
CALCIUM SERPL-MCNC: 8.5 MG/DL (ref 8.4–10.2)
CHLORIDE SERPL-SCNC: 103 MMOL/L (ref 98–107)
CO2 SERPL-SCNC: 22 MMOL/L (ref 23–31)
CREAT SERPL-MCNC: 1.37 MG/DL (ref 0.55–1.02)
CREAT/UREA NIT SERPL: 25
GFR SERPLBLD CREATININE-BSD FMLA CKD-EPI: 41 ML/MIN/1.73/M2
GLUCOSE SERPL-MCNC: 98 MG/DL (ref 82–115)
POTASSIUM SERPL-SCNC: 4.6 MMOL/L (ref 3.5–5.1)
SODIUM SERPL-SCNC: 138 MMOL/L (ref 136–145)

## 2025-03-14 PROCEDURE — 80048 BASIC METABOLIC PNL TOTAL CA: CPT

## 2025-03-14 PROCEDURE — 36415 COLL VENOUS BLD VENIPUNCTURE: CPT

## 2025-03-14 NOTE — PROGRESS NOTES
C3 nurse attempted to contact Laura Jacobs for a TCC post hospital discharge follow up call. No answer. No voicemail available. The patient has a scheduled HOSFU appointment with Ruben Brooks Sr., MD (Internal Medicine) on 3/20/2025 @9:30am.

## 2025-03-15 ENCOUNTER — HOSPITAL ENCOUNTER (INPATIENT)
Facility: HOSPITAL | Age: 75
LOS: 4 days | Discharge: HOME HEALTH | DRG: 871 | End: 2025-03-19
Attending: EMERGENCY MEDICINE | Admitting: EMERGENCY MEDICINE
Payer: MEDICARE

## 2025-03-15 DIAGNOSIS — E83.42 HYPOMAGNESEMIA: ICD-10-CM

## 2025-03-15 DIAGNOSIS — K64.8 INTERNAL HEMORRHOIDS: ICD-10-CM

## 2025-03-15 DIAGNOSIS — I50.23 ACUTE ON CHRONIC SYSTOLIC CONGESTIVE HEART FAILURE: ICD-10-CM

## 2025-03-15 DIAGNOSIS — I95.9 ACUTE HYPOTENSION: Primary | ICD-10-CM

## 2025-03-15 DIAGNOSIS — E83.51 HYPOCALCEMIA: ICD-10-CM

## 2025-03-15 DIAGNOSIS — I50.23 ACUTE ON CHRONIC SYSTOLIC (CONGESTIVE) HEART FAILURE: ICD-10-CM

## 2025-03-15 DIAGNOSIS — Z00.00 ROUTINE CHECK-UP: ICD-10-CM

## 2025-03-15 DIAGNOSIS — K62.5 RECTAL BLEEDING: ICD-10-CM

## 2025-03-15 DIAGNOSIS — D64.9 ANEMIA, UNSPECIFIED TYPE: ICD-10-CM

## 2025-03-15 DIAGNOSIS — K85.90 ACUTE PANCREATITIS, UNSPECIFIED COMPLICATION STATUS, UNSPECIFIED PANCREATITIS TYPE: ICD-10-CM

## 2025-03-15 LAB
ABO + RH BLD: NORMAL
ABO + RH BLD: NORMAL
ABS NEUT (OLG): 2.47 X10(3)/MCL (ref 2.1–9.2)
ACANTHOCYTES (OLG): ABNORMAL
ACCEPTIBLE SP GR UR QL: >1.05 (ref 1–1.03)
ALBUMIN SERPL-MCNC: 0.8 G/DL (ref 3.4–4.8)
ALBUMIN SERPL-MCNC: 2.7 G/DL (ref 3.4–4.8)
ALBUMIN/GLOB SERPL: 0.9 RATIO (ref 1.1–2)
ALBUMIN/GLOB SERPL: 1 RATIO (ref 1.1–2)
ALLENS TEST BLOOD GAS (OHS): ABNORMAL
ALLENS TEST BLOOD GAS (OHS): ABNORMAL
ALLENS TEST BLOOD GAS (OHS): YES
ALLENS TEST BLOOD GAS (OHS): YES
ALP SERPL-CCNC: 198 UNIT/L (ref 40–150)
ALP SERPL-CCNC: 54 UNIT/L (ref 40–150)
ALT SERPL-CCNC: 157 UNIT/L (ref 0–55)
ALT SERPL-CCNC: 16 UNIT/L (ref 0–55)
AMMONIA PLAS-MSCNC: 32.7 UMOL/L (ref 18–72)
AMPHET UR QL SCN: NEGATIVE
ANION GAP SERPL CALC-SCNC: 18 MEQ/L
ANION GAP SERPL CALC-SCNC: 19 MEQ/L
ANION GAP SERPL CALC-SCNC: 20 MEQ/L
ANISOCYTOSIS BLD QL SMEAR: ABNORMAL
APAP SERPL-MCNC: <3 UG/ML (ref 10–30)
APICAL FOUR CHAMBER EJECTION FRACTION: 21 %
APTT PPP: 32.8 SECONDS (ref 23.2–33.7)
AST SERPL-CCNC: 13 UNIT/L (ref 5–34)
AST SERPL-CCNC: 214 UNIT/L (ref 5–34)
AV INDEX (PROSTH): 0.61
AV MEAN GRADIENT: 3 MMHG
AV PEAK GRADIENT: 5 MMHG
AV VALVE AREA BY VELOCITY RATIO: 1.6 CM²
AV VALVE AREA: 1.5 CM²
AV VELOCITY RATIO: 0.64
BACTERIA #/AREA URNS AUTO: ABNORMAL /HPF
BARBITURATE SCN PRESENT UR: NEGATIVE
BASE EXCESS BLD CALC-SCNC: -13.4 MMOL/L (ref -2–2)
BASE EXCESS BLD CALC-SCNC: -13.5 MMOL/L (ref -2–2)
BASE EXCESS BLD CALC-SCNC: -5.5 MMOL/L (ref -2–2)
BASE EXCESS BLD CALC-SCNC: 4.3 MMOL/L (ref -2–2)
BENZODIAZ UR QL SCN: NEGATIVE
BILIRUB SERPL-MCNC: 0.3 MG/DL
BILIRUB SERPL-MCNC: 2.5 MG/DL
BILIRUB UR QL STRIP.AUTO: NEGATIVE
BLD PROD TYP BPU: NORMAL
BLD PROD TYP BPU: NORMAL
BLOOD GAS SAMPLE TYPE (OHS): ABNORMAL
BLOOD UNIT EXPIRATION DATE: NORMAL
BLOOD UNIT EXPIRATION DATE: NORMAL
BLOOD UNIT TYPE CODE: 9500
BLOOD UNIT TYPE CODE: 9500
BNP BLD-MCNC: 3198.8 PG/ML
BSA FOR ECHO PROCEDURE: 1.93 M2
BUN SERPL-MCNC: 11.9 MG/DL (ref 9.8–20.1)
BUN SERPL-MCNC: 31.6 MG/DL (ref 9.8–20.1)
BUN SERPL-MCNC: 32.5 MG/DL (ref 9.8–20.1)
BURR CELLS (OLG): ABNORMAL
CA-I BLD-SCNC: 1.03 MMOL/L (ref 1.12–1.23)
CA-I BLD-SCNC: 1.04 MMOL/L (ref 1.12–1.23)
CA-I BLD-SCNC: 1.04 MMOL/L (ref 1.12–1.23)
CA-I BLD-SCNC: 1.09 MMOL/L (ref 1.12–1.23)
CALCIUM SERPL-MCNC: 6 MG/DL (ref 8.4–10.2)
CALCIUM SERPL-MCNC: 8 MG/DL (ref 8.4–10.2)
CALCIUM SERPL-MCNC: 8.5 MG/DL (ref 8.4–10.2)
CANNABINOIDS UR QL SCN: NEGATIVE
CHLORIDE SERPL-SCNC: 103 MMOL/L (ref 98–107)
CHLORIDE SERPL-SCNC: 105 MMOL/L (ref 98–107)
CHLORIDE SERPL-SCNC: 107 MMOL/L (ref 98–107)
CHOLEST SERPL-MCNC: 74 MG/DL
CHOLEST/HDLC SERPL: 3 {RATIO} (ref 0–5)
CLARITY UR: ABNORMAL
CO2 BLDA-SCNC: 10.9 MMOL/L
CO2 BLDA-SCNC: 12 MMOL/L
CO2 BLDA-SCNC: 18.6 MMOL/L
CO2 BLDA-SCNC: 27.7 MMOL/L
CO2 SERPL-SCNC: 11 MMOL/L (ref 23–31)
CO2 SERPL-SCNC: 14 MMOL/L (ref 23–31)
CO2 SERPL-SCNC: 5 MMOL/L (ref 23–31)
COCAINE UR QL SCN: NEGATIVE
COHGB MFR BLDA: 1.1 % (ref 0.5–1.5)
COHGB MFR BLDA: 1.3 % (ref 0.5–1.5)
COHGB MFR BLDA: 1.4 % (ref 0.5–1.5)
COHGB MFR BLDA: 2.1 % (ref 0.5–1.5)
COLOR UR AUTO: YELLOW
CREAT SERPL-MCNC: 0.44 MG/DL (ref 0.55–1.02)
CREAT SERPL-MCNC: 1.62 MG/DL (ref 0.55–1.02)
CREAT SERPL-MCNC: 1.71 MG/DL (ref 0.55–1.02)
CREAT/UREA NIT SERPL: 19
CREAT/UREA NIT SERPL: 20
CREAT/UREA NIT SERPL: 27
CROSSMATCH INTERPRETATION: NORMAL
CROSSMATCH INTERPRETATION: NORMAL
CV ECHO LV RWT: 0.32 CM
DISPENSE STATUS: NORMAL
DISPENSE STATUS: NORMAL
DOP CALC AO PEAK VEL: 1.1 M/S
DOP CALC AO VTI: 17.8 CM
DOP CALC LVOT AREA: 2.5 CM2
DOP CALC LVOT DIAMETER: 1.8 CM
DOP CALC LVOT PEAK VEL: 0.7 M/S
DOP CALC LVOT STROKE VOLUME: 27.5 CM3
DOP CALC MV VTI: 19.7 CM
DOP CALCLVOT PEAK VEL VTI: 10.8 CM
DRAWN BY BLOOD GAS (OHS): ABNORMAL
E WAVE DECELERATION TIME: 114 MSEC
E/A RATIO: 2.85
ECHO LV POSTERIOR WALL: 0.9 CM (ref 0.6–1.1)
ERYTHROCYTE [DISTWIDTH] IN BLOOD BY AUTOMATED COUNT: 16.4 % (ref 11.5–17)
EST. AVERAGE GLUCOSE BLD GHB EST-MCNC: 165.7 MG/DL
FENTANYL UR QL SCN: NEGATIVE
FOLATE SERPL-MCNC: 13.4 NG/ML (ref 7–31.4)
FRACTIONAL SHORTENING: 10.5 % (ref 28–44)
GFR SERPLBLD CREATININE-BSD FMLA CKD-EPI: 32 ML/MIN/1.73/M2
GFR SERPLBLD CREATININE-BSD FMLA CKD-EPI: 34 ML/MIN/1.73/M2
GFR SERPLBLD CREATININE-BSD FMLA CKD-EPI: >60 ML/MIN/1.73/M2
GLOBULIN SER-MCNC: 0.8 GM/DL (ref 2.4–3.5)
GLOBULIN SER-MCNC: 3 GM/DL (ref 2.4–3.5)
GLUCOSE SERPL-MCNC: 120 MG/DL (ref 82–115)
GLUCOSE SERPL-MCNC: 170 MG/DL (ref 82–115)
GLUCOSE SERPL-MCNC: 46 MG/DL (ref 82–115)
GLUCOSE UR QL STRIP: ABNORMAL
GRAN CASTS #/AREA URNS LPF: >20 /LPF
GROUP & RH: NORMAL
HBA1C MFR BLD: 7.4 %
HCO3 BLDA-SCNC: 10.3 MMOL/L (ref 22–26)
HCO3 BLDA-SCNC: 11.3 MMOL/L (ref 22–26)
HCO3 BLDA-SCNC: 17.7 MMOL/L (ref 22–26)
HCO3 BLDA-SCNC: 26.7 MMOL/L (ref 22–26)
HCT VFR BLD AUTO: 14.9 % (ref 37–47)
HCT VFR BLD AUTO: 36.3 % (ref 37–47)
HDLC SERPL-MCNC: 24 MG/DL (ref 35–60)
HGB BLD-MCNC: 12.4 G/DL (ref 12–16)
HGB BLD-MCNC: 4.4 G/DL (ref 12–16)
HGB UR QL STRIP: ABNORMAL
HR MV ECHO: 83 BPM
INDIRECT COOMBS: NORMAL
INR PPP: 2
INSTRUMENT WBC (OLG): 2.74 X10(3)/MCL
INTERVENTRICULAR SEPTUM: 0.7 CM (ref 0.6–1.1)
KETONES UR QL STRIP: NEGATIVE
LACTATE SERPL-SCNC: 20.9 MMOL/L (ref 0.5–2.2)
LACTATE SERPL-SCNC: 3 MMOL/L (ref 0.5–2.2)
LDLC SERPL CALC-MCNC: 36 MG/DL (ref 50–140)
LEFT ATRIUM SIZE: 4.3 CM
LEFT INTERNAL DIMENSION IN SYSTOLE: 5.1 CM (ref 2.1–4)
LEFT VENTRICLE DIASTOLIC VOLUME INDEX: 86.24 ML/M2
LEFT VENTRICLE DIASTOLIC VOLUME: 163 ML
LEFT VENTRICLE END DIASTOLIC VOLUME APICAL 4 CHAMBER: 113 ML
LEFT VENTRICLE MASS INDEX: 90 G/M2
LEFT VENTRICLE SYSTOLIC VOLUME INDEX: 65.1 ML/M2
LEFT VENTRICLE SYSTOLIC VOLUME: 123 ML
LEFT VENTRICULAR INTERNAL DIMENSION IN DIASTOLE: 5.7 CM (ref 3.5–6)
LEFT VENTRICULAR MASS: 170.2 G
LEUKOCYTE ESTERASE UR QL STRIP: NEGATIVE
LIPASE SERPL-CCNC: 21 U/L
LIPASE SERPL-CCNC: 5 U/L
LPM (OHS): 10
LPM (OHS): 2
LPM (OHS): 2
LVED V (TEICH): 163 ML
LVES V (TEICH): 123 ML
LVOT MG: 1 MMHG
LVOT MV: 0.44 CM/S
LYMPHOCYTES NFR BLD MANUAL: 0.27 X10(3)/MCL (ref 0.6–4.6)
LYMPHOCYTES NFR BLD MANUAL: 10 %
MAGNESIUM SERPL-MCNC: 0.6 MG/DL (ref 1.6–2.6)
MAGNESIUM SERPL-MCNC: 2.6 MG/DL (ref 1.6–2.6)
MCH RBC QN AUTO: 32.4 PG (ref 27–31)
MCHC RBC AUTO-ENTMCNC: 29.5 G/DL (ref 33–36)
MCV RBC AUTO: 109.6 FL (ref 80–94)
MDMA UR QL SCN: NEGATIVE
METHGB MFR BLDA: 0.4 % (ref 0.4–1.5)
METHGB MFR BLDA: 0.6 % (ref 0.4–1.5)
METHGB MFR BLDA: 0.6 % (ref 0.4–1.5)
METHGB MFR BLDA: 0.9 % (ref 0.4–1.5)
MICROCYTES BLD QL SMEAR: ABNORMAL
MV MEAN GRADIENT: 2 MMHG
MV PEAK A VEL: 0.4 M/S
MV PEAK E VEL: 1.14 M/S
MV PEAK GRADIENT: 6 MMHG
MV VALVE AREA BY CONTINUITY EQUATION: 1.39 CM2
NEUTROPHILS NFR BLD MANUAL: 90 %
NITRITE UR QL STRIP: NEGATIVE
NON-SQ EPI CELLS URNS QL MICRO: ABNORMAL /HPF
O2 HB BLOOD GAS (OHS): 96 % (ref 94–97)
O2 HB BLOOD GAS (OHS): 96 % (ref 94–97)
O2 HB BLOOD GAS (OHS): 96.2 % (ref 94–97)
O2 HB BLOOD GAS (OHS): 97.6 % (ref 94–97)
OHS QRS DURATION: 104 MS
OHS QTC CALCULATION: 502 MS
OPIATES UR QL SCN: NEGATIVE
OXYGEN DEVICE BLOOD GAS (OHS): ABNORMAL
OXYHGB MFR BLDA: 11.6 G/DL (ref 12–16)
OXYHGB MFR BLDA: 12.6 G/DL (ref 12–16)
OXYHGB MFR BLDA: 13.7 G/DL (ref 12–16)
OXYHGB MFR BLDA: 14.5 G/DL (ref 12–16)
PCO2 BLDA: 19 MMHG (ref 35–45)
PCO2 BLDA: 24 MMHG (ref 35–45)
PCO2 BLDA: 28 MMHG (ref 35–45)
PCO2 BLDA: 32 MMHG (ref 35–45)
PCP UR QL: NEGATIVE
PH BLDA: 7.28 [PH] (ref 7.35–7.45)
PH BLDA: 7.34 [PH] (ref 7.35–7.45)
PH BLDA: 7.41 [PH] (ref 7.35–7.45)
PH BLDA: 7.53 [PH] (ref 7.35–7.45)
PH UR STRIP: 6 [PH]
PH UR: 5.5 [PH] (ref 3–11)
PHOSPHATE SERPL-MCNC: 1.1 MG/DL (ref 2.3–4.7)
PHOSPHATE SERPL-MCNC: 3.8 MG/DL (ref 2.3–4.7)
PISA TR MAX VEL: 2.8 M/S
PLATELET # BLD AUTO: 85 X10(3)/MCL (ref 130–400)
PLATELET # BLD EST: NORMAL 10*3/UL
PMV BLD AUTO: 10.6 FL (ref 7.4–10.4)
PO2 BLDA: 103 MMHG (ref 80–100)
PO2 BLDA: 205 MMHG (ref 80–100)
PO2 BLDA: 95 MMHG (ref 80–100)
PO2 BLDA: 99 MMHG (ref 80–100)
POCT GLUCOSE: 125 MG/DL (ref 70–110)
POCT GLUCOSE: 190 MG/DL (ref 70–110)
POCT GLUCOSE: 85 MG/DL (ref 70–110)
POIKILOCYTOSIS BLD QL SMEAR: ABNORMAL
POTASSIUM BLOOD GAS (OHS): 3.8 MMOL/L (ref 3.5–5)
POTASSIUM BLOOD GAS (OHS): 4 MMOL/L (ref 3.5–5)
POTASSIUM BLOOD GAS (OHS): 4.3 MMOL/L (ref 3.5–5)
POTASSIUM BLOOD GAS (OHS): 4.3 MMOL/L (ref 3.5–5)
POTASSIUM SERPL-SCNC: 4 MMOL/L (ref 3.5–5.1)
POTASSIUM SERPL-SCNC: 4.2 MMOL/L (ref 3.5–5.1)
POTASSIUM SERPL-SCNC: 4.5 MMOL/L (ref 3.5–5.1)
PROT SERPL-MCNC: 1.6 GM/DL (ref 5.8–7.6)
PROT SERPL-MCNC: 5.7 GM/DL (ref 5.8–7.6)
PROT UR QL STRIP: ABNORMAL
PROTHROMBIN TIME: 22.7 SECONDS (ref 12.5–14.5)
RBC # BLD AUTO: 1.36 X10(6)/MCL (ref 4.2–5.4)
RBC #/AREA URNS AUTO: ABNORMAL /HPF
RBC MORPH BLD: ABNORMAL
RET# (OHS): 0.03 X10E6/UL (ref 0.02–0.08)
RETICULOCYTE COUNT AUTOMATED (OLG): 2.13 % (ref 1.1–2.1)
SAMPLE SITE BLOOD GAS (OHS): ABNORMAL
SAO2 % BLDA: 97.3 %
SAO2 % BLDA: 97.5 %
SAO2 % BLDA: 98.5 %
SAO2 % BLDA: 99.6 %
SODIUM BLOOD GAS (OHS): 128 MMOL/L (ref 137–145)
SODIUM BLOOD GAS (OHS): 131 MMOL/L (ref 137–145)
SODIUM BLOOD GAS (OHS): 132 MMOL/L (ref 137–145)
SODIUM BLOOD GAS (OHS): 133 MMOL/L (ref 137–145)
SODIUM SERPL-SCNC: 132 MMOL/L (ref 136–145)
SODIUM SERPL-SCNC: 134 MMOL/L (ref 136–145)
SODIUM SERPL-SCNC: 136 MMOL/L (ref 136–145)
SP GR UR STRIP.AUTO: >1.05 (ref 1–1.03)
SPECIMEN OUTDATE: NORMAL
SQUAMOUS #/AREA URNS LPF: ABNORMAL /HPF
TR MAX PG: 31 MMHG
TRIGL SERPL-MCNC: 68 MG/DL (ref 37–140)
TRIGL SERPL-MCNC: 74 MG/DL (ref 37–140)
TROPONIN I SERPL-MCNC: <0.01 NG/ML (ref 0–0.04)
TSH SERPL-ACNC: 5.7 UIU/ML (ref 0.35–4.94)
UNIT NUMBER: NORMAL
UNIT NUMBER: NORMAL
UROBILINOGEN UR STRIP-ACNC: 2
VIT B12 SERPL-MCNC: >2000 PG/ML (ref 213–816)
VLDLC SERPL CALC-MCNC: 14 MG/DL
WBC # BLD AUTO: 2.75 X10(3)/MCL (ref 4.5–11.5)
WBC #/AREA URNS AUTO: ABNORMAL /HPF
Z-SCORE OF LEFT VENTRICULAR DIMENSION IN END DIASTOLE: 0.75
Z-SCORE OF LEFT VENTRICULAR DIMENSION IN END SYSTOLE: 3.54

## 2025-03-15 PROCEDURE — 36620 INSERTION CATHETER ARTERY: CPT

## 2025-03-15 PROCEDURE — 80053 COMPREHEN METABOLIC PANEL: CPT | Performed by: EMERGENCY MEDICINE

## 2025-03-15 PROCEDURE — 25000003 PHARM REV CODE 250: Performed by: EMERGENCY MEDICINE

## 2025-03-15 PROCEDURE — 85730 THROMBOPLASTIN TIME PARTIAL: CPT | Performed by: EMERGENCY MEDICINE

## 2025-03-15 PROCEDURE — 36600 WITHDRAWAL OF ARTERIAL BLOOD: CPT

## 2025-03-15 PROCEDURE — 81001 URINALYSIS AUTO W/SCOPE: CPT | Performed by: EMERGENCY MEDICINE

## 2025-03-15 PROCEDURE — 51702 INSERT TEMP BLADDER CATH: CPT

## 2025-03-15 PROCEDURE — 25500020 PHARM REV CODE 255: Performed by: EMERGENCY MEDICINE

## 2025-03-15 PROCEDURE — 99900035 HC TECH TIME PER 15 MIN (STAT)

## 2025-03-15 PROCEDURE — 80143 DRUG ASSAY ACETAMINOPHEN: CPT

## 2025-03-15 PROCEDURE — 82803 BLOOD GASES ANY COMBINATION: CPT

## 2025-03-15 PROCEDURE — 87086 URINE CULTURE/COLONY COUNT: CPT | Performed by: EMERGENCY MEDICINE

## 2025-03-15 PROCEDURE — 83880 ASSAY OF NATRIURETIC PEPTIDE: CPT | Performed by: EMERGENCY MEDICINE

## 2025-03-15 PROCEDURE — 80307 DRUG TEST PRSMV CHEM ANLYZR: CPT

## 2025-03-15 PROCEDURE — 99285 EMERGENCY DEPT VISIT HI MDM: CPT | Mod: 25

## 2025-03-15 PROCEDURE — 36430 TRANSFUSION BLD/BLD COMPNT: CPT

## 2025-03-15 PROCEDURE — 96360 HYDRATION IV INFUSION INIT: CPT | Mod: 59

## 2025-03-15 PROCEDURE — 83735 ASSAY OF MAGNESIUM: CPT | Performed by: INTERNAL MEDICINE

## 2025-03-15 PROCEDURE — 96361 HYDRATE IV INFUSION ADD-ON: CPT

## 2025-03-15 PROCEDURE — 96365 THER/PROPH/DIAG IV INF INIT: CPT

## 2025-03-15 PROCEDURE — 80053 COMPREHEN METABOLIC PANEL: CPT

## 2025-03-15 PROCEDURE — 80048 BASIC METABOLIC PNL TOTAL CA: CPT | Performed by: STUDENT IN AN ORGANIZED HEALTH CARE EDUCATION/TRAINING PROGRAM

## 2025-03-15 PROCEDURE — 96366 THER/PROPH/DIAG IV INF ADDON: CPT

## 2025-03-15 PROCEDURE — 84478 ASSAY OF TRIGLYCERIDES: CPT | Performed by: INTERNAL MEDICINE

## 2025-03-15 PROCEDURE — 83690 ASSAY OF LIPASE: CPT | Performed by: EMERGENCY MEDICINE

## 2025-03-15 PROCEDURE — 84443 ASSAY THYROID STIM HORMONE: CPT

## 2025-03-15 PROCEDURE — 80061 LIPID PANEL: CPT

## 2025-03-15 PROCEDURE — 63600175 PHARM REV CODE 636 W HCPCS

## 2025-03-15 PROCEDURE — 3E033XZ INTRODUCTION OF VASOPRESSOR INTO PERIPHERAL VEIN, PERCUTANEOUS APPROACH: ICD-10-PCS | Performed by: INTERNAL MEDICINE

## 2025-03-15 PROCEDURE — P9040 RBC LEUKOREDUCED IRRADIATED: HCPCS | Performed by: EMERGENCY MEDICINE

## 2025-03-15 PROCEDURE — 85610 PROTHROMBIN TIME: CPT | Performed by: EMERGENCY MEDICINE

## 2025-03-15 PROCEDURE — 25000003 PHARM REV CODE 250

## 2025-03-15 PROCEDURE — 82607 VITAMIN B-12: CPT

## 2025-03-15 PROCEDURE — 85025 COMPLETE CBC W/AUTO DIFF WBC: CPT | Performed by: EMERGENCY MEDICINE

## 2025-03-15 PROCEDURE — 82746 ASSAY OF FOLIC ACID SERUM: CPT

## 2025-03-15 PROCEDURE — 83690 ASSAY OF LIPASE: CPT

## 2025-03-15 PROCEDURE — 25000003 PHARM REV CODE 250: Performed by: INTERNAL MEDICINE

## 2025-03-15 PROCEDURE — 63600175 PHARM REV CODE 636 W HCPCS: Performed by: EMERGENCY MEDICINE

## 2025-03-15 PROCEDURE — 84100 ASSAY OF PHOSPHORUS: CPT

## 2025-03-15 PROCEDURE — 86901 BLOOD TYPING SEROLOGIC RH(D): CPT | Performed by: EMERGENCY MEDICINE

## 2025-03-15 PROCEDURE — 85014 HEMATOCRIT: CPT

## 2025-03-15 PROCEDURE — 84100 ASSAY OF PHOSPHORUS: CPT | Performed by: INTERNAL MEDICINE

## 2025-03-15 PROCEDURE — 96375 TX/PRO/DX INJ NEW DRUG ADDON: CPT

## 2025-03-15 PROCEDURE — 82962 GLUCOSE BLOOD TEST: CPT

## 2025-03-15 PROCEDURE — 83735 ASSAY OF MAGNESIUM: CPT | Performed by: EMERGENCY MEDICINE

## 2025-03-15 PROCEDURE — 93005 ELECTROCARDIOGRAM TRACING: CPT

## 2025-03-15 PROCEDURE — 86923 COMPATIBILITY TEST ELECTRIC: CPT | Mod: 91 | Performed by: EMERGENCY MEDICINE

## 2025-03-15 PROCEDURE — 82140 ASSAY OF AMMONIA: CPT

## 2025-03-15 PROCEDURE — 85045 AUTOMATED RETICULOCYTE COUNT: CPT

## 2025-03-15 PROCEDURE — 37799 UNLISTED PX VASCULAR SURGERY: CPT

## 2025-03-15 PROCEDURE — 84484 ASSAY OF TROPONIN QUANT: CPT | Performed by: EMERGENCY MEDICINE

## 2025-03-15 PROCEDURE — 83605 ASSAY OF LACTIC ACID: CPT | Performed by: EMERGENCY MEDICINE

## 2025-03-15 PROCEDURE — 87040 BLOOD CULTURE FOR BACTERIA: CPT | Performed by: EMERGENCY MEDICINE

## 2025-03-15 PROCEDURE — S5010 5% DEXTROSE AND 0.45% SALINE: HCPCS | Performed by: EMERGENCY MEDICINE

## 2025-03-15 PROCEDURE — 80074 ACUTE HEPATITIS PANEL: CPT

## 2025-03-15 PROCEDURE — 83036 HEMOGLOBIN GLYCOSYLATED A1C: CPT

## 2025-03-15 PROCEDURE — 96368 THER/DIAG CONCURRENT INF: CPT

## 2025-03-15 PROCEDURE — 93010 ELECTROCARDIOGRAM REPORT: CPT | Mod: ,,, | Performed by: INTERNAL MEDICINE

## 2025-03-15 PROCEDURE — 36415 COLL VENOUS BLD VENIPUNCTURE: CPT

## 2025-03-15 PROCEDURE — 20000000 HC ICU ROOM

## 2025-03-15 RX ORDER — MAGNESIUM SULFATE HEPTAHYDRATE 40 MG/ML
2 INJECTION, SOLUTION INTRAVENOUS ONCE
Status: COMPLETED | OUTPATIENT
Start: 2025-03-15 | End: 2025-03-15

## 2025-03-15 RX ORDER — PANTOPRAZOLE SODIUM 40 MG/10ML
40 INJECTION, POWDER, LYOPHILIZED, FOR SOLUTION INTRAVENOUS 2 TIMES DAILY
Status: DISCONTINUED | OUTPATIENT
Start: 2025-03-15 | End: 2025-03-19 | Stop reason: HOSPADM

## 2025-03-15 RX ORDER — HYDROCODONE BITARTRATE AND ACETAMINOPHEN 500; 5 MG/1; MG/1
TABLET ORAL
Status: DISCONTINUED | OUTPATIENT
Start: 2025-03-15 | End: 2025-03-15

## 2025-03-15 RX ORDER — MAGNESIUM SULFATE 1 G/100ML
1 INJECTION INTRAVENOUS
Status: COMPLETED | OUTPATIENT
Start: 2025-03-15 | End: 2025-03-15

## 2025-03-15 RX ORDER — THIAMINE HCL 100 MG
100 TABLET ORAL EVERY 8 HOURS
Status: DISCONTINUED | OUTPATIENT
Start: 2025-03-17 | End: 2025-03-19 | Stop reason: HOSPADM

## 2025-03-15 RX ORDER — INDOMETHACIN 25 MG/1
50 CAPSULE ORAL
Status: COMPLETED | OUTPATIENT
Start: 2025-03-15 | End: 2025-03-15

## 2025-03-15 RX ORDER — THIAMINE HYDROCHLORIDE 100 MG/ML
400 INJECTION, SOLUTION INTRAMUSCULAR; INTRAVENOUS EVERY 8 HOURS
Status: DISPENSED | OUTPATIENT
Start: 2025-03-15 | End: 2025-03-17

## 2025-03-15 RX ORDER — SODIUM CHLORIDE, SODIUM LACTATE, POTASSIUM CHLORIDE, CALCIUM CHLORIDE 600; 310; 30; 20 MG/100ML; MG/100ML; MG/100ML; MG/100ML
INJECTION, SOLUTION INTRAVENOUS CONTINUOUS
Status: DISCONTINUED | OUTPATIENT
Start: 2025-03-15 | End: 2025-03-15

## 2025-03-15 RX ORDER — CALCIUM GLUCONATE 20 MG/ML
1 INJECTION, SOLUTION INTRAVENOUS
Status: COMPLETED | OUTPATIENT
Start: 2025-03-15 | End: 2025-03-15

## 2025-03-15 RX ORDER — MUPIROCIN 20 MG/G
OINTMENT TOPICAL 2 TIMES DAILY
Status: DISCONTINUED | OUTPATIENT
Start: 2025-03-15 | End: 2025-03-19 | Stop reason: HOSPADM

## 2025-03-15 RX ORDER — NOREPINEPHRINE BITARTRATE/D5W 8 MG/250ML
0-3 PLASTIC BAG, INJECTION (ML) INTRAVENOUS CONTINUOUS
Status: DISCONTINUED | OUTPATIENT
Start: 2025-03-15 | End: 2025-03-16

## 2025-03-15 RX ORDER — NOREPINEPHRINE BITARTRATE/D5W 8 MG/250ML
0-3 PLASTIC BAG, INJECTION (ML) INTRAVENOUS CONTINUOUS
Status: DISCONTINUED | OUTPATIENT
Start: 2025-03-15 | End: 2025-03-15

## 2025-03-15 RX ORDER — DEXTROSE MONOHYDRATE AND SODIUM CHLORIDE 5; .45 G/100ML; G/100ML
1000 INJECTION, SOLUTION INTRAVENOUS
Status: COMPLETED | OUTPATIENT
Start: 2025-03-15 | End: 2025-03-15

## 2025-03-15 RX ORDER — MAGNESIUM SULFATE 1 G/100ML
1 INJECTION INTRAVENOUS ONCE
Status: DISCONTINUED | OUTPATIENT
Start: 2025-03-15 | End: 2025-03-19 | Stop reason: HOSPADM

## 2025-03-15 RX ORDER — SODIUM CHLORIDE 0.9 % (FLUSH) 0.9 %
10 SYRINGE (ML) INJECTION
Status: DISCONTINUED | OUTPATIENT
Start: 2025-03-15 | End: 2025-03-19 | Stop reason: HOSPADM

## 2025-03-15 RX ORDER — IPRATROPIUM BROMIDE AND ALBUTEROL SULFATE 2.5; .5 MG/3ML; MG/3ML
3 SOLUTION RESPIRATORY (INHALATION)
Status: ACTIVE | OUTPATIENT
Start: 2025-03-15 | End: 2025-03-16

## 2025-03-15 RX ORDER — PANTOPRAZOLE SODIUM 40 MG/10ML
80 INJECTION, POWDER, LYOPHILIZED, FOR SOLUTION INTRAVENOUS
Status: COMPLETED | OUTPATIENT
Start: 2025-03-15 | End: 2025-03-15

## 2025-03-15 RX ORDER — NOREPINEPHRINE BITARTRATE/D5W 8 MG/250ML
PLASTIC BAG, INJECTION (ML) INTRAVENOUS
Status: DISPENSED
Start: 2025-03-15 | End: 2025-03-15

## 2025-03-15 RX ORDER — FOLIC ACID 5 MG/ML
1 INJECTION, SOLUTION INTRAMUSCULAR; INTRAVENOUS; SUBCUTANEOUS DAILY
Status: COMPLETED | OUTPATIENT
Start: 2025-03-15 | End: 2025-03-18

## 2025-03-15 RX ADMIN — SODIUM BICARBONATE 50 MEQ: 84 INJECTION INTRAVENOUS at 10:03

## 2025-03-15 RX ADMIN — IOHEXOL 100 ML: 350 INJECTION, SOLUTION INTRAVENOUS at 07:03

## 2025-03-15 RX ADMIN — DEXTROSE AND SODIUM CHLORIDE 1000 ML: 5; 450 INJECTION, SOLUTION INTRAVENOUS at 08:03

## 2025-03-15 RX ADMIN — THIAMINE HYDROCHLORIDE 400 MG: 100 INJECTION, SOLUTION INTRAMUSCULAR; INTRAVENOUS at 09:03

## 2025-03-15 RX ADMIN — VANCOMYCIN HYDROCHLORIDE 750 MG: 750 INJECTION, POWDER, LYOPHILIZED, FOR SOLUTION INTRAVENOUS at 03:03

## 2025-03-15 RX ADMIN — THIAMINE HYDROCHLORIDE 400 MG: 100 INJECTION, SOLUTION INTRAMUSCULAR; INTRAVENOUS at 03:03

## 2025-03-15 RX ADMIN — VANCOMYCIN HYDROCHLORIDE 1000 MG: 1 INJECTION, POWDER, LYOPHILIZED, FOR SOLUTION INTRAVENOUS at 09:03

## 2025-03-15 RX ADMIN — PIPERACILLIN SODIUM AND TAZOBACTAM SODIUM 4.5 G: 4; .5 INJECTION, POWDER, LYOPHILIZED, FOR SOLUTION INTRAVENOUS at 11:03

## 2025-03-15 RX ADMIN — PANTOPRAZOLE SODIUM 80 MG: 40 INJECTION, POWDER, FOR SOLUTION INTRAVENOUS at 06:03

## 2025-03-15 RX ADMIN — SODIUM BICARBONATE: 84 INJECTION, SOLUTION INTRAVENOUS at 10:03

## 2025-03-15 RX ADMIN — PANTOPRAZOLE SODIUM 40 MG: 40 INJECTION, POWDER, FOR SOLUTION INTRAVENOUS at 12:03

## 2025-03-15 RX ADMIN — PIPERACILLIN SODIUM AND TAZOBACTAM SODIUM 4.5 G: 4; .5 INJECTION, POWDER, LYOPHILIZED, FOR SOLUTION INTRAVENOUS at 08:03

## 2025-03-15 RX ADMIN — SODIUM CHLORIDE, POTASSIUM CHLORIDE, SODIUM LACTATE AND CALCIUM CHLORIDE 1000 ML: 600; 310; 30; 20 INJECTION, SOLUTION INTRAVENOUS at 04:03

## 2025-03-15 RX ADMIN — MAGNESIUM SULFATE IN DEXTROSE 1 G: 10 INJECTION, SOLUTION INTRAVENOUS at 06:03

## 2025-03-15 RX ADMIN — PIPERACILLIN SODIUM AND TAZOBACTAM SODIUM 4.5 G: 4; .5 INJECTION, POWDER, LYOPHILIZED, FOR SOLUTION INTRAVENOUS at 03:03

## 2025-03-15 RX ADMIN — MAGNESIUM SULFATE HEPTAHYDRATE 2 G: 40 INJECTION, SOLUTION INTRAVENOUS at 12:03

## 2025-03-15 RX ADMIN — CALCIUM GLUCONATE 1 G: 20 INJECTION, SOLUTION INTRAVENOUS at 06:03

## 2025-03-15 RX ADMIN — NOREPINEPHRINE BITARTRATE 0.02 MCG/KG/MIN: 8 INJECTION, SOLUTION INTRAVENOUS at 07:03

## 2025-03-15 RX ADMIN — MUPIROCIN: 20 OINTMENT TOPICAL at 08:03

## 2025-03-15 RX ADMIN — SODIUM CHLORIDE, POTASSIUM CHLORIDE, SODIUM LACTATE AND CALCIUM CHLORIDE 1000 ML: 600; 310; 30; 20 INJECTION, SOLUTION INTRAVENOUS at 05:03

## 2025-03-15 RX ADMIN — FOLIC ACID 1 MG: 5 INJECTION, SOLUTION INTRAMUSCULAR; INTRAVENOUS; SUBCUTANEOUS at 04:03

## 2025-03-15 RX ADMIN — SODIUM BICARBONATE: 84 INJECTION, SOLUTION INTRAVENOUS at 07:03

## 2025-03-15 RX ADMIN — PANTOPRAZOLE SODIUM 40 MG: 40 INJECTION, POWDER, FOR SOLUTION INTRAVENOUS at 08:03

## 2025-03-15 RX ADMIN — SODIUM CHLORIDE, POTASSIUM CHLORIDE, SODIUM LACTATE AND CALCIUM CHLORIDE 1000 ML: 600; 310; 30; 20 INJECTION, SOLUTION INTRAVENOUS at 01:03

## 2025-03-15 RX ADMIN — SODIUM CHLORIDE, POTASSIUM CHLORIDE, SODIUM LACTATE AND CALCIUM CHLORIDE 500 ML: 600; 310; 30; 20 INJECTION, SOLUTION INTRAVENOUS at 07:03

## 2025-03-15 NOTE — H&P
[facility] - Emergency Dept  Pulmonary Critical Care Note    Patient Name: Laura Jacobs  MRN: [id number]  Admission Date: 3/15/2025  Hospital Length of Stay: 0 days  Code Status: Full Code  Attending Provider: Josefa Morrison MD  Primary Care Provider: Ruben Brooks Sr., MD     Subjective:     HPI:   Female with a past medical history of breast cancer in remission status post left-sided mastectomy with left-sided lymphedema, heart failure with reduced ejection fraction EF of 25-30% with a pacemaker in place, history of pulmonary embolus on long-term anticoagulation, hyperlipidemia presented to the emergency department at about 4:00 a.m. this morning complaining of generalized weakness and shortness of breath that began the previous night and progressively worsened.    On arrival to the ED, patient noted to be severely hypotensive and tachycardic.  She was bolused 30 cc/kg and started on broad-spectrum antibiotics.  Blood cultures were drawn.  Initial labs showed a leukopenia with a white count of 2.75, an H&H of 4.4 and 14.9 respectively.  Platelet count 85.  Initial lactic acid of 20.  Patient also received 2 units of PRBCs, and remained hypotensive.  Levophed was initiated at 0.1.  Initial CMP shows a severe metabolic acidosis with a bicarb of 5, ABG showed a pH of 7.28, a bicarb of 10, and a CO2 of 19 indicative of a severe metabolic acidosis with respiratory compensation.  Patient is started on Venturi mask at 15 L. she was given 2 amps of bicarb and started on a bicarb drip.  This was prior to last ABG drawn.  CTA chest/abdomen/pelvis showed severe celiac artery stenosis with post stenotic dilatation, a distended gallbladder, and severe acute pancreatitis.  Patient has no drug or alcohol history.  One-view chest x-ray showed no pneumothorax, pneumonia, or pleural effusion.  Initial troponin was negative, BNP is pending.  Lipase is also pending.  Initial magnesium was noted to be 0.6,  repleted with 4 g of Mag sulfate.  FOBT positive, however CTA not showing evidence of acute bleed. Dana hugger in place.    On last evaluation, vital signs were stable.  Patient oxygenating at 100%.  Maps above 90 on 0.04 of Levophed.  Son arrived and was updated on the patient's situation and critical status.  Family notified that the patient is at high risk of decompensation.  At this time, patient has opted to pursue mechanical ventilation in the instance it is required.  General surgery has been consulted to assess for ischemic bowel and possible surgical abdomen.  Vascular surgery also consulted for celiac artery stenosis.    Hospital Course/Significant events:  Admitted to the ICU on 03/15/2025    24 Hour Interval History:  NA    Past Medical History:   Diagnosis Date    Cancer     breast CA s/p chemo and L masectomy     Cardiomyopathy     CHF (congestive heart failure)     History of breast cancer     History of DVT (deep vein thrombosis)     HLD (hyperlipidemia)     Hypertension     Lymphedema     Osteoarthritis     Venous insufficiency        Past Surgical History:   Procedure Laterality Date    HYSTERECTOMY      INSERTION OF PACEMAKER      INTRAMEDULLARY RODDING OF FEMUR Left 10/14/2022    Procedure: INSERTION, INTRAMEDULLARY СВЕТЛАНА, FEMUR;  Surgeon: Ankush Alex MD;  Location: [facility];  Service: Orthopedics;  Laterality: Left;    JOINT REPLACEMENT Bilateral     Knee    MASTECTOMY Left 2019       Social History[1]        Current Outpatient Medications   Medication Instructions    anastrozole (ARIMIDEX) 1 mg Tab 1 tablet, Daily    apixaban (ELIQUIS) 5 mg, Oral, 2 times daily    aspirin (ECOTRIN) 81 mg, Oral, Daily    cyproheptadine (PERIACTIN) 4 mg, Oral, 2 times daily PRN    dexlansoprazole (DEXILANT) 60 mg, Oral, Daily    famotidine (PEPCID) 20 mg, Oral, Daily    furosemide (LASIX) 20 mg, Oral, Daily    pravastatin (PRAVACHOL) 20 mg, Oral, Nightly    verapamiL (CALAN-SR) 240 mg, Oral, Nightly       Review  of patient's allergies indicates:   Allergen Reactions    Sulfa (sulfonamide antibiotics)      Patient unsure if allergic to Sulfa        Current Inpatient Medications   lactated ringers  1,000 mL Intravenous Once    lactated ringers  500 mL Intravenous ED 1 Time    magnesium sulfate 1 g IVPB  1 g Intravenous Once    Followed by    magnesium sulfate 2 g IVPB  2 g Intravenous Once    NORepinephrine 8 mg        pantoprazole  40 mg Intravenous BID    piperacillin-tazobactam (Zosyn) IV (PEDS and ADULTS) (extended infusion is not appropriate)  4.5 g Intravenous Q8H    vancomycin 1,500 mg in D5W 250 mL IVPB (admixture device)  1,500 mg Intravenous Q12H    vancomycin 750 mg in D5W 250 mL IVPB (admixture device)  750 mg Intravenous Once       Current Intravenous Infusions   NORepinephrine bitartrate-D5W  0-3 mcg/kg/min Intravenous Continuous        sodium bicarbonate 150 mEq in D5W 1,000 mL infusion   Intravenous Continuous 125 mL/hr at 03/15/25 1055 New Bag at 03/15/25 1055         Review of Systems   Unable to perform ROS: Critical illness          Objective:       Intake/Output Summary (Last 24 hours) at 3/15/2025 1143  Last data filed at 3/15/2025 0646  Gross per 24 hour   Intake 1306 ml   Output --   Net 1306 ml         Vital Signs (Most Recent):  Temp: (!) 95.9 °F (35.5 °C) (03/15/25 0852)  Pulse: 81 (03/15/25 1128)  Resp: 19 (03/15/25 1128)  BP: 128/71 (03/15/25 1023)  SpO2: 98 % (03/15/25 1128)  Body mass index is 29.95 kg/m².  Weight: 81.6 kg (180 lb) Vital Signs (24h Range):  Temp:  [95.9 °F (35.5 °C)-98.4 °F (36.9 °C)] 95.9 °F (35.5 °C)  Pulse:  [71-85] 81  Resp:  [15-39] 19  SpO2:  [89 %-100 %] 98 %  BP: ()/(38-94) 128/71     Physical Exam  Vitals reviewed.   Constitutional:       Appearance: She is ill-appearing and toxic-appearing.   HENT:      Head: Normocephalic and atraumatic.      Mouth/Throat:      Mouth: Mucous membranes are dry.      Pharynx: No oropharyngeal exudate or posterior oropharyngeal  erythema.   Eyes:      Extraocular Movements: Extraocular movements intact.      Conjunctiva/sclera: Conjunctivae normal.      Pupils: Pupils are equal, round, and reactive to light.   Cardiovascular:      Rate and Rhythm: Normal rate. Rhythm irregular.      Pulses: Decreased pulses.      Heart sounds: Heart sounds are distant. Murmur heard.      Systolic murmur is present with a grade of 2/6.      No friction rub. No gallop.      Comments: Radial pulses 1+ bilaterally  DP pulses 1+ bilaterally  Pulmonary:      Effort: Respiratory distress present.      Breath sounds: Wheezing present.   Chest:      Comments: Left-sided mastectomy scar  Abdominal:      General: Bowel sounds are normal. There is no distension.      Palpations: Abdomen is soft.      Tenderness: There is abdominal tenderness (Diffuse) in the epigastric area.   Musculoskeletal:      Cervical back: Normal range of motion and neck supple.      Right lower leg: No edema.      Left lower leg: No edema.   Skin:     General: Skin is cool.      Capillary Refill: Capillary refill takes 2 to 3 seconds.      Coloration: Skin is pale.      Comments: Skin is cool to touch   Neurological:      Mental Status: She is lethargic and disoriented.      GCS: GCS eye subscore is 4. GCS verbal subscore is 5. GCS motor subscore is 6.      Cranial Nerves: Cranial nerves 2-12 are intact.      Sensory: Sensation is intact.      Motor: Motor function is intact.           Lines/Drains/Airways       Peripheral Intravenous Line  Duration                  Peripheral IV - Single Lumen 03/15/25 0500 20 G No Right Antecubital <1 day         Peripheral IV - Single Lumen 03/15/25 0629 18 G Anterior;Right Upper Arm <1 day                    Significant Labs:    Lab Results   Component Value Date    WBC 2.75 (L) 03/15/2025    WBC 2.74 03/15/2025    HGB 4.4 (LL) 03/15/2025    HCT 14.9 (LL) 03/15/2025    .6 (H) 03/15/2025    PLT 85 (L) 03/15/2025           BMP  Lab Results   Component  Value Date     03/15/2025    K 4.2 03/15/2025    CO2 14 (L) 03/15/2025    BUN 32.5 (H) 03/15/2025    CREATININE 1.71 (H) 03/15/2025    CALCIUM 8.0 (L) 03/15/2025    AGAP 19.0 03/15/2025    ESTGFRAFRICA 92 01/04/2021    EGFRNONAA 58 04/04/2022         ABG  Recent Labs   Lab 03/15/25  1128   PH 7.410   PO2 95.0   PCO2 28.0*   HCO3 17.7*   POCBASEDEF -5.50*       Mechanical Ventilation Support:         Significant Imaging:  I have reviewed the pertinent imaging within the past 24 hours.        Assessment/Plan:     Assessment  Septic shock possible abdominal source, cholecystitis versus infected pancreas  Severe symptomatic anemia  Severe celiac artery stenosis  Acute pancreatitis  Lactic acidosis  Acute kidney injury likely secondary to hypoperfusion  History of left breast cancer status post mastectomy causing lymphedema  Heart failure with reduced ejection fraction, echo 03/05/2025 25-30% with a pacemaker in place  History of pulmonary embolus on long-term Eliquis      Plan  Broad-spectrum antibiotics, vancomycin Zosyn initiated upon arrival  Bolus 30 cc/kilogram in the ED  Dana hugger placed, keep normothermic  Blood cultures x2 and urinary culture pending  Given 2 units PRBCs  Started on Levophed, keep MAPs above 65--arterial line placed in the ED  Initial lactic acid of 20, given 2 amps of bicarb in bicarb drip initiated at 125 cc/hour  Given full-dose Protonix, we will give 40 IV b.i.d. for possible GI bleed  Strict intake/output, we will consult Nephrology if renal function continues to worsen--last creatinine of 1.7  CTA chest, abdomen, pelvis showing severe celiac artery stenosis with post stenotic dilatation  General surgery and vascular surgery consulted, both on board appreciate assistance  Ultrasound abdomen limited stat ordered and pending  Echo stat ordered and pending  Holding all anticoagulation at this time due to severe symptomatic anemia and possible bleed  Venturi mask initiated in the ED,  keep SpO2 above 92%-high possibility for intubation due to critical illness  Patient has agreed to be full code at this time, family members notified of patient's current status and high risk for decompensation        DVT Prophylaxis: Holding at this time, possible bleed  GI Prophylaxis: Protonix     32 minutes of critical care was time spent personally by me on the following activities: development of treatment plan with patient or surrogate and bedside caregivers, discussions with consultants, evaluation of patient's response to treatment, examination of patient, ordering and performing treatments and interventions, ordering and review of laboratory studies, ordering and review of radiographic studies, pulse oximetry, re-evaluation of patient's condition.  This critical care time did not overlap with that of any other provider or involve time for any procedures.     Jaxon Beaver MD  Pulmonary Critical Care Medicine  [facility] - Emergency Dept  DOS: 03/15/2025           [1]   Social History  Socioeconomic History    Marital status: Single   Tobacco Use    Smoking status: Never    Smokeless tobacco: Never   Substance and Sexual Activity    Alcohol use: Not Currently    Drug use: Never    Sexual activity: Not Currently   Social History Narrative    ** Merged History Encounter **          Social Drivers of Health     Financial Resource Strain: Low Risk  (3/12/2025)    Overall Financial Resource Strain (CARDIA)     Difficulty of Paying Living Expenses: Not hard at all   Food Insecurity: No Food Insecurity (3/12/2025)    Hunger Vital Sign     Worried About Running Out of Food in the Last Year: Never true     Ran Out of Food in the Last Year: Never true   Transportation Needs: No Transportation Needs (1/21/2025)    TRANSPORTATION NEEDS     Transportation : No   Physical Activity: Inactive (3/12/2025)    Exercise Vital Sign     Days of Exercise per Week: 0 days     Minutes of Exercise per Session: 0 min    Stress: No Stress Concern Present (3/12/2025)    [employer] - Occupational Stress Questionnaire     Feeling of Stress : Only a little   Housing Stability: Low Risk  (3/12/2025)    Housing Stability Vital Sign     Unable to Pay for Housing in the Last Year: No     Homeless in the Last Year: No

## 2025-03-15 NOTE — PROGRESS NOTES
Pharmacokinetic Initial Assessment: IV Vancomycin    Assessment/Plan:    Initiate intravenous vancomycin with loading dose of 1750 mg once followed by a maintenance dose of vancomycin 1500mg IV every 12 hours  Desired empiric serum trough concentration is 15 to 20 mcg/mL  Draw vancomycin trough level 60 min prior to fourth dose on 3/16 at approximately 2100  Pharmacy will continue to follow and monitor vancomycin.      Please contact pharmacy at extension [phone] with any questions regarding this assessment.     Thank you for the consult,   Rick Hollis       Patient brief summary:  Laura Jacobs is a 74 y.o. female initiated on antimicrobial therapy with IV Vancomycin for treatment of suspected sepsis    Drug Allergies:   Review of patient's allergies indicates:   Allergen Reactions    Sulfa (sulfonamide antibiotics)      Patient unsure if allergic to Sulfa       Actual Body Weight:   81.6 kg    Renal Function:   Estimated Creatinine Clearance: 121.9 mL/min (A) (based on SCr of 0.44 mg/dL (L)).,     Dialysis Method (if applicable):  N/A    CBC (last 72 hours):  Recent Labs   Lab Result Units 03/15/25  0502   WBC x10(3)/mcL 2.74  2.75*   Hgb g/dL 4.4*   Hct % 14.9*   Platelet x10(3)/mcL 85*       Metabolic Panel (last 72 hours):  Recent Labs   Lab Result Units 03/14/25  1049 03/15/25  0425 03/15/25  0502 03/15/25  0554 03/15/25  1000   Sodium mmol/L 138 134* 132*  --   --    Sodium, Blood Gas mmol/L  --   --   --  133* 128*   Potassium mmol/L 4.6 4.5 4.0  --   --    Potassium, Blood Gas mmol/L  --   --   --  4.3 4.3   Chloride mmol/L 103 105 107  --   --    CO2 mmol/L 22* 11* 5*  --   --    Glucose mg/dL 98 120* 46*  --   --    Blood Urea Nitrogen mg/dL 33.9* 31.6* 11.9  --   --    Creatinine mg/dL 1.37* 1.62* 0.44*  --   --    Albumin g/dL  --   --  0.8*  --   --    Bilirubin Total mg/dL  --   --  0.3  --   --    ALP unit/L  --   --  54  --   --    AST unit/L  --   --  13  --   --    ALT unit/L  --   --  16   "--   --    Magnesium Level mg/dL  --   --  0.60*  --   --        Drug levels (last 3 results):  No results for input(s): "VANCOMYCINRA", "VANCORANDOM", "VANCOMYCINPE", "VANCOPEAK", "VANCOMYCINTR", "VANCOTROUGH" in the last 72 hours.    Microbiologic Results:  Microbiology Results (last 7 days)       Procedure Component Value Units Date/Time    Blood culture #2 **CANNOT BE ORDERED STAT** [[id number]] Collected: 03/15/25 0502    Order Status: Resulted Specimen: Blood from Arm, Left Updated: 03/15/25 0512    Blood culture #1 **CANNOT BE ORDERED STAT** [[id number]] Collected: 03/15/25 0502    Order Status: Resulted Specimen: Blood from Arm, Right Updated: 03/15/25 0511            "

## 2025-03-15 NOTE — PLAN OF CARE
Problem: Adult Inpatient Plan of Care  Goal: Plan of Care Review  Outcome: Progressing  Goal: Patient-Specific Goal (Individualized)  Outcome: Progressing  Goal: Absence of Hospital-Acquired Illness or Injury  Outcome: Progressing  Goal: Optimal Comfort and Wellbeing  Outcome: Progressing  Goal: Readiness for Transition of Care  Outcome: Progressing     Problem: Skin Injury Risk Increased  Goal: Skin Health and Integrity  Outcome: Progressing     Problem: Pain Acute  Goal: Optimal Pain Control and Function  Outcome: Progressing     Problem: Pancreatitis  Goal: Fluid and Electrolyte Balance  Outcome: Progressing  Goal: Absence of Infection Signs and Symptoms  Outcome: Progressing  Goal: Optimal Nutrition Delivery  Outcome: Progressing  Goal: Optimal Pain Control and Function  Outcome: Progressing  Goal: Effective Oxygenation and Ventilation  Outcome: Progressing

## 2025-03-15 NOTE — CONSULTS
Acute Care Surgery   Consult    Patient Name: Laura Jacobs  YOB: 1950  Date: 03/15/2025 11:20 AM  Date of Admission: 3/15/2025  HD#0  POD#* No surgery found *    PRESENTING HISTORY   Chief Complaint/Reason for Admission: <principal problem not specified>  History source(s): patient and chart   History of Present Illness:  74F w/PMH Breast cancer s/p chemo and left mastectomy c/b LUE lymphadenopathy, CHF & severe mitral regurg s/p ICD placement in 3/2021 (EF 25-30% w/severe global hypokinesis and G3 Diastolic dysfunction on 3/10/25), Hx Dvt on Eliquis (last dose 3/15 AM), GONZALEZ on CPAP, HLD, HTN recently admitted 3/9-3/15 for ROMO in setting of decompensated heart failure.    Patient presented to the ED today with chief complaint of fatigue and shortness of breath. She was hypotensive on arrival with systolics recorded in the 60s, started on levo. Per chart review, patient with responsive to fluid resuscitation and titrated off levo. Started on bicarb drip for profound acidosis with Lactate of 20. Workup significant for electrolyte derangement and anemia Hgb 4.4 with positive fecal occult blood positive.     On CTA imaging patient noted to have severe narrowing of the celiac artery with findings suggestive of acute pancreatitis (lipase wnl) and inflammatory changes surrounding the pancreas and trace ascites. Also noted to have nonspecific gallbladder wall thickening on CT imaging. General surgery consulted at this time with concern for ischemic bowel.     Patient able to provide limit history, denies abdominal pain nausea vomiting. Denies prior abdominal surgeries. Denies abdominal pain with eating. Has regular bowel movements, last was yesterday morning. However endorses black stools for approximately 1 year.     Review of Systems:  12 point ROS negative except as stated in HPI    PAST HISTORY:   Past medical history:  Past Medical History:   Diagnosis Date    Cancer     breast CA s/p chemo  and L masectomy     Cardiomyopathy     CHF (congestive heart failure)     History of breast cancer     History of DVT (deep vein thrombosis)     HLD (hyperlipidemia)     Hypertension     Lymphedema     Osteoarthritis     Venous insufficiency        Past surgical history:  Past Surgical History:   Procedure Laterality Date    HYSTERECTOMY      INSERTION OF PACEMAKER      INTRAMEDULLARY RODDING OF FEMUR Left 10/14/2022    Procedure: INSERTION, INTRAMEDULLARY СВЕТЛАНА, FEMUR;  Surgeon: Ankush Alex MD;  Location: [facility];  Service: Orthopedics;  Laterality: Left;    JOINT REPLACEMENT Bilateral     Knee    MASTECTOMY Left 2019       Family history:  Family History   Family history unknown: Yes       Social history:  Social History     Socioeconomic History    Marital status: Single   Tobacco Use    Smoking status: Never    Smokeless tobacco: Never   Substance and Sexual Activity    Alcohol use: Not Currently    Drug use: Never    Sexual activity: Not Currently   Social History Narrative    ** Merged History Encounter **          Social Drivers of Health     Financial Resource Strain: Low Risk  (3/12/2025)    Overall Financial Resource Strain (CARDIA)     Difficulty of Paying Living Expenses: Not hard at all   Food Insecurity: No Food Insecurity (3/12/2025)    Hunger Vital Sign     Worried About Running Out of Food in the Last Year: Never true     Ran Out of Food in the Last Year: Never true   Transportation Needs: No Transportation Needs (1/21/2025)    TRANSPORTATION NEEDS     Transportation : No   Physical Activity: Inactive (3/12/2025)    Exercise Vital Sign     Days of Exercise per Week: 0 days     Minutes of Exercise per Session: 0 min   Stress: No Stress Concern Present (3/12/2025)    [employer] - Occupational Stress Questionnaire     Feeling of Stress : Only a little   Housing Stability: Low Risk  (3/12/2025)    Housing Stability Vital Sign     Unable to Pay for Housing in the Last Year:  No     Homeless in the Last Year: No     Tobacco Use History[1]   Social History     Substance and Sexual Activity   Alcohol Use Not Currently        MEDICATIONS & ALLERGIES:     No current facility-administered medications on file prior to encounter.     Current Outpatient Medications on File Prior to Encounter   Medication Sig    anastrozole (ARIMIDEX) 1 mg Tab Take 1 tablet by mouth once daily.    apixaban (ELIQUIS) 5 mg Tab Take 5 mg by mouth 2 (two) times daily.    aspirin (ECOTRIN) 81 MG EC tablet Take 1 tablet (81 mg total) by mouth once daily.    cyproheptadine (PERIACTIN) 4 mg tablet Take 4 mg by mouth 2 (two) times daily as needed.    dexlansoprazole (DEXILANT) 60 mg capsule Take 60 mg by mouth once daily.    famotidine (PEPCID) 20 MG tablet Take 1 tablet (20 mg total) by mouth once daily.    furosemide (LASIX) 20 MG tablet Take 1 tablet (20 mg total) by mouth once daily.    [] itraconazole (SPORANOX) 100 mg Cap Take 2 capsules (200 mg total) by mouth once daily. for 4 days    pravastatin (PRAVACHOL) 20 MG tablet Take 1 tablet (20 mg total) by mouth every evening.    verapamiL (CALAN-SR) 240 MG CR tablet Take 1 tablet (240 mg total) by mouth nightly.     Allergies:   Review of patient's allergies indicates:   Allergen Reactions    Sulfa (sulfonamide antibiotics)      Patient unsure if allergic to Sulfa     Scheduled Meds:   lactated ringers  1,000 mL Intravenous Once    lactated ringers  500 mL Intravenous ED 1 Time    magnesium sulfate 1 g IVPB  1 g Intravenous Once    Followed by    magnesium sulfate 2 g IVPB  2 g Intravenous Once    NORepinephrine 8 mg        pantoprazole  40 mg Intravenous BID    piperacillin-tazobactam (Zosyn) IV (PEDS and ADULTS) (extended infusion is not appropriate)  4.5 g Intravenous Q8H    vancomycin 1,500 mg in D5W 250 mL IVPB (admixture device)  1,500 mg Intravenous Q12H    vancomycin 750 mg in D5W 250 mL IVPB (admixture device)  750 mg Intravenous Once     Continuous  "Infusions:   NORepinephrine bitartrate-D5W  0-3 mcg/kg/min Intravenous Continuous        sodium bicarbonate 150 mEq in D5W 1,000 mL infusion   Intravenous Continuous 125 mL/hr at 03/15/25 1055 New Bag at 03/15/25 1055     PRN Meds:  Current Facility-Administered Medications:     0.9%  NaCl infusion (for blood administration), , Intravenous, Q24H PRN    NORepinephrine 8 mg, , ,     sodium chloride 0.9%, 10 mL, Intravenous, PRN    vancomycin - pharmacy to dose, , Intravenous, pharmacy to manage frequency    OBJECTIVE:   Vital Signs:  VITAL SIGNS: 24 HR MIN & MAX LAST   Temp  Min: 95.9 °F (35.5 °C)  Max: 98.4 °F (36.9 °C)  (!) 95.9 °F (35.5 °C)   BP  Min: 59/46  Max: 128/71  128/71    Pulse  Min: 71  Max: 85  76    Resp  Min: 15  Max: 39  19    SpO2  Min: 89 %  Max: 100 %  100 %      HT: 5' 5" (165.1 cm)  WT: 81.6 kg (180 lb)  BMI: 30     Intake/output:  Intake/Output - Last 3 Shifts         03/13 0700  03/14 0659 03/14 0700  03/15 0659 03/15 0700  03/16 0659    Blood  1306     Total Intake(mL/kg)  1306 (16)     Net  +1306                    Intake/Output Summary (Last 24 hours) at 3/15/2025 1120  Last data filed at 3/15/2025 0646  Gross per 24 hour   Intake 1306 ml   Output --   Net 1306 ml         Physical Exam:  General: Well developed, well nourished  HEENT: Normocephalic, PERRL  CV: RR  Resp: increased work of breathing on 2L mask however adequate O2 saturation on monitor  GI:  Abdomen soft, non-tender, mildly distended, no guarding, no rebound  :  Deferred  MSK: LUE edema  Skin/wounds:  No rashes, ulcers, erythema  Neuro:  CNII-XII grossly intact    Labs:  Troponin:  Recent Labs     03/15/25  0502   TROPONINI <0.010     CBC:  Recent Labs     03/15/25  0502   WBC 2.74  2.75*   RBC 1.36*   HGB 4.4*   HCT 14.9*   PLT 85*   .6*   MCH 32.4*   MCHC 29.5*     CMP:  Recent Labs     03/15/25  0502   CALCIUM 6.0*   ALBUMIN 0.8*   *   K 4.0   CO2 5*      BUN 11.9   CREATININE 0.44*   ALKPHOS 54   ALT " "16   AST 13   BILITOT 0.3     Lactic Acid:  Recent Labs     03/15/25  0502   LACTATE 20.9*     ETOH:  No results for input(s): "ETHANOL" in the last 72 hours.   Urine Drug Screen:  No results for input(s): "COCAINE", "OPIATE", "BARBITURATE", "AMPHETAMINE", "FENTANYL", "CANNABINOIDS", "MDMA" in the last 72 hours.    Invalid input(s): "BENZODIAZEPINE", "PHENCYCLIDINE"   ABG:  Recent Labs   Lab 03/15/25  1000   PH 7.280*   PO2 205.0*   PCO2 24.0*   HCO3 11.3*      I have reviewed all pertinent lab results within the past 24 hours.    Diagnostic Results:  Imaging Results              CTA Chest Abdomen Pelvis (XPD) (Final result)  Result time 03/15/25 08:17:48      Final result by Benita Marinelli MD (03/15/25 08:17:48)                   Impression:      1. No evidence of aortic dissection or aneurysm.  2. 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  3. Findings suggestive of an acute pancreatitis with fluid and inflammatory changes surrounding the pancreas and trace ascites.  No drainable fluid collection identified.  4. Nonspecific diffuse gallbladder wall thickening.      Electronically signed by: Benita Marinelli  Date:    03/15/2025  Time:    08:17               Narrative:    EXAMINATION:  CTA CHEST ABDOMEN PELVIS (XPD)    CLINICAL HISTORY:  Aortic aneurysm, known or suspected;abdominal and back pain, sob, hypotensive, multiple electrolyte abnormalities hgb 4, lactic 20;    TECHNIQUE:  CT images of the chest, abdomen and pelvis before and after IV contrast. Axial, coronal and sagittal reformatted images are reviewed. 3D reconstructed images were also performed for further evaluation of the vasculature. Dose length product is 3342 mGycm. Automatic exposure control, adjustment of mA/kV or iterative reconstruction technique was used to limit radiation dose.    COMPARISON:  CT chest dated 01/19/2025    FINDINGS:  The thoracic and abdominal aorta are normal in caliber, with minimal scattered calcifications " distally.  There is mixing artifact within the aortic arch and proximal descending thoracic aorta.  There is no evidence of aneurysm or dissection.  The celiac artery is patent with severe narrowing at its origin and poststenotic dilatation.  The superior mesenteric artery, renal arteries and inferior mesenteric artery are patent.  The bilateral iliac arteries are patent.  There is no evidence of a pulmonary embolism.    There is no focal airspace consolidation.  There is no pleural effusion or pneumothorax.  The heart is enlarged.  There is no pericardial effusion.    There is free fluid and inflammatory change in the upper abdomen centered around the pancreas.  The pancreas appears to enhance heterogeneously, with evaluation limited by technique.  There is suspected significant wall thickening of the gallbladder.  The liver and spleen are unremarkable.  The adrenal glands are unremarkable.  There is no hydronephrosis.  There is no bowel obstruction.  The uterus has been surgically resected.  The bladder is decompressed.  There is trace ascites.  There is no acute bony abnormality.                                       X-Ray Chest 1 View (Final result)  Result time 03/15/25 08:59:51      Final result by Og Darby MD (03/15/25 08:59:51)                   Narrative:    EXAMINATION  XR CHEST 1 VIEW    CLINICAL HISTORY  hypotension;    TECHNIQUE  A total of 1 frontal image(s) of the chest.    COMPARISON  11 March 2025    FINDINGS  Lines/tubes/devices: Grossly unchanged positioning when allowing for differences in technique and patient rotation.    There is similar enlargement of the cardiac silhouette and findings of central vascular congestion.  The trachea is midline. No new or worsening consolidation is identified. There is no enlarging pleural effusion or convincing pneumothorax.    Regional osseous structures and extrathoracic soft tissues are similar.    IMPRESSION  No acute process or other adverse  interval change.      Electronically signed by: Og Darby  Date:    03/15/2025  Time:    08:59                                   I have reviewed all pertinent imaging results/findings within the past 24 hours.    ASSESSMENT & PLAN:    74F w/PMH Breast cancer s/p chemo and left mastectomy c/b LUE lymphadenopathy, CHF & severe mitral regurg s/p ICD placement in 3/2021 (EF 25-30% w/severe global hypokinesis and G3 Diastolic dysfunction on 3/10/25), Hx Dvt on Eliquis (last dose 3/15 AM), GONZALEZ on CPAP, HLD, HTN recently admitted 3/9-3/15 for ROMO in setting of decompensated heart failure. Now presenting with fatigue, acute anemia, and shortness of breath. CTA imaging with concern for celiac stenosis. General surgery consulted for acute ischemic bowel.     No acute surgical intervention recommended at this time  Agree with RUQ ultrasound   Concern for acute ischemic bowel relatively low however recommend vascular consult for review of imaging   Recommend resuscitation and supportive treatment of pancreatitis   Recommend obtaining triglyceride level   Trend lactate   Serial abdominal exams  Rest of care per ICU   General surgery will continue to follow     Amanda Garcia MD  [employer] General Surgery, PGY-2         [1]   Social History  Tobacco Use   Smoking Status Never   Smokeless Tobacco Never

## 2025-03-15 NOTE — PROCEDURES
"Laura Jacobs is a 74 y.o. female patient.    Temp: (!) 95.9 °F (35.5 °C) (03/15/25 0852)  Pulse: 81 (03/15/25 1128)  Resp: 19 (03/15/25 1128)  BP: 128/71 (03/15/25 1023)  SpO2: 98 % (03/15/25 1128)  Weight: 81.6 kg (180 lb) (03/15/25 0356)  Height: 5' 5" (165.1 cm) (03/15/25 0356)       Arterial Line    Date/Time: 3/15/2025 12:12 PM  Location procedure was performed: [facility] EMERGENCY DEPARTMENT    Performed by: Jaxon Beaver MD  Authorized by: Jaxon Beaver MD  Consent Done: Emergent Situation  Preparation: Patient was prepped and draped in the usual sterile fashion.  Indications: multiple ABGs, respiratory failure and hemodynamic monitoring  Location: right radial  Anesthesia: local infiltration    Anesthesia:  Local Anesthetic: lidocaine 2% with epinephrine  Anesthetic total: 5 mL    Patient sedated: no  Jaxon's test normal: yes  Needle gauge: 20  Seldinger technique: Seldinger technique used  Number of attempts: 2  Complications: No  Estimated blood loss (mL): 3  Specimens: No  Implants: No  Post-procedure: dressing applied  Post-procedure CMS: normal  Patient tolerance: Patient tolerated the procedure well with no immediate complications  Comments: Successfully placed, good waveform on monitor, pullback showing arterial blood      Jaxon Beaver MD  [facility] ICU 7th floor, PGY-3    3/15/2025    "

## 2025-03-15 NOTE — ED PROVIDER NOTES
Encounter Date: 3/15/2025       History     Chief Complaint   Patient presents with    Fatigue     Pt arrives via [facility] for weakness since 2130, pt was restless all night according to her.      73 yo f, very poor historian - c/o generalized weakness and sob from what I can discern, from chart review hx of chf, pe, unclear if compliant with medications.    Patient is ill-appearing and tachypneic on arrival she is hypotensive on arrival.      Patient had a number of critical lab results which were addressed.  Low H and H was given emergency release blood fecal occult blood test positive with tan stool given Protonix    Hypomagnesemia replaced    Hypocalcemia replaced    2-1/2 L of lactated Ringer's    Broad-spectrum antibiotics and blood cultures for elevated lactic acid and possible sepsis    Active rewarming with Dana Hugger    Hypoglycemia patient put on D5 half drip        Review of patient's allergies indicates:   Allergen Reactions    Sulfa (sulfonamide antibiotics)      Patient unsure if allergic to Sulfa     Past Medical History:   Diagnosis Date    Cancer     breast CA s/p chemo and L masectomy     Cardiomyopathy     CHF (congestive heart failure)     History of breast cancer     History of DVT (deep vein thrombosis)     HLD (hyperlipidemia)     Hypertension     Lymphedema     Osteoarthritis     Venous insufficiency      Past Surgical History:   Procedure Laterality Date    HYSTERECTOMY      INSERTION OF PACEMAKER      INTRAMEDULLARY RODDING OF FEMUR Left 10/14/2022    Procedure: INSERTION, INTRAMEDULLARY СВЕТЛАНА, FEMUR;  Surgeon: Ankush Alex MD;  Location: [facility];  Service: Orthopedics;  Laterality: Left;    JOINT REPLACEMENT Bilateral     Knee    MASTECTOMY Left 2019     Family History   Family history unknown: Yes     Social History[1]  Review of Systems   Respiratory:  Positive for shortness of breath.    Musculoskeletal:  Positive for back pain.   Neurological:  Positive for weakness.       Physical Exam      Initial Vitals [03/15/25 0356]   BP Pulse Resp Temp SpO2   (!) 63/38 80 (!) 22 98.4 °F (36.9 °C) (!) 89 %      MAP       --         Physical Exam    Constitutional: She is not diaphoretic. She appears distressed.   HENT:   Head: Normocephalic and atraumatic.   Eyes: Pupils are equal, round, and reactive to light.   Cardiovascular:  Normal rate, regular rhythm and normal heart sounds.           Pulmonary/Chest: She has no wheezes. She has no rales.   Tachypneic   Abdominal: Abdomen is soft. She exhibits no distension. There is no abdominal tenderness.   Musculoskeletal:         General: Normal range of motion.     Neurological: She is alert and oriented to person, place, and time.   Skin: Skin is warm and dry.   Psychiatric: She has a normal mood and affect.         ED Course   Critical Care    Date/Time: 3/15/2025 6:10 AM    Performed by: Perry Fagan MD  Authorized by: Perry Fagan MD  Total critical care time (exclusive of procedural time) : 0 minutes  Critical care time was exclusive of separately billable procedures and treating other patients and teaching time.  Critical care was time spent personally by me on the following activities: interpretation of cardiac output measurements, evaluation of patient's response to treatment, examination of patient, obtaining history from patient or surrogate, ordering and performing treatments and interventions, ordering and review of laboratory studies, ordering and review of radiographic studies and pulse oximetry.        Labs Reviewed   BASIC METABOLIC PANEL - Abnormal       Result Value    Sodium 134 (*)     Potassium 4.5      Chloride 105      CO2 11 (*)     Glucose 120 (*)     Blood Urea Nitrogen 31.6 (*)     Creatinine 1.62 (*)     BUN/Creatinine Ratio 20      Calcium 8.5      Anion Gap 18.0      eGFR 34     COMPREHENSIVE METABOLIC PANEL - Abnormal    Sodium 132 (*)     Potassium 4.0      Chloride 107      CO2 5 (*)     Glucose 46 (*)     Blood  Urea Nitrogen 11.9      Creatinine 0.44 (*)     Calcium 6.0 (*)     Protein Total 1.6 (*)     Albumin 0.8 (*)     Globulin 0.8 (*)     Albumin/Globulin Ratio 1.0 (*)     Bilirubin Total 0.3      ALP 54      ALT 16      AST 13      eGFR >60      Anion Gap 20.0      BUN/Creatinine Ratio 27     PROTIME-INR - Abnormal    PT 22.7 (*)     INR 2.0 (*)     Narrative:     Protimes are used to monitor anticoagulant agents such as warfarin. PT INR values are based on the current patient normal mean and the HAN value for the specific instrument reagent used.  **Routine theraputic target values for the INR are 2.0-3.0**   MAGNESIUM - Abnormal    Magnesium Level 0.60 (*)    LACTIC ACID, PLASMA - Abnormal    Lactic Acid Level 20.9 (*)    CBC WITH DIFFERENTIAL - Abnormal    WBC 2.75 (*)     RBC 1.36 (*)     Hgb 4.4 (*)     Hct 14.9 (*)     .6 (*)     MCH 32.4 (*)     MCHC 29.5 (*)     RDW 16.4      Platelet 85 (*)     MPV 10.6 (*)    BLOOD GAS - Abnormal    Sample Type Arterial Blood      Sample site Right Radial Artery      Drawn by agnieszka rt      pH, Blood gas 7.340 (*)     pCO2, Blood gas 19.0 (*)     pO2, Blood gas 99.0      Sodium, Blood Gas 133 (*)     Potassium, Blood Gas 4.3      Calcium Level Ionized 1.09 (*)     TOC2, Blood gas 10.9      Base Excess, Blood gas -13.40 (*)     sO2, Blood gas 97.3      HCO3, Blood gas 10.3 (*)     THb, Blood gas 11.6 (*)     O2 Hb, Blood Gas 96.0      CO Hgb 1.3      Met Hgb 0.6      Allens Test Yes     MANUAL DIFFERENTIAL - Abnormal    WBC 2.74      Neutrophils % 90      Lymphs % 10      Neutrophils Abs 2.466      Lymphs Abs 0.274 (*)     Platelets Normal      RBC Morph Abnormal (*)     Poikilocytosis 3+ (*)     Anisocytosis 1+ (*)     Microcytosis 1+ (*)     Acanthocytes 2+ (*)     [place] Cells 3+ (*)     Narrative:     Ok to verify per nurse    POCT GLUCOSE - Abnormal    POCT Glucose 125 (*)    TROPONIN I - Normal    Troponin-I <0.010     APTT - Normal    PTT 32.8     BLOOD CULTURE  [facility]   BLOOD CULTURE [facility]   CBC W/ AUTO DIFFERENTIAL    Narrative:     The following orders were created for panel order CBC auto differential.  Procedure                               Abnormality         Status                     ---------                               -----------         ------                     CBC with Differential[[id number]]       Abnormal            Final result               Manual Differential[[id number]]         Abnormal            Final result                 Please view results for these tests on the individual orders.   B-TYPE NATRIURETIC PEPTIDE   URINALYSIS, REFLEX TO URINE CULTURE   LACTIC ACID, PLASMA   LIPASE   TYPE & SCREEN    Group & Rh O POS      Indirect Sabina GEL NEG      Specimen Outdate 03/18/2025 23:59     POCT GLUCOSE    POCT Glucose 85     PREPARE RBC SOFT    UNIT NUMBER [id number]      UNIT ABO/RH O NEG      DISPENSE STATUS Issued      Unit Expiration [id number]      Product Code D6130I54      Unit Blood Type Code 9500      CROSSMATCH INTERPRETATION Compatible      UNIT NUMBER [id number]      UNIT ABO/RH O NEG      DISPENSE STATUS Issued      Unit Expiration [id number]      Product Code L4909A45      Unit Blood Type Code 9500      CROSSMATCH INTERPRETATION Compatible            Imaging Results              CTA Chest Abdomen Pelvis (XPD) (Final result)  Result time 03/15/25 08:17:48      Final result by Benita Marinelli MD (03/15/25 08:17:48)                   Impression:      1. No evidence of aortic dissection or aneurysm.  2. 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  3. Findings suggestive of an acute pancreatitis with fluid and inflammatory changes surrounding the pancreas and trace ascites.  No drainable fluid collection identified.  4. Nonspecific diffuse gallbladder wall thickening.      Electronically signed by: Benita Marinelli  Date:    03/15/2025  Time:    08:17               Narrative:    EXAMINATION:  CTA CHEST ABDOMEN PELVIS  (XPD)    CLINICAL HISTORY:  Aortic aneurysm, known or suspected;abdominal and back pain, sob, hypotensive, multiple electrolyte abnormalities hgb 4, lactic 20;    TECHNIQUE:  CT images of the chest, abdomen and pelvis before and after IV contrast. Axial, coronal and sagittal reformatted images are reviewed. 3D reconstructed images were also performed for further evaluation of the vasculature. Dose length product is 3342 mGycm. Automatic exposure control, adjustment of mA/kV or iterative reconstruction technique was used to limit radiation dose.    COMPARISON:  CT chest dated 01/19/2025    FINDINGS:  The thoracic and abdominal aorta are normal in caliber, with minimal scattered calcifications distally.  There is mixing artifact within the aortic arch and proximal descending thoracic aorta.  There is no evidence of aneurysm or dissection.  The celiac artery is patent with severe narrowing at its origin and poststenotic dilatation.  The superior mesenteric artery, renal arteries and inferior mesenteric artery are patent.  The bilateral iliac arteries are patent.  There is no evidence of a pulmonary embolism.    There is no focal airspace consolidation.  There is no pleural effusion or pneumothorax.  The heart is enlarged.  There is no pericardial effusion.    There is free fluid and inflammatory change in the upper abdomen centered around the pancreas.  The pancreas appears to enhance heterogeneously, with evaluation limited by technique.  There is suspected significant wall thickening of the gallbladder.  The liver and spleen are unremarkable.  The adrenal glands are unremarkable.  There is no hydronephrosis.  There is no bowel obstruction.  The uterus has been surgically resected.  The bladder is decompressed.  There is trace ascites.  There is no acute bony abnormality.                                       X-Ray Chest 1 View (In process)  Result time 03/15/25 08:58:10                     Medications   lactated  ringers bolus 500 mL (has no administration in time range)   vancomycin (VANCOCIN) 1,000 mg in D5W 250 mL IVPB (admixture device) (has no administration in time range)     Followed by   vancomycin 750 mg in D5W 250 mL IVPB (admixture device) (has no administration in time range)   NORepinephrine 8 mg (LEVOPHED) 8 mg/250 mL (32 mcg/mL) in dextrose 5% 250 mL infusion (has no administration in time range)   0.9%  NaCl infusion (for blood administration) (has no administration in time range)   lactated ringers bolus 1,000 mL (0 mLs Intravenous Stopped 3/15/25 0545)   lactated ringers bolus 1,000 mL (0 mLs Intravenous Stopped 3/15/25 0630)   calcium gluconate 1 g in NS IVPB (premixed) (1 g Intravenous New Bag 3/15/25 0637)   magnesium sulfate in dextrose IVPB (premix) 1 g (1 g Intravenous New Bag 3/15/25 0644)   pantoprazole injection 80 mg (80 mg Intravenous Given 3/15/25 0641)   dextrose 5 % and 0.45 % NaCl infusion (1,000 mLs Intravenous New Bag 3/15/25 0833)   piperacillin-tazobactam (ZOSYN) 4.5 g in D5W 100 mL IVPB (MB+) (4.5 g Intravenous New Bag 3/15/25 0825)   iohexoL (OMNIPAQUE 350) injection 100 mL (100 mLs Intravenous Given 3/15/25 0750)     Medical Decision Making  Amount and/or Complexity of Data Reviewed  Labs: ordered.  Radiology: ordered.    Risk  Prescription drug management.               ED Course as of 03/15/25 0859   Sat Mar 15, 2025   0445 From last echo march 2025 Left Ventricle: The left ventricle is dilated. Severe global hypokinesis present. There is severely reduced systolic function with a visually estimated ejection fraction of 25 - 30%. Grade III diastolic dysfunction. Elevated left ventricular filling pressure. [NL]   0620 Tan stool, fobt + [NL]   0849 Paged ICU.  [KL]      ED Course User Index  [KL] Elizabeth Wilkerson  [NL] Perry Fagan MD                           Clinical Impression:  Final diagnoses:  [K85.90] Acute pancreatitis, unspecified complication status, unspecified  pancreatitis type  [D64.9] Anemia, unspecified type  [E83.42] Hypomagnesemia  [E83.51] Hypocalcemia                   [1]   Social History  Tobacco Use    Smoking status: Never    Smokeless tobacco: Never   Substance Use Topics    Alcohol use: Not Currently    Drug use: Never        Perry Fagan MD  03/15/25 0859

## 2025-03-15 NOTE — ED NOTES
Bedside report received from Tiffany LEDESMA. Pt is alert and restless, able to answer questions and follow commands. Came to ED from home c/o fatigue, recently discharged from hospital. Pt has results of abnormal labs, low H&H with positive guaiac, currently receiving 2nd unit of emergent release PRBCs. Was hypothermic and currently on cynthia hugger. Receiving fluid boluses for sepsis workup.

## 2025-03-16 LAB
ALBUMIN SERPL-MCNC: 2.6 G/DL (ref 3.4–4.8)
ALBUMIN/GLOB SERPL: 1 RATIO (ref 1.1–2)
ALP SERPL-CCNC: 151 UNIT/L (ref 40–150)
ALT SERPL-CCNC: 198 UNIT/L (ref 0–55)
ANION GAP SERPL CALC-SCNC: 11 MEQ/L
AST SERPL-CCNC: 298 UNIT/L (ref 5–34)
BASOPHILS # BLD AUTO: 0.05 X10(3)/MCL
BASOPHILS NFR BLD AUTO: 0.6 %
BILIRUB SERPL-MCNC: 2.1 MG/DL
BUN SERPL-MCNC: 29.5 MG/DL (ref 9.8–20.1)
CALCIUM SERPL-MCNC: 7.6 MG/DL (ref 8.4–10.2)
CHLORIDE SERPL-SCNC: 97 MMOL/L (ref 98–107)
CO2 SERPL-SCNC: 27 MMOL/L (ref 23–31)
CREAT SERPL-MCNC: 1.51 MG/DL (ref 0.55–1.02)
CREAT/UREA NIT SERPL: 20
EOSINOPHIL # BLD AUTO: 0.18 X10(3)/MCL (ref 0–0.9)
EOSINOPHIL NFR BLD AUTO: 2.3 %
ERYTHROCYTE [DISTWIDTH] IN BLOOD BY AUTOMATED COUNT: 17.4 % (ref 11.5–17)
GFR SERPLBLD CREATININE-BSD FMLA CKD-EPI: 37 ML/MIN/1.73/M2
GLOBULIN SER-MCNC: 2.7 GM/DL (ref 2.4–3.5)
GLUCOSE SERPL-MCNC: 138 MG/DL (ref 82–115)
HCT VFR BLD AUTO: 35.3 % (ref 37–47)
HGB BLD-MCNC: 12 G/DL (ref 12–16)
IMM GRANULOCYTES # BLD AUTO: 0.05 X10(3)/MCL (ref 0–0.04)
IMM GRANULOCYTES NFR BLD AUTO: 0.6 %
LYMPHOCYTES # BLD AUTO: 1 X10(3)/MCL (ref 0.6–4.6)
LYMPHOCYTES NFR BLD AUTO: 12.5 %
MCH RBC QN AUTO: 32.3 PG (ref 27–31)
MCHC RBC AUTO-ENTMCNC: 34 G/DL (ref 33–36)
MCV RBC AUTO: 95.1 FL (ref 80–94)
MONOCYTES # BLD AUTO: 0.32 X10(3)/MCL (ref 0.1–1.3)
MONOCYTES NFR BLD AUTO: 4 %
NEUTROPHILS # BLD AUTO: 6.38 X10(3)/MCL (ref 2.1–9.2)
NEUTROPHILS NFR BLD AUTO: 80 %
NRBC BLD AUTO-RTO: 0.5 %
PLATELET # BLD AUTO: 196 X10(3)/MCL (ref 130–400)
PMV BLD AUTO: 10.7 FL (ref 7.4–10.4)
POTASSIUM SERPL-SCNC: 3.3 MMOL/L (ref 3.5–5.1)
PROT SERPL-MCNC: 5.3 GM/DL (ref 5.8–7.6)
RBC # BLD AUTO: 3.71 X10(6)/MCL (ref 4.2–5.4)
SODIUM SERPL-SCNC: 135 MMOL/L (ref 136–145)
VANCOMYCIN SERPL-MCNC: 16.6 UG/ML (ref 15–20)
WBC # BLD AUTO: 7.98 X10(3)/MCL (ref 4.5–11.5)

## 2025-03-16 PROCEDURE — 63600175 PHARM REV CODE 636 W HCPCS

## 2025-03-16 PROCEDURE — 25000003 PHARM REV CODE 250

## 2025-03-16 PROCEDURE — 25000003 PHARM REV CODE 250: Performed by: INTERNAL MEDICINE

## 2025-03-16 PROCEDURE — 80053 COMPREHEN METABOLIC PANEL: CPT

## 2025-03-16 PROCEDURE — 36415 COLL VENOUS BLD VENIPUNCTURE: CPT

## 2025-03-16 PROCEDURE — 80202 ASSAY OF VANCOMYCIN: CPT | Performed by: INTERNAL MEDICINE

## 2025-03-16 PROCEDURE — 25000003 PHARM REV CODE 250: Performed by: NURSE PRACTITIONER

## 2025-03-16 PROCEDURE — 85025 COMPLETE CBC W/AUTO DIFF WBC: CPT

## 2025-03-16 PROCEDURE — 30233N1 TRANSFUSION OF NONAUTOLOGOUS RED BLOOD CELLS INTO PERIPHERAL VEIN, PERCUTANEOUS APPROACH: ICD-10-PCS | Performed by: HOSPITALIST

## 2025-03-16 PROCEDURE — 11000001 HC ACUTE MED/SURG PRIVATE ROOM

## 2025-03-16 PROCEDURE — 63600175 PHARM REV CODE 636 W HCPCS: Performed by: INTERNAL MEDICINE

## 2025-03-16 PROCEDURE — 21400001 HC TELEMETRY ROOM

## 2025-03-16 PROCEDURE — 94760 N-INVAS EAR/PLS OXIMETRY 1: CPT

## 2025-03-16 PROCEDURE — 63600175 PHARM REV CODE 636 W HCPCS: Performed by: NURSE PRACTITIONER

## 2025-03-16 PROCEDURE — 27000221 HC OXYGEN, UP TO 24 HOURS

## 2025-03-16 PROCEDURE — 99233 SBSQ HOSP IP/OBS HIGH 50: CPT | Mod: GC,,, | Performed by: STUDENT IN AN ORGANIZED HEALTH CARE EDUCATION/TRAINING PROGRAM

## 2025-03-16 RX ORDER — SODIUM CHLORIDE 9 MG/ML
INJECTION, SOLUTION INTRAVENOUS CONTINUOUS
Status: DISCONTINUED | OUTPATIENT
Start: 2025-03-16 | End: 2025-03-16

## 2025-03-16 RX ORDER — POTASSIUM CHLORIDE 7.45 MG/ML
10 INJECTION INTRAVENOUS
Status: COMPLETED | OUTPATIENT
Start: 2025-03-16 | End: 2025-03-16

## 2025-03-16 RX ORDER — FUROSEMIDE 10 MG/ML
20 INJECTION INTRAMUSCULAR; INTRAVENOUS ONCE
Status: COMPLETED | OUTPATIENT
Start: 2025-03-16 | End: 2025-03-16

## 2025-03-16 RX ADMIN — SODIUM CHLORIDE: 9 INJECTION, SOLUTION INTRAVENOUS at 06:03

## 2025-03-16 RX ADMIN — POTASSIUM CHLORIDE 10 MEQ: 7.46 INJECTION, SOLUTION INTRAVENOUS at 06:03

## 2025-03-16 RX ADMIN — PIPERACILLIN SODIUM AND TAZOBACTAM SODIUM 4.5 G: 4; .5 INJECTION, POWDER, LYOPHILIZED, FOR SOLUTION INTRAVENOUS at 03:03

## 2025-03-16 RX ADMIN — APIXABAN 5 MG: 5 TABLET, FILM COATED ORAL at 09:03

## 2025-03-16 RX ADMIN — PANTOPRAZOLE SODIUM 40 MG: 40 INJECTION, POWDER, FOR SOLUTION INTRAVENOUS at 08:03

## 2025-03-16 RX ADMIN — THIAMINE HYDROCHLORIDE 400 MG: 100 INJECTION, SOLUTION INTRAMUSCULAR; INTRAVENOUS at 03:03

## 2025-03-16 RX ADMIN — FOLIC ACID 1 MG: 5 INJECTION, SOLUTION INTRAMUSCULAR; INTRAVENOUS; SUBCUTANEOUS at 08:03

## 2025-03-16 RX ADMIN — PIPERACILLIN SODIUM AND TAZOBACTAM SODIUM 4.5 G: 4; .5 INJECTION, POWDER, LYOPHILIZED, FOR SOLUTION INTRAVENOUS at 09:03

## 2025-03-16 RX ADMIN — SODIUM BICARBONATE: 84 INJECTION, SOLUTION INTRAVENOUS at 03:03

## 2025-03-16 RX ADMIN — THIAMINE HYDROCHLORIDE 400 MG: 100 INJECTION, SOLUTION INTRAMUSCULAR; INTRAVENOUS at 10:03

## 2025-03-16 RX ADMIN — PANTOPRAZOLE SODIUM 40 MG: 40 INJECTION, POWDER, FOR SOLUTION INTRAVENOUS at 09:03

## 2025-03-16 RX ADMIN — VANCOMYCIN HYDROCHLORIDE 1000 MG: 1 INJECTION, POWDER, LYOPHILIZED, FOR SOLUTION INTRAVENOUS at 08:03

## 2025-03-16 RX ADMIN — THIAMINE HYDROCHLORIDE 400 MG: 100 INJECTION, SOLUTION INTRAMUSCULAR; INTRAVENOUS at 05:03

## 2025-03-16 RX ADMIN — FUROSEMIDE 20 MG: 10 INJECTION, SOLUTION INTRAMUSCULAR; INTRAVENOUS at 03:03

## 2025-03-16 RX ADMIN — MUPIROCIN: 20 OINTMENT TOPICAL at 09:03

## 2025-03-16 RX ADMIN — MUPIROCIN: 20 OINTMENT TOPICAL at 08:03

## 2025-03-16 RX ADMIN — POTASSIUM CHLORIDE 10 MEQ: 7.46 INJECTION, SOLUTION INTRAVENOUS at 07:03

## 2025-03-16 NOTE — CONSULTS
[facility] - 7th Floor ICU  Vascular Surgery  Consult Note    Consults  Subjective:         History of Present Illness: 73 y/o female with h/o HTN/HLP/CKD/CHF(EF  30%)/DVT presented to [facility] c/o fatigue and SOB.  She was hypotensive and found to have lactate  20 and reported Hg 4.4.  She was given 2uPRBC and initiated on Levo.  These labs may have been incorrect as her subsequent Hg was 12.  Nonetheless, she states she feels improved and denies SOB or abdominal pain at this time.    Prescriptions Prior to Admission[1]    Review of patient's allergies indicates:   Allergen Reactions    Sulfa (sulfonamide antibiotics)      Patient unsure if allergic to Sulfa       Past Medical History:   Diagnosis Date    Cancer     breast CA s/p chemo and L masectomy     Cardiomyopathy     CHF (congestive heart failure)     History of breast cancer     History of DVT (deep vein thrombosis)     HLD (hyperlipidemia)     Hypertension     Lymphedema     Osteoarthritis     Venous insufficiency      Past Surgical History:   Procedure Laterality Date    HYSTERECTOMY      INSERTION OF PACEMAKER      INTRAMEDULLARY RODDING OF FEMUR Left 10/14/2022    Procedure: INSERTION, INTRAMEDULLARY СВЕТЛАНА, FEMUR;  Surgeon: Ankush Alex MD;  Location: [facility];  Service: Orthopedics;  Laterality: Left;    JOINT REPLACEMENT Bilateral     Knee    MASTECTOMY Left 2019     Family History    Family history is unknown by patient.       Tobacco Use    Smoking status: Never    Smokeless tobacco: Never   Substance and Sexual Activity    Alcohol use: Not Currently    Drug use: Never    Sexual activity: Not Currently     Review of Systems   Constitutional:  Negative for activity change and appetite change.   HENT:  Negative for congestion.    Respiratory:  Negative for apnea and shortness of breath.    Cardiovascular:  Negative for chest pain.   Gastrointestinal:  Negative for abdominal distention, abdominal pain, diarrhea and nausea.   Musculoskeletal:   Negative for back pain.   Neurological:  Negative for dizziness and syncope.   Psychiatric/Behavioral:  Negative for agitation.      Objective:     Vital Signs (Most Recent):  Temp: 98.9 °F (37.2 °C) (03/16/25 0800)  Pulse: 94 (03/16/25 1015)  Resp: (!) 23 (03/16/25 1015)  BP: 118/71 (03/16/25 1015)  SpO2: 96 % (03/16/25 1015) Vital Signs (24h Range):  Temp:  [97.7 °F (36.5 °C)-98.9 °F (37.2 °C)] 98.9 °F (37.2 °C)  Pulse:  [] 94  Resp:  [5-28] 23  SpO2:  [94 %-100 %] 96 %  BP: ()/(61-88) 118/71  Arterial Line BP: ()/() 90/61     Weight: 81.6 kg (179 lb 14.3 oz)  Body mass index is 29.94 kg/m².    Date 03/16/25 0700 - 03/17/25 0659   Shift 9884-1919 7430-6471 2267-2881 24 Hour Total   INTAKE   I.V.(mL/kg) 470(5.8)   470(5.8)   IV Piggyback 460   460   Shift Total(mL/kg) 930(11.4)   930(11.4)   OUTPUT   Urine(mL/kg/hr) 150   150   Shift Total(mL/kg) 150(1.8)   150(1.8)   Weight (kg) 81.6 81.6 81.6 81.6       Physical Exam  Constitutional:       Appearance: Normal appearance.   Eyes:      Extraocular Movements: Extraocular movements intact.   Cardiovascular:      Rate and Rhythm: Normal rate and regular rhythm.   Pulmonary:      Effort: Pulmonary effort is normal. No respiratory distress.      Breath sounds: Normal breath sounds.   Abdominal:      General: Bowel sounds are normal. There is no distension.      Palpations: Abdomen is soft.      Tenderness: There is no abdominal tenderness.   Musculoskeletal:         General: Normal range of motion.   Skin:     General: Skin is warm.      Capillary Refill: Capillary refill takes less than 2 seconds.   Neurological:      General: No focal deficit present.      Mental Status: She is alert and oriented to person, place, and time.         Significant Labs:  All pertinent labs from the last 24 hours have been reviewed.    Significant Diagnostics:  I have reviewed all pertinent imaging results/findings within the past 24 hours.    Assessment/Plan:      There are no hospital problems to display for this patient.    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 She has no atherosclerosis and MAL syndrome must be considered.  This would be an incidental finding and likely not contributing to her state at admission.  When she returns to baseline, she could have celiac duplex with inspiration/expiration velocities.  If these were parallel with MAL syndrome, Surgery could evaluate for laparoscopic decompression.  If her duplex findings were not in line with MAL, she would be considered for stenting.    Thank you for your consult.     Ricardo Campos III, MD  Vascular Surgery  [facility] - 7th Floor ICU         [1]   Medications Prior to Admission   Medication Sig Dispense Refill Last Dose/Taking    anastrozole (ARIMIDEX) 1 mg Tab Take 1 tablet by mouth once daily.       apixaban (ELIQUIS) 5 mg Tab Take 5 mg by mouth 2 (two) times daily.       aspirin (ECOTRIN) 81 MG EC tablet Take 1 tablet (81 mg total) by mouth once daily. 90 tablet 3     cyproheptadine (PERIACTIN) 4 mg tablet Take 4 mg by mouth 2 (two) times daily as needed.       dexlansoprazole (DEXILANT) 60 mg capsule Take 60 mg by mouth once daily.       famotidine (PEPCID) 20 MG tablet Take 1 tablet (20 mg total) by mouth once daily. 30 tablet 11     furosemide (LASIX) 20 MG tablet Take 1 tablet (20 mg total) by mouth once daily. 30 tablet 11     [] itraconazole (SPORANOX) 100 mg Cap Take 2 capsules (200 mg total) by mouth once daily. for 4 days 8 capsule 0     pravastatin (PRAVACHOL) 20 MG tablet Take 1 tablet (20 mg total) by mouth every evening. 90 tablet 3     verapamiL (CALAN-SR) 240 MG CR tablet Take 1 tablet (240 mg total) by mouth nightly. 30 tablet 11

## 2025-03-16 NOTE — PROGRESS NOTES
[facility] - Emergency Dept  Pulmonary Critical Care Note    Patient Name: Laura Jacobs  MRN: [id number]  Admission Date: 3/15/2025  Hospital Length of Stay: 1 days  Code Status: Full Code  Attending Provider: Josefa Morrison MD  Primary Care Provider: Ruben Brooks Sr., MD     Subjective:     HPI:   Female with a past medical history of breast cancer in remission status post left-sided mastectomy with left-sided lymphedema, heart failure with reduced ejection fraction EF of 25-30% with a pacemaker in place, history of pulmonary embolus on long-term anticoagulation, hyperlipidemia presented to the emergency department at about 4:00 a.m. this morning complaining of generalized weakness and shortness of breath that began the previous night and progressively worsened.    On arrival to the ED, patient noted to be severely hypotensive and tachycardic.  She was bolused 30 cc/kg and started on broad-spectrum antibiotics.  Blood cultures were drawn.  Initial labs showed a leukopenia with a white count of 2.75, an H&H of 4.4 and 14.9 respectively.  Platelet count 85.  Initial lactic acid of 20.  Patient also received 2 units of PRBCs, and remained hypotensive.  Levophed was initiated at 0.1.  Initial CMP shows a severe metabolic acidosis with a bicarb of 5, ABG showed a pH of 7.28, a bicarb of 10, and a CO2 of 19 indicative of a severe metabolic acidosis with respiratory compensation.  Patient is started on Venturi mask at 15 L. she was given 2 amps of bicarb and started on a bicarb drip.  This was prior to last ABG drawn.  CTA chest/abdomen/pelvis showed severe celiac artery stenosis with post stenotic dilatation, a distended gallbladder, and severe acute pancreatitis.  Patient has no drug or alcohol history.  One-view chest x-ray showed no pneumothorax, pneumonia, or pleural effusion.  Initial troponin was negative, BNP is pending.  Lipase is also pending.  Initial magnesium was noted to be 0.6,  repleted with 4 g of Mag sulfate.  FOBT positive, however CTA not showing evidence of acute bleed. Dana hugger in place.    On last evaluation, vital signs were stable.  Patient oxygenating at 100%.  Maps above 90 on 0.04 of Levophed.  Son arrived and was updated on the patient's situation and critical status.  Family notified that the patient is at high risk of decompensation.  At this time, patient has opted to pursue mechanical ventilation in the instance it is required.  General surgery has been consulted to assess for ischemic bowel and possible surgical abdomen.  Vascular surgery also consulted for celiac artery stenosis.    Hospital Course/Significant events:  Admitted to the ICU on 03/15/2025    24 Hour Interval History:  Patient awake and alert this morning.  Hemodynamically stable.  No specific complaints.    Past Medical History:   Diagnosis Date    Cancer     breast CA s/p chemo and L masectomy     Cardiomyopathy     CHF (congestive heart failure)     History of breast cancer     History of DVT (deep vein thrombosis)     HLD (hyperlipidemia)     Hypertension     Lymphedema     Osteoarthritis     Venous insufficiency        Past Surgical History:   Procedure Laterality Date    HYSTERECTOMY      INSERTION OF PACEMAKER      INTRAMEDULLARY RODDING OF FEMUR Left 10/14/2022    Procedure: INSERTION, INTRAMEDULLARY СВЕТЛАНА, FEMUR;  Surgeon: Ankush Alex MD;  Location: [facility];  Service: Orthopedics;  Laterality: Left;    JOINT REPLACEMENT Bilateral     Knee    MASTECTOMY Left 2019       Social History[1]        Current Outpatient Medications   Medication Instructions    anastrozole (ARIMIDEX) 1 mg Tab 1 tablet, Daily    apixaban (ELIQUIS) 5 mg, Oral, 2 times daily    aspirin (ECOTRIN) 81 mg, Oral, Daily    cyproheptadine (PERIACTIN) 4 mg, Oral, 2 times daily PRN    dexlansoprazole (DEXILANT) 60 mg, Oral, Daily    famotidine (PEPCID) 20 mg, Oral, Daily    furosemide (LASIX) 20 mg, Oral, Daily    pravastatin  (PRAVACHOL) 20 mg, Oral, Nightly    verapamiL (CALAN-SR) 240 mg, Oral, Nightly       Review of patient's allergies indicates:   Allergen Reactions    Sulfa (sulfonamide antibiotics)      Patient unsure if allergic to Sulfa        Current Inpatient Medications   folic acid  1 mg Intravenous Daily    magnesium sulfate 1 g IVPB  1 g Intravenous Once    mupirocin   Nasal BID    pantoprazole  40 mg Intravenous BID    piperacillin-tazobactam (Zosyn) IV (PEDS and ADULTS) (extended infusion is not appropriate)  4.5 g Intravenous Q8H    thiamine (B-1) injection  400 mg Intravenous Q8H    Followed by    [START ON 3/17/2025] thiamine  100 mg Oral Q8H    vancomycin (VANCOCIN) 1,000 mg in 0.9% NaCl 250 mL IVPB (admixture device)  1,000 mg Intravenous Q24H       Current Intravenous Infusions   0.9% NaCl   Intravenous Continuous 125 mL/hr at 03/16/25 0800 Rate Verify at 03/16/25 0800    NORepinephrine bitartrate-D5W  0-3 mcg/kg/min Intravenous Continuous   Stopped at 03/15/25 1053         ROS       Objective:       Intake/Output Summary (Last 24 hours) at 3/16/2025 0948  Last data filed at 3/16/2025 0800  Gross per 24 hour   Intake 6777.59 ml   Output 705 ml   Net 6072.59 ml         Vital Signs (Most Recent):  Temp: 98.9 °F (37.2 °C) (03/16/25 0800)  Pulse: 89 (03/16/25 0930)  Resp: 19 (03/16/25 0930)  BP: 128/78 (03/16/25 0930)  SpO2: 98 % (03/16/25 0930)  Body mass index is 29.94 kg/m².  Weight: 81.6 kg (179 lb 14.3 oz) Vital Signs (24h Range):  Temp:  [97.7 °F (36.5 °C)-98.9 °F (37.2 °C)] 98.9 °F (37.2 °C)  Pulse:  [] 89  Resp:  [5-28] 19  SpO2:  [94 %-100 %] 98 %  BP: ()/(61-88) 128/78  Arterial Line BP: ()/() 98/61     Physical Exam  Vitals reviewed.   HENT:      Head: Normocephalic and atraumatic.      Mouth/Throat:      Mouth: Mucous membranes are dry.      Pharynx: No oropharyngeal exudate or posterior oropharyngeal erythema.   Eyes:      Extraocular Movements: Extraocular movements intact.       Conjunctiva/sclera: Conjunctivae normal.      Pupils: Pupils are equal, round, and reactive to light.   Cardiovascular:      Rate and Rhythm: Normal rate. Rhythm irregular.      Pulses: Decreased pulses.      Heart sounds: Heart sounds are distant. Murmur heard.      Systolic murmur is present with a grade of 2/6.      No friction rub. No gallop.      Comments: Radial pulses 1+ bilaterally  DP pulses 1+ bilaterally  Pulmonary:      Effort: Respiratory distress present.   Chest:      Comments: Left-sided mastectomy scar  Abdominal:      General: Bowel sounds are normal. There is no distension.      Palpations: Abdomen is soft.      Tenderness: Tenderness: Diffuse.   Musculoskeletal:      Cervical back: Normal range of motion and neck supple.      Right lower leg: No edema.      Left lower leg: No edema.   Skin:     General: Skin is cool.      Capillary Refill: Capillary refill takes 2 to 3 seconds.      Comments: Skin is cool to touch   Neurological:      Mental Status: She is lethargic and disoriented.      GCS: GCS eye subscore is 4. GCS verbal subscore is 5. GCS motor subscore is 6.      Cranial Nerves: Cranial nerves 2-12 are intact.      Sensory: Sensation is intact.      Motor: Motor function is intact.           Lines/Drains/Airways       Drain  Duration                  Urethral Catheter 03/15/25 0845 Temperature probe 16 Fr. 1 day              Arterial Line  Duration             Arterial Line 03/15/25 1007 Right Radial <1 day              Peripheral Intravenous Line  Duration                  Peripheral IV - Single Lumen 03/15/25 0500 20 G No Right Antecubital 1 day         Peripheral IV - Single Lumen 03/15/25 0629 18 G Anterior;Right Upper Arm 1 day                    Significant Labs:    Lab Results   Component Value Date    WBC 7.98 03/16/2025    HGB 12.0 03/16/2025    HCT 35.3 (L) 03/16/2025    MCV 95.1 (H) 03/16/2025     03/16/2025           BMP  Lab Results   Component Value Date     (L)  03/16/2025    K 3.3 (L) 03/16/2025    CO2 27 03/16/2025    BUN 29.5 (H) 03/16/2025    CREATININE 1.51 (H) 03/16/2025    CALCIUM 7.6 (L) 03/16/2025    AGAP 11.0 03/16/2025    ESTGFRAFRICA 92 01/04/2021    EGFRNONAA 58 04/04/2022         ABG  Recent Labs   Lab 03/15/25  1700   PH 7.530*   PO2 103.0*   PCO2 32.0*   HCO3 26.7*   POCBASEDEF 4.30*       Mechanical Ventilation Support:         Significant Imaging:  I have reviewed the pertinent imaging within the past 24 hours.        Assessment/Plan:     Assessment  ? Septic shock possible abdominal source, cholecystitis versus infected pancreas.  Resolved now hemodynamically stable.  Severe symptomatic anemia.  Hemoglobin now 12 and really question the reality of initial hemoglobin of 4.  Severe celiac artery stenosis  Questionable diagnosis of Acute pancreatitis  Lactic acidosis.  Markedly improved  Acute kidney injury likely secondary to hypoperfusion improved.  History of left breast cancer status post mastectomy causing lymphedema  Heart failure with reduced ejection fraction, echo 03/05/2025 25-30% with a pacemaker in place  History of pulmonary embolus on long-term Eliquis      Plan  Patient appears stable to downgrade of ICU.  Vascular surgery to evaluate celiac artery disease.  Nephrology has discontinued bicarbonate drip and we will continue to follow the patient.  Antibiotics per culture.  Follow up on labs and pancreas.      DVT Prophylaxis: Holding at this time, possible bleed  GI Prophylaxis: Protonix        Zafar Espinoza MD  Pulmonary Critical Care Medicine  [facility]   DOS: 03/16/2025           [1]   Social History  Socioeconomic History    Marital status: Single   Tobacco Use    Smoking status: Never    Smokeless tobacco: Never   Substance and Sexual Activity    Alcohol use: Not Currently    Drug use: Never    Sexual activity: Not Currently   Social History Narrative    ** Merged History Encounter **          Social Drivers of Health      Financial Resource Strain: Low Risk  (3/12/2025)    Overall Financial Resource Strain (CARDIA)     Difficulty of Paying Living Expenses: Not hard at all   Food Insecurity: No Food Insecurity (3/12/2025)    Hunger Vital Sign     Worried About Running Out of Food in the Last Year: Never true     Ran Out of Food in the Last Year: Never true   Transportation Needs: No Transportation Needs (1/21/2025)    TRANSPORTATION NEEDS     Transportation : No   Physical Activity: Inactive (3/12/2025)    Exercise Vital Sign     Days of Exercise per Week: 0 days     Minutes of Exercise per Session: 0 min   Stress: No Stress Concern Present (3/12/2025)    [employer] [place] [employer] - Occupational Stress Questionnaire     Feeling of Stress : Only a little   Housing Stability: Low Risk  (3/12/2025)    Housing Stability Vital Sign     Unable to Pay for Housing in the Last Year: No     Homeless in the Last Year: No

## 2025-03-16 NOTE — CONSULTS
[facility]    Nephrology Consultation    Patient Name: Laura Jacobs  Age: 74 y.o.  : 1950  MRN: [id number]  Admission Date: 3/15/2025    Date of Consultation: 2025    Consultation Requested By: Josefa Morrison MD     Reason for Consultation: [place]    Chief Complaint: Fatigue (Pt arrives via [facility] for weakness since , pt was restless all night according to her. )      History of Present Illness:  Ms Laura Jacobs is a 74 y.o. Black or  female with past medical history of breast cancer, status post left mastectomy, left arm lymphadenopathy, chemotherapy, heart failure with ejection fraction (EF 20-30%), severe mitral regurgitation, hypertension, dyslipidemia, and osteoarthritis.  Patient was brought to the emergency department with complaints of generalized weakness and shortness of breath.  She was hypotensive, tachycardic, and anemic.  Patient was admitted to the intensive care unit, given IV fluid boluses and started vasopressor.  Evaluation revealed severe celiac artery stenosis, distended gallbladder, and findings consistent with acute pancreatitis.  Nephrology service was consulted for assistance with management of acute kidney injury.    Patient is allergic to sulfa (sulfonamide antibiotics).     Review of systems:  Unable to obtain secondary to patient's condition    Past Medical History:  has a past medical history of Cancer, Cardiomyopathy, CHF (congestive heart failure), History of breast cancer, History of DVT (deep vein thrombosis), HLD (hyperlipidemia), Hypertension, Lymphedema, Osteoarthritis, and Venous insufficiency.    Procedure History:  has a past surgical history that includes Mastectomy (Left, ); Joint replacement (Bilateral); Hysterectomy; Insertion of pacemaker; and Intramedullary rodding of femur (Left, 10/14/2022).    Family History: Family history is unknown by patient.    Social History:  reports that she has never smoked.  "She has never used smokeless tobacco. She reports that she does not currently use alcohol. She reports that she does not use drugs.    Physical Exam:   /69   Pulse 80   Temp 98.3 °F (36.8 °C) (Axillary)   Resp 19   Ht 5' 5" (1.651 m)   Wt 81.6 kg (179 lb 14.3 oz)   SpO2 98%   BMI 29.94 kg/m²  Body mass index is 29.94 kg/m².  General appearance: Patient is in no acute distress.  Skin: No rashes or wounds.  HEENT: PERRLA, EOMI, no scleral icterus, no JVD. Neck is supple.  Chest:  Expiratory wheezes noted.  Respirations are unlabored.  Heart: S1, S2.   Abdomen:  Diffusely tender, nondistended.  :  Singh catheter to gravity with dark yellow urine in the collection chamber.  Extremities: No edema, peripheral pulses are palpable.  Left upper extremity lymphedema  Neuro: Lethargic, arousable to verbal command.     Inpatient Medications:   Scheduled Meds:   folic acid  1 mg Intravenous Daily    magnesium sulfate 1 g IVPB  1 g Intravenous Once    mupirocin   Nasal BID    pantoprazole  40 mg Intravenous BID    piperacillin-tazobactam (Zosyn) IV (PEDS and ADULTS) (extended infusion is not appropriate)  4.5 g Intravenous Q8H    thiamine (B-1) injection  400 mg Intravenous Q8H    Followed by    [START ON 3/17/2025] thiamine  100 mg Oral Q8H     Continuous Infusions:   NORepinephrine bitartrate-D5W  0-3 mcg/kg/min Intravenous Continuous   Stopped at 03/15/25 1053    sodium bicarbonate 150 mEq in D5W 1,000 mL infusion   Intravenous Continuous 125 mL/hr at 03/16/25 0351 New Bag at 03/16/25 0351     PRN Meds:.  Current Facility-Administered Medications:     sodium chloride 0.9%, 10 mL, Intravenous, PRN    vancomycin - pharmacy to dose, , Intravenous, pharmacy to manage frequency     Imaging:  CT angiogram of the abdomen 03/15/2025:  1. No evidence of aortic dissection or aneurysm.  2. 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  3. Findings suggestive of an acute pancreatitis with fluid and inflammatory " changes surrounding the pancreas and trace ascites.  No drainable fluid collection identified.  4. Nonspecific diffuse gallbladder wall thickening.    Laboratory Data:   Lab Results   Component Value Date    WBC 2.75 (L) 03/15/2025    WBC 2.74 03/15/2025    HGB 12.4 03/15/2025    HCT 36.3 (L) 03/15/2025    PLT 85 (L) 03/15/2025    FOLATE 13.4 03/15/2025    [id number] >2,000 (H) 03/15/2025     03/15/2025    K 4.2 03/15/2025    CO2 14 (L) 03/15/2025    BUN 32.5 (H) 03/15/2025    CREATININE 1.71 (H) 03/15/2025    EGFRNORACEVR 32 03/15/2025    GLUCOSE 170 (H) 03/15/2025    CALCIUM 8.0 (L) 03/15/2025    ALKPHOS 198 (H) 03/15/2025    LABPROT 5.7 (L) 03/15/2025    ALBUMIN 2.7 (L) 03/15/2025    BILIDIR 0.3 05/06/2022    IBILI 0.50 05/06/2022     (H) 03/15/2025     (H) 03/15/2025    MG 2.60 03/15/2025    PHOS 3.8 03/15/2025      Lab Results   Component Value Date    HGBA1C 7.4 (H) 03/15/2025    [id number] 12 (L) 10/08/2024    HIV Nonreactive 11/22/2023    HEPCAB Nonreactive 11/27/2023    HEPBSAB Nonreactive 11/27/2023     Urine:  Lab Results   Component Value Date    APPEARANCEUA Turbid (A) 03/15/2025    SGUA >1.050 (H) 03/15/2025    PROTEINUA 3+ (A) 03/15/2025    KETONESUA Negative 03/15/2025    LEUKOCYTESUR Negative 03/15/2025    RBCUA 21-50 (A) 03/15/2025    WBCUA 21-50 (A) 03/15/2025    BACTERIA Trace 03/15/2025    SQEPUA Trace 03/15/2025    HYALINECASTS 0-2 (A) 03/09/2025         Impression:   Nonoliguric acute kidney injury   Underlying chronic kidney disease  Metabolic acidosis, resolved   Hypokalemia   Transaminitis  Acute pancreatitis   Heart failure with reduced ejection fraction   Anemia   History of breast cancer    Plan:   Renal indices have improved from yesterday, metabolic acidosis resolved.  Will discontinue bicarbonate drip, start normal saline at 125 mL/hr.  Will replace potassium.  Continue supportive care.    Thank you very much for your consultation.     LUIS ENRIQUE Peralta  Nephrology  3/16/2025 5:02 AM

## 2025-03-16 NOTE — PROGRESS NOTES
"   Acute Care Surgery   Progress Note  Admit Date: 3/15/2025  HD#1  POD#* No surgery found *    Subjective:   Interval history:  NAEON  AF, VSS  Only required Levo for 1 hour while in the ED  Denies abdominal pain, nausea, or vomiting  UOP improving, now ~35 mL/hr    Home Meds:  Current Outpatient Medications   Medication Instructions    anastrozole (ARIMIDEX) 1 mg Tab 1 tablet, Daily    apixaban (ELIQUIS) 5 mg, Oral, 2 times daily    aspirin (ECOTRIN) 81 mg, Oral, Daily    cyproheptadine (PERIACTIN) 4 mg, Oral, 2 times daily PRN    dexlansoprazole (DEXILANT) 60 mg, Oral, Daily    famotidine (PEPCID) 20 mg, Oral, Daily    furosemide (LASIX) 20 mg, Oral, Daily    pravastatin (PRAVACHOL) 20 mg, Oral, Nightly    verapamiL (CALAN-SR) 240 mg, Oral, Nightly      Scheduled Meds:   folic acid  1 mg Intravenous Daily    magnesium sulfate 1 g IVPB  1 g Intravenous Once    mupirocin   Nasal BID    pantoprazole  40 mg Intravenous BID    piperacillin-tazobactam (Zosyn) IV (PEDS and ADULTS) (extended infusion is not appropriate)  4.5 g Intravenous Q8H    potassium chloride  10 mEq Intravenous Q1H    thiamine (B-1) injection  400 mg Intravenous Q8H    Followed by    [START ON 3/17/2025] thiamine  100 mg Oral Q8H     Continuous Infusions:   0.9% NaCl   Intravenous Continuous 125 mL/hr at 03/16/25 0607 New Bag at 03/16/25 0607    NORepinephrine bitartrate-D5W  0-3 mcg/kg/min Intravenous Continuous   Stopped at 03/15/25 1053     PRN Meds:  Current Facility-Administered Medications:     sodium chloride 0.9%, 10 mL, Intravenous, PRN    vancomycin - pharmacy to dose, , Intravenous, pharmacy to manage frequency     Objective:     VITAL SIGNS: 24 HR MIN & MAX LAST   Temp  Min: 95.9 °F (35.5 °C)  Max: 98.5 °F (36.9 °C)  98.1 °F (36.7 °C)   BP  Min: 68/46  Max: 140/88  114/72    Pulse  Min: 71  Max: 95  88    Resp  Min: 5  Max: 28  17    SpO2  Min: 95 %  Max: 100 %  97 %      HT: 5' 5" (165.1 cm)  WT: 81.6 kg (179 lb 14.3 oz)  BMI: 29.9 "     Intake/output:  Intake/Output - Last 3 Shifts         03/14 0700  03/15 0659 03/15 0700  03/16 0659 03/16 0700 03/17 0659    I.V. (mL/kg)  2559.7 (31.4)     Blood 1306      IV Piggyback  3884.9     Total Intake(mL/kg) 1306 (16) 6444.6 (79)     Urine (mL/kg/hr)  625 (0.3)     Stool  0     Total Output  625     Net +1306 +5819.6            Urine Occurrence  0 x     Stool Occurrence  0 x             Intake/Output Summary (Last 24 hours) at 3/16/2025 0727  Last data filed at 3/16/2025 0607  Gross per 24 hour   Intake 6444.58 ml   Output 625 ml   Net 5819.58 ml         Lines/drains/airway:       Peripheral IV - Single Lumen 03/15/25 0500 20 G No Right Antecubital (Active)   Site Assessment Clean;Intact;Dry;No redness;No swelling 03/16/25 0540   Line Securement Device Antimicrobial Adhesive 03/15/25 2000   Extremity Assessment Distal to IV No abnormal discoloration 03/16/25 0540   Line Status Infusing 03/16/25 0540   Dressing Status Clean;Dry;Intact 03/16/25 0540   Dressing Intervention Integrity maintained 03/16/25 0540   Reason Not Rotated Not due 03/15/25 0500   Number of days: 1            Peripheral IV - Single Lumen 03/15/25 0629 18 G Anterior;Right Upper Arm (Active)   Site Assessment Clean;Dry;Intact;No redness;No swelling 03/16/25 0540   Line Securement Device Antimicrobial Adhesive 03/15/25 2000   Extremity Assessment Distal to IV No abnormal discoloration 03/16/25 0540   Line Status Infusing 03/16/25 0540   Dressing Status Clean;Dry;Intact 03/16/25 0540   Dressing Intervention Integrity maintained 03/16/25 0540   Reason Not Rotated Not due 03/15/25 0629   Number of days: 1       Arterial Line 03/15/25 1007 Right Radial (Active)   $ Arterial Line Charges (Upon Insertion) Bedside Insertion Performed 03/15/25 1007   Site Assessment Clean;Dry;Intact;No redness;No swelling 03/16/25 0540   Line Status Positional 03/16/25 0540   Art Line Waveform Appropriate 03/16/25 0540   Arterial Line Interventions Zeroed and  "calibrated;Leveled;Connections checked and tightened 03/16/25 0540   Color/Movement/Sensation Capillary refill less than 3 sec 03/16/25 0540   Dressing Type CHG impregnated dressing/sponge 03/16/25 0540   Dressing Status Clean;Dry;Intact 03/16/25 0540   Dressing Intervention Integrity maintained 03/16/25 0540   Number of days: 0            Urethral Catheter 03/15/25 0845 Temperature probe 16 Fr. (Active)   $ Singh Insertion Bedside Insertion Performed 03/15/25 2000   Site Assessment Clean;Intact 03/16/25 0540   Collection Container Urimeter 03/16/25 0540   Securement Method secured to top of thigh w/ adhesive device 03/16/25 0540   Catheter Care Performed yes 03/16/25 0540   Reason for Continuing Urinary Catheterization Critically ill in ICU and requiring hourly monitoring of intake/output 03/16/25 0540   CAUTI Prevention Bundle Securement Device in place with 1" slack;Intact seal between catheter & drainage tubing;Drainage bag/urimeter off the floor;Sheeting clip in use;No dependent loops or kinks;Drainage bag/urimeter not overfilled (<2/3 full);Drainage bag/urimeter below bladder 03/15/25 2000   Output (mL) 45 mL 03/16/25 0607   Number of days: 0       Physical examination:  Gen: NAD, AAOx3, answering questions appropriately  HEENT: NC  CV: RR  Resp: NWOB  Abd: S/NT/ND   Ext: moving all extremities spontaneously and purposefully  Neuro: CN II-XII grossly intact  Skin/wounds: warm and dry    Labs:  Renal:  Recent Labs     03/15/25  0425 03/15/25  0502 03/15/25  1040 03/16/25  0413   BUN 31.6* 11.9 32.5* 29.5*   CREATININE 1.62* 0.44* 1.71* 1.51*     No results for input(s): "LACTIC" in the last 72 hours.  FENGI:  Recent Labs     03/15/25  0425 03/15/25  0502 03/15/25  1040 03/15/25  1537 03/16/25  0413   * 132* 136  --  135*   K 4.5 4.0 4.2  --  3.3*    107 103  --  97*   CO2 11* 5* 14*  --  27   CALCIUM 8.5 6.0* 8.0*  --  7.6*   MG  --  0.60*  --  2.60  --    PHOS  --  1.1*  --  3.8  --    ALBUMIN  --  " 0.8* 2.7*  --  2.6*   BILITOT  --  0.3 2.5*  --  2.1*   AST  --  13 214*  --  298*   ALKPHOS  --  54 198*  --  151*   ALT  --  16 157*  --  198*     Heme:  Recent Labs     03/15/25  0502 03/15/25  0633 03/15/25  1537 03/16/25  0604   HGB 4.4*  --  12.4 12.0   HCT 14.9*  --  36.3* 35.3*   PLT 85*  --   --  196   INR  --  2.0*  --   --      ID:  Recent Labs     03/15/25  0502 03/16/25  0604   WBC 2.74  2.75* 7.98     CBG:  Recent Labs     03/15/25  0425 03/15/25  0502 03/15/25  1040 03/16/25  0413   GLUCOSE 120* 46* 170* 138*      Cardiovascular:  Recent Labs   Lab 03/09/25  1701 03/15/25  0502 03/15/25  1537   TROPONINI 0.021 <0.010  --    BNP 4,380.0*  --  3,198.8*     ABG:  Recent Labs   Lab 03/15/25  1700   PH 7.530*   PO2 103.0*   PCO2 32.0*   HCO3 26.7*      I have reviewed all pertinent lab results within the past 24 hours.    Imaging:  US Abdomen Limited_Gallbladder   Final Result      1. No gallstones or biliary ductal dilatation.   2. Nonspecific gallbladder wall thickening and edema.   3. Peripancreatic fluid/edema better demonstrated on CT.         Electronically signed by: Jony Beasley   Date:    03/15/2025   Time:    12:17      CTA Chest Abdomen Pelvis (XPD)   Final Result      1. No evidence of aortic dissection or aneurysm.   2. 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   3. Findings suggestive of an acute pancreatitis with fluid and inflammatory changes surrounding the pancreas and trace ascites.  No drainable fluid collection identified.   4. Nonspecific diffuse gallbladder wall thickening.         Electronically signed by: Benita Marinelli   Date:    03/15/2025   Time:    08:17      X-Ray Chest 1 View   Final Result         I have reviewed all pertinent imaging results/findings within the past 24 hours.    Micro/Path/Other:  Microbiology Results (last 7 days)       Procedure Component Value Units Date/Time    Urine culture [[id number]] Collected: 03/15/25 1034    Order Status: Completed  Specimen: Urine Updated: 03/16/25 0705     Urine Culture No Growth At 24 Hours    Blood culture #2 **CANNOT BE ORDERED STAT** [[id number]] Collected: 03/15/25 0502    Order Status: Resulted Specimen: Blood from Arm, Left Updated: 03/15/25 0512    Blood culture #1 **CANNOT BE ORDERED STAT** [[id number]] Collected: 03/15/25 0502    Order Status: Resulted Specimen: Blood from Arm, Right Updated: 03/15/25 0511           Specimens (From admission, onward)      None           Pathology Results  (Last 365 days)      None             Assessment & Plan:   74F w/PMH Breast cancer s/p chemo and left mastectomy c/b LUE lymphadenopathy, CHF & severe mitral regurg s/p ICD placement in 3/2021 (EF 25-30% w/severe global hypokinesis and G3 Diastolic dysfunction on 3/10/25), Hx Dvt on Eliquis (last dose 3/15 AM), GONZALEZ on CPAP, HLD, HTN recently admitted 3/9-3/15 for ROMO in setting of decompensated heart failure. Now presenting with fatigue, acute anemia, and shortness of breath. CTA imaging with concern for celiac stenosis. General surgery consulted for acute ischemic bowel.     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

## 2025-03-16 NOTE — PROGRESS NOTES
Pharmacokinetic Assessment Follow Up: IV Vancomycin    Vancomycin serum concentration assessment(s):    The random level was drawn correctly and can be used to guide therapy at this time. The measurement is within the desired definitive target range of 10 to 20 mcg/mL.    Vancomycin Regimen Plan:    Will initiate a maintenance regimen of 1000 mg every 24 hours. A trough is scheduled prior to the third dose of this regimen on 3/18 at 0800.    Scheduled Administration Times    0900    Drug levels (last 3 results):  Recent Labs   Lab Result Units 03/16/25  0413   Vancomycin Random ug/ml 16.6       Vancomycin Administrations:  vancomycin given in the last 96 hours                     vancomycin 750 mg in D5W 250 mL IVPB (admixture device) ()  Restarted 03/15/25 1614     750 mg New Bag  1545    vancomycin (VANCOCIN) 1,000 mg in D5W 250 mL IVPB (admixture device) ()  Restarted 03/15/25 1030      Restarted  1020      Restarted  1010     1,000 mg New Bag  0946                    Pharmacy will continue to follow and monitor vancomycin.    Please contact pharmacy at extension [phone] for questions regarding this assessment.    Thank you for the consult,   Maik Guzman       Patient brief summary:  Laura Jacobs is a 74 y.o. female initiated on antimicrobial therapy with IV Vancomycin for treatment of sepsis    The patient's current regimen is 1000 mg every 24 hours.    Drug Allergies:   Review of patient's allergies indicates:   Allergen Reactions    Sulfa (sulfonamide antibiotics)      Patient unsure if allergic to Sulfa       Actual Body Weight:  Wt Readings from Last 1 Encounters:   03/15/25 81.6 kg (179 lb 14.3 oz)       Renal Function:   Estimated Creatinine Clearance: 35.5 mL/min (A) (based on SCr of 1.51 mg/dL (H)).    CBC (last 72 hours):  Recent Labs   Lab Result Units 03/15/25  0502 03/15/25  1537 03/16/25  0604   WBC x10(3)/mcL 2.74  2.75*  --  7.98   Hgb g/dL 4.4* 12.4 12.0   Hemoglobin A1c %  --  7.4*  --     Hct % 14.9* 36.3* 35.3*   Platelet x10(3)/mcL 85*  --  196   Mono % %  --   --  4.0   Eos % %  --   --  2.3   Basophil % %  --   --  0.6       Metabolic Panel (last 72 hours):  Recent Labs   Lab Result Units 03/14/25  1049 03/15/25  0425 03/15/25  0502 03/15/25  0554 03/15/25  1000 03/15/25  1034 03/15/25  1040 03/15/25  1128 03/15/25  1537 03/15/25  1700 03/16/25  0413   Sodium mmol/L 138 134* 132*  --   --   --  136  --   --   --  135*   Sodium, Blood Gas mmol/L  --   --   --  133* 128*  --   --  132*  --  131*  --    Potassium mmol/L 4.6 4.5 4.0  --   --   --  4.2  --   --   --  3.3*   Potassium, Blood Gas mmol/L  --   --   --  4.3 4.3  --   --  4.0  --  3.8  --    Chloride mmol/L 103 105 107  --   --   --  103  --   --   --  97*   CO2 mmol/L 22* 11* 5*  --   --   --  14*  --   --   --  27   Glucose mg/dL 98 120* 46*  --   --   --  170*  --   --   --  138*   Glucose, UA   --   --   --   --   --  Trace*  --   --   --   --   --    Blood Urea Nitrogen mg/dL 33.9* 31.6* 11.9  --   --   --  32.5*  --   --   --  29.5*   Creatinine mg/dL 1.37* 1.62* 0.44*  --   --   --  1.71*  --   --   --  1.51*   Albumin g/dL  --   --  0.8*  --   --   --  2.7*  --   --   --  2.6*   Bilirubin Total mg/dL  --   --  0.3  --   --   --  2.5*  --   --   --  2.1*   ALP unit/L  --   --  54  --   --   --  198*  --   --   --  151*   AST unit/L  --   --  13  --   --   --  214*  --   --   --  298*   ALT unit/L  --   --  16  --   --   --  157*  --   --   --  198*   Magnesium Level mg/dL  --   --  0.60*  --   --   --   --   --  2.60  --   --    Phosphorus Level mg/dL  --   --  1.1*  --   --   --   --   --  3.8  --   --        Microbiologic Results:  Microbiology Results (last 7 days)       Procedure Component Value Units Date/Time    Urine culture [[id number]] Collected: 03/15/25 1034    Order Status: Completed Specimen: Urine Updated: 03/16/25 0705     Urine Culture No Growth At 24 Hours    Blood culture #2 **CANNOT BE ORDERED STAT**  [[id number]] Collected: 03/15/25 0502    Order Status: Resulted Specimen: Blood from Arm, Left Updated: 03/15/25 0512    Blood culture #1 **CANNOT BE ORDERED STAT** [[id number]] Collected: 03/15/25 0502    Order Status: Resulted Specimen: Blood from Arm, Right Updated: 03/15/25 0511

## 2025-03-16 NOTE — H&P
[facility] Medicine History & Physical Examination       Patient Name: Laura Jacobs  MRN: [id number]  Patient Class: IP- Inpatient   Admission Date: 3/15/2025   Admitting Physician: ANIYAH Service   Length of Stay: 1  Attending Physician: Dr Yahir Lanier  Primary Care Provider: Ruben Brooks Sr., MD  Face-to-Face encounter date: 03/16/2025  Code Status: full code  Chief Complaint: Fatigue (Pt arrives via [facility] for weakness since 2130, pt was restless all night according to her. )        Screening for Social Drivers for health:  Patient screened for food insecurity, housing instability, transportation needs, utility difficulties, and interpersonal safety (select all that apply as identified as concern)  []Housing or Food  []Transportation Needs  []Utility Difficulties  []Interpersonal safety  [x]None    Patient information was obtained from patient, patient's family, past medical records and/or ER records.     HISTORY OF PRESENT ILLNESS:   Laura Jacobs is a 74 y.o. female who past medical history includes breast cancer status post mastectomy and chemotherapy, cardiomyopathy, CHF EF 25-30 %, pacemaker, DVT, PE on long-term anticoagulation therapy, HTN, HLD, osteoarthritis, PVD, HLD, lymphedema; presented to the ED at [facility] on 3/15/2025 via EMS with a primary complaint of generalized weakness and shortness a breath.  Per ED reports it is a for difficult obtain information from the patient as she was a poor historian..  It was reported patient had complaints of generalized weakness and shortness a breath.  It was also reported her symptoms started the night prior and subsequently progressed prompting ED visit.  It was reported on presentation in the ER patient was hypotensive and tachycardic with sepsis picture.  She did receive fluid resuscitation 30 cc/kilogram and was started on broad-spectrum IV antibiotic therapy.  Cultures were collected.  Initial lab work  demonstrated WBCs 2.75, H&H 4.4/14.9, platelets 85, lactic acid 20, magnesium 0.6 which she received 4 g Mag riders.  Patient was reported to be occult negative..  Patient received 2 units of PRBCs.  LFTs were elevated.  She remained hypotensive which she was started on Levophed infusion and subsequently admitted to the ICU unit.  Patient was also initiated on bicarbonate infusion. CTA chest/abdomen/pelvis showed severe celiac artery stenosis with post stenotic dilatation, a distended gallbladder, and severe acute pancreatitis, no evidence of acute bleeding..  Patient has no drug or alcohol history.  One-view chest x-ray showed no pneumothorax, pneumonia, or pleural effusion.  Initial troponin was negative, BNP is pending.  Lipase is also pending.  Initial magnesium was noted to be 0.6.  Renal Services were consulted.  Vascular surgery was consulted.  General surgery Services were consulted.  No need for surgical intervention per vascular or surgery Services at this time.  Renal Services continues to follow and she is on bicarbonate infusion, renal function is improving.  Patient has been weaned off of Levophed infusion.  Patient has been cleared for transfer out of ICU to the regular floor.  Hospital medicine services have been consulted for assumption of care.    PAST MEDICAL HISTORY:     Past Medical History:   Diagnosis Date    Cancer     breast CA s/p chemo and L masectomy     Cardiomyopathy     CHF (congestive heart failure)     History of breast cancer     History of DVT (deep vein thrombosis)     HLD (hyperlipidemia)     Hypertension     Lymphedema     Osteoarthritis     Venous insufficiency        PAST SURGICAL HISTORY:     Past Surgical History:   Procedure Laterality Date    HYSTERECTOMY      INSERTION OF PACEMAKER      INTRAMEDULLARY RODDING OF FEMUR Left 10/14/2022    Procedure: INSERTION, INTRAMEDULLARY СВЕТЛАНА, FEMUR;  Surgeon: Ankush Alex MD;  Location: [facility];  Service: Orthopedics;  Laterality:  Left;    JOINT REPLACEMENT Bilateral     Knee    MASTECTOMY Left 2019       ALLERGIES:   Sulfa (sulfonamide antibiotics)    FAMILY HISTORY:   Reviewed and negative    SOCIAL HISTORY:   No reports of alcohol, tobacco, or illicit drug use   Lives with family     HOME MEDICATIONS:   As documented  Prior to Admission medications    Medication Sig Start Date End Date Taking? Authorizing Provider   anastrozole (ARIMIDEX) 1 mg Tab Take 1 tablet by mouth once daily. 7/24/23   Provider, Historical   apixaban (ELIQUIS) 5 mg Tab Take 5 mg by mouth 2 (two) times daily.    Provider, Historical   aspirin (ECOTRIN) 81 MG EC tablet Take 1 tablet (81 mg total) by mouth once daily. 3/14/25 3/14/26  Phyllis Marshall DO   cyproheptadine (PERIACTIN) 4 mg tablet Take 4 mg by mouth 2 (two) times daily as needed.    Provider, Historical   dexlansoprazole (DEXILANT) 60 mg capsule Take 60 mg by mouth once daily.    Provider, Historical   famotidine (PEPCID) 20 MG tablet Take 1 tablet (20 mg total) by mouth once daily. 3/14/25 3/14/26  Phyllis Marshall DO   furosemide (LASIX) 20 MG tablet Take 1 tablet (20 mg total) by mouth once daily. 3/14/25 3/14/26  Phyllis Marshall DO   pravastatin (PRAVACHOL) 20 MG tablet Take 1 tablet (20 mg total) by mouth every evening. 3/13/25 3/13/26  Phyllis Marshall DO   verapamiL (CALAN-SR) 240 MG CR tablet Take 1 tablet (240 mg total) by mouth nightly. 3/13/25 3/13/26  Phyllis Marshall DO       REVIEW OF SYSTEMS:   Limited secondary to clinical condition     PHYSICAL EXAM:     VITAL SIGNS: 24 HRS MIN & MAX LAST   Temp  Min: 97.7 °F (36.5 °C)  Max: 98.9 °F (37.2 °C) 98.9 °F (37.2 °C)   BP  Min: 87/71  Max: 140/88 118/71   Pulse  Min: 73  Max: 100  94   Resp  Min: 5  Max: 28 (!) 23   SpO2  Min: 94 %  Max: 100 % 96 %     Physical exam per MD     LABS AND IMAGING:     Recent Labs   Lab 03/10/25  0633 03/15/25  0502 03/15/25  1537 03/16/25  0604   WBC 4.12* 2.74  2.75*  --  7.98   RBC 3.49* 1.36*  --  3.71*   HGB 11.6* 4.4* 12.4 12.0    HCT 34.7* 14.9* 36.3* 35.3*   MCV 99.4* 109.6*  --  95.1*   MCH 33.2* 32.4*  --  32.3*   MCHC 33.4 29.5*  --  34.0   RDW 16.6 16.4  --  17.4*    85*  --  196   MPV 10.5* 10.6*  --  10.7*       Recent Labs   Lab 03/12/25  0558 03/14/25  1049 03/15/25  0502 03/15/25  0554 03/15/25  1000 03/15/25  1040 03/15/25  1128 03/15/25  1537 03/15/25  1700 03/16/25  0413      < > 132*  --   --  136  --   --   --  135*   K 4.2   < > 4.0  --   --  4.2  --   --   --  3.3*   *   < > 107  --   --  103  --   --   --  97*   CO2 22*   < > 5*  --   --  14*  --   --   --  27   BUN 22.0*   < > 11.9  --   --  32.5*  --   --   --  29.5*   CREATININE 1.23*   < > 0.44*  --   --  1.71*  --   --   --  1.51*   CALCIUM 7.9*   < > 6.0*  --   --  8.0*  --   --   --  7.6*   PH  --   --   --    < > 7.280*  --  7.410  --  7.530*  --    MG 2.30  --  0.60*  --   --   --   --  2.60  --   --    ALBUMIN  --   --  0.8*  --   --  2.7*  --   --   --  2.6*   ALKPHOS  --   --  54  --   --  198*  --   --   --  151*   ALT  --   --  16  --   --  157*  --   --   --  198*   AST  --   --  13  --   --  214*  --   --   --  298*   BILITOT  --   --  0.3  --   --  2.5*  --   --   --  2.1*    < > = values in this interval not displayed.       Microbiology Results (last 7 days)       Procedure Component Value Units Date/Time    Blood culture #1 **CANNOT BE ORDERED STAT** [[id number]]  (Normal) Collected: 03/15/25 0502    Order Status: Completed Specimen: Blood from Arm, Right Updated: 03/16/25 1101     Blood Culture No Growth At 24 Hours    Blood culture #2 **CANNOT BE ORDERED STAT** [[id number]]  (Normal) Collected: 03/15/25 0502    Order Status: Completed Specimen: Blood from Arm, Left Updated: 03/16/25 1101     Blood Culture No Growth At 24 Hours    Urine culture [[id number]] Collected: 03/15/25 1034    Order Status: Completed Specimen: Urine Updated: 03/16/25 0705     Urine Culture No Growth At 24 Hours             Echo Saline Bubble? No    Left  Ventricle: The left ventricle is dilated. Wall is thinner than   normal. Severe global hypokinesis present. There is severely reduced   systolic function with a visually estimated ejection fraction of 25 - 30%.    Right Ventricle: Right ventricular enlargement.    Left Atrium: Severely dilated Agitated saline study of the atrial   septum is negative, suggesting absence of intracardiac shunt at the atrial   level.    Right Atrium: Right atrium is severely dilated.    Mitral Valve: Mildly thickened leaflets. There is severe regurgitation.    Tricuspid Valve: There is moderate regurgitation.  US Abdomen Limited_Gallbladder  Narrative: EXAMINATION:  US ABDOMEN LIMITED_GALLBLADDER    CLINICAL HISTORY:  Epigastric pain;gallbladder; abdominal pain;gallbladder;    COMPARISON:  CT earlier today.    FINDINGS:  Grayscale, color and spectral doppler evaluation of the right upper quadrant.    There is some peripancreatic fluid/edema.  Imaged portion of the IVC is normal in caliber.    Liver is upper limit of normal in size. No focal liver lesion is seen.Normal hepatopetal flow is noted in the portal vein.    The gallbladder is not well distended.  No gallstones are seen.  There is nonspecific gallbladder wall thickening and edema.  The common bile duct is normal in caliber  and measures 3-4 mm.  Impression: 1. No gallstones or biliary ductal dilatation.  2. Nonspecific gallbladder wall thickening and edema.  3. Peripancreatic fluid/edema better demonstrated on CT.    Electronically signed by: Jony Beasley  Date:    03/15/2025  Time:    12:17  X-Ray Chest 1 View  EXAMINATION  XR CHEST 1 VIEW    CLINICAL HISTORY  hypotension;    TECHNIQUE  A total of 1 frontal image(s) of the chest.    COMPARISON  11 March 2025    FINDINGS  Lines/tubes/devices: Grossly unchanged positioning when allowing for differences in technique and patient rotation.    There is similar enlargement of the cardiac silhouette and findings of central vascular  congestion.  The trachea is midline. No new or worsening consolidation is identified. There is no enlarging pleural effusion or convincing pneumothorax.    Regional osseous structures and extrathoracic soft tissues are similar.    IMPRESSION  No acute process or other adverse interval change.    Electronically signed by: Og Darby  Date:    03/15/2025  Time:    08:59  CTA Chest Abdomen Pelvis (XPD)  Narrative: EXAMINATION:  CTA CHEST ABDOMEN PELVIS (XPD)    CLINICAL HISTORY:  Aortic aneurysm, known or suspected;abdominal and back pain, sob, hypotensive, multiple electrolyte abnormalities hgb 4, lactic 20;    TECHNIQUE:  CT images of the chest, abdomen and pelvis before and after IV contrast. Axial, coronal and sagittal reformatted images are reviewed. 3D reconstructed images were also performed for further evaluation of the vasculature. Dose length product is 3342 mGycm. Automatic exposure control, adjustment of mA/kV or iterative reconstruction technique was used to limit radiation dose.    COMPARISON:  CT chest dated 01/19/2025    FINDINGS:  The thoracic and abdominal aorta are normal in caliber, with minimal scattered calcifications distally.  There is mixing artifact within the aortic arch and proximal descending thoracic aorta.  There is no evidence of aneurysm or dissection.  The celiac artery is patent with severe narrowing at its origin and poststenotic dilatation.  The superior mesenteric artery, renal arteries and inferior mesenteric artery are patent.  The bilateral iliac arteries are patent.  There is no evidence of a pulmonary embolism.    There is no focal airspace consolidation.  There is no pleural effusion or pneumothorax.  The heart is enlarged.  There is no pericardial effusion.    There is free fluid and inflammatory change in the upper abdomen centered around the pancreas.  The pancreas appears to enhance heterogeneously, with evaluation limited by technique.  There is suspected significant  wall thickening of the gallbladder.  The liver and spleen are unremarkable.  The adrenal glands are unremarkable.  There is no hydronephrosis.  There is no bowel obstruction.  The uterus has been surgically resected.  The bladder is decompressed.  There is trace ascites.  There is no acute bony abnormality.  Impression: 1. No evidence of aortic dissection or aneurysm.  2. 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  3. Findings suggestive of an acute pancreatitis with fluid and inflammatory changes surrounding the pancreas and trace ascites.  No drainable fluid collection identified.  4. Nonspecific diffuse gallbladder wall thickening.    Electronically signed by: Benita Marinelli  Date:    03/15/2025  Time:    08:17        ASSESSMENT & PLAN:   ASSESSMENT:  Acute septic shock-requiring vasopressor therapy-POA   Acute pancreatitis-suspicion for infection-POA   Acute symptomatic anemia-status post PRBC transfusion-POA   Metabolic acidosis-requiring bicarbonate infusion-POA; resolved  CHF with reduced EF 25-30%, pacer in place-POA   Celiac artery stenosis-severe-vascular Services following-POA  History of pulmonary embolus on long-term anticoagulation therapy with Eliquis-POA   History of breast cancer status post mastectomy and chemotherapy treatments, remission reported-POA   Weakness- POA    PLAN:  Spoke with Renal Services we will decrease IV fluids to 75 cc an hour normal saline acidosis resolved   Vascular and surgery Services following appreciate assistance and recommendations   Continue with IV antibiotic therapy vancomycin and Zosyn adjust according to cultures and sensitivities   Continue with SCDs   Chest x-ray   LFT elevated likely secondary to to liver shock, will monitor closely  Patient did tolerate p.o. while at the bedside we will start low-fiber diet   Continue with therapy services  Monitor blood pressure closely  Repeat lab work in a.m.   Resume home medication as deemed necessary     VTE  Prophylaxis: SCD for DVT prophylaxis and will be advised to be as mobile as possible and sit in a chair as tolerated    Patient condition:  Stable  __________________________________________________________________________  INPATIENT LIST OF MEDICATIONS     Scheduled Meds:   folic acid  1 mg Intravenous Daily    magnesium sulfate 1 g IVPB  1 g Intravenous Once    mupirocin   Nasal BID    pantoprazole  40 mg Intravenous BID    piperacillin-tazobactam (Zosyn) IV (PEDS and ADULTS) (extended infusion is not appropriate)  4.5 g Intravenous Q8H    thiamine (B-1) injection  400 mg Intravenous Q8H    Followed by    [START ON 3/17/2025] thiamine  100 mg Oral Q8H    vancomycin (VANCOCIN) 1,000 mg in 0.9% NaCl 250 mL IVPB (admixture device)  1,000 mg Intravenous Q24H     Continuous Infusions:   0.9% NaCl   Intravenous Continuous 125 mL/hr at 03/16/25 1000 Rate Verify at 03/16/25 1000    NORepinephrine bitartrate-D5W  0-3 mcg/kg/min Intravenous Continuous   Stopped at 03/15/25 1053     PRN Meds:.  Current Facility-Administered Medications:     sodium chloride 0.9%, 10 mL, Intravenous, PRN    vancomycin - pharmacy to dose, , Intravenous, pharmacy to manage frequency      I, MACHELLE Danielson have reviewed and discussed the case with   Dr. Yahir Lanier.  Please see the following addendum for further assessment and plan from their attending MD.This note was created with a assistance of electronic voice recognition software.  There may be transcription errors as a result of using this technology however minimal; and effort has been made to assure accuracy of the transcription but any obvious areas or admissions should be clarified with the author of the document     MACHELLE iRley   03/16/2025    ________________________________________________________________________________    MD Addendum:  Dr. TAMERA ---assumed care of this patient today at ---am/pm  For the patient encounter, I performed the substantive portion of  the visit, I reviewed the NP/PA documentation, treatment plan, and medical decision making.  I had face to face time with this patient     A. History:    B. Physical exam:    C. Medical decision making:    Discharge Planning and Disposition: No mobility needs. Ambulating well. Good social support system.   Anticipated discharge    All diagnosis and differential diagnosis have been reviewed; assessment and plan has been documented; I have personally reviewed the labs and test results that are presently available; I have reviewed the patients medication list; I have reviewed the consulting providers response and recommendations. I have reviewed or attempted to review medical records based upon their availability.    All of the patient and family questions have been addressed and answered. Patient's is agreeable to the above stated plan. I will continue to monitor closely and make adjustments to medical management as needed.

## 2025-03-17 PROBLEM — K62.5 RECTAL BLEEDING: Status: ACTIVE | Noted: 2025-03-17

## 2025-03-17 LAB
ALBUMIN SERPL-MCNC: 2.8 G/DL (ref 3.4–4.8)
ALBUMIN/GLOB SERPL: 1 RATIO (ref 1.1–2)
ALP SERPL-CCNC: 158 UNIT/L (ref 40–150)
ALT SERPL-CCNC: 166 UNIT/L (ref 0–55)
ANION GAP SERPL CALC-SCNC: 11 MEQ/L
AST SERPL-CCNC: 115 UNIT/L (ref 5–34)
BACTERIA UR CULT: NO GROWTH
BASOPHILS # BLD AUTO: 0.06 X10(3)/MCL
BASOPHILS NFR BLD AUTO: 0.8 %
BILIRUB SERPL-MCNC: 2 MG/DL
BUN SERPL-MCNC: 30.6 MG/DL (ref 9.8–20.1)
CALCIUM SERPL-MCNC: 7.9 MG/DL (ref 8.4–10.2)
CHLORIDE SERPL-SCNC: 99 MMOL/L (ref 98–107)
CO2 SERPL-SCNC: 27 MMOL/L (ref 23–31)
CREAT SERPL-MCNC: 1.62 MG/DL (ref 0.55–1.02)
CREAT/UREA NIT SERPL: 19
EOSINOPHIL # BLD AUTO: 0.25 X10(3)/MCL (ref 0–0.9)
EOSINOPHIL NFR BLD AUTO: 3.4 %
ERYTHROCYTE [DISTWIDTH] IN BLOOD BY AUTOMATED COUNT: 17.7 % (ref 11.5–17)
GFR SERPLBLD CREATININE-BSD FMLA CKD-EPI: 34 ML/MIN/1.73/M2
GLOBULIN SER-MCNC: 2.8 GM/DL (ref 2.4–3.5)
GLUCOSE SERPL-MCNC: 92 MG/DL (ref 82–115)
HAV IGM SERPL QL IA: NONREACTIVE
HBV CORE IGM SERPL QL IA: NONREACTIVE
HBV SURFACE AG SERPL QL IA: NONREACTIVE
HCT VFR BLD AUTO: 39 % (ref 37–47)
HCT VFR BLD AUTO: 39.2 % (ref 37–47)
HCV AB SERPL QL IA: NONREACTIVE
HGB BLD-MCNC: 12.8 G/DL (ref 12–16)
HGB BLD-MCNC: 12.9 G/DL (ref 12–16)
IMM GRANULOCYTES # BLD AUTO: 0.02 X10(3)/MCL (ref 0–0.04)
IMM GRANULOCYTES NFR BLD AUTO: 0.3 %
LYMPHOCYTES # BLD AUTO: 1.12 X10(3)/MCL (ref 0.6–4.6)
LYMPHOCYTES NFR BLD AUTO: 15.4 %
MAGNESIUM SERPL-MCNC: 2.3 MG/DL (ref 1.6–2.6)
MCH RBC QN AUTO: 31.9 PG (ref 27–31)
MCHC RBC AUTO-ENTMCNC: 32.7 G/DL (ref 33–36)
MCV RBC AUTO: 97.8 FL (ref 80–94)
MONOCYTES # BLD AUTO: 0.4 X10(3)/MCL (ref 0.1–1.3)
MONOCYTES NFR BLD AUTO: 5.5 %
NEUTROPHILS # BLD AUTO: 5.4 X10(3)/MCL (ref 2.1–9.2)
NEUTROPHILS NFR BLD AUTO: 74.6 %
NRBC BLD AUTO-RTO: 0.4 %
PHOSPHATE SERPL-MCNC: 4.1 MG/DL (ref 2.3–4.7)
PLATELET # BLD AUTO: 196 X10(3)/MCL (ref 130–400)
PMV BLD AUTO: 10.6 FL (ref 7.4–10.4)
POCT GLUCOSE: 108 MG/DL (ref 70–110)
POCT GLUCOSE: 75 MG/DL (ref 70–110)
POTASSIUM SERPL-SCNC: 4.2 MMOL/L (ref 3.5–5.1)
PROT SERPL-MCNC: 5.6 GM/DL (ref 5.8–7.6)
RBC # BLD AUTO: 4.01 X10(6)/MCL (ref 4.2–5.4)
SODIUM SERPL-SCNC: 137 MMOL/L (ref 136–145)
WBC # BLD AUTO: 7.25 X10(3)/MCL (ref 4.5–11.5)

## 2025-03-17 PROCEDURE — 36415 COLL VENOUS BLD VENIPUNCTURE: CPT | Performed by: COLON & RECTAL SURGERY

## 2025-03-17 PROCEDURE — 99222 1ST HOSP IP/OBS MODERATE 55: CPT | Mod: ,,,

## 2025-03-17 PROCEDURE — 36415 COLL VENOUS BLD VENIPUNCTURE: CPT

## 2025-03-17 PROCEDURE — 21400001 HC TELEMETRY ROOM

## 2025-03-17 PROCEDURE — 11000001 HC ACUTE MED/SURG PRIVATE ROOM

## 2025-03-17 PROCEDURE — 63600175 PHARM REV CODE 636 W HCPCS: Performed by: INTERNAL MEDICINE

## 2025-03-17 PROCEDURE — 25000003 PHARM REV CODE 250

## 2025-03-17 PROCEDURE — 84100 ASSAY OF PHOSPHORUS: CPT | Performed by: NURSE PRACTITIONER

## 2025-03-17 PROCEDURE — 85018 HEMOGLOBIN: CPT | Performed by: COLON & RECTAL SURGERY

## 2025-03-17 PROCEDURE — 80053 COMPREHEN METABOLIC PANEL: CPT

## 2025-03-17 PROCEDURE — 25000003 PHARM REV CODE 250: Performed by: INTERNAL MEDICINE

## 2025-03-17 PROCEDURE — 85025 COMPLETE CBC W/AUTO DIFF WBC: CPT

## 2025-03-17 PROCEDURE — 63600175 PHARM REV CODE 636 W HCPCS

## 2025-03-17 PROCEDURE — 83735 ASSAY OF MAGNESIUM: CPT | Performed by: NURSE PRACTITIONER

## 2025-03-17 RX ADMIN — PIPERACILLIN SODIUM AND TAZOBACTAM SODIUM 4.5 G: 4; .5 INJECTION, POWDER, LYOPHILIZED, FOR SOLUTION INTRAVENOUS at 10:03

## 2025-03-17 RX ADMIN — MUPIROCIN: 20 OINTMENT TOPICAL at 08:03

## 2025-03-17 RX ADMIN — PANTOPRAZOLE SODIUM 40 MG: 40 INJECTION, POWDER, FOR SOLUTION INTRAVENOUS at 08:03

## 2025-03-17 RX ADMIN — FOLIC ACID 1 MG: 5 INJECTION, SOLUTION INTRAMUSCULAR; INTRAVENOUS; SUBCUTANEOUS at 10:03

## 2025-03-17 RX ADMIN — VANCOMYCIN HYDROCHLORIDE 1000 MG: 1 INJECTION, POWDER, LYOPHILIZED, FOR SOLUTION INTRAVENOUS at 10:03

## 2025-03-17 RX ADMIN — THIAMINE HCL TAB 100 MG 100 MG: 100 TAB at 08:03

## 2025-03-17 RX ADMIN — THIAMINE HCL TAB 100 MG 100 MG: 100 TAB at 02:03

## 2025-03-17 RX ADMIN — APIXABAN 5 MG: 5 TABLET, FILM COATED ORAL at 10:03

## 2025-03-17 RX ADMIN — APIXABAN 5 MG: 5 TABLET, FILM COATED ORAL at 08:03

## 2025-03-17 RX ADMIN — PIPERACILLIN SODIUM AND TAZOBACTAM SODIUM 4.5 G: 4; .5 INJECTION, POWDER, LYOPHILIZED, FOR SOLUTION INTRAVENOUS at 12:03

## 2025-03-17 RX ADMIN — PANTOPRAZOLE SODIUM 40 MG: 40 INJECTION, POWDER, FOR SOLUTION INTRAVENOUS at 10:03

## 2025-03-17 RX ADMIN — PIPERACILLIN SODIUM AND TAZOBACTAM SODIUM 4.5 G: 4; .5 INJECTION, POWDER, LYOPHILIZED, FOR SOLUTION INTRAVENOUS at 05:03

## 2025-03-17 NOTE — PHYSICIAN QUERY
Due to the conflicting clinical picture, please clinically validate the acute kidney injury . If validated, please provide additional clinical support for the acute kidney injury .   The condition is confirmed. Please specify clinical support (signs, symptoms, and treatment) for the confirmed diagnosis:  Patient had a baseline creatinine of 0.9 on admission creatinine was 1.2

## 2025-03-17 NOTE — PROGRESS NOTES
[facility] Hospital Medicine Progress Note        Chief Complaint: Inpatient Follow-up for     HPI per admitting team: 74-year-old female with known past medical history of breast cancers status post left mastectomy and left axillary lymph node dissection as/p chemotherapy now in remission, left upper extremity lymphedema, chronic systolic heart failure with previous EF of 35-40% in January 20, 2025 but now has dropped to 25-30%, ICD present, new diagnosis of diabetes with A1c 7.5, CKD 3A/B, admitted 03/15/2025 to ICU with hypotension with tachycardia requiring vasopressor, severe metabolic acidosis requiring bicarb drip, shortness of breath.  Sepsis could not be ruled out so she was empirically started on vanc and Zosyn.  She was found to have and H&H of 4.4/14.9 and received 2 units packed red blood cells transfusion and hemoglobin improved to 12.4/36.3.  Occult blood was negative  Patient was also diagnosed with acute pancreatitis and severe celiac artery stenosis with post stenotic dilation a distended gallbladder.  BNP was 3198, troponin was negative x1  Lipase was 5  Given her bicarb was 10, patient was started on bicarb drip and Nephrology consulted  General surgery also evaluated the patient, right upper quadrant ultrasound performed- showed no gallstones or biliary ductal dilation, nonspecific gallbladder wall thickening and edema.  Peripancreatic fluid/edema better demonstrated on CT.  No surgical intervention needed at this time  Vascular surgery also consulted for severe narrowing at the origin of celiac artery, Dr. Campos evaluated the patient, recommended when she returns to baseline, she can have celiac duplex with inspiration and expiration with velocities.  If these are parallel with MAL syndrome, surgery could evaluate for laparoscopic decompression.  If duplex findings are not in line with MAL, she she would be considered for stenting  Patient is downgraded to hospital  medicine team on 03/16  Upon my evaluation, laying in bed, reports she is hungry.  Nurse did a bedside swallow and she did it well.  Given some concern for the celiac stenosis, will start her on low fiber diet/cardiac/diabetic.  Patient denies chest pain, abdominal pain, reports shortness on breath is better  I reviewed patient's chart in detail, noted in cardiology note that there is concern for noncompliance with medication in outpatient setting  Patient informs me that she is independent at home, walks to the store and get what she needs to by herself, son lives with her but he works during the daytime, patient does not drive  Hospital medicine team has assumed patient care    Interval Hx:   Patient is laying in bed, no major complaints, inquired about rectal bleed, patient reported his never happened to her before, denied history of prolapse or hemorrhoids  Informed that we have consulted colorectal surgeon evaluate and give recommendation.  Verbalized understanding  No family is at bedside  Case was discussed with patient's nurse and  on the floor.    Objective/physical exam:  General: In no acute distress, afebrile, looks good for her stated age  Chest:  decreased breath sounds left lung base, on supplemental oxygen via nasal cannula at 1 liter/minute  Heart: S1, S2, no appreciable murmur  Abdomen: Soft, nontender, BS hypoactive  MSK: Warm, no lower extremity edema, no clubbing or cyanosis, lymphedema left upper extremity  Neurologic: Alert and oriented x4, moving all extremities with good strength     VITAL SIGNS: 24 HRS MIN & MAX LAST   Temp  Min: 97.4 °F (36.3 °C)  Max: 97.9 °F (36.6 °C) 97.4 °F (36.3 °C)   BP  Min: 94/74  Max: 112/87 104/61   Pulse  Min: 73  Max: 89  89   Resp  Min: 18  Max: 18 18   SpO2  Min: 98 %  Max: 100 % 98 %     I reviewed the labs below:  Recent Labs   Lab 03/15/25  0502 03/15/25  1537 03/16/25  0604 03/17/25  0423 03/17/25  1253   WBC 2.74  2.75*  --  7.98 7.25  --     RBC 1.36*  --  3.71* 4.01*  --    HGB 4.4*   < > 12.0 12.8 12.9   HCT 14.9*   < > 35.3* 39.2 39.0   .6*  --  95.1* 97.8*  --    MCH 32.4*  --  32.3* 31.9*  --    MCHC 29.5*  --  34.0 32.7*  --    RDW 16.4  --  17.4* 17.7*  --    PLT 85*  --  196 196  --    MPV 10.6*  --  10.7* 10.6*  --     < > = values in this interval not displayed.     Recent Labs   Lab 03/15/25  0502 03/15/25  0554 03/15/25  1000 03/15/25  1040 03/15/25  1128 03/15/25  1537 03/15/25  1700 03/16/25  0413 03/17/25  0423   *  --   --  136  --   --   --  135* 137   K 4.0  --   --  4.2  --   --   --  3.3* 4.2     --   --  103  --   --   --  97* 99   CO2 5*  --   --  14*  --   --   --  27 27   BUN 11.9  --   --  32.5*  --   --   --  29.5* 30.6*   CREATININE 0.44*  --   --  1.71*  --   --   --  1.51* 1.62*   CALCIUM 6.0*  --   --  8.0*  --   --   --  7.6* 7.9*   PH  --    < > 7.280*  --  7.410  --  7.530*  --   --    MG 0.60*  --   --   --   --  2.60  --   --  2.30   ALBUMIN 0.8*  --   --  2.7*  --   --   --  2.6* 2.8*   ALKPHOS 54  --   --  198*  --   --   --  151* 158*   ALT 16  --   --  157*  --   --   --  198* 166*   AST 13  --   --  214*  --   --   --  298* 115*   BILITOT 0.3  --   --  2.5*  --   --   --  2.1* 2.0*    < > = values in this interval not displayed.     Microbiology Results (last 7 days)       Procedure Component Value Units Date/Time    Blood culture #1 **CANNOT BE ORDERED STAT** [[id number]]  (Normal) Collected: 03/15/25 0502    Order Status: Completed Specimen: Blood from Arm, Right Updated: 03/17/25 1100     Blood Culture No Growth At 48 Hours    Blood culture #2 **CANNOT BE ORDERED STAT** [[id number]]  (Normal) Collected: 03/15/25 0502    Order Status: Completed Specimen: Blood from Arm, Left Updated: 03/17/25 1100     Blood Culture No Growth At 48 Hours    Urine culture [[id number]] Collected: 03/15/25 1034    Order Status: Completed Specimen: Urine Updated: 03/17/25 1019     Urine Culture No Growth            See below for Radiology    Intake and Output:  Intake/Output Summary (Last 24 hours) at 3/17/2025 1840  Last data filed at 3/17/2025 1200  Gross per 24 hour   Intake 600 ml   Output 450 ml   Net 150 ml       Assessment/Plan:  Acute septic shock-requiring vasopressor therapy-POA - now resolved- no infection identified   Acute pancreatitis per CT imaging-POA - abdominal pain resolved  Acute symptomatic anemia-status post PRBC transfusion-POA    Acute painless rectal bleed - grade 2 internal hemorrhoids, no prolapse  Severe Metabolic acidosis secondary to lactic acidosis requiring bicarbonate infusion-POA- resolved  Acute on chronic systolic heart failure with acute drop in EF to 25-30% (was 35-40% in 1/2025), ICD present - BNP 3198  Acute hypoxemic respiratory failure secondary to above- improving  Celiac artery stenosis- severe-vascular Services following-POA  History of pulmonary embolus on long-term anticoagulation therapy with Eliquis-POA   History of breast cancer status post L mastectomy, L axillary Lympth nodes dissection and chemotherapy with residual LUE lymphedema - remission reported-POA   Weakness- POA        Admitted to ICU on 03/15, downgraded to hospital medicine team on 03/16  Off of vasopressors, septic shock could not be ruled out at the time of the admission  Blood culture x2-negative at 48 hours  Urine culture- no growth  Discontinue IV vancomycin and Zosyn on day 3 given no source of infection identified  3/17- had a significant amount of painless rectal bleed, on examination patient and charge both felt patient has a prolapsed rectum--> ordered repeat H&H and consulted Colorectal surgery  Colorectal FNP Tonia Butts evaluated the patient, patient has grade 2 internal hemorrhoids an abnormal anal tone but no prolapse appreciated  H&H stable after the bleeding event  Recommended colonoscopy to evaluate severe anemia  Vascular Dr. Campos evaluated the patient, once acute illness resolves, will do  outpatient workup  Abdominal pain has resolved, lipase 5, CT abdomen concerning for acute pancreatitis  Start low-fiber/cardiac/diabetic diet--> if patient developed pain after ingestion of food, will let vascular know - so far tolerating well  General surgery also evaluated the patient, ultrasound negative for acute cholecystitis, no indication for surgery at this time  Noted BNP elevated 3198, blood pressure on the lower side.  Antihypertensive on hold  Given decreased breath sounds in the left lung base, ordered Chest x-ray to rule out pulmonary edema as patient is tachypneic while talking--> x-ray chest personally reviewed, pulmonary congestion  Received Lasix 20 mg IV x1  Strict intake and output, daily weights  LFT elevated likely secondary to acute liver injury due to hypotension on admission- no hx of alcohol abuse--> monitor closely--> trending down, now 115/166  On admission patient H&H was 4.4/14.9--> received 2 units packed red blood cell transfusion and hemoglobin came up to 12.4/36.3...  I feel that the initial H&H results were erroneous as hemoglobin can not come up to 12 from 4 after 2 units  Continue home Eliquis 5 mg b.i.d.  Magnesium 2.3  PT OT consulted  Appropriate home medication for chronic medical condition has been resumed   Morning CBC, CMP ordered     VTE Prophylaxis:  Home Eliquis     Patient condition:   Stable     Discharge Planning and Disposition/Anticipated discharge: TBD       All diagnosis and differential diagnosis have been reviewed; assessment and plan has been documented; I have personally reviewed the labs and test results that are presently available; I have reviewed the patients medication list; I have reviewed the consulting providers response and recommendations. I have reviewed or attempted to review medical records based upon their availability    All of the patient's questions have been  addressed and answered. Patient's is agreeable to the above stated plan. I will  continue to monitor closely and make adjustments to medical management as needed.    Portions of this note dictated using EMR integrated voice recognition software, and may be subject to voice recognition errors not corrected at proofreading. Please contact writer for clarification if needed.   _____________________________________________________________________    Nutrition Status:  Nutrition consulted. Most recent weight and BMI monitored-     Measurements:  Wt Readings from Last 1 Encounters:   03/15/25 81.6 kg (179 lb 14.3 oz)   Body mass index is 29.94 kg/m².    Patient has been screened and assessed by RD.    Malnutrition Type:  Context:    Level:      Malnutrition Characteristic Summary:       Interventions/Recommendations (treatment strategy):         A minimum of two characteristics is recommended for diagnosis of either severe or non-severe malnutrition.     Current Med:   apixaban  5 mg Oral BID    folic acid  1 mg Intravenous Daily    magnesium sulfate 1 g IVPB  1 g Intravenous Once    mupirocin   Nasal BID    pantoprazole  40 mg Intravenous BID    thiamine  100 mg Oral Q8H      PRN meds:  Current Facility-Administered Medications:     sodium chloride 0.9%, 10 mL, Intravenous, PRN    Continuous infusion:       Radiology:   I have personally reviewed the images and agree with radiologist report  X-Ray Chest 1 View  Narrative: EXAMINATION:  XR CHEST 1 VIEW    CLINICAL HISTORY:  shortness of breath;    COMPARISON:  Yesterday    FINDINGS:  Frontal view of the chest was obtained. Stable right Port-A-Cath.  Heart and mediastinum unchanged.  There is increased bilateral interstitial prominence.  No pneumothorax.  Impression: Interstitial prominence, suspect pulmonary vascular congestion.    Electronically signed by: Jony Beasley  Date:    03/16/2025  Time:    13:42        Yahir Lanier MD  Department of Hospital Medicine   [facility]   03/17/2025

## 2025-03-17 NOTE — PROGRESS NOTES
Inpatient Nutrition Evaluation    Admit Date: 3/15/2025   Total duration of encounter: 2 days   Patient Age: 74 y.o.    Nutrition Recommendation/Prescription     Resume Low Residue Diet once medically feasible.   Monitor wt and labs.     Nutrition Assessment     Chart Review    Reason Seen: continuous nutrition monitoring and malnutrition screening tool (MST)    Malnutrition Screening Tool Results   Have you recently lost weight without trying?: Unsure  Have you been eating poorly because of a decreased appetite?: No   MST Score: 2   Diagnosis:  ? Septic shock possible abdominal source, cholecystitis versus infected pancreas.  Resolved now hemodynamically stable.  Severe symptomatic anemia.  Hemoglobin now 12 and really question the reality of initial hemoglobin of 4.  Severe celiac artery stenosis  Questionable diagnosis of Acute pancreatitis  Lactic acidosis.  Markedly improved  Acute kidney injury likely secondary to hypoperfusion improved.  History of left breast cancer status post mastectomy causing lymphedema  Heart failure with reduced ejection fraction, echo 03/05/2025 25-30% with a pacemaker in place    Relevant Medical History:    Cancer     breast CA s/p chemo and L masectomy     Cardiomyopathy    CHF (congestive heart failure)    History of breast cancer    History of DVT (deep vein thrombosis)    HLD (hyperlipidemia)    Hypertension    Lymphedema    Osteoarthritis    Venous insufficiency       Scheduled Medications:  apixaban, 5 mg, BID  folic acid, 1 mg, Daily  magnesium sulfate 1 g IVPB, 1 g, Once  mupirocin, , BID  pantoprazole, 40 mg, BID  piperacillin-tazobactam (Zosyn) IV (PEDS and ADULTS) (extended infusion is not appropriate), 4.5 g, Q8H  thiamine (B-1) injection, 400 mg, Q8H   Followed by  thiamine, 100 mg, Q8H  vancomycin (VANCOCIN) 1,000 mg in 0.9% NaCl 250 mL IVPB (admixture device), 1,000 mg, Q24H    Continuous Infusions:   PRN Medications:   Current Facility-Administered Medications:      sodium chloride 0.9%, 10 mL, Intravenous, PRN    vancomycin - pharmacy to dose, , Intravenous, pharmacy to manage frequency    Recent Labs   Lab 03/11/25  0414 03/12/25  0558 03/14/25  1049 03/15/25  0425 03/15/25  0502 03/15/25  1040 03/15/25  1537 03/15/25  1703 03/16/25  0413 03/16/25  0604 03/17/25  0423    142 138 134* 132* 136  --   --  135*  --  137   K 3.3* 4.2 4.6 4.5 4.0 4.2  --   --  3.3*  --  4.2   CALCIUM 7.7* 7.9* 8.5 8.5 6.0* 8.0*  --   --  7.6*  --  7.9*   PHOS  --   --   --   --  1.1*  --  3.8  --   --   --  4.1   MG 1.50* 2.30  --   --  0.60*  --  2.60  --   --   --  2.30   * 108* 103 105 107 103  --   --  97*  --  99   CO2 24 22* 22* 11* 5* 14*  --   --  27  --  27   BUN 20.4* 22.0* 33.9* 31.6* 11.9 32.5*  --   --  29.5*  --  30.6*   CREATININE 1.13* 1.23* 1.37* 1.62* 0.44* 1.71*  --   --  1.51*  --  1.62*   EGFRNORACEVR 52 47 41 34 >60 32  --   --  37  --  34   GLUCOSE 95 86 98 120* 46* 170*  --   --  138*  --  92   BILITOT  --   --   --   --  0.3 2.5*  --   --  2.1*  --  2.0*   ALKPHOS  --   --   --   --  54 198*  --   --  151*  --  158*   ALT  --   --   --   --  16 157*  --   --  198*  --  166*   AST  --   --   --   --  13 214*  --   --  298*  --  115*   ALBUMIN  --   --   --   --  0.8* 2.7*  --   --  2.6*  --  2.8*   TRIG  --   --   --   --   --  68 74  --   --   --   --    HGBA1C  --   --   --   --   --   --  7.4*  --   --   --   --    AMMONIA  --   --   --   --   --   --   --  32.7  --   --   --    LIPASE  --   --   --  21 5  --   --   --   --   --   --    WBC  --   --   --   --  2.74  2.75*  --   --   --   --  7.98 7.25   HGB  --   --   --   --  4.4*  --  12.4  --   --  12.0 12.8   HCT  --   --   --   --  14.9*  --  36.3*  --   --  35.3* 39.2     Nutrition Orders:  Diet NPO      Appetite/Oral Intake: NPO/NPO  Factors Affecting Nutritional Intake: NPO  Food/Yarsanism/Cultural Preferences: unable to obtain  Food Allergies: no known food allergies  Last Bowel Movement:  "03/14/25  Wound(s):  skin intact per EMR    Comments    3/17/25: MST for unsure wt loss-  no wt loss per EMR weights; +wt gain noted. Pt currently NPO; unable to visit with pt; will f/u early.     Anthropometrics    Height: 5' 5" (165.1 cm), Height Method: Stated  Last Weight: 81.6 kg (179 lb 14.3 oz) (03/15/25 1230), Weight Method: Bed Scale  BMI (Calculated): 29.9  BMI Classification: overweight (BMI 25-29.9)     Ideal Body Weight (IBW), Female: 125 lb     % Ideal Body Weight, Female (lb): 143.92 %                             Usual Weight Provided By: EMR weight history    Wt Readings from Last 5 Encounters:   03/15/25 81.6 kg (179 lb 14.3 oz)   03/13/25 81.9 kg (180 lb 8.9 oz)   01/19/25 59 kg (130 lb)   01/25/24 59 kg (130 lb)   11/26/23 64.9 kg (143 lb 1.6 oz)     Weight Change(s) Since Admission:   Wt Readings from Last 1 Encounters:   03/15/25 1230 81.6 kg (179 lb 14.3 oz)   03/15/25 0356 81.6 kg (180 lb)   Admit Weight: 81.6 kg (180 lb) (03/15/25 0356), Weight Method: Stated    Patient Education     Not applicable.    Nutrition Goals & Monitoring     Dietitian will monitor: energy intake and weight    Nutrition Risk/Follow-Up: low (follow-up in 5-7 days)  Patients assigned 'low nutrition risk' status do not qualify for a full nutritional assessment but will be monitored and re-evaluated in a 5-7 day time period. Please consult if re-evaluation needed sooner.   "

## 2025-03-17 NOTE — PLAN OF CARE
03/17/25 1432   Medicare Message   Important Message from Medicare regarding Discharge Appeal Rights Given to patient/caregiver;Explained to patient/caregiver

## 2025-03-17 NOTE — CONSULTS
[facility] - 8th Floor Med Surg  Colon & Rectal Surgery  Consult Note    Inpatient consult to Colorectal Surgery  Consult performed by: Tonia Butts FNP  Consult ordered by: Yahir Lanier MD        Subjective:     Chief Complaint/Reason for Admission: Prolapsed rectum and rectal bleeding    History of Present Illness:     Laura Jacobs is a 74 year-old female with a PMH of breast cancer, heart failure, and pulmonary embolism (on Eliquis) presented to the ER on 3/15/25 with complaints of generalized weakness and dyspnea. She was found to have severe hypotension, HH 4.4/14.9, and a lactic acid of 20. She was given 2 units of PRBC, started on IV antibiotics, She was stabilized in the ICU and was downgraded to post op floor yesterday. This morning, she was sitting on the commode to have a bowel movement and experienced painless bright red blood per rectum. Immediately after, the nurse noticed a prolapsed rectum that self reduced. She reports bleeding has since stopped. Patient denies a history of rectal prolapse, hemorrhoids, rectal pain, or history of surgery to rectum.     No current facility-administered medications on file prior to encounter.     Current Outpatient Medications on File Prior to Encounter   Medication Sig    anastrozole (ARIMIDEX) 1 mg Tab Take 1 tablet by mouth once daily.    apixaban (ELIQUIS) 5 mg Tab Take 5 mg by mouth 2 (two) times daily.    aspirin (ECOTRIN) 81 MG EC tablet Take 1 tablet (81 mg total) by mouth once daily.    cyproheptadine (PERIACTIN) 4 mg tablet Take 4 mg by mouth 2 (two) times daily as needed.    dexlansoprazole (DEXILANT) 60 mg capsule Take 60 mg by mouth once daily.    famotidine (PEPCID) 20 MG tablet Take 1 tablet (20 mg total) by mouth once daily.    furosemide (LASIX) 20 MG tablet Take 1 tablet (20 mg total) by mouth once daily.    pravastatin (PRAVACHOL) 20 MG tablet Take 1 tablet (20 mg total) by mouth every evening.    verapamiL (CALAN-SR) 240 MG  CR tablet Take 1 tablet (240 mg total) by mouth nightly.       Review of patient's allergies indicates:   Allergen Reactions    Sulfa (sulfonamide antibiotics)      Patient unsure if allergic to Sulfa       Past Medical History:   Diagnosis Date    Cancer     breast CA s/p chemo and L masectomy     Cardiomyopathy     CHF (congestive heart failure)     History of breast cancer     History of DVT (deep vein thrombosis)     HLD (hyperlipidemia)     Hypertension     Lymphedema     Osteoarthritis     Venous insufficiency      Past Surgical History:   Procedure Laterality Date    HYSTERECTOMY      INSERTION OF PACEMAKER      INTRAMEDULLARY RODDING OF FEMUR Left 10/14/2022    Procedure: INSERTION, INTRAMEDULLARY СВЕТЛАНА, FEMUR;  Surgeon: Ankush Alex MD;  Location: [facility];  Service: Orthopedics;  Laterality: Left;    JOINT REPLACEMENT Bilateral     Knee    MASTECTOMY Left 2019     Family History    Family history is unknown by patient.       Tobacco Use    Smoking status: Never    Smokeless tobacco: Never   Substance and Sexual Activity    Alcohol use: Not Currently    Drug use: Never    Sexual activity: Not Currently     Review of Systems   Constitutional:  Negative for appetite change, chills, diaphoresis, fatigue and fever.   HENT:  Negative for facial swelling, rhinorrhea and sore throat.    Eyes:  Negative for redness.   Respiratory:  Negative for cough and shortness of breath.    Cardiovascular:  Negative for chest pain.   Gastrointestinal:  Positive for anal bleeding. Negative for abdominal distention, abdominal pain, constipation and rectal pain.   Genitourinary:  Negative for dysuria and hematuria.   Musculoskeletal:  Negative for back pain.   Skin:  Negative for rash.   Neurological:  Negative for dizziness and syncope.   Psychiatric/Behavioral:  Negative for confusion.      Objective:     Vital Signs (Most Recent):  Temp: 97.9 °F (36.6 °C) (03/17/25 1123)  Pulse: 87 (03/17/25 1123)  Resp: 18 (03/17/25 1123)  BP:  107/83 (03/17/25 1123)  SpO2: 98 % (03/17/25 1123) Vital Signs (24h Range):  Temp:  [97.5 °F (36.4 °C)-97.9 °F (36.6 °C)] 97.9 °F (36.6 °C)  Pulse:  [73-91] 87  Resp:  [18] 18  SpO2:  [98 %-100 %] 98 %  BP: ()/(74-87) 107/83     Weight: 81.6 kg (179 lb 14.3 oz)  Body mass index is 29.94 kg/m².      Intake/Output Summary (Last 24 hours) at 3/17/2025 1403  Last data filed at 3/17/2025 0634  Gross per 24 hour   Intake 240 ml   Output 450 ml   Net -210 ml       Physical Exam  Vitals reviewed.   Constitutional:       General: She is not in acute distress.  HENT:      Head: Normocephalic and atraumatic.      Nose: Nose normal.   Eyes:      General: No scleral icterus.  Cardiovascular:      Rate and Rhythm: Normal rate and regular rhythm.   Pulmonary:      Effort: Pulmonary effort is normal. No respiratory distress.   Abdominal:      General: There is no distension.      Palpations: Abdomen is soft.      Tenderness: There is no abdominal tenderness. There is no rebound.   Genitourinary:     Rectum: Internal hemorrhoid present. No mass, tenderness or external hemorrhoid. Abnormal anal tone.      Comments: Decreased rectal tone with VETO  Right lateral internal grade II hemorrhoid  No prolapse appreciated  Musculoskeletal:         General: Normal range of motion.   Neurological:      Mental Status: She is alert and oriented to person, place, and time.         Significant Labs:  CBC:   Recent Labs   Lab 03/17/25  0423 03/17/25  1253   WBC 7.25  --    RBC 4.01*  --    HGB 12.8 12.9   HCT 39.2 39.0     --    MCV 97.8*  --    MCH 31.9*  --    MCHC 32.7*  --      CMP:   Recent Labs   Lab 03/17/25  0423   CALCIUM 7.9*   ALBUMIN 2.8*      K 4.2   CO2 27   CL 99   BUN 30.6*   CREATININE 1.62*   ALKPHOS 158*   *   *   BILITOT 2.0*       Significant Diagnostics:  I have reviewed all pertinent imaging results/findings within the past 24 hours.    Assessment/Plan:     Active Diagnoses:    Diagnosis Date  Noted POA    Rectal bleeding [K62.5] 03/17/2025 Clinically Undetermined      Problems Resolved During this Admission:     - Monitor for bleeding  - No surgical intervention at this time  - H/H stable after bleeding event  - Recommend colonoscopy to evaluate severe anemia    Thank you for your consult. I will follow-up with patient. Please contact us if you have any additional questions.    Tonia Butts, MACHELLE  Colon & Rectal Surgery  [facility] - 8th Floor Med Surg

## 2025-03-17 NOTE — PROGRESS NOTES
Nephrology consult follow up note    HPI:      Laura Jacobs is a 74 y.o. female with past medical history of breast cancer, status post left mastectomy, left arm lymphadenopathy, chemotherapy, heart failure with ejection fraction (EF 20-30%), severe mitral regurgitation, hypertension, dyslipidemia, and osteoarthritis.  Patient was brought to the emergency department with complaints of generalized weakness and shortness of breath.  She was hypotensive, tachycardic, and anemic.  Patient was admitted to the intensive care unit, given IV fluid boluses and started vasopressor.  Evaluation revealed severe celiac artery stenosis, distended gallbladder, and findings consistent with acute pancreatitis.  Nephrology service was consulted for assistance with management of acute kidney injury.     Interval history:     03/17/2025  No acute events overnight.  No new complaints.  No chest pain, shortness of breath, nausea, vomiting, or lower extremity edema     Review of Systems:     Comprehensive 10pt ROS negative except as noted per history.    Past medical, family, surgical, and social history reviewed and unchanged from initial consult note.     Objective:       VITAL SIGNS: 24 HR MIN & MAX LAST    Temp  Min: 97.5 °F (36.4 °C)  Max: 97.9 °F (36.6 °C)  97.9 °F (36.6 °C)        BP  Min: 94/74  Max: 112/87  107/83     Pulse  Min: 73  Max: 91  87     Resp  Min: 18  Max: 18  18    SpO2  Min: 98 %  Max: 100 %  98 %      GEN:  Well-appearing AA female  HEENT: Conjunctiva anicteric, pupils equal   CV: RRR +S1,S2 without murmur  PULM: CTAB, unlabored  ABD: Soft, NT/ND abdomen with NABS  EXT: No cyanosis or edema  SKIN: Warm and dry  PSYCH: Awake, alert and appropriately conversant.   Dialysis access:  No dialysis access            Component Value Date/Time     03/17/2025 0423     (L) 03/16/2025 0413     (L) 01/04/2021 0621    K 4.2 03/17/2025 0423    K 3.3 (L) 03/16/2025 0413    K 4.1 01/04/2021 0621    CO2 27  03/17/2025 0423    CO2 27 03/16/2025 0413    CO2 29 01/04/2021 0621    BUN 30.6 (H) 03/17/2025 0423    BUN 29.5 (H) 03/16/2025 0413    BUN 14 01/04/2021 0621    CREATININE 1.62 (H) 03/17/2025 0423    CREATININE 1.51 (H) 03/16/2025 0413    CREATININE 0.76 01/04/2021 0621    CALCIUM 7.9 (L) 03/17/2025 0423    CALCIUM 7.6 (L) 03/16/2025 0413    CALCIUM 8.2 (L) 01/04/2021 0621    PHOS 4.1 03/17/2025 0423            Component Value Date/Time    WBC 7.25 03/17/2025 0423    WBC 7.98 03/16/2025 0604    WBC 2.74 03/15/2025 0502    HGB 12.9 03/17/2025 1253    HGB 12.8 03/17/2025 0423    HCT 39.0 03/17/2025 1253    HCT 39.2 03/17/2025 0423     03/17/2025 0423     03/16/2025 0604         Imaging reviewed      Assessment / Plan:       Active Hospital Problems    Diagnosis  POA    Rectal bleeding [K62.5]  Clinically Undetermined      Resolved Hospital Problems   No resolved problems to display.     Nonoliguric acute kidney injury   Underlying chronic kidney disease - baseline creatinine 1-1.5  Metabolic acidosis, resolved   Hypokalemia   Transaminitis  Acute pancreatitis   Heart failure with reduced ejection fraction   Anemia   History of breast cancer    Plan:  Renal function around typical baseline.  Encourage p.o. water intake.    Carlos Roca DO  Nephrology  [facility]  Clinic number: [phone]      Note was created with the assistance of electronic Dictation Services.  Note was reviewed to decrease errors, however, these may still be present.  Please contact me about questions or concerns with the dictation.

## 2025-03-17 NOTE — PT/OT/SLP PROGRESS
Physical Therapy      Patient Name:  Laura Jacobs   MRN:  [id number]    PT eval orders received. Spoke to RN who requested therapy hold while awaiting GI consult for possible prolapsed rectum. Will f/u as appropriate.    3/17/2025

## 2025-03-17 NOTE — NURSING
Pt walked to bathroom with assistance this morning and was incontinent of stool just before she sat down. Stool was loose, brown. Pt was told to call when finished. This nurse was not present when pt was finished, but was told there was a large amount of bright red blood in the toiled. I got back to the room as the pt was leaving the bathroom and saw a bit of blood in the toilet, the rest had been flushed. Pt stated that she has never had hemorrhoids. I decided to check just in case while pt was still standing. Upon spreading the buttocks I immediately saw the rectum protruding from the anus. Helped the pt get back in bed and informed the charge RN who came to inspect pt. Prolapse had went back up into the anus, but was still visible. Dr Lanier has been notified.

## 2025-03-17 NOTE — PT/OT/SLP EVAL
Nrsg reports not appropriate today. Awaiting GI consult for possible rectal prolapse. Will f/u tomorrow.

## 2025-03-18 LAB
ALBUMIN SERPL-MCNC: 2.7 G/DL (ref 3.4–4.8)
ALBUMIN/GLOB SERPL: 0.9 RATIO (ref 1.1–2)
ALP SERPL-CCNC: 160 UNIT/L (ref 40–150)
ALT SERPL-CCNC: 117 UNIT/L (ref 0–55)
ANION GAP SERPL CALC-SCNC: 14 MEQ/L
AST SERPL-CCNC: 57 UNIT/L (ref 5–34)
BASOPHILS # BLD AUTO: 0.05 X10(3)/MCL
BASOPHILS NFR BLD AUTO: 0.8 %
BILIRUB SERPL-MCNC: 1.7 MG/DL
BUN SERPL-MCNC: 29.7 MG/DL (ref 9.8–20.1)
CALCIUM SERPL-MCNC: 7.9 MG/DL (ref 8.4–10.2)
CHLORIDE SERPL-SCNC: 100 MMOL/L (ref 98–107)
CO2 SERPL-SCNC: 25 MMOL/L (ref 23–31)
CREAT SERPL-MCNC: 1.5 MG/DL (ref 0.55–1.02)
CREAT/UREA NIT SERPL: 20
EOSINOPHIL # BLD AUTO: 0.24 X10(3)/MCL (ref 0–0.9)
EOSINOPHIL NFR BLD AUTO: 3.9 %
ERYTHROCYTE [DISTWIDTH] IN BLOOD BY AUTOMATED COUNT: 17.5 % (ref 11.5–17)
GFR SERPLBLD CREATININE-BSD FMLA CKD-EPI: 37 ML/MIN/1.73/M2
GLOBULIN SER-MCNC: 3 GM/DL (ref 2.4–3.5)
GLUCOSE SERPL-MCNC: 86 MG/DL (ref 82–115)
HCT VFR BLD AUTO: 39.1 % (ref 37–47)
HGB BLD-MCNC: 12.8 G/DL (ref 12–16)
IMM GRANULOCYTES # BLD AUTO: 0.03 X10(3)/MCL (ref 0–0.04)
IMM GRANULOCYTES NFR BLD AUTO: 0.5 %
LYMPHOCYTES # BLD AUTO: 1.29 X10(3)/MCL (ref 0.6–4.6)
LYMPHOCYTES NFR BLD AUTO: 20.8 %
MCH RBC QN AUTO: 32.3 PG (ref 27–31)
MCHC RBC AUTO-ENTMCNC: 32.7 G/DL (ref 33–36)
MCV RBC AUTO: 98.7 FL (ref 80–94)
MONOCYTES # BLD AUTO: 0.44 X10(3)/MCL (ref 0.1–1.3)
MONOCYTES NFR BLD AUTO: 7.1 %
NEUTROPHILS # BLD AUTO: 4.14 X10(3)/MCL (ref 2.1–9.2)
NEUTROPHILS NFR BLD AUTO: 66.9 %
NRBC BLD AUTO-RTO: 0.6 %
PATH REV: NORMAL
PLATELET # BLD AUTO: 181 X10(3)/MCL (ref 130–400)
PMV BLD AUTO: 10.9 FL (ref 7.4–10.4)
POCT GLUCOSE: 92 MG/DL (ref 70–110)
POTASSIUM SERPL-SCNC: 4.2 MMOL/L (ref 3.5–5.1)
PROT SERPL-MCNC: 5.7 GM/DL (ref 5.8–7.6)
RBC # BLD AUTO: 3.96 X10(6)/MCL (ref 4.2–5.4)
SODIUM SERPL-SCNC: 139 MMOL/L (ref 136–145)
WBC # BLD AUTO: 6.19 X10(3)/MCL (ref 4.5–11.5)

## 2025-03-18 PROCEDURE — 97162 PT EVAL MOD COMPLEX 30 MIN: CPT

## 2025-03-18 PROCEDURE — 80053 COMPREHEN METABOLIC PANEL: CPT

## 2025-03-18 PROCEDURE — 97166 OT EVAL MOD COMPLEX 45 MIN: CPT

## 2025-03-18 PROCEDURE — 63600175 PHARM REV CODE 636 W HCPCS

## 2025-03-18 PROCEDURE — 25000003 PHARM REV CODE 250: Performed by: NURSE PRACTITIONER

## 2025-03-18 PROCEDURE — 11000001 HC ACUTE MED/SURG PRIVATE ROOM

## 2025-03-18 PROCEDURE — 99231 SBSQ HOSP IP/OBS SF/LOW 25: CPT | Mod: ,,,

## 2025-03-18 PROCEDURE — 25000003 PHARM REV CODE 250

## 2025-03-18 PROCEDURE — 25000003 PHARM REV CODE 250: Performed by: INTERNAL MEDICINE

## 2025-03-18 PROCEDURE — 27000221 HC OXYGEN, UP TO 24 HOURS

## 2025-03-18 PROCEDURE — 85025 COMPLETE CBC W/AUTO DIFF WBC: CPT

## 2025-03-18 PROCEDURE — 21400001 HC TELEMETRY ROOM

## 2025-03-18 PROCEDURE — 36415 COLL VENOUS BLD VENIPUNCTURE: CPT

## 2025-03-18 RX ORDER — TALC
9 POWDER (GRAM) TOPICAL NIGHTLY PRN
Status: DISCONTINUED | OUTPATIENT
Start: 2025-03-18 | End: 2025-03-19 | Stop reason: HOSPADM

## 2025-03-18 RX ORDER — FUROSEMIDE 20 MG/1
20 TABLET ORAL DAILY
Status: DISCONTINUED | OUTPATIENT
Start: 2025-03-18 | End: 2025-03-19 | Stop reason: HOSPADM

## 2025-03-18 RX ADMIN — APIXABAN 5 MG: 5 TABLET, FILM COATED ORAL at 09:03

## 2025-03-18 RX ADMIN — Medication 9 MG: at 01:03

## 2025-03-18 RX ADMIN — THIAMINE HCL TAB 100 MG 100 MG: 100 TAB at 03:03

## 2025-03-18 RX ADMIN — THIAMINE HCL TAB 100 MG 100 MG: 100 TAB at 09:03

## 2025-03-18 RX ADMIN — Medication 9 MG: at 09:03

## 2025-03-18 RX ADMIN — THIAMINE HCL TAB 100 MG 100 MG: 100 TAB at 05:03

## 2025-03-18 RX ADMIN — PANTOPRAZOLE SODIUM 40 MG: 40 INJECTION, POWDER, FOR SOLUTION INTRAVENOUS at 09:03

## 2025-03-18 RX ADMIN — FUROSEMIDE 20 MG: 20 TABLET ORAL at 05:03

## 2025-03-18 RX ADMIN — FOLIC ACID 1 MG: 5 INJECTION, SOLUTION INTRAMUSCULAR; INTRAVENOUS; SUBCUTANEOUS at 10:03

## 2025-03-18 NOTE — PROGRESS NOTES
Colon & Rectal Surgery Consult Note    Subjective:    Reports no bleeding per rectum since episode yesterday  H&H stable  VSS  Reports intermittent weakness  Denies noticing prolapsing rectum or pain    Objective:  Temp:  [97.4 °F (36.3 °C)-97.9 °F (36.6 °C)]   Pulse:  []   Resp:  [16-18]   BP: (102-133)/(61-81)   SpO2:  [93 %-100 %]     Physical Exam:  NAD  Regular rate and rhythm  Non-labored respirations  Abdomen soft, appropriate  Mild rectal prolapse while standing, no blood noted  SCDs in place      Intake/Output Summary (Last 24 hours) at 3/18/2025 1457  Last data filed at 3/18/2025 0601  Gross per 24 hour   Intake 360 ml   Output 500 ml   Net -140 ml       Recent Labs     03/17/25  0423 03/17/25  1253 03/18/25  0444   WBC 7.25  --  6.19   HGB 12.8   < > 12.8   HCT 39.2   < > 39.1     --  181     --  139   K 4.2  --  4.2   CL 99  --  100   CO2 27  --  25   BUN 30.6*  --  29.7*   CREATININE 1.62*  --  1.50*   BILITOT 2.0*  --  1.7*   *  --  57*   *  --  117*   ALKPHOS 158*  --  160*   CALCIUM 7.9*  --  7.9*   ALBUMIN 2.8*  --  2.7*   MG 2.30  --   --    PHOS 4.1  --   --     < > = values in this interval not displayed.       Assessment/Plan    - Recommend colonoscopy to evaluate severe anemia upon admission  - No surgical intervention needed at this time  - Will sign off, please call if needed.      Tonia Butts, MACHELLE  Colon & Rectal Surgery  [facility] - 8th Floor Med Surg

## 2025-03-18 NOTE — PLAN OF CARE
Problem: Occupational Therapy  Goal: Occupational Therapy Goal  Description: Goals to be met by: in 1 month     Patient will increase functional independence with ADLs by performing:    LE Dressing with Modified [place].  Grooming while standing with Modified [place].  Toileting from toilet with Modified [place] for hygiene and clothing management.   Toilet transfer to toilet with Modified [place].    Outcome: Progressing

## 2025-03-18 NOTE — PT/OT/SLP EVAL
Occupational Therapy  Evaluation    Name: Laura Jacobs  MRN: [id number]  Admitting Diagnosis: septic shock   Recent Surgery: * No surgery found *      Recommendations:     Discharge therapy intensity: Low Intensity Therapy   Discharge Equipment Recommendations:  bath bench  Barriers to discharge:       Assessment:     Laura Jacobs is a 74 y.o. female with a medical diagnosis of septic shock, celiac artery stenosis, pancreatitis.  She presents with the following performance deficits affecting function: weakness, impaired endurance, impaired self care skills, impaired functional mobility, gait instability, impaired balance, pain.   Pt normally ambulates with straight cane. Today, pt ambulated to bathroom with cane, waddling gait and appeared unsteady, however pt reports this is her baseline. Utilized RW on way back to room, pt with better stability. Pt also unable to reach BLE on this date and may benefit from hip kit education. Recommend RW and TTB for home with HH.     Rehab Prognosis: Good; patient would benefit from acute skilled OT services to address these deficits and reach maximum level of function.       Plan:     Patient to be seen 5 x/week to address the above listed problems via self-care/home management, therapeutic activities, therapeutic exercises  Plan of Care Expires: 04/18/25  Plan of Care Reviewed with: patient    Subjective     Chief Complaint: leg pain and swelling   Patient/Family Comments/goals: go home     Occupational Profile:  Living Environment: pt lives alone with no steps and tub shower   Previous level of function; indep with cane   Roles and Routines: does not drive or work   Equipment Used at Home: cane, straight, walker, rolling  Assistance upon Discharge: son checks on pt daily and pt reports that niece can assist with showers     Pain/Comfort:  Pain Rating 1: 5/10  Location - Side 1: Bilateral  Location 1: leg  Pain Addressed 1: Reposition    Patients cultural,  spiritual, Episcopal conflicts given the current situation:      Objective:     OT communicated with nrsg prior to session.      Patient was found up in chair with   upon OT entry to room.    General Precautions: Standard, fall  Orthopedic Precautions:    Braces:        Bed Mobility:    Found UIC     Functional Mobility/Transfers:  Patient completed Toilet Transfer Step Transfer technique with contact guard assistance with  rolling walker  Functional Mobility: waddling gait which pt reports is her norm     Activities of Daily Living:  Lower Body Dressing: maximal assistance    Toileting: contact guard assistance      AMPA 6 Click ADL:  AMPA Total Score: 20    Functional Cognition:  Affect: Appropriate to situation and Cooperative      Upper Extremity Function:  Right Upper Extremity:   WFL    Left Upper Extremity:  WFL    Balance:   Dynamic standing balance:CGA with RW     Therapeutic Positioning  Risk for acquired pressure injuries is decreased due to ability to get to BSC/toilet with assist.      Patient Education:  Patient provided with verbal education education regarding OT role/goals/POC, fall prevention, safety awareness, and Discharge/DME recommendations.  Understanding was verbalized.     Patient left up in chair with all lines intact and call button in reach.    GOALS:   Multidisciplinary Problems       Occupational Therapy Goals          Problem: Occupational Therapy    Goal Priority Disciplines Outcome Interventions   Occupational Therapy Goal     OT, PT/OT Progressing    Description: Goals to be met by: in 1 month     Patient will increase functional independence with ADLs by performing:    LE Dressing with Modified [place].  Grooming while standing with Modified [place].  Toileting from toilet with Modified [place] for hygiene and clothing management.   Toilet transfer to toilet with Modified [place].                         History:     Past Medical History:   Diagnosis Date     Cancer     breast CA s/p chemo and L masectomy     Cardiomyopathy     CHF (congestive heart failure)     History of breast cancer     History of DVT (deep vein thrombosis)     HLD (hyperlipidemia)     Hypertension     Lymphedema     Osteoarthritis     Venous insufficiency          Past Surgical History:   Procedure Laterality Date    HYSTERECTOMY      INSERTION OF PACEMAKER      INTRAMEDULLARY RODDING OF FEMUR Left 10/14/2022    Procedure: INSERTION, INTRAMEDULLARY СВЕТЛАНА, FEMUR;  Surgeon: Ankush Alex MD;  Location: [facility];  Service: Orthopedics;  Laterality: Left;    JOINT REPLACEMENT Bilateral     Knee    MASTECTOMY Left 2019       Time Tracking:     OT Date of Treatment:    OT Start Time: 1015  OT Stop Time: 1028  OT Total Time (min): 13 min    Billable Minutes:Evaluation Moderate Complexity     3/18/2025

## 2025-03-18 NOTE — PROGRESS NOTES
[facility] Medicine Progress Note        Chief Complaint: Inpatient Follow-up for AMS     HPI per admitting team: 74-year-old female with known past medical history of breast cancers status post left mastectomy and left axillary lymph node dissection as/p chemotherapy now in remission, left upper extremity lymphedema, chronic systolic heart failure with previous EF of 35-40% in January 20, 2025 but now has dropped to 25-30%, ICD present, new diagnosis of diabetes with A1c 7.5, CKD 3A/B, admitted 03/15/2025 to ICU with hypotension with tachycardia requiring vasopressor, severe metabolic acidosis requiring bicarb drip, shortness of breath.  Sepsis could not be ruled out so she was empirically started on vanc and Zosyn.  She was found to have and H&H of 4.4/14.9 and received 2 units packed red blood cells transfusion and hemoglobin improved to 12.4/36.3.  Occult blood was negative  Patient was also diagnosed with acute pancreatitis and severe celiac artery stenosis with post stenotic dilation a distended gallbladder.  BNP was 3198, troponin was negative x1  Lipase was 5  Given her bicarb was 10, patient was started on bicarb drip and Nephrology consulted  General surgery also evaluated the patient, right upper quadrant ultrasound performed- showed no gallstones or biliary ductal dilation, nonspecific gallbladder wall thickening and edema.  Peripancreatic fluid/edema better demonstrated on CT.  No surgical intervention needed at this time  Vascular surgery also consulted for severe narrowing at the origin of celiac artery, Dr. Campos evaluated the patient, recommended when she returns to baseline, she can have celiac duplex with inspiration and expiration with velocities.  If these are parallel with MAL syndrome, surgery could evaluate for laparoscopic decompression.  If duplex findings are not in line with MAL, she she would be considered for stenting  Patient is downgraded to  hospital medicine team on 03/16  Upon my evaluation, laying in bed, reports she is hungry.  Nurse did a bedside swallow and she did it well.  Given some concern for the celiac stenosis, will start her on low fiber diet/cardiac/diabetic.  Patient denies chest pain, abdominal pain, reports shortness on breath is better  I reviewed patient's chart in detail, noted in cardiology note that there is concern for noncompliance with medication in outpatient setting  Patient informs me that she is independent at home, walks to the store and get what she needs to by herself, son lives with her but he works during the daytime, patient does not drive  Hospital medicine team has assumed patient care    Interval Hx:   Patient sitting in bed, reports she feels good.  Hemoglobin is stable.  Informed that no plan for surgery for now but she will need outpatient colonoscopy.  Verbalized understanding   No family is at bedside  Case was discussed with patient's nurse and  on the floor.  Possible discharge tomorrow    Objective/physical exam:  General: In no acute distress, afebrile, looks good for her stated age  Chest:  decreased breath sounds left lung base, on supplemental oxygen via nasal cannula at 2 liter/minute  Heart: S1, S2, no appreciable murmur  Abdomen: Soft, nontender, BS hypoactive  MSK: Warm, no lower extremity edema, no clubbing or cyanosis, lymphedema left upper extremity  Neurologic: Alert and oriented x4, moving all extremities with good strength     VITAL SIGNS: 24 HRS MIN & MAX LAST   Temp  Min: 97.4 °F (36.3 °C)  Max: 97.9 °F (36.6 °C) 97.8 °F (36.6 °C)   BP  Min: 91/55  Max: 133/78 (!) 91/55   Pulse  Min: 84  Max: 114  87   Resp  Min: 16  Max: 18 18   SpO2  Min: 93 %  Max: 100 % 96 %     I reviewed the labs below:  Recent Labs   Lab 03/16/25  0604 03/17/25  0423 03/17/25  1253 03/18/25  0444   WBC 7.98 7.25  --  6.19   RBC 3.71* 4.01*  --  3.96*   HGB 12.0 12.8 12.9 12.8   HCT 35.3* 39.2 39.0 39.1    MCV 95.1* 97.8*  --  98.7*   MCH 32.3* 31.9*  --  32.3*   MCHC 34.0 32.7*  --  32.7*   RDW 17.4* 17.7*  --  17.5*    196  --  181   MPV 10.7* 10.6*  --  10.9*     Recent Labs   Lab 03/15/25  0502 03/15/25  0554 03/15/25  1000 03/15/25  1040 03/15/25  1128 03/15/25  1537 03/15/25  1700 03/16/25  0413 03/17/25  0423 03/18/25  0444   *  --   --    < >  --   --   --  135* 137 139   K 4.0  --   --    < >  --   --   --  3.3* 4.2 4.2     --   --    < >  --   --   --  97* 99 100   CO2 5*  --   --    < >  --   --   --  27 27 25   BUN 11.9  --   --    < >  --   --   --  29.5* 30.6* 29.7*   CREATININE 0.44*  --   --    < >  --   --   --  1.51* 1.62* 1.50*   CALCIUM 6.0*  --   --    < >  --   --   --  7.6* 7.9* 7.9*   PH  --    < > 7.280*  --  7.410  --  7.530*  --   --   --    MG 0.60*  --   --   --   --  2.60  --   --  2.30  --    ALBUMIN 0.8*  --   --    < >  --   --   --  2.6* 2.8* 2.7*   ALKPHOS 54  --   --    < >  --   --   --  151* 158* 160*   ALT 16  --   --    < >  --   --   --  198* 166* 117*   AST 13  --   --    < >  --   --   --  298* 115* 57*   BILITOT 0.3  --   --    < >  --   --   --  2.1* 2.0* 1.7*    < > = values in this interval not displayed.     Microbiology Results (last 7 days)       Procedure Component Value Units Date/Time    Blood culture #1 **CANNOT BE ORDERED STAT** [[id number]]  (Normal) Collected: 03/15/25 0502    Order Status: Completed Specimen: Blood from Arm, Right Updated: 03/18/25 1100     Blood Culture No Growth At 72 Hours    Blood culture #2 **CANNOT BE ORDERED STAT** [[id number]]  (Normal) Collected: 03/15/25 0502    Order Status: Completed Specimen: Blood from Arm, Left Updated: 03/18/25 1100     Blood Culture No Growth At 72 Hours    Urine culture [[id number]] Collected: 03/15/25 1034    Order Status: Completed Specimen: Urine Updated: 03/17/25 1019     Urine Culture No Growth           See below for Radiology    Intake and Output:  Intake/Output Summary (Last 24  hours) at 3/18/2025 1740  Last data filed at 3/18/2025 1530  Gross per 24 hour   Intake 960 ml   Output 600 ml   Net 360 ml       Assessment/Plan:  Acute Hypotension-requiring vasopressor therapy-POA - now resolved- no infection identified   Acute symptomatic anemia- status post PRBC transfusion-POA, probably the cause of above  Acute pancreatitis per CT imaging-POA - abdominal pain resolved  Acute painless rectal bleed - grade 2 internal hemorrhoids, no prolapse- need outpatient colonoscopy  Severe Metabolic acidosis secondary to lactic acidosis requiring bicarbonate infusion-POA- resolved  Acute on chronic systolic heart failure with acute drop in EF to 25-30% (was 35-40% in 1/2025), ICD present - BNP 3198  Acute hypoxemic respiratory failure secondary to above- improving  Transaminitis probably due to acute liver injury due to hypotension on admission-improving  Celiac artery stenosis- severe-vascular Services following-POA  History of pulmonary embolus on long-term anticoagulation therapy with Eliquis-POA   History of breast cancer status post L mastectomy, L axillary Lympth nodes dissection and chemotherapy with residual LUE lymphedema - remission reported-POA   CKD 3A- baseline creatinine 1-1.5           Blood culture x2-negative at 72 hours  Urine culture- no growth  Discontinued IV vancomycin and Zosyn on day 3 (3/17) given no source of infection identified  3/17- had a significant amount of painless rectal bleed, on examination patient and charge both felt patient has a prolapsed rectum--> ordered repeat H&H and consulted Colorectal surgery  Colorectal FNP Tonia Butts evaluated the patient, patient has grade 2 internal hemorrhoids an abnormal anal tone but no prolapse appreciated, recommended outpatient colonoscopy given her admission patient had acute anemia  H&H stable after the bleeding event, HB 12.8  Continue home Eliquis 5 mg b.i.d.  Abdominal pain has resolved, lipase 5, CT abdomen concerning for  acute pancreatitis  Tolerating low-fiber/cardiac/diabetic diet  Nephrology has now signed off, recommended to follow up with Dr. Roca in 2 weeks for her CKD 3A  Noted BNP elevated 3198, blood pressure on the lower side.  Antihypertensive on hold, received 1 dose of Lasix 20 mg IV x1, hold further given low BP  Patient is still on supplemental oxygen via nasal cannula at 1-2 L, requested nurse to either try to wean her off or evaluate her for home oxygen  Strict intake and output, daily weights  On low-fiber, cardiac/diabetic diet  AST/ALT is trending down, now 57/117  ALP still elevated at 160  Magnesium 2.3  PT OT evaluated the patient recommended home with home health  Appropriate home medication for chronic medical condition has been resumed   Morning CMP ordered     VTE Prophylaxis:  Home Eliquis     Patient condition:   Stable     Discharge Planning and Disposition/Anticipated discharge: To home tomorrow if off of oxygen or if oxygen arranged for home, follow up with vascular Dr. Campos, Dr. Roca and PCP to refer to GI for colonoscopy/internal hemorrhoids      All diagnosis and differential diagnosis have been reviewed; assessment and plan has been documented; I have personally reviewed the labs and test results that are presently available; I have reviewed the patients medication list; I have reviewed the consulting providers response and recommendations. I have reviewed or attempted to review medical records based upon their availability    All of the patient's questions have been  addressed and answered. Patient's is agreeable to the above stated plan. I will continue to monitor closely and make adjustments to medical management as needed.    Portions of this note dictated using EMR integrated voice recognition software, and may be subject to voice recognition errors not corrected at proofreading. Please contact writer for clarification if needed.    _____________________________________________________________________    Nutrition Status:  Nutrition consulted. Most recent weight and BMI monitored-     Measurements:  Wt Readings from Last 1 Encounters:   03/15/25 81.6 kg (179 lb 14.3 oz)   Body mass index is 29.94 kg/m².    Patient has been screened and assessed by RD.    Malnutrition Type:  Context:    Level:      Malnutrition Characteristic Summary:       Interventions/Recommendations (treatment strategy):         A minimum of two characteristics is recommended for diagnosis of either severe or non-severe malnutrition.     Current Med:   apixaban  5 mg Oral BID    magnesium sulfate 1 g IVPB  1 g Intravenous Once    mupirocin   Nasal BID    pantoprazole  40 mg Intravenous BID    thiamine  100 mg Oral Q8H      PRN meds:  Current Facility-Administered Medications:     melatonin, 9 mg, Oral, Nightly PRN    sodium chloride 0.9%, 10 mL, Intravenous, PRN    Continuous infusion:       Radiology:   I have personally reviewed the images and agree with radiologist report  X-Ray Chest 1 View  Narrative: EXAMINATION:  XR CHEST 1 VIEW    CLINICAL HISTORY:  shortness of breath;    COMPARISON:  Yesterday    FINDINGS:  Frontal view of the chest was obtained. Stable right Port-A-Cath.  Heart and mediastinum unchanged.  There is increased bilateral interstitial prominence.  No pneumothorax.  Impression: Interstitial prominence, suspect pulmonary vascular congestion.    Electronically signed by: Jony Beasley  Date:    03/16/2025  Time:    13:42        Yahir Lanier MD  Department of Hospital Medicine   [facility]   03/18/2025

## 2025-03-18 NOTE — PROGRESS NOTES
Nephrology consult follow up note    HPI:      Laura Jacobs is a 74 y.o. female with past medical history of breast cancer, status post left mastectomy, left arm lymphadenopathy, chemotherapy, heart failure with ejection fraction (EF 20-30%), severe mitral regurgitation, hypertension, dyslipidemia, and osteoarthritis.  Patient was brought to the emergency department with complaints of generalized weakness and shortness of breath.  She was hypotensive, tachycardic, and anemic.  Patient was admitted to the intensive care unit, given IV fluid boluses and started vasopressor.  Evaluation revealed severe celiac artery stenosis, distended gallbladder, and findings consistent with acute pancreatitis.  Nephrology service was consulted for assistance with management of acute kidney injury.     Interval history:     03/17/2025  No acute events overnight.  No new complaints.  No chest pain, shortness of breath, nausea, vomiting, or lower extremity edema     03/18/2025   Sitting up in the recliner.  No complaints.  No chest pain no shortness breath no nausea no vomiting.  Denies dysuria hematuria.    Review of Systems:     Comprehensive 10pt ROS negative except as noted per history.    Past medical, family, surgical, and social history reviewed and unchanged from initial consult note.     Objective:       VITAL SIGNS: 24 HR MIN & MAX LAST    Temp  Min: 97.4 °F (36.3 °C)  Max: 97.9 °F (36.6 °C)  97.4 °F (36.3 °C)        BP  Min: 102/68  Max: 133/78  133/78     Pulse  Min: 84  Max: 114  96     Resp  Min: 16  Max: 18  18    SpO2  Min: 93 %  Max: 100 %  100 %      GEN:  Well-appearing AA female.  No acute distress noted.  HEENT: Conjunctiva anicteric, pupils equal   CV: RRR +S1,S2 without murmur  PULM: CTAB, unlabored  ABD: Soft, NT/ND abdomen with NABS  EXT: No cyanosis or edema  SKIN: Warm and dry  PSYCH: Awake, alert and appropriately conversant.               Component Value Date/Time     03/18/2025 0444      03/17/2025 0423     (L) 01/04/2021 0621    K 4.2 03/18/2025 0444    K 4.2 03/17/2025 0423    K 4.1 01/04/2021 0621    CO2 25 03/18/2025 0444    CO2 27 03/17/2025 0423    CO2 29 01/04/2021 0621    BUN 29.7 (H) 03/18/2025 0444    BUN 30.6 (H) 03/17/2025 0423    BUN 14 01/04/2021 0621    CREATININE 1.50 (H) 03/18/2025 0444    CREATININE 1.62 (H) 03/17/2025 0423    CREATININE 0.76 01/04/2021 0621    CALCIUM 7.9 (L) 03/18/2025 0444    CALCIUM 7.9 (L) 03/17/2025 0423    CALCIUM 8.2 (L) 01/04/2021 0621    PHOS 4.1 03/17/2025 0423            Component Value Date/Time    WBC 6.19 03/18/2025 0444    WBC 7.25 03/17/2025 0423    WBC 2.74 03/15/2025 0502    HGB 12.8 03/18/2025 0444    HGB 12.9 03/17/2025 1253    HCT 39.1 03/18/2025 0444    HCT 39.0 03/17/2025 1253     03/18/2025 0444     03/17/2025 0423         Imaging reviewed      Assessment / Plan:       Nonoliguric acute kidney injury improved   chronic kidney disease probably CKD 3A- baseline creatinine 1-1.5  Metabolic acidosis, resolved   Hypokalemia   Transaminitis  Acute pancreatitis   Heart failure with reduced ejection fraction   Anemia   History of breast cancer    Plan:  Patient is informed the need to increase oral fluid intake and avoid all NSAIDs and [place] 2 inhibitors.  Since her renal functions have been stable, I advised patient that she needs follow-up with a nephrologist and I will set her appointment with Dr. Roca in about 2 months.  Will sign off this case.  Please reconsult if needed.

## 2025-03-18 NOTE — PLAN OF CARE
Problem: Physical Therapy  Goal: Physical Therapy Goal  Description: Goals to be met by: 25     Patient will increase functional independence with mobility by performin. Sit to stand transfer with Modified [place]  2. Bed to chair transfer with Modified [place] using Rolling Walker  3. Gait  x 150 feet with Modified [place] using Rolling Walker.     Outcome: Progressing

## 2025-03-18 NOTE — PT/OT/SLP EVAL
"Physical Therapy Evaluation    Patient Name:  Laura Jacobs   MRN:  [id number]    Recommendations:     Discharge therapy intensity: Low Intensity Therapy   Discharge Equipment Recommendations: none   Barriers to discharge: None    Assessment:     Laura Jacobs is a 74 y.o. female admitted with a medical diagnosis of septic shock, pancreatitis, anemia, metabolic acidosis, celiac artery stenosis, CHF with reduced EF 25-30% with pacer in place.  She presents with the following impairments/functional limitations: weakness, impaired endurance, impaired self care skills, impaired functional mobility, gait instability, impaired balance, decreased safety awareness.    Pt tolerated PT eval well. She is Miky for sit<>stand and CGA for ambulating with both a SPC and RW, improved stability when using RW. O2 removed for mobility, pt does fatigue easily but denies SOB and SpO2 95%. Will continue to progress, recommending low intensity therapy at d/c.    Rehab Prognosis: Good; patient would benefit from acute skilled PT services to address these deficits and reach maximum level of function.    Recent Surgery: * No surgery found *      Plan:     During this hospitalization, patient would benefit from acute PT services 5 x/week to address the identified rehab impairments via gait training, therapeutic activities, therapeutic exercises and progress toward the following goals:    Plan of Care Expires:  04/18/25    Subjective     Chief Complaint: "My legs are swollen"  Patient/Family Comments/goals: to return to PLOF  Pain/Comfort:  Pain Rating 1: 0/10    Patients cultural, spiritual, Jehovah's witness conflicts given the current situation:      Living Environment:  Pt lives alone in [facility] with flat entrance.  Prior to admission, patients level of function was [place] with SPC inside and RW outside.  Equipment used at home: cane, straight, walker, rolling, bath bench, bedside commode.  DME owned (not currently used): none.  Upon " discharge, patient will have assistance from sons nearby.    Objective:     Communicated with RN prior to session.  Patient found up in chair with telemetry  upon PT entry to room.    General Precautions: Standard, fall  Orthopedic Precautions:N/A   Braces: N/A  Respiratory Status: Nasal cannula, flow 2 L/min, O2 removed for mobility and reapplied at end of session, SpO2 95% with amb  Blood Pressure: N/A      Exams:  RLE ROM: WFL  RLE Strength: grossly 4/5  LLE ROM: WFL  LLE Strength: grossly 4/5  Skin integrity: Visible skin intact      Functional Mobility:  Transfers:     Sit to Stand:  minimum assistance and as well as for eccentric control when stting with rolling walker and straight cane  Gait: pt amb x 6' to bathroom using SPC and CGA, short shuffling and waddling gait. Then used RW to amb an additional 24' with CGA, still waddling likely 2/2 weak [place] hip abductors, no LOB but improved stability when using RW. Pt does fatigue easily and required standing rest break during RW trial, SpO2 95% on room air.    Balance: SBA-CGA standing      AM-PAC 6 CLICK MOBILITY  Total Score:18       Treatment & Education:  Patient provided with verbal education education regarding PT role/goals/POC, fall prevention, safety awareness, discharge/DME recommendations, and recommending use of RW at all times for incr stability .  Understanding was verbalized.     Patient left up in chair with call button in reach.    GOALS:   Multidisciplinary Problems       Physical Therapy Goals          Problem: Physical Therapy    Goal Priority Disciplines Outcome Interventions   Physical Therapy Goal     PT, PT/OT Progressing    Description: Goals to be met by: 25     Patient will increase functional independence with mobility by performin. Sit to stand transfer with Modified [place]  2. Bed to chair transfer with Modified [place] using Rolling Walker  3. Gait  x 150 feet with Modified [place] using Rolling Walker.                           History:     Past Medical History:   Diagnosis Date    Cancer     breast CA s/p chemo and L masectomy     Cardiomyopathy     CHF (congestive heart failure)     History of breast cancer     History of DVT (deep vein thrombosis)     HLD (hyperlipidemia)     Hypertension     Lymphedema     Osteoarthritis     Venous insufficiency        Past Surgical History:   Procedure Laterality Date    HYSTERECTOMY      INSERTION OF PACEMAKER      INTRAMEDULLARY RODDING OF FEMUR Left 10/14/2022    Procedure: INSERTION, INTRAMEDULLARY СВЕТЛАНА, FEMUR;  Surgeon: Ankush Alex MD;  Location: [facility];  Service: Orthopedics;  Laterality: Left;    JOINT REPLACEMENT Bilateral     Knee    MASTECTOMY Left 2019       Time Tracking:     PT Received On: 03/18/25  PT Start Time: 1015     PT Stop Time: 1027  PT Total Time (min): 12 min     Billable Minutes: Evaluation 12      03/18/2025

## 2025-03-18 NOTE — PLAN OF CARE
03/18/25 0957   Discharge Assessment   Assessment Type Discharge Planning Assessment   Confirmed/corrected address, phone number and insurance Yes   Confirmed Demographics Correct on Facesheet   Source of Information patient   When was your last doctors appointment? 11/12/24   Communicated AMANDA with patient/caregiver Date not available/Unable to determine   Reason For Admission hypocalcemia   People in Home alone   Do you expect to return to your current living situation? Yes   Do you have help at home or someone to help you manage your care at home? Yes   Who are your caregiver(s) and their phone number(s)? Andrzej Jacobs [phone]   Prior to hospitilization cognitive status: Alert/Oriented   Current cognitive status: Alert/Oriented   Walking or Climbing Stairs Difficulty yes   Walking or Climbing Stairs ambulation difficulty, requires equipment   Mobility Management RW, straight cane   Dressing/Bathing Difficulty yes   Dressing/Bathing bathing difficulty, requires equipment   Dressing/Bathing Management bath bench   Home Accessibility stairs to enter home   Number of Stairs, Main Entrance one   Stair Railings, Main Entrance none   Equipment Currently Used at Home cane, straight;bedside commode;bath bench;walker, rolling   Patient currently being followed by outpatient case management? No   Do you currently have service(s) that help you manage your care at home? No   Do you take prescription medications? Yes   Do you have prescription coverage? Yes   Do you have any problems affording any of your prescribed medications? No   Is the patient taking medications as prescribed? yes   Who is going to help you get home at discharge? Andrzej Jacobs   How do you get to doctors appointments? family or friend will provide   Are you on dialysis? No   Do you take coumadin? No   Discharge Plan A Home   Discharge Plan B Home   DME Needed Upon Discharge  other (see comments)  (TBD)   Discharge Plan discussed with: Patient    Transition of Care Barriers None     Assessment done with pt in chair. Pt had HH in the past but cannot remember which company was used. Looks like per Epic a referral was sent to [facility] in 2023. Pt uses [facility] and said they deliver her meds to her home. Pt has 2 sons that assist her as needed. One of them lives down the same street as her.

## 2025-03-19 VITALS
OXYGEN SATURATION: 95 % | TEMPERATURE: 98 F | RESPIRATION RATE: 18 BRPM | BODY MASS INDEX: 29.97 KG/M2 | SYSTOLIC BLOOD PRESSURE: 111 MMHG | DIASTOLIC BLOOD PRESSURE: 80 MMHG | WEIGHT: 179.88 LBS | HEART RATE: 101 BPM | HEIGHT: 65 IN

## 2025-03-19 PROBLEM — K62.5 RECTAL BLEEDING: Status: RESOLVED | Noted: 2025-03-17 | Resolved: 2025-03-19

## 2025-03-19 PROBLEM — I95.9 ACUTE HYPOTENSION: Status: RESOLVED | Noted: 2025-03-19 | Resolved: 2025-03-19

## 2025-03-19 PROBLEM — K64.8 INTERNAL HEMORRHOIDS: Status: ACTIVE | Noted: 2025-03-19

## 2025-03-19 PROBLEM — I95.9 ACUTE HYPOTENSION: Status: ACTIVE | Noted: 2025-03-19

## 2025-03-19 LAB
ALBUMIN SERPL-MCNC: 2.6 G/DL (ref 3.4–4.8)
ALBUMIN/GLOB SERPL: 0.8 RATIO (ref 1.1–2)
ALP SERPL-CCNC: 165 UNIT/L (ref 40–150)
ALT SERPL-CCNC: 90 UNIT/L (ref 0–55)
ANION GAP SERPL CALC-SCNC: 14 MEQ/L
AST SERPL-CCNC: 37 UNIT/L (ref 5–34)
BILIRUB SERPL-MCNC: 1.5 MG/DL
BUN SERPL-MCNC: 30.5 MG/DL (ref 9.8–20.1)
CALCIUM SERPL-MCNC: 8.1 MG/DL (ref 8.4–10.2)
CHLORIDE SERPL-SCNC: 100 MMOL/L (ref 98–107)
CO2 SERPL-SCNC: 24 MMOL/L (ref 23–31)
CREAT SERPL-MCNC: 1.45 MG/DL (ref 0.55–1.02)
CREAT/UREA NIT SERPL: 21
GFR SERPLBLD CREATININE-BSD FMLA CKD-EPI: 38 ML/MIN/1.73/M2
GLOBULIN SER-MCNC: 3.1 GM/DL (ref 2.4–3.5)
GLUCOSE SERPL-MCNC: 90 MG/DL (ref 82–115)
POTASSIUM SERPL-SCNC: 4 MMOL/L (ref 3.5–5.1)
PROT SERPL-MCNC: 5.7 GM/DL (ref 5.8–7.6)
SODIUM SERPL-SCNC: 138 MMOL/L (ref 136–145)

## 2025-03-19 PROCEDURE — 80053 COMPREHEN METABOLIC PANEL: CPT

## 2025-03-19 PROCEDURE — 94760 N-INVAS EAR/PLS OXIMETRY 1: CPT

## 2025-03-19 PROCEDURE — 36415 COLL VENOUS BLD VENIPUNCTURE: CPT

## 2025-03-19 PROCEDURE — 25000003 PHARM REV CODE 250

## 2025-03-19 PROCEDURE — 25000003 PHARM REV CODE 250: Performed by: INTERNAL MEDICINE

## 2025-03-19 PROCEDURE — 97535 SELF CARE MNGMENT TRAINING: CPT

## 2025-03-19 PROCEDURE — 63600175 PHARM REV CODE 636 W HCPCS

## 2025-03-19 PROCEDURE — 99900031 HC PATIENT EDUCATION (STAT)

## 2025-03-19 PROCEDURE — 27000221 HC OXYGEN, UP TO 24 HOURS

## 2025-03-19 RX ADMIN — PANTOPRAZOLE SODIUM 40 MG: 40 INJECTION, POWDER, FOR SOLUTION INTRAVENOUS at 08:03

## 2025-03-19 RX ADMIN — APIXABAN 5 MG: 5 TABLET, FILM COATED ORAL at 08:03

## 2025-03-19 RX ADMIN — FUROSEMIDE 20 MG: 20 TABLET ORAL at 08:03

## 2025-03-19 RX ADMIN — THIAMINE HCL TAB 100 MG 100 MG: 100 TAB at 05:03

## 2025-03-19 RX ADMIN — MUPIROCIN: 20 OINTMENT TOPICAL at 08:03

## 2025-03-19 NOTE — PT/OT/SLP PROGRESS
Occupational Therapy   Treatment    Name: Laura Jacobs  MRN: [id number]  Admitting Diagnosis:  Acute hypotension       1. Acute hypotension    2. Acute on chronic systolic congestive heart failure    3. Acute pancreatitis, unspecified complication status, unspecified pancreatitis type    4. Anemia, unspecified type    5. Hypomagnesemia    6. Hypocalcemia    7. Routine check-up    8. Internal hemorrhoids    9. Rectal bleeding    10. Acute on chronic systolic (congestive) heart failure          Recommendations:     Recommended therapy intensity at discharge: Low Intensity Therapy   Discharge Equipment Recommendations:  none  Barriers to discharge:  None    Assessment:     Laura Jacobs is a 74 y.o. female with a medical diagnosis of Acute hypotension.  She presents with The primary encounter diagnosis was Acute hypotension. Diagnoses of Acute on chronic systolic congestive heart failure, Acute pancreatitis, unspecified complication status, unspecified pancreatitis type, Anemia, unspecified type, Hypomagnesemia, Hypocalcemia, Routine check-up, Internal hemorrhoids, Rectal bleeding, and Acute on chronic systolic (congestive) heart failure were also pertinent to this visit.  . Performance deficits affecting function are weakness, impaired balance, impaired endurance, impaired self care skills, impaired functional mobility, gait instability, decreased upper extremity function, decreased lower extremity function, decreased ROM, edema.       Pt found using restroom this AM upon entrance into room. Pt reports fatigue and desire to rest prior to possible d/c this afternoon. Pt performing toileting and G&H axs at the sink. Discussed home safety and assist for d/c home. O2 95% on RA following axs. Cont OT POC if pt to remain in hospital today's date.      Rehab Prognosis:  Good; patient would benefit from acute skilled OT services to address these deficits and reach maximum level of function.       Plan:      Patient to be seen 5 x/week to address the above listed problems via self-care/home management, therapeutic activities, therapeutic exercises  Plan of Care Expires: 04/18/25  Plan of Care Reviewed with: patient    Subjective     Pain/Comfort:  Pain Rating 1: 0/10    Objective:     Communicated with: nsg prior to session.  Patient found  in bathroom   with telemetry upon OT entry to room.    General Precautions: Standard, fall    Orthopedic Precautions:N/A  Braces: N/A  Respiratory Status: Room air  Vital Signs: Sp02: 95%     Occupational Performance:     Bed Mobility:    Pt UIC     Functional Mobility/Transfers:  Patient completed Sit <> Stand Transfer with minimum assistance  with  rolling walker   Patient completed Toilet Transfer Step Transfer technique with minimum assistance with  rolling walker  Functional Mobility: CGA- Min A with RW in room ; cues for safety awareness with management of RW and navigating obstacles; cues to maintain boundaries of RW     Activities of Daily Living:  Grooming: contact guard assistance at sink   Toileting: minimum assistance clothing management to pull undergarments over hips     Therapeutic Activities:  Limited functional mobility in room for performance of ADLs 2/2 fatigue- significant waddling gait and wide base of support; pt appearing with labored breathing; report she removed O2 prior to ambulation to bathroom; O2 reading 95% on RA. Discussed home safety pending upcoming d/c        [facility] 6 Click ADL: 20    Patient Education:  Patient provided with verbal education and demonstrations education regarding OT role/goals/POC, fall prevention, safety awareness, and Discharge/DME recommendations.  Understanding was verbalized.      Patient left up in chair with all lines intact, call button in reach, and nsg/PCT notified.    GOALS:   Multidisciplinary Problems       Occupational Therapy Goals          Problem: Occupational Therapy    Goal Priority Disciplines Outcome  Interventions   Occupational Therapy Goal     OT, PT/OT Progressing    Description: Goals to be met by: in 1 month     Patient will increase functional independence with ADLs by performing:    LE Dressing with Modified [place].  Grooming while standing with Modified [place].  Toileting from toilet with Modified [place] for hygiene and clothing management.   Toilet transfer to toilet with Modified [place].                         Time Tracking:     OT Date of Treatment: 03/19/25  OT Start Time: 0944  OT Stop Time: 0956  OT Total Time (min): 12 min    Billable Minutes:Self Care/Home Management 12    OT/ANGELICA: OT     Number of ANGELICA visits since last OT visit: 1    3/19/2025

## 2025-03-19 NOTE — DISCHARGE SUMMARY
[facility] Hospital Medicine Discharge Summary    Admit Date: 3/15/2025  Discharge Date and Time: 3/19/07747:59 AM  Admitting Physician:  Team  Discharging Physician: Yahir Lanier MD.  Primary Care Physician: Ruben Brooks Sr., MD  Consults: Cardiothoracic/Vascular Surgery, General Surgery, Nephrology, and Colorectal surgeon    Discharge Diagnoses:  Acute Hypotension-requiring vasopressor therapy-POA - now resolved- no infection identified   Acute symptomatic anemia- status post PRBC transfusion-POA, probably the cause of above  Acute pancreatitis per CT imaging-POA - abdominal pain resolved  Acute painless rectal bleed - grade 2 internal hemorrhoids, no prolapse- need outpatient colonoscopy  Severe Metabolic acidosis secondary to lactic acidosis requiring bicarbonate infusion-POA- resolved  Acute on chronic systolic heart failure with acute drop in EF to 25-30% (was 35-40% in 1/2025), ICD present - BNP 3198  Acute hypoxemic respiratory failure secondary to above- improving  Transaminitis probably due to acute liver injury due to hypotension on admission-improving  Celiac artery stenosis- severe-vascular Services following-POA  History of pulmonary embolus on long-term anticoagulation therapy with Eliquis-POA   History of breast cancer status post L mastectomy, L axillary Lympth nodes dissection and chemotherapy with residual LUE lymphedema - remission reported-POA   CKD 3A- baseline creatinine 1-1.5     Hospital Course:   74-year-old female with known past medical history of breast cancers status post left mastectomy and left axillary lymph node dissection as/p chemotherapy now in remission, left upper extremity lymphedema, chronic systolic heart failure with previous EF of 35-40% in January 20, 2025 but now has dropped to 25-30%, ICD present, new diagnosis of diabetes with A1c 7.5, CKD 3A/B, admitted 03/15/2025 to ICU with hypotension with tachycardia requiring vasopressor,  severe metabolic acidosis requiring bicarb drip, shortness of breath.  Sepsis could not be ruled out so she was empirically started on vanc and Zosyn.  She was found to have and H&H of 4.4/14.9 and received 2 units packed red blood cells transfusion and hemoglobin improved to 12.4/36.3.  Occult blood was negative  Patient was also diagnosed with acute pancreatitis and severe celiac artery stenosis with post stenotic dilation a distended gallbladder.  BNP was 3198, troponin was negative x1  Lipase was 5  Given her bicarb was 10, patient was started on bicarb drip and Nephrology consulted  General surgery also evaluated the patient, right upper quadrant ultrasound performed- showed no gallstones or biliary ductal dilation, nonspecific gallbladder wall thickening and edema.  Peripancreatic fluid/edema better demonstrated on CT.  No surgical intervention needed at this time  Vascular surgery also consulted for severe narrowing at the origin of celiac artery, Dr. Campos evaluated the patient, recommended when she returns to baseline, she can have celiac duplex with inspiration and expiration with velocities.  If these are parallel with MAL syndrome, surgery could evaluate for laparoscopic decompression.  If duplex findings are not in line with MAL, she she would be considered for stenting  Patient is downgraded to hospital medicine team on 03/16  Upon my evaluation, laying in bed, reports she is hungry.  Nurse did a bedside swallow and she did it well.  Given some concern for the celiac stenosis, will start her on low fiber diet/cardiac/diabetic.  Patient denies chest pain, abdominal pain, reports shortness on breath is better  I reviewed patient's chart in detail, noted in cardiology note that there is concern for noncompliance with medication in outpatient setting  Patient informs me that she is independent at home, walks to the store and get what she needs to by herself, son lives with her but he works during the  daytime, patient does not drive  Hospital medicine team has assumed patient care  Blood culture x2-negative at 72 hours  Urine culture- no growth  Discontinued IV vancomycin and Zosyn on day 3 (3/17) given no source of infection identified  3/17- had a significant amount of painless rectal bleed, on examination patient and charge both felt patient has a prolapsed rectum--> ordered repeat H&H and consulted Colorectal surgery  Colorectal FNP Tonia Butts evaluated the patient, patient has grade 2 internal hemorrhoids an abnormal anal tone but no prolapse appreciated, recommended outpatient colonoscopy given her admission patient had acute anemia  H&H stable after the bleeding event, HB 12.8  Continue home Eliquis 5 mg b.i.d.  Abdominal pain has resolved, lipase 5, CT abdomen concerning for acute pancreatitis  Tolerating low-fiber/cardiac/diabetic diet  Nephrology has now signed off, recommended to follow up with Dr. Roca in 2 weeks for her CKD 3A  Noted BNP elevated 3198, cont home lasix 20 mg po daily  Patient is still on supplemental oxygen via nasal cannula at 1-2 L, requested nurse to either try to wean her off or evaluate her for home oxygen  Strict intake and output, daily weights  On low-fiber, cardiac/diabetic diet  AST/ALT is trending down, now 57/117  ALP still elevated at 160  Magnesium 2.3  PT OT evaluated the patient recommended home with home health    Pt was seen and examined on the day of discharge  Vitals:  VITAL SIGNS: 24 HRS MIN & MAX LAST   Temp  Min: 97.4 °F (36.3 °C)  Max: 97.8 °F (36.6 °C) 97.7 °F (36.5 °C)   BP  Min: 91/55  Max: 133/78 112/82   Pulse  Min: 67  Max: 99  92   Resp  Min: 18  Max: 20 18   SpO2  Min: 95 %  Max: 100 % 97 %       Physical Exam:  General: In no acute distress, afebrile, looks good for her stated age  Chest:  decreased breath sounds left lung base, on supplemental oxygen via nasal cannula at 2 liter/minute  Heart: S1, S2, no appreciable murmur  Abdomen: Soft,  nontender, BS hypoactive  MSK: Warm, no lower extremity edema, no clubbing or cyanosis, lymphedema left upper extremity  Neurologic: Alert and oriented x4, moving all extremities with good strength     Recent Labs   Lab 03/16/25  0604 03/17/25  0423 03/17/25  1253 03/18/25  0444   WBC 7.98 7.25  --  6.19   RBC 3.71* 4.01*  --  3.96*   HGB 12.0 12.8 12.9 12.8   HCT 35.3* 39.2 39.0 39.1   MCV 95.1* 97.8*  --  98.7*   MCH 32.3* 31.9*  --  32.3*   MCHC 34.0 32.7*  --  32.7*   RDW 17.4* 17.7*  --  17.5*    196  --  181   MPV 10.7* 10.6*  --  10.9*       Recent Labs   Lab 03/15/25  0502 03/15/25  0554 03/15/25  1000 03/15/25  1040 03/15/25  1128 03/15/25  1537 03/15/25  1700 03/16/25  0413 03/17/25  0423 03/18/25  0444 03/19/25  0444   *  --   --    < >  --   --   --    < > 137 139 138   K 4.0  --   --    < >  --   --   --    < > 4.2 4.2 4.0     --   --    < >  --   --   --    < > 99 100 100   CO2 5*  --   --    < >  --   --   --    < > 27 25 24   BUN 11.9  --   --    < >  --   --   --    < > 30.6* 29.7* 30.5*   CREATININE 0.44*  --   --    < >  --   --   --    < > 1.62* 1.50* 1.45*   CALCIUM 6.0*  --   --    < >  --   --   --    < > 7.9* 7.9* 8.1*   PH  --    < > 7.280*  --  7.410  --  7.530*  --   --   --   --    MG 0.60*  --   --   --   --  2.60  --   --  2.30  --   --    ALBUMIN 0.8*  --   --    < >  --   --   --    < > 2.8* 2.7* 2.6*   ALKPHOS 54  --   --    < >  --   --   --    < > 158* 160* 165*   ALT 16  --   --    < >  --   --   --    < > 166* 117* 90*   AST 13  --   --    < >  --   --   --    < > 115* 57* 37*   BILITOT 0.3  --   --    < >  --   --   --    < > 2.0* 1.7* 1.5    < > = values in this interval not displayed.        Microbiology Results (last 7 days)       Procedure Component Value Units Date/Time    Blood culture #1 **CANNOT BE ORDERED STAT** [[id number]]  (Normal) Collected: 03/15/25 0502    Order Status: Completed Specimen: Blood from Arm, Right Updated: 03/18/25 1100     Blood  Culture No Growth At 72 Hours    Blood culture #2 **CANNOT BE ORDERED STAT** [[id number]]  (Normal) Collected: 03/15/25 0502    Order Status: Completed Specimen: Blood from Arm, Left Updated: 03/18/25 1100     Blood Culture No Growth At 72 Hours    Urine culture [[id number]] Collected: 03/15/25 1034    Order Status: Completed Specimen: Urine Updated: 03/17/25 1019     Urine Culture No Growth             X-Ray Chest 1 View  Narrative: EXAMINATION:  XR CHEST 1 VIEW    CLINICAL HISTORY:  shortness of breath;    COMPARISON:  Yesterday    FINDINGS:  Frontal view of the chest was obtained. Stable right Port-A-Cath.  Heart and mediastinum unchanged.  There is increased bilateral interstitial prominence.  No pneumothorax.  Impression: Interstitial prominence, suspect pulmonary vascular congestion.    Electronically signed by: Jony Beasley  Date:    03/16/2025  Time:    13:42         Medication List        CONTINUE taking these medications      anastrozole 1 mg Tab  Commonly known as: ARIMIDEX     aspirin 81 MG EC tablet  Commonly known as: ECOTRIN  Take 1 tablet (81 mg total) by mouth once daily.     cyproheptadine 4 mg tablet  Commonly known as: PERIACTIN     dexlansoprazole 60 mg capsule  Commonly known as: DEXILANT     ELIQUIS 5 mg Tab  Generic drug: apixaban     famotidine 20 MG tablet  Commonly known as: PEPCID  Take 1 tablet (20 mg total) by mouth once daily.     furosemide 20 MG tablet  Commonly known as: LASIX  Take 1 tablet (20 mg total) by mouth once daily.     pravastatin 20 MG tablet  Commonly known as: PRAVACHOL  Take 1 tablet (20 mg total) by mouth every evening.            STOP taking these medications      itraconazole 100 mg Cap  Commonly known as: SPORANOX     verapamiL 240 MG CR tablet  Commonly known as: CALAN-SR               Explained in detail to the patient about the discharge plan, medications, and follow-up visits. Pt understands and agrees with the treatment plan  Discharge Disposition: Home with  HH  Discharged Condition: stable  Diet- CARDIAC DIET     Medications Per DC med rec  Activities as tolerated   Follow-up Information       Ruben Brooks Sr., MD. Schedule an appointment as soon as possible for a visit in 1 week(s).    Specialty: Internal Medicine  Why: post discharge, need referral to GI for colonoscopy and bleeding internal hemorrhoid  Contact information:  [address]  [facility] [address]  [phone]               Ricardo Campos III, MD. Schedule an appointment as soon as possible for a visit in 2 week(s).    Specialty: Vascular Surgery  Why: post discharge for celian artery stenosis  Contact information:  [address]  [facility] [address]  [phone]               Carlos Roca DO. Schedule an appointment as soon as possible for a visit in 2 week(s).    Specialty: Nephrology  Why: post dicsharge for CKD  Contact information:  [address] [facility] [address]  [phone]                           For further questions contact hospitalist office    Discharge time 34 minutes    For worsening symptoms, chest pain, shortness of breath, increased abdominal pain, high grade fever, stroke or stroke like symptoms, immediately go to the nearest Emergency Room or call 911 as soon as possible.    Portions of this note dictated using EMR integrated voice recognition software, and may be subject to voice recognition errors not corrected at proofreading. Please contact writer for clarification if needed    Yahir Lanier MD  Department of Hospital Medicine   [facility]   03/19/2025 8:59 AM

## 2025-03-19 NOTE — PLAN OF CARE
Problem: Adult Inpatient Plan of Care  Goal: Plan of Care Review  Outcome: Met  Goal: Patient-Specific Goal (Individualized)  Outcome: Met  Goal: Absence of Hospital-Acquired Illness or Injury  Outcome: Met  Goal: Optimal Comfort and Wellbeing  Outcome: Met  Goal: Readiness for Transition of Care  Outcome: Met     Problem: Skin Injury Risk Increased  Goal: Skin Health and Integrity  Outcome: Met     Problem: Pain Acute  Goal: Optimal Pain Control and Function  Outcome: Met     Problem: Pancreatitis  Goal: Fluid and Electrolyte Balance  Outcome: Met  Goal: Absence of Infection Signs and Symptoms  Outcome: Met  Goal: Optimal Nutrition Delivery  Outcome: Met  Goal: Optimal Pain Control and Function  Outcome: Met  Goal: Effective Oxygenation and Ventilation  Outcome: Met

## 2025-03-19 NOTE — PROGRESS NOTES
Discharge education given to pt and family member. All questions answered. Pt stable for discharge.

## 2025-03-19 NOTE — PLAN OF CARE
Freedom of choice signed. Referral sent to [facility] via [employer].   Pt was with [facility]  in the past but said she does not want to use them again.    10:35am-Epic fax still pending. Manually faxed to [facility] at   Fax# [phone] and notified their office.    11:05am-pt accepted by [facility]. Updated AVS.

## 2025-03-19 NOTE — PLAN OF CARE
03/19/25 1119   Final Note   Assessment Type Final Discharge Note   Anticipated Discharge Disposition Home-Health   What phone number can be called within the next 1-3 days to see how you are doing after discharge? [phone]   Hospital Resources/Appts/Education Provided Post-Acute resouces added to AVS   Post-Acute Status   Post-Acute Authorization Home Health   Home Health Status Set-up Complete/Auth obtained   Patient choice form signed by patient/caregiver List with quality metrics by geographic area provided   Discharge Delays None known at this time     Pt being discharged home today. DC summary/AVS sent to [facility] via .com. She was accepted by [facility]. No further dc needs at this time.

## 2025-03-20 LAB
BACTERIA BLD CULT: NORMAL
BACTERIA BLD CULT: NORMAL

## 2025-03-25 ENCOUNTER — LAB VISIT (OUTPATIENT)
Dept: LAB | Facility: HOSPITAL | Age: 75
End: 2025-03-25
Attending: FAMILY MEDICINE
Payer: COMMERCIAL

## 2025-03-25 DIAGNOSIS — E55.9 VITAMIN D DEFICIENCY: ICD-10-CM

## 2025-03-25 DIAGNOSIS — Z12.31 OTHER SCREENING MAMMOGRAM: Primary | ICD-10-CM

## 2025-03-25 DIAGNOSIS — E78.00 HIGH CHOLESTEROL: Primary | ICD-10-CM

## 2025-03-25 DIAGNOSIS — R79.89 OTHER SPECIFIED ABNORMAL FINDINGS OF BLOOD CHEMISTRY: ICD-10-CM

## 2025-03-25 LAB
25(OH)D3+25(OH)D2 SERPL-MCNC: 52 NG/ML (ref 30–80)
ALBUMIN SERPL-MCNC: 3.7 G/DL (ref 3.4–4.8)
ALBUMIN/GLOB SERPL: 0.9 RATIO (ref 1.1–2)
ALP SERPL-CCNC: 110 UNIT/L (ref 40–150)
ALT SERPL-CCNC: 16 UNIT/L (ref 0–55)
ANION GAP SERPL CALC-SCNC: 8 MEQ/L
AST SERPL-CCNC: 19 UNIT/L (ref 11–45)
BASOPHILS # BLD AUTO: 0.02 X10(3)/MCL
BASOPHILS NFR BLD AUTO: 0.3 %
BILIRUB SERPL-MCNC: 0.3 MG/DL
BUN SERPL-MCNC: 16 MG/DL (ref 9.8–20.1)
CALCIUM SERPL-MCNC: 9.8 MG/DL (ref 8.4–10.2)
CHLORIDE SERPL-SCNC: 106 MMOL/L (ref 98–107)
CHOLEST SERPL-MCNC: 110 MG/DL
CHOLEST/HDLC SERPL: 3 {RATIO} (ref 0–5)
CO2 SERPL-SCNC: 26 MMOL/L (ref 23–31)
CREAT SERPL-MCNC: 0.74 MG/DL (ref 0.55–1.02)
CREAT/UREA NIT SERPL: 22
EOSINOPHIL # BLD AUTO: 0.11 X10(3)/MCL (ref 0–0.9)
EOSINOPHIL NFR BLD AUTO: 1.9 %
ERYTHROCYTE [DISTWIDTH] IN BLOOD BY AUTOMATED COUNT: 14.2 % (ref 11.5–17)
EST. AVERAGE GLUCOSE BLD GHB EST-MCNC: 119.8 MG/DL
GFR SERPLBLD CREATININE-BSD FMLA CKD-EPI: >60 ML/MIN/1.73/M2
GLOBULIN SER-MCNC: 4 GM/DL (ref 2.4–3.5)
GLUCOSE SERPL-MCNC: 87 MG/DL (ref 82–115)
HBA1C MFR BLD: 5.8 %
HCT VFR BLD AUTO: 40.4 % (ref 37–47)
HDLC SERPL-MCNC: 40 MG/DL (ref 35–60)
HGB BLD-MCNC: 12.8 G/DL (ref 12–16)
IMM GRANULOCYTES # BLD AUTO: 0.02 X10(3)/MCL (ref 0–0.04)
IMM GRANULOCYTES NFR BLD AUTO: 0.3 %
LDLC SERPL CALC-MCNC: 63 MG/DL (ref 50–140)
LYMPHOCYTES # BLD AUTO: 1.03 X10(3)/MCL (ref 0.6–4.6)
LYMPHOCYTES NFR BLD AUTO: 17.9 %
MCH RBC QN AUTO: 28.9 PG (ref 27–31)
MCHC RBC AUTO-ENTMCNC: 31.7 G/DL (ref 33–36)
MCV RBC AUTO: 91.2 FL (ref 80–94)
MONOCYTES # BLD AUTO: 0.42 X10(3)/MCL (ref 0.1–1.3)
MONOCYTES NFR BLD AUTO: 7.3 %
NEUTROPHILS # BLD AUTO: 4.17 X10(3)/MCL (ref 2.1–9.2)
NEUTROPHILS NFR BLD AUTO: 72.3 %
NRBC BLD AUTO-RTO: 0 %
PLATELET # BLD AUTO: 228 X10(3)/MCL (ref 130–400)
PMV BLD AUTO: 10.3 FL (ref 7.4–10.4)
POTASSIUM SERPL-SCNC: 3.9 MMOL/L (ref 3.5–5.1)
PROT SERPL-MCNC: 7.7 GM/DL (ref 5.8–7.6)
RBC # BLD AUTO: 4.43 X10(6)/MCL (ref 4.2–5.4)
SODIUM SERPL-SCNC: 140 MMOL/L (ref 136–145)
T4 FREE SERPL-MCNC: 0.95 NG/DL (ref 0.7–1.48)
TRIGL SERPL-MCNC: 37 MG/DL (ref 37–140)
TSH SERPL-ACNC: 2 UIU/ML (ref 0.35–4.94)
VLDLC SERPL CALC-MCNC: 7 MG/DL
WBC # BLD AUTO: 5.77 X10(3)/MCL (ref 4.5–11.5)

## 2025-03-25 PROCEDURE — 84439 ASSAY OF FREE THYROXINE: CPT

## 2025-03-25 PROCEDURE — 84443 ASSAY THYROID STIM HORMONE: CPT

## 2025-03-25 PROCEDURE — 83036 HEMOGLOBIN GLYCOSYLATED A1C: CPT

## 2025-03-25 PROCEDURE — 82306 VITAMIN D 25 HYDROXY: CPT

## 2025-03-25 PROCEDURE — 80053 COMPREHEN METABOLIC PANEL: CPT

## 2025-03-25 PROCEDURE — 80061 LIPID PANEL: CPT

## 2025-03-25 PROCEDURE — 36415 COLL VENOUS BLD VENIPUNCTURE: CPT

## 2025-03-25 PROCEDURE — 85025 COMPLETE CBC W/AUTO DIFF WBC: CPT

## 2025-03-28 ENCOUNTER — LAB VISIT (OUTPATIENT)
Dept: LAB | Facility: HOSPITAL | Age: 75
End: 2025-03-28
Attending: FAMILY MEDICINE
Payer: COMMERCIAL

## 2025-03-28 DIAGNOSIS — E11.9 DIABETES MELLITUS WITHOUT COMPLICATION: ICD-10-CM

## 2025-03-28 DIAGNOSIS — E78.00 PURE HYPERCHOLESTEROLEMIA: Primary | ICD-10-CM

## 2025-03-28 LAB
HEMOCCULT SP1 STL QL: NEGATIVE
HEMOCCULT SP2 STL QL: NEGATIVE
HEMOCCULT SP3 STL QL: NEGATIVE

## 2025-03-28 PROCEDURE — 82270 OCCULT BLOOD FECES: CPT

## 2025-04-24 ENCOUNTER — LAB VISIT (OUTPATIENT)
Dept: LAB | Facility: HOSPITAL | Age: 75
End: 2025-04-24
Payer: COMMERCIAL

## 2025-04-24 ENCOUNTER — OFFICE VISIT (OUTPATIENT)
Facility: CLINIC | Age: 75
End: 2025-04-24
Payer: COMMERCIAL

## 2025-04-24 VITALS
TEMPERATURE: 98 F | WEIGHT: 249 LBS | SYSTOLIC BLOOD PRESSURE: 138 MMHG | DIASTOLIC BLOOD PRESSURE: 84 MMHG | HEART RATE: 56 BPM | BODY MASS INDEX: 41.48 KG/M2 | OXYGEN SATURATION: 98 % | HEIGHT: 65 IN | RESPIRATION RATE: 18 BRPM

## 2025-04-24 DIAGNOSIS — Z79.811 AROMATASE INHIBITOR USE: ICD-10-CM

## 2025-04-24 DIAGNOSIS — Z08 ENCOUNTER FOR FOLLOW-UP SURVEILLANCE OF BREAST CANCER: ICD-10-CM

## 2025-04-24 DIAGNOSIS — Z85.3 ENCOUNTER FOR FOLLOW-UP SURVEILLANCE OF BREAST CANCER: ICD-10-CM

## 2025-04-24 DIAGNOSIS — Z79.811 LONG TERM (CURRENT) USE OF AROMATASE INHIBITORS: Primary | ICD-10-CM

## 2025-04-24 LAB
ALBUMIN SERPL-MCNC: 3.5 G/DL (ref 3.4–4.8)
ALBUMIN/GLOB SERPL: 0.9 RATIO (ref 1.1–2)
ALP SERPL-CCNC: 109 UNIT/L (ref 40–150)
ALT SERPL-CCNC: 12 UNIT/L (ref 0–55)
ANION GAP SERPL CALC-SCNC: 8 MEQ/L
AST SERPL-CCNC: 13 UNIT/L (ref 11–45)
BASOPHILS # BLD AUTO: 0.01 X10(3)/MCL
BASOPHILS NFR BLD AUTO: 0.2 %
BILIRUB SERPL-MCNC: 0.3 MG/DL
BUN SERPL-MCNC: 11 MG/DL (ref 9.8–20.1)
CALCIUM SERPL-MCNC: 9.3 MG/DL (ref 8.4–10.2)
CHLORIDE SERPL-SCNC: 108 MMOL/L (ref 98–107)
CO2 SERPL-SCNC: 25 MMOL/L (ref 23–31)
CREAT SERPL-MCNC: 0.73 MG/DL (ref 0.55–1.02)
CREAT/UREA NIT SERPL: 15
EOSINOPHIL # BLD AUTO: 0.1 X10(3)/MCL (ref 0–0.9)
EOSINOPHIL NFR BLD AUTO: 1.9 %
ERYTHROCYTE [DISTWIDTH] IN BLOOD BY AUTOMATED COUNT: 13.9 % (ref 11.5–17)
GFR SERPLBLD CREATININE-BSD FMLA CKD-EPI: >60 ML/MIN/1.73/M2
GLOBULIN SER-MCNC: 3.9 GM/DL (ref 2.4–3.5)
GLUCOSE SERPL-MCNC: 84 MG/DL (ref 82–115)
HCT VFR BLD AUTO: 39.1 % (ref 37–47)
HGB BLD-MCNC: 12.8 G/DL (ref 12–16)
IMM GRANULOCYTES # BLD AUTO: 0.01 X10(3)/MCL (ref 0–0.04)
IMM GRANULOCYTES NFR BLD AUTO: 0.2 %
LYMPHOCYTES # BLD AUTO: 0.95 X10(3)/MCL (ref 0.6–4.6)
LYMPHOCYTES NFR BLD AUTO: 18.4 %
MCH RBC QN AUTO: 29.3 PG (ref 27–31)
MCHC RBC AUTO-ENTMCNC: 32.7 G/DL (ref 33–36)
MCV RBC AUTO: 89.5 FL (ref 80–94)
MONOCYTES # BLD AUTO: 0.36 X10(3)/MCL (ref 0.1–1.3)
MONOCYTES NFR BLD AUTO: 7 %
NEUTROPHILS # BLD AUTO: 3.72 X10(3)/MCL (ref 2.1–9.2)
NEUTROPHILS NFR BLD AUTO: 72.3 %
NRBC BLD AUTO-RTO: 0 %
PLATELET # BLD AUTO: 230 X10(3)/MCL (ref 130–400)
PMV BLD AUTO: 10.3 FL (ref 7.4–10.4)
POTASSIUM SERPL-SCNC: 3.9 MMOL/L (ref 3.5–5.1)
PROT SERPL-MCNC: 7.4 GM/DL (ref 5.8–7.6)
RBC # BLD AUTO: 4.37 X10(6)/MCL (ref 4.2–5.4)
SODIUM SERPL-SCNC: 141 MMOL/L (ref 136–145)
WBC # BLD AUTO: 5.15 X10(3)/MCL (ref 4.5–11.5)

## 2025-04-24 PROCEDURE — 36415 COLL VENOUS BLD VENIPUNCTURE: CPT

## 2025-04-24 PROCEDURE — 99999 PR PBB SHADOW E&M-EST. PATIENT-LVL V: CPT | Mod: PBBFAC,,, | Performed by: NURSE PRACTITIONER

## 2025-04-24 PROCEDURE — 80053 COMPREHEN METABOLIC PANEL: CPT

## 2025-04-24 PROCEDURE — 85025 COMPLETE CBC W/AUTO DIFF WBC: CPT

## 2025-04-30 ENCOUNTER — LAB VISIT (OUTPATIENT)
Dept: LAB | Facility: HOSPITAL | Age: 75
End: 2025-04-30
Attending: INTERNAL MEDICINE
Payer: MEDICARE

## 2025-04-30 DIAGNOSIS — E11.9 DIABETES MELLITUS WITHOUT COMPLICATION: ICD-10-CM

## 2025-04-30 DIAGNOSIS — E07.9 DISEASE OF THYROID GLAND: ICD-10-CM

## 2025-04-30 DIAGNOSIS — Z00.00 WELL ADULT EXAM: Primary | ICD-10-CM

## 2025-04-30 DIAGNOSIS — N18.9 ANEMIA OF CHRONIC RENAL FAILURE, UNSPECIFIED CKD STAGE: ICD-10-CM

## 2025-04-30 DIAGNOSIS — N18.32 CHRONIC KIDNEY DISEASE (CKD) STAGE G3B/A1, MODERATELY DECREASED GLOMERULAR FILTRATION RATE (GFR) BETWEEN 30-44 ML/MIN/1.73 SQUARE METER AND ALBUMINURIA CREATININE RATIO LESS THAN 30 MG/G: ICD-10-CM

## 2025-04-30 DIAGNOSIS — E55.9 VITAMIN D DEFICIENCY: ICD-10-CM

## 2025-04-30 DIAGNOSIS — I50.9 HEART FAILURE, UNSPECIFIED HF CHRONICITY, UNSPECIFIED HEART FAILURE TYPE: ICD-10-CM

## 2025-04-30 DIAGNOSIS — D63.1 ANEMIA OF CHRONIC RENAL FAILURE, UNSPECIFIED CKD STAGE: ICD-10-CM

## 2025-04-30 LAB
25(OH)D3+25(OH)D2 SERPL-MCNC: 11 NG/ML (ref 30–80)
ALBUMIN SERPL-MCNC: 3 G/DL (ref 3.4–4.8)
ALBUMIN/GLOB SERPL: 0.9 RATIO (ref 1.1–2)
ALP SERPL-CCNC: 123 UNIT/L (ref 40–150)
ALT SERPL-CCNC: 30 UNIT/L (ref 0–55)
ANION GAP SERPL CALC-SCNC: 16 MEQ/L
AST SERPL-CCNC: 24 UNIT/L (ref 11–45)
BILIRUB SERPL-MCNC: 3.5 MG/DL
BUN SERPL-MCNC: 31.2 MG/DL (ref 9.8–20.1)
CALCIUM SERPL-MCNC: 8.2 MG/DL (ref 8.4–10.2)
CHLORIDE SERPL-SCNC: 103 MMOL/L (ref 98–107)
CHOLEST SERPL-MCNC: 97 MG/DL
CHOLEST/HDLC SERPL: 5 {RATIO} (ref 0–5)
CO2 SERPL-SCNC: 22 MMOL/L (ref 23–31)
CREAT SERPL-MCNC: 1.51 MG/DL (ref 0.55–1.02)
CREAT/UREA NIT SERPL: 21
ERYTHROCYTE [DISTWIDTH] IN BLOOD BY AUTOMATED COUNT: 19.9 % (ref 11.5–17)
EST. AVERAGE GLUCOSE BLD GHB EST-MCNC: 128.4 MG/DL
GFR SERPLBLD CREATININE-BSD FMLA CKD-EPI: 37 ML/MIN/1.73/M2
GLOBULIN SER-MCNC: 3.2 GM/DL (ref 2.4–3.5)
GLUCOSE SERPL-MCNC: 75 MG/DL (ref 82–115)
HBA1C MFR BLD: 6.1 %
HCT VFR BLD AUTO: 44.4 % (ref 37–47)
HDLC SERPL-MCNC: 19 MG/DL (ref 35–60)
HGB BLD-MCNC: 14.6 G/DL (ref 12–16)
LDLC SERPL CALC-MCNC: 56 MG/DL (ref 50–140)
MCH RBC QN AUTO: 32.1 PG (ref 27–31)
MCHC RBC AUTO-ENTMCNC: 32.9 G/DL (ref 33–36)
MCV RBC AUTO: 97.6 FL (ref 80–94)
NRBC BLD AUTO-RTO: 0 %
PLATELET # BLD AUTO: 108 X10(3)/MCL (ref 130–400)
PLATELETS.RETICULATED NFR BLD AUTO: 5.1 % (ref 0.9–11.2)
PMV BLD AUTO: 11.6 FL (ref 7.4–10.4)
POTASSIUM SERPL-SCNC: 3.3 MMOL/L (ref 3.5–5.1)
PROT SERPL-MCNC: 6.2 GM/DL (ref 5.8–7.6)
RBC # BLD AUTO: 4.55 X10(6)/MCL (ref 4.2–5.4)
SODIUM SERPL-SCNC: 141 MMOL/L (ref 136–145)
TRIGL SERPL-MCNC: 110 MG/DL (ref 37–140)
TSH SERPL-ACNC: 5.74 UIU/ML (ref 0.35–4.94)
VLDLC SERPL CALC-MCNC: 22 MG/DL
WBC # BLD AUTO: 4.64 X10(3)/MCL (ref 4.5–11.5)

## 2025-04-30 PROCEDURE — 36415 COLL VENOUS BLD VENIPUNCTURE: CPT

## 2025-04-30 PROCEDURE — 80053 COMPREHEN METABOLIC PANEL: CPT

## 2025-04-30 PROCEDURE — 80061 LIPID PANEL: CPT

## 2025-04-30 PROCEDURE — 83036 HEMOGLOBIN GLYCOSYLATED A1C: CPT

## 2025-04-30 PROCEDURE — 85027 COMPLETE CBC AUTOMATED: CPT

## 2025-04-30 PROCEDURE — 82306 VITAMIN D 25 HYDROXY: CPT

## 2025-04-30 PROCEDURE — 84443 ASSAY THYROID STIM HORMONE: CPT

## 2025-05-08 ENCOUNTER — HOSPITAL ENCOUNTER (INPATIENT)
Facility: HOSPITAL | Age: 75
LOS: 17 days | Discharge: HOSPICE | DRG: 280 | End: 2025-05-25
Attending: EMERGENCY MEDICINE | Admitting: STUDENT IN AN ORGANIZED HEALTH CARE EDUCATION/TRAINING PROGRAM
Payer: MEDICARE

## 2025-05-08 DIAGNOSIS — Z71.89 COUNSELING REGARDING ADVANCE CARE PLANNING AND GOALS OF CARE: ICD-10-CM

## 2025-05-08 DIAGNOSIS — R60.9 EDEMA, UNSPECIFIED TYPE: ICD-10-CM

## 2025-05-08 DIAGNOSIS — R06.02 SHORTNESS OF BREATH: ICD-10-CM

## 2025-05-08 DIAGNOSIS — N28.9 ACUTE RENAL INSUFFICIENCY: ICD-10-CM

## 2025-05-08 DIAGNOSIS — I47.10 SVT (SUPRAVENTRICULAR TACHYCARDIA): ICD-10-CM

## 2025-05-08 DIAGNOSIS — I25.10 CAD (CORONARY ARTERY DISEASE): ICD-10-CM

## 2025-05-08 DIAGNOSIS — I50.9 ACUTE ON CHRONIC CONGESTIVE HEART FAILURE, UNSPECIFIED HEART FAILURE TYPE: Primary | ICD-10-CM

## 2025-05-08 DIAGNOSIS — Z85.3 HISTORY OF LEFT BREAST CANCER: ICD-10-CM

## 2025-05-08 DIAGNOSIS — I50.23 ACUTE ON CHRONIC SYSTOLIC (CONGESTIVE) HEART FAILURE: ICD-10-CM

## 2025-05-08 DIAGNOSIS — R07.9 CHEST PAIN: ICD-10-CM

## 2025-05-08 DIAGNOSIS — R60.9 SWELLING: ICD-10-CM

## 2025-05-08 DIAGNOSIS — E87.20 LACTIC ACIDOSIS: ICD-10-CM

## 2025-05-08 LAB
ALBUMIN SERPL-MCNC: 2.7 G/DL (ref 3.4–4.8)
ALBUMIN SERPL-MCNC: 3 G/DL (ref 3.4–4.8)
ALBUMIN/GLOB SERPL: 0.8 RATIO (ref 1.1–2)
ALBUMIN/GLOB SERPL: 0.9 RATIO (ref 1.1–2)
ALP SERPL-CCNC: 107 UNIT/L (ref 40–150)
ALP SERPL-CCNC: 126 UNIT/L (ref 40–150)
ALT SERPL-CCNC: 30 UNIT/L (ref 0–55)
ALT SERPL-CCNC: 35 UNIT/L (ref 0–55)
ANION GAP SERPL CALC-SCNC: 15 MEQ/L
ANION GAP SERPL CALC-SCNC: 22 MEQ/L
AST SERPL-CCNC: 23 UNIT/L (ref 11–45)
AST SERPL-CCNC: 28 UNIT/L (ref 11–45)
BACTERIA #/AREA URNS AUTO: ABNORMAL /HPF
BASOPHILS # BLD AUTO: 0.02 X10(3)/MCL
BASOPHILS # BLD AUTO: 0.04 X10(3)/MCL
BASOPHILS NFR BLD AUTO: 0.2 %
BASOPHILS NFR BLD AUTO: 0.6 %
BILIRUB DIRECT SERPL-MCNC: 2.6 MG/DL (ref 0–?)
BILIRUB SERPL-MCNC: 3.9 MG/DL
BILIRUB SERPL-MCNC: 4 MG/DL
BILIRUB UR QL STRIP.AUTO: NEGATIVE
BNP BLD-MCNC: 6729.3 PG/ML
BUN SERPL-MCNC: 34 MG/DL (ref 9.8–20.1)
BUN SERPL-MCNC: 34.4 MG/DL (ref 9.8–20.1)
CALCIUM SERPL-MCNC: 8 MG/DL (ref 8.4–10.2)
CALCIUM SERPL-MCNC: 8.3 MG/DL (ref 8.4–10.2)
CHLORIDE SERPL-SCNC: 100 MMOL/L (ref 98–107)
CHLORIDE SERPL-SCNC: 101 MMOL/L (ref 98–107)
CLARITY UR: ABNORMAL
CO2 SERPL-SCNC: 17 MMOL/L (ref 23–31)
CO2 SERPL-SCNC: 23 MMOL/L (ref 23–31)
COLOR UR AUTO: YELLOW
CREAT SERPL-MCNC: 1.71 MG/DL (ref 0.55–1.02)
CREAT SERPL-MCNC: 1.78 MG/DL (ref 0.55–1.02)
CREAT/UREA NIT SERPL: 19
CREAT/UREA NIT SERPL: 20
EOSINOPHIL # BLD AUTO: 0.02 X10(3)/MCL (ref 0–0.9)
EOSINOPHIL # BLD AUTO: 0.06 X10(3)/MCL (ref 0–0.9)
EOSINOPHIL NFR BLD AUTO: 0.2 %
EOSINOPHIL NFR BLD AUTO: 0.9 %
ERYTHROCYTE [DISTWIDTH] IN BLOOD BY AUTOMATED COUNT: 21 % (ref 11.5–17)
ERYTHROCYTE [DISTWIDTH] IN BLOOD BY AUTOMATED COUNT: 21.4 % (ref 11.5–17)
FLUAV AG UPPER RESP QL IA.RAPID: NOT DETECTED
FLUBV AG UPPER RESP QL IA.RAPID: NOT DETECTED
GFR SERPLBLD CREATININE-BSD FMLA CKD-EPI: 30 ML/MIN/1.73/M2
GFR SERPLBLD CREATININE-BSD FMLA CKD-EPI: 32 ML/MIN/1.73/M2
GLOBULIN SER-MCNC: 2.9 GM/DL (ref 2.4–3.5)
GLOBULIN SER-MCNC: 3.7 GM/DL (ref 2.4–3.5)
GLUCOSE SERPL-MCNC: 138 MG/DL (ref 82–115)
GLUCOSE SERPL-MCNC: 87 MG/DL (ref 82–115)
GLUCOSE UR QL STRIP: NORMAL
GROUP & RH: NORMAL
HCT VFR BLD AUTO: 40.8 % (ref 37–47)
HCT VFR BLD AUTO: 47.2 % (ref 37–47)
HGB BLD-MCNC: 13.4 G/DL (ref 12–16)
HGB BLD-MCNC: 15.2 G/DL (ref 12–16)
HGB UR QL STRIP: ABNORMAL
HYALINE CASTS #/AREA URNS LPF: >20 /LPF
IMM GRANULOCYTES # BLD AUTO: 0.04 X10(3)/MCL (ref 0–0.04)
IMM GRANULOCYTES # BLD AUTO: 0.05 X10(3)/MCL (ref 0–0.04)
IMM GRANULOCYTES NFR BLD AUTO: 0.6 %
IMM GRANULOCYTES NFR BLD AUTO: 0.6 %
INDIRECT COOMBS: NORMAL
KETONES UR QL STRIP: NEGATIVE
LACTATE SERPL-SCNC: 3.7 MMOL/L (ref 0.5–2.2)
LACTATE SERPL-SCNC: 4.1 MMOL/L (ref 0.5–2.2)
LACTATE SERPL-SCNC: 6 MMOL/L (ref 0.5–2.2)
LEUKOCYTE ESTERASE UR QL STRIP: 500
LYMPHOCYTES # BLD AUTO: 0.63 X10(3)/MCL (ref 0.6–4.6)
LYMPHOCYTES # BLD AUTO: 0.9 X10(3)/MCL (ref 0.6–4.6)
LYMPHOCYTES NFR BLD AUTO: 13.4 %
LYMPHOCYTES NFR BLD AUTO: 7.4 %
MAGNESIUM SERPL-MCNC: 1.8 MG/DL (ref 1.6–2.6)
MCH RBC QN AUTO: 32 PG (ref 27–31)
MCH RBC QN AUTO: 32 PG (ref 27–31)
MCHC RBC AUTO-ENTMCNC: 32.2 G/DL (ref 33–36)
MCHC RBC AUTO-ENTMCNC: 32.8 G/DL (ref 33–36)
MCV RBC AUTO: 97.4 FL (ref 80–94)
MCV RBC AUTO: 99.4 FL (ref 80–94)
MONOCYTES # BLD AUTO: 0.14 X10(3)/MCL (ref 0.1–1.3)
MONOCYTES # BLD AUTO: 0.27 X10(3)/MCL (ref 0.1–1.3)
MONOCYTES NFR BLD AUTO: 2.1 %
MONOCYTES NFR BLD AUTO: 3.2 %
MUCOUS THREADS URNS QL MICRO: ABNORMAL /LPF
NEUTROPHILS # BLD AUTO: 5.53 X10(3)/MCL (ref 2.1–9.2)
NEUTROPHILS # BLD AUTO: 7.47 X10(3)/MCL (ref 2.1–9.2)
NEUTROPHILS NFR BLD AUTO: 82.4 %
NEUTROPHILS NFR BLD AUTO: 88.4 %
NITRITE UR QL STRIP: NEGATIVE
NRBC BLD AUTO-RTO: 0.2 %
NRBC BLD AUTO-RTO: 0.3 %
OHS QRS DURATION: 110 MS
OHS QTC CALCULATION: 475 MS
PH UR STRIP: 5.5 [PH]
PLATELET # BLD AUTO: 110 X10(3)/MCL (ref 130–400)
PLATELET # BLD AUTO: 121 X10(3)/MCL (ref 130–400)
PMV BLD AUTO: 12.4 FL (ref 7.4–10.4)
PMV BLD AUTO: 12.7 FL (ref 7.4–10.4)
POCT GLUCOSE: 103 MG/DL (ref 70–110)
POCT GLUCOSE: 169 MG/DL (ref 70–110)
POCT GLUCOSE: 49 MG/DL (ref 70–110)
POCT GLUCOSE: 91 MG/DL (ref 70–110)
POCT GLUCOSE: 96 MG/DL (ref 70–110)
POTASSIUM SERPL-SCNC: 2.6 MMOL/L (ref 3.5–5.1)
POTASSIUM SERPL-SCNC: 3.6 MMOL/L (ref 3.5–5.1)
POTASSIUM SERPL-SCNC: 3.8 MMOL/L (ref 3.5–5.1)
PROT SERPL-MCNC: 5.6 GM/DL (ref 5.8–7.6)
PROT SERPL-MCNC: 6.7 GM/DL (ref 5.8–7.6)
PROT UR QL STRIP: ABNORMAL
RBC # BLD AUTO: 4.19 X10(6)/MCL (ref 4.2–5.4)
RBC # BLD AUTO: 4.75 X10(6)/MCL (ref 4.2–5.4)
RBC #/AREA URNS AUTO: ABNORMAL /HPF
SARS-COV-2 RNA RESP QL NAA+PROBE: NOT DETECTED
SODIUM SERPL-SCNC: 138 MMOL/L (ref 136–145)
SODIUM SERPL-SCNC: 140 MMOL/L (ref 136–145)
SP GR UR STRIP.AUTO: 1.03 (ref 1–1.03)
SPECIMEN OUTDATE: NORMAL
SQUAMOUS #/AREA URNS LPF: ABNORMAL /HPF
TROPONIN I SERPL-MCNC: 0.06 NG/ML (ref 0–0.04)
TROPONIN I SERPL-MCNC: 0.07 NG/ML (ref 0–0.04)
TSH SERPL-ACNC: 9.8 UIU/ML (ref 0.35–4.94)
UROBILINOGEN UR STRIP-ACNC: 4
WBC # BLD AUTO: 6.71 X10(3)/MCL (ref 4.5–11.5)
WBC # BLD AUTO: 8.46 X10(3)/MCL (ref 4.5–11.5)
WBC #/AREA URNS AUTO: ABNORMAL /HPF

## 2025-05-08 PROCEDURE — 63600175 PHARM REV CODE 636 W HCPCS: Performed by: INTERNAL MEDICINE

## 2025-05-08 PROCEDURE — 82248 BILIRUBIN DIRECT: CPT | Performed by: INTERNAL MEDICINE

## 2025-05-08 PROCEDURE — 27000221 HC OXYGEN, UP TO 24 HOURS

## 2025-05-08 PROCEDURE — 25000003 PHARM REV CODE 250: Performed by: NURSE PRACTITIONER

## 2025-05-08 PROCEDURE — 87077 CULTURE AEROBIC IDENTIFY: CPT | Performed by: EMERGENCY MEDICINE

## 2025-05-08 PROCEDURE — 83605 ASSAY OF LACTIC ACID: CPT | Performed by: EMERGENCY MEDICINE

## 2025-05-08 PROCEDURE — 84132 ASSAY OF SERUM POTASSIUM: CPT | Performed by: INTERNAL MEDICINE

## 2025-05-08 PROCEDURE — 80053 COMPREHEN METABOLIC PANEL: CPT

## 2025-05-08 PROCEDURE — 85025 COMPLETE CBC W/AUTO DIFF WBC: CPT

## 2025-05-08 PROCEDURE — 25000003 PHARM REV CODE 250: Performed by: INTERNAL MEDICINE

## 2025-05-08 PROCEDURE — 25500020 PHARM REV CODE 255: Performed by: EMERGENCY MEDICINE

## 2025-05-08 PROCEDURE — 96375 TX/PRO/DX INJ NEW DRUG ADDON: CPT

## 2025-05-08 PROCEDURE — 84443 ASSAY THYROID STIM HORMONE: CPT | Performed by: EMERGENCY MEDICINE

## 2025-05-08 PROCEDURE — 84484 ASSAY OF TROPONIN QUANT: CPT | Performed by: NURSE PRACTITIONER

## 2025-05-08 PROCEDURE — 84484 ASSAY OF TROPONIN QUANT: CPT | Performed by: EMERGENCY MEDICINE

## 2025-05-08 PROCEDURE — 86901 BLOOD TYPING SEROLOGIC RH(D): CPT | Performed by: EMERGENCY MEDICINE

## 2025-05-08 PROCEDURE — 21400001 HC TELEMETRY ROOM

## 2025-05-08 PROCEDURE — 93010 ELECTROCARDIOGRAM REPORT: CPT | Mod: ,,, | Performed by: INTERNAL MEDICINE

## 2025-05-08 PROCEDURE — 36415 COLL VENOUS BLD VENIPUNCTURE: CPT | Performed by: NURSE PRACTITIONER

## 2025-05-08 PROCEDURE — 63600175 PHARM REV CODE 636 W HCPCS: Performed by: NURSE PRACTITIONER

## 2025-05-08 PROCEDURE — 83880 ASSAY OF NATRIURETIC PEPTIDE: CPT | Performed by: EMERGENCY MEDICINE

## 2025-05-08 PROCEDURE — 36415 COLL VENOUS BLD VENIPUNCTURE: CPT | Performed by: EMERGENCY MEDICINE

## 2025-05-08 PROCEDURE — 96365 THER/PROPH/DIAG IV INF INIT: CPT

## 2025-05-08 PROCEDURE — 63600175 PHARM REV CODE 636 W HCPCS: Performed by: EMERGENCY MEDICINE

## 2025-05-08 PROCEDURE — 94760 N-INVAS EAR/PLS OXIMETRY 1: CPT

## 2025-05-08 PROCEDURE — 93005 ELECTROCARDIOGRAM TRACING: CPT

## 2025-05-08 PROCEDURE — 25000003 PHARM REV CODE 250: Performed by: STUDENT IN AN ORGANIZED HEALTH CARE EDUCATION/TRAINING PROGRAM

## 2025-05-08 PROCEDURE — 85025 COMPLETE CBC W/AUTO DIFF WBC: CPT | Performed by: EMERGENCY MEDICINE

## 2025-05-08 PROCEDURE — 83735 ASSAY OF MAGNESIUM: CPT

## 2025-05-08 PROCEDURE — 0240U COVID/FLU A&B PCR: CPT | Performed by: EMERGENCY MEDICINE

## 2025-05-08 PROCEDURE — 81001 URINALYSIS AUTO W/SCOPE: CPT | Performed by: EMERGENCY MEDICINE

## 2025-05-08 PROCEDURE — 99285 EMERGENCY DEPT VISIT HI MDM: CPT | Mod: 25

## 2025-05-08 PROCEDURE — 25000003 PHARM REV CODE 250: Performed by: EMERGENCY MEDICINE

## 2025-05-08 PROCEDURE — 83605 ASSAY OF LACTIC ACID: CPT | Performed by: NURSE PRACTITIONER

## 2025-05-08 PROCEDURE — 82962 GLUCOSE BLOOD TEST: CPT

## 2025-05-08 PROCEDURE — 80053 COMPREHEN METABOLIC PANEL: CPT | Performed by: EMERGENCY MEDICINE

## 2025-05-08 RX ORDER — CEFTRIAXONE 1 G/1
1 INJECTION, POWDER, FOR SOLUTION INTRAMUSCULAR; INTRAVENOUS
Status: DISCONTINUED | OUTPATIENT
Start: 2025-05-08 | End: 2025-05-09

## 2025-05-08 RX ORDER — FUROSEMIDE 10 MG/ML
20 INJECTION INTRAMUSCULAR; INTRAVENOUS
Status: COMPLETED | OUTPATIENT
Start: 2025-05-08 | End: 2025-05-08

## 2025-05-08 RX ORDER — MUPIROCIN 20 MG/G
OINTMENT TOPICAL 2 TIMES DAILY
Status: DISPENSED | OUTPATIENT
Start: 2025-05-08 | End: 2025-05-13

## 2025-05-08 RX ORDER — ALUMINUM HYDROXIDE, MAGNESIUM HYDROXIDE, AND SIMETHICONE 1200; 120; 1200 MG/30ML; MG/30ML; MG/30ML
30 SUSPENSION ORAL 4 TIMES DAILY PRN
Status: DISCONTINUED | OUTPATIENT
Start: 2025-05-08 | End: 2025-05-25 | Stop reason: HOSPADM

## 2025-05-08 RX ORDER — POTASSIUM CHLORIDE 20 MEQ/1
40 TABLET, EXTENDED RELEASE ORAL ONCE
Status: COMPLETED | OUTPATIENT
Start: 2025-05-08 | End: 2025-05-08

## 2025-05-08 RX ORDER — BISACODYL 10 MG/1
10 SUPPOSITORY RECTAL DAILY PRN
Status: DISCONTINUED | OUTPATIENT
Start: 2025-05-08 | End: 2025-05-25 | Stop reason: HOSPADM

## 2025-05-08 RX ORDER — SODIUM CHLORIDE 9 MG/ML
500 INJECTION, SOLUTION INTRAVENOUS
Status: COMPLETED | OUTPATIENT
Start: 2025-05-08 | End: 2025-05-08

## 2025-05-08 RX ORDER — POTASSIUM CHLORIDE 7.45 MG/ML
20 INJECTION INTRAVENOUS ONCE
Status: COMPLETED | OUTPATIENT
Start: 2025-05-08 | End: 2025-05-08

## 2025-05-08 RX ORDER — SODIUM CHLORIDE 0.9 % (FLUSH) 0.9 %
10 SYRINGE (ML) INJECTION
Status: DISCONTINUED | OUTPATIENT
Start: 2025-05-08 | End: 2025-05-25 | Stop reason: HOSPADM

## 2025-05-08 RX ORDER — ACETAMINOPHEN 500 MG
1000 TABLET ORAL EVERY 8 HOURS PRN
Status: DISCONTINUED | OUTPATIENT
Start: 2025-05-08 | End: 2025-05-25 | Stop reason: HOSPADM

## 2025-05-08 RX ORDER — POLYETHYLENE GLYCOL 3350 17 G/17G
17 POWDER, FOR SOLUTION ORAL 2 TIMES DAILY PRN
Status: DISCONTINUED | OUTPATIENT
Start: 2025-05-08 | End: 2025-05-25 | Stop reason: HOSPADM

## 2025-05-08 RX ORDER — CYPROHEPTADINE HYDROCHLORIDE 4 MG/1
1 TABLET ORAL 2 TIMES DAILY
Status: ON HOLD | COMMUNITY
End: 2025-05-23 | Stop reason: HOSPADM

## 2025-05-08 RX ORDER — METOPROLOL SUCCINATE 25 MG/1
25 TABLET, EXTENDED RELEASE ORAL
COMMUNITY
Start: 2025-05-06

## 2025-05-08 RX ORDER — ACETAMINOPHEN 325 MG/1
650 TABLET ORAL EVERY 4 HOURS PRN
Status: DISCONTINUED | OUTPATIENT
Start: 2025-05-08 | End: 2025-05-25 | Stop reason: HOSPADM

## 2025-05-08 RX ORDER — VANCOMYCIN HCL IN 5 % DEXTROSE 1G/250ML
1000 PLASTIC BAG, INJECTION (ML) INTRAVENOUS
Status: DISCONTINUED | OUTPATIENT
Start: 2025-05-08 | End: 2025-05-08

## 2025-05-08 RX ORDER — IBUPROFEN 200 MG
16 TABLET ORAL
Status: DISCONTINUED | OUTPATIENT
Start: 2025-05-08 | End: 2025-05-25 | Stop reason: HOSPADM

## 2025-05-08 RX ORDER — ENOXAPARIN SODIUM 100 MG/ML
1 INJECTION SUBCUTANEOUS EVERY 24 HOURS
Status: DISCONTINUED | OUTPATIENT
Start: 2025-05-09 | End: 2025-05-14

## 2025-05-08 RX ORDER — ACETAMINOPHEN 500 MG
500 TABLET ORAL
Status: COMPLETED | OUTPATIENT
Start: 2025-05-08 | End: 2025-05-08

## 2025-05-08 RX ORDER — POTASSIUM CHLORIDE 20 MEQ/1
40 TABLET, EXTENDED RELEASE ORAL
Status: COMPLETED | OUTPATIENT
Start: 2025-05-08 | End: 2025-05-08

## 2025-05-08 RX ORDER — FUROSEMIDE 10 MG/ML
40 INJECTION INTRAMUSCULAR; INTRAVENOUS ONCE
Status: DISCONTINUED | OUTPATIENT
Start: 2025-05-08 | End: 2025-05-09

## 2025-05-08 RX ORDER — TALC
6 POWDER (GRAM) TOPICAL NIGHTLY PRN
Status: DISCONTINUED | OUTPATIENT
Start: 2025-05-08 | End: 2025-05-25 | Stop reason: HOSPADM

## 2025-05-08 RX ORDER — DOBUTAMINE HYDROCHLORIDE 400 MG/100ML
5 INJECTION INTRAVENOUS CONTINUOUS
Status: DISCONTINUED | OUTPATIENT
Start: 2025-05-08 | End: 2025-05-12

## 2025-05-08 RX ORDER — GLUCAGON 1 MG
1 KIT INJECTION
Status: DISCONTINUED | OUTPATIENT
Start: 2025-05-08 | End: 2025-05-25 | Stop reason: HOSPADM

## 2025-05-08 RX ORDER — IBUPROFEN 200 MG
24 TABLET ORAL
Status: DISCONTINUED | OUTPATIENT
Start: 2025-05-08 | End: 2025-05-25 | Stop reason: HOSPADM

## 2025-05-08 RX ADMIN — MUPIROCIN: 20 OINTMENT TOPICAL at 08:05

## 2025-05-08 RX ADMIN — CEFTRIAXONE SODIUM 1 G: 1 INJECTION, POWDER, FOR SOLUTION INTRAMUSCULAR; INTRAVENOUS at 10:05

## 2025-05-08 RX ADMIN — POTASSIUM CHLORIDE 40 MEQ: 1500 TABLET, EXTENDED RELEASE ORAL at 03:05

## 2025-05-08 RX ADMIN — FUROSEMIDE 5 MG/HR: 10 INJECTION, SOLUTION INTRAMUSCULAR; INTRAVENOUS at 06:05

## 2025-05-08 RX ADMIN — APIXABAN 5 MG: 5 TABLET, FILM COATED ORAL at 10:05

## 2025-05-08 RX ADMIN — DEXTROSE MONOHYDRATE 25 G: 25 INJECTION, SOLUTION INTRAVENOUS at 03:05

## 2025-05-08 RX ADMIN — POTASSIUM CHLORIDE 40 MEQ: 1500 TABLET, EXTENDED RELEASE ORAL at 07:05

## 2025-05-08 RX ADMIN — FUROSEMIDE 20 MG: 10 INJECTION, SOLUTION INTRAMUSCULAR; INTRAVENOUS at 04:05

## 2025-05-08 RX ADMIN — SODIUM CHLORIDE 500 ML: 9 INJECTION, SOLUTION INTRAVENOUS at 02:05

## 2025-05-08 RX ADMIN — POTASSIUM CHLORIDE 20 MEQ: 7.46 INJECTION, SOLUTION INTRAVENOUS at 07:05

## 2025-05-08 RX ADMIN — POTASSIUM CHLORIDE 40 MEQ: 1500 TABLET, EXTENDED RELEASE ORAL at 06:05

## 2025-05-08 RX ADMIN — ACETAMINOPHEN 500 MG: 500 TABLET ORAL at 05:05

## 2025-05-08 RX ADMIN — DOBUTAMINE HYDROCHLORIDE 2.5 MCG/KG/MIN: 400 INJECTION INTRAVENOUS at 02:05

## 2025-05-08 RX ADMIN — VANCOMYCIN HYDROCHLORIDE 1000 MG: 1 INJECTION, POWDER, LYOPHILIZED, FOR SOLUTION INTRAVENOUS at 05:05

## 2025-05-08 RX ADMIN — PIPERACILLIN AND TAZOBACTAM 4.5 G: 4; .5 INJECTION, POWDER, LYOPHILIZED, FOR SOLUTION INTRAVENOUS; PARENTERAL at 04:05

## 2025-05-08 RX ADMIN — IOHEXOL 100 ML: 350 INJECTION, SOLUTION INTRAVENOUS at 03:05

## 2025-05-08 NOTE — PLAN OF CARE
05/08/25 0944   Discharge Assessment   Assessment Type Discharge Planning Assessment   Confirmed/corrected address, phone number and insurance Yes   Confirmed Demographics Correct on Facesheet   Source of Information patient;family  (Son: Andrzej Jacobs: [phone].)   If unable to respond/provide information was family/caregiver contacted? Yes   Contact Name/Number Son: Andrzej Jacobs: [phone].   Communicated AMANDA with patient/caregiver Date not available/Unable to determine   Reason For Admission admitted to floor from er with edema and swelling of BLE and right arm.  CT neg for possible PE, pt has had low bp's since admit, 88/53, md's aware. Notified nurse to continue to monitor pt and notify pcp and/or icu if pt condition worsens.   People in Home child(bhavana), adult  (Patient lives with her adult Son: Nikolay Jacobs in their private residence: [place], [place].)   Facility Arrived From: Private residence: [place], [place].   Do you expect to return to your current living situation? Yes   Do you have help at home or someone to help you manage your care at home? Yes   Who are your caregiver(s) and their phone number(s)? Patient lives with her adult Son: Nikolay Jacobs in their private residence: [place], [place]. She has another Son: Andrzej Jacobs: [phone] who is actively involved in his Mom's healthcare needs.   Prior to hospitilization cognitive status: Alert/Oriented   Current cognitive status: Alert/Oriented   Walking or Climbing Stairs Difficulty yes   Walking or Climbing Stairs ambulation difficulty, requires equipment   Mobility Management The patient uses the following DME: 1. Rolling Walker 2. StraightCane (PRN) safety with mobility concerns.   Dressing/Bathing Difficulty yes   Dressing/Bathing bathing difficulty, assistance 1 person   Dressing/Bathing Management The patient uses a Bath Bench for assistance with hygiene needs (PRN).   Home Layout Able to live on 1st floor   Equipment Currently Used  at Home bath bench;cane, straight;walker, rolling   Readmission within 30 days? No   Patient currently being followed by outpatient case management? No   Do you currently have service(s) that help you manage your care at home? Yes   Name and Contact number of agency The patient is active with home health services through: [facility].   Is the pt/caregiver preference to resume services with current agency Yes   Do you take prescription medications? Yes   Do you have prescription coverage? Yes   Coverage Financial Class: Managed Medicare  Payor: [facility] DUAL COMPLETE HMO SNP   Do you have any problems affording any of your prescribed medications? No   Is the patient taking medications as prescribed? yes   Who is going to help you get home at discharge? Patient lives with her adult Son: Nikolay Jacobs in their private residence: [place]. She has another Son: Andrzej Jacobs: [phone] who is actively involved in his Mom's healthcare needs.   How do you get to doctors appointments? family or friend will provide   Are you on dialysis? No   Do you take coumadin? No   Discharge Plan A Home Health  (FOC: The patient is active with home health services through: [facility].)   Discharge Plan B Home Health   DME Needed Upon Discharge  none   Discharge Plan discussed with: Patient;Adult children  (Patient lives with her adult Son: Nikolay Jacobs in their private residence: [place]. She has another Son: Andrzej Jacobs: [phone] who is actively involved in his Mom's healthcare needs.)   Transition of Care Barriers None   Financial Resource Strain   How hard is it for you to pay for the very basics like food, housing, medical care, and heating? Not very   Housing Stability   In the last 12 months, was there a time when you were not able to pay the mortgage or rent on time? N   At any time in the past 12 months, were you homeless or living in a shelter (including now)? N    Transportation Needs   In the past 12 months, has lack of transportation kept you from medical appointments or from getting medications? no   In the past 12 months, has lack of transportation kept you from meetings, work, or from getting things needed for daily living? No   Food Insecurity   Within the past 12 months, you worried that your food would run out before you got the money to buy more. Never true   Within the past 12 months, the food you bought just didn't last and you didn't have money to get more. Never true   Stress   Do you feel stress - tense, restless, nervous, or anxious, or unable to sleep at night because your mind is troubled all the time - these days? Only a littl   Social Isolation   How often do you feel lonely or isolated from those around you?  Rarely   Alcohol Use   Q1: How often do you have a drink containing alcohol? Never   Q2: How many drinks containing alcohol do you have on a typical day when you are drinking? None   Q3: How often do you have six or more drinks on one occasion? Never   [employer]   In the past 12 months has the electric, gas, oil, or water company threatened to shut off services in your home? No   Health Literacy   How often do you need to have someone help you when you read instructions, pamphlets, or other written material from your doctor or pharmacy? Never   OTHER   Name(s) of People in Home Patient lives with her adult Son: Nikolay Jacobs in their private residence: [place], [place]. She has another Son: Andrzej Jacobs: [phone] who is actively involved in his Mom's healthcare needs.   Son: Andrzej Jacobs: [phone]  Lives with Son: Nikolay Jacobs: (?) number  Cousin: Ksenia Jacobs: [phone]  PCP: Dr. Ruben Brooks, Sr.   [facility]  DME: 1. Bath Bench 2. Rolling Walker 3. Straight Cane.

## 2025-05-08 NOTE — CONSULTS
Inpatient consult to Cardiology  Consult performed by: Stevie Carrera FNP  Consult ordered by: Olvin Choudhury MD  Reason for consult: Cardiogenic Shock        [facility]    Cardiology  Consult Note    Patient Name: Laura Jacobs  MRN: [id number]  Admission Date: 5/8/2025  Hospital Length of Stay: 0 days  Code Status: Full Code   Attending Provider: Reyes, Thairy G, DO   Consulting Provider: MACHELLE Zimmerman  Primary Care Physician: Ruben Brooks Sr., MD  Principal Problem:<principal problem not specified>    Patient information was obtained from patient, past medical records, ER records, and primary team.     Subjective:   Chief Complaint/Reason for Consult: Concern for Cardiogenic Shock    HPI:   Ms. Jacobs is a 74 year old female, known to Dr. Washington, who presented to the hospital with bilateral lower extremity edema, pain, and SOB. Patient with history of NICMO. Noted Significant MR. Lab work significant for creatinine 1.7, bicarb 17, BNP 6700, troponin 0.06, lactic acid 6.0. Urinalysis consistent with UTI. BP initially marginal, but has improved. On Eliquis for history of DVT/PE. Admitted to Hospital Medicine Team. CIS consulted for cardiac evaluation/management due to concern for impending shock.    PMH: Hypertension/HHD, Dyslipidemia, Palpitations, MO, Breast Cancer, CP, SOB/ROMO, Pulmonary Hypertension, GONZALEZ/CPAP, DVT/PE (Eliquis), OA, CVI, VHD/MR-TR  PSH: Hysterectomy, Pacemaker, Fumur David Placement, Joint Replacement, Mastectomy  Family History: Father- Brain Aneurysm, Mother- Cancer, Sister- Hypertension  Social History: Tobacco- Negative, Alcohol- Negative, Substance Abuse- Negative     Previous Cardiac Diagnostics:   Venous Doppler (5.8.25):  Negative for deep and superficial vein thrombosis in bilateral lower extremities.    Echocardiogram (3.15.25):  Left Ventricle: The left ventricle is dilated. Wall is thinner than normal. Severe global hypokinesis present.  There is severely reduced systolic function with a visually estimated ejection fraction of 25 - 30%.  Right Ventricle: Right ventricular enlargement.  Left Atrium: Severely dilated Agitated saline study of the atrial septum is negative, suggesting absence of intracardiac shunt at the atrial level.  Right Atrium: Right atrium is severely dilated.  Mitral Valve: Mildly thickened leaflets. There is severe regurgitation.  Tricuspid Valve: There is moderate regurgitation.    Echocardiogram (1.20.25):  Left Ventricle: The left ventricle is dilated. Normal wall thickness. Global hypokinesis present. There is moderately reduced systolic function with a visually estimated ejection fraction of 35 - 40%.  Right Ventricle: Normal right ventricular cavity size. Systolic function is normal.  Left Atrium: Left atrium is mildly dilated.  Right Atrium: Right atrium is mildly dilated.  Aortic Valve: The aortic valve is a trileaflet valve. There is mild aortic valve sclerosis.  Mitral Valve: There is mitral annular calcification present. There is moderate to severe regurgitation.  Tricuspid Valve: There is mild regurgitation.  Pulmonic Valve: There is mild regurgitation.  IVC/SVC: Normal venous pressure at 3 mmHg.  Pericardium: There is a trivial effusion adjacent to the right atrium. No indication of cardiac tamponade.     Echocardiogram (3.20.24):  The study quality is average.   The left ventricle is normal in size. Global left ventricular systolic function is normal. The left ventricular ejection fraction is 55%. The left ventricle diastolic function is impaired (Grade I) with normal left atrial pressure.   Mild calcification of the aortic valve is noted with adequate cuspal excursion.  Mild mitral annular calcification is noted.   Mild (1+) mitral, tricuspid, and pulmonic regurgitation.   The pulmonary artery systolic pressure is 45 mmHg. Evidence of pulmonary hypertension is noted.      Carotid US (11.22.23):  The right internal  carotid artery demonstrated no hemodynamically significant stenosis.  There is no substantial right side carotid plaque identified.  The left internal carotid artery demonstrated no hemodynamically significant stenosis.  There is a minimal amount of plaque in the left carotid bulb.  The right vertebral artery is patent with antegrade flow.  The left vertebral artery is patent with antegrade flow.     AMANDA (9.14.22):  LV systolic function, LVEF 30-35%.  Severe global hypokinesis  Mild Tricuspid regurgitation  Moderate to severe Mitral regurgitation  Mild aortic regurgitation.  There is Lambl's excrescences on aortic leaflets that is benign fibrous structure.   No evidence of infection, vegetation or abscess     SICD Placement (3.25.21):  SICD Placement Re: Primary Prevention of SCD.      LHC (1.4.21):  Left main coronary artery normal   Left anterior descending artery normal   Left circumflex artery codominant normal   Right coronary artery codominant normal   Left ventricle dilated with severe left ventricular systolic   dysfunction ejection fraction less than 30%   SUMMARY : Nonischemic dilated cardiomyopathy with severe left ventricular systolic dysfunction.      ETT (6.9.20):  Stress EKG is normal.   Maximal exercise treadmill test (MPHR : 97 %).  The functional capacity is poor (4.6 METs).  The heart rate recovery is normal.   The study quality is below average.      Venogram (10.17.18):  No Critical Venous Compression Noted.     Review of patient's allergies indicates:   Allergen Reactions    Sulfa (sulfonamide antibiotics)      Patient unsure if allergic to Sulfa     No current facility-administered medications on file prior to encounter.     Current Outpatient Medications on File Prior to Encounter   Medication Sig    anastrozole (ARIMIDEX) 1 mg Tab Take 1 tablet by mouth once daily.    apixaban (ELIQUIS) 5 mg Tab Take 5 mg by mouth 2 (two) times daily.    aspirin (ECOTRIN) 81 MG EC tablet Take 1 tablet (81 mg  total) by mouth once daily.    cyproheptadine (PERIACTIN) 4 mg tablet Take 1 tablet by mouth 2 (two) times daily.    dexlansoprazole (DEXILANT) 60 mg capsule Take 60 mg by mouth once daily.    famotidine (PEPCID) 20 MG tablet Take 1 tablet (20 mg total) by mouth once daily.    furosemide (LASIX) 20 MG tablet Take 1 tablet (20 mg total) by mouth once daily. (Patient taking differently: Take 40 mg by mouth once daily.)    metoprolol succinate (TOPROL-XL) 25 MG 24 hr tablet Take 25 mg by mouth.    pravastatin (PRAVACHOL) 20 MG tablet Take 1 tablet (20 mg total) by mouth every evening.    [DISCONTINUED] cyproheptadine (PERIACTIN) 4 mg tablet Take 4 mg by mouth 2 (two) times daily as needed.     Review of Systems   Respiratory:  Positive for shortness of breath. Negative for chest tightness.    Cardiovascular:  Positive for leg swelling.   Musculoskeletal:         Leg Pain   All other systems reviewed and are negative.    Objective:     Vital Signs (Most Recent):  Temp: 97.4 °F (36.3 °C) (05/08/25 0844)  Pulse: 68 (05/08/25 1144)  Resp: 19 (05/08/25 0808)  BP: (!) 86/60 (05/08/25 1144)  SpO2: 98 % (05/08/25 0808) Vital Signs (24h Range):  Temp:  [97.4 °F (36.3 °C)-99 °F (37.2 °C)] 97.4 °F (36.3 °C)  Pulse:  [68-96] 68  Resp:  [11-28] 19  SpO2:  [94 %-100 %] 98 %  BP: ()/() 86/60   Weight: 67.6 kg (149 lb)  Body mass index is 25.58 kg/m².  SpO2: 98 %     No intake or output data in the 24 hours ending 05/08/25 1217  Lines/Drains/Airways       Peripheral Intravenous Line  Duration                  Peripheral IV - Single Lumen 05/08/25 0255 18 G Anterior;Distal;Right Upper Arm <1 day                  Significant Labs:   Chemistries:   Recent Labs   Lab 05/08/25  0218 05/08/25  0618    138   K 3.6 2.6*    100   CO2 17* 23   BUN 34.4* 34.0*   CREATININE 1.71* 1.78*   CALCIUM 8.3* 8.0*   PROT 6.7 5.6*   BILITOT 4.0* 3.9*   ALKPHOS 126 107   ALT 35 30   AST 28 23   MG  --  1.80   TROPONINI 0.063*  --      "    CBC/Anemia Labs: Coags:    Recent Labs   Lab 05/08/25  0218 05/08/25  0618   WBC 6.71 8.46   HGB 15.2 13.4   HCT 47.2* 40.8   * 110*   MCV 99.4* 97.4*   RDW 21.4* 21.0*    No results for input(s): "PT", "INR", "APTT" in the last 168 hours.     Significant Imaging:  Imaging Results              CTA Chest Non-Coronary (PE Studies) (Final result)  Result time 05/08/25 07:07:36      Final result by Tiffani Elmore MD (05/08/25 07:07:36)                   Impression:      1. No evidence of pulmonary embolus  2. Cardiomegaly      Electronically signed by: Tiffani Elmore  Date:    05/08/2025  Time:    07:07               Narrative:    EXAMINATION:  CTA CHEST NON CORONARY (PE STUDIES)    CLINICAL HISTORY:  Pulmonary embolism (PE) suspected, unknown D-dimer;    TECHNIQUE:  Helically acquired images with axial, sagittal and coronal reformations were obtained from the thoracic inlet to the lung bases followingthe IV administration of contrast.  CTA timed for evaluation of the pulmonary arteries.  MIP images were performed.    Automated tube current modulation, weight-based exposure dosing, and/or iterative reconstruction technique utilized to reach lowest reasonably achievable exposure rate.    DLP: 346 mGy*cm    COMPARISON:  CT chest 03/15/2025    FINDINGS:  BASE OF NECK: Atrophic versus surgically absent right lobe of the thyroid.    AORTA: Left-sided aortic arch.  No aneurysm and no significant atherosclerosis    PULMONARY VASCULATURE: Pulmonary arteries enhance normally.  No evidence of pulmonary embolus.    HEART: Cardiomegaly.    DUONG/MEDIASTINUM: No enlarged lymph nodes by size criteria.    AIRWAYS: Patent.    LUNGS/PLEURA: Respiratory motion artifact.  Left basilar scarring versus atelectasis.    UPPER ABDOMEN: Reflux of contrast into the hepatic veins.    THORACIC SOFT TISSUES: Body wall edema.  Parasternal defibrillator with left chest wall generator.  Right subclavian port with tip obscured by " dense contrast.    BONES: No acute fracture. No suspicious lytic or sclerotic lesions.                        Preliminary result by Srinivasa Veloz Jr., MD (05/08/25 03:36:30)                   Impression:    1. Moderate stable cardiomegaly is seen. There is contrast reflux into the hepatic IVC suggestive of cardiac disease/dysfunction.  2. No filling defects are seen in the pulmonary arteries to suggest pulmonary embolus.  3. Details and other findings as discussed above.               Narrative:    START OF REPORT:  Technique: CT Scan of the chest was performed with intravenous contrast with direct axial images as well as sagittal and coronal reconstruction images.    Dosage Information: Automated Exposure Control was utilized.    Comparison: Comparison is with study dated 2025-01-19 13:24:45.    Clinical History: Pulmonary embolism (PE) suspected, unknown D-dimer.    Findings:  Soft Tissues: There is extensive stranding of the subcutaneous fat suggesting an element of anasarca possibly related to cardiac disease.  Neck: The visualized soft tissues of the neck appear unremarkable.  Heart: Moderate stable cardiomegaly is seen. There is contrast reflux into the hepatic IVC suggestive of cardiac disease/dysfunction.  Aorta: Unremarkable appearing aorta. No aortic dissection or aneurysm is seen.  Pulmonary Arteries: No filling defects are seen in the pulmonary arteries to suggest pulmonary embolus.  Lungs: Moderate widely scattered streaky linear opacity is seen predominantly in the dependent portions at the lung bases consistent with nonspecific dependent changes scarring and subsegmental atelectasis. There is stable appearing focal pleural thickening with traction bronchiectasis in the lingula (Series 4 Image 43). No focal infiltrate or consolidation is seen.  Bony Structures:  Spine: Subtle spondylolytic changes are seen in the thoracic spine.  Ribs: The ribs appear unremarkable.  Abdomen: The visualized  upper abdominal organs appear unremarkable.                                         X-Ray Chest AP Portable (Final result)  Result time 05/08/25 09:05:14      Final result by Shubham Jain MD (05/08/25 09:05:14)                   Impression:      Congestive heart failure.      Electronically signed by: Shubham Jain  Date:    05/08/2025  Time:    09:05               Narrative:    EXAMINATION:  XR CHEST AP PORTABLE    CLINICAL HISTORY:  CHF;    TECHNIQUE:  One    COMPARISON:  March 16, 225.    FINDINGS:  Cardiopericardial silhouette enlarged appearance is similar.  Lungs are remarkable for mild to moderate congestive failure ground-glass and reticulonodular opacities.  No significant fluid within the pleural spaces.  No pneumothorax.  Left chest cardiac device and right chest implanted latonia catheter are in similar location.                                    EKG:       Telemetry:  SR    Physical Exam  Vitals and nursing note reviewed.   Constitutional:       General: She is not in acute distress.     Appearance: She is obese. She is ill-appearing.   Cardiovascular:      Rate and Rhythm: Normal rate and regular rhythm.      Pulses:           Dorsalis pedis pulses are detected w/ Doppler on the right side and detected w/ Doppler on the left side.      Heart sounds: Murmur heard.   Pulmonary:      Effort: Pulmonary effort is normal. No respiratory distress.      Breath sounds: Rales present.      Comments: NC  Abdominal:      Palpations: Abdomen is soft.   Musculoskeletal:      Right lower leg: Edema present.      Left lower leg: Edema present.      Comments: BLE Warm/Diffuse Pitting Lower Extremity Edema up to thighs bilaterally.   Skin:     General: Skin is warm and dry.   Neurological:      Mental Status: She is alert.      Comments: Drowsy but arousable       Home Medications:   Medications Ordered Prior to Encounter[1]  Current Schedule Inpatient Medications:   apixaban  5 mg Oral BID    cefTRIAXone (Rocephin)  IV (PEDS and ADULTS)  1 g Intravenous Q24H    furosemide (LASIX) injection  40 mg Intravenous Once    potassium chloride  40 mEq Oral Q8H       Assessment:   Acute on Chronic Systolic HF    - EF 25-30% (Echo 3.15.25)    - Concern for Impending Cardiogenic Shock- Lactic Acidosis  Nonischemic Cardiomyopathy    - EF 35-40% (Echo 1/2025)    - Status Post SICD    - Normal Coronaries in 2021/ETT (6/202): Normal  NSTEMI Type II due to Decompensated HF/Fluid Overload  Valvular Heart Disease    - MR: Severe, TR: Moderate  Hypertension/Hypertensive Heart Disease (BP Now Low Normal Range- Systolic 's)  Pulmonary Hypertension  Dyslipidemia    - On Statin  Chronic VI/Edema- Lymphedema  GONZALEZ/CPAP  History of Breast Cancer/Mastectomy  History of DVT/PE (Previously Treated with Xarelto)- Now on Eliquis  OA  UTI  Anemia  Thrombocytopenia  Electrolyte Derangements- Hypokalemia    Plan:   Start Dobutamine at 2.5 Mcg/Kg/Min Set Rate  Start Lasix 5 Mg/Hour Set Rate  Ensure Accurate I/O & Daily Weights  Replete K+ (Done)  Hold HF Medications for now  Trend Lactates  Hold Eliquis, transition to FD Lovenox starting tomorrow  Routine Labs in AM    Thank you for your consult.   Stevie Carrera, MACHELLE  Cardiology  [facility]   Physician addendum:  I have seen and examined this patient as a split-shared visit with the MAGDY d/t complicated medical management of above problems written in assessment and high acuity requiring physician expertise in medical decision-making. I performed the substantive portion of the history and exam. Above medical decision-making is also formulated by me.    Cardiovascular exam:  S1, S2  Lungs:  fine crackles at bases.  Extremities:  + edema bilaterally    Plan:  Medications as above.  Continue supportive therapy.     Deepak Banuelos MD  Cardiologist         [1]   No current facility-administered medications on file prior to encounter.     Current Outpatient Medications on File Prior to  Encounter   Medication Sig Dispense Refill    anastrozole (ARIMIDEX) 1 mg Tab Take 1 tablet by mouth once daily.      apixaban (ELIQUIS) 5 mg Tab Take 5 mg by mouth 2 (two) times daily.      aspirin (ECOTRIN) 81 MG EC tablet Take 1 tablet (81 mg total) by mouth once daily. 90 tablet 3    cyproheptadine (PERIACTIN) 4 mg tablet Take 1 tablet by mouth 2 (two) times daily.      dexlansoprazole (DEXILANT) 60 mg capsule Take 60 mg by mouth once daily.      famotidine (PEPCID) 20 MG tablet Take 1 tablet (20 mg total) by mouth once daily. 30 tablet 11    furosemide (LASIX) 20 MG tablet Take 1 tablet (20 mg total) by mouth once daily. (Patient taking differently: Take 40 mg by mouth once daily.) 30 tablet 11    metoprolol succinate (TOPROL-XL) 25 MG 24 hr tablet Take 25 mg by mouth.      pravastatin (PRAVACHOL) 20 MG tablet Take 1 tablet (20 mg total) by mouth every evening. 90 tablet 3    [DISCONTINUED] cyproheptadine (PERIACTIN) 4 mg tablet Take 4 mg by mouth 2 (two) times daily as needed.

## 2025-05-08 NOTE — ED PROVIDER NOTES
Encounter Date: 5/8/2025    SCRIBE #1 NOTE: I, Girish Knight, am scribing for, and in the presence of,  Eveline Ledezma MD. I have scribed the following portions of the note - Other sections scribed: HPI,ROS,PE.       History     Chief Complaint   Patient presents with    Leg Swelling     Pt arrives via [facility] leg swelling and arm swelling, unable to walk recently d/t swelling. hx of chf and lung cancer.      75 y/o female with PMHx of breast cancer (in remission), CHFrEF (20-30%), HTN, HLD, and CKD presents to ED c/o BLE edema and pain. Pt reports since onset she has started to become more short of breath as well. She reports associated bilateral leg swelling/pain and left arm swelling. She also reports she has been unable to walk due to the swelling and pain. She reports she has been eating and drinking normally. She denies any chest pain, abdominal pain, nausea, vomiting, diarrhea, black/bloody stool, difficulty urinating, cough, fever, or chills. She denies any EtOH or tobacco use. She reports she does take lasix, not urinating well in response, gaining weight.     The history is provided by the patient and medical records.     Review of patient's allergies indicates:   Allergen Reactions    Sulfa (sulfonamide antibiotics)      Patient unsure if allergic to Sulfa     Past Medical History:   Diagnosis Date    Cancer     breast CA s/p chemo and L masectomy     Cardiomyopathy     CHF (congestive heart failure)     History of breast cancer     History of DVT (deep vein thrombosis)     HLD (hyperlipidemia)     Hypertension     Lymphedema     Osteoarthritis     Venous insufficiency      Past Surgical History:   Procedure Laterality Date    HYSTERECTOMY      INSERTION OF PACEMAKER      INTRAMEDULLARY RODDING OF FEMUR Left 10/14/2022    Procedure: INSERTION, INTRAMEDULLARY СВЕТЛАНА, FEMUR;  Surgeon: Ankush Alex MD;  Location: [facility];  Service: Orthopedics;  Laterality: Left;    JOINT REPLACEMENT Bilateral     Knee     MASTECTOMY Left 2019     Family History   Family history unknown: Yes     Social History[1]  Review of Systems   Constitutional:  Positive for unexpected weight change (gain). Negative for appetite change, chills and fever.   HENT:  Negative for sore throat.    Eyes:  Negative for visual disturbance.   Respiratory:  Positive for shortness of breath.    Cardiovascular:  Positive for leg swelling (bilateral leg swelling). Negative for chest pain.        Left arm swelling.    Gastrointestinal:  Negative for abdominal pain, blood in stool, constipation, diarrhea, nausea and vomiting.   Genitourinary:  Negative for difficulty urinating, dysuria and hematuria.   Musculoskeletal:  Positive for arthralgias (bilateral leg pain) and myalgias (bilateral leg pain).   Skin:  Negative for rash.   Neurological:  Negative for syncope, light-headedness and headaches.   Psychiatric/Behavioral:  Negative for confusion and hallucinations. The patient is not nervous/anxious.    All other systems reviewed and are negative.      Physical Exam     Initial Vitals [05/08/25 0103]   BP Pulse Resp Temp SpO2   (!) 164/114 96 (!) 28 99 °F (37.2 °C) 100 %      MAP       --         Physical Exam    Nursing note and vitals reviewed.  Constitutional: She appears well-developed and well-nourished. No distress.   HENT:   Head: Normocephalic and atraumatic.   Mucous membranes tacky  NC in place       Eyes: Conjunctivae and EOM are normal.   Neck: Neck supple. JVD present.   Cardiovascular:  Normal rate, regular rhythm and normal heart sounds.           No murmur heard.  Pulmonary/Chest: Breath sounds normal. No respiratory distress. She has no wheezes. She has no rhonchi. She has no rales.   Abdominal: Abdomen is soft. Bowel sounds are normal. She exhibits no distension. There is no abdominal tenderness. There is no guarding.   Musculoskeletal:         General: Edema (pitting edema to BLE and LUE.) present. No tenderness. Normal range of motion.       Cervical back: Neck supple.      Lumbar back: Normal range of motion.     Neurological: She is alert and oriented to person, place, and time. She has normal strength. GCS score is 15. GCS eye subscore is 4. GCS verbal subscore is 5. GCS motor subscore is 6.   Skin: Skin is warm, dry and intact. Capillary refill takes less than 2 seconds.   Psychiatric: She has a normal mood and affect.         ED Course   Critical Care    Date/Time: 5/8/2025 5:19 AM    Performed by: Eveline Ledezma MD  Authorized by: Eveline Ledezma MD  Direct patient critical care time: 15 minutes  Additional history critical care time: 7 minutes  Ordering / reviewing critical care time: 7 minutes  Documentation critical care time: 7 minutes  Consulting other physicians critical care time: 8 minutes  Consult with family critical care time: 5 minutes  Total critical care time (exclusive of procedural time) : 49 minutes  Critical care time was exclusive of separately billable procedures and treating other patients.  Critical care was necessary to treat or prevent imminent or life-threatening deterioration of the following conditions: cardiac failure, metabolic crisis and sepsis.  Critical care was time spent personally by me on the following activities: blood draw for specimens, development of treatment plan with patient or surrogate, discussions with consultants, discussions with primary provider, gastric intubation, interpretation of cardiac output measurements, evaluation of patient's response to treatment, examination of patient, obtaining history from patient or surrogate, ordering and performing treatments and interventions, ordering and review of laboratory studies, ordering and review of radiographic studies, pulse oximetry, re-evaluation of patient's condition and review of old charts.        Labs Reviewed   COMPREHENSIVE METABOLIC PANEL - Abnormal       Result Value    Sodium 140      Potassium 3.6      Chloride 101      CO2 17 (*)      Glucose 87      Blood Urea Nitrogen 34.4 (*)     Creatinine 1.71 (*)     Calcium 8.3 (*)     Protein Total 6.7      Albumin 3.0 (*)     Globulin 3.7 (*)     Albumin/Globulin Ratio 0.8 (*)     Bilirubin Total 4.0 (*)           ALT 35      AST 28      eGFR 32      Anion Gap 22.0      BUN/Creatinine Ratio 20     TROPONIN I - Abnormal    Troponin-I 0.063 (*)    B-TYPE NATRIURETIC PEPTIDE - Abnormal    Natriuretic Peptide 6,729.3 (*)    CBC WITH DIFFERENTIAL - Abnormal    WBC 6.71      RBC 4.75      Hgb 15.2      Hct 47.2 (*)     MCV 99.4 (*)     MCH 32.0 (*)     MCHC 32.2 (*)     RDW 21.4 (*)     Platelet 121 (*)     MPV 12.7 (*)     Neut % 82.4      Lymph % 13.4      Mono % 2.1      Eos % 0.9      Basophil % 0.6      Imm Grans % 0.6      Neut # 5.53      Lymph # 0.90      Mono # 0.14      Eos # 0.06      Baso # 0.04      Imm Gran # 0.04      NRBC% 0.3     LACTIC ACID, PLASMA - Abnormal    Lactic Acid Level 6.0 (*)    LACTIC ACID, PLASMA - Abnormal    Lactic Acid Level 4.1 (*)    TSH - Abnormal    TSH 9.801 (*)    COMPREHENSIVE METABOLIC PANEL - Abnormal    Sodium 138      Potassium 2.6 (*)     Chloride 100      CO2 23      Glucose 138 (*)     Blood Urea Nitrogen 34.0 (*)     Creatinine 1.78 (*)     Calcium 8.0 (*)     Protein Total 5.6 (*)     Albumin 2.7 (*)     Globulin 2.9      Albumin/Globulin Ratio 0.9 (*)     Bilirubin Total 3.9 (*)           ALT 30      AST 23      eGFR 30      Anion Gap 15.0      BUN/Creatinine Ratio 19     CBC WITH DIFFERENTIAL - Abnormal    WBC 8.46      RBC 4.19 (*)     Hgb 13.4      Hct 40.8      MCV 97.4 (*)     MCH 32.0 (*)     MCHC 32.8 (*)     RDW 21.0 (*)     Platelet 110 (*)     MPV 12.4 (*)     Neut % 88.4      Lymph % 7.4      Mono % 3.2      Eos % 0.2      Basophil % 0.2      Imm Grans % 0.6      Neut # 7.47      Lymph # 0.63      Mono # 0.27      Eos # 0.02      Baso # 0.02      Imm Gran # 0.05 (*)     NRBC% 0.2     BILIRUBIN, DIRECT - Abnormal    Bilirubin Direct  2.6 (*)    POCT GLUCOSE - Abnormal    POCT Glucose 49 (*)    POCT GLUCOSE - Abnormal    POCT Glucose 169 (*)    COVID/FLU A&B PCR - Normal    Influenza A PCR Not Detected      Influenza B PCR Not Detected      SARS-CoV-2 PCR Not Detected      Narrative:     The Xpert Xpress SARS-CoV-2/FLU/RSV plus is a rapid, multiplexed real-time PCR test intended for the simultaneous qualitative detection and differentiation of SARS-CoV-2, Influenza A, Influenza B, and respiratory syncytial virus (RSV) viral RNA in either nasopharyngeal swab or nasal swab specimens.         MAGNESIUM - Normal    Magnesium Level 1.80     BLOOD CULTURE OLG   BLOOD CULTURE OLG   CBC W/ AUTO DIFFERENTIAL    Narrative:     The following orders were created for panel order CBC auto differential.  Procedure                               Abnormality         Status                     ---------                               -----------         ------                     CBC with Differential[[id number]]       Abnormal            Final result                 Please view results for these tests on the individual orders.   CBC W/ AUTO DIFFERENTIAL    Narrative:     The following orders were created for panel order CBC with Automated Differential.  Procedure                               Abnormality         Status                     ---------                               -----------         ------                     CBC with Differential[[id number]]       Abnormal            Final result                 Please view results for these tests on the individual orders.   TYPE & SCREEN    Group & Rh O POS      Indirect Sabina GEL NEG      Specimen Outdate 05/11/2025 23:59     POCT GLUCOSE    POCT Glucose 96       EKG Readings: (Independently Interpreted)   Initial Reading: No STEMI. Rhythm: Normal Sinus Rhythm. Heart Rate: 84. Ectopy: PVCs. Conduction: Normal. Axis: Left Axis Deviation.   Taken at 01:18     ECG Results              EKG 12-lead (Final result)         Collection Time Result Time QRS Duration OHS QTC Calculation    05/08/25 01:18:09 05/08/25 09:03:36 110 475                     Final result by Interface, Lab In [facility] (05/08/25 09:03:44)                   Narrative:    Test Reason : R06.02,    Vent. Rate :  84 BPM     Atrial Rate :  84 BPM     P-R Int : 172 ms          QRS Dur : 110 ms      QT Int : 402 ms       P-R-T Axes :  50 -64  88 degrees    QTcB Int : 475 ms    Sinus rhythm with occasional Premature ventricular complexes  Left axis deviation  Minimal voltage criteria for LVH, may be normal variant ( Model product )  Inferior infarct (cited on or before 25-Jan-2024)  Anteroseptal infarct ,age undetermined  Abnormal ECG  When compared with ECG of 15-Mar-2025 04:32,  Sinus rhythm has replaced Ectopic atrial rhythm  Anteroseptal infarct is now Present  Confirmed by Moy Pérez ([id number]) on 5/8/2025 9:03:34 AM    Referred By: AAAREFERRAL SELF           Confirmed By: Moy Pérez                                  Imaging Results              CTA Chest Non-Coronary (PE Studies) (Final result)  Result time 05/08/25 07:07:36      Final result by Tiffani Elmore MD (05/08/25 07:07:36)                   Impression:      1. No evidence of pulmonary embolus  2. Cardiomegaly      Electronically signed by: Tiffani Elmore  Date:    05/08/2025  Time:    07:07               Narrative:    EXAMINATION:  CTA CHEST NON CORONARY (PE STUDIES)    CLINICAL HISTORY:  Pulmonary embolism (PE) suspected, unknown D-dimer;    TECHNIQUE:  Helically acquired images with axial, sagittal and coronal reformations were obtained from the thoracic inlet to the lung bases followingthe IV administration of contrast.  CTA timed for evaluation of the pulmonary arteries.  MIP images were performed.    Automated tube current modulation, weight-based exposure dosing, and/or iterative reconstruction technique utilized to reach lowest reasonably achievable exposure rate.    DLP: 346  mGy*cm    COMPARISON:  CT chest 03/15/2025    FINDINGS:  BASE OF NECK: Atrophic versus surgically absent right lobe of the thyroid.    AORTA: Left-sided aortic arch.  No aneurysm and no significant atherosclerosis    PULMONARY VASCULATURE: Pulmonary arteries enhance normally.  No evidence of pulmonary embolus.    HEART: Cardiomegaly.    DUONG/MEDIASTINUM: No enlarged lymph nodes by size criteria.    AIRWAYS: Patent.    LUNGS/PLEURA: Respiratory motion artifact.  Left basilar scarring versus atelectasis.    UPPER ABDOMEN: Reflux of contrast into the hepatic veins.    THORACIC SOFT TISSUES: Body wall edema.  Parasternal defibrillator with left chest wall generator.  Right subclavian port with tip obscured by dense contrast.    BONES: No acute fracture. No suspicious lytic or sclerotic lesions.                        Preliminary result by Srinivasa Veloz Jr., MD (05/08/25 03:36:30)                   Impression:    1. Moderate stable cardiomegaly is seen. There is contrast reflux into the hepatic IVC suggestive of cardiac disease/dysfunction.  2. No filling defects are seen in the pulmonary arteries to suggest pulmonary embolus.  3. Details and other findings as discussed above.               Narrative:    START OF REPORT:  Technique: CT Scan of the chest was performed with intravenous contrast with direct axial images as well as sagittal and coronal reconstruction images.    Dosage Information: Automated Exposure Control was utilized.    Comparison: Comparison is with study dated 2025-01-19 13:24:45.    Clinical History: Pulmonary embolism (PE) suspected, unknown D-dimer.    Findings:  Soft Tissues: There is extensive stranding of the subcutaneous fat suggesting an element of anasarca possibly related to cardiac disease.  Neck: The visualized soft tissues of the neck appear unremarkable.  Heart: Moderate stable cardiomegaly is seen. There is contrast reflux into the hepatic IVC suggestive of cardiac  disease/dysfunction.  Aorta: Unremarkable appearing aorta. No aortic dissection or aneurysm is seen.  Pulmonary Arteries: No filling defects are seen in the pulmonary arteries to suggest pulmonary embolus.  Lungs: Moderate widely scattered streaky linear opacity is seen predominantly in the dependent portions at the lung bases consistent with nonspecific dependent changes scarring and subsegmental atelectasis. There is stable appearing focal pleural thickening with traction bronchiectasis in the lingula (Series 4 Image 43). No focal infiltrate or consolidation is seen.  Bony Structures:  Spine: Subtle spondylolytic changes are seen in the thoracic spine.  Ribs: The ribs appear unremarkable.  Abdomen: The visualized upper abdominal organs appear unremarkable.                                         X-Ray Chest AP Portable (Final result)  Result time 05/08/25 09:05:14      Final result by Shubham Jain MD (05/08/25 09:05:14)                   Impression:      Congestive heart failure.      Electronically signed by: Shubham Jain  Date:    05/08/2025  Time:    09:05               Narrative:    EXAMINATION:  XR CHEST AP PORTABLE    CLINICAL HISTORY:  CHF;    TECHNIQUE:  One    COMPARISON:  March 16, 225.    FINDINGS:  Cardiopericardial silhouette enlarged appearance is similar.  Lungs are remarkable for mild to moderate congestive failure ground-glass and reticulonodular opacities.  No significant fluid within the pleural spaces.  No pneumothorax.  Left chest cardiac device and right chest implanted latoina catheter are in similar location.                                    X-Rays:   Independently Interpreted Readings:   Chest X-Ray: Cardiomegaly present.  Increased vascular markings consistent with CHF are present.     Medications   sodium chloride 0.9% flush 10 mL (has no administration in time range)   melatonin tablet 6 mg (has no administration in time range)   polyethylene glycol packet 17 g (has no  administration in time range)   bisacodyL suppository 10 mg (has no administration in time range)   acetaminophen tablet 650 mg (has no administration in time range)   aluminum-magnesium hydroxide-simethicone 200-200-20 mg/5 mL suspension 30 mL (has no administration in time range)   glucose chewable tablet 16 g (has no administration in time range)   glucose chewable tablet 24 g (has no administration in time range)   dextrose 50% injection 12.5 g (has no administration in time range)   dextrose 50% injection 25 g (has no administration in time range)   glucagon (human recombinant) injection 1 mg (has no administration in time range)   acetaminophen tablet 1,000 mg (has no administration in time range)   enoxaparin injection 70 mg (has no administration in time range)   DOBUtamine 1000 mg in D5W 250 mL infusion (2.5 mcg/kg/min × 67.6 kg Intravenous New Bag 5/8/25 1454)   furosemide (Lasix) 200 mg in 0.9% NaCl SolP 100 mL continuous infusion (conc: 2 mg/mL) (5 mg/hr Intravenous New Bag 5/8/25 1827)   mupirocin 2 % ointment ( Nasal Given 5/8/25 2053)   0.9% NaCl infusion (500 mLs Intravenous New Bag 5/8/25 0256)   iohexoL (OMNIPAQUE 350) injection 100 mL (100 mLs Intravenous Given 5/8/25 0318)   dextrose 50% injection 25 g (25 g Intravenous Given 5/8/25 0322)   furosemide injection 20 mg (20 mg Intravenous Given 5/8/25 0413)   piperacillin-tazobactam (ZOSYN) 4.5 g in D5W 100 mL IVPB (MB+) (0 g Intravenous Stopped 5/8/25 0525)   vancomycin (VANCOCIN) 1,000 mg in D5W 250 mL IVPB (admixture device) (0 mg Intravenous Stopped 5/8/25 0712)   acetaminophen tablet 500 mg (500 mg Oral Given 5/8/25 0534)   potassium chloride SA CR tablet 40 mEq (40 mEq Oral Given 5/8/25 1556)   potassium chloride 10 mEq in 100 mL IVPB (20 mEq Intravenous New Bag 5/8/25 0715)   potassium chloride SA CR tablet 40 mEq (40 mEq Oral Given 5/8/25 1818)     Medical Decision Making  74-year-old female with multiple comorbidities including heart  failure presents for evaluation of lower extremity edema with shortness of breath.  No chest pain.  No fever.  She is afebrile here, borderline hypotensive, oxygenating well on a nasal cannula.  Upon review of the medical chart, she was recently admitted in March requiring ICU admission for septic shock on pressors, CHF, anemia requiring blood products, among others.  She has admittedly not been on anticoagulation since this admission.  She denies any signs of bleeding.  She has been taking her Lasix but not urinating much.  Screening cardiac workup with CTA of the chest will be initiated along with a very gentle saline bolus for her borderline blood pressure.  Low threshold to give Lasix, start antibiotics and vasopressors.  Reassess    The differential diagnosis includes, but is not limited to, COPD, heart failure, liver failure, kidney failure, PE, DVT, malignancy, and others.     Problems Addressed:  Acute on chronic congestive heart failure, unspecified heart failure type: acute illness or injury  Edema, unspecified type: acute illness or injury  Shortness of breath: acute illness or injury    Amount and/or Complexity of Data Reviewed  Labs: ordered. Decision-making details documented in ED Course.  Radiology: ordered and independent interpretation performed. Decision-making details documented in ED Course.  ECG/medicine tests: ordered and independent interpretation performed. Decision-making details documented in ED Course.    Risk  OTC drugs.  Prescription drug management.  Decision regarding hospitalization.            Scribe Attestation:   Scribe #1: I performed the above scribed service and the documentation accurately describes the services I performed. I attest to the accuracy of the note.    Attending Attestation:           Physician Attestation for Scribe:  Physician Attestation Statement for Scribe #1: I, Eveline Ledezma MD, reviewed documentation, as scribed by Girish Knight in my presence, and it  is both accurate and complete.             ED Course as of 05/09/25 0846   Thu May 08, 2025   0423 Patient has remained hemodynamically stable albeit borderline hypotensive; her last few BPs have been within normal range.  She has no acute complaints, remains chest pain-free.  She is still oxygenating well on NC.  Labs remarkable for mild AUSTIN, elevated lactic acid, elevated BNP and troponin.  Urinalysis is still pending.  Notably, her glucose dropped.  She was given D50 with improvement.  CTA of the chest negative for PE.  Chest x-ray with vascular congestion.  Case discussed with hospital medicine. Patient's BP is now borderline low again. We will monitor here for another hour to assess need for vasopressors considering her last presentation.  Patient amenable to admission.  [RB]   0523 BP stable, ok to admit to the floor. [RB]      ED Course User Index  [RB] Eveline Ledezma MD             /77, HR 79, RR 14, oxygen saturation 99%              Clinical Impression:  Final diagnoses:  [R06.02] Shortness of breath  [I50.9] Acute on chronic congestive heart failure, unspecified heart failure type (Primary)  [R60.9] Edema, unspecified type  [N28.9] Acute renal insufficiency  [E87.20] Lactic acidosis          ED Disposition Condition    Admit                     [1]   Social History  Tobacco Use    Smoking status: Never    Smokeless tobacco: Never   Substance Use Topics    Alcohol use: Not Currently    Drug use: Never        Eveline Ledezma MD  05/09/25 0867

## 2025-05-08 NOTE — AI DETERIORATION ALERT
Artificial Intelligence Notification  [facility]  [address] [place] [address]  Phone: [phone]    This documentation was triggered by an Artificial Intelligence Notification:    Admit Date: 2025   LOS: 0  Code Status: Full Code  : 1950  Age: 74 y.o.  Weight:   Wt Readings from Last 1 Encounters:   25 67.6 kg (149 lb)        Sex: female  Bed: [address] [place] [address] A  MRN: [id number]  Attending Physician: Reyes, Thairy G, DO     Date of Alert: 2025  Time AI Alert Received: 845            Vitals:    25 0844   BP: (!) 88/53   Pulse: 68   Resp:    Temp: 97.4 °F (36.3 °C)     SpO2: 98 %      Artificial Intelligence alert discussed with Provider:     Name: Sara RN   Date/Time of Provider Notification: 915      Patient Condition: pt just admitted to floor from er with edema and swelling of BLE and right arm.  CT neg for possible PE, pt has had low bp's since admit, 88/53, md's aware. Notified nurse to continue to monitor pt and notify pcp and/or icu if pt condition worsens.  Icu available if needed.

## 2025-05-08 NOTE — H&P
[facility] MEDICINE - H&P ADMISSION NOTE      Patient Name: Laura Jacobs  MRN: [id number]  Patient Class: IP- Inpatient   Admission Date: 5/8/2025   Admitting Physician:  Service   Attending Physician: Olvin Choudhury MD  Primary Care Provider: Ruben Brooks Sr., MD  Face-to-Face encounter date: 05/08/2025        CHIEF COMPLAINT     Chief Complaint   Patient presents with    Leg Swelling     Pt arrives via [facility] leg swelling and arm swelling, unable to walk recently d/t swelling. hx of chf and lung cancer.        HISTORY OF PRESENTING ILLNESS   74-year-old female with a past known medical history of breast cancer status post left mastectomy and status post chemo now and remission, chronic systolic heart failure, diabetes mellitus, CKD 3, hemorrhoids, celiac artery stenosis severe, pulmonary embolism on Eliquis.    She presented to the emergency department with complaints of bilateral leg swelling she reports of 3 to 4 weeks, also intermittent shortness a breath but unable to elaborate if it is orthopnea related, reports she sleeps with pillows.  No chest pain, no abdominal complaints.  In the emergency department her initial blood pressure was hypertensive systolic 164 however since then has been 90s systolic.  Lab work significant for creatinine 1.7, bicarb 17, BNP 6700, troponin 0.06, lactic acid 6.0.  Urinalysis consistent with UTI.  Patient received vancomycin and Zosyn while in the emergency department.  She also required a 500 cc bolus due to hypotension in the ER.          PAST MEDICAL HISTORY     Past Medical History:   Diagnosis Date    Cancer     breast CA s/p chemo and L masectomy     Cardiomyopathy     CHF (congestive heart failure)     History of breast cancer     History of DVT (deep vein thrombosis)     HLD (hyperlipidemia)     Hypertension     Lymphedema     Osteoarthritis     Venous insufficiency        PAST SURGICAL HISTORY     Past  Surgical History:   Procedure Laterality Date    HYSTERECTOMY      INSERTION OF PACEMAKER      INTRAMEDULLARY RODDING OF FEMUR Left 10/14/2022    Procedure: INSERTION, INTRAMEDULLARY СВЕТЛАНА, FEMUR;  Surgeon: Ankush Alex MD;  Location: [facility];  Service: Orthopedics;  Laterality: Left;    JOINT REPLACEMENT Bilateral     Knee    MASTECTOMY Left 2019       FAMILY HISTORY   Reviewed and noncontributory to this case    SOCIAL HISTORY   Social History[1]  Screening for Social Drivers for health:  Patient screened for food insecurity, housing instability, transportation needs, utility difficulties, and interpersonal safety (select all that apply as identified as concern)  []Housing or Food  []Transportation Needs  []Utility Difficulties  []Interpersonal safety  [x]None      HOME MEDICATIONS     Prior to Admission medications    Medication Sig Start Date End Date Taking? Authorizing Provider   anastrozole (ARIMIDEX) 1 mg Tab Take 1 tablet by mouth once daily. 7/24/23  Yes Provider, Historical   apixaban (ELIQUIS) 5 mg Tab Take 5 mg by mouth 2 (two) times daily.   Yes Provider, Historical   aspirin (ECOTRIN) 81 MG EC tablet Take 1 tablet (81 mg total) by mouth once daily. 3/14/25 3/14/26 Yes Phyllis Marshall, DO   cyproheptadine (PERIACTIN) 4 mg tablet Take 1 tablet by mouth 2 (two) times daily.   Yes Provider, Historical   dexlansoprazole (DEXILANT) 60 mg capsule Take 60 mg by mouth once daily.   Yes Provider, Historical   famotidine (PEPCID) 20 MG tablet Take 1 tablet (20 mg total) by mouth once daily. 3/14/25 3/14/26 Yes Phyllis Marshall,    furosemide (LASIX) 20 MG tablet Take 1 tablet (20 mg total) by mouth once daily.  Patient taking differently: Take 40 mg by mouth once daily. 3/14/25 3/14/26 Yes Phyllis Marshall, DO   metoprolol succinate (TOPROL-XL) 25 MG 24 hr tablet Take 25 mg by mouth. 5/6/25  Yes Provider, Historical   pravastatin (PRAVACHOL) 20 MG tablet Take 1 tablet (20 mg total) by mouth every evening. 3/13/25 3/13/26  Yes Phyllis Marshall, DO   cyproheptadine (PERIACTIN) 4 mg tablet Take 4 mg by mouth 2 (two) times daily as needed.  5/8/25  Provider, Historical       ALLERGIES   Sulfa (sulfonamide antibiotics)          REVIEW OF SYSTEMS   Except as documented above, all other systems reviewed and negative    PHYSICAL EXAM     Vitals:    05/08/25 0844   BP: (!) 88/53   Pulse: 68   Resp:    Temp: 97.4 °F (36.3 °C)      General:  In no acute distress, resting comfortably  Head and neck:  Atraumatic, normocephalic, moist mucous membranes, supple neck  Chest:  Clear to auscultation bilaterally  Heart:  S1, S2, no appreciable murmur, JVD  Abdomen:  Soft, nontender, BS +  MSK:  Warm, 2+ bilateral lower extremity edema, no clubbing or cyanosis  Neuro:  Alert and oriented x4, moving all extremities with good strength  Integumentary:  No obvious skin rash  Psychiatry:            ASSESSMENT AND PLAN   Acute on chronic HFrEF 25%-decompensated  Valvular heart disease-severe MR, moderate TR  Impending cardiogenic shock  Possible urinary tract infection-asymptomatic    History of: As listed above      Cardiology consulted.  May need inotrope drip awaiting cardiology evaluation.  Blood pressure unable to tolerate diuresis at this time.  Reports taking Eliquis as prescribed 5 mg b.i.d., no missing doses.  CT is negative for PE.  She received vancomycin and Zosyn in the ER.  I will go ahead and start Rocephin for possible UTI given hypotension but she is asymptomatic.  I suspect the source of hypotension is her systolic heart failure and valvular issues.    DVT prophylaxis:  Eliquis    Critical care diagnosis:  Impending cardiogenic shock  Critical care time spent:  65 minutes  Intervention for the above diagnosis was given to prevent further deterioration/decompensation of the patient condition.    __________________________________________________________________  LABS/MICRO/MEDS/DIAGNOSTICS       LABS  Recent Labs     05/08/25  0618      K  2.6*   CO2 23   BUN 34.0*   CREATININE 1.78*   CALCIUM 8.0*   ALKPHOS 107   AST 23   ALT 30   ALBUMIN 2.7*     Recent Labs     05/08/25 0618   WBC 8.46   RBC 4.19*   HCT 40.8   MCV 97.4*   *       MICROBIOLOGY  Microbiology Results (last 7 days)       Procedure Component Value Units Date/Time    Urine culture [[id number]] Collected: 05/08/25 0810    Order Status: Sent Specimen: Urine, Clean Catch Updated: 05/08/25 0848    Blood Culture #1 **CANNOT BE ORDERED STAT** [[id number]]     Order Status: Sent Specimen: Blood     Blood Culture #2 **CANNOT BE ORDERED STAT** [[id number]]     Order Status: Sent Specimen: Blood             MEDICATIONS   apixaban  5 mg Oral BID    furosemide (LASIX) injection  40 mg Intravenous Once    potassium chloride  40 mEq Oral Q8H      INFUSIONS      DIAGNOSTIC TESTS  CTA Chest Non-Coronary (PE Studies)   Final Result      1. No evidence of pulmonary embolus   2. Cardiomegaly         Electronically signed by: Tiffani Elmore   Date:    05/08/2025   Time:    07:07      X-Ray Chest AP Portable   Final Result      Congestive heart failure.         Electronically signed by: Shubham Jain   Date:    05/08/2025   Time:    09:05             Patient information was obtained from patient, patient's family, past medical records and ER records.   All diagnosis and differential diagnosis have been reviewed; assessment and plan has been documented. I have personally reviewed the labs and test results that are presently available; I have reviewed the patients medication list. I have reviewed the consulting providers response and recommendations. I have reviewed or attempted to review medical records based upon their availability.  All of the patient's questions have been addressed and answered. Patient's is agreeable to the above stated plan. I will continue to monitor closely and make adjustments to medical management as needed.  This note was created using [employer] voice recognition software that  occasionally misinterpreted phrases or words.  Please contact me if any questions may rise regarding documentation to clarify verbiage.        Olvin Choudhury MD   Internal Medicine  Department of Hospital Medicine [facility]         [1]   Social History  Socioeconomic History    Marital status: Single   Tobacco Use    Smoking status: Never    Smokeless tobacco: Never   Substance and Sexual Activity    Alcohol use: Not Currently    Drug use: Never    Sexual activity: Not Currently   Social History Narrative    ** Merged History Encounter **          Social Drivers of Health     Financial Resource Strain: Patient Declined (3/18/2025)    Overall Financial Resource Strain (CARDIA)     Difficulty of Paying Living Expenses: Patient declined   Food Insecurity: Patient Declined (3/18/2025)    Hunger Vital Sign     Worried About Running Out of Food in the Last Year: Patient declined     Ran Out of Food in the Last Year: Patient declined   Transportation Needs: No Transportation Needs (1/21/2025)    TRANSPORTATION NEEDS     Transportation : No   Physical Activity: Inactive (3/12/2025)    Exercise Vital Sign     Days of Exercise per Week: 0 days     Minutes of Exercise per Session: 0 min   Stress: Patient Declined (3/18/2025)    [employer] - Occupational Stress Questionnaire     Feeling of Stress : Patient declined   Housing Stability: Patient Declined (3/18/2025)    Housing Stability Vital Sign     Unable to Pay for Housing in the Last Year: Patient declined     Homeless in the Last Year: Patient declined

## 2025-05-09 LAB
ANION GAP SERPL CALC-SCNC: 18 MEQ/L
BASOPHILS # BLD AUTO: 0.02 X10(3)/MCL
BASOPHILS NFR BLD AUTO: 0.3 %
BUN SERPL-MCNC: 35.3 MG/DL (ref 9.8–20.1)
CALCIUM SERPL-MCNC: 7.9 MG/DL (ref 8.4–10.2)
CHLORIDE SERPL-SCNC: 103 MMOL/L (ref 98–107)
CO2 SERPL-SCNC: 19 MMOL/L (ref 23–31)
CREAT SERPL-MCNC: 1.64 MG/DL (ref 0.55–1.02)
CREAT/UREA NIT SERPL: 22
EOSINOPHIL # BLD AUTO: 0.04 X10(3)/MCL (ref 0–0.9)
EOSINOPHIL NFR BLD AUTO: 0.6 %
ERYTHROCYTE [DISTWIDTH] IN BLOOD BY AUTOMATED COUNT: 21.7 % (ref 11.5–17)
GFR SERPLBLD CREATININE-BSD FMLA CKD-EPI: 33 ML/MIN/1.73/M2
GLUCOSE SERPL-MCNC: 112 MG/DL (ref 82–115)
HCT VFR BLD AUTO: 41.7 % (ref 37–47)
HGB BLD-MCNC: 13.8 G/DL (ref 12–16)
IMM GRANULOCYTES # BLD AUTO: 0.06 X10(3)/MCL (ref 0–0.04)
IMM GRANULOCYTES NFR BLD AUTO: 0.8 %
LACTATE SERPL-SCNC: 3.8 MMOL/L (ref 0.5–2.2)
LYMPHOCYTES # BLD AUTO: 0.82 X10(3)/MCL (ref 0.6–4.6)
LYMPHOCYTES NFR BLD AUTO: 11.5 %
MAGNESIUM SERPL-MCNC: 1.8 MG/DL (ref 1.6–2.6)
MCH RBC QN AUTO: 32.4 PG (ref 27–31)
MCHC RBC AUTO-ENTMCNC: 33.1 G/DL (ref 33–36)
MCV RBC AUTO: 97.9 FL (ref 80–94)
MONOCYTES # BLD AUTO: 0.22 X10(3)/MCL (ref 0.1–1.3)
MONOCYTES NFR BLD AUTO: 3.1 %
NEUTROPHILS # BLD AUTO: 5.97 X10(3)/MCL (ref 2.1–9.2)
NEUTROPHILS NFR BLD AUTO: 83.7 %
NRBC BLD AUTO-RTO: 0.4 %
PHOSPHATE SERPL-MCNC: 3.1 MG/DL (ref 2.3–4.7)
PLATELET # BLD AUTO: 112 X10(3)/MCL (ref 130–400)
PMV BLD AUTO: 12 FL (ref 7.4–10.4)
POCT GLUCOSE: 96 MG/DL (ref 70–110)
POCT GLUCOSE: 98 MG/DL (ref 70–110)
POTASSIUM SERPL-SCNC: 4.1 MMOL/L (ref 3.5–5.1)
RBC # BLD AUTO: 4.26 X10(6)/MCL (ref 4.2–5.4)
SODIUM SERPL-SCNC: 140 MMOL/L (ref 136–145)
T4 FREE SERPL-MCNC: 1.18 NG/DL (ref 0.7–1.48)
TROPONIN I SERPL-MCNC: 0.05 NG/ML (ref 0–0.04)
WBC # BLD AUTO: 7.13 X10(3)/MCL (ref 4.5–11.5)

## 2025-05-09 PROCEDURE — 87040 BLOOD CULTURE FOR BACTERIA: CPT | Performed by: EMERGENCY MEDICINE

## 2025-05-09 PROCEDURE — 80048 BASIC METABOLIC PNL TOTAL CA: CPT | Performed by: INTERNAL MEDICINE

## 2025-05-09 PROCEDURE — 83735 ASSAY OF MAGNESIUM: CPT | Performed by: INTERNAL MEDICINE

## 2025-05-09 PROCEDURE — 25000003 PHARM REV CODE 250: Performed by: NURSE PRACTITIONER

## 2025-05-09 PROCEDURE — 36569 INSJ PICC 5 YR+ W/O IMAGING: CPT

## 2025-05-09 PROCEDURE — 83605 ASSAY OF LACTIC ACID: CPT | Performed by: NURSE PRACTITIONER

## 2025-05-09 PROCEDURE — 02HV33Z INSERTION OF INFUSION DEVICE INTO SUPERIOR VENA CAVA, PERCUTANEOUS APPROACH: ICD-10-PCS | Performed by: RADIOLOGY

## 2025-05-09 PROCEDURE — 21400001 HC TELEMETRY ROOM

## 2025-05-09 PROCEDURE — 85025 COMPLETE CBC W/AUTO DIFF WBC: CPT | Performed by: INTERNAL MEDICINE

## 2025-05-09 PROCEDURE — 84439 ASSAY OF FREE THYROXINE: CPT | Performed by: INTERNAL MEDICINE

## 2025-05-09 PROCEDURE — 84100 ASSAY OF PHOSPHORUS: CPT | Performed by: INTERNAL MEDICINE

## 2025-05-09 PROCEDURE — 36415 COLL VENOUS BLD VENIPUNCTURE: CPT | Performed by: EMERGENCY MEDICINE

## 2025-05-09 PROCEDURE — 63600175 PHARM REV CODE 636 W HCPCS: Performed by: NURSE PRACTITIONER

## 2025-05-09 PROCEDURE — C1751 CATH, INF, PER/CENT/MIDLINE: HCPCS

## 2025-05-09 PROCEDURE — 84484 ASSAY OF TROPONIN QUANT: CPT | Performed by: NURSE PRACTITIONER

## 2025-05-09 RX ORDER — SODIUM CHLORIDE 0.9 % (FLUSH) 0.9 %
10 SYRINGE (ML) INJECTION EVERY 12 HOURS PRN
Status: DISCONTINUED | OUTPATIENT
Start: 2025-05-09 | End: 2025-05-25 | Stop reason: HOSPADM

## 2025-05-09 RX ORDER — MAGNESIUM SULFATE HEPTAHYDRATE 40 MG/ML
2 INJECTION, SOLUTION INTRAVENOUS ONCE
Status: COMPLETED | OUTPATIENT
Start: 2025-05-09 | End: 2025-05-09

## 2025-05-09 RX ADMIN — MUPIROCIN: 20 OINTMENT TOPICAL at 08:05

## 2025-05-09 RX ADMIN — MAGNESIUM SULFATE HEPTAHYDRATE 2 G: 40 INJECTION, SOLUTION INTRAVENOUS at 12:05

## 2025-05-09 RX ADMIN — FUROSEMIDE 10 MG/HR: 10 INJECTION, SOLUTION INTRAMUSCULAR; INTRAVENOUS at 09:05

## 2025-05-09 RX ADMIN — ENOXAPARIN SODIUM 70 MG: 80 INJECTION SUBCUTANEOUS at 09:05

## 2025-05-09 RX ADMIN — MUPIROCIN: 20 OINTMENT TOPICAL at 09:05

## 2025-05-09 NOTE — PROGRESS NOTES
Inpatient Nutrition Assessment    Admit Date: 5/8/2025   Total duration of encounter: 1 day   Patient Age: 74 y.o.    Nutrition Recommendation/Prescription     Continue oral diet as tolerated; Diet Heart Healthy Standard Tray     Communication of Recommendations: reviewed with patient    Nutrition Assessment     Malnutrition Assessment/Nutrition-Focused Physical Exam       Malnutrition Level: other (see comments) (Unable to assess) (05/09/25 1355)     Weight Loss (Malnutrition): other (see comments) (Unable to assess) (05/09/25 1355)                                         Fluid Accumulation (Malnutrition): moderate to severe (05/09/25 1355)        A minimum of two characteristics is recommended for diagnosis of either severe or non-severe malnutrition.    Chart Review    Reason Seen: continuous nutrition monitoring    Malnutrition Screening Tool Results   Have you recently lost weight without trying?: Yes: 2-13 lbs  Have you been eating poorly because of a decreased appetite?: No   MST Score: 1   Diagnosis:  Acute on chronic HFrEF 25%-decompensated  Valvular heart disease-severe MR, moderate TR  Impending cardiogenic shock  Possible urinary tract infection-asymptomatic  Subclinical hypothyroidism    Relevant Medical History: breast cancer status post left mastectomy and status post chemo now and remission, chronic systolic heart failure, diabetes mellitus, CKD 3, hemorrhoids, celiac artery stenosis severe, pulmonary embolism on Eliquis.     Scheduled Medications:  enoxparin, 1 mg/kg, Q24H (prophylaxis, 1700)  magnesium sulfate 2 g IVPB, 2 g, Once  mupirocin, , BID    Continuous Infusions:  DOBUTamine IV infusion (non-titrating), Last Rate: 5 mcg/kg/min (05/09/25 1218)  furosemide (LASIX) 2 mg/mL continuous infusion (non-titrating), Last Rate: 10 mg/hr (05/09/25 1219)    PRN Medications:  acetaminophen, 1,000 mg, Q8H PRN  acetaminophen, 650 mg, Q4H PRN  aluminum-magnesium hydroxide-simethicone, 30 mL, QID  "PRN  bisacodyL, 10 mg, Daily PRN  dextrose 50%, 12.5 g, PRN  dextrose 50%, 25 g, PRN  glucagon (human recombinant), 1 mg, PRN  glucose, 16 g, PRN  glucose, 24 g, PRN  melatonin, 6 mg, Nightly PRN  polyethylene glycol, 17 g, BID PRN  sodium chloride 0.9%, 10 mL, PRN  sodium chloride 0.9%, 10 mL, Q12H PRN    Calorie Containing IV Medications: no significant kcals from medications at this time    Recent Labs   Lab 05/08/25  0218 05/08/25  0618 05/08/25  1539 05/09/25  0351    138  --  140   K 3.6 2.6* 3.8 4.1   CALCIUM 8.3* 8.0*  --  7.9*   PHOS  --   --   --  3.1   MG  --  1.80  --  1.80    100  --  103   CO2 17* 23  --  19*   BUN 34.4* 34.0*  --  35.3*   CREATININE 1.71* 1.78*  --  1.64*   EGFRNORACEVR 32 30  --  33   GLU 87 138*  --  112   BILITOT 4.0* 3.9*  --   --    ALKPHOS 126 107  --   --    ALT 35 30  --   --    AST 28 23  --   --    ALBUMIN 3.0* 2.7*  --   --    WBC 6.71 8.46  --  7.13   HGB 15.2 13.4  --  13.8   HCT 47.2* 40.8  --  41.7     Nutrition Orders:  Diet Heart Healthy Standard Tray      Appetite/Oral Intake: good/50-75% of meals  Factors Affecting Nutritional Intake: none identified  Social Needs Impacting Access to Food: none identified  Food/Jew/Cultural Preferences: none reported  Food Allergies: no known food allergies  Last Bowel Movement: 05/08/25  Wound(s):      Comments    5/9/25 Pt tolerating oral diet, weight fluctuations noted in EMR, possibly due to fluid and/or error in weights, will monitor.    Anthropometrics    Height: 5' 4.02" (162.6 cm), Height Method: Stated  Last Weight: 67.6 kg (149 lb) (05/08/25 0103),    BMI (Calculated): 25.6  BMI Classification: overweight (BMI 25-29.9)     Ideal Body Weight (IBW), Female: 120.1 lb     % Ideal Body Weight, Female (lb): 124.17 %                             Usual Weight Provided By: EMR weight history    Wt Readings from Last 5 Encounters:   05/08/25 67.6 kg (149 lb)   03/15/25 81.6 kg (179 lb 14.3 oz)   03/13/25 81.9 kg " (180 lb 8.9 oz)   01/19/25 59 kg (130 lb)   01/25/24 59 kg (130 lb)     Weight Change(s) Since Admission:   Wt Readings from Last 1 Encounters:   05/08/25 0103 67.6 kg (149 lb)   Admit Weight: 67.6 kg (149 lb) (05/08/25 0103),      Estimated Needs    Weight Used For Calorie Calculations: 67.6 kg (149 lb 0.5 oz)  Energy Calorie Requirements (kcal): 1400 kcal (1.2 stress factor)  Energy Need Method: [place]-St Jeor  Weight Used For Protein Calculations: 67.6 kg (149 lb 0.5 oz)  Protein Requirements: 74gm (1.1 gm/kg)  Fluid Requirements (mL): 1400 ml (1 ml/kcal)        Enteral Nutrition     Patient not receiving enteral nutrition at this time.    Parenteral Nutrition     Patient not receiving parenteral nutrition support at this time.    Evaluation of Received Nutrient Intake    Calories: meeting estimated needs  Protein: meeting estimated needs    Patient Education     Not applicable.    Nutrition Diagnosis     PES: Unintended weight gain related to acute illness as evidenced by moderate to severe lower extremity and upper extremity edema. (new)     PES:            Nutrition Interventions     Intervention(s): general/healthful diet  Intervention(s):      Goal: Consume % of meals/snacks by follow-up. (new)      Nutrition Goals & Monitoring     Dietitian will monitor: food and beverage intake and weight change  Discharge planning: resume home regimen  Nutrition Risk/Follow-Up: dietitian will follow-up one time per week   Please consult if re-assessment needed sooner.

## 2025-05-09 NOTE — AI DETERIORATION ALERT
Artificial Intelligence Notification  [facility]  [address]  [place] [address]  Phone: [phone]    This documentation was triggered by an Artificial Intelligence Notification:    Admit Date: 2025   LOS: 0  Code Status: Full Code  : 1950  Age: 74 y.o.  Weight:   Wt Readings from Last 1 Encounters:   25 67.6 kg (149 lb)        Sex: female  Bed: [address] [place] [address]  MRN: [id number]  Attending Physician: Reyes, Thairy G, DO     Date of Alert: 2025  Time AI Alert Received:             Vitals:    25 2245   BP: 90/60   Pulse:    Resp:    Temp:      SpO2: 97 %      Artificial Intelligence alert discussed with Provider:     Name:   Date/Time of Provider Notification:      Patient Condition: Pt admitted with edema and swelling of BLE and right arm. CT neg for PE. PT 80/67 Map 71.

## 2025-05-09 NOTE — PROGRESS NOTES
[facility]    Cardiology  Progress Note    Patient Name: Laura Jacobs  MRN: [id number]  Admission Date: 5/8/2025  Hospital Length of Stay: 1 days  Code Status: Full Code   Attending Provider: Tevin Soliz MD   Consulting Provider: MACHELLE Zimmerman  Primary Care Physician: Ruben Brooks Sr., MD  Principal Problem:<principal problem not specified>    Patient information was obtained from patient, past medical records, ER records, and primary team.     Subjective:   Chief Complaint/Reason for Consult: Concern for Cardiogenic Shock    HPI:   Ms. Jacobs is a 74 year old female, known to Dr. Washington, who presented to the hospital with bilateral lower extremity edema, pain, and SOB. Patient with history of NICMO. Noted Significant MR. Lab work significant for creatinine 1.7, bicarb 17, BNP 6700, troponin 0.06, lactic acid 6.0. Urinalysis consistent with UTI. BP initially marginal, but has improved. On Eliquis for history of DVT/PE. Admitted to Hospital Medicine Team. [facility]IS consulted for cardiac evaluation/management due to concern for impending shock.    Hospital Course:  5.9.25: NAD Noted. On RA, Lying flat in no distress. Marginal diuresis overnight. BP Systolic low normal. SR with intermittent bouts of ST.     PMH: Hypertension/HHD, Dyslipidemia, Palpitations, MO, Breast Cancer, CP, SOB/ROMO, Pulmonary Hypertension, GONZALEZ/CPAP, DVT/PE (Eliquis), OA, CVI, VHD/MR-TR  PSH: Hysterectomy, Pacemaker, Fumur David Placement, Joint Replacement, Mastectomy  Family History: Father- Brain Aneurysm, Mother- Cancer, Sister- Hypertension  Social History: Tobacco- Negative, Alcohol- Negative, Substance Abuse- Negative     Previous Cardiac Diagnostics:   Venous Doppler (5.8.25):  Negative for deep and superficial vein thrombosis in bilateral lower extremities.    Echocardiogram (3.15.25):  Left Ventricle: The left ventricle is dilated. Wall is thinner than normal. Severe global hypokinesis  present. There is severely reduced systolic function with a visually estimated ejection fraction of 25 - 30%.  Right Ventricle: Right ventricular enlargement.  Left Atrium: Severely dilated Agitated saline study of the atrial septum is negative, suggesting absence of intracardiac shunt at the atrial level.  Right Atrium: Right atrium is severely dilated.  Mitral Valve: Mildly thickened leaflets. There is severe regurgitation.  Tricuspid Valve: There is moderate regurgitation.    Echocardiogram (1.20.25):  Left Ventricle: The left ventricle is dilated. Normal wall thickness. Global hypokinesis present. There is moderately reduced systolic function with a visually estimated ejection fraction of 35 - 40%.  Right Ventricle: Normal right ventricular cavity size. Systolic function is normal.  Left Atrium: Left atrium is mildly dilated.  Right Atrium: Right atrium is mildly dilated.  Aortic Valve: The aortic valve is a trileaflet valve. There is mild aortic valve sclerosis.  Mitral Valve: There is mitral annular calcification present. There is moderate to severe regurgitation.  Tricuspid Valve: There is mild regurgitation.  Pulmonic Valve: There is mild regurgitation.  IVC/SVC: Normal venous pressure at 3 mmHg.  Pericardium: There is a trivial effusion adjacent to the right atrium. No indication of cardiac tamponade.     Echocardiogram (3.20.24):  The study quality is average.   The left ventricle is normal in size. Global left ventricular systolic function is normal. The left ventricular ejection fraction is 55%. The left ventricle diastolic function is impaired (Grade I) with normal left atrial pressure.   Mild calcification of the aortic valve is noted with adequate cuspal excursion.  Mild mitral annular calcification is noted.   Mild (1+) mitral, tricuspid, and pulmonic regurgitation.   The pulmonary artery systolic pressure is 45 mmHg. Evidence of pulmonary hypertension is noted.      Carotid US (11.22.23):  The right  internal carotid artery demonstrated no hemodynamically significant stenosis.  There is no substantial right side carotid plaque identified.  The left internal carotid artery demonstrated no hemodynamically significant stenosis.  There is a minimal amount of plaque in the left carotid bulb.  The right vertebral artery is patent with antegrade flow.  The left vertebral artery is patent with antegrade flow.     AMANDA (9.14.22):  LV systolic function, LVEF 30-35%.  Severe global hypokinesis  Mild Tricuspid regurgitation  Moderate to severe Mitral regurgitation  Mild aortic regurgitation.  There is Lambl's excrescences on aortic leaflets that is benign fibrous structure.   No evidence of infection, vegetation or abscess     SICD Placement (3.25.21):  SICD Placement Re: Primary Prevention of SCD.      LHC (1.4.21):  Left main coronary artery normal   Left anterior descending artery normal   Left circumflex artery codominant normal   Right coronary artery codominant normal   Left ventricle dilated with severe left ventricular systolic   dysfunction ejection fraction less than 30%   SUMMARY : Nonischemic dilated cardiomyopathy with severe left ventricular systolic dysfunction.      ETT (6.9.20):  Stress EKG is normal.   Maximal exercise treadmill test (MPHR : 97 %).  The functional capacity is poor (4.6 METs).  The heart rate recovery is normal.   The study quality is below average.      Venogram (10.17.18):  No Critical Venous Compression Noted.     Review of patient's allergies indicates:   Allergen Reactions    Sulfa (sulfonamide antibiotics)      Patient unsure if allergic to Sulfa     No current facility-administered medications on file prior to encounter.     Current Outpatient Medications on File Prior to Encounter   Medication Sig    anastrozole (ARIMIDEX) 1 mg Tab Take 1 tablet by mouth once daily.    apixaban (ELIQUIS) 5 mg Tab Take 5 mg by mouth 2 (two) times daily.    aspirin (ECOTRIN) 81 MG EC tablet Take 1  tablet (81 mg total) by mouth once daily.    cyproheptadine (PERIACTIN) 4 mg tablet Take 1 tablet by mouth 2 (two) times daily.    dexlansoprazole (DEXILANT) 60 mg capsule Take 60 mg by mouth once daily.    famotidine (PEPCID) 20 MG tablet Take 1 tablet (20 mg total) by mouth once daily.    furosemide (LASIX) 20 MG tablet Take 1 tablet (20 mg total) by mouth once daily. (Patient taking differently: Take 40 mg by mouth once daily.)    metoprolol succinate (TOPROL-XL) 25 MG 24 hr tablet Take 25 mg by mouth.    pravastatin (PRAVACHOL) 20 MG tablet Take 1 tablet (20 mg total) by mouth every evening.     Review of Systems   Respiratory:  Negative for chest tightness and shortness of breath.      Objective:     Vital Signs (Most Recent):  Temp: 97.6 °F (36.4 °C) (05/09/25 0337)  Pulse: 91 (05/09/25 0956)  Resp: 18 (05/09/25 0337)  BP: 96/68 (05/09/25 0956)  SpO2: 96 % (05/09/25 0900) Vital Signs (24h Range):  Temp:  [97.3 °F (36.3 °C)-97.9 °F (36.6 °C)] 97.6 °F (36.4 °C)  Pulse:  [] 91  Resp:  [18-19] 18  SpO2:  [96 %-98 %] 96 %  BP: ()/(55-72) 96/68   Weight: 67.6 kg (149 lb)  Body mass index is 25.58 kg/m².  SpO2: 96 %       Intake/Output Summary (Last 24 hours) at 5/9/2025 1142  Last data filed at 5/9/2025 1008  Gross per 24 hour   Intake 50 ml   Output 502 ml   Net -452 ml     Lines/Drains/Airways       Peripherally Inserted Central Catheter Line  Duration             PICC Double Lumen 05/09/25 0853 right brachial <1 day              Drain  Duration                  Urethral Catheter 05/08/25 1601 <1 day              Peripheral Intravenous Line  Duration                  Peripheral IV - Single Lumen 05/08/25 0255 18 G Anterior;Distal;Right Upper Arm 1 day                  Significant Labs:   Chemistries:   Recent Labs   Lab 05/08/25  0218 05/08/25  0618 05/08/25  1539 05/09/25  0351    138  --  140   K 3.6 2.6* 3.8 4.1    100  --  103   CO2 17* 23  --  19*   BUN 34.4* 34.0*  --  35.3*  "  CREATININE 1.71* 1.78*  --  1.64*   CALCIUM 8.3* 8.0*  --  7.9*   PROT 6.7 5.6*  --   --    BILITOT 4.0* 3.9*  --   --    ALKPHOS 126 107  --   --    ALT 35 30  --   --    AST 28 23  --   --    MG  --  1.80  --  1.80   PHOS  --   --   --  3.1   TROPONINI 0.063*  --  0.065* 0.047*        CBC/Anemia Labs: Coags:    Recent Labs   Lab 05/08/25  0218 05/08/25  0618 05/09/25  0351   WBC 6.71 8.46 7.13   HGB 15.2 13.4 13.8   HCT 47.2* 40.8 41.7   * 110* 112*   MCV 99.4* 97.4* 97.9*   RDW 21.4* 21.0* 21.7*    No results for input(s): "PT", "INR", "APTT" in the last 168 hours.     Significant Imaging:  Imaging Results              CTA Chest Non-Coronary (PE Studies) (Final result)  Result time 05/08/25 07:07:36      Final result by Tiffani Elmore MD (05/08/25 07:07:36)                   Impression:      1. No evidence of pulmonary embolus  2. Cardiomegaly      Electronically signed by: Tiffani Elmore  Date:    05/08/2025  Time:    07:07               Narrative:    EXAMINATION:  CTA CHEST NON CORONARY (PE STUDIES)    CLINICAL HISTORY:  Pulmonary embolism (PE) suspected, unknown D-dimer;    TECHNIQUE:  Helically acquired images with axial, sagittal and coronal reformations were obtained from the thoracic inlet to the lung bases followingthe IV administration of contrast.  CTA timed for evaluation of the pulmonary arteries.  MIP images were performed.    Automated tube current modulation, weight-based exposure dosing, and/or iterative reconstruction technique utilized to reach lowest reasonably achievable exposure rate.    DLP: 346 mGy*cm    COMPARISON:  CT chest 03/15/2025    FINDINGS:  BASE OF NECK: Atrophic versus surgically absent right lobe of the thyroid.    AORTA: Left-sided aortic arch.  No aneurysm and no significant atherosclerosis    PULMONARY VASCULATURE: Pulmonary arteries enhance normally.  No evidence of pulmonary embolus.    HEART: Cardiomegaly.    DUONG/MEDIASTINUM: No enlarged lymph nodes by " size criteria.    AIRWAYS: Patent.    LUNGS/PLEURA: Respiratory motion artifact.  Left basilar scarring versus atelectasis.    UPPER ABDOMEN: Reflux of contrast into the hepatic veins.    THORACIC SOFT TISSUES: Body wall edema.  Parasternal defibrillator with left chest wall generator.  Right subclavian port with tip obscured by dense contrast.    BONES: No acute fracture. No suspicious lytic or sclerotic lesions.                        Preliminary result by Srinivasa Veloz Jr., MD (05/08/25 03:36:30)                   Impression:    1. Moderate stable cardiomegaly is seen. There is contrast reflux into the hepatic IVC suggestive of cardiac disease/dysfunction.  2. No filling defects are seen in the pulmonary arteries to suggest pulmonary embolus.  3. Details and other findings as discussed above.               Narrative:    START OF REPORT:  Technique: CT Scan of the chest was performed with intravenous contrast with direct axial images as well as sagittal and coronal reconstruction images.    Dosage Information: Automated Exposure Control was utilized.    Comparison: Comparison is with study dated 2025-01-19 13:24:45.    Clinical History: Pulmonary embolism (PE) suspected, unknown D-dimer.    Findings:  Soft Tissues: There is extensive stranding of the subcutaneous fat suggesting an element of anasarca possibly related to cardiac disease.  Neck: The visualized soft tissues of the neck appear unremarkable.  Heart: Moderate stable cardiomegaly is seen. There is contrast reflux into the hepatic IVC suggestive of cardiac disease/dysfunction.  Aorta: Unremarkable appearing aorta. No aortic dissection or aneurysm is seen.  Pulmonary Arteries: No filling defects are seen in the pulmonary arteries to suggest pulmonary embolus.  Lungs: Moderate widely scattered streaky linear opacity is seen predominantly in the dependent portions at the lung bases consistent with nonspecific dependent changes scarring and  subsegmental atelectasis. There is stable appearing focal pleural thickening with traction bronchiectasis in the lingula (Series 4 Image 43). No focal infiltrate or consolidation is seen.  Bony Structures:  Spine: Subtle spondylolytic changes are seen in the thoracic spine.  Ribs: The ribs appear unremarkable.  Abdomen: The visualized upper abdominal organs appear unremarkable.                                         X-Ray Chest AP Portable (Final result)  Result time 05/08/25 09:05:14      Final result by Shubham Jain MD (05/08/25 09:05:14)                   Impression:      Congestive heart failure.      Electronically signed by: Shubham Jain  Date:    05/08/2025  Time:    09:05               Narrative:    EXAMINATION:  XR CHEST AP PORTABLE    CLINICAL HISTORY:  CHF;    TECHNIQUE:  One    COMPARISON:  March 16, 225.    FINDINGS:  Cardiopericardial silhouette enlarged appearance is similar.  Lungs are remarkable for mild to moderate congestive failure ground-glass and reticulonodular opacities.  No significant fluid within the pleural spaces.  No pneumothorax.  Left chest cardiac device and right chest implanted latonia catheter are in similar location.                                    Telemetry:  SR    Physical Exam  Vitals and nursing note reviewed.   Constitutional:       General: She is not in acute distress.     Appearance: She is obese.   Cardiovascular:      Rate and Rhythm: Normal rate and regular rhythm.      Pulses:           Dorsalis pedis pulses are detected w/ Doppler on the right side and detected w/ Doppler on the left side.      Heart sounds: Murmur heard.   Pulmonary:      Effort: Pulmonary effort is normal. No respiratory distress.      Comments: RA  Musculoskeletal:      Comments: BLE Warm/Diffuse Pitting Lower Extremity Edema up to thighs bilaterally.   Skin:     General: Skin is dry.   Neurological:      Mental Status: She is alert. Mental status is at baseline.       Home Medications:    Medications Ordered Prior to Encounter[1]  Current Schedule Inpatient Medications:   enoxparin  1 mg/kg Subcutaneous Q24H (prophylaxis, 1700)    mupirocin   Nasal BID      DOBUTamine IV infusion (non-titrating)  2.5 mcg/kg/min Intravenous Continuous 2.5 mL/hr at 05/08/25 1454 2.5 mcg/kg/min at 05/08/25 1454    furosemide (LASIX) 2 mg/mL continuous infusion (non-titrating)  5 mg/hr Intravenous Continuous 2.5 mL/hr at 05/08/25 1827 5 mg/hr at 05/08/25 1827     Assessment:   Acute on Chronic Systolic HF    - EF 25-30% (Echo 3.15.25)    - Lactic Acidosis  Nonischemic Cardiomyopathy    - EF 35-40% (Echo 1/2025)    - Status Post SICD    - Normal Coronaries in 2021/ETT (6/202): Normal  NSTEMI Type II due to Decompensated HF/Fluid Overload  Valvular Heart Disease    - MR: Severe, TR: Moderate  Hypertension/Hypertensive Heart Disease (BP Now Low Normal Range- Systolic 's)  Pulmonary Hypertension  Dyslipidemia    - On Statin  Chronic VI/Edema- Lymphedema  GONZALEZ/CPAP  History of Breast Cancer/Mastectomy  History of DVT/PE (Previously Treated with Xarelto)- Now on Eliquis  OA  UTI  Thrombocytopenia  Electrolyte Derangements- Hypokalemia    Plan:   Increase Dobutamine to 5 Mcg/Kg/Min Set Rate  Increase Lasix to 10 Mg/Hour Set Rate  Ensure Accurate I/O & Daily Weights  Replete Mag (Done)  Holding HF Medications for now  Continue FD Lovenox   Routine Labs in AM    MACHELLE Zimmerman  Cardiology  [facility]   Physician addendum:  I have seen and examined this patient as a split-shared visit with the MAGDY d/t complicated medical management of above problems written in assessment and high acuity requiring physician expertise in medical decision-making. I performed the substantive portion of the history and exam. Above medical decision-making is also formulated by me.    Cardiovascular exam:  S1, S2  Lungs:  fine crackles at bases.  Extremities:  + edema bilaterally    Plan:  Increase dobutamine and  Lasix doses.  Rest  Medications as above.  Continue supportive therapy.     Deepak Banuelos MD  Cardiologist         [1]   No current facility-administered medications on file prior to encounter.     Current Outpatient Medications on File Prior to Encounter   Medication Sig Dispense Refill    anastrozole (ARIMIDEX) 1 mg Tab Take 1 tablet by mouth once daily.      apixaban (ELIQUIS) 5 mg Tab Take 5 mg by mouth 2 (two) times daily.      aspirin (ECOTRIN) 81 MG EC tablet Take 1 tablet (81 mg total) by mouth once daily. 90 tablet 3    cyproheptadine (PERIACTIN) 4 mg tablet Take 1 tablet by mouth 2 (two) times daily.      dexlansoprazole (DEXILANT) 60 mg capsule Take 60 mg by mouth once daily.      famotidine (PEPCID) 20 MG tablet Take 1 tablet (20 mg total) by mouth once daily. 30 tablet 11    furosemide (LASIX) 20 MG tablet Take 1 tablet (20 mg total) by mouth once daily. (Patient taking differently: Take 40 mg by mouth once daily.) 30 tablet 11    metoprolol succinate (TOPROL-XL) 25 MG 24 hr tablet Take 25 mg by mouth.      pravastatin (PRAVACHOL) 20 MG tablet Take 1 tablet (20 mg total) by mouth every evening. 90 tablet 3

## 2025-05-09 NOTE — PLAN OF CARE
Problem: Adult Inpatient Plan of Care  Goal: Plan of Care Review  Outcome: Not Progressing  Goal: Patient-Specific Goal (Individualized)  Outcome: Not Progressing  Goal: Absence of Hospital-Acquired Illness or Injury  Outcome: Not Progressing  Goal: Optimal Comfort and Wellbeing  Outcome: Not Progressing  Goal: Readiness for Transition of Care  Outcome: Not Progressing     Problem: Infection  Goal: Absence of Infection Signs and Symptoms  Outcome: Not Progressing     Problem: Skin Injury Risk Increased  Goal: Skin Health and Integrity  Outcome: Not Progressing     Problem: Fall Injury Risk  Goal: Absence of Fall and Fall-Related Injury  Outcome: Not Progressing

## 2025-05-09 NOTE — PROCEDURES
"Laura Jacobs is a 74 y.o. female patient.    Temp: 97.6 °F (36.4 °C) (05/09/25 0337)  Pulse: 75 (05/09/25 0400)  Resp: 18 (05/09/25 0337)  BP: (!) 86/55 (05/09/25 0337)  SpO2: 96 % (05/09/25 0449)  Weight: 67.6 kg (149 lb) (05/08/25 0103)  Height: 5' 4" (162.6 cm) (05/08/25 0103)    PICC  Time out: Immediately prior to procedure a time out was called to verify the correct patient, procedure, equipment, support staff and site/side marked as required  Indications: med administration  Preparation: skin prepped with ChloraPrep  Skin prep agent dried: skin prep agent completely dried prior to procedure  Sterile barriers: all five maximum sterile barriers used - cap, mask, sterile gown, sterile gloves, and large sterile sheet  Hand hygiene: hand hygiene performed prior to central venous catheter insertion  Location details: right brachial  Catheter type: double lumen  Catheter size: 5 Fr  Catheter Length: 34cm    Ultrasound guidance: yes  Needle advanced into vessel with real time Ultrasound guidance.  Guidewire confirmed in vessel.  Sterile sheath used.  Number of attempts: 1  Post-procedure: blood return through all ports and sterile dressing applied    Assessment: placement verified by x-ray          Name js  5/9/2025    "

## 2025-05-09 NOTE — PROGRESS NOTES
[facility] [address] MEDICINE ~ PROGRESS NOTE        CHIEF COMPLAINT   Hospital follow up    HOSPITAL COURSE   74-year-old female with a past known medical history of breast cancer status post left mastectomy and status post chemo now and remission, chronic systolic heart failure, diabetes mellitus, CKD 3, hemorrhoids, celiac artery stenosis severe, pulmonary embolism on Eliquis.    She presented to the emergency department with complaints of bilateral leg swelling she reports of 3 to 4 weeks, also intermittent shortness a breath but unable to elaborate if it is orthopnea related, reports she sleeps with pillows.  No chest pain, no abdominal complaints.  In the emergency department her initial blood pressure was hypertensive systolic 164 however since then has been 90s systolic.  Lab work significant for creatinine 1.7, bicarb 17, BNP 6700, troponin 0.06, lactic acid 6.0.  Urinalysis consistent with UTI.  Patient received vancomycin and Zosyn while in the emergency department.  She also required a 500 cc bolus due to hypotension in the ER.    After being admitted patient had persistent hypotension with some lactic acidosis, discuss with Cardiology and she was started on dobutamine drip along with Lasix drip.    Today  Her legs do have some skin wrinkling but urine output is not recorded.  Blood pressure is remaining on the low side with systolic in the 80s and 90s.        OBJECTIVE/PHYSICAL EXAM     VITAL SIGNS (MOST RECENT):  Temp: 97.6 °F (36.4 °C) (05/09/25 0337)  Pulse: 75 (05/09/25 0400)  Resp: 18 (05/09/25 0337)  BP: (!) 86/55 (05/09/25 0337)  SpO2: 96 % (05/09/25 0449) VITAL SIGNS (24 HOUR RANGE):  Temp:  [97.3 °F (36.3 °C)-97.9 °F (36.6 °C)] 97.6 °F (36.4 °C)  Pulse:  [] 75  Resp:  [18-19] 18  SpO2:  [96 %-98 %] 96 %  BP: ()/(53-72) 86/55   GENERAL: In no acute distress, afebrile  HEENT:  JVD  CHEST: Clear to auscultation bilaterally  HEART: S1, S2, no  appreciable murmur  ABDOMEN: Soft, nontender, BS +  MSK: Warm, 2+ bilateral lower extremity edema, no clubbing or cyanosis  NEUROLOGIC: Alert and oriented x4, moving all extremities with good strength   INTEGUMENTARY:  PSYCHIATRY:        ASSESSMENT/PLAN   Acute on chronic HFrEF 25%-decompensated  Valvular heart disease-severe MR, moderate TR  Impending cardiogenic shock  Possible urinary tract infection-asymptomatic  Subclinical hypothyroidism     History of: As listed above        Cardiology following.  Currently on dobutamine and Lasix drip.  Likely needs inpatient mitral regurgitation evaluation.  Discontinue antibiotics as her urine cultures negative  I suspect the source of hypotension is her systolic heart failure and valvular issues.  Okay for PICC if needed     DVT prophylaxis:  Eliquis     Critical care diagnosis:  Impending cardiogenic shock  Critical care time spent:  45 minutes  Intervention for the above diagnosis was given to prevent further deterioration/decompensation of the patient condition.    DVT prophylaxis:     Anticipated discharge and disposition:   __________________________________________________________________________    NUTRITIONAL STATUS     Patient meets ASPEN criteria for   malnutrition of   per RD assessment as evidenced by:                       A minimum of two characteristics is recommended for diagnosis of either severe or non-severe malnutrition.     LABS/MICRO/MEDS/DIAGNOSTICS       LABS  Recent Labs     05/08/25  0618 05/08/25  1539 05/09/25  0351     --  140   K 2.6*   < > 4.1   CO2 23  --  19*   BUN 34.0*  --  35.3*   CREATININE 1.78*  --  1.64*   CALCIUM 8.0*  --  7.9*   ALKPHOS 107  --   --    AST 23  --   --    ALT 30  --   --    ALBUMIN 2.7*  --   --     < > = values in this interval not displayed.     Recent Labs     05/09/25  0351   WBC 7.13   RBC 4.26   HCT 41.7   MCV 97.9*   *       MICROBIOLOGY  Microbiology Results (last 7 days)       Procedure  Component Value Units Date/Time    Urine culture [[id number]] Collected: 05/08/25 0810    Order Status: Completed Specimen: Urine, Clean Catch Updated: 05/09/25 0823     Urine Culture No Growth At 24 Hours    Blood Culture #2 **CANNOT BE ORDERED STAT** [[id number]] Collected: 05/09/25 0400    Order Status: Resulted Specimen: Blood from Hand, Left Updated: 05/09/25 0429    Blood Culture #1 **CANNOT BE ORDERED STAT** [[id number]] Collected: 05/09/25 0351    Order Status: Resulted Specimen: Blood from Wrist, Right Updated: 05/09/25 0357               MEDICATIONS   cefTRIAXone (Rocephin) IV (PEDS and ADULTS)  1 g Intravenous Q24H    enoxparin  1 mg/kg Subcutaneous Q24H (prophylaxis, 1700)    furosemide (LASIX) injection  40 mg Intravenous Once    mupirocin   Nasal BID         INFUSIONS   DOBUTamine IV infusion (non-titrating)  2.5 mcg/kg/min Intravenous Continuous 2.5 mL/hr at 05/08/25 1454 2.5 mcg/kg/min at 05/08/25 1454    furosemide (LASIX) 2 mg/mL continuous infusion (non-titrating)  5 mg/hr Intravenous Continuous 2.5 mL/hr at 05/08/25 1827 5 mg/hr at 05/08/25 1827          DIAGNOSTIC TESTS  CTA Chest Non-Coronary (PE Studies)   Final Result      1. No evidence of pulmonary embolus   2. Cardiomegaly         Electronically signed by: Tiffani Elmore   Date:    05/08/2025   Time:    07:07      X-Ray Chest AP Portable   Final Result      Congestive heart failure.         Electronically signed by: Shubham Jain   Date:    05/08/2025   Time:    09:05           No echocardiogram results found for the past 14 days.         Case related differential diagnoses have been reviewed; assessment and plan has been documented. I have personally reviewed the labs and test results that are currently available; I have reviewed the patients medication list. I have reviewed the consulting providers recommendations. I have reviewed or attempted to review medical records based upon their availability.  All of the patient's and/or family's  questions have been addressed and answered to the best of my ability.  I will continue to monitor closely and make adjustments to medical management as needed.  This document was created using M*Modal Fluency Direct.  Transcription errors may have been made.  Please contact me if any questions may rise regarding documentation to clarify transcription.        Olvin Choudhury MD   Internal Medicine  Department of [facility] Medicine  [facility] [address] [facility] Tele[facility]

## 2025-05-10 LAB
ALBUMIN SERPL-MCNC: 2.4 G/DL (ref 3.4–4.8)
BASOPHILS # BLD AUTO: 0.03 X10(3)/MCL
BASOPHILS NFR BLD AUTO: 0.5 %
BUN SERPL-MCNC: 30.4 MG/DL (ref 9.8–20.1)
CALCIUM SERPL-MCNC: 8 MG/DL (ref 8.4–10.2)
CHLORIDE SERPL-SCNC: 107 MMOL/L (ref 98–107)
CO2 SERPL-SCNC: 22 MMOL/L (ref 23–31)
CREAT SERPL-MCNC: 1.34 MG/DL (ref 0.55–1.02)
EOSINOPHIL # BLD AUTO: 0.13 X10(3)/MCL (ref 0–0.9)
EOSINOPHIL NFR BLD AUTO: 2.3 %
ERYTHROCYTE [DISTWIDTH] IN BLOOD BY AUTOMATED COUNT: 21.7 % (ref 11.5–17)
GFR SERPLBLD CREATININE-BSD FMLA CKD-EPI: 42 ML/MIN/1.73/M2
GLUCOSE SERPL-MCNC: 96 MG/DL (ref 82–115)
HCT VFR BLD AUTO: 40.3 % (ref 37–47)
HGB BLD-MCNC: 13.4 G/DL (ref 12–16)
IMM GRANULOCYTES # BLD AUTO: 0.06 X10(3)/MCL (ref 0–0.04)
IMM GRANULOCYTES NFR BLD AUTO: 1.1 %
LACTATE SERPL-SCNC: 2.3 MMOL/L (ref 0.5–2.2)
LYMPHOCYTES # BLD AUTO: 0.85 X10(3)/MCL (ref 0.6–4.6)
LYMPHOCYTES NFR BLD AUTO: 15 %
MAGNESIUM SERPL-MCNC: 2 MG/DL (ref 1.6–2.6)
MAGNESIUM SERPL-MCNC: 2 MG/DL (ref 1.6–2.6)
MCH RBC QN AUTO: 32.3 PG (ref 27–31)
MCHC RBC AUTO-ENTMCNC: 33.3 G/DL (ref 33–36)
MCV RBC AUTO: 97.1 FL (ref 80–94)
MONOCYTES # BLD AUTO: 0.17 X10(3)/MCL (ref 0.1–1.3)
MONOCYTES NFR BLD AUTO: 3 %
NEUTROPHILS # BLD AUTO: 4.42 X10(3)/MCL (ref 2.1–9.2)
NEUTROPHILS NFR BLD AUTO: 78.1 %
NRBC BLD AUTO-RTO: 0.4 %
PHOSPHATE SERPL-MCNC: 2.9 MG/DL (ref 2.3–4.7)
PLATELET # BLD AUTO: 108 X10(3)/MCL (ref 130–400)
PLATELETS.RETICULATED NFR BLD AUTO: 3.5 % (ref 0.9–11.2)
PMV BLD AUTO: 12.1 FL (ref 7.4–10.4)
POCT GLUCOSE: 95 MG/DL (ref 70–110)
POTASSIUM SERPL-SCNC: 3.4 MMOL/L (ref 3.5–5.1)
POTASSIUM SERPL-SCNC: 4.3 MMOL/L (ref 3.5–5.1)
RBC # BLD AUTO: 4.15 X10(6)/MCL (ref 4.2–5.4)
SODIUM SERPL-SCNC: 141 MMOL/L (ref 136–145)
WBC # BLD AUTO: 5.66 X10(3)/MCL (ref 4.5–11.5)

## 2025-05-10 PROCEDURE — 85025 COMPLETE CBC W/AUTO DIFF WBC: CPT | Performed by: INTERNAL MEDICINE

## 2025-05-10 PROCEDURE — 25000003 PHARM REV CODE 250: Performed by: INTERNAL MEDICINE

## 2025-05-10 PROCEDURE — 83605 ASSAY OF LACTIC ACID: CPT | Performed by: NURSE PRACTITIONER

## 2025-05-10 PROCEDURE — 63600175 PHARM REV CODE 636 W HCPCS: Performed by: NURSE PRACTITIONER

## 2025-05-10 PROCEDURE — 84132 ASSAY OF SERUM POTASSIUM: CPT | Performed by: NURSE PRACTITIONER

## 2025-05-10 PROCEDURE — 25000003 PHARM REV CODE 250: Performed by: STUDENT IN AN ORGANIZED HEALTH CARE EDUCATION/TRAINING PROGRAM

## 2025-05-10 PROCEDURE — 25000003 PHARM REV CODE 250: Performed by: NURSE PRACTITIONER

## 2025-05-10 PROCEDURE — 83735 ASSAY OF MAGNESIUM: CPT | Performed by: INTERNAL MEDICINE

## 2025-05-10 PROCEDURE — 36415 COLL VENOUS BLD VENIPUNCTURE: CPT | Performed by: NURSE PRACTITIONER

## 2025-05-10 PROCEDURE — 21400001 HC TELEMETRY ROOM

## 2025-05-10 PROCEDURE — 80069 RENAL FUNCTION PANEL: CPT | Performed by: INTERNAL MEDICINE

## 2025-05-10 PROCEDURE — 83735 ASSAY OF MAGNESIUM: CPT | Performed by: NURSE PRACTITIONER

## 2025-05-10 RX ORDER — POTASSIUM CHLORIDE 20 MEQ/1
40 TABLET, EXTENDED RELEASE ORAL ONCE
Status: COMPLETED | OUTPATIENT
Start: 2025-05-10 | End: 2025-05-10

## 2025-05-10 RX ADMIN — MUPIROCIN: 20 OINTMENT TOPICAL at 09:05

## 2025-05-10 RX ADMIN — MUPIROCIN: 20 OINTMENT TOPICAL at 08:05

## 2025-05-10 RX ADMIN — POTASSIUM CHLORIDE 40 MEQ: 1500 TABLET, EXTENDED RELEASE ORAL at 11:05

## 2025-05-10 RX ADMIN — ENOXAPARIN SODIUM 70 MG: 80 INJECTION SUBCUTANEOUS at 04:05

## 2025-05-10 RX ADMIN — FUROSEMIDE 10 MG/HR: 10 INJECTION, SOLUTION INTRAMUSCULAR; INTRAVENOUS at 05:05

## 2025-05-10 NOTE — PROGRESS NOTES
"[facility] Hospital Medicine Progress Note        Chief Complaint: Inpatient Follow-up     HPI:   "74-year-old female with a past known medical history of breast cancer status post left mastectomy and status post chemo now and remission, chronic systolic heart failure, diabetes mellitus, CKD 3, hemorrhoids, celiac artery stenosis severe, pulmonary embolism on Eliquis.    She presented to the emergency department with complaints of bilateral leg swelling she reports of 3 to 4 weeks, also intermittent shortness a breath but unable to elaborate if it is orthopnea related, reports she sleeps with pillows.  No chest pain, no abdominal complaints.  In the emergency department her initial blood pressure was hypertensive systolic 164 however since then has been 90s systolic.  Lab work significant for creatinine 1.7, bicarb 17, BNP 6700, troponin 0.06, lactic acid 6.0.  Urinalysis consistent with UTI.  Patient received vancomycin and Zosyn while in the emergency department.  She also required a 500 cc bolus due to hypotension in the ER.     After being admitted patient had persistent hypotension with some lactic acidosis, discuss with Cardiology and she was started on dobutamine drip along with Lasix drip."        Interval Hx:   No acute events reported overnight, patient was seen at bedside this morning she was lying in bed comfortably alert awake, no family members in the room, she reported feeling improving, denied CP, SOB patient has Singh reported good urine output, urine output documented 2.5 L in last 24 hours.  Patient reports tolerating p.o., having BMs.  Patient is on dobutamine, Lasix drip  Labs from this morning noted CBC grossly stable platelet count 108 K, potassium 3.4, BUN to creatinine improving 30.4, 1.34  Low-grade tachycardia  Case was discussed with patient's nurse     Objective/physical exam:  General: In no acute distress, afebrile  Chest: Clear to auscultation " bilaterally  Heart:  Regular, +S1, S2  Abdomen: Soft, nontender  MSK: Warm, lower extremity edema++.,wrinkles noted   Neurologic: Alert and answering appropriately  VITAL SIGNS: 24 HRS MIN & MAX LAST   Temp  Min: 97.6 °F (36.4 °C)  Max: 98.2 °F (36.8 °C) 97.7 °F (36.5 °C)   BP  Min: 96/68  Max: 123/85 104/73   Pulse  Min: 69  Max: 111  (!) 111   Resp  Min: 17  Max: 18 18   SpO2  Min: 92 %  Max: 98 % 98 %     I have reviewed the following labs:  Recent Labs   Lab 05/08/25  0618 05/09/25  0351 05/10/25  0446   WBC 8.46 7.13 5.66   RBC 4.19* 4.26 4.15*   HGB 13.4 13.8 13.4   HCT 40.8 41.7 40.3   MCV 97.4* 97.9* 97.1*   MCH 32.0* 32.4* 32.3*   MCHC 32.8* 33.1 33.3   RDW 21.0* 21.7* 21.7*   * 112* 108*   MPV 12.4* 12.0* 12.1*     Recent Labs   Lab 05/08/25  0218 05/08/25 0618 05/08/25  1539 05/09/25  0351 05/10/25  0446    138  --  140 141   K 3.6 2.6* 3.8 4.1 3.4*    100  --  103 107   CO2 17* 23  --  19* 22*   BUN 34.4* 34.0*  --  35.3* 30.4*   CREATININE 1.71* 1.78*  --  1.64* 1.34*   GLU 87 138*  --  112 96   CALCIUM 8.3* 8.0*  --  7.9* 8.0*   MG  --  1.80  --  1.80 2.00   ALBUMIN 3.0* 2.7*  --   --  2.4*   PROT 6.7 5.6*  --   --   --    ALKPHOS 126 107  --   --   --    ALT 35 30  --   --   --    AST 28 23  --   --   --    BILITOT 4.0* 3.9*  --   --   --      Microbiology Results (last 7 days)       Procedure Component Value Units Date/Time    Blood Culture #1 **CANNOT BE ORDERED STAT** [[id number]]  (Normal) Collected: 05/09/25 0351    Order Status: Completed Specimen: Blood from Wrist, Right Updated: 05/10/25 0501     Blood Culture No Growth At 24 Hours    Blood Culture #2 **CANNOT BE ORDERED STAT** [[id number]]  (Normal) Collected: 05/09/25 0400    Order Status: Completed Specimen: Blood from Hand, Left Updated: 05/10/25 0501     Blood Culture No Growth At 24 Hours    Urine culture [[id number]] Collected: 05/08/25 0810    Order Status: Completed Specimen: Urine, Clean Catch Updated: 05/09/25  0823     Urine Culture No Growth At 24 Hours             See below for Radiology    Assessment/Plan:  Acute on chronic HFrEF 25%-decompensated,Impending cardiogenic shock  Lactic acidosis improving  Mild hypokalemia  Valvular heart disease-severe MR, moderate TR  NSTEMI suspected type 2 due to decompensated heart failure  Possible urinary tract infection-asymptomatic  Thrombocytopenia stable  Subclinical hypothyroidism    Continue to monitor on tele   Cardiology following, Follow [facility] recommendations Patient on dobutamine, Lasix drip   Strict Is&Os, monitor electrolytes, renal function  Replete potassium   Labs in a.m.    VTE prophylaxis:  Lovenox      Anticipated discharge and Disposition:   TBD      Critical care time spent: 35 minutes   Critical care diagnosis:  Decompensated heart failure needing Lasix drip    Portions of this note dictated using EMR integrated voice recognition software, and may be subject to voice recognition errors not corrected at proofreading. Please contact writer for clarification if needed.   _____________________________________________________________________    Malnutrition Status:  Nutrition consulted. Most recent weight and BMI monitored-     Measurements:  Wt Readings from Last 1 Encounters:   05/08/25 67.6 kg (149 lb)   Body mass index is 25.56 kg/m².    Patient has been screened and assessed by RD.    Malnutrition Type:  Context:    Level: other (see comments) (Unable to assess)    Malnutrition Characteristic Summary:  Weight Loss (Malnutrition): other (see comments) (Unable to assess)  Fluid Accumulation (Malnutrition): moderate to severe    Interventions/Recommendations (treatment strategy):        Scheduled Med:   enoxparin  1 mg/kg Subcutaneous Q24H (prophylaxis, 1700)    mupirocin   Nasal BID      Continuous Infusions:   DOBUTamine IV infusion (non-titrating)  5 mcg/kg/min Intravenous Continuous 5.1 mL/hr at 05/09/25 1218 5 mcg/kg/min at 05/09/25 1218    furosemide (LASIX) 2  mg/mL continuous infusion (non-titrating)  10 mg/hr Intravenous Continuous 5 mL/hr at 05/09/25 2146 10 mg/hr at 05/09/25 2146      PRN Meds:    Current Facility-Administered Medications:     acetaminophen, 1,000 mg, Oral, Q8H PRN    acetaminophen, 650 mg, Oral, Q4H PRN    aluminum-magnesium hydroxide-simethicone, 30 mL, Oral, QID PRN    bisacodyL, 10 mg, Rectal, Daily PRN    dextrose 50%, 12.5 g, Intravenous, PRN    dextrose 50%, 25 g, Intravenous, PRN    glucagon (human recombinant), 1 mg, Intramuscular, PRN    glucose, 16 g, Oral, PRN    glucose, 24 g, Oral, PRN    melatonin, 6 mg, Oral, Nightly PRN    polyethylene glycol, 17 g, Oral, BID PRN    sodium chloride 0.9%, 10 mL, Intravenous, PRN    Flushing PICC/Midline Protocol, , , Until Discontinued **AND** sodium chloride 0.9%, 10 mL, Intravenous, Q12H PRN     Radiology:  I have personally reviewed the following imaging and agree with the radiologist.     X-Ray Chest 1 View  Narrative: EXAMINATION:  XR CHEST 1 VIEW    CPT 55306    CLINICAL HISTORY:  picc;    COMPARISON:  May 8, 2025    FINDINGS:  Examination reveals cardiomediastinal silhouette and pleuroparenchymal changes to be essentially unchanged as compared with the previous exam.    Right-sided PICC line has been inserted tip in the region of the superior vena cava  Impression: No interval change.    PICC line insertion    Electronically signed by: Joe Easley  Date:    05/09/2025  Time:    09:52      Kat Rodríguez MD  Department of Hospital Medicine   [facility]   05/10/2025

## 2025-05-10 NOTE — PROGRESS NOTES
"  [facility]    Cardiology  Progress Note    Patient Name: Laura Jacobs  MRN: [id number]  Admission Date: 5/8/2025  Hospital Length of Stay: 2 days  Code Status: Full Code   Attending Provider: Tevin Soliz MD   Consulting Provider: ABDIRAHMAN Servin  Primary Care Physician: Ruben Brooks Sr., MD  Principal Problem:<principal problem not specified>    Patient information was obtained from patient, past medical records, ER records, and primary team.     Subjective:   Chief Complaint/Reason for Consult: Concern for Cardiogenic Shock    HPI: Ms. Jacobs is a 74 year old female, known to Dr. Washington, who presented to the hospital with bilateral lower extremity edema, pain, and SOB. Patient with history of NICMO. Noted Significant MR. Lab work significant for creatinine 1.7, bicarb 17, BNP 6700, troponin 0.06, lactic acid 6.0. Urinalysis consistent with UTI. BP initially marginal, but has improved. On Eliquis for history of DVT/PE. Admitted to Hospital Medicine Team. [facility] consulted for cardiac evaluation/management due to concern for impending shock.    Hospital Course:  5.9.25: NAD Noted. On RA, Lying flat in no distress. Marginal diuresis overnight. BP Systolic low normal. SR with intermittent bouts of ST.   5.10.25: NAD. "I am feeling a little better." Fluid Balance Net Negative 2022mL. ST 100s-110s.     PMH: Hypertension/HHD, Dyslipidemia, Palpitations, MO, Breast Cancer, CP, SOB/ROMO, Pulmonary Hypertension, GONZALEZ/CPAP, DVT/PE (Eliquis), OA, CVI, VHD/MR-TR  PSH: Hysterectomy, Pacemaker, Fumur David Placement, Joint Replacement, Mastectomy  Family History: Father- Brain Aneurysm, Mother- Cancer, Sister- Hypertension  Social History: Tobacco- Negative, Alcohol- Negative, Substance Abuse- Negative     Previous Cardiac Diagnostics:   BLE Venous US 5.8.25:  Negative for deep and superficial vein thrombosis in bilateral lower extremities     Venous Doppler (5.8.25):  Negative for " deep and superficial vein thrombosis in bilateral lower extremities.    Echocardiogram (3.15.25):  Left Ventricle: The left ventricle is dilated. Wall is thinner than normal. Severe global hypokinesis present. There is severely reduced systolic function with a visually estimated ejection fraction of 25 - 30%.  Right Ventricle: Right ventricular enlargement.  Left Atrium: Severely dilated Agitated saline study of the atrial septum is negative, suggesting absence of intracardiac shunt at the atrial level.  Right Atrium: Right atrium is severely dilated.  Mitral Valve: Mildly thickened leaflets. There is severe regurgitation.  Tricuspid Valve: There is moderate regurgitation.    Echocardiogram (1.20.25):  Left Ventricle: The left ventricle is dilated. Normal wall thickness. Global hypokinesis present. There is moderately reduced systolic function with a visually estimated ejection fraction of 35 - 40%.  Right Ventricle: Normal right ventricular cavity size. Systolic function is normal.  Left Atrium: Left atrium is mildly dilated.  Right Atrium: Right atrium is mildly dilated.  Aortic Valve: The aortic valve is a trileaflet valve. There is mild aortic valve sclerosis.  Mitral Valve: There is mitral annular calcification present. There is moderate to severe regurgitation.  Tricuspid Valve: There is mild regurgitation.  Pulmonic Valve: There is mild regurgitation.  IVC/SVC: Normal venous pressure at 3 mmHg.  Pericardium: There is a trivial effusion adjacent to the right atrium. No indication of cardiac tamponade.     Echocardiogram (3.20.24):  The study quality is average.   The left ventricle is normal in size. Global left ventricular systolic function is normal. The left ventricular ejection fraction is 55%. The left ventricle diastolic function is impaired (Grade I) with normal left atrial pressure.   Mild calcification of the aortic valve is noted with adequate cuspal excursion.  Mild mitral annular calcification is  noted.   Mild (1+) mitral, tricuspid, and pulmonic regurgitation.   The pulmonary artery systolic pressure is 45 mmHg. Evidence of pulmonary hypertension is noted.      Carotid US (11.22.23):  The right internal carotid artery demonstrated no hemodynamically significant stenosis.  There is no substantial right side carotid plaque identified.  The left internal carotid artery demonstrated no hemodynamically significant stenosis.  There is a minimal amount of plaque in the left carotid bulb.  The right vertebral artery is patent with antegrade flow.  The left vertebral artery is patent with antegrade flow.     AMANDA (9.14.22):  LV systolic function, LVEF 30-35%.  Severe global hypokinesis  Mild Tricuspid regurgitation  Moderate to severe Mitral regurgitation  Mild aortic regurgitation.  There is Lambl's excrescences on aortic leaflets that is benign fibrous structure.   No evidence of infection, vegetation or abscess     SICD Placement (3.25.21):  SICD Placement Re: Primary Prevention of SCD.      LH (1.4.21):  Left main coronary artery normal   Left anterior descending artery normal   Left circumflex artery codominant normal   Right coronary artery codominant normal   Left ventricle dilated with severe left ventricular systolic   dysfunction ejection fraction less than 30%   SUMMARY : Nonischemic dilated cardiomyopathy with severe left ventricular systolic dysfunction.      ETT (6.9.20):  Stress EKG is normal.   Maximal exercise treadmill test (MPHR : 97 %).  The functional capacity is poor (4.6 METs).  The heart rate recovery is normal.   The study quality is below average.      Venogram (10.17.18):  No Critical Venous Compression Noted.     Review of patient's allergies indicates:   Allergen Reactions    Sulfa (sulfonamide antibiotics)      Patient unsure if allergic to Sulfa     No current facility-administered medications on file prior to encounter.     Current Outpatient Medications on File Prior to Encounter    Medication Sig    anastrozole (ARIMIDEX) 1 mg Tab Take 1 tablet by mouth once daily.    apixaban (ELIQUIS) 5 mg Tab Take 5 mg by mouth 2 (two) times daily.    aspirin (ECOTRIN) 81 MG EC tablet Take 1 tablet (81 mg total) by mouth once daily.    cyproheptadine (PERIACTIN) 4 mg tablet Take 1 tablet by mouth 2 (two) times daily.    dexlansoprazole (DEXILANT) 60 mg capsule Take 60 mg by mouth once daily.    famotidine (PEPCID) 20 MG tablet Take 1 tablet (20 mg total) by mouth once daily.    furosemide (LASIX) 20 MG tablet Take 1 tablet (20 mg total) by mouth once daily. (Patient taking differently: Take 40 mg by mouth once daily.)    metoprolol succinate (TOPROL-XL) 25 MG 24 hr tablet Take 25 mg by mouth.    pravastatin (PRAVACHOL) 20 MG tablet Take 1 tablet (20 mg total) by mouth every evening.     Review of Systems   Constitutional:  Positive for fatigue.   Respiratory:  Negative for cough and shortness of breath.    Cardiovascular:  Negative for chest pain, palpitations and leg swelling.   All other systems reviewed and are negative.    Objective:     Vital Signs (Most Recent):  Temp: 97.7 °F (36.5 °C) (05/10/25 0742)  Pulse: (!) 111 (05/10/25 0800)  Resp: 18 (05/10/25 0300)  BP: 104/73 (05/10/25 0742)  SpO2: 98 % (05/10/25 0900) Vital Signs (24h Range):  Temp:  [97.6 °F (36.4 °C)-98.2 °F (36.8 °C)] 97.7 °F (36.5 °C)  Pulse:  [] 111  Resp:  [17-18] 18  SpO2:  [92 %-98 %] 98 %  BP: ()/(64-85) 104/73   Weight: 67.6 kg (149 lb)  Body mass index is 25.56 kg/m².  SpO2: 98 %       Intake/Output Summary (Last 24 hours) at 5/10/2025 1113  Last data filed at 5/10/2025 0559  Gross per 24 hour   Intake 480 ml   Output 2002 ml   Net -1522 ml     Lines/Drains/Airways       Peripherally Inserted Central Catheter Line  Duration             PICC Double Lumen 05/09/25 0853 right brachial 1 day              Drain  Duration                  Urethral Catheter 05/08/25 1601 1 day                  Significant Labs:  "  Chemistries:   Recent Labs   Lab 05/08/25  0218 05/08/25  0618 05/08/25  1539 05/09/25  0351 05/10/25  0446    138  --  140 141   K 3.6 2.6* 3.8 4.1 3.4*    100  --  103 107   CO2 17* 23  --  19* 22*   BUN 34.4* 34.0*  --  35.3* 30.4*   CREATININE 1.71* 1.78*  --  1.64* 1.34*   CALCIUM 8.3* 8.0*  --  7.9* 8.0*   PROT 6.7 5.6*  --   --   --    BILITOT 4.0* 3.9*  --   --   --    ALKPHOS 126 107  --   --   --    ALT 35 30  --   --   --    AST 28 23  --   --   --    MG  --  1.80  --  1.80 2.00   PHOS  --   --   --  3.1 2.9   TROPONINI 0.063*  --  0.065* 0.047*  --         CBC/Anemia Labs: Coags:    Recent Labs   Lab 05/08/25  0618 05/09/25  0351 05/10/25  0446   WBC 8.46 7.13 5.66   HGB 13.4 13.8 13.4   HCT 40.8 41.7 40.3   * 112* 108*   MCV 97.4* 97.9* 97.1*   RDW 21.0* 21.7* 21.7*    No results for input(s): "PT", "INR", "APTT" in the last 168 hours.     Telemetry: SR    Physical Exam  Vitals reviewed.   Constitutional:       General: She is not in acute distress.     Appearance: Normal appearance. She is not ill-appearing.   HENT:      Head: Normocephalic.      Mouth/Throat:      Mouth: Mucous membranes are dry.   Eyes:      Extraocular Movements: Extraocular movements intact.   Cardiovascular:      Rate and Rhythm: Normal rate and regular rhythm.      Pulses:           Dorsalis pedis pulses are detected w/ Doppler on the right side and detected w/ Doppler on the left side.      Heart sounds: Murmur heard.   Pulmonary:      Effort: Pulmonary effort is normal. No respiratory distress.      Comments: RA  Abdominal:      Palpations: Abdomen is soft.   Musculoskeletal:         General: Swelling (Pitting up to Bilateral Hips) present.      Right lower leg: Edema present.      Left lower leg: Edema present.   Skin:     General: Skin is dry.   Neurological:      Mental Status: She is alert and oriented to person, place, and time. Mental status is at baseline.       Home Medications:   Medications " Ordered Prior to Encounter[1]  Current Schedule Inpatient Medications:   enoxparin  1 mg/kg Subcutaneous Q24H (prophylaxis, 1700)    mupirocin   Nasal BID    potassium chloride  40 mEq Oral Once      DOBUTamine IV infusion (non-titrating)  5 mcg/kg/min Intravenous Continuous 5.1 mL/hr at 05/09/25 1218 5 mcg/kg/min at 05/09/25 1218    furosemide (LASIX) 2 mg/mL continuous infusion (non-titrating)  10 mg/hr Intravenous Continuous 5 mL/hr at 05/09/25 2146 10 mg/hr at 05/09/25 2146     Assessment:   Acute on Chronic Systolic HF/EF 25-30%    - EF 25-30% (ECHO 3.15.25)    - Lactic Acidosis - Improving   Nonischemic Cardiomyopathy    - EF 35-40% (Echo 1/2025)    - Status Post SICD    - Normal Coronaries in 2021/ETT (6/202): Normal  NSTEMI Type II due to Decompensated HF/Fluid Overload  Valvular Heart Disease    - MR: Severe, TR: Moderate  Hypertension/Hypertensive Heart Disease (BP Now Low Normal Range- Systolic 's)  Pulmonary Hypertension  Dyslipidemia    - On Statin  Chronic VI/Edema- Lymphedema  GONZALEZ/CPAP  History of Breast Cancer/Mastectomy  History of DVT/PE (Previously Treated with Xarelto)- Now on Eliquis  OA  UTI  Thrombocytopenia - Stable   Electrolyte Derangements- Hypokalemia    Plan:   Continue Dobutamine 5mcg/kg/min   Continue IV Lasix 10 mg/HR   Ensure Accurate I&Os and Daily Weights  Replete K (Done)  Keep K > 4.0 and Mg > 2.0   Holding HF Medications for now  Continue FD Lovenox   Labs in AM: CBC, CMP, BNP and Mg    Jacinto Pillai, ANP  Cardiology  [facility]   Physician addendum:  I have seen and examined this patient as a split-shared visit with the MAGDY d/t complicated medical management of above problems written in assessment and high acuity requiring physician expertise in medical decision-making. I performed the substantive portion of the history and exam. Above medical decision-making is also formulated by me.    Cardiovascular exam:  S1, S2  Lungs:  fine crackles  at bases.  Extremities:  + edema bilaterally    Plan:  Continue dobutamine and Lasix.  Rest Medications as above.  Continue supportive therapy.     Deepak Banuelos MD  Cardiologist         [1]   No current facility-administered medications on file prior to encounter.     Current Outpatient Medications on File Prior to Encounter   Medication Sig Dispense Refill    anastrozole (ARIMIDEX) 1 mg Tab Take 1 tablet by mouth once daily.      apixaban (ELIQUIS) 5 mg Tab Take 5 mg by mouth 2 (two) times daily.      aspirin (ECOTRIN) 81 MG EC tablet Take 1 tablet (81 mg total) by mouth once daily. 90 tablet 3    cyproheptadine (PERIACTIN) 4 mg tablet Take 1 tablet by mouth 2 (two) times daily.      dexlansoprazole (DEXILANT) 60 mg capsule Take 60 mg by mouth once daily.      famotidine (PEPCID) 20 MG tablet Take 1 tablet (20 mg total) by mouth once daily. 30 tablet 11    furosemide (LASIX) 20 MG tablet Take 1 tablet (20 mg total) by mouth once daily. (Patient taking differently: Take 40 mg by mouth once daily.) 30 tablet 11    metoprolol succinate (TOPROL-XL) 25 MG 24 hr tablet Take 25 mg by mouth.      pravastatin (PRAVACHOL) 20 MG tablet Take 1 tablet (20 mg total) by mouth every evening. 90 tablet 3

## 2025-05-11 LAB
ALBUMIN SERPL-MCNC: 2.4 G/DL (ref 3.4–4.8)
ALBUMIN/GLOB SERPL: 0.8 RATIO (ref 1.1–2)
ALP SERPL-CCNC: 100 UNIT/L (ref 40–150)
ALT SERPL-CCNC: 23 UNIT/L (ref 0–55)
ANION GAP SERPL CALC-SCNC: 15 MEQ/L
AST SERPL-CCNC: 21 UNIT/L (ref 11–45)
BACTERIA UR CULT: ABNORMAL
BACTERIA UR CULT: ABNORMAL
BASOPHILS # BLD AUTO: 0.02 X10(3)/MCL
BASOPHILS NFR BLD AUTO: 0.4 %
BILIRUB SERPL-MCNC: 2.5 MG/DL
BNP BLD-MCNC: 4543 PG/ML
BUN SERPL-MCNC: 25.5 MG/DL (ref 9.8–20.1)
CALCIUM SERPL-MCNC: 8.1 MG/DL (ref 8.4–10.2)
CHLORIDE SERPL-SCNC: 102 MMOL/L (ref 98–107)
CO2 SERPL-SCNC: 24 MMOL/L (ref 23–31)
CREAT SERPL-MCNC: 1.23 MG/DL (ref 0.55–1.02)
CREAT/UREA NIT SERPL: 21
EOSINOPHIL # BLD AUTO: 0.12 X10(3)/MCL (ref 0–0.9)
EOSINOPHIL NFR BLD AUTO: 2.2 %
ERYTHROCYTE [DISTWIDTH] IN BLOOD BY AUTOMATED COUNT: 21.9 % (ref 11.5–17)
GFR SERPLBLD CREATININE-BSD FMLA CKD-EPI: 47 ML/MIN/1.73/M2
GLOBULIN SER-MCNC: 3 GM/DL (ref 2.4–3.5)
GLUCOSE SERPL-MCNC: 90 MG/DL (ref 82–115)
HCT VFR BLD AUTO: 39.9 % (ref 37–47)
HGB BLD-MCNC: 13.3 G/DL (ref 12–16)
IMM GRANULOCYTES # BLD AUTO: 0.07 X10(3)/MCL (ref 0–0.04)
IMM GRANULOCYTES NFR BLD AUTO: 1.3 %
LYMPHOCYTES # BLD AUTO: 1.04 X10(3)/MCL (ref 0.6–4.6)
LYMPHOCYTES NFR BLD AUTO: 18.7 %
MAGNESIUM SERPL-MCNC: 1.9 MG/DL (ref 1.6–2.6)
MCH RBC QN AUTO: 32.2 PG (ref 27–31)
MCHC RBC AUTO-ENTMCNC: 33.3 G/DL (ref 33–36)
MCV RBC AUTO: 96.6 FL (ref 80–94)
MONOCYTES # BLD AUTO: 0.2 X10(3)/MCL (ref 0.1–1.3)
MONOCYTES NFR BLD AUTO: 3.6 %
NEUTROPHILS # BLD AUTO: 4.12 X10(3)/MCL (ref 2.1–9.2)
NEUTROPHILS NFR BLD AUTO: 73.8 %
NRBC BLD AUTO-RTO: 0.4 %
OHS QRS DURATION: 106 MS
OHS QTC CALCULATION: 473 MS
PLATELET # BLD AUTO: 117 X10(3)/MCL (ref 130–400)
PLATELETS.RETICULATED NFR BLD AUTO: 4.2 % (ref 0.9–11.2)
PMV BLD AUTO: 11.8 FL (ref 7.4–10.4)
POTASSIUM SERPL-SCNC: 4.2 MMOL/L (ref 3.5–5.1)
PROT SERPL-MCNC: 5.4 GM/DL (ref 5.8–7.6)
RBC # BLD AUTO: 4.13 X10(6)/MCL (ref 4.2–5.4)
SODIUM SERPL-SCNC: 141 MMOL/L (ref 136–145)
WBC # BLD AUTO: 5.57 X10(3)/MCL (ref 4.5–11.5)

## 2025-05-11 PROCEDURE — 21400001 HC TELEMETRY ROOM

## 2025-05-11 PROCEDURE — 83735 ASSAY OF MAGNESIUM: CPT | Performed by: NURSE PRACTITIONER

## 2025-05-11 PROCEDURE — 63600175 PHARM REV CODE 636 W HCPCS: Performed by: NURSE PRACTITIONER

## 2025-05-11 PROCEDURE — 93005 ELECTROCARDIOGRAM TRACING: CPT

## 2025-05-11 PROCEDURE — 80053 COMPREHEN METABOLIC PANEL: CPT | Performed by: NURSE PRACTITIONER

## 2025-05-11 PROCEDURE — 36415 COLL VENOUS BLD VENIPUNCTURE: CPT | Performed by: NURSE PRACTITIONER

## 2025-05-11 PROCEDURE — 25000003 PHARM REV CODE 250: Performed by: NURSE PRACTITIONER

## 2025-05-11 PROCEDURE — 85025 COMPLETE CBC W/AUTO DIFF WBC: CPT | Performed by: NURSE PRACTITIONER

## 2025-05-11 PROCEDURE — 83880 ASSAY OF NATRIURETIC PEPTIDE: CPT | Performed by: NURSE PRACTITIONER

## 2025-05-11 PROCEDURE — 93010 ELECTROCARDIOGRAM REPORT: CPT | Mod: ,,, | Performed by: INTERNAL MEDICINE

## 2025-05-11 RX ADMIN — FUROSEMIDE 10 MG/HR: 10 INJECTION, SOLUTION INTRAMUSCULAR; INTRAVENOUS at 01:05

## 2025-05-11 RX ADMIN — DOBUTAMINE HYDROCHLORIDE 5 MCG/KG/MIN: 400 INJECTION INTRAVENOUS at 01:05

## 2025-05-11 RX ADMIN — MUPIROCIN: 20 OINTMENT TOPICAL at 09:05

## 2025-05-11 RX ADMIN — ENOXAPARIN SODIUM 70 MG: 80 INJECTION SUBCUTANEOUS at 04:05

## 2025-05-11 RX ADMIN — MUPIROCIN: 20 OINTMENT TOPICAL at 10:05

## 2025-05-11 NOTE — PROGRESS NOTES
"  [facility]    Cardiology  Progress Note    Patient Name: Laura Jacobs  MRN: [id number]  Admission Date: 5/8/2025  Hospital Length of Stay: 3 days  Code Status: Full Code   Attending Provider: Tevin Soliz MD   Consulting Provider: MACHELLE Graves  Primary Care Physician: Ruben Brooks Sr., MD  Principal Problem:<principal problem not specified>    Patient information was obtained from patient, past medical records, ER records, and primary team.     Subjective:   Chief Complaint/Reason for Consult: Concern for Cardiogenic Shock    HPI: Ms. Jacobs is a 74 year old female, known to Dr. Washington, who presented to the hospital with bilateral lower extremity edema, pain, and SOB. Patient with history of NICMO. Noted Significant MR. Lab work significant for creatinine 1.7, bicarb 17, BNP 6700, troponin 0.06, lactic acid 6.0. Urinalysis consistent with UTI. BP initially marginal, but has improved. On Eliquis for history of DVT/PE. Admitted to Hospital Medicine Team. CIS consulted for cardiac evaluation/management due to concern for impending shock.    Hospital Course:  5.9.25: NAD Noted. On RA, Lying flat in no distress. Marginal diuresis overnight. BP Systolic low normal. SR with intermittent bouts of ST.   5.10.25: NAD. "I am feeling a little better." Fluid Balance Net Negative 2022mL. ST 100s-110s.   5.11.25: BP marginal at times. Good UOP. Remains on Dobutamine and Lasix drip. Renal indices stable.       PMH: Hypertension/HHD, Dyslipidemia, Palpitations, MO, Breast Cancer, CP, SOB/ROMO, Pulmonary Hypertension, GONZALEZ/CPAP, DVT/PE (Eliquis), OA, CVI, VHD/MR-TR  PSH: Hysterectomy, Pacemaker, Fumur David Placement, Joint Replacement, Mastectomy  Family History: Father- Brain Aneurysm, Mother- Cancer, Sister- Hypertension  Social History: Tobacco- Negative, Alcohol- Negative, Substance Abuse- Negative     Previous Cardiac Diagnostics:   BLE Venous US 5.8.25:  Negative for " deep and superficial vein thrombosis in bilateral lower extremities     Venous Doppler (5.8.25):  Negative for deep and superficial vein thrombosis in bilateral lower extremities.    Echocardiogram (3.15.25):  Left Ventricle: The left ventricle is dilated. Wall is thinner than normal. Severe global hypokinesis present. There is severely reduced systolic function with a visually estimated ejection fraction of 25 - 30%.  Right Ventricle: Right ventricular enlargement.  Left Atrium: Severely dilated Agitated saline study of the atrial septum is negative, suggesting absence of intracardiac shunt at the atrial level.  Right Atrium: Right atrium is severely dilated.  Mitral Valve: Mildly thickened leaflets. There is severe regurgitation.  Tricuspid Valve: There is moderate regurgitation.    Echocardiogram (1.20.25):  Left Ventricle: The left ventricle is dilated. Normal wall thickness. Global hypokinesis present. There is moderately reduced systolic function with a visually estimated ejection fraction of 35 - 40%.  Right Ventricle: Normal right ventricular cavity size. Systolic function is normal.  Left Atrium: Left atrium is mildly dilated.  Right Atrium: Right atrium is mildly dilated.  Aortic Valve: The aortic valve is a trileaflet valve. There is mild aortic valve sclerosis.  Mitral Valve: There is mitral annular calcification present. There is moderate to severe regurgitation.  Tricuspid Valve: There is mild regurgitation.  Pulmonic Valve: There is mild regurgitation.  IVC/SVC: Normal venous pressure at 3 mmHg.  Pericardium: There is a trivial effusion adjacent to the right atrium. No indication of cardiac tamponade.     Echocardiogram (3.20.24):  The study quality is average.   The left ventricle is normal in size. Global left ventricular systolic function is normal. The left ventricular ejection fraction is 55%. The left ventricle diastolic function is impaired (Grade I) with normal left atrial pressure.   Mild  calcification of the aortic valve is noted with adequate cuspal excursion.  Mild mitral annular calcification is noted.   Mild (1+) mitral, tricuspid, and pulmonic regurgitation.   The pulmonary artery systolic pressure is 45 mmHg. Evidence of pulmonary hypertension is noted.      Carotid US (11.22.23):  The right internal carotid artery demonstrated no hemodynamically significant stenosis.  There is no substantial right side carotid plaque identified.  The left internal carotid artery demonstrated no hemodynamically significant stenosis.  There is a minimal amount of plaque in the left carotid bulb.  The right vertebral artery is patent with antegrade flow.  The left vertebral artery is patent with antegrade flow.     AMANDA (9.14.22):  LV systolic function, LVEF 30-35%.  Severe global hypokinesis  Mild Tricuspid regurgitation  Moderate to severe Mitral regurgitation  Mild aortic regurgitation.  There is Lambl's excrescences on aortic leaflets that is benign fibrous structure.   No evidence of infection, vegetation or abscess     SICD Placement (3.25.21):  SICD Placement Re: Primary Prevention of SCD.      LHC (1.4.21):  Left main coronary artery normal   Left anterior descending artery normal   Left circumflex artery codominant normal   Right coronary artery codominant normal   Left ventricle dilated with severe left ventricular systolic   dysfunction ejection fraction less than 30%   SUMMARY : Nonischemic dilated cardiomyopathy with severe left ventricular systolic dysfunction.      ETT (6.9.20):  Stress EKG is normal.   Maximal exercise treadmill test (MPHR : 97 %).  The functional capacity is poor (4.6 METs).  The heart rate recovery is normal.   The study quality is below average.      Venogram (10.17.18):  No Critical Venous Compression Noted.     Review of patient's allergies indicates:   Allergen Reactions    Sulfa (sulfonamide antibiotics)      Patient unsure if allergic to Sulfa     No current  facility-administered medications on file prior to encounter.     Current Outpatient Medications on File Prior to Encounter   Medication Sig    anastrozole (ARIMIDEX) 1 mg Tab Take 1 tablet by mouth once daily.    apixaban (ELIQUIS) 5 mg Tab Take 5 mg by mouth 2 (two) times daily.    aspirin (ECOTRIN) 81 MG EC tablet Take 1 tablet (81 mg total) by mouth once daily.    cyproheptadine (PERIACTIN) 4 mg tablet Take 1 tablet by mouth 2 (two) times daily.    dexlansoprazole (DEXILANT) 60 mg capsule Take 60 mg by mouth once daily.    famotidine (PEPCID) 20 MG tablet Take 1 tablet (20 mg total) by mouth once daily.    furosemide (LASIX) 20 MG tablet Take 1 tablet (20 mg total) by mouth once daily. (Patient taking differently: Take 40 mg by mouth once daily.)    metoprolol succinate (TOPROL-XL) 25 MG 24 hr tablet Take 25 mg by mouth.    pravastatin (PRAVACHOL) 20 MG tablet Take 1 tablet (20 mg total) by mouth every evening.     Review of Systems   Constitutional:  Positive for fatigue.   Respiratory:  Negative for cough and shortness of breath.    Cardiovascular:  Negative for chest pain, palpitations and leg swelling.   All other systems reviewed and are negative.    Objective:     Vital Signs (Most Recent):  Temp: 97.6 °F (36.4 °C) (05/11/25 0741)  Pulse: 78 (05/11/25 0800)  Resp: 18 (05/10/25 0300)  BP: (!) 90/55 (05/11/25 0741)  SpO2: 98 % (05/11/25 0900) Vital Signs (24h Range):  Temp:  [97.4 °F (36.3 °C)-98.5 °F (36.9 °C)] 97.6 °F (36.4 °C)  Pulse:  [] 78  SpO2:  [91 %-98 %] 98 %  BP: ()/(55-81) 90/55   Weight: 78.5 kg (173 lb 1 oz)  Body mass index is 29.69 kg/m².  SpO2: 98 %       Intake/Output Summary (Last 24 hours) at 5/11/2025 1231  Last data filed at 5/11/2025 1143  Gross per 24 hour   Intake 582 ml   Output 3175 ml   Net -2593 ml     Lines/Drains/Airways       Peripherally Inserted Central Catheter Line  Duration             PICC Double Lumen 05/09/25 0853 right brachial 2 days              Drain   "Duration                  Urethral Catheter 05/08/25 1601 2 days                  Significant Labs:   Chemistries:   Recent Labs   Lab 05/08/25  0218 05/08/25  0218 05/08/25  0618 05/08/25  1539 05/09/25  0351 05/10/25  0446 05/10/25  1742 05/11/25  0702     --  138  --  140 141  --  141   K 3.6  --  2.6* 3.8 4.1 3.4* 4.3 4.2     --  100  --  103 107  --  102   CO2 17*  --  23  --  19* 22*  --  24   BUN 34.4*  --  34.0*  --  35.3* 30.4*  --  25.5*   CREATININE 1.71*  --  1.78*  --  1.64* 1.34*  --  1.23*   CALCIUM 8.3*  --  8.0*  --  7.9* 8.0*  --  8.1*   PROT 6.7  --  5.6*  --   --   --   --  5.4*   BILITOT 4.0*  --  3.9*  --   --   --   --  2.5*   ALKPHOS 126  --  107  --   --   --   --  100   ALT 35  --  30  --   --   --   --  23   AST 28  --  23  --   --   --   --  21   MG  --    < > 1.80  --  1.80 2.00 2.00 1.90   PHOS  --   --   --   --  3.1 2.9  --   --    TROPONINI 0.063*  --   --  0.065* 0.047*  --   --   --     < > = values in this interval not displayed.        CBC/Anemia Labs: Coags:    Recent Labs   Lab 05/09/25  0351 05/10/25  0446 05/11/25  0702   WBC 7.13 5.66 5.57   HGB 13.8 13.4 13.3   HCT 41.7 40.3 39.9   * 108* 117*   MCV 97.9* 97.1* 96.6*   RDW 21.7* 21.7* 21.9*    No results for input(s): "PT", "INR", "APTT" in the last 168 hours.     Telemetry:     Physical Exam  Vitals reviewed.   Constitutional:       General: She is not in acute distress.     Appearance: Normal appearance. She is not ill-appearing.   HENT:      Head: Normocephalic.      Mouth/Throat:      Mouth: Mucous membranes are dry.   Eyes:      Extraocular Movements: Extraocular movements intact.   Cardiovascular:      Rate and Rhythm: Normal rate and regular rhythm.      Pulses:           Dorsalis pedis pulses are detected w/ Doppler on the right side and detected w/ Doppler on the left side.      Heart sounds: Murmur heard.   Pulmonary:      Effort: Pulmonary effort is normal. No respiratory distress.      " Comments: RA  Abdominal:      Palpations: Abdomen is soft.   Musculoskeletal:         General: Swelling (Pitting up to Bilateral Hips) present.      Right lower leg: Edema present.      Left lower leg: Edema present.   Skin:     General: Skin is dry.   Neurological:      Mental Status: She is alert and oriented to person, place, and time. Mental status is at baseline.       Home Medications:   Medications Ordered Prior to Encounter[1]  Current Schedule Inpatient Medications:   enoxparin  1 mg/kg Subcutaneous Q24H (prophylaxis, 1700)    mupirocin   Nasal BID      DOBUTamine IV infusion (non-titrating)  5 mcg/kg/min Intravenous Continuous 5.1 mL/hr at 05/11/25 0122 5 mcg/kg/min at 05/11/25 0122    furosemide (LASIX) 2 mg/mL continuous infusion (non-titrating)  10 mg/hr Intravenous Continuous 5 mL/hr at 05/10/25 1733 10 mg/hr at 05/10/25 1733     Assessment:   Acute on Chronic Systolic HF/EF 25-30%    - EF 25-30% (ECHO 3.15.25)    - Lactic Acidosis - Improving   Nonischemic Cardiomyopathy    - EF 35-40% (Echo 1/2025)    - Status Post SICD    - Normal Coronaries in 2021/ETT (6/202): Normal  NSTEMI Type II due to Decompensated HF/Fluid Overload  Valvular Heart Disease    - MR: Severe, TR: Moderate  Hypertension/Hypertensive Heart Disease (BP Now Low Normal Range- Systolic 's)  Pulmonary Hypertension  Dyslipidemia    - On Statin  Chronic VI/Edema- Lymphedema  GONZALEZ/CPAP  History of Breast Cancer/Mastectomy  History of DVT/PE (Previously Treated with Xarelto)- Now on Eliquis  OA  UTI  Thrombocytopenia - Stable   Electrolyte Derangements- Hypokalemia    Plan:   Continue Dobutamine 5mcg/kg/min   Continue IV Lasix 10 mg/HR   Ensure Accurate I&Os and Daily Weights  Keep K > 4.0 and Mg > 2.0   Holding HF Medications for now  Continue FD Lovenox   Labs in AM: CBC, CMP, BNP and Mg    MACHELLE Graves  Cardiology  [facility]   Physician addendum:  I have seen and examined this patient as  a split-shared visit with the MAGDY d/t complicated medical management of above problems written in assessment and high acuity requiring physician expertise in medical decision-making. I performed the substantive portion of the history and exam. Above medical decision-making is also formulated by me.    Cardiovascular exam:  S1, S2  Lungs:  fine crackles at bases.  Extremities:  + edema bilaterally    Plan:  Continue dobutamine and IV Lasix.  Change Lasix to 80 b.i.d..  Rest  Medications as above.  Continue supportive therapy.     Deepak Banuelos MD  Cardiologist         [1]   No current facility-administered medications on file prior to encounter.     Current Outpatient Medications on File Prior to Encounter   Medication Sig Dispense Refill    anastrozole (ARIMIDEX) 1 mg Tab Take 1 tablet by mouth once daily.      apixaban (ELIQUIS) 5 mg Tab Take 5 mg by mouth 2 (two) times daily.      aspirin (ECOTRIN) 81 MG EC tablet Take 1 tablet (81 mg total) by mouth once daily. 90 tablet 3    cyproheptadine (PERIACTIN) 4 mg tablet Take 1 tablet by mouth 2 (two) times daily.      dexlansoprazole (DEXILANT) 60 mg capsule Take 60 mg by mouth once daily.      famotidine (PEPCID) 20 MG tablet Take 1 tablet (20 mg total) by mouth once daily. 30 tablet 11    furosemide (LASIX) 20 MG tablet Take 1 tablet (20 mg total) by mouth once daily. (Patient taking differently: Take 40 mg by mouth once daily.) 30 tablet 11    metoprolol succinate (TOPROL-XL) 25 MG 24 hr tablet Take 25 mg by mouth.      pravastatin (PRAVACHOL) 20 MG tablet Take 1 tablet (20 mg total) by mouth every evening. 90 tablet 3

## 2025-05-11 NOTE — PROGRESS NOTES
"[facility] Hospital Medicine Progress Note        Chief Complaint: Inpatient Follow-up     HPI:   "74-year-old female with a past known medical history of breast cancer status post left mastectomy and status post chemo now and remission, chronic systolic heart failure, diabetes mellitus, CKD 3, hemorrhoids, celiac artery stenosis severe, pulmonary embolism on Eliquis.    She presented to the emergency department with complaints of bilateral leg swelling she reports of 3 to 4 weeks, also intermittent shortness a breath but unable to elaborate if it is orthopnea related, reports she sleeps with pillows.  No chest pain, no abdominal complaints.  In the emergency department her initial blood pressure was hypertensive systolic 164 however since then has been 90s systolic.  Lab work significant for creatinine 1.7, bicarb 17, BNP 6700, troponin 0.06, lactic acid 6.0.  Urinalysis consistent with UTI.  Patient received vancomycin and Zosyn while in the emergency department.  She also required a 500 cc bolus due to hypotension in the ER.     After being admitted patient had persistent hypotension with some lactic acidosis, cardiology team evaluated the patient, she was started on dobutamine drip along with Lasix drip."        Interval Hx:   No acute events reported overnight      patient was seen at bedside this morning , no family members in the room she was lying in bed , feels improving, denied CP, SOB,Patient diuresing well with IV Lasix, dobutamine drip. patient has Singh, urine output documented 3.15L in last 24 hours      Labs from this morning noted CBC grossly stable platelet count 117 K, potassium 4.2, BUN to creatinine improving 25, 1.23    Intermittent Low-grade tachycardia with a marginal BP, otherwise stable    Case was discussed with patient's nurse     Objective/physical exam:  General: In no acute distress, afebrile  Chest: Clear to auscultation bilaterally  Heart:  Regular, " +S1, S2  Abdomen: Soft, nontender  MSK: Warm, lower extremity edema++.,wrinkles noted   Neurologic: Alert and answering appropriately  VITAL SIGNS: 24 HRS MIN & MAX LAST   Temp  Min: 97.4 °F (36.3 °C)  Max: 98.5 °F (36.9 °C) 97.6 °F (36.4 °C)   BP  Min: 90/55  Max: 109/81 (!) 90/55   Pulse  Min: 78  Max: 116  78   No data recorded 18   SpO2  Min: 91 %  Max: 98 % 98 %     I have reviewed the following labs:  Recent Labs   Lab 05/09/25  0351 05/10/25  0446 05/11/25  0702   WBC 7.13 5.66 5.57   RBC 4.26 4.15* 4.13*   HGB 13.8 13.4 13.3   HCT 41.7 40.3 39.9   MCV 97.9* 97.1* 96.6*   MCH 32.4* 32.3* 32.2*   MCHC 33.1 33.3 33.3   RDW 21.7* 21.7* 21.9*   * 108* 117*   MPV 12.0* 12.1* 11.8*     Recent Labs   Lab 05/08/25  0218 05/08/25  0218 05/08/25  0618 05/08/25  1539 05/09/25  0351 05/10/25  0446 05/10/25  1742 05/11/25  0702     --  138  --  140 141  --  141   K 3.6  --  2.6*   < > 4.1 3.4* 4.3 4.2     --  100  --  103 107  --  102   CO2 17*  --  23  --  19* 22*  --  24   BUN 34.4*  --  34.0*  --  35.3* 30.4*  --  25.5*   CREATININE 1.71*  --  1.78*  --  1.64* 1.34*  --  1.23*   GLU 87  --  138*  --  112 96  --  90   CALCIUM 8.3*  --  8.0*  --  7.9* 8.0*  --  8.1*   MG  --    < > 1.80  --  1.80 2.00 2.00 1.90   ALBUMIN 3.0*  --  2.7*  --   --  2.4*  --  2.4*   PROT 6.7  --  5.6*  --   --   --   --  5.4*   ALKPHOS 126  --  107  --   --   --   --  100   ALT 35  --  30  --   --   --   --  23   AST 28  --  23  --   --   --   --  21   BILITOT 4.0*  --  3.9*  --   --   --   --  2.5*    < > = values in this interval not displayed.     Microbiology Results (last 7 days)       Procedure Component Value Units Date/Time    Urine culture [[id number]]  (Abnormal)  (Susceptibility) Collected: 05/08/25 0810    Order Status: Completed Specimen: Urine, Clean Catch Updated: 05/11/25 0721     Urine Culture >/= 100,000 colonies/ml Escherichia coli      25,000-50,000 colonies/ml Klebsiella oxytoca    Blood Culture #1  **CANNOT BE ORDERED STAT** [[id number]]  (Normal) Collected: 05/09/25 0351    Order Status: Completed Specimen: Blood from Wrist, Right Updated: 05/11/25 0501     Blood Culture No Growth At 48 Hours    Blood Culture #2 **CANNOT BE ORDERED STAT** [[id number]]  (Normal) Collected: 05/09/25 0400    Order Status: Completed Specimen: Blood from Hand, Left Updated: 05/11/25 0501     Blood Culture No Growth At 48 Hours             See below for Radiology    Assessment/Plan:  Acute on chronic HFrEF 25%-decompensated,Impending cardiogenic shock  Lactic acidosis improving  Valvular heart disease-severe MR, moderate TR  NSTEMI suspected type 2 due to decompensated heart failure   asymptomatic bacteriuria  Thrombocytopenia stable  Subclinical hypothyroidism    Continue to monitor on tele   Cardiology following, Follow CIS recommendations Patient on dobutamine, Lasix drip   Strict Is&Os, monitor electrolytes, renal function  Supportive care  Mobilize/PT  Patient received 1 dose of IV ceftriaxone, urine cultures growing greater than 100,000 colonies E coli less than 50,000 colonies Klebsiella.  Patient asymptomatic  Otherwise continue current care and monitoring  Labs in a.m.    VTE prophylaxis:  Lovenox      Anticipated discharge and Disposition:   TBD          Portions of this note dictated using EMR integrated voice recognition software, and may be subject to voice recognition errors not corrected at proofreading. Please contact writer for clarification if needed.   _____________________________________________________________________    Malnutrition Status:  Nutrition consulted. Most recent weight and BMI monitored-     Measurements:  Wt Readings from Last 1 Encounters:   05/11/25 78.5 kg (173 lb 1 oz)   Body mass index is 29.69 kg/m².    Patient has been screened and assessed by RD.    Malnutrition Type:  Context:    Level: other (see comments) (Unable to assess)    Malnutrition Characteristic Summary:  Weight Loss  (Malnutrition): other (see comments) (Unable to assess)  Fluid Accumulation (Malnutrition): moderate to severe    Interventions/Recommendations (treatment strategy):        Scheduled Med:   enoxparin  1 mg/kg Subcutaneous Q24H (prophylaxis, 1700)    mupirocin   Nasal BID      Continuous Infusions:   DOBUTamine IV infusion (non-titrating)  5 mcg/kg/min Intravenous Continuous 5.1 mL/hr at 05/11/25 0122 5 mcg/kg/min at 05/11/25 0122    furosemide (LASIX) 2 mg/mL continuous infusion (non-titrating)  10 mg/hr Intravenous Continuous 5 mL/hr at 05/10/25 1733 10 mg/hr at 05/10/25 1733      PRN Meds:    Current Facility-Administered Medications:     acetaminophen, 1,000 mg, Oral, Q8H PRN    acetaminophen, 650 mg, Oral, Q4H PRN    aluminum-magnesium hydroxide-simethicone, 30 mL, Oral, QID PRN    bisacodyL, 10 mg, Rectal, Daily PRN    dextrose 50%, 12.5 g, Intravenous, PRN    dextrose 50%, 25 g, Intravenous, PRN    glucagon (human recombinant), 1 mg, Intramuscular, PRN    glucose, 16 g, Oral, PRN    glucose, 24 g, Oral, PRN    melatonin, 6 mg, Oral, Nightly PRN    polyethylene glycol, 17 g, Oral, BID PRN    sodium chloride 0.9%, 10 mL, Intravenous, PRN    Flushing PICC/Midline Protocol, , , Until Discontinued **AND** sodium chloride 0.9%, 10 mL, Intravenous, Q12H PRN     Radiology:  I have personally reviewed the following imaging and agree with the radiologist.     X-Ray Chest 1 View  Narrative: EXAMINATION:  XR CHEST 1 VIEW    CPT 57517    CLINICAL HISTORY:  picc;    COMPARISON:  May 8, 2025    FINDINGS:  Examination reveals cardiomediastinal silhouette and pleuroparenchymal changes to be essentially unchanged as compared with the previous exam.    Right-sided PICC line has been inserted tip in the region of the superior vena cava  Impression: No interval change.    PICC line insertion    Electronically signed by: Joe Easley  Date:    05/09/2025  Time:    09:52      Kat Rodríguez MD  Department of Hospital  Medicine   [facility]   05/11/2025

## 2025-05-12 LAB
ANION GAP SERPL CALC-SCNC: 14 MEQ/L
BUN SERPL-MCNC: 20.7 MG/DL (ref 9.8–20.1)
CALCIUM SERPL-MCNC: 7.8 MG/DL (ref 8.4–10.2)
CHLORIDE SERPL-SCNC: 102 MMOL/L (ref 98–107)
CO2 SERPL-SCNC: 23 MMOL/L (ref 23–31)
CREAT SERPL-MCNC: 1.14 MG/DL (ref 0.55–1.02)
CREAT/UREA NIT SERPL: 18
GFR SERPLBLD CREATININE-BSD FMLA CKD-EPI: 51 ML/MIN/1.73/M2
GLUCOSE SERPL-MCNC: 95 MG/DL (ref 82–115)
MAGNESIUM SERPL-MCNC: 1.8 MG/DL (ref 1.6–2.6)
POCT GLUCOSE: 109 MG/DL (ref 70–110)
POTASSIUM SERPL-SCNC: 4 MMOL/L (ref 3.5–5.1)
SODIUM SERPL-SCNC: 139 MMOL/L (ref 136–145)

## 2025-05-12 PROCEDURE — 83735 ASSAY OF MAGNESIUM: CPT | Performed by: INTERNAL MEDICINE

## 2025-05-12 PROCEDURE — 21400001 HC TELEMETRY ROOM

## 2025-05-12 PROCEDURE — 97162 PT EVAL MOD COMPLEX 30 MIN: CPT

## 2025-05-12 PROCEDURE — 36415 COLL VENOUS BLD VENIPUNCTURE: CPT | Performed by: INTERNAL MEDICINE

## 2025-05-12 PROCEDURE — 63600175 PHARM REV CODE 636 W HCPCS: Performed by: NURSE PRACTITIONER

## 2025-05-12 PROCEDURE — 80048 BASIC METABOLIC PNL TOTAL CA: CPT | Performed by: INTERNAL MEDICINE

## 2025-05-12 RX ORDER — DOBUTAMINE HYDROCHLORIDE 400 MG/100ML
2.5 INJECTION INTRAVENOUS CONTINUOUS
Status: DISCONTINUED | OUTPATIENT
Start: 2025-05-12 | End: 2025-05-13

## 2025-05-12 RX ORDER — MAGNESIUM SULFATE HEPTAHYDRATE 40 MG/ML
2 INJECTION, SOLUTION INTRAVENOUS ONCE
Status: COMPLETED | OUTPATIENT
Start: 2025-05-12 | End: 2025-05-12

## 2025-05-12 RX ORDER — FUROSEMIDE 10 MG/ML
80 INJECTION INTRAMUSCULAR; INTRAVENOUS EVERY 12 HOURS
Status: DISCONTINUED | OUTPATIENT
Start: 2025-05-12 | End: 2025-05-13

## 2025-05-12 RX ADMIN — FUROSEMIDE 80 MG: 10 INJECTION, SOLUTION INTRAVENOUS at 06:05

## 2025-05-12 RX ADMIN — DOBUTAMINE HYDROCHLORIDE 2.5 MCG/KG/MIN: 400 INJECTION INTRAVENOUS at 11:05

## 2025-05-12 RX ADMIN — ENOXAPARIN SODIUM 70 MG: 80 INJECTION SUBCUTANEOUS at 06:05

## 2025-05-12 RX ADMIN — MAGNESIUM SULFATE HEPTAHYDRATE 2 G: 40 INJECTION, SOLUTION INTRAVENOUS at 11:05

## 2025-05-12 RX ADMIN — MUPIROCIN: 20 OINTMENT TOPICAL at 08:05

## 2025-05-12 RX ADMIN — MUPIROCIN: 20 OINTMENT TOPICAL at 09:05

## 2025-05-12 NOTE — PROGRESS NOTES
[facility] Hospital Medicine Progress Note        Chief Complaint: Inpatient Follow-up for HF    HPI:   74-year-old female with a past known medical history of breast cancer status post left mastectomy and status post chemo now and remission, chronic systolic heart failure, diabetes mellitus, CKD 3, hemorrhoids, celiac artery stenosis severe, pulmonary embolism on Eliquis.    She presented to the emergency department with complaints of bilateral leg swelling she reports of 3 to 4 weeks, also intermittent shortness a breath but unable to elaborate if it is orthopnea related, reports she sleeps with pillows.  No chest pain, no abdominal complaints.  In the emergency department her initial blood pressure was hypertensive systolic 164 however since then has been 90s systolic.  Lab work significant for creatinine 1.7, bicarb 17, BNP 6700, troponin 0.06, lactic acid 6.0.  Urinalysis consistent with UTI.  Patient received vancomycin and Zosyn while in the emergency department.  She also required a 500 cc bolus due to hypotension in the ER.  ECHO done 3/15/25 EF 25-30%/      After being admitted patient had persistent hypotension with some lactic acidosis, cardiology team evaluated the patient, she was started on dobutamine drip along with Lasix drip.     Interval Hx:   Patient today awake and comfortable. Denies any SOB chest pain, fever or chills. Has not ambulated since admit.     No family at bedside.     Case was discussed with patient's nurse and  on the floor.    Objective/physical exam:  General: In no acute distress  Chest: Clear to auscultation bilaterally  Heart: RRR, +S1, S2, no appreciable murmur  Abdomen: Soft, nontender, BS +  MSK: Warm, minimal lower extremity edema, no clubbing or cyanosis  Neurologic: Cranial nerve II-XII intact, Strength 5/5 in all 4 extremities    VITAL SIGNS: 24 HRS MIN & MAX LAST   Temp  Min: 97.5 °F (36.4 °C)  Max: 98.2 °F (36.8 °C) 98 °F  (36.7 °C)   BP  Min: 91/59  Max: 117/81 117/81   Pulse  Min: 98  Max: 117  (!) 117   Resp  Min: 18  Max: 18 18   SpO2  Min: 92 %  Max: 100 % 96 %     I have reviewed the following labs:  Recent Labs   Lab 05/09/25  0351 05/10/25  0446 05/11/25  0702   WBC 7.13 5.66 5.57   RBC 4.26 4.15* 4.13*   HGB 13.8 13.4 13.3   HCT 41.7 40.3 39.9   MCV 97.9* 97.1* 96.6*   MCH 32.4* 32.3* 32.2*   MCHC 33.1 33.3 33.3   RDW 21.7* 21.7* 21.9*   * 108* 117*   MPV 12.0* 12.1* 11.8*     Recent Labs   Lab 05/08/25  0218 05/08/25  0618 05/08/25  1539 05/10/25  0446 05/10/25  1742 05/11/25  0702 05/12/25  0423    138   < > 141  --  141 139   K 3.6 2.6*   < > 3.4* 4.3 4.2 4.0    100   < > 107  --  102 102   CO2 17* 23   < > 22*  --  24 23   BUN 34.4* 34.0*   < > 30.4*  --  25.5* 20.7*   CREATININE 1.71* 1.78*   < > 1.34*  --  1.23* 1.14*   GLU 87 138*   < > 96  --  90 95   CALCIUM 8.3* 8.0*   < > 8.0*  --  8.1* 7.8*   MG  --  1.80   < > 2.00 2.00 1.90 1.80   ALBUMIN 3.0* 2.7*  --  2.4*  --  2.4*  --    PROT 6.7 5.6*  --   --   --  5.4*  --    ALKPHOS 126 107  --   --   --  100  --    ALT 35 30  --   --   --  23  --    AST 28 23  --   --   --  21  --    BILITOT 4.0* 3.9*  --   --   --  2.5*  --     < > = values in this interval not displayed.     Microbiology Results (last 7 days)       Procedure Component Value Units Date/Time    Blood Culture #1 **CANNOT BE ORDERED STAT** [[id number]]  (Normal) Collected: 05/09/25 0351    Order Status: Completed Specimen: Blood from Wrist, Right Updated: 05/12/25 0500     Blood Culture No Growth At 72 Hours    Blood Culture #2 **CANNOT BE ORDERED STAT** [[id number]]  (Normal) Collected: 05/09/25 0400    Order Status: Completed Specimen: Blood from Hand, Left Updated: 05/12/25 0500     Blood Culture No Growth At 72 Hours    Urine culture [[id number]]  (Abnormal)  (Susceptibility) Collected: 05/08/25 0810    Order Status: Completed Specimen: Urine, Clean Catch Updated: 05/11/25 0721      Urine Culture >/= 100,000 colonies/ml Escherichia coli      25,000-50,000 colonies/ml Klebsiella oxytoca             See below for Radiology    Assessment/Plan:  Acute on chronic HFrEF 25%-decompensated,Impending cardiogenic shock  Lactic acidosis improving  Valvular heart disease-severe MR, moderate TR  NSTEMI suspected type 2 due to decompensated heart failure   asymptomatic bacteriuria  Thrombocytopenia stable  Subclinical hypothyroidism    Plan:  Patient looks comfortable  LE edema is now much better  At rest she is asymptomatic   Will start to ambulate with PT     Continue IV lasix and dobutamine   Will closely monitor patients daily weight, urine out put, renal parameters and volume status      Reviewed today's labs and  WBC 5.57, Hb 13.3, Plt 117, Na 139, K 4, Crt 1.14    Continue supportive care     Critical care note:  Critical care diagnosis: HF needing iv lasix drip   Critical care interventions: Hands-on evaluation, review of labs/radiographs/records and discussion with patient and family if present  Critical care time spent: 35 minutes     VTE prophylaxis: FD lovenox     Patient condition:  Guarded    Anticipated discharge and Disposition:   Home with  vs TCU      All diagnosis and differential diagnosis have been reviewed; assessment and plan has been documented; I have personally reviewed the labs and test results that are presently available; I have reviewed the patients medication list; I have reviewed the consulting providers response and recommendations. I have reviewed or attempted to review medical records based upon their availability    All of the patient's questions have been  addressed and answered. Patient's is agreeable to the above stated plan. I will continue to monitor closely and make adjustments to medical management as needed.    Portions of this note dictated using EMR integrated voice recognition software, and may be subject to voice recognition errors not corrected at  proofreading. Please contact writer for clarification if needed.   _____________________________________________________________________    Malnutrition Status:  Nutrition consulted. Most recent weight and BMI monitored-     Measurements:  Wt Readings from Last 1 Encounters:   05/12/25 78.5 kg (173 lb 1 oz)   Body mass index is 29.69 kg/m².    Patient has been screened and assessed by RD.    Malnutrition Type:  Context:    Level: other (see comments) (Unable to assess)    Malnutrition Characteristic Summary:  Weight Loss (Malnutrition): other (see comments) (Unable to assess)  Fluid Accumulation (Malnutrition): moderate to severe    Interventions/Recommendations (treatment strategy):        Scheduled Med:   enoxparin  1 mg/kg Subcutaneous Q24H (prophylaxis, 1700)    mupirocin   Nasal BID      Continuous Infusions:   DOBUTamine IV infusion (non-titrating)  5 mcg/kg/min Intravenous Continuous 5.1 mL/hr at 05/11/25 0122 5 mcg/kg/min at 05/11/25 0122    furosemide (LASIX) 2 mg/mL continuous infusion (non-titrating)  10 mg/hr Intravenous Continuous 5 mL/hr at 05/11/25 1353 10 mg/hr at 05/11/25 1353      PRN Meds:    Current Facility-Administered Medications:     acetaminophen, 1,000 mg, Oral, Q8H PRN    acetaminophen, 650 mg, Oral, Q4H PRN    aluminum-magnesium hydroxide-simethicone, 30 mL, Oral, QID PRN    bisacodyL, 10 mg, Rectal, Daily PRN    dextrose 50%, 12.5 g, Intravenous, PRN    dextrose 50%, 25 g, Intravenous, PRN    glucagon (human recombinant), 1 mg, Intramuscular, PRN    glucose, 16 g, Oral, PRN    glucose, 24 g, Oral, PRN    melatonin, 6 mg, Oral, Nightly PRN    polyethylene glycol, 17 g, Oral, BID PRN    sodium chloride 0.9%, 10 mL, Intravenous, PRN    Flushing PICC/Midline Protocol, , , Until Discontinued **AND** sodium chloride 0.9%, 10 mL, Intravenous, Q12H PRN     Radiology:  I have personally reviewed the following imaging and agree with the radiologist.     CV Ultrasound doppler venous legs  bilat  Negative for deep and superficial vein thrombosis in bilateral lower   extremities.      Scooter Davis MD  Department of Hospital Medicine   [facility]   05/12/2025

## 2025-05-12 NOTE — PT/OT/SLP EVAL
Physical Therapy Evaluation    Patient Name:  Laura Jacobs   MRN:  [id number]    Recommendations:     Discharge therapy intensity: Moderate Intensity Therapy   Discharge Equipment Recommendations: walker, rolling   Barriers to discharge: Impaired mobility and Ongoing medical needs    Assessment:     Laura Jacobs is a 74 y.o. female admitted with bilateral leg swelling. Prior to admit, patient lived with son in a [facility]. At baseline, she is independent and ambulates with a cane.  She presents with the following impairments/functional limitations: weakness, impaired endurance, impaired self care skills, impaired functional mobility, gait instability, impaired balance, decreased safety awareness, edema. She required MOD A for bed mobility. MOD A for sit<>stand. Ambulated 19 ft with RW & CGA. Wide AVA. Waddle-like gait pattern. Standing rest breaks every ~5 ft. Patient will benefit from continued PT services while in hospital to improve functional mobility & return to PLOF. Recommending MOD intensity therapy at discharge. Progress as tolerated.     Rehab Prognosis: Good; patient would benefit from acute skilled PT services to address these deficits and reach maximum level of function.    Recent Surgery: * No surgery found *      Plan:     During this hospitalization, patient would benefit from acute PT services 5 x/week to address the identified rehab impairments via gait training, therapeutic activities, therapeutic exercises, neuromuscular re-education and progress toward the following goals:    Plan of Care Expires:  06/12/25    Subjective     Chief Complaint: none  Patient/Family Comments/goals: none  Pain/Comfort:  Pain Rating 1: 0/10    Patients cultural, spiritual, Scientologist conflicts given the current situation: no    Living Environment:  Prior to admit, patient lived with son in a [facility]. At baseline, she is independent and ambulates with a cane. Equipment used at home: bath bench, cane, straight.   Upon discharge, patient will have assistance from TBD.    Objective:     Communicated with nurse prior to session.  Patient found supine with telemetry, yeager catheter  upon PT entry to room.    General Precautions: Standard, fall  Orthopedic Precautions:N/A   Braces: N/A  Respiratory Status: Room air    Exams:  Cognitive Exam:  Patient is oriented to Person, Place, Time, and Situation  Sensation: -       Intact  RLE ROM: WFL  RLE Strength: -4/5 grossly  LLE ROM: WFL  LLE Strength: -4/5 grossly  Skin integrity: Visible skin intact      Functional Mobility:  Bed Mobility:  Supine to Sit: moderate assistance  Transfers:  Sit to Stand:  moderate assistance with rolling walker  Gait: 19 ft with RW & CGA. Wide AVA. Waddle-like gait pattern. Standing rest breaks every ~5 ft.   Balance: fair/poor      AM-PAC 6 CLICK MOBILITY  Total Score:13     Education Provided:  Role and goals of PT, transfer training, bed mobility, gait training, balance training, safety awareness, assistive device, strengthening exercises, and importance of participating in PT to return to PLOF.    Patient left up in chair with all lines intact, call button in reach, and educated on calling for assistance when ready to return to bed.    GOALS:   Multidisciplinary Problems       Physical Therapy Goals          Problem: Physical Therapy    Goal Priority Disciplines Outcome Interventions   Physical Therapy Goal     PT, PT/OT Progressing    Description: Goals to be met by: 25     Patient will increase functional independence with mobility by performin. Supine to sit with Bureau  2. Sit to supine with Bureau  3. Sit to stand transfer with Modified Bureau  4. Gait  x 200 feet with Modified Bureau using Rolling Walker.   5. Increased functional strength to 5/5 in BLE's.                         History:     Past Medical History:   Diagnosis Date    Cancer     breast CA s/p chemo and L masectomy     Cardiomyopathy     CHF  (congestive heart failure)     History of breast cancer     History of DVT (deep vein thrombosis)     HLD (hyperlipidemia)     Hypertension     Lymphedema     Osteoarthritis     Venous insufficiency        Past Surgical History:   Procedure Laterality Date    HYSTERECTOMY      INSERTION OF PACEMAKER      INTRAMEDULLARY RODDING OF FEMUR Left 10/14/2022    Procedure: INSERTION, INTRAMEDULLARY СВЕТЛАНА, FEMUR;  Surgeon: Ankush Alex MD;  Location: [facility];  Service: Orthopedics;  Laterality: Left;    JOINT REPLACEMENT Bilateral     Knee    MASTECTOMY Left 2019       Time Tracking:     PT Received On: 05/12/25  PT Start Time: 1415     PT Stop Time: 1435  PT Total Time (min): 20 min     Billable Minutes: Evaluation 20 minutes      05/12/2025

## 2025-05-12 NOTE — PLAN OF CARE
Problem: Physical Therapy  Goal: Physical Therapy Goal  Description: Goals to be met by: 25     Patient will increase functional independence with mobility by performin. Supine to sit with [place]  2. Sit to supine with [place]  3. Sit to stand transfer with Modified [place]  4. Gait  x 200 feet with Modified [place] using Rolling Walker.   5. Increased functional strength to 5/5 in BLE's.    Outcome: Progressing

## 2025-05-12 NOTE — PROGRESS NOTES
"  [facility]    Cardiology  Progress Note    Patient Name: Laura Jacobs  MRN: [id number]  Admission Date: 5/8/2025  Hospital Length of Stay: 4 days  Code Status: Full Code   Attending Provider: Scooter Davis MD   Consulting Provider: MACHELLE Zimmerman  Primary Care Physician: Ruben Brooks Sr., MD  Principal Problem:<principal problem not specified>    Patient information was obtained from patient, past medical records, ER records, and primary team.     Subjective:   Chief Complaint/Reason for Consult: Concern for Cardiogenic Shock    HPI: Ms. Jacobs is a 74 year old female, known to Dr. Washington, who presented to the hospital with bilateral lower extremity edema, pain, and SOB. Patient with history of NICMO. Noted Significant MR. Lab work significant for creatinine 1.7, bicarb 17, BNP 6700, troponin 0.06, lactic acid 6.0. Urinalysis consistent with UTI. BP initially marginal, but has improved. On Eliquis for history of DVT/PE. Admitted to Hospital Medicine Team. [facility] consulted for cardiac evaluation/management due to concern for impending shock.    Hospital Course:  5.9.25: NAD Noted. On RA, Lying flat in no distress. Marginal diuresis overnight. BP Systolic low normal. SR with intermittent bouts of ST.   5.10.25: NAD. "I am feeling a little better." Fluid Balance Net Negative 2022mL. ST 100s-110s.   5.11.25: BP marginal at times. Good UOP. Remains on Dobutamine and Lasix drip. Renal indices stable.   5.12.25: NAD Noted. Vitals Stable. Diuresing well. Edema is improving. Patient watching TV in no distress. She is awake/alert.    PMH: Hypertension/HHD, Dyslipidemia, Palpitations, MO, Breast Cancer, CP, SOB/ROMO, Pulmonary Hypertension, GONZALEZ/CPAP, DVT/PE (Eliquis), OA, CVI, VHD/MR-TR  PSH: Hysterectomy, Pacemaker, Fumur David Placement, Joint Replacement, Mastectomy  Family History: Father- Brain Aneurysm, Mother- Cancer, Sister- Hypertension  Social History: Tobacco- " Negative, Alcohol- Negative, Substance Abuse- Negative     Previous Cardiac Diagnostics:   Venous Doppler (5.8.25):  Negative for deep and superficial vein thrombosis in bilateral lower extremities.    Echocardiogram (3.15.25):  Left Ventricle: The left ventricle is dilated. Wall is thinner than normal. Severe global hypokinesis present. There is severely reduced systolic function with a visually estimated ejection fraction of 25 - 30%.  Right Ventricle: Right ventricular enlargement.  Left Atrium: Severely dilated Agitated saline study of the atrial septum is negative, suggesting absence of intracardiac shunt at the atrial level.  Right Atrium: Right atrium is severely dilated.  Mitral Valve: Mildly thickened leaflets. There is severe regurgitation.  Tricuspid Valve: There is moderate regurgitation.    Echocardiogram (1.20.25):  Left Ventricle: The left ventricle is dilated. Normal wall thickness. Global hypokinesis present. There is moderately reduced systolic function with a visually estimated ejection fraction of 35 - 40%.  Right Ventricle: Normal right ventricular cavity size. Systolic function is normal.  Left Atrium: Left atrium is mildly dilated.  Right Atrium: Right atrium is mildly dilated.  Aortic Valve: The aortic valve is a trileaflet valve. There is mild aortic valve sclerosis.  Mitral Valve: There is mitral annular calcification present. There is moderate to severe regurgitation.  Tricuspid Valve: There is mild regurgitation.  Pulmonic Valve: There is mild regurgitation.  IVC/SVC: Normal venous pressure at 3 mmHg.  Pericardium: There is a trivial effusion adjacent to the right atrium. No indication of cardiac tamponade.     Echocardiogram (3.20.24):  The study quality is average.   The left ventricle is normal in size. Global left ventricular systolic function is normal. The left ventricular ejection fraction is 55%. The left ventricle diastolic function is impaired (Grade I) with normal left atrial  pressure.   Mild calcification of the aortic valve is noted with adequate cuspal excursion.  Mild mitral annular calcification is noted.   Mild (1+) mitral, tricuspid, and pulmonic regurgitation.   The pulmonary artery systolic pressure is 45 mmHg. Evidence of pulmonary hypertension is noted.      Carotid US (11.22.23):  The right internal carotid artery demonstrated no hemodynamically significant stenosis.  There is no substantial right side carotid plaque identified.  The left internal carotid artery demonstrated no hemodynamically significant stenosis.  There is a minimal amount of plaque in the left carotid bulb.  The right vertebral artery is patent with antegrade flow.  The left vertebral artery is patent with antegrade flow.     AMANDA (9.14.22):  LV systolic function, LVEF 30-35%.  Severe global hypokinesis  Mild Tricuspid regurgitation  Moderate to severe Mitral regurgitation  Mild aortic regurgitation.  There is Lambl's excrescences on aortic leaflets that is benign fibrous structure.   No evidence of infection, vegetation or abscess     SICD Placement (3.25.21):  SICD Placement Re: Primary Prevention of SCD.      LHC (1.4.21):  Left main coronary artery normal   Left anterior descending artery normal   Left circumflex artery codominant normal   Right coronary artery codominant normal   Left ventricle dilated with severe left ventricular systolic   dysfunction ejection fraction less than 30%   SUMMARY : Nonischemic dilated cardiomyopathy with severe left ventricular systolic dysfunction.      ETT (6.9.20):  Stress EKG is normal.   Maximal exercise treadmill test (MPHR : 97 %).  The functional capacity is poor (4.6 METs).  The heart rate recovery is normal.   The study quality is below average.      Venogram (10.17.18):  No Critical Venous Compression Noted.     Review of patient's allergies indicates:   Allergen Reactions    Sulfa (sulfonamide antibiotics)      Patient unsure if allergic to Sulfa     No  current facility-administered medications on file prior to encounter.     Current Outpatient Medications on File Prior to Encounter   Medication Sig    anastrozole (ARIMIDEX) 1 mg Tab Take 1 tablet by mouth once daily.    apixaban (ELIQUIS) 5 mg Tab Take 5 mg by mouth 2 (two) times daily.    aspirin (ECOTRIN) 81 MG EC tablet Take 1 tablet (81 mg total) by mouth once daily.    cyproheptadine (PERIACTIN) 4 mg tablet Take 1 tablet by mouth 2 (two) times daily.    dexlansoprazole (DEXILANT) 60 mg capsule Take 60 mg by mouth once daily.    famotidine (PEPCID) 20 MG tablet Take 1 tablet (20 mg total) by mouth once daily.    furosemide (LASIX) 20 MG tablet Take 1 tablet (20 mg total) by mouth once daily. (Patient taking differently: Take 40 mg by mouth once daily.)    metoprolol succinate (TOPROL-XL) 25 MG 24 hr tablet Take 25 mg by mouth.    pravastatin (PRAVACHOL) 20 MG tablet Take 1 tablet (20 mg total) by mouth every evening.     Review of Systems   Respiratory:  Negative for chest tightness and shortness of breath.    Cardiovascular:  Negative for chest pain.   All other systems reviewed and are negative.    Objective:     Vital Signs (Most Recent):  Temp: 98 °F (36.7 °C) (05/12/25 0737)  Pulse: (!) 117 (05/12/25 0737)  Resp: 18 (05/11/25 1614)  BP: 117/81 (05/12/25 0737)  SpO2: 96 % (05/12/25 0737) Vital Signs (24h Range):  Temp:  [97.5 °F (36.4 °C)-98.2 °F (36.8 °C)] 98 °F (36.7 °C)  Pulse:  [] 117  Resp:  [18] 18  SpO2:  [92 %-100 %] 96 %  BP: ()/(59-81) 117/81   Weight: 78.5 kg (173 lb 1 oz)  Body mass index is 29.69 kg/m².  SpO2: 96 %       Intake/Output Summary (Last 24 hours) at 5/12/2025 1023  Last data filed at 5/12/2025 0700  Gross per 24 hour   Intake 618 ml   Output 2525 ml   Net -1907 ml     Lines/Drains/Airways       Peripherally Inserted Central Catheter Line  Duration             PICC Double Lumen 05/09/25 0853 right brachial 3 days              Drain  Duration                  Urethral  "Catheter 05/08/25 1601 3 days                  Significant Labs:   Chemistries:   Recent Labs   Lab 05/08/25  0218 05/08/25  0218 05/08/25  0618 05/08/25  1539 05/09/25  0351 05/10/25  0446 05/10/25  1742 05/11/25  0702 05/12/25  0423     --  138  --  140 141  --  141 139   K 3.6  --  2.6* 3.8 4.1 3.4* 4.3 4.2 4.0     --  100  --  103 107  --  102 102   CO2 17*  --  23  --  19* 22*  --  24 23   BUN 34.4*  --  34.0*  --  35.3* 30.4*  --  25.5* 20.7*   CREATININE 1.71*  --  1.78*  --  1.64* 1.34*  --  1.23* 1.14*   CALCIUM 8.3*  --  8.0*  --  7.9* 8.0*  --  8.1* 7.8*   PROT 6.7  --  5.6*  --   --   --   --  5.4*  --    BILITOT 4.0*  --  3.9*  --   --   --   --  2.5*  --    ALKPHOS 126  --  107  --   --   --   --  100  --    ALT 35  --  30  --   --   --   --  23  --    AST 28  --  23  --   --   --   --  21  --    MG  --    < > 1.80  --  1.80 2.00 2.00 1.90 1.80   PHOS  --   --   --   --  3.1 2.9  --   --   --    TROPONINI 0.063*  --   --  0.065* 0.047*  --   --   --   --     < > = values in this interval not displayed.        CBC/Anemia Labs: Coags:    Recent Labs   Lab 05/09/25  0351 05/10/25  0446 05/11/25  0702   WBC 7.13 5.66 5.57   HGB 13.8 13.4 13.3   HCT 41.7 40.3 39.9   * 108* 117*   MCV 97.9* 97.1* 96.6*   RDW 21.7* 21.7* 21.9*    No results for input(s): "PT", "INR", "APTT" in the last 168 hours.     Telemetry: SR    Physical Exam  Vitals and nursing note reviewed.   Constitutional:       General: She is not in acute distress.     Appearance: She is obese.   Cardiovascular:      Rate and Rhythm: Normal rate and regular rhythm.   Pulmonary:      Effort: Pulmonary effort is normal. No respiratory distress.   Musculoskeletal:      Right lower leg: Edema present.      Left lower leg: Edema present.   Skin:     General: Skin is warm and dry.   Neurological:      Mental Status: She is alert.       Home Medications:   Medications Ordered Prior to Encounter[1]  Current Schedule Inpatient " Medications:   enoxparin  1 mg/kg Subcutaneous Q24H (prophylaxis, 1700)    magnesium sulfate 2 g IVPB  2 g Intravenous Once    mupirocin   Nasal BID      DOBUTamine IV infusion (non-titrating)  5 mcg/kg/min Intravenous Continuous 5.1 mL/hr at 05/11/25 0122 5 mcg/kg/min at 05/11/25 0122    furosemide (LASIX) 2 mg/mL continuous infusion (non-titrating)  10 mg/hr Intravenous Continuous 5 mL/hr at 05/11/25 1353 10 mg/hr at 05/11/25 1353     Assessment:   Acute on Chronic Systolic HF/EF 25-30% (Clinically Improving)    - EF 25-30% (ECHO 3.15.25)    - Lactic Acidosis - Improving   Nonischemic Cardiomyopathy    - EF 35-40% (Echo 1/2025)    - Status Post SICD    - Normal Coronaries in 2021/ETT (6/202): Normal  NSTEMI Type II due to Decompensated HF/Fluid Overload  Valvular Heart Disease    - MR: Severe, TR: Moderate  Hypertension/Hypertensive Heart Disease (BP Stable)  Pulmonary Hypertension  Dyslipidemia    - On Statin  Chronic VI/Edema- Lymphedema  GONZALEZ/CPAP  History of Breast Cancer/Mastectomy  History of DVT/PE (Previously Treated with Xarelto)- Now on Eliquis  OA  UTI  Thrombocytopenia - Stable   Electrolyte Derangements- Hypokalemia    Plan:   Reduce Dobutamine to 2.5 mcg/kg/min   Transition to Lasix 80 Mg IV BID  Ensure Accurate I&Os and Daily Weights  Keep K > 4.0 and Mg > 2.0   Holding HF Medications for now  Continue FD Lovenox   Labs in AM: CBC, CMP, BNP and Mg    MACHELLE Zimmerman  Cardiology  [facility]   Physician addendum:  I have seen and examined this patient as a split-shared visit with the MAGDY d/t complicated medical management of above problems written in assessment and high acuity requiring physician expertise in medical decision-making. I performed the substantive portion of the history and exam. Above medical decision-making is also formulated by me.    Cardiovascular exam:  S1, S2  Lungs:  fine crackles at bases.  Extremities:  + trace edema bilaterally, significantly  improved    Plan:  Titrate dobutamine to 2.5.  Change Lasix to IV bolus.  Plan to stop dobutamine tomorrow and change Lasix to p.o..  Recommend adding GDM T if blood pressure allows.  Follow up.  Deepak Banuelos MD  Cardiologist         [1]   No current facility-administered medications on file prior to encounter.     Current Outpatient Medications on File Prior to Encounter   Medication Sig Dispense Refill    anastrozole (ARIMIDEX) 1 mg Tab Take 1 tablet by mouth once daily.      apixaban (ELIQUIS) 5 mg Tab Take 5 mg by mouth 2 (two) times daily.      aspirin (ECOTRIN) 81 MG EC tablet Take 1 tablet (81 mg total) by mouth once daily. 90 tablet 3    cyproheptadine (PERIACTIN) 4 mg tablet Take 1 tablet by mouth 2 (two) times daily.      dexlansoprazole (DEXILANT) 60 mg capsule Take 60 mg by mouth once daily.      famotidine (PEPCID) 20 MG tablet Take 1 tablet (20 mg total) by mouth once daily. 30 tablet 11    furosemide (LASIX) 20 MG tablet Take 1 tablet (20 mg total) by mouth once daily. (Patient taking differently: Take 40 mg by mouth once daily.) 30 tablet 11    metoprolol succinate (TOPROL-XL) 25 MG 24 hr tablet Take 25 mg by mouth.      pravastatin (PRAVACHOL) 20 MG tablet Take 1 tablet (20 mg total) by mouth every evening. 90 tablet 3

## 2025-05-13 LAB
ALLENS TEST BLOOD GAS (OHS): YES
ANION GAP SERPL CALC-SCNC: 13 MEQ/L
BASE EXCESS BLD CALC-SCNC: 1.6 MMOL/L
BASOPHILS # BLD AUTO: 0.04 X10(3)/MCL
BASOPHILS NFR BLD AUTO: 0.8 %
BLOOD GAS SAMPLE TYPE (OHS): ABNORMAL
BUN SERPL-MCNC: 22.8 MG/DL (ref 9.8–20.1)
CA-I BLD-SCNC: 0.99 MMOL/L (ref 1.12–1.23)
CALCIUM SERPL-MCNC: 7.7 MG/DL (ref 8.4–10.2)
CHLORIDE SERPL-SCNC: 99 MMOL/L (ref 98–107)
CO2 BLDA-SCNC: 25.2 MMOL/L
CO2 SERPL-SCNC: 22 MMOL/L (ref 23–31)
CREAT SERPL-MCNC: 1.16 MG/DL (ref 0.55–1.02)
CREAT/UREA NIT SERPL: 20
DRAWN BY BLOOD GAS (OHS): ABNORMAL
EOSINOPHIL # BLD AUTO: 0.11 X10(3)/MCL (ref 0–0.9)
EOSINOPHIL NFR BLD AUTO: 2.3 %
ERYTHROCYTE [DISTWIDTH] IN BLOOD BY AUTOMATED COUNT: 21.7 % (ref 11.5–17)
GFR SERPLBLD CREATININE-BSD FMLA CKD-EPI: 50 ML/MIN/1.73/M2
GLUCOSE SERPL-MCNC: 96 MG/DL (ref 82–115)
HCO3 BLDA-SCNC: 24.2 MMOL/L (ref 22–26)
HCT VFR BLD AUTO: 39.4 % (ref 37–47)
HGB BLD-MCNC: 13.3 G/DL (ref 12–16)
IMM GRANULOCYTES # BLD AUTO: 0.07 X10(3)/MCL (ref 0–0.04)
IMM GRANULOCYTES NFR BLD AUTO: 1.4 %
LPM (OHS): 2
LYMPHOCYTES # BLD AUTO: 0.95 X10(3)/MCL (ref 0.6–4.6)
LYMPHOCYTES NFR BLD AUTO: 19.6 %
MAGNESIUM SERPL-MCNC: 2.2 MG/DL (ref 1.6–2.6)
MCH RBC QN AUTO: 32.7 PG (ref 27–31)
MCHC RBC AUTO-ENTMCNC: 33.8 G/DL (ref 33–36)
MCV RBC AUTO: 96.8 FL (ref 80–94)
MONOCYTES # BLD AUTO: 0.08 X10(3)/MCL (ref 0.1–1.3)
MONOCYTES NFR BLD AUTO: 1.7 %
NEUTROPHILS # BLD AUTO: 3.59 X10(3)/MCL (ref 2.1–9.2)
NEUTROPHILS NFR BLD AUTO: 74.2 %
NRBC BLD AUTO-RTO: 0 %
OXYGEN DEVICE BLOOD GAS (OHS): ABNORMAL
PCO2 BLDA: 31 MMHG (ref 35–45)
PH BLDA: 7.5 [PH] (ref 7.35–7.45)
PLATELET # BLD AUTO: 122 X10(3)/MCL (ref 130–400)
PLATELETS.RETICULATED NFR BLD AUTO: 5.1 % (ref 0.9–11.2)
PMV BLD AUTO: 12 FL (ref 7.4–10.4)
PO2 BLDA: 70 MMHG (ref 80–100)
POCT GLUCOSE: 136 MG/DL (ref 70–110)
POTASSIUM BLOOD GAS (OHS): 3.8 MMOL/L (ref 3.5–5)
POTASSIUM SERPL-SCNC: 3.8 MMOL/L (ref 3.5–5.1)
RBC # BLD AUTO: 4.07 X10(6)/MCL (ref 4.2–5.4)
SAMPLE SITE BLOOD GAS (OHS): ABNORMAL
SAO2 % BLDA: 95 %
SODIUM BLOOD GAS (OHS): 130 MMOL/L (ref 137–145)
SODIUM SERPL-SCNC: 134 MMOL/L (ref 136–145)
WBC # BLD AUTO: 4.84 X10(3)/MCL (ref 4.5–11.5)

## 2025-05-13 PROCEDURE — 97530 THERAPEUTIC ACTIVITIES: CPT | Mod: CQ

## 2025-05-13 PROCEDURE — 82803 BLOOD GASES ANY COMBINATION: CPT

## 2025-05-13 PROCEDURE — 80048 BASIC METABOLIC PNL TOTAL CA: CPT | Performed by: INTERNAL MEDICINE

## 2025-05-13 PROCEDURE — 36415 COLL VENOUS BLD VENIPUNCTURE: CPT | Performed by: INTERNAL MEDICINE

## 2025-05-13 PROCEDURE — 25000003 PHARM REV CODE 250: Performed by: INTERNAL MEDICINE

## 2025-05-13 PROCEDURE — 21400001 HC TELEMETRY ROOM

## 2025-05-13 PROCEDURE — 63600175 PHARM REV CODE 636 W HCPCS: Performed by: NURSE PRACTITIONER

## 2025-05-13 PROCEDURE — 36600 WITHDRAWAL OF ARTERIAL BLOOD: CPT

## 2025-05-13 PROCEDURE — 97116 GAIT TRAINING THERAPY: CPT | Mod: CQ

## 2025-05-13 PROCEDURE — 99900035 HC TECH TIME PER 15 MIN (STAT)

## 2025-05-13 PROCEDURE — 85025 COMPLETE CBC W/AUTO DIFF WBC: CPT | Performed by: INTERNAL MEDICINE

## 2025-05-13 PROCEDURE — 83735 ASSAY OF MAGNESIUM: CPT | Performed by: NURSE PRACTITIONER

## 2025-05-13 RX ORDER — FUROSEMIDE 40 MG/1
40 TABLET ORAL 2 TIMES DAILY
Status: DISCONTINUED | OUTPATIENT
Start: 2025-05-13 | End: 2025-05-13

## 2025-05-13 RX ADMIN — ENOXAPARIN SODIUM 70 MG: 80 INJECTION SUBCUTANEOUS at 07:05

## 2025-05-13 RX ADMIN — FUROSEMIDE 80 MG: 10 INJECTION, SOLUTION INTRAVENOUS at 10:05

## 2025-05-13 RX ADMIN — SODIUM CHLORIDE 500 ML: 9 INJECTION, SOLUTION INTRAVENOUS at 11:05

## 2025-05-13 NOTE — PROGRESS NOTES
"  [facility]    Cardiology  Progress Note    Patient Name: Laura Jacobs  MRN: [id number]  Admission Date: 5/8/2025  Hospital Length of Stay: 5 days  Code Status: Full Code   Attending Provider: Scooter Davis MD   Consulting Provider: MACHELLE Zimmerman  Primary Care Physician: Ruben Brooks Sr., MD  Principal Problem:<principal problem not specified>    Patient information was obtained from patient, past medical records, ER records, and primary team.     Subjective:   Chief Complaint/Reason for Consult: Concern for Cardiogenic Shock    HPI: Ms. Jacobs is a 74 year old female, known to Dr. Washington, who presented to the hospital with bilateral lower extremity edema, pain, and SOB. Patient with history of NICMO. Noted Significant MR. Lab work significant for creatinine 1.7, bicarb 17, BNP 6700, troponin 0.06, lactic acid 6.0. Urinalysis consistent with UTI. BP initially marginal, but has improved. On Eliquis for history of DVT/PE. Admitted to Hospital Medicine Team. [facility] consulted for cardiac evaluation/management due to concern for impending shock.    Hospital Course:  5.9.25: NAD Noted. On RA, Lying flat in no distress. Marginal diuresis overnight. BP Systolic low normal. SR with intermittent bouts of ST.   5.10.25: NAD. "I am feeling a little better." Fluid Balance Net Negative 2022mL. ST 100s-110s.   5.11.25: BP marginal at times. Good UOP. Remains on Dobutamine and Lasix drip. Renal indices stable.   5.12.25: NAD Noted. Vitals Stable. Diuresing well. Edema is improving. Patient watching TV in no distress. She is awake/alert.  5.13.25: NAD Noted. Vitals Stable. UO slacked off overnight. On RA. Dobutamine infusion going.     PMH: Hypertension/HHD, Dyslipidemia, Palpitations, MO, Breast Cancer, CP, SOB/ROMO, Pulmonary Hypertension, GONZALEZ/CPAP, DVT/PE (Eliquis), OA, CVI, VHD/MR-TR  PSH: Hysterectomy, Pacemaker, Fumur David Placement, Joint Replacement, Mastectomy  Family " History: Father- Brain Aneurysm, Mother- Cancer, Sister- Hypertension  Social History: Tobacco- Negative, Alcohol- Negative, Substance Abuse- Negative     Previous Cardiac Diagnostics:   Venous Doppler (5.8.25):  Negative for deep and superficial vein thrombosis in bilateral lower extremities.    Echocardiogram (3.15.25):  Left Ventricle: The left ventricle is dilated. Wall is thinner than normal. Severe global hypokinesis present. There is severely reduced systolic function with a visually estimated ejection fraction of 25 - 30%.  Right Ventricle: Right ventricular enlargement.  Left Atrium: Severely dilated Agitated saline study of the atrial septum is negative, suggesting absence of intracardiac shunt at the atrial level.  Right Atrium: Right atrium is severely dilated.  Mitral Valve: Mildly thickened leaflets. There is severe regurgitation.  Tricuspid Valve: There is moderate regurgitation.    Echocardiogram (1.20.25):  Left Ventricle: The left ventricle is dilated. Normal wall thickness. Global hypokinesis present. There is moderately reduced systolic function with a visually estimated ejection fraction of 35 - 40%.  Right Ventricle: Normal right ventricular cavity size. Systolic function is normal.  Left Atrium: Left atrium is mildly dilated.  Right Atrium: Right atrium is mildly dilated.  Aortic Valve: The aortic valve is a trileaflet valve. There is mild aortic valve sclerosis.  Mitral Valve: There is mitral annular calcification present. There is moderate to severe regurgitation.  Tricuspid Valve: There is mild regurgitation.  Pulmonic Valve: There is mild regurgitation.  IVC/SVC: Normal venous pressure at 3 mmHg.  Pericardium: There is a trivial effusion adjacent to the right atrium. No indication of cardiac tamponade.     Echocardiogram (3.20.24):  The study quality is average.   The left ventricle is normal in size. Global left ventricular systolic function is normal. The left ventricular ejection  fraction is 55%. The left ventricle diastolic function is impaired (Grade I) with normal left atrial pressure.   Mild calcification of the aortic valve is noted with adequate cuspal excursion.  Mild mitral annular calcification is noted.   Mild (1+) mitral, tricuspid, and pulmonic regurgitation.   The pulmonary artery systolic pressure is 45 mmHg. Evidence of pulmonary hypertension is noted.      Carotid US (11.22.23):  The right internal carotid artery demonstrated no hemodynamically significant stenosis.  There is no substantial right side carotid plaque identified.  The left internal carotid artery demonstrated no hemodynamically significant stenosis.  There is a minimal amount of plaque in the left carotid bulb.  The right vertebral artery is patent with antegrade flow.  The left vertebral artery is patent with antegrade flow.     AMANDA (9.14.22):  LV systolic function, LVEF 30-35%.  Severe global hypokinesis  Mild Tricuspid regurgitation  Moderate to severe Mitral regurgitation  Mild aortic regurgitation.  There is Lambl's excrescences on aortic leaflets that is benign fibrous structure.   No evidence of infection, vegetation or abscess     SICD Placement (3.25.21):  SICD Placement Re: Primary Prevention of SCD.      LHC (1.4.21):  Left main coronary artery normal   Left anterior descending artery normal   Left circumflex artery codominant normal   Right coronary artery codominant normal   Left ventricle dilated with severe left ventricular systolic   dysfunction ejection fraction less than 30%   SUMMARY : Nonischemic dilated cardiomyopathy with severe left ventricular systolic dysfunction.      ETT (6.9.20):  Stress EKG is normal.   Maximal exercise treadmill test (MPHR : 97 %).  The functional capacity is poor (4.6 METs).  The heart rate recovery is normal.   The study quality is below average.      Venogram (10.17.18):  No Critical Venous Compression Noted.     Review of patient's allergies indicates:    Allergen Reactions    Sulfa (sulfonamide antibiotics)      Patient unsure if allergic to Sulfa     No current facility-administered medications on file prior to encounter.     Current Outpatient Medications on File Prior to Encounter   Medication Sig    anastrozole (ARIMIDEX) 1 mg Tab Take 1 tablet by mouth once daily.    apixaban (ELIQUIS) 5 mg Tab Take 5 mg by mouth 2 (two) times daily.    aspirin (ECOTRIN) 81 MG EC tablet Take 1 tablet (81 mg total) by mouth once daily.    cyproheptadine (PERIACTIN) 4 mg tablet Take 1 tablet by mouth 2 (two) times daily.    dexlansoprazole (DEXILANT) 60 mg capsule Take 60 mg by mouth once daily.    famotidine (PEPCID) 20 MG tablet Take 1 tablet (20 mg total) by mouth once daily.    furosemide (LASIX) 20 MG tablet Take 1 tablet (20 mg total) by mouth once daily. (Patient taking differently: Take 40 mg by mouth once daily.)    metoprolol succinate (TOPROL-XL) 25 MG 24 hr tablet Take 25 mg by mouth.    pravastatin (PRAVACHOL) 20 MG tablet Take 1 tablet (20 mg total) by mouth every evening.     Review of Systems   Respiratory:  Negative for chest tightness and shortness of breath.    Cardiovascular:  Negative for chest pain.   All other systems reviewed and are negative.    Objective:     Vital Signs (Most Recent):  Temp: 97.8 °F (36.6 °C) (05/13/25 0747)  Pulse: 92 (05/13/25 0747)  Resp: 18 (05/13/25 0747)  BP: 105/78 (05/13/25 0747)  SpO2: 99 % (05/13/25 0747) Vital Signs (24h Range):  Temp:  [97.4 °F (36.3 °C)-99.1 °F (37.3 °C)] 97.8 °F (36.6 °C)  Pulse:  [] 92  Resp:  [18] 18  SpO2:  [91 %-99 %] 99 %  BP: (105-125)/(56-78) 105/78   Weight: 75.2 kg (165 lb 11.2 oz)  Body mass index is 28.43 kg/m².  SpO2: 99 %       Intake/Output Summary (Last 24 hours) at 5/13/2025 1012  Last data filed at 5/13/2025 0400  Gross per 24 hour   Intake 100 ml   Output 300 ml   Net -200 ml     Lines/Drains/Airways       Peripherally Inserted Central Catheter Line  Duration             PICC  "Double Lumen 05/09/25 0853 right brachial 4 days              Drain  Duration                  Urethral Catheter 05/08/25 1601 4 days                  Significant Labs:   Chemistries:   Recent Labs   Lab 05/08/25  0218 05/08/25  0218 05/08/25  0618 05/08/25  1539 05/09/25  0351 05/10/25  0446 05/10/25  1742 05/11/25  0702 05/12/25  0423 05/13/25  0441     --  138  --  140 141  --  141 139 134*   K 3.6  --  2.6* 3.8 4.1 3.4* 4.3 4.2 4.0 3.8     --  100  --  103 107  --  102 102 99   CO2 17*  --  23  --  19* 22*  --  24 23 22*   BUN 34.4*  --  34.0*  --  35.3* 30.4*  --  25.5* 20.7* 22.8*   CREATININE 1.71*  --  1.78*  --  1.64* 1.34*  --  1.23* 1.14* 1.16*   CALCIUM 8.3*  --  8.0*  --  7.9* 8.0*  --  8.1* 7.8* 7.7*   PROT 6.7  --  5.6*  --   --   --   --  5.4*  --   --    BILITOT 4.0*  --  3.9*  --   --   --   --  2.5*  --   --    ALKPHOS 126  --  107  --   --   --   --  100  --   --    ALT 35  --  30  --   --   --   --  23  --   --    AST 28  --  23  --   --   --   --  21  --   --    MG  --    < > 1.80  --  1.80 2.00 2.00 1.90 1.80 2.20   PHOS  --   --   --   --  3.1 2.9  --   --   --   --    TROPONINI 0.063*  --   --  0.065* 0.047*  --   --   --   --   --     < > = values in this interval not displayed.        CBC/Anemia Labs: Coags:    Recent Labs   Lab 05/10/25  0446 05/11/25  0702 05/13/25  0441   WBC 5.66 5.57 4.84   HGB 13.4 13.3 13.3   HCT 40.3 39.9 39.4   * 117* 122*   MCV 97.1* 96.6* 96.8*   RDW 21.7* 21.9* 21.7*    No results for input(s): "PT", "INR", "APTT" in the last 168 hours.     Telemetry: SR    Physical Exam  Vitals and nursing note reviewed.   Constitutional:       General: She is not in acute distress.     Appearance: She is obese.   Cardiovascular:      Rate and Rhythm: Normal rate and regular rhythm.   Pulmonary:      Effort: Pulmonary effort is normal. No respiratory distress.   Musculoskeletal:      Right lower leg: Edema present.      Left lower leg: Edema present. "   Skin:     General: Skin is warm and dry.   Neurological:      Mental Status: She is alert.       Home Medications:   Medications Ordered Prior to Encounter[1]  Current Schedule Inpatient Medications:   enoxparin  1 mg/kg Subcutaneous Q24H (prophylaxis, 1700)    furosemide  40 mg Oral BID    mupirocin   Nasal BID         Assessment:   Acute on Chronic Systolic HF/EF 25-30% (Clinically Improving)    - EF 25-30% (ECHO 3.15.25)    - Lactic Acidosis - Improving   Nonischemic Cardiomyopathy    - EF 35-40% (Echo 1/2025)    - Status Post SICD    - Normal Coronaries in 2021/ETT (6/202): Normal  NSTEMI Type II due to Decompensated HF/Fluid Overload  Valvular Heart Disease    - MR: Severe, TR: Moderate  Hypertension/Hypertensive Heart Disease (BP Stable)  Pulmonary Hypertension  Dyslipidemia    - On Statin  Chronic VI/Edema- Lymphedema  GONZALEZ/CPAP  History of Breast Cancer/Mastectomy  History of DVT/PE (Previously Treated with Xarelto)- Now on Eliquis  OA  UTI  Thrombocytopenia - Stable     Plan:   DC Dobutamine Infusion  Transition to Lasix 40 Mg PO BID  Ensure Accurate I&Os and Daily Weights  Resume Beta Blocker and will gradually resume GDMT as BP Permits.  Check Echo today (off Dobutamine)    MACHELLE Zimmerman  Cardiology  [facility]     Physician addendum:        Patient's cardiac care is performed as a split-shared visit with MAGDY d/t complicated medical management as detailed in A/P and associated high acuity requiring physician expertise. I obtained and performed relevant components of history/exam. Medical decision-making is formulated by me. It is a pleasure to care for the patient.    Moy Pérez MD  Cardiology          [1]   No current facility-administered medications on file prior to encounter.     Current Outpatient Medications on File Prior to Encounter   Medication Sig Dispense Refill    anastrozole (ARIMIDEX) 1 mg Tab Take 1 tablet by mouth once daily.      apixaban (ELIQUIS)  5 mg Tab Take 5 mg by mouth 2 (two) times daily.      aspirin (ECOTRIN) 81 MG EC tablet Take 1 tablet (81 mg total) by mouth once daily. 90 tablet 3    cyproheptadine (PERIACTIN) 4 mg tablet Take 1 tablet by mouth 2 (two) times daily.      dexlansoprazole (DEXILANT) 60 mg capsule Take 60 mg by mouth once daily.      famotidine (PEPCID) 20 MG tablet Take 1 tablet (20 mg total) by mouth once daily. 30 tablet 11    furosemide (LASIX) 20 MG tablet Take 1 tablet (20 mg total) by mouth once daily. (Patient taking differently: Take 40 mg by mouth once daily.) 30 tablet 11    metoprolol succinate (TOPROL-XL) 25 MG 24 hr tablet Take 25 mg by mouth.      pravastatin (PRAVACHOL) 20 MG tablet Take 1 tablet (20 mg total) by mouth every evening. 90 tablet 3

## 2025-05-13 NOTE — CODE/ RAPID DOCUMENTATION
RRT called at 1115. Assessed pt at bedside. Pt hypotensive and lethergic. Pt received 80mg lasix this morning and got hypotensive while working with PT. MD made aware. 500 ml bolus NS put in. ABG and other new orders also put in. After about half of the bolus pt BP came up to 102/67 and was more awake answering questions and talking with staff. Please call ICU if need anything later.

## 2025-05-13 NOTE — PROGRESS NOTES
[facility] Hospital Medicine Progress Note        Chief Complaint: Inpatient Follow-up for HF    HPI:   74-year-old female with a past known medical history of breast cancer status post left mastectomy and status post chemo now and remission, chronic systolic heart failure, diabetes mellitus, CKD 3, hemorrhoids, celiac artery stenosis severe, pulmonary embolism on Eliquis.    She presented to the emergency department with complaints of bilateral leg swelling she reports of 3 to 4 weeks, also intermittent shortness a breath but unable to elaborate if it is orthopnea related, reports she sleeps with pillows.  No chest pain, no abdominal complaints.  In the emergency department her initial blood pressure was hypertensive systolic 164 however since then has been 90s systolic.  Lab work significant for creatinine 1.7, bicarb 17, BNP 6700, troponin 0.06, lactic acid 6.0.  Urinalysis consistent with UTI.  Patient received vancomycin and Zosyn while in the emergency department.  She also required a 500 cc bolus due to hypotension in the ER.  ECHO done 3/15/25 EF 25-30%/      After being admitted patient had persistent hypotension with some lactic acidosis, cardiology team evaluated the patient, she was started on dobutamine drip along with Lasix drip. Later iv dobutamine was stopped and lasic was switched to po 40 mg BID.     Interval Hx:   Patient today awake and comfortable. Was getting her central line cleaned. She denied any SOB, chest pain, fever or chills.     Later got a call from the nurse that patients BP dropped when she participated with PT.     She was given 500 cc iv NS x 1.     No family at bedside.     Case was discussed with patient's nurse and  on the floor.    Objective/physical exam:  General: In no acute distress  Chest: Clear to auscultation bilaterally  Heart: RRR, +S1, S2, no appreciable murmur  Abdomen: Soft, nontender, BS +  Neurologic: Cranial nerve II-XII  intact, Strength 5/5 in all 4 extremities    VITAL SIGNS: 24 HRS MIN & MAX LAST   Temp  Min: 97.4 °F (36.3 °C)  Max: 99.1 °F (37.3 °C) 97.8 °F (36.6 °C)   BP  Min: 82/60  Max: 125/68 (!) 89/66   Pulse  Min: 86  Max: 104  86   Resp  Min: 18  Max: 18 18   SpO2  Min: 91 %  Max: 99 % 96 %     I have reviewed the following labs:  Recent Labs   Lab 05/10/25  0446 05/11/25  0702 05/13/25  0441   WBC 5.66 5.57 4.84   RBC 4.15* 4.13* 4.07*   HGB 13.4 13.3 13.3   HCT 40.3 39.9 39.4   MCV 97.1* 96.6* 96.8*   MCH 32.3* 32.2* 32.7*   MCHC 33.3 33.3 33.8   RDW 21.7* 21.9* 21.7*   * 117* 122*   MPV 12.1* 11.8* 12.0*     Recent Labs   Lab 05/08/25  0218 05/08/25  0618 05/08/25  1539 05/10/25  0446 05/10/25  1742 05/11/25  0702 05/12/25  0423 05/13/25  0441    138   < > 141  --  141 139 134*   K 3.6 2.6*   < > 3.4*   < > 4.2 4.0 3.8    100   < > 107  --  102 102 99   CO2 17* 23   < > 22*  --  24 23 22*   BUN 34.4* 34.0*   < > 30.4*  --  25.5* 20.7* 22.8*   CREATININE 1.71* 1.78*   < > 1.34*  --  1.23* 1.14* 1.16*   GLU 87 138*   < > 96  --  90 95 96   CALCIUM 8.3* 8.0*   < > 8.0*  --  8.1* 7.8* 7.7*   MG  --  1.80   < > 2.00   < > 1.90 1.80 2.20   ALBUMIN 3.0* 2.7*  --  2.4*  --  2.4*  --   --    PROT 6.7 5.6*  --   --   --  5.4*  --   --    ALKPHOS 126 107  --   --   --  100  --   --    ALT 35 30  --   --   --  23  --   --    AST 28 23  --   --   --  21  --   --    BILITOT 4.0* 3.9*  --   --   --  2.5*  --   --     < > = values in this interval not displayed.     Microbiology Results (last 7 days)       Procedure Component Value Units Date/Time    Blood Culture #2 **CANNOT BE ORDERED STAT** [[id number]]  (Normal) Collected: 05/09/25 0400    Order Status: Completed Specimen: Blood from Hand, Left Updated: 05/13/25 0502     Blood Culture No Growth At 96 Hours    Blood Culture #1 **CANNOT BE ORDERED STAT** [[id number]]  (Normal) Collected: 05/09/25 0351    Order Status: Completed Specimen: Blood from Wrist, Right  Updated: 05/13/25 0426     Blood Culture No Growth At 96 Hours    Urine culture [[id number]]  (Abnormal)  (Susceptibility) Collected: 05/08/25 0810    Order Status: Completed Specimen: Urine, Clean Catch Updated: 05/11/25 0721     Urine Culture >/= 100,000 colonies/ml Escherichia coli      25,000-50,000 colonies/ml Klebsiella oxytoca             See below for Radiology    Assessment/Plan:  Acute on chronic HFrEF 25%-decompensated,Impending cardiogenic shock  Lactic acidosis: resolved   Mild hypotension   Valvular heart disease-severe MR, moderate TR  NSTEMI suspected type 2 due to decompensated heart failure   asymptomatic bacteriuria  Thrombocytopenia stable  Subclinical hypothyroidism    Plan:  Patient was comfortable this morning  Later became hypotensive, She was on lasix 80 mg iv q 12 hrs  This was stopped. She was given 500 cc iv NS bolus  Bedrest for today     LE edema is now much better    Will hold lasix. Off dobutamine   Will closely monitor patients daily weight, urine out put, renal parameters and volume status      Reviewed today's labs and  WBC 4.84, Hb 13.3, Plt 122, Na 134, K 3.8, Crt 1.16    Continue supportive care     Critical care note:  Critical care diagnosis: Hypotension needing iv fluid bolus   Critical care interventions: Hands-on evaluation, review of labs/radiographs/records and discussion with patient and family if present  Critical care time spent: 35 minutes     VTE prophylaxis: FD lovenox     Patient condition:  Guarded    Anticipated discharge and Disposition:   Home with  vs TCU      All diagnosis and differential diagnosis have been reviewed; assessment and plan has been documented; I have personally reviewed the labs and test results that are presently available; I have reviewed the patients medication list; I have reviewed the consulting providers response and recommendations. I have reviewed or attempted to review medical records based upon their availability    All of the  patient's questions have been  addressed and answered. Patient's is agreeable to the above stated plan. I will continue to monitor closely and make adjustments to medical management as needed.    Portions of this note dictated using EMR integrated voice recognition software, and may be subject to voice recognition errors not corrected at proofreading. Please contact writer for clarification if needed.   _____________________________________________________________________    Malnutrition Status:  Nutrition consulted. Most recent weight and BMI monitored-     Measurements:  Wt Readings from Last 1 Encounters:   05/13/25 75.2 kg (165 lb 11.2 oz)   Body mass index is 28.43 kg/m².    Patient has been screened and assessed by RD.    Malnutrition Type:  Context:    Level: other (see comments) (Does not meet criteria)    Malnutrition Characteristic Summary:  Weight Loss (Malnutrition): other (see comments) (Does not meet criteria)  Fluid Accumulation (Malnutrition): moderate to severe    Interventions/Recommendations (treatment strategy):        Scheduled Med:   enoxparin  1 mg/kg Subcutaneous Q24H (prophylaxis, 1700)    furosemide  40 mg Oral BID    mupirocin   Nasal BID    sodium chloride 0.9%  500 mL Intravenous Once      Continuous Infusions:       PRN Meds:    Current Facility-Administered Medications:     acetaminophen, 1,000 mg, Oral, Q8H PRN    acetaminophen, 650 mg, Oral, Q4H PRN    aluminum-magnesium hydroxide-simethicone, 30 mL, Oral, QID PRN    bisacodyL, 10 mg, Rectal, Daily PRN    dextrose 50%, 12.5 g, Intravenous, PRN    dextrose 50%, 25 g, Intravenous, PRN    glucagon (human recombinant), 1 mg, Intramuscular, PRN    glucose, 16 g, Oral, PRN    glucose, 24 g, Oral, PRN    melatonin, 6 mg, Oral, Nightly PRN    polyethylene glycol, 17 g, Oral, BID PRN    sodium chloride 0.9%, 10 mL, Intravenous, PRN    Flushing PICC/Midline Protocol, , , Until Discontinued **AND** sodium chloride 0.9%, 10 mL, Intravenous, Q12H  PRN     Radiology:  I have personally reviewed the following imaging and agree with the radiologist.     CV Ultrasound doppler venous legs bilat  Negative for deep and superficial vein thrombosis in bilateral lower   extremities.      Scooter Davis MD  Department of Hospital Medicine   [facility]   05/13/2025

## 2025-05-13 NOTE — PT/OT/SLP PROGRESS
Physical Therapy Treatment    Patient Name:  Laura Jacobs   MRN:  [id number]    Recommendations:     Discharge therapy intensity: Moderate Intensity Therapy   Discharge Equipment Recommendations: walker, rolling  Barriers to discharge: Impaired mobility and Ongoing medical needs    Assessment:     Laura Jacobs is a 74 y.o. female admitted with a medical diagnosis of  bilateral leg swelling. Prior to admit, patient lived with son in a [facility]. At baseline, she is independent and ambulates with a cane. She presents with the following impairments/functional limitations: weakness, impaired endurance, impaired self care skills, impaired functional mobility, gait instability, impaired balance, decreased safety awareness, edema.     Pt awake and alert upon entry. Pt able to get EOB, stand and ambulate a short distance with overall Ariana-CGA. Pt started to ambulated toward hallway stopping x2 for resting break. Pt putting head down became less verbal and started moving slower. Pt assisted to seated position, pt became more lethargic however remained to be arousable. Multiple attempts were made to obtain a blood pressure using different blood pressure cuffs, different locations, and different machines. BP assessed at 80/58, multiple nurses present. Pt assisted back to bed via lateral t/f. Pt left in supine with legs elevated, wedge under LEs (bed unable to be trended), multiple nurses present.     Rehab Prognosis: Good; patient would benefit from acute skilled PT services to address these deficits and reach maximum level of function.    Recent Surgery: * No surgery found *      Plan:     During this hospitalization, patient would benefit from acute PT services 5 x/week to address the identified rehab impairments via gait training, therapeutic activities, therapeutic exercises, neuromuscular re-education and progress toward the following goals:    Plan of Care Expires:  06/12/25    Subjective     Chief Complaint:  none stated  Patient/Family Comments/goals: to walk  Pain/Comfort:  Pain Rating 1:  (none stated)      Objective:     Communicated with nursing prior to session.  Patient found HOB elevated with telemetry, yeager catheter, pulse ox (continuous), PICC line upon PT entry to room.     General Precautions: Standard, fall  Orthopedic Precautions: N/A  Braces: N/A  Respiratory Status: Room air  Blood Pressure: multiple attempts were made using different blood pressure cuffs, different locations, and different machines   Seated for several minutes after ambulation: 80/58   Supine post lateral t/f back to bed: 83/58    Functional Mobility:  Bed Mobility:     Supine to Sit: minimum assistance  Transfers:     Sit to Stand:  contact guard assistance with rolling walker  Chair to mat: total assistance and of 4+ persons with  no AD  using  lateral transfer  Gait: 10' w/RW, CGA  X3 standing breaks    Therapeutic Activities/Exercises:      Education:  Patient provided with verbal education education regarding PT role/goals/POC, fall prevention, and safety awareness.  Understanding was verbalized, however additional teaching warranted.     Patient left supine with knees elevated and legs on wedge(bed unable to trend) with all lines intact, call button in reach, and multiple nurses present    GOALS:   Multidisciplinary Problems       Physical Therapy Goals          Problem: Physical Therapy    Goal Priority Disciplines Outcome Interventions   Physical Therapy Goal     PT, PT/OT Progressing    Description: Goals to be met by: 25     Patient will increase functional independence with mobility by performin. Supine to sit with [place]  2. Sit to supine with [place]  3. Sit to stand transfer with Modified [place]  4. Gait  x 200 feet with Modified [place] using Rolling Walker.   5. Increased functional strength to 5/5 in BLE's.                         Time Tracking:     PT Received On: 25  PT Start  Time: 1044     PT Stop Time: 1117  PT Total Time (min): 33 min     Billable Minutes: Gait Training 1 unit and Therapeutic Activity 1 units    Treatment Type: Treatment  PT/PTA: PTA     Number of PTA visits since last PT visit: 1 05/13/2025

## 2025-05-13 NOTE — PROGRESS NOTES
Inpatient Nutrition Assessment    Admit Date: 5/8/2025   Total duration of encounter: 5 days   Patient Age: 74 y.o.    Nutrition Recommendation/Prescription     Continue oral diet as tolerated; Diet Heart Healthy Consistent Carbohydrate; 2000 Calories (up to 75 gm per meal); Standard Tray     Communication of Recommendations: reviewed with patient    Nutrition Assessment     Malnutrition Assessment/Nutrition-Focused Physical Exam       Malnutrition Level: other (see comments) (Unable to assess) (05/09/25 1355)     Weight Loss (Malnutrition): other (see comments) (Unable to assess) (05/09/25 1355)                                         Fluid Accumulation (Malnutrition): moderate to severe (05/09/25 1355)        A minimum of two characteristics is recommended for diagnosis of either severe or non-severe malnutrition.    Chart Review    Reason Seen: continuous nutrition monitoring    Malnutrition Screening Tool Results   Have you recently lost weight without trying?: Yes: 2-13 lbs  Have you been eating poorly because of a decreased appetite?: No   MST Score: 1   Diagnosis:  Acute on chronic HFrEF 25%-decompensated  Valvular heart disease-severe MR, moderate TR  Impending cardiogenic shock  Possible urinary tract infection-asymptomatic  Subclinical hypothyroidism    Relevant Medical History: breast cancer status post left mastectomy and status post chemo now and remission, chronic systolic heart failure, diabetes mellitus, CKD 3, hemorrhoids, celiac artery stenosis severe, pulmonary embolism on Eliquis.     Scheduled Medications:  enoxparin, 1 mg/kg, Q24H (prophylaxis, 1700)  furosemide, 40 mg, BID  mupirocin, , BID    Continuous Infusions:     PRN Medications:  acetaminophen, 1,000 mg, Q8H PRN  acetaminophen, 650 mg, Q4H PRN  aluminum-magnesium hydroxide-simethicone, 30 mL, QID PRN  bisacodyL, 10 mg, Daily PRN  dextrose 50%, 12.5 g, PRN  dextrose 50%, 25 g, PRN  glucagon (human recombinant), 1 mg, PRN  glucose, 16 g,  PRN  glucose, 24 g, PRN  melatonin, 6 mg, Nightly PRN  polyethylene glycol, 17 g, BID PRN  sodium chloride 0.9%, 10 mL, PRN  sodium chloride 0.9%, 10 mL, Q12H PRN    Calorie Containing IV Medications: no significant kcals from medications at this time    Recent Labs   Lab 05/08/25  0218 05/08/25  0618 05/08/25  1539 05/09/25  0351 05/10/25  0446 05/10/25  1742 05/11/25  0702 05/12/25  0423 05/13/25  0441    138  --  140 141  --  141 139 134*   K 3.6 2.6* 3.8 4.1 3.4* 4.3 4.2 4.0 3.8   CALCIUM 8.3* 8.0*  --  7.9* 8.0*  --  8.1* 7.8* 7.7*   PHOS  --   --   --  3.1 2.9  --   --   --   --    MG  --  1.80  --  1.80 2.00 2.00 1.90 1.80 2.20    100  --  103 107  --  102 102 99   CO2 17* 23  --  19* 22*  --  24 23 22*   BUN 34.4* 34.0*  --  35.3* 30.4*  --  25.5* 20.7* 22.8*   CREATININE 1.71* 1.78*  --  1.64* 1.34*  --  1.23* 1.14* 1.16*   EGFRNORACEVR 32 30  --  33 42  --  47 51 50   GLU 87 138*  --  112 96  --  90 95 96   BILITOT 4.0* 3.9*  --   --   --   --  2.5*  --   --    ALKPHOS 126 107  --   --   --   --  100  --   --    ALT 35 30  --   --   --   --  23  --   --    AST 28 23  --   --   --   --  21  --   --    ALBUMIN 3.0* 2.7*  --   --  2.4*  --  2.4*  --   --    WBC 6.71 8.46  --  7.13 5.66  --  5.57  --  4.84   HGB 15.2 13.4  --  13.8 13.4  --  13.3  --  13.3   HCT 47.2* 40.8  --  41.7 40.3  --  39.9  --  39.4     Nutrition Orders:  Diet Heart Healthy Consistent Carbohydrate; 2000 Calories (up to 75 gm per meal); Standard Tray      Appetite/Oral Intake: good/50-75% of meals  Factors Affecting Nutritional Intake: none identified  Social Needs Impacting Access to Food: none identified  Food/Orthodox/Cultural Preferences: none reported  Food Allergies: no known food allergies  Last Bowel Movement: 05/12/25  Wound(s):      Comments    5/9/25 Pt tolerating oral diet, weight fluctuations noted in EMR, possibly due to fluid and/or error in weights, will monitor.    5/13/25 Pt reports good appetite and  "intake of meals, no unintentional weight loss    Anthropometrics    Height: 5' 4.02" (162.6 cm), Height Method: Stated  Last Weight: 75.2 kg (165 lb 11.2 oz) (05/13/25 0438), Weight Method: Bed Scale  BMI (Calculated): 28.4  BMI Classification: overweight (BMI 25-29.9)     Ideal Body Weight (IBW), Female: 120.1 lb     % Ideal Body Weight, Female (lb): 124.17 %                             Usual Weight Provided By: EMR weight history    Wt Readings from Last 5 Encounters:   05/13/25 75.2 kg (165 lb 11.2 oz)   03/15/25 81.6 kg (179 lb 14.3 oz)   03/13/25 81.9 kg (180 lb 8.9 oz)   01/19/25 59 kg (130 lb)   01/25/24 59 kg (130 lb)     Weight Change(s) Since Admission:   Wt Readings from Last 1 Encounters:   05/13/25 0438 75.2 kg (165 lb 11.2 oz)   05/12/25 0503 78.5 kg (173 lb 1 oz)   05/11/25 0503 78.5 kg (173 lb 1 oz)   05/08/25 0103 67.6 kg (149 lb)   Admit Weight: 67.6 kg (149 lb) (05/08/25 0103), Weight Method: Bed Scale    Estimated Needs    Weight Used For Calorie Calculations: 67.6 kg (149 lb 0.5 oz)  Energy Calorie Requirements (kcal): 1400 kcal (1.2 stress factor)  Energy Need Method: [place]-St Jeor  Weight Used For Protein Calculations: 67.6 kg (149 lb 0.5 oz)  Protein Requirements: 74gm (1.1 gm/kg)  Fluid Requirements (mL): 1400 ml (1 ml/kcal)        Enteral Nutrition     Patient not receiving enteral nutrition at this time.    Parenteral Nutrition     Patient not receiving parenteral nutrition support at this time.    Evaluation of Received Nutrient Intake    Calories: meeting estimated needs  Protein: meeting estimated needs    Patient Education     Not applicable.    Nutrition Diagnosis     PES: Unintended weight gain related to acute illness as evidenced by moderate to severe lower extremity and upper extremity edema. (active)     PES:            Nutrition Interventions     Intervention(s): general/healthful diet  Intervention(s):      Goal: Consume % of meals/snacks by follow-up. (goal " met)      Nutrition Goals & Monitoring     Dietitian will monitor: food and beverage intake and weight change  Discharge planning: resume home regimen  Nutrition Risk/Follow-Up: dietitian will follow-up one time per week   Please consult if re-assessment needed sooner.

## 2025-05-14 LAB
ALBUMIN SERPL-MCNC: 2.4 G/DL (ref 3.4–4.8)
ALBUMIN/GLOB SERPL: 0.7 RATIO (ref 1.1–2)
ALP SERPL-CCNC: 119 UNIT/L (ref 40–150)
ALT SERPL-CCNC: 23 UNIT/L (ref 0–55)
ANION GAP SERPL CALC-SCNC: 13 MEQ/L
APICAL FOUR CHAMBER EJECTION FRACTION: 23 %
APICAL TWO CHAMBER EJECTION FRACTION: 12 %
AST SERPL-CCNC: 24 UNIT/L (ref 11–45)
AV INDEX (PROSTH): 0.45
AV MEAN GRADIENT: 2 MMHG
AV PEAK GRADIENT: 3 MMHG
AV REGURGITATION PRESSURE HALF TIME: 259 MS
AV VALVE AREA BY VELOCITY RATIO: 1.4 CM²
AV VALVE AREA: 1.2 CM²
AV VELOCITY RATIO: 0.56
BACTERIA BLD CULT: NORMAL
BACTERIA BLD CULT: NORMAL
BASOPHILS # BLD AUTO: 0.06 X10(3)/MCL
BASOPHILS NFR BLD AUTO: 1.1 %
BILIRUB SERPL-MCNC: 3.2 MG/DL
BSA FOR ECHO PROCEDURE: 1.75 M2
BUN SERPL-MCNC: 25.1 MG/DL (ref 9.8–20.1)
CALCIUM SERPL-MCNC: 7.8 MG/DL (ref 8.4–10.2)
CHLORIDE SERPL-SCNC: 100 MMOL/L (ref 98–107)
CO2 SERPL-SCNC: 20 MMOL/L (ref 23–31)
CREAT SERPL-MCNC: 1.32 MG/DL (ref 0.55–1.02)
CREAT/UREA NIT SERPL: 19
CV ECHO LV RWT: 0.43 CM
DOP CALC AO PEAK VEL: 0.9 M/S
DOP CALC AO VTI: 13.7 CM
DOP CALC LVOT AREA: 2.5 CM2
DOP CALC LVOT DIAMETER: 1.8 CM
DOP CALC LVOT PEAK VEL: 0.5 M/S
DOP CALC LVOT STROKE VOLUME: 15.8 CM3
DOP CALC MV VTI: 19.5 CM
DOP CALCLVOT PEAK VEL VTI: 6.2 CM
ECHO LV POSTERIOR WALL: 1.1 CM (ref 0.6–1.1)
EOSINOPHIL # BLD AUTO: 0.11 X10(3)/MCL (ref 0–0.9)
EOSINOPHIL NFR BLD AUTO: 2 %
ERYTHROCYTE [DISTWIDTH] IN BLOOD BY AUTOMATED COUNT: 21.8 % (ref 11.5–17)
FLUAV AG UPPER RESP QL IA.RAPID: NOT DETECTED
FLUBV AG UPPER RESP QL IA.RAPID: NOT DETECTED
FRACTIONAL SHORTENING: 3.9 % (ref 28–44)
GFR SERPLBLD CREATININE-BSD FMLA CKD-EPI: 43 ML/MIN/1.73/M2
GLOBULIN SER-MCNC: 3.5 GM/DL (ref 2.4–3.5)
GLUCOSE SERPL-MCNC: 84 MG/DL (ref 82–115)
HCT VFR BLD AUTO: 40.7 % (ref 37–47)
HGB BLD-MCNC: 13.4 G/DL (ref 12–16)
IMM GRANULOCYTES # BLD AUTO: 0.13 X10(3)/MCL (ref 0–0.04)
IMM GRANULOCYTES NFR BLD AUTO: 2.3 %
INTERVENTRICULAR SEPTUM: 0.9 CM (ref 0.6–1.1)
IVC DIAMETER: 2.2 CM
LEFT ATRIUM AREA SYSTOLIC (APICAL 2 CHAMBER): 20.1 CM2
LEFT ATRIUM AREA SYSTOLIC (APICAL 4 CHAMBER): 24.2 CM2
LEFT ATRIUM SIZE: 4.7 CM
LEFT ATRIUM VOLUME INDEX MOD: 39 ML/M2
LEFT ATRIUM VOLUME MOD: 72 ML
LEFT INTERNAL DIMENSION IN SYSTOLE: 4.9 CM (ref 2.1–4)
LEFT VENTRICLE DIASTOLIC VOLUME INDEX: 67.39 ML/M2
LEFT VENTRICLE DIASTOLIC VOLUME: 124 ML
LEFT VENTRICLE END DIASTOLIC VOLUME APICAL 2 CHAMBER: 104 ML
LEFT VENTRICLE END DIASTOLIC VOLUME APICAL 4 CHAMBER: 75 ML
LEFT VENTRICLE END SYSTOLIC VOLUME APICAL 2 CHAMBER: 59.8 ML
LEFT VENTRICLE END SYSTOLIC VOLUME APICAL 4 CHAMBER: 76.5 ML
LEFT VENTRICLE MASS INDEX: 102.2 G/M2
LEFT VENTRICLE SYSTOLIC VOLUME INDEX: 61.4 ML/M2
LEFT VENTRICLE SYSTOLIC VOLUME: 113 ML
LEFT VENTRICULAR INTERNAL DIMENSION IN DIASTOLE: 5.1 CM (ref 3.5–6)
LEFT VENTRICULAR MASS: 188 G
LVED V (TEICH): 124 ML
LVES V (TEICH): 113 ML
LVOT MG: 1 MMHG
LVOT MV: 0.33 CM/S
LYMPHOCYTES # BLD AUTO: 1.15 X10(3)/MCL (ref 0.6–4.6)
LYMPHOCYTES NFR BLD AUTO: 20.5 %
MAGNESIUM SERPL-MCNC: 2.1 MG/DL (ref 1.6–2.6)
MCH RBC QN AUTO: 32 PG (ref 27–31)
MCHC RBC AUTO-ENTMCNC: 32.9 G/DL (ref 33–36)
MCV RBC AUTO: 97.1 FL (ref 80–94)
MONOCYTES # BLD AUTO: 0.17 X10(3)/MCL (ref 0.1–1.3)
MONOCYTES NFR BLD AUTO: 3 %
MRSA PCR SCRN (OHS): NOT DETECTED
MV MEAN GRADIENT: 3 MMHG
MV PEAK GRADIENT: 6 MMHG
MV STENOSIS PRESSURE HALF TIME: 68 MS
MV VALVE AREA BY CONTINUITY EQUATION: 0.81 CM2
MV VALVE AREA P 1/2 METHOD: 3.24 CM2
NEUTROPHILS # BLD AUTO: 3.98 X10(3)/MCL (ref 2.1–9.2)
NEUTROPHILS NFR BLD AUTO: 71.1 %
NRBC BLD AUTO-RTO: 0.5 %
OHS CV RV/LV RATIO: 0.65 CM
OHS LV EJECTION FRACTION SIMPSONS BIPLANE MOD: 16 %
PISA AR MAX VEL: 2.22 M/S
PISA TR MAX VEL: 2.5 M/S
PLATELET # BLD AUTO: 136 X10(3)/MCL (ref 130–400)
PLATELETS.RETICULATED NFR BLD AUTO: 6.6 % (ref 0.9–11.2)
PMV BLD AUTO: 12.2 FL (ref 7.4–10.4)
POTASSIUM SERPL-SCNC: 4.5 MMOL/L (ref 3.5–5.1)
PROT SERPL-MCNC: 5.9 GM/DL (ref 5.8–7.6)
PV PEAK GRADIENT: 1 MMHG
PV PEAK VELOCITY: 0.6 M/S
RA MAJOR: 7.3 CM
RA WIDTH: 5.5 CM
RBC # BLD AUTO: 4.19 X10(6)/MCL (ref 4.2–5.4)
RIGHT VENTRICLE DIASTOLIC BASEL DIMENSION: 3.3 CM
RIGHT VENTRICLE DIASTOLIC LENGTH: 7.7 CM
RIGHT VENTRICLE DIASTOLIC MID DIMENSION: 3.7 CM
RIGHT VENTRICULAR END-DIASTOLIC DIMENSION: 3.3 CM
RIGHT VENTRICULAR LENGTH IN DIASTOLE (APICAL 4-CHAMBER VIEW): 7.7 CM
RSV A 5' UTR RNA NPH QL NAA+PROBE: NOT DETECTED
RV MID DIAMA: 3.7 CM
SARS-COV-2 RNA RESP QL NAA+PROBE: NOT DETECTED
SODIUM SERPL-SCNC: 133 MMOL/L (ref 136–145)
TDI LATERAL: 0.1 M/S
TDI SEPTAL: 0.05 M/S
TDI: 0.08 M/S
TR MAX PG: 26 MMHG
TRICUSPID ANNULAR PLANE SYSTOLIC EXCURSION: 0.6 CM
WBC # BLD AUTO: 5.6 X10(3)/MCL (ref 4.5–11.5)
Z-SCORE OF LEFT VENTRICULAR DIMENSION IN END DIASTOLE: -0.01
Z-SCORE OF LEFT VENTRICULAR DIMENSION IN END SYSTOLE: 3.49

## 2025-05-14 PROCEDURE — 27000221 HC OXYGEN, UP TO 24 HOURS

## 2025-05-14 PROCEDURE — 80053 COMPREHEN METABOLIC PANEL: CPT | Performed by: NURSE PRACTITIONER

## 2025-05-14 PROCEDURE — 94760 N-INVAS EAR/PLS OXIMETRY 1: CPT

## 2025-05-14 PROCEDURE — 87641 MR-STAPH DNA AMP PROBE: CPT | Performed by: NURSE PRACTITIONER

## 2025-05-14 PROCEDURE — 99900035 HC TECH TIME PER 15 MIN (STAT)

## 2025-05-14 PROCEDURE — 63600175 PHARM REV CODE 636 W HCPCS: Performed by: NURSE PRACTITIONER

## 2025-05-14 PROCEDURE — 83735 ASSAY OF MAGNESIUM: CPT | Performed by: NURSE PRACTITIONER

## 2025-05-14 PROCEDURE — 25000003 PHARM REV CODE 250: Performed by: NURSE PRACTITIONER

## 2025-05-14 PROCEDURE — 87040 BLOOD CULTURE FOR BACTERIA: CPT | Performed by: NURSE PRACTITIONER

## 2025-05-14 PROCEDURE — 0241U COVID/RSV/FLU A&B PCR: CPT | Performed by: NURSE PRACTITIONER

## 2025-05-14 PROCEDURE — 85025 COMPLETE CBC W/AUTO DIFF WBC: CPT | Performed by: NURSE PRACTITIONER

## 2025-05-14 PROCEDURE — 36415 COLL VENOUS BLD VENIPUNCTURE: CPT | Performed by: NURSE PRACTITIONER

## 2025-05-14 PROCEDURE — 21400001 HC TELEMETRY ROOM

## 2025-05-14 PROCEDURE — 99900031 HC PATIENT EDUCATION (STAT)

## 2025-05-14 RX ORDER — PRAVASTATIN SODIUM 40 MG/1
40 TABLET ORAL NIGHTLY
Status: DISCONTINUED | OUTPATIENT
Start: 2025-05-14 | End: 2025-05-25 | Stop reason: HOSPADM

## 2025-05-14 RX ORDER — METOPROLOL SUCCINATE 25 MG/1
25 TABLET, EXTENDED RELEASE ORAL DAILY
Status: DISCONTINUED | OUTPATIENT
Start: 2025-05-14 | End: 2025-05-14

## 2025-05-14 RX ORDER — FAMOTIDINE 20 MG/1
20 TABLET, FILM COATED ORAL DAILY
Status: DISCONTINUED | OUTPATIENT
Start: 2025-05-14 | End: 2025-05-25 | Stop reason: HOSPADM

## 2025-05-14 RX ADMIN — FAMOTIDINE 20 MG: 20 TABLET, FILM COATED ORAL at 03:05

## 2025-05-14 RX ADMIN — APIXABAN 5 MG: 5 TABLET, FILM COATED ORAL at 08:05

## 2025-05-14 RX ADMIN — PRAVASTATIN SODIUM 40 MG: 40 TABLET ORAL at 08:05

## 2025-05-14 RX ADMIN — PIPERACILLIN SODIUM AND TAZOBACTAM SODIUM 4.5 G: 4; .5 INJECTION, POWDER, LYOPHILIZED, FOR SOLUTION INTRAVENOUS at 10:05

## 2025-05-14 RX ADMIN — SODIUM CHLORIDE 250 ML: 9 INJECTION, SOLUTION INTRAVENOUS at 04:05

## 2025-05-14 NOTE — PROGRESS NOTES
[facility] Hospital Medicine Progress Note        Chief Complaint: Inpatient Follow-up for HF    HPI:   74-year-old female with a past known medical history of breast cancer status post left mastectomy and status post chemo now and remission, chronic systolic heart failure, diabetes mellitus, CKD 3, hemorrhoids, celiac artery stenosis severe, pulmonary embolism on Eliquis.    She presented to the emergency department with complaints of bilateral leg swelling she reports of 3 to 4 weeks, also intermittent shortness a breath but unable to elaborate if it is orthopnea related, reports she sleeps with pillows.  No chest pain, no abdominal complaints.  In the emergency department her initial blood pressure was hypertensive systolic 164 however since then has been 90s systolic.  Lab work significant for creatinine 1.7, bicarb 17, BNP 6700, troponin 0.06, lactic acid 6.0.  Urinalysis consistent with UTI.  Patient received vancomycin and Zosyn while in the emergency department.  She also required a 500 cc bolus due to hypotension in the ER.  ECHO done 3/15/25 EF 25-30%/      After being admitted patient had persistent hypotension with some lactic acidosis, cardiology team evaluated the patient, she was started on dobutamine drip along with Lasix drip. Later iv dobutamine was stopped and lasic was switched to po 40 mg BID.     Interval Hx:   Patient today awake and comfortable. Still weak and needing assist to ambulate. Denies any SOB or chest pain,.     No family at bedside.     Case was discussed with patient's nurse and  on the floor.    Objective/physical exam:  General: In no acute distress  Chest: Clear to auscultation bilaterally  Heart: RRR, +S1, S2, no appreciable murmur  Abdomen: Soft, nontender, BS +  Neurologic: Cranial nerve II-XII intact, Strength 5/5 in all 4 extremities    VITAL SIGNS: 24 HRS MIN & MAX LAST   Temp  Min: 97.7 °F (36.5 °C)  Max: 99 °F (37.2 °C) 97.7 °F  (36.5 °C)   BP  Min: 75/55  Max: 124/73 94/68   Pulse  Min: 50  Max: 109  (!) 50   Resp  Min: 17  Max: 20 18   SpO2  Min: 90 %  Max: 99 % 95 %     I have reviewed the following labs:  Recent Labs   Lab 05/11/25 0702 05/13/25 0441 05/14/25  0346   WBC 5.57 4.84 5.60   RBC 4.13* 4.07* 4.19*   HGB 13.3 13.3 13.4   HCT 39.9 39.4 40.7   MCV 96.6* 96.8* 97.1*   MCH 32.2* 32.7* 32.0*   MCHC 33.3 33.8 32.9*   RDW 21.9* 21.7* 21.8*   * 122* 136   MPV 11.8* 12.0* 12.2*     Recent Labs   Lab 05/08/25  0618 05/08/25  1539 05/10/25  0446 05/10/25  1742 05/11/25  0702 05/12/25  0423 05/13/25 0441 05/13/25  1135 05/14/25  0346      < > 141  --  141 139 134*  --  133*   K 2.6*   < > 3.4*   < > 4.2 4.0 3.8  --  4.5      < > 107  --  102 102 99  --  100   CO2 23   < > 22*  --  24 23 22*  --  20*   BUN 34.0*   < > 30.4*  --  25.5* 20.7* 22.8*  --  25.1*   CREATININE 1.78*   < > 1.34*  --  1.23* 1.14* 1.16*  --  1.32*   *   < > 96  --  90 95 96  --  84   CALCIUM 8.0*   < > 8.0*  --  8.1* 7.8* 7.7*  --  7.8*   PH  --   --   --   --   --   --   --  7.500*  --    MG 1.80   < > 2.00   < > 1.90 1.80 2.20  --  2.10   ALBUMIN 2.7*  --  2.4*  --  2.4*  --   --   --  2.4*   PROT 5.6*  --   --   --  5.4*  --   --   --  5.9   ALKPHOS 107  --   --   --  100  --   --   --  119   ALT 30  --   --   --  23  --   --   --  23   AST 23  --   --   --  21  --   --   --  24   BILITOT 3.9*  --   --   --  2.5*  --   --   --  3.2*    < > = values in this interval not displayed.     Microbiology Results (last 7 days)       Procedure Component Value Units Date/Time    Blood Culture #2 **CANNOT BE ORDERED STAT** [[id number]]  (Normal) Collected: 05/09/25 0400    Order Status: Completed Specimen: Blood from Hand, Left Updated: 05/14/25 0507     Blood Culture No Growth at 5 days    Blood Culture #1 **CANNOT BE ORDERED STAT** [[id number]]  (Normal) Collected: 05/09/25 0351    Order Status: Completed Specimen: Blood from Wrist, Right  Updated: 05/14/25 0440     Blood Culture No Growth at 5 days    Urine culture [[id number]]  (Abnormal)  (Susceptibility) Collected: 05/08/25 0810    Order Status: Completed Specimen: Urine, Clean Catch Updated: 05/11/25 0721     Urine Culture >/= 100,000 colonies/ml Escherichia coli      25,000-50,000 colonies/ml Klebsiella oxytoca             See below for Radiology    Assessment/Plan:  Acute on chronic HFrEF 25%-decompensated,Impending cardiogenic shock  Lactic acidosis: resolved   Mild hypotension   Valvular heart disease-severe MR, moderate TR  NSTEMI suspected type 2 due to decompensated heart failure   asymptomatic bacteriuria  Thrombocytopenia stable  Subclinical hypothyroidism  UTI, asymptomatic     Plan:  Patient has no new issues  She looks comfortable  Still weak and needing assist with ambulation and ADLs  LE edema is now much better    Now off lasix and BP meds   Will closely monitor patients daily weight, urine out put, renal parameters and volume status      Reviewed today's labs and  WBC 5.60, Hb 13.4, Plt 136, Na 133, K 4.5, Crt 1.32    Continue supportive care       VTE prophylaxis: started on eliquis     Patient condition:  Guarded    Anticipated discharge and Disposition:  TCU      All diagnosis and differential diagnosis have been reviewed; assessment and plan has been documented; I have personally reviewed the labs and test results that are presently available; I have reviewed the patients medication list; I have reviewed the consulting providers response and recommendations. I have reviewed or attempted to review medical records based upon their availability    All of the patient's questions have been  addressed and answered. Patient's is agreeable to the above stated plan. I will continue to monitor closely and make adjustments to medical management as needed.    Portions of this note dictated using EMR integrated voice recognition software, and may be subject to voice recognition errors not  corrected at proofreading. Please contact writer for clarification if needed.   _____________________________________________________________________    Malnutrition Status:  Nutrition consulted. Most recent weight and BMI monitored-     Measurements:  Wt Readings from Last 1 Encounters:   05/14/25 78.1 kg (172 lb 2.9 oz)   Body mass index is 29.54 kg/m².    Patient has been screened and assessed by RD.    Malnutrition Type:  Context:    Level: other (see comments) (Does not meet criteria)    Malnutrition Characteristic Summary:  Weight Loss (Malnutrition): other (see comments) (Does not meet criteria)  Fluid Accumulation (Malnutrition): moderate to severe    Interventions/Recommendations (treatment strategy):        Scheduled Med:   apixaban  5 mg Oral BID    famotidine  20 mg Oral Daily    pravastatin  40 mg Oral QHS      Continuous Infusions:       PRN Meds:    Current Facility-Administered Medications:     acetaminophen, 1,000 mg, Oral, Q8H PRN    acetaminophen, 650 mg, Oral, Q4H PRN    aluminum-magnesium hydroxide-simethicone, 30 mL, Oral, QID PRN    bisacodyL, 10 mg, Rectal, Daily PRN    dextrose 50%, 12.5 g, Intravenous, PRN    dextrose 50%, 25 g, Intravenous, PRN    glucagon (human recombinant), 1 mg, Intramuscular, PRN    glucose, 16 g, Oral, PRN    glucose, 24 g, Oral, PRN    melatonin, 6 mg, Oral, Nightly PRN    polyethylene glycol, 17 g, Oral, BID PRN    sodium chloride 0.9%, 10 mL, Intravenous, PRN    Flushing PICC/Midline Protocol, , , Until Discontinued **AND** sodium chloride 0.9%, 10 mL, Intravenous, Q12H PRN     Radiology:  I have personally reviewed the following imaging and agree with the radiologist.     Echo Ultrasound enhancing contrast? Yes    Left Ventricle: The left ventricle is normal in size. Normal wall   thickness. Moderate global hypokinesis present. There is severely reduced   systolic function with a visually estimated ejection fraction of 20 - 25%.   There is diastolic dysfunction  but grade cannot be determined.    Right Ventricle: The right ventricle is mildly dilated Systolic   function is severely reduced. TAPSE is 0.6 cm.    Left Atrium: The left atrium is moderately dilated    Right Atrium: The right atrium is moderately dilated .    Mitral Valve: There is moderate regurgitation.    Tricuspid Valve: There is mild to moderate regurgitation.    Pericardium: There is a trivial effusion.      Scooter Davis MD  Department of Hospital Medicine   [facility]   05/14/2025

## 2025-05-14 NOTE — PROGRESS NOTES
"  [facility]    Cardiology  Progress Note    Patient Name: Laura Jacobs  MRN: [id number]  Admission Date: 5/8/2025  Hospital Length of Stay: 6 days  Code Status: Full Code   Attending Provider: Scooter Davis MD   Consulting Provider: MACHELLE Zimmerman  Primary Care Physician: Ruben Brooks Sr., MD  Principal Problem:<principal problem not specified>    Patient information was obtained from patient, past medical records, ER records, and primary team.     Subjective:   Chief Complaint/Reason for Consult: Concern for Cardiogenic Shock    HPI: Ms. Jacobs is a 74 year old female, known to Dr. Washington, who presented to the hospital with bilateral lower extremity edema, pain, and SOB. Patient with history of NICMO. Noted Significant MR. Lab work significant for creatinine 1.7, bicarb 17, BNP 6700, troponin 0.06, lactic acid 6.0. Urinalysis consistent with UTI. BP initially marginal, but has improved. On Eliquis for history of DVT/PE. Admitted to Hospital Medicine Team. [facility]IS consulted for cardiac evaluation/management due to concern for impending shock.    Hospital Course:  5.9.25: NAD Noted. On RA, Lying flat in no distress. Marginal diuresis overnight. BP Systolic low normal. SR with intermittent bouts of ST.   5.10.25: NAD. "I am feeling a little better." Fluid Balance Net Negative 2022mL. ST 100s-110s.   5.11.25: BP marginal at times. Good UOP. Remains on Dobutamine and Lasix drip. Renal indices stable.   5.12.25: NAD Noted. Vitals Stable. Diuresing well. Edema is improving. Patient watching TV in no distress. She is awake/alert.  5.13.25: NAD Noted. Vitals Stable. UO slacked off overnight. On RA. Dobutamine infusion going.   5.14.25: NAD Noted. On RA. Lying flat, nurse aid assisting with bath. BP Stable. Sinus Tachycardia HR Low 100's.     PMH: Hypertension/HHD, Dyslipidemia, Palpitations, MO, Breast Cancer, CP, SOB/ROMO, Pulmonary Hypertension, GONZALEZ/CPAP, DVT/PE " (Eliquis), OA, CVI, VHD/MR-TR  PSH: Hysterectomy, Pacemaker, Fumur David Placement, Joint Replacement, Mastectomy  Family History: Father- Brain Aneurysm, Mother- Cancer, Sister- Hypertension  Social History: Tobacco- Negative, Alcohol- Negative, Substance Abuse- Negative     Previous Cardiac Diagnostics:   Echocardiogram (5.14.25):  Left Ventricle: The left ventricle is normal in size. Normal wall thickness. Moderate global hypokinesis present. There is severely reduced systolic function with a visually estimated ejection fraction of 20 - 25%. There is diastolic dysfunction but grade cannot be determined.  Right Ventricle: The right ventricle is mildly dilated Systolic function is severely reduced. TAPSE is 0.6 cm.  Left Atrium: The left atrium is moderately dilated  Right Atrium: The right atrium is moderately dilated .  Mitral Valve: There is moderate regurgitation.  Tricuspid Valve: There is mild to moderate regurgitation.  Pericardium: There is a trivial effusion.    Venous Doppler (5.8.25):  Negative for deep and superficial vein thrombosis in bilateral lower extremities.    Echocardiogram (3.15.25):  Left Ventricle: The left ventricle is dilated. Wall is thinner than normal. Severe global hypokinesis present. There is severely reduced systolic function with a visually estimated ejection fraction of 25 - 30%.  Right Ventricle: Right ventricular enlargement.  Left Atrium: Severely dilated Agitated saline study of the atrial septum is negative, suggesting absence of intracardiac shunt at the atrial level.  Right Atrium: Right atrium is severely dilated.  Mitral Valve: Mildly thickened leaflets. There is severe regurgitation.  Tricuspid Valve: There is moderate regurgitation.    Echocardiogram (1.20.25):  Left Ventricle: The left ventricle is dilated. Normal wall thickness. Global hypokinesis present. There is moderately reduced systolic function with a visually estimated ejection fraction of 35 - 40%.  Right  Ventricle: Normal right ventricular cavity size. Systolic function is normal.  Left Atrium: Left atrium is mildly dilated.  Right Atrium: Right atrium is mildly dilated.  Aortic Valve: The aortic valve is a trileaflet valve. There is mild aortic valve sclerosis.  Mitral Valve: There is mitral annular calcification present. There is moderate to severe regurgitation.  Tricuspid Valve: There is mild regurgitation.  Pulmonic Valve: There is mild regurgitation.  IVC/SVC: Normal venous pressure at 3 mmHg.  Pericardium: There is a trivial effusion adjacent to the right atrium. No indication of cardiac tamponade.     Echocardiogram (3.20.24):  The study quality is average.   The left ventricle is normal in size. Global left ventricular systolic function is normal. The left ventricular ejection fraction is 55%. The left ventricle diastolic function is impaired (Grade I) with normal left atrial pressure.   Mild calcification of the aortic valve is noted with adequate cuspal excursion.  Mild mitral annular calcification is noted.   Mild (1+) mitral, tricuspid, and pulmonic regurgitation.   The pulmonary artery systolic pressure is 45 mmHg. Evidence of pulmonary hypertension is noted.      Carotid US (11.22.23):  The right internal carotid artery demonstrated no hemodynamically significant stenosis.  There is no substantial right side carotid plaque identified.  The left internal carotid artery demonstrated no hemodynamically significant stenosis.  There is a minimal amount of plaque in the left carotid bulb.  The right vertebral artery is patent with antegrade flow.  The left vertebral artery is patent with antegrade flow.     AMANDA (9.14.22):  LV systolic function, LVEF 30-35%.  Severe global hypokinesis  Mild Tricuspid regurgitation  Moderate to severe Mitral regurgitation  Mild aortic regurgitation.  There is Lambl's excrescences on aortic leaflets that is benign fibrous structure.   No evidence of infection, vegetation or  abscess     SICD Placement (3.25.21):  SICD Placement Re: Primary Prevention of SCD.      [facility] (1.4.21):  Left main coronary artery normal   Left anterior descending artery normal   Left circumflex artery codominant normal   Right coronary artery codominant normal   Left ventricle dilated with severe left ventricular systolic   dysfunction ejection fraction less than 30%   SUMMARY : Nonischemic dilated cardiomyopathy with severe left ventricular systolic dysfunction.      ETT (6.9.20):  Stress EKG is normal.   Maximal exercise treadmill test (MPHR : 97 %).  The functional capacity is poor (4.6 METs).  The heart rate recovery is normal.   The study quality is below average.      Venogram (10.17.18):  No Critical Venous Compression Noted.     Review of patient's allergies indicates:   Allergen Reactions    Sulfa (sulfonamide antibiotics)      Patient unsure if allergic to Sulfa     No current facility-administered medications on file prior to encounter.     Current Outpatient Medications on File Prior to Encounter   Medication Sig    anastrozole (ARIMIDEX) 1 mg Tab Take 1 tablet by mouth once daily.    apixaban (ELIQUIS) 5 mg Tab Take 5 mg by mouth 2 (two) times daily.    aspirin (ECOTRIN) 81 MG EC tablet Take 1 tablet (81 mg total) by mouth once daily.    cyproheptadine (PERIACTIN) 4 mg tablet Take 1 tablet by mouth 2 (two) times daily.    dexlansoprazole (DEXILANT) 60 mg capsule Take 60 mg by mouth once daily.    famotidine (PEPCID) 20 MG tablet Take 1 tablet (20 mg total) by mouth once daily.    furosemide (LASIX) 20 MG tablet Take 1 tablet (20 mg total) by mouth once daily. (Patient taking differently: Take 40 mg by mouth once daily.)    metoprolol succinate (TOPROL-XL) 25 MG 24 hr tablet Take 25 mg by mouth.    pravastatin (PRAVACHOL) 20 MG tablet Take 1 tablet (20 mg total) by mouth every evening.     Review of Systems   Respiratory:  Negative for chest tightness and shortness of breath.    Cardiovascular:   Negative for chest pain.   All other systems reviewed and are negative.    Objective:     Vital Signs (Most Recent):  Temp: 97.8 °F (36.6 °C) (05/14/25 0728)  Pulse: 102 (05/14/25 0911)  Resp: 18 (05/14/25 0911)  BP: 124/73 (05/14/25 0728)  SpO2: 95 % (05/14/25 0911) Vital Signs (24h Range):  Temp:  [97.7 °F (36.5 °C)-99 °F (37.2 °C)] 97.8 °F (36.6 °C)  Pulse:  [] 102  Resp:  [17-20] 18  SpO2:  [90 %-99 %] 95 %  BP: ()/(58-79) 124/73   Weight: 78.1 kg (172 lb 2.9 oz)  Body mass index is 29.54 kg/m².  SpO2: 95 %       Intake/Output Summary (Last 24 hours) at 5/14/2025 1037  Last data filed at 5/14/2025 0500  Gross per 24 hour   Intake 240 ml   Output 250 ml   Net -10 ml     Lines/Drains/Airways       Peripherally Inserted Central Catheter Line  Duration             PICC Double Lumen 05/09/25 0853 right brachial 5 days                  Significant Labs:   Chemistries:   Recent Labs   Lab 05/08/25  0218 05/08/25  0218 05/08/25  0618 05/08/25  1539 05/09/25  0351 05/10/25  0446 05/10/25  1742 05/11/25  0702 05/12/25  0423 05/13/25  0441 05/14/25  0346     --  138  --  140 141  --  141 139 134* 133*   K 3.6  --  2.6* 3.8 4.1 3.4* 4.3 4.2 4.0 3.8 4.5     --  100  --  103 107  --  102 102 99 100   CO2 17*  --  23  --  19* 22*  --  24 23 22* 20*   BUN 34.4*  --  34.0*  --  35.3* 30.4*  --  25.5* 20.7* 22.8* 25.1*   CREATININE 1.71*  --  1.78*  --  1.64* 1.34*  --  1.23* 1.14* 1.16* 1.32*   CALCIUM 8.3*  --  8.0*  --  7.9* 8.0*  --  8.1* 7.8* 7.7* 7.8*   PROT 6.7  --  5.6*  --   --   --   --  5.4*  --   --  5.9   BILITOT 4.0*  --  3.9*  --   --   --   --  2.5*  --   --  3.2*   ALKPHOS 126  --  107  --   --   --   --  100  --   --  119   ALT 35  --  30  --   --   --   --  23  --   --  23   AST 28  --  23  --   --   --   --  21  --   --  24   MG  --    < > 1.80  --  1.80 2.00 2.00 1.90 1.80 2.20 2.10   PHOS  --   --   --   --  3.1 2.9  --   --   --   --   --    TROPONINI 0.063*  --   --  0.065* 0.047*   "--   --   --   --   --   --     < > = values in this interval not displayed.        CBC/Anemia Labs: Coags:    Recent Labs   Lab 05/11/25  0702 05/13/25  0441 05/14/25  0346   WBC 5.57 4.84 5.60   HGB 13.3 13.3 13.4   HCT 39.9 39.4 40.7   * 122* 136   MCV 96.6* 96.8* 97.1*   RDW 21.9* 21.7* 21.8*    No results for input(s): "PT", "INR", "APTT" in the last 168 hours.     Telemetry: ST HR Low 100's     Physical Exam  Vitals and nursing note reviewed.   Constitutional:       General: She is not in acute distress.     Appearance: She is obese.   Cardiovascular:      Rate and Rhythm: Normal rate and regular rhythm.   Pulmonary:      Effort: Pulmonary effort is normal. No respiratory distress.   Musculoskeletal:      Right lower leg: Edema present.      Left lower leg: Edema present.   Skin:     General: Skin is warm and dry.   Neurological:      Mental Status: She is alert.       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    - EF 20-25% with Diastolic Dysfunction (Echo 5.14.25)    - Lactic Acidosis -Cleared  Acute/Chronic Right Sided HF (Severely Reduced RV Function)- Biventricular Failure  Nonischemic Cardiomyopathy    - EF 20-25% (Echo 5.14.25)    - Status Post SICD    - Normal Coronaries in 2021/ETT (6/202): Normal  NSTEMI Type II due to Decompensated HF/Fluid Overload  Valvular Heart Disease    - MR: Moderate, TR: Mild to Moderate (Echo 5.13.25)  Hypertension/Hypertensive Heart Disease (BP Stable)    - Episode of Orthostatic Hypotension on 5.13.25 afternoon- BP Recovered with IV Fluid Bolus 500 ML in Total (BP Now Stable)  Pulmonary Hypertension  Dyslipidemia    - On Statin  Chronic VI/Edema- Lymphedema  GONZALEZ/CPAP  History of Breast Cancer/Mastectomy  History of DVT/PE (Previously Treated with Xarelto)- Now on " Eliquis  OA  Asymptomatic Bacteruria   Thrombocytopenia - Stable     Plan:   Check Orthostatic VS this Morning  Start Toprol XL 25 Mg Daily if negative   Start Lasix 40 Mg PO Daily Tomorrow- Hold today  Advance Mobilization with PT  Consider Rehab versus SNF Placement at discharge. At minimum will need Home Health.    MACHELLE Zimmerman  Cardiology  [facility]     Physician addendum:        Patient's cardiac care is performed as a split-shared visit with MAGDY d/t complicated medical management as detailed in A/P and associated high acuity requiring physician expertise. I obtained and performed relevant components of history/exam. Medical decision-making is formulated by me. It is a pleasure to care for the patient.    Moy Pérez MD  Cardiology           [1]   No current facility-administered medications on file prior to encounter.     Current Outpatient Medications on File Prior to Encounter   Medication Sig Dispense Refill    anastrozole (ARIMIDEX) 1 mg Tab Take 1 tablet by mouth once daily.      apixaban (ELIQUIS) 5 mg Tab Take 5 mg by mouth 2 (two) times daily.      aspirin (ECOTRIN) 81 MG EC tablet Take 1 tablet (81 mg total) by mouth once daily. 90 tablet 3    cyproheptadine (PERIACTIN) 4 mg tablet Take 1 tablet by mouth 2 (two) times daily.      dexlansoprazole (DEXILANT) 60 mg capsule Take 60 mg by mouth once daily.      famotidine (PEPCID) 20 MG tablet Take 1 tablet (20 mg total) by mouth once daily. 30 tablet 11    furosemide (LASIX) 20 MG tablet Take 1 tablet (20 mg total) by mouth once daily. (Patient taking differently: Take 40 mg by mouth once daily.) 30 tablet 11    metoprolol succinate (TOPROL-XL) 25 MG 24 hr tablet Take 25 mg by mouth.      pravastatin (PRAVACHOL) 20 MG tablet Take 1 tablet (20 mg total) by mouth every evening. 90 tablet 3

## 2025-05-15 LAB
ANION GAP SERPL CALC-SCNC: 15 MEQ/L
BUN SERPL-MCNC: 32.4 MG/DL (ref 9.8–20.1)
CALCIUM SERPL-MCNC: 8 MG/DL (ref 8.4–10.2)
CHLORIDE SERPL-SCNC: 98 MMOL/L (ref 98–107)
CO2 SERPL-SCNC: 21 MMOL/L (ref 23–31)
CREAT SERPL-MCNC: 1.57 MG/DL (ref 0.55–1.02)
CREAT/UREA NIT SERPL: 21
GFR SERPLBLD CREATININE-BSD FMLA CKD-EPI: 35 ML/MIN/1.73/M2
GLUCOSE SERPL-MCNC: 105 MG/DL (ref 82–115)
LACTATE SERPL-SCNC: 3.2 MMOL/L (ref 0.5–2.2)
LACTATE SERPL-SCNC: 5.4 MMOL/L (ref 0.5–2.2)
MAGNESIUM SERPL-MCNC: 2.2 MG/DL (ref 1.6–2.6)
POTASSIUM SERPL-SCNC: 4.4 MMOL/L (ref 3.5–5.1)
SODIUM SERPL-SCNC: 134 MMOL/L (ref 136–145)

## 2025-05-15 PROCEDURE — 80048 BASIC METABOLIC PNL TOTAL CA: CPT | Performed by: NURSE PRACTITIONER

## 2025-05-15 PROCEDURE — 99900035 HC TECH TIME PER 15 MIN (STAT)

## 2025-05-15 PROCEDURE — 99223 1ST HOSP IP/OBS HIGH 75: CPT | Mod: ,,,

## 2025-05-15 PROCEDURE — 27000221 HC OXYGEN, UP TO 24 HOURS

## 2025-05-15 PROCEDURE — 25000003 PHARM REV CODE 250: Performed by: INTERNAL MEDICINE

## 2025-05-15 PROCEDURE — 83605 ASSAY OF LACTIC ACID: CPT | Performed by: INTERNAL MEDICINE

## 2025-05-15 PROCEDURE — 25000003 PHARM REV CODE 250: Performed by: NURSE PRACTITIONER

## 2025-05-15 PROCEDURE — 63600175 PHARM REV CODE 636 W HCPCS: Performed by: INTERNAL MEDICINE

## 2025-05-15 PROCEDURE — 36415 COLL VENOUS BLD VENIPUNCTURE: CPT | Performed by: INTERNAL MEDICINE

## 2025-05-15 PROCEDURE — 63600175 PHARM REV CODE 636 W HCPCS: Performed by: NURSE PRACTITIONER

## 2025-05-15 PROCEDURE — 36415 COLL VENOUS BLD VENIPUNCTURE: CPT | Performed by: NURSE PRACTITIONER

## 2025-05-15 PROCEDURE — 21400001 HC TELEMETRY ROOM

## 2025-05-15 PROCEDURE — 83735 ASSAY OF MAGNESIUM: CPT | Performed by: NURSE PRACTITIONER

## 2025-05-15 PROCEDURE — 99900031 HC PATIENT EDUCATION (STAT)

## 2025-05-15 RX ORDER — DOBUTAMINE HYDROCHLORIDE 400 MG/100ML
5 INJECTION INTRAVENOUS CONTINUOUS
Status: DISCONTINUED | OUTPATIENT
Start: 2025-05-15 | End: 2025-05-25 | Stop reason: HOSPADM

## 2025-05-15 RX ORDER — MIDODRINE HYDROCHLORIDE 5 MG/1
5 TABLET ORAL
Status: DISCONTINUED | OUTPATIENT
Start: 2025-05-15 | End: 2025-05-25 | Stop reason: HOSPADM

## 2025-05-15 RX ORDER — SODIUM CHLORIDE 9 MG/ML
INJECTION, SOLUTION INTRAVENOUS ONCE
Status: COMPLETED | OUTPATIENT
Start: 2025-05-15 | End: 2025-05-15

## 2025-05-15 RX ORDER — CEFTRIAXONE 1 G/1
1 INJECTION, POWDER, FOR SOLUTION INTRAMUSCULAR; INTRAVENOUS
Status: DISCONTINUED | OUTPATIENT
Start: 2025-05-15 | End: 2025-05-16

## 2025-05-15 RX ADMIN — APIXABAN 5 MG: 5 TABLET, FILM COATED ORAL at 10:05

## 2025-05-15 RX ADMIN — CEFTRIAXONE SODIUM 1 G: 1 INJECTION, POWDER, FOR SOLUTION INTRAMUSCULAR; INTRAVENOUS at 05:05

## 2025-05-15 RX ADMIN — PRAVASTATIN SODIUM 40 MG: 40 TABLET ORAL at 08:05

## 2025-05-15 RX ADMIN — PIPERACILLIN SODIUM AND TAZOBACTAM SODIUM 4.5 G: 4; .5 INJECTION, POWDER, LYOPHILIZED, FOR SOLUTION INTRAVENOUS at 03:05

## 2025-05-15 RX ADMIN — DOBUTAMINE HYDROCHLORIDE 5 MCG/KG/MIN: 400 INJECTION INTRAVENOUS at 10:05

## 2025-05-15 RX ADMIN — SODIUM CHLORIDE: 9 INJECTION, SOLUTION INTRAVENOUS at 09:05

## 2025-05-15 RX ADMIN — APIXABAN 5 MG: 5 TABLET, FILM COATED ORAL at 08:05

## 2025-05-15 RX ADMIN — MIDODRINE HYDROCHLORIDE 5 MG: 5 TABLET ORAL at 04:05

## 2025-05-15 RX ADMIN — MIDODRINE HYDROCHLORIDE 5 MG: 5 TABLET ORAL at 10:05

## 2025-05-15 RX ADMIN — FAMOTIDINE 20 MG: 20 TABLET, FILM COATED ORAL at 10:05

## 2025-05-15 NOTE — PROGRESS NOTES
05/15/25 1139   Vital Signs   BP (!) 86/64   MAP (mmHg) 71   BP Location Right leg   BP Method Automatic   Patient Position Lying     Blood pressure still low but improving with NS bolus, PO Midodrine, and dobutamine gtt. Dr. Harrison made aware of low blood pressure. No new orders at this time. Pt resting comfortably in bed with no issues, NADN. Will continue to monitor pt for any acute changes during shift.

## 2025-05-15 NOTE — PLAN OF CARE
Problem: Adult Inpatient Plan of Care  Goal: Plan of Care Review  Outcome: Ongoing  Goal: Patient-Specific Goal (Individualized)  Outcome: Ongoing  Goal: Absence of Hospital-Acquired Illness or Injury  Outcome: Ongoing  Goal: Optimal Comfort and Wellbeing  Outcome: Ongoing  Goal: Readiness for Transition of Care  Outcome: Ongoing     Problem: Infection  Goal: Absence of Infection Signs and Symptoms  Outcome: Ongoing     Problem: Skin Injury Risk Increased  Goal: Skin Health and Integrity  Outcome: Ongoing     Problem: Fall Injury Risk  Goal: Absence of Fall and Fall-Related Injury  Outcome: Ongoing     Problem: Coping Ineffective  Goal: Effective Coping  Outcome: Ongoing

## 2025-05-15 NOTE — PROGRESS NOTES
"  [facility]    Cardiology  Progress Note    Patient Name: Laura Jacobs  MRN: [id number]  Admission Date: 5/8/2025  Hospital Length of Stay: 7 days  Code Status: Full Code   Attending Provider: Scooter Davis MD   Consulting Provider: MACHELLE Zimmerman  Primary Care Physician: Ruben Brooks Sr., MD  Principal Problem:<principal problem not specified>    Patient information was obtained from patient, past medical records, ER records, and primary team.     Subjective:   Chief Complaint/Reason for Consult: Concern for Cardiogenic Shock    HPI: Ms. Jacobs is a 74 year old female, known to Dr. Washington, who presented to the hospital with bilateral lower extremity edema, pain, and SOB. Patient with history of NICMO. Noted Significant MR. Lab work significant for creatinine 1.7, bicarb 17, BNP 6700, troponin 0.06, lactic acid 6.0. Urinalysis consistent with UTI. BP initially marginal, but has improved. On Eliquis for history of DVT/PE. Admitted to Hospital Medicine Team. [facility] consulted for cardiac evaluation/management due to concern for impending shock.    Hospital Course:  5.9.25: NAD Noted. On RA, Lying flat in no distress. Marginal diuresis overnight. BP Systolic low normal. SR with intermittent bouts of ST.   5.10.25: NAD. "I am feeling a little better." Fluid Balance Net Negative 2022mL. ST 100s-110s.   5.11.25: BP marginal at times. Good UOP. Remains on Dobutamine and Lasix drip. Renal indices stable.   5.12.25: NAD Noted. Vitals Stable. Diuresing well. Edema is improving. Patient watching TV in no distress. She is awake/alert.  5.13.25: NAD Noted. Vitals Stable. UO slacked off overnight. On RA. Dobutamine infusion going.   5.14.25: NAD Noted. On RA. Lying flat, nurse aid assisting with bath. BP Stable. Sinus Tachycardia HR Low 100's.   5.15.25: Hypotensive this morning. Dobutamine Restarted for BP Support and Palliative support. ST low 100's. Patient updated on overall " prognosis and need for palliative measures. Insight into her disease process is limited. Attending notified of CIS plan moving forward.     PMH: Hypertension/HHD, Dyslipidemia, Palpitations, MO, Breast Cancer, CP, SOB/ROMO, Pulmonary Hypertension, GONZALEZ/CPAP, DVT/PE (Eliquis), OA, CVI, VHD/MR-TR  PSH: S-ICD, Knee Surgery, LHC, Mastectomy  Family History: Non-contributory  Social History: Tobacco- Negative, Alcohol- Negative, Substance Abuse- Negative     Previous Cardiac Diagnostics:   Echocardiogram (5.14.25):  Left Ventricle: The left ventricle is normal in size. Normal wall thickness. Moderate global hypokinesis present. There is severely reduced systolic function with a visually estimated ejection fraction of 20 - 25%. There is diastolic dysfunction but grade cannot be determined.  Right Ventricle: The right ventricle is mildly dilated Systolic function is severely reduced. TAPSE is 0.6 cm.  Left Atrium: The left atrium is moderately dilated  Right Atrium: The right atrium is moderately dilated .  Mitral Valve: There is moderate regurgitation.  Tricuspid Valve: There is mild to moderate regurgitation.  Pericardium: There is a trivial effusion.    Venous Doppler (5.8.25):  Negative for deep and superficial vein thrombosis in bilateral lower extremities.    Echocardiogram (3.15.25):  Left Ventricle: The left ventricle is dilated. Wall is thinner than normal. Severe global hypokinesis present. There is severely reduced systolic function with a visually estimated ejection fraction of 25 - 30%.  Right Ventricle: Right ventricular enlargement.  Left Atrium: Severely dilated Agitated saline study of the atrial septum is negative, suggesting absence of intracardiac shunt at the atrial level.  Right Atrium: Right atrium is severely dilated.  Mitral Valve: Mildly thickened leaflets. There is severe regurgitation.  Tricuspid Valve: There is moderate regurgitation.    PET (2.14.25):  This is a normal perfusion study, no  perfusion defects noted. There is no evidence of ischemia.   This scan is suggestive of low risk for future cardiovascular events.   The left ventricular cavity is noted to be mildly enlarged on the stress studies. The stress left ventricular ejection fraction was calculated to be 27% and left ventricular global function is severely reduced. The rest left ventricular cavity is noted to be mildly enlarged. The rest left ventricular ejection fraction was calculated to be 24% and rest left ventricular global function is severely reduced.   Persistent hypokinesis of the anterior region, septal region, inferior region and lateral region is noted in both rest and stress studies. When compared to the resting ejection fraction (24%), the stress ejection fraction (27%) has increased.   The study quality is good.   Calcium scoring was not performed in this patient due to specific concerns that can affect accuracy.   There was no rise in myocardial blood flow between rest and stress. Global myocardial blood flow reserve was 1.98. Non-diagnostic study.    Carotid US (2.14.25):  The study quality is average.   Antegrade left vertebral artery flow.   Antegrade right vertebral artery flow.   No plaque noted in bilateral internal carotid arteries.    Echocardiogram (1.20.25):  Left Ventricle: The left ventricle is dilated. Normal wall thickness. Global hypokinesis present. There is moderately reduced systolic function with a visually estimated ejection fraction of 35 - 40%.  Right Ventricle: Normal right ventricular cavity size. Systolic function is normal.  Left Atrium: Left atrium is mildly dilated.  Right Atrium: Right atrium is mildly dilated.  Aortic Valve: The aortic valve is a trileaflet valve. There is mild aortic valve sclerosis.  Mitral Valve: There is mitral annular calcification present. There is moderate to severe regurgitation.  Tricuspid Valve: There is mild regurgitation.  Pulmonic Valve: There is mild  regurgitation.  IVC/SVC: Normal venous pressure at 3 mmHg.  Pericardium: There is a trivial effusion adjacent to the right atrium. No indication of cardiac tamponade.     LHC (3.10.22):  Widely Patent Coronary Anatomy with severe LV Systolic Dysfunction. EF 20-25% with EDP 5-10 mmHg.     AMANDA (9.14.22):  LV systolic function, LVEF 30-35%.  Severe global hypokinesis  Mild Tricuspid regurgitation  Moderate to severe Mitral regurgitation  Mild aortic regurgitation.  There is Lambl's excrescences on aortic leaflets that is benign fibrous structure.   No evidence of infection, vegetation or abscess     SICD Placement (3.25.21):  SICD Placement Re: Primary Prevention of SCD.      LHC (1.4.21):  Left main coronary artery normal   Left anterior descending artery normal   Left circumflex artery codominant normal   Right coronary artery codominant normal   Left ventricle dilated with severe left ventricular systolic   dysfunction ejection fraction less than 30%   SUMMARY : Nonischemic dilated cardiomyopathy with severe left ventricular systolic dysfunction.      Venogram (10.17.18):  No Critical Venous Compression Noted.     Review of patient's allergies indicates:   Allergen Reactions    Sulfa (sulfonamide antibiotics)      Patient unsure if allergic to Sulfa     No current facility-administered medications on file prior to encounter.     Current Outpatient Medications on File Prior to Encounter   Medication Sig    anastrozole (ARIMIDEX) 1 mg Tab Take 1 tablet by mouth once daily.    apixaban (ELIQUIS) 5 mg Tab Take 5 mg by mouth 2 (two) times daily.    aspirin (ECOTRIN) 81 MG EC tablet Take 1 tablet (81 mg total) by mouth once daily.    cyproheptadine (PERIACTIN) 4 mg tablet Take 1 tablet by mouth 2 (two) times daily.    dexlansoprazole (DEXILANT) 60 mg capsule Take 60 mg by mouth once daily.    famotidine (PEPCID) 20 MG tablet Take 1 tablet (20 mg total) by mouth once daily.    furosemide (LASIX) 20 MG tablet Take 1 tablet  (20 mg total) by mouth once daily. (Patient taking differently: Take 40 mg by mouth once daily.)    metoprolol succinate (TOPROL-XL) 25 MG 24 hr tablet Take 25 mg by mouth.    pravastatin (PRAVACHOL) 20 MG tablet Take 1 tablet (20 mg total) by mouth every evening.     Review of Systems   Respiratory:  Negative for chest tightness and shortness of breath.    Cardiovascular:  Negative for chest pain.   All other systems reviewed and are negative.    Objective:     Vital Signs (Most Recent):  Temp: 98 °F (36.7 °C) (05/15/25 0759)  Pulse: 101 (05/15/25 0759)  Resp: 20 (05/15/25 0759)  BP: (!) 76/54 (05/15/25 0759)  SpO2: 97 % (05/15/25 0759) Vital Signs (24h Range):  Temp:  [97.7 °F (36.5 °C)-98.9 °F (37.2 °C)] 98 °F (36.7 °C)  Pulse:  [] 101  Resp:  [18-20] 20  SpO2:  [90 %-99 %] 97 %  BP: ()/(54-87) 76/54   Weight: 78.1 kg (172 lb 2.9 oz)  Body mass index is 29.54 kg/m².  SpO2: 97 %       Intake/Output Summary (Last 24 hours) at 5/15/2025 1019  Last data filed at 5/14/2025 1447  Gross per 24 hour   Intake 480 ml   Output --   Net 480 ml     Lines/Drains/Airways       Peripherally Inserted Central Catheter Line  Duration             PICC Double Lumen 05/09/25 0853 right brachial 6 days                  Significant Labs:   Chemistries:   Recent Labs   Lab 05/08/25  1539 05/08/25  1539 05/09/25  0351 05/10/25  0446 05/10/25  1742 05/11/25  0702 05/12/25  0423 05/13/25  0441 05/14/25  0346 05/15/25  0616   NA  --    < > 140 141  --  141 139 134* 133* 134*   K 3.8  --  4.1 3.4*   < > 4.2 4.0 3.8 4.5 4.4   CL  --    < > 103 107  --  102 102 99 100 98   CO2  --    < > 19* 22*  --  24 23 22* 20* 21*   BUN  --    < > 35.3* 30.4*  --  25.5* 20.7* 22.8* 25.1* 32.4*   CREATININE  --    < > 1.64* 1.34*  --  1.23* 1.14* 1.16* 1.32* 1.57*   CALCIUM  --    < > 7.9* 8.0*  --  8.1* 7.8* 7.7* 7.8* 8.0*   PROT  --   --   --   --   --  5.4*  --   --  5.9  --    BILITOT  --   --   --   --   --  2.5*  --   --  3.2*  --   "  ALKPHOS  --   --   --   --   --  100  --   --  119  --    ALT  --   --   --   --   --  23  --   --  23  --    AST  --   --   --   --   --  21  --   --  24  --    MG  --    < > 1.80 2.00   < > 1.90 1.80 2.20 2.10 2.20   PHOS  --   --  3.1 2.9  --   --   --   --   --   --    TROPONINI 0.065*  --  0.047*  --   --   --   --   --   --   --     < > = values in this interval not displayed.        CBC/Anemia Labs: Coags:    Recent Labs   Lab 05/11/25  0702 05/13/25  0441 05/14/25  0346   WBC 5.57 4.84 5.60   HGB 13.3 13.3 13.4   HCT 39.9 39.4 40.7   * 122* 136   MCV 96.6* 96.8* 97.1*   RDW 21.9* 21.7* 21.8*    No results for input(s): "PT", "INR", "APTT" in the last 168 hours.     Telemetry: ST HR Low 100's     Physical Exam  Vitals and nursing note reviewed.   Constitutional:       General: She is not in acute distress.     Appearance: She is obese.   Cardiovascular:      Rate and Rhythm: Normal rate and regular rhythm.   Pulmonary:      Effort: Pulmonary effort is normal. No respiratory distress.   Musculoskeletal:      Right lower leg: Edema present.      Left lower leg: Edema present.   Skin:     General: Skin is warm and dry.   Neurological:      Mental Status: She is alert.       Home Medications:   Medications Ordered Prior to Encounter[1]  Current Schedule Inpatient Medications:   apixaban  5 mg Oral BID    famotidine  20 mg Oral Daily    midodrine  5 mg Oral TID WM    piperacillin-tazobactam (Zosyn) IV (PEDS and ADULTS) (extended infusion is not appropriate)  4.5 g Intravenous Q8H    pravastatin  40 mg Oral QHS      DOBUTamine IV infusion (non-titrating)  5 mcg/kg/min Intravenous Continuous           Assessment:   Cardiogenic Shock/Intermittent Hypotension    - History of Hypertension/Hypertensive Heart Disease     - Episode of Orthostatic Hypotension on 5.13.25 afternoon- BP Recovered with IV Fluid Bolus 500 ML in Total (BP Now Stable)    - Episode of Orthostatic Hypotension on 5.14.25- IV Fluid Bolus 250 " ML Given  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 EF 20-25% with EDP 5-10 mmHg. (3.10.22)  NSTEMI Type II due to Decompensated HF/Fluid Overload  Valvular Heart Disease    - MR: Moderate, TR: Mild to Moderate (Echo 5.13.25)  Pulmonary Hypertension  Dyslipidemia    - On Statin  Chronic VI/Edema- Lymphedema (Left Arm/Bilateral Lower Extremities)  History of Breast Cancer/Mastectomy  History of DVT (Left Popliteal Vein) (2018)    - on Eliquis  OA  Asymptomatic Bacteruria   Thrombocytopenia - Stable     Plan:   Okay to Resume Dobutamine Infusion for Palliative Support.  Holding afterload reduction medications as patient does not tolerate.  Hold diuretics  Recommend Palliative Care Consultation and Hospice Discussions. Patient updated on overall prognosis, which is poor. She appears to have little insight into her disease process and actually how sick she is.  Supportive Care as per Primary Team.  Will follow up tomorrow.     Patient remains full code.     MACHELLE Zimmerman  Cardiology  [facility]     Physician addendum:        Patient's cardiac care is performed as a split-shared visit with MAGDY d/t complicated medical management as detailed in A/P and associated high acuity requiring physician expertise. I obtained and performed relevant components of history/exam. Medical decision-making is formulated by me. It is a pleasure to care for the patient.    Moy Pérez MD  Cardiology           [1]   No current facility-administered medications on file prior to encounter.     Current Outpatient Medications on File  Prior to Encounter   Medication Sig Dispense Refill    anastrozole (ARIMIDEX) 1 mg Tab Take 1 tablet by mouth once daily.      apixaban (ELIQUIS) 5 mg Tab Take 5 mg by mouth 2 (two) times daily.      aspirin (ECOTRIN) 81 MG EC tablet Take 1 tablet (81 mg total) by mouth once daily. 90 tablet 3    cyproheptadine (PERIACTIN) 4 mg tablet Take 1 tablet by mouth 2 (two) times daily.      dexlansoprazole (DEXILANT) 60 mg capsule Take 60 mg by mouth once daily.      famotidine (PEPCID) 20 MG tablet Take 1 tablet (20 mg total) by mouth once daily. 30 tablet 11    furosemide (LASIX) 20 MG tablet Take 1 tablet (20 mg total) by mouth once daily. (Patient taking differently: Take 40 mg by mouth once daily.) 30 tablet 11    metoprolol succinate (TOPROL-XL) 25 MG 24 hr tablet Take 25 mg by mouth.      pravastatin (PRAVACHOL) 20 MG tablet Take 1 tablet (20 mg total) by mouth every evening. 90 tablet 3

## 2025-05-15 NOTE — CONSULTS
Inpatient consult to Palliative Care  Consult performed by: Imani Ham NP  Consult ordered by: Tana Harrison MD      Patient Name: Laura Jacobs   MRN: [id number]   Admission Date: 5/8/2025   Hospital Length of Stay: 7   Attending Provider: Scooter Davis MD   Consulting Provider: MACHELLE Cuellar  Reason for Consult: Goals of Care  Primary Care Physician: Ruben Brooks Sr., MD     Principal Problem: <principal problem not specified>     Patient information was obtained from patient, relative(s), and ER records.      Final diagnoses:  [R06.02] Shortness of breath  [I50.9] Acute on chronic congestive heart failure, unspecified heart failure type (Primary)  [R60.9] Edema, unspecified type  [N28.9] Acute renal insufficiency  [E87.20] Lactic acidosis     Assessment/Plan:     I reviewed the patient and family's understanding of the seriousness of the illness and its expected prognosis. We discussed the patient's goals of care and treatment preferences. We discussed the difference between palliative and curative medicine. I clarified current code status, which is full code. I identified the surrogate decision maker, to be the two sons Andrzej and Nikolay. I answered all questions and we formulated a plan including recommendations for symptom management and how to best achieve goals of care.       Patient is resting comfortably in bed.  Dobutamine drip infusing.  Introduced Service.  Reviewed current condition and medical comorbidities.  Explored her goals of care and she states that what is important to her is spending time at home with 3 grandchildren.  States that she has 2 sons who work and lives with her niece who is her primary caretaker.  She has been fairly independent prior to hospitalization, although she does require extra time and DME such as a walker.  Reviewed cardiology progress note and recommendations, including consideration for palliative and hospice services.  Discussed that she  "has again required cardiac support via infusion and that cardiac function may not improve significantly despite medical interventions.  She verbalizes understanding and states that she is hoping to go to rehab facility.  Reviewed goals again and discussed consideration for skilled nursing facility versus hospice services.  Explained that she will likely require weaning from dobutamine drip prior to skilled nursing facility placement.  Explained the role of hospice services in the home.  At this time she is remains in favor of rehabilitation services.  States "whatever I have to do to live." Reviewed resuscitation measures along with risks and benefits given frailty and medical comorbidities.  Discussed that she is at increased risk for cardiac arrhythmia given low EF.  She then states "if God says it is my time then it is my time." Attempted to explore her wishes regarding resuscitation further and she states that she is unsure at this time.  Encouraged her to discuss further with her children.  She states they come to visit after work nightly.    Spoke with patient's son Andrzej via telephone.  Introduced Service, review current condition, medical comorbidities, resuscitation measures, and options as detailed above.  He states they have a meeting with Dr. Harrison this evening.  Encouraged him and his brother to further discuss with their mother and MD this evening.  Encouraged questions.  Offered spiritual support, consult placed.  Offered support.  Encouraged to call with questions or concerns.  Palliative Medicine will continue to follow.  He asked that palliative medicine call him tomorrow for follow up.    Advance Care Planning     Date: 05/15/2025    [facility]  I engaged the patient in a voluntary conversation about advance care planning and we specifically addressed what the goals of care would be moving forward, in light of the patient's change in clinical status, specifically current condition.  We did specifically " address the patient's likely prognosis, which is poor.  We explored the patient's values and preferences for future care.  The patient endorses that what is most important right now is to focus on curative/life-prolongation (regardless of treatment burdens)    Accordingly, we have decided that the best plan to meet the patient's goals includes continuing with treatment         Recommendations:     Consult spiritual care  We will plan for follow up discussion tomorrow      History of Present Illness:     74-year-old female with PMH of breast cancer status post left mastectomy and chemo now in remission, chronic systolic heart failure, diabetes mellitus, CKD 3, hemorrhoids, celiac artery stenosis severe, pulmonary embolism on Eliquis.  She presented to the ED at [facility] on 05/08/2025 with complaints of bilateral lower extremity edema times 3-4 weeks, intermittent shortness of breath requiring her to sleep with pillows.  She was initially hypotensive, however since has become borderline hypotensive.  Initial labs significant for creatinine of 1.7, bicarb 17, BNP 6 D 700, troponin 0.06, lactic acid of 6, UA suggestive of UTI.  She was initiated on vancomycin and Zosyn in the ED, required fluid resuscitation for hypotension.  Echo completed with EF of 25-30% on 03/15/2025.  Repeat echo completed on 05/14/2025 with EF 20-25%.  Cardiology was consulted who initiated a dobutamine drip and Lasix drip.  She has been weaned from both medications and transitioned to oral Lasix.  Unfortunately dobutamine was resumed this morning due to hypotension.  Blood cultures pending from 5/14.  Cardiology has recommended palliative care consultation and hospice discussions.  Palliative medicine consulted for goals of care discussions.      Active Ambulatory Problems     Diagnosis Date Noted    Vasovagal syncope 05/10/2022    Closed intertrochanteric fracture of hip, left, initial encounter 10/15/2022    Dizziness 04/01/2023    Acute on  chronic systolic (congestive) heart failure 01/23/2025    Anemia 03/09/2025    Internal hemorrhoids 03/19/2025     Resolved Ambulatory Problems     Diagnosis Date Noted    AUSTIN (acute kidney injury) 11/22/2023    Rectal bleeding 03/17/2025    Acute hypotension 03/19/2025     Past Medical History:   Diagnosis Date    Cancer     Cardiomyopathy     CHF (congestive heart failure)     History of breast cancer     History of DVT (deep vein thrombosis)     HLD (hyperlipidemia)     Hypertension     Lymphedema     Osteoarthritis     Venous insufficiency         Past Surgical History:   Procedure Laterality Date    HYSTERECTOMY      INSERTION OF PACEMAKER      INTRAMEDULLARY RODDING OF FEMUR Left 10/14/2022    Procedure: INSERTION, INTRAMEDULLARY СВЕТЛАНА, FEMUR;  Surgeon: Ankush Alex MD;  Location: [facility];  Service: Orthopedics;  Laterality: Left;    JOINT REPLACEMENT Bilateral     Knee    MASTECTOMY Left 2019        Review of patient's allergies indicates:   Allergen Reactions    Sulfa (sulfonamide antibiotics)      Patient unsure if allergic to Sulfa        Current Medications[1]       Current Facility-Administered Medications:     acetaminophen, 1,000 mg, Oral, Q8H PRN    acetaminophen, 650 mg, Oral, Q4H PRN    aluminum-magnesium hydroxide-simethicone, 30 mL, Oral, QID PRN    bisacodyL, 10 mg, Rectal, Daily PRN    dextrose 50%, 12.5 g, Intravenous, PRN    dextrose 50%, 25 g, Intravenous, PRN    glucagon (human recombinant), 1 mg, Intramuscular, PRN    glucose, 16 g, Oral, PRN    glucose, 24 g, Oral, PRN    melatonin, 6 mg, Oral, Nightly PRN    polyethylene glycol, 17 g, Oral, BID PRN    sodium chloride 0.9%, 10 mL, Intravenous, PRN    Flushing PICC/Midline Protocol, , , Until Discontinued **AND** sodium chloride 0.9%, 10 mL, Intravenous, Q12H PRN     Family History   Family history unknown: Yes        Review of Systems   Constitutional:  Positive for activity change and fatigue.   HENT: Negative.     Respiratory: Negative.   "   Cardiovascular:  Positive for leg swelling.   Gastrointestinal: Negative.    Genitourinary: Negative.    Musculoskeletal: Negative.    Neurological:  Positive for weakness.   Psychiatric/Behavioral: Negative.              Objective:   BP (!) 76/54   Pulse 101   Temp 98 °F (36.7 °C) (Oral)   Resp 20   Ht 5' 4.02" (1.626 m)   Wt 78.1 kg (172 lb 2.9 oz)   SpO2 97%   BMI 29.54 kg/m²      Physical Exam  Constitutional:       General: She is not in acute distress.     Appearance: She is ill-appearing.   HENT:      Mouth/Throat:      Mouth: Mucous membranes are moist.   Eyes:      Extraocular Movements: Extraocular movements intact.   Cardiovascular:      Rate and Rhythm: Normal rate.   Pulmonary:      Effort: Pulmonary effort is normal. No respiratory distress.   Musculoskeletal:         General: Swelling present.      Cervical back: Normal range of motion.      Right lower leg: Edema present.      Left lower leg: Edema present.   Skin:     General: Skin is warm and dry.   Neurological:      General: No focal deficit present.      Mental Status: She is alert and oriented to person, place, and time.   Psychiatric:         Behavior: Behavior normal.            Review of Symptoms  Review of Symptoms        Living Arrangements:  Lives with family and Lives in home    Psychosocial/Cultural:   See Palliative Psychosocial Note: Yes  Never . Has two sons, Andrzej and Nikolay. Lives with her niece. All are involved in her care. Able to do some ADLs at home, but requires DME such as walker.  **Primary  to Follow**  Palliative Care  Consult: No    Spiritual:  F - Codie and Belief:  Protestant  I - Importance:  Yes  C - Community:  Yes  A - Address in Care:  Yes      Advance Care Planning   Advance Directives:     Decision Making:  Patient answered questions  Goals of Care: The patient endorses that what is most important right now is to focus on curative/life-prolongation (regardless of treatment " burdens)    Accordingly, we have decided that the best plan to meet the patient's goals includes continuing with treatment          PAINAD: NA    Caregiver burden formerly assessed: Yes    > 50% of 70 min of encounter was spent in chart review, face to face discussion of goals of care, symptom assessment, coordination of care and emotional support.    MACHELLE Cuellar-BC  Palliative Medicine  [facility]         [1]   Current Facility-Administered Medications:     acetaminophen tablet 1,000 mg, 1,000 mg, Oral, Q8H PRN, Olvin Choudhury MD    acetaminophen tablet 650 mg, 650 mg, Oral, Q4H PRN, Macario, Mallory, FNP    aluminum-magnesium hydroxide-simethicone 200-200-20 mg/5 mL suspension 30 mL, 30 mL, Oral, QID PRN, Macario Mallory, FNP    apixaban tablet 5 mg, 5 mg, Oral, BID, Stevie Carrera T, FNP, 5 mg at 05/15/25 1003    bisacodyL suppository 10 mg, 10 mg, Rectal, Daily PRN, Akbar Sortoyssa, FNP    dextrose 50% injection 12.5 g, 12.5 g, Intravenous, PRN, Macario, Malloyr, FNP    dextrose 50% injection 25 g, 25 g, Intravenous, PRN, Macario Mallory, FNP    DOBUtamine 1000 mg in D5W 250 mL infusion, 5 mcg/kg/min, Intravenous, Continuous, Tana Harrison MD, Last Rate: 5.9 mL/hr at 05/15/25 1027, 5 mcg/kg/min at 05/15/25 1027    famotidine tablet 20 mg, 20 mg, Oral, Daily, Deandre Rein T, FNP, 20 mg at 05/15/25 1002    glucagon (human recombinant) injection 1 mg, 1 mg, Intramuscular, PRN, Macario, Mallory, FNP    glucose chewable tablet 16 g, 16 g, Oral, PRN, Macario, Mallory, FNP    glucose chewable tablet 24 g, 24 g, Oral, PRN, Macario, Mallory, FNP    melatonin tablet 6 mg, 6 mg, Oral, Nightly PRN, Macaroi, Mallory, FNP    midodrine tablet 5 mg, 5 mg, Oral, TID WM, Tana Harrison MD, 5 mg at 05/15/25 1002    piperacillin-tazobactam (ZOSYN) 4.5 g in D5W 100 mL IVPB (MB+), 4.5 g, Intravenous, Q8H, Shruthi Ojeda, FRANCISCOP, Stopped at 05/15/25 0250    polyethylene glycol packet 17 g, 17 g, Oral, BID PRN, Macario,  MACHELLE Tejada    pravastatin tablet 40 mg, 40 mg, Oral, QHS, Stevie Carrera T, FNP, 40 mg at 05/14/25 2039    sodium chloride 0.9% flush 10 mL, 10 mL, Intravenous, PRN, Mallory Sorto FNP    Flushing PICC/Midline Protocol, , , Until Discontinued **AND** sodium chloride 0.9% flush 10 mL, 10 mL, Intravenous, Q12H PRN, Tevin Soliz MD

## 2025-05-15 NOTE — PROGRESS NOTES
[facility]  Hospital Medicine Progress Note        Chief Complaint: Inpatient Follow-up    HPI:     74-year-old female with significant history of HTN, HLD, breast cancer status post left mastectomy, chemo in remission, nonischemic cardiomyopathy with ejection fraction-25-30%, valvular heart disease-MR/TR, DVT/PE on Eliquis, GONZALEZ on CPAP, pulmonary hypertension.  Patient presented to the ED with complaints of bilateral lower extremity edema with associated pain and worsening dyspnea.  Lab significant for renal insufficiency, metabolic acidosis, elevated BNP, lactic acidosis, UA concerning for UTI.  Patient was admitted to hospital medicine services, concern for impending cardiogenic shock and cardiology services consulted.  Patient was initiated on IV diuretics/Lasix drip, dobutamine, holding other GDM T given compromised hemodynamics/low BP, holding Eliquis and placed on full-dose anticoagulation with Lovenox.  Overall prognosis extremely poor.  Patient only treated with 1 dose of IV ceftriaxone for bacteriuria, she was asymptomatic and therefore antibiotics were discontinued.  Patient also significantly physically deconditioned and therefore therapy services consulted.  Once hemodynamics stabilized dobutamine was discontinued and IV Lasix was transitioned to p.o. Lasix, planning for GDM T as hemodynamics tolerates.  Off dobutamine patient had episodic hypotension which responded to fluid bolus with normal saline.       Interval Hx:   Patient was seen at bedside, I was informed by the nursing staff that her BP is in 70s, patient is asymptomatic, she is awake, alert and fully oriented at the time of my rounds and denied any active complaints, on 2 L with stable saturation in mid 90s    Objective/physical exam:  General: In no acute distress, afebrile  Chest: Clear to auscultation bilaterally  Heart: S1, S2, no appreciable murmur  Abdomen: Soft, nontender, BS +  MSK: Warm, no lower  extremity edema, no clubbing or cyanosis  Neurologic: Alert and oriented x4,   VITAL SIGNS: 24 HRS MIN & MAX LAST   Temp  Min: 97.7 °F (36.5 °C)  Max: 98.9 °F (37.2 °C) 98 °F (36.7 °C)   BP  Min: 75/55  Max: 119/87 (!) 76/54   Pulse  Min: 50  Max: 109  101   Resp  Min: 18  Max: 20 20   SpO2  Min: 90 %  Max: 99 % 97 %       Recent Labs   Lab 05/14/25  0346   WBC 5.60   RBC 4.19*   HGB 13.4   HCT 40.7   MCV 97.1*   MCH 32.0*   MCHC 32.9*   RDW 21.8*      MPV 12.2*         Recent Labs   Lab 05/13/25  1135 05/14/25  0346 05/15/25  0616   NA  --  133* 134*   K  --  4.5 4.4   CL  --  100 98   CO2  --  20* 21*   BUN  --  25.1* 32.4*   CREATININE  --  1.32* 1.57*   GLU  --  84 105   CALCIUM  --  7.8* 8.0*   PH 7.500*  --   --    MG  --  2.10 2.20   ALBUMIN  --  2.4*  --    PROT  --  5.9  --    ALKPHOS  --  119  --    ALT  --  23  --    AST  --  24  --    BILITOT  --  3.2*  --           Microbiology Results (last 7 days)       Procedure Component Value Units Date/Time    Blood Culture [[id number]] Collected: 05/14/25 2252    Order Status: Resulted Specimen: Blood, Venous Updated: 05/14/25 2309    Blood Culture [[id number]] Collected: 05/14/25 2252    Order Status: Resulted Specimen: Blood, Venous Updated: 05/14/25 2309    Blood Culture #2 **CANNOT BE ORDERED STAT** [[id number]]  (Normal) Collected: 05/09/25 0400    Order Status: Completed Specimen: Blood from Hand, Left Updated: 05/14/25 0507     Blood Culture No Growth at 5 days    Blood Culture #1 **CANNOT BE ORDERED STAT** [[id number]]  (Normal) Collected: 05/09/25 0351    Order Status: Completed Specimen: Blood from Wrist, Right Updated: 05/14/25 0440     Blood Culture No Growth at 5 days    Urine culture [[id number]]  (Abnormal)  (Susceptibility) Collected: 05/08/25 0810    Order Status: Completed Specimen: Urine, Clean Catch Updated: 05/11/25 0721     Urine Culture >/= 100,000 colonies/ml Escherichia coli      25,000-50,000 colonies/ml Klebsiella oxytoca              Scheduled Med:   apixaban  5 mg Oral BID    famotidine  20 mg Oral Daily    midodrine  5 mg Oral TID WM    piperacillin-tazobactam (Zosyn) IV (PEDS and ADULTS) (extended infusion is not appropriate)  4.5 g Intravenous Q8H    pravastatin  40 mg Oral QHS          Assessment/Plan:    Cardiogenic shock  Hypotension secondary to above   Acute decompensated systolic/diastolic heart failure with ejection fraction 20-25%/nonischemic cardiomyopathy status post S ICD  Lactic acidosis secondary to cardiogenic shock  Acute on chronic right-sided heart failure-biventricular failure  Josiah I on CKD, stage III-cardiorenal  Orthostatic hypotension  NSTEMI-type 2  Valvular heart disease-MR/TR   Pulmonary hypertension   HLD   History of breast cancer status post mastectomy, chemo, in remission  History of DVT/PE  History of GONZALEZ on CPAP  Asymptomatic bacteriuria on admit   Prophylaxis    Discussed case with Cardiology   Unfortunately extremely poor prognosis and no options for aggressive treatment   Decided to initiate her on dobutamine for palliative purpose   Patient also was initiated on p.o. midodrine and also received 250 mL bolus in the morning   Poor prognosis discussed with patient and son   Palliative care team consulted   As of now patient remains full code  Hypotensive shock is likely secondary to cardiogenic, low suspicion for sepsis   Chest x-ray with no pneumonia   DC Zosyn   Repeat UA ordered, pending UA I will initiate her on ceftriaxone based on previous culture and sensitivities on admit with E coli in urine  GDM T for nonischemic cardiomyopathy not possible given compromised hemodynamics   Continue Eliquis for thromboembolic prophylaxis   Continue statin and Pepcid   DVT prophylaxis-on Eliquis    I discussed poor prognosis with patient and son   Patient has poor insight   Son will discuss it further with other family members and then make final decision      Critical care time-35 minutes  Critical care  diagnosis-echogenic shock requiring dobutamine   Critical care interventions: Hands-on evaluation, review of labs/radiographs/records and discussions with patient       Tana Harrison MD   05/15/2025

## 2025-05-15 NOTE — PT/OT/SLP PROGRESS
Physical Therapy      Patient Name:  Laura Jacobs   MRN:  [id number]    Attempted to see patient twice this day AM & PM nurse hold 2/2 low BP(70/50s & 80s/60s), palliative has been consulted . Will follow-up as appropriate/schedule allows.

## 2025-05-15 NOTE — PLAN OF CARE
The patient and her Son: Andrzej Reynaldo: [phone] were given a choice list re: SNF facilities to review and select their top (3) preferences. They are requesting (SNF) referrals be sent to: 1. [facility] 2. [facility] 3. [facility]. Will keep the patient and her Son: Andrzej informed as updates are received.

## 2025-05-15 NOTE — PLAN OF CARE
PASSR Level I Screen completed through: [employer] ([employer]). The (LOCET) was called in by ZEUS Grey prior to (PASSR) submission.

## 2025-05-15 NOTE — PROGRESS NOTES
05/15/25 0759   Vital Signs   Temp 98 °F (36.7 °C)   Temp Source Oral   Pulse 101   Resp 20   SpO2 97 %   Flow (L/min) (Oxygen Therapy) 2   Device (Oxygen Therapy) nasal cannula   BP (!) 76/54   MAP (mmHg) 62     Dr. Harrison notified of pt's low BP. All other VSS normal, pt asymptomatic at this time/NADN. New orders received. Charge nurse made aware.

## 2025-05-16 LAB
ABS NEUT (OLG): 5.43 X10(3)/MCL (ref 2.1–9.2)
ALBUMIN SERPL-MCNC: 2.2 G/DL (ref 3.4–4.8)
ALBUMIN/GLOB SERPL: 0.7 RATIO (ref 1.1–2)
ALP SERPL-CCNC: 106 UNIT/L (ref 40–150)
ALT SERPL-CCNC: 19 UNIT/L (ref 0–55)
ANION GAP SERPL CALC-SCNC: 16 MEQ/L
ANISOCYTOSIS BLD QL SMEAR: ABNORMAL
AST SERPL-CCNC: 22 UNIT/L (ref 11–45)
BACTERIA #/AREA URNS AUTO: ABNORMAL /HPF
BASOPHILS NFR BLD MANUAL: 0.06 X10(3)/MCL (ref 0–0.2)
BASOPHILS NFR BLD MANUAL: 1 %
BILIRUB SERPL-MCNC: 2.5 MG/DL
BILIRUB UR QL STRIP.AUTO: NEGATIVE
BUN SERPL-MCNC: 29.3 MG/DL (ref 9.8–20.1)
BURR CELLS (OLG): ABNORMAL
CALCIUM SERPL-MCNC: 7.9 MG/DL (ref 8.4–10.2)
CHLORIDE SERPL-SCNC: 99 MMOL/L (ref 98–107)
CLARITY UR: ABNORMAL
CO2 SERPL-SCNC: 22 MMOL/L (ref 23–31)
COLOR UR AUTO: YELLOW
CREAT SERPL-MCNC: 1.36 MG/DL (ref 0.55–1.02)
CREAT/UREA NIT SERPL: 22
EOSINOPHIL NFR BLD MANUAL: 0.06 X10(3)/MCL (ref 0–0.9)
EOSINOPHIL NFR BLD MANUAL: 1 %
ERYTHROCYTE [DISTWIDTH] IN BLOOD BY AUTOMATED COUNT: 22.2 % (ref 11.5–17)
GFR SERPLBLD CREATININE-BSD FMLA CKD-EPI: 41 ML/MIN/1.73/M2
GLOBULIN SER-MCNC: 3.3 GM/DL (ref 2.4–3.5)
GLUCOSE SERPL-MCNC: 92 MG/DL (ref 82–115)
GLUCOSE UR QL STRIP: NORMAL
HCT VFR BLD AUTO: 39.4 % (ref 37–47)
HGB BLD-MCNC: 13.5 G/DL (ref 12–16)
HGB UR QL STRIP: ABNORMAL
INSTRUMENT WBC (OLG): 5.72 X10(3)/MCL
KETONES UR QL STRIP: NEGATIVE
LACTATE SERPL-SCNC: 2.1 MMOL/L (ref 0.5–2.2)
LEUKOCYTE ESTERASE UR QL STRIP: 25
LYMPHOCYTES NFR BLD MANUAL: 0.06 X10(3)/MCL (ref 0.6–4.6)
LYMPHOCYTES NFR BLD MANUAL: 1 %
MACROCYTES BLD QL SMEAR: ABNORMAL
MCH RBC QN AUTO: 32.8 PG (ref 27–31)
MCHC RBC AUTO-ENTMCNC: 34.3 G/DL (ref 33–36)
MCV RBC AUTO: 95.9 FL (ref 80–94)
METAMYELOCYTES NFR BLD MANUAL: 1 %
MONOCYTES NFR BLD MANUAL: 0.11 X10(3)/MCL (ref 0.1–1.3)
MONOCYTES NFR BLD MANUAL: 2 %
NEUTROPHILS NFR BLD MANUAL: 95 %
NITRITE UR QL STRIP: NEGATIVE
NRBC BLD MANUAL-RTO: 1 %
PH UR STRIP: 5 [PH]
PLATELET # BLD AUTO: 124 X10(3)/MCL (ref 130–400)
PLATELET # BLD EST: NORMAL 10*3/UL
PLATELETS.RETICULATED NFR BLD AUTO: 5.5 % (ref 0.9–11.2)
PMV BLD AUTO: 11.5 FL (ref 7.4–10.4)
POIKILOCYTOSIS BLD QL SMEAR: ABNORMAL
POLYCHROMASIA BLD QL SMEAR: ABNORMAL
POTASSIUM SERPL-SCNC: 3.9 MMOL/L (ref 3.5–5.1)
PROT SERPL-MCNC: 5.5 GM/DL (ref 5.8–7.6)
PROT UR QL STRIP: ABNORMAL
RBC # BLD AUTO: 4.11 X10(6)/MCL (ref 4.2–5.4)
RBC #/AREA URNS AUTO: ABNORMAL /HPF
RBC MORPH BLD: ABNORMAL
SODIUM SERPL-SCNC: 137 MMOL/L (ref 136–145)
SP GR UR STRIP.AUTO: 1.02 (ref 1–1.03)
SQUAMOUS #/AREA URNS LPF: ABNORMAL /HPF
TARGETS BLD QL SMEAR: ABNORMAL
UROBILINOGEN UR STRIP-ACNC: NORMAL
WBC # BLD AUTO: 5.72 X10(3)/MCL (ref 4.5–11.5)
WBC #/AREA URNS AUTO: ABNORMAL /HPF
YEAST BUDDING URNS QL: ABNORMAL /HPF

## 2025-05-16 PROCEDURE — 85025 COMPLETE CBC W/AUTO DIFF WBC: CPT | Performed by: INTERNAL MEDICINE

## 2025-05-16 PROCEDURE — 25000003 PHARM REV CODE 250: Performed by: NURSE PRACTITIONER

## 2025-05-16 PROCEDURE — 81001 URINALYSIS AUTO W/SCOPE: CPT | Performed by: INTERNAL MEDICINE

## 2025-05-16 PROCEDURE — 80053 COMPREHEN METABOLIC PANEL: CPT | Performed by: INTERNAL MEDICINE

## 2025-05-16 PROCEDURE — 97530 THERAPEUTIC ACTIVITIES: CPT

## 2025-05-16 PROCEDURE — 25000003 PHARM REV CODE 250: Performed by: INTERNAL MEDICINE

## 2025-05-16 PROCEDURE — 97535 SELF CARE MNGMENT TRAINING: CPT

## 2025-05-16 PROCEDURE — 83605 ASSAY OF LACTIC ACID: CPT | Performed by: INTERNAL MEDICINE

## 2025-05-16 PROCEDURE — 51798 US URINE CAPACITY MEASURE: CPT

## 2025-05-16 PROCEDURE — 21400001 HC TELEMETRY ROOM

## 2025-05-16 PROCEDURE — 97166 OT EVAL MOD COMPLEX 45 MIN: CPT

## 2025-05-16 PROCEDURE — 99233 SBSQ HOSP IP/OBS HIGH 50: CPT | Mod: ,,,

## 2025-05-16 PROCEDURE — 36415 COLL VENOUS BLD VENIPUNCTURE: CPT | Performed by: INTERNAL MEDICINE

## 2025-05-16 RX ADMIN — APIXABAN 5 MG: 5 TABLET, FILM COATED ORAL at 09:05

## 2025-05-16 RX ADMIN — MIDODRINE HYDROCHLORIDE 5 MG: 5 TABLET ORAL at 09:05

## 2025-05-16 RX ADMIN — PRAVASTATIN SODIUM 40 MG: 40 TABLET ORAL at 09:05

## 2025-05-16 RX ADMIN — MIDODRINE HYDROCHLORIDE 5 MG: 5 TABLET ORAL at 11:05

## 2025-05-16 RX ADMIN — FAMOTIDINE 20 MG: 20 TABLET, FILM COATED ORAL at 09:05

## 2025-05-16 RX ADMIN — MIDODRINE HYDROCHLORIDE 5 MG: 5 TABLET ORAL at 04:05

## 2025-05-16 NOTE — PLAN OF CARE
Received an update by [facility] Clinical Liaison: Lakeisha Mehta who reports they are OON and are unable to accept due to no (OON) benefits.

## 2025-05-16 NOTE — PLAN OF CARE
Received an update by Kelsey Duarte ([facility]) they are unable to accept due to being out of her insurance network.

## 2025-05-16 NOTE — PT/OT/SLP PROGRESS
"Physical Therapy Treatment    Patient Name:  Laura Jacobs   MRN:  [id number]    Recommendations:     Discharge therapy intensity: Moderate Intensity Therapy   Discharge Equipment Recommendations: walker, rolling  Barriers to discharge: Impaired mobility and Ongoing medical needs    Assessment:     Laura Jacobs is a 74 y.o. female admitted with a medical diagnosis of cardiogenic shock, hypotension, acute decompensated heart failure, cardiomyopathy, lactic acidosis, AUSTIN, NSTEMI, valvular heart disease, pulmonary hypertension, HLD, (extensive medical history). Palliative care team following patient.  She presents with the following impairments/functional limitations: weakness, impaired endurance, impaired self care skills, impaired functional mobility, gait instability, impaired balance, decreased safety awareness, edema. Decreasing plan of care to 3x/week secondary to medical prognosis. Will continue to update as appropriate.     Rehab Prognosis: Fair; patient would benefit from acute skilled PT services to address these deficits and reach maximum level of function.    Recent Surgery: * No surgery found *      Plan:     During this hospitalization, patient would benefit from acute PT services 3 x/week to address the identified rehab impairments via gait training, therapeutic activities, therapeutic exercises, neuromuscular re-education and progress toward the following goals:    Plan of Care Expires:  06/12/25    Subjective     Chief Complaint: "I want to walk to the door"  Patient/Family Comments/goals: I want to walk   Pain/Comfort:  Pain Rating 1: 0/10      Objective:     Communicated with nsg prior to session.  Patient found HOB elevated with pulse ox (continuous), telemetry, PICC line upon PT entry to room.     General Precautions: Standard, fall  Orthopedic Precautions: N/A  Braces: N/A  Respiratory Status: Room air  Blood Pressure:   106/73 HOB elevated (BP taken on R calf)  145/96 sitting EOB (BP " taken on R calf, unsure accuracy of reading)  HR: 103-130s sitting EOB  Skin Integrity: L arm swelling & pitting edema      Functional Mobility:  Bed Mobility:     Semi-Supine to Sit: minimum assistance  Sit to supine: Mod A x2   Transfers:     Sit to Stand:  Mod with rolling walker  Unsteadiness and buckling noted upon standing     Did mobilize further secondary to patient safety. (Elevated HR and buckling of BLEs)    Therapeutic Activities/Exercises:  Supine AAROM - hip flexion, knee flexion, ankle PF/DF      Education:  Patient provided with verbal education education regarding PT role/goals/POC, fall prevention, and safety awareness.  Additional teaching is warranted.     Patient left left sidelying with all lines intact, call button in reach, wedge under L side, and pressure relief boots    GOALS:   Multidisciplinary Problems       Physical Therapy Goals          Problem: Physical Therapy    Goal Priority Disciplines Outcome Interventions   Physical Therapy Goal     PT, PT/OT Progressing    Description: Goals to be met by: 25     Patient will increase functional independence with mobility by performin. Supine to sit with [employer]  2. Sit to supine with [employer]  3. Sit to stand transfer with Modified [employer]  4. Gait  x 200 feet with Modified [employer] using Rolling Walker.   5. Increased functional strength to 5/5 in BLE's.                         Time Tracking:     PT Received On: 25  PT Start Time: 1455     PT Stop Time: 1518  PT Total Time (min): 23 min     Billable Minutes: Therapeutic Activity 23    Treatment Type: Treatment  PT/PTA: PT     Number of PTA visits since last PT visit: 2     2025

## 2025-05-16 NOTE — PLAN OF CARE
Clinical updates/notes were uploaded and sent to [employer] via ([employer]) for review.  D/C Date: Pending.

## 2025-05-16 NOTE — PLAN OF CARE
Received a CM consult by Dr. Harrison (Hospitalist) who is requesting that (LTAC) services be considered for discharge planning. The patient is on a Dobutamine Drip for B/P concerns. I spoke with the patient and her Son: Andrzej Jacobs who were given a choice list of (LTAC) facilities for review. They are requesting (LTAC) referrals be sent to:   [facility]/LTAC   [facility] \[facility] [place] [facility]\""    **Note: Will keep the patient and family informed as updates are received.

## 2025-05-16 NOTE — PROGRESS NOTES
Spoke to son explained pt's poor prognosis. Pt is currently dobutamine dependent. NYHA class 4 End stage cardiomyopathy severe MR. Pt is not a candidate for advanced therapy ( LVAD) given age and poor functional status. Pt is not a MVR candidate given inotrope dependent. Pt may opt for outpt Dobutamine but this will not provide any long term benefit.

## 2025-05-16 NOTE — PT/OT/SLP EVAL
"Occupational Therapy  Evaluation    Name: Laura Jacobs  MRN: [id number]  Recent Surgery: * No surgery found *      Recommendations:     Discharge therapy intensity: Moderate Intensity Therapy   Discharge Equipment Recommendations:  to be determined by next level of care, walker, rolling  Barriers to discharge:  Decreased caregiver support (medical dx)    Assessment:     Laura Jacobs is a 74 y.o. female with a medical diagnosis of cardiogenic shock, hypotension, acute decompensated heart failure, cardiomyopathy, lactic acidosis, AUSTIN, NSTEMI, valvular heart disease, pulmonary hypertension, HLD, (extensive medical history). Palliative care team following patient..  She presents with the following performance deficits affecting function: weakness, impaired cardiopulmonary response to activity, impaired balance, edema, decreased safety awareness, impaired functional mobility, gait instability, decreased lower extremity function, decreased upper extremity function, impaired self care skills, impaired skin, decreased coordination, impaired endurance.     Rehab Prognosis: guarded, poor; patient would benefit from acute skilled OT services to address these deficits and reach maximum level of function.       Plan:     Patient to be seen 3 x/week to address the above listed problems via self-care/home management, therapeutic activities, therapeutic exercises  Plan of Care Expires: 06/20/25  Plan of Care Reviewed with: patient    Subjective     Chief Complaint: "I can walk to that door"  Patient/Family Comments/goals: walk, go home    Occupational Profile: per palliative care team present this date:   Living Environment: lives alone in [facility]. Niece lives down the road however has not seen/assisted the pt in months 2/2 fly dynamics.   Previous level of function: independent  Roles and Routines: mother, aunt?  Equipment Used at Home: bath bench, cane, straight  Assistance upon Discharge: TBD. Unsure. Pt states she is " going to stay with niece who assists her; however, niece denies this.     Pain/Comfort:  Pain Rating 1: 0/10    Patients cultural, spiritual, Congregation conflicts given the current situation: no    Objective:     OT communicated with RN prior to session.      Patient was found HOB elevated with pulse ox (continuous), telemetry, PICC line, PureWick upon OT entry to room.    General Precautions: Standard, fall  Orthopedic Precautions: N/A  Braces: N/A  Respiratory Status: Room air  Blood Pressure:   106/73 HOB elevated (BP taken on R calf)  145/96 sitting EOB (BP taken on R calf, unsure accuracy of reading)  HR: 103-130s sitting EOB  Skin Integrity: L arm swelling & pitting edema    Bed Mobility:    Patient completed Supine to Sit with minimum assistance and with side rail  Patient completed Sit to Supine with moderate assistance and 2 persons    Functional Mobility/Transfers:  Patient completed Sit <> Stand Transfer with moderate assistance  with  rolling walker   Functional Mobility: Does not occur 2/2 poor safety; elevated HR.    Activities of Daily Living:  Max A for dressing/toileting/bathing ADLs at this time.     AMPAC 6 Click ADL:  AMPAC Total Score: 15    Functional Cognition:  Orientation: oriented to Person, Place, and Time  Safety Awareness: Impaired. Poor   Insight into Deficits: Impaired. poor    Visual Perceptual Skills:  Intact    Upper Extremity Function:  Right Upper Extremity:   Strength 3+/5 grossly     Left Upper Extremity:  Strength 3+/5 grossly   Pitting edema noted t/o     Balance:   Impaired.      Patient Education:  Patient provided with verbal education education regarding OT role/goals/POC, fall prevention, safety awareness, Discharge/DME recommendations, and pressure ulcer prevention.  Additional teaching is warranted.     Patient left HOB elevated with all lines intact, call button in reach, wedge under L side, pressure relief boots, and RN notified.    GOALS:   Multidisciplinary Problems        Occupational Therapy Goals          Problem: Occupational Therapy    Goal Priority Disciplines Outcome Interventions   Occupational Therapy Goal     OT, PT/OT Ongoing    Description: Goals to be met by: d/c     Patient will increase functional independence with ADLs by performing:    Grooming while seated with Stand-by Assistance.  Sitting at edge of bed x10 minutes with Stand-by Assistance to perform ADL.  Toilet transfer to bedside commode with Minimal Assistance.  Upper extremity exercise program 2x10 reps per handout, with assistance as needed.                         History:     Past Medical History:   Diagnosis Date    Cancer     breast CA s/p chemo and L masectomy     Cardiomyopathy     CHF (congestive heart failure)     History of breast cancer     History of DVT (deep vein thrombosis)     HLD (hyperlipidemia)     Hypertension     Lymphedema     Osteoarthritis     Venous insufficiency          Past Surgical History:   Procedure Laterality Date    HYSTERECTOMY      INSERTION OF PACEMAKER      INTRAMEDULLARY RODDING OF FEMUR Left 10/14/2022    Procedure: INSERTION, INTRAMEDULLARY СВЕТЛАНА, FEMUR;  Surgeon: Ankush Alex MD;  Location: [facility];  Service: Orthopedics;  Laterality: Left;    JOINT REPLACEMENT Bilateral     Knee    MASTECTOMY Left 2019       Time Tracking:     OT Date of Treatment: 05/16/25  OT Start Time: 1449  OT Stop Time: 1518  OT Total Time (min): 29 min    Billable Minutes:Evaluation mod  Self Care/Home Management 1    5/16/2025

## 2025-05-16 NOTE — PROGRESS NOTES
Patient Name: Laura Jacobs   MRN: [id number]   Admission Date: 5/8/2025   Hospital Length of Stay: 8   Attending Provider: Scooter Davis MD   Consulting Provider: MACHELLE Cuellar  Reason for Consult: Goals of Care  Primary Care Physician:  Ruben Brooks Sr., MD     Principal Problem: <principal problem not specified>     Patient information was obtained from patient, relative(s), and ER records.     Final diagnoses:  [R06.02] Shortness of breath  [I50.9] Acute on chronic congestive heart failure, unspecified heart failure type (Primary)  [R60.9] Edema, unspecified type  [N28.9] Acute renal insufficiency  [E87.20] Lactic acidosis      Assessment/Plan:     I reviewed the patient and family's understanding of the seriousness of the illness and its expected prognosis. We discussed the patient's goals of care and treatment preferences. We discussed the difference between palliative and curative medicine. I explained the differences between home health, palliative and hospice care. I clarified current code status, which is full code. I identified the surrogate decision maker to be the patient's two sons, Andrzej and Nikolay. I answered all questions and we formulated a plan including recommendations for symptom management and how to best achieve goals of care.       Patient is resting comfortably in bed on NC. She denies pain or shortness of breath. Reviewed goals of care and recommendations from progress notes.  Explained the nature of cardiac disease, along with dobutamine dependence and overall poor prognosis.  Explained that previous discharge plan included skilled nursing facility, which is likely not an option if she continues to require dobutamine.  Therefore they are pending possible LTAC placement.  Attempted to explore her goals of care with overall poor prognosis.  Discussed options of LTAC, nursing home with hospice, home health with palliative medicine service, or home with hospice.  Reviewed  "that yesterday she informed me her goals would include spending time at home with her grandchildren.  She confirms this again today.  Attempted to discuss options and explained the role of continuing aggressive treatment versus hospice services with focus on symptom management/comfort and quality of life given overall poor prognosis. States she would want to go home and spend time with family, however she would want to return to the hospital if needed for further care. States she would want to do everything to "get the heart stronger." Explained again the nature of cardiac disease and that she is not a candidate for aggressive interventions to improve cardiac function per cardiology note. Reviewed resuscitation measures again, along with risks and benefits in the setting of cardiac dysfunction. She initially states "that is God's work" and shakes her head no. However, she then states that she would want medical interventions, including full aggressive treatment in the event of cardiopulmonary arrest. She appears to have poor insight into her medical condition. Informed her that I would call her son to discuss options further and follow up on his discussion with physicians. She expresses gratitude. Encouraged further discussions with family. Offered support. Encouraged to call with questions or concerns. Palliative Medicine will continue to follow.    Spoke with son Andrzej via telephone. Reintroduced self and service. He confirms having discussed his mother's condition with cardiology this morning. Attempted to review current condition and options again, as discussed yesterday. He asks that I call Ksenia Jacobs, the patient's niece, who is reportedly the primary caregiver.    Spoke with Ksenia via telephone. She explains that the patient does not live with her, but that she used to care for her aunt. The patient lives alone and Ksenia had not seen her in a few months prior to hospital admission because of family " dynamics. States she has discussed nursing home placement with the patient's sons previously and they declined. She informs that the patient's sons would not want the patient to be on any palliative medicine service. 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 States she told the patient and son this is a decision between immediate family and that she would support their decisions. She asks to be called if any further information is needed.    Advance Care Planning     Date: 05/16/2025    [facility]  I engaged the patient in a voluntary conversation about advance care planning and we specifically addressed what the goals of care would be moving forward, in light of the patient's change in clinical status, specifically current condition.  We did specifically address the patient's likely prognosis, which is poor.  We explored the patient's values and preferences for future care.  The patient endorses that what is most important right now is to focus on curative/life-prolongation (regardless of treatment burdens)    Accordingly, we have decided that the best plan to meet the patient's goals includes continuing with treatment       Interval History:     No acute events overnight.      Active Ambulatory Problems     Diagnosis Date Noted    Vasovagal syncope 05/10/2022    Closed intertrochanteric fracture of hip, left, initial encounter 10/15/2022    Dizziness 04/01/2023    Acute on chronic systolic (congestive) heart failure 01/23/2025    Anemia 03/09/2025    Internal hemorrhoids 03/19/2025     Resolved Ambulatory Problems     Diagnosis Date Noted    AUSTIN (acute kidney injury) 11/22/2023    Rectal bleeding 03/17/2025    Acute hypotension 03/19/2025     Past Medical History:   Diagnosis Date    Cancer     Cardiomyopathy     CHF (congestive heart failure)     History of breast cancer     History of DVT (deep vein thrombosis)   "   HLD (hyperlipidemia)     Hypertension     Lymphedema     Osteoarthritis     Venous insufficiency         Past Surgical History:   Procedure Laterality Date    HYSTERECTOMY      INSERTION OF PACEMAKER      INTRAMEDULLARY RODDING OF FEMUR Left 10/14/2022    Procedure: INSERTION, INTRAMEDULLARY СВЕТЛАНА, FEMUR;  Surgeon: Ankush Alex MD;  Location: [facility];  Service: Orthopedics;  Laterality: Left;    JOINT REPLACEMENT Bilateral     Knee    MASTECTOMY Left 2019        Review of patient's allergies indicates:   Allergen Reactions    Sulfa (sulfonamide antibiotics)      Patient unsure if allergic to Sulfa        Current Medications[1]       Current Facility-Administered Medications:     acetaminophen, 1,000 mg, Oral, Q8H PRN    acetaminophen, 650 mg, Oral, Q4H PRN    aluminum-magnesium hydroxide-simethicone, 30 mL, Oral, QID PRN    bisacodyL, 10 mg, Rectal, Daily PRN    dextrose 50%, 12.5 g, Intravenous, PRN    dextrose 50%, 25 g, Intravenous, PRN    glucagon (human recombinant), 1 mg, Intramuscular, PRN    glucose, 16 g, Oral, PRN    glucose, 24 g, Oral, PRN    melatonin, 6 mg, Oral, Nightly PRN    polyethylene glycol, 17 g, Oral, BID PRN    sodium chloride 0.9%, 10 mL, Intravenous, PRN    Flushing PICC/Midline Protocol, , , Until Discontinued **AND** sodium chloride 0.9%, 10 mL, Intravenous, Q12H PRN     Family History   Family history unknown: Yes          Review of Systems   Constitutional:  Positive for appetite change and fatigue.   HENT: Negative.     Respiratory: Negative.     Cardiovascular:  Positive for leg swelling.   Gastrointestinal: Negative.    Genitourinary: Negative.    Musculoskeletal: Negative.    Neurological:  Positive for weakness.   Psychiatric/Behavioral: Negative.              Objective:   BP (!) 130/90   Pulse 94   Temp 98.2 °F (36.8 °C) (Axillary)   Resp 18   Ht 5' 4.02" (1.626 m)   Wt 77.8 kg (171 lb 7.9 oz)   SpO2 (!) 94%   BMI 29.42 kg/m²      Physical Exam   Constitutional: She is " oriented to person, place, and time. No distress. She appears ill.   HENT:   Mouth/Throat: Mucous membranes are dry.   Cardiovascular: Normal rate and regular rhythm. Pulmonary:      Effort: Pulmonary effort is normal. No respiratory distress.     Abdominal: She exhibits no distension.   Musculoskeletal:      Cervical back: Normal range of motion.      Right lower leg: Edema present.      Left lower leg: Edema present.   Neurological: She is alert and oriented to person, place, and time.   Skin: Skin is warm and dry.          Review of Symptoms  Review of Symptoms        Psychosocial/Cultural:   See Palliative Psychosocial Note: Yes  **Primary  to Follow**  Palliative Care  Consult: No      Advance Care Planning   Advance Directives:     Decision Making:  Patient answered questions  Goals of Care: What is most important right now is to focus on curative/life-prolongation (regardless of treatment burdens). Accordingly, we have decided that the best plan to meet the patient's goals includes continuing with treatment.          PAINAD: NA    Caregiver burden formerly assessed: Yes    > 50% of 50 min of encounter was spent in chart review, face to face discussion of goals of care, symptom assessment, coordination of care and emotional support.    MACHELLE Cuellar-BC  Palliative Medicine  [facility]         [1]   Current Facility-Administered Medications:     acetaminophen tablet 1,000 mg, 1,000 mg, Oral, Q8H PRN, Olvin Choudhury MD    acetaminophen tablet 650 mg, 650 mg, Oral, Q4H PRN, Mallory Sorto FNP    aluminum-magnesium hydroxide-simethicone 200-200-20 mg/5 mL suspension 30 mL, 30 mL, Oral, QID PRN, Mallory Sorto FNP    apixaban tablet 5 mg, 5 mg, Oral, BID, Stevie Carrera FNP, 5 mg at 05/16/25 0922    bisacodyL suppository 10 mg, 10 mg, Rectal, Daily PRN, Mallory Sorto FNP    cefTRIAXone injection 1 g, 1 g, Intravenous, Q24H, Tana Harrison MD, 1 g at 05/15/25  1713    dextrose 50% injection 12.5 g, 12.5 g, Intravenous, PRN, Bayfield, Mallory, FNP    dextrose 50% injection 25 g, 25 g, Intravenous, PRN, [place]field, Mallory, FNP    DOBUtamine 1000 mg in D5W 250 mL infusion, 5 mcg/kg/min, Intravenous, Continuous, Tana Harrison MD, Last Rate: 5.9 mL/hr at 05/15/25 1027, 5 mcg/kg/min at 05/15/25 1027    famotidine tablet 20 mg, 20 mg, Oral, Daily, Deandre, Rein T, FNP, 20 mg at 05/16/25 0922    glucagon (human recombinant) injection 1 mg, 1 mg, Intramuscular, PRN, Bayfield, Mallory, FNP    glucose chewable tablet 16 g, 16 g, Oral, PRN, Bayfield, Mallory, FNP    glucose chewable tablet 24 g, 24 g, Oral, PRN, Macario, Mallory, FNP    melatonin tablet 6 mg, 6 mg, Oral, Nightly PRN, Macario, Mallory, FNP    midodrine tablet 5 mg, 5 mg, Oral, TID WM, Tana Harrison MD, 5 mg at 05/16/25 1108    polyethylene glycol packet 17 g, 17 g, Oral, BID PRN, Macario, Mallory, FNP    pravastatin tablet 40 mg, 40 mg, Oral, QHS, Deandre Rein T, FNP, 40 mg at 05/15/25 2049    sodium chloride 0.9% flush 10 mL, 10 mL, Intravenous, PRN, [place]field, Mallory, FNP    Flushing PICC/Midline Protocol, , , Until Discontinued **AND** sodium chloride 0.9% flush 10 mL, 10 mL, Intravenous, Q12H PRN, Tevin Soliz MD

## 2025-05-16 NOTE — PROGRESS NOTES
"  [facility]    Cardiology  Progress Note    Patient Name: Laura Jacobs  MRN: [id number]  Admission Date: 5/8/2025  Hospital Length of Stay: 8 days  Code Status: Full Code   Attending Provider: Scooter Davis MD   Consulting Provider: MACHELLE Donohue  Primary Care Physician: Ruben Brooks Sr., MD  Principal Problem:<principal problem not specified>    Patient information was obtained from patient, past medical records, ER records, and primary team.     Subjective:   Chief Complaint/Reason for Consult: Concern for Cardiogenic Shock    HPI: Ms. Jacobs is a 74 year old female, known to Dr. Washington, who presented to the hospital with bilateral lower extremity edema, pain, and SOB. Patient with history of NICMO. Noted Significant MR. Lab work significant for creatinine 1.7, bicarb 17, BNP 6700, troponin 0.06, lactic acid 6.0. Urinalysis consistent with UTI. BP initially marginal, but has improved. On Eliquis for history of DVT/PE. Admitted to Hospital Medicine Team. CIS consulted for cardiac evaluation/management due to concern for impending shock.    Hospital Course:  5.9.25: NAD Noted. On RA, Lying flat in no distress. Marginal diuresis overnight. BP Systolic low normal. SR with intermittent bouts of ST.   5.10.25: NAD. "I am feeling a little better." Fluid Balance Net Negative 2022mL. ST 100s-110s.   5.11.25: BP marginal at times. Good UOP. Remains on Dobutamine and Lasix drip. Renal indices stable.   5.12.25: NAD Noted. Vitals Stable. Diuresing well. Edema is improving. Patient watching TV in no distress. She is awake/alert.  5.13.25: NAD Noted. Vitals Stable. UO slacked off overnight. On RA. Dobutamine infusion going.   5.14.25: NAD Noted. On RA. Lying flat, nurse aid assisting with bath. BP Stable. Sinus Tachycardia HR Low 100's.   5.15.25: Hypotensive this morning. Dobutamine Restarted for BP Support and Palliative support. ST low 100's. Patient updated on " overall prognosis and need for palliative measures. Insight into her disease process is limited. Attending notified of CIS plan moving forward.     PMH: Hypertension/HHD, Dyslipidemia, Palpitations, MO, Breast Cancer, CP, SOB/ROMO, Pulmonary Hypertension, GONZALEZ/CPAP, DVT/PE (Eliquis), OA, CVI, VHD/MR-TR  PSH: S-ICD, Knee Surgery, LHC, Mastectomy  Family History: Non-contributory  Social History: Tobacco- Negative, Alcohol- Negative, Substance Abuse- Negative     Previous Cardiac Diagnostics:   Echocardiogram (5.14.25):  Left Ventricle: The left ventricle is normal in size. Normal wall thickness. Moderate global hypokinesis present. There is severely reduced systolic function with a visually estimated ejection fraction of 20 - 25%. There is diastolic dysfunction but grade cannot be determined.  Right Ventricle: The right ventricle is mildly dilated Systolic function is severely reduced. TAPSE is 0.6 cm.  Left Atrium: The left atrium is moderately dilated  Right Atrium: The right atrium is moderately dilated .  Mitral Valve: There is moderate regurgitation.  Tricuspid Valve: There is mild to moderate regurgitation.  Pericardium: There is a trivial effusion.    Venous Doppler (5.8.25):  Negative for deep and superficial vein thrombosis in bilateral lower extremities.    Echocardiogram (3.15.25):  Left Ventricle: The left ventricle is dilated. Wall is thinner than normal. Severe global hypokinesis present. There is severely reduced systolic function with a visually estimated ejection fraction of 25 - 30%.  Right Ventricle: Right ventricular enlargement.  Left Atrium: Severely dilated Agitated saline study of the atrial septum is negative, suggesting absence of intracardiac shunt at the atrial level.  Right Atrium: Right atrium is severely dilated.  Mitral Valve: Mildly thickened leaflets. There is severe regurgitation.  Tricuspid Valve: There is moderate regurgitation.    PET (2.14.25):  This is a normal perfusion study,  no perfusion defects noted. There is no evidence of ischemia.   This scan is suggestive of low risk for future cardiovascular events.   The left ventricular cavity is noted to be mildly enlarged on the stress studies. The stress left ventricular ejection fraction was calculated to be 27% and left ventricular global function is severely reduced. The rest left ventricular cavity is noted to be mildly enlarged. The rest left ventricular ejection fraction was calculated to be 24% and rest left ventricular global function is severely reduced.   Persistent hypokinesis of the anterior region, septal region, inferior region and lateral region is noted in both rest and stress studies. When compared to the resting ejection fraction (24%), the stress ejection fraction (27%) has increased.   The study quality is good.   Calcium scoring was not performed in this patient due to specific concerns that can affect accuracy.   There was no rise in myocardial blood flow between rest and stress. Global myocardial blood flow reserve was 1.98. Non-diagnostic study.    Carotid US (2.14.25):  The study quality is average.   Antegrade left vertebral artery flow.   Antegrade right vertebral artery flow.   No plaque noted in bilateral internal carotid arteries.    Echocardiogram (1.20.25):  Left Ventricle: The left ventricle is dilated. Normal wall thickness. Global hypokinesis present. There is moderately reduced systolic function with a visually estimated ejection fraction of 35 - 40%.  Right Ventricle: Normal right ventricular cavity size. Systolic function is normal.  Left Atrium: Left atrium is mildly dilated.  Right Atrium: Right atrium is mildly dilated.  Aortic Valve: The aortic valve is a trileaflet valve. There is mild aortic valve sclerosis.  Mitral Valve: There is mitral annular calcification present. There is moderate to severe regurgitation.  Tricuspid Valve: There is mild regurgitation.  Pulmonic Valve: There is mild  regurgitation.  IVC/SVC: Normal venous pressure at 3 mmHg.  Pericardium: There is a trivial effusion adjacent to the right atrium. No indication of cardiac tamponade.     LHC (3.10.22):  Widely Patent Coronary Anatomy with severe LV Systolic Dysfunction. EF 20-25% with EDP 5-10 mmHg.     AMANDA (9.14.22):  LV systolic function, LVEF 30-35%.  Severe global hypokinesis  Mild Tricuspid regurgitation  Moderate to severe Mitral regurgitation  Mild aortic regurgitation.  There is Lambl's excrescences on aortic leaflets that is benign fibrous structure.   No evidence of infection, vegetation or abscess     SICD Placement (3.25.21):  SICD Placement Re: Primary Prevention of SCD.      LHC (1.4.21):  Left main coronary artery normal   Left anterior descending artery normal   Left circumflex artery codominant normal   Right coronary artery codominant normal   Left ventricle dilated with severe left ventricular systolic   dysfunction ejection fraction less than 30%   SUMMARY : Nonischemic dilated cardiomyopathy with severe left ventricular systolic dysfunction.      Venogram (10.17.18):  No Critical Venous Compression Noted.     Review of patient's allergies indicates:   Allergen Reactions    Sulfa (sulfonamide antibiotics)      Patient unsure if allergic to Sulfa     No current facility-administered medications on file prior to encounter.     Current Outpatient Medications on File Prior to Encounter   Medication Sig    anastrozole (ARIMIDEX) 1 mg Tab Take 1 tablet by mouth once daily.    apixaban (ELIQUIS) 5 mg Tab Take 5 mg by mouth 2 (two) times daily.    aspirin (ECOTRIN) 81 MG EC tablet Take 1 tablet (81 mg total) by mouth once daily.    cyproheptadine (PERIACTIN) 4 mg tablet Take 1 tablet by mouth 2 (two) times daily.    dexlansoprazole (DEXILANT) 60 mg capsule Take 60 mg by mouth once daily.    famotidine (PEPCID) 20 MG tablet Take 1 tablet (20 mg total) by mouth once daily.    furosemide (LASIX) 20 MG tablet Take 1 tablet  (20 mg total) by mouth once daily. (Patient taking differently: Take 40 mg by mouth once daily.)    metoprolol succinate (TOPROL-XL) 25 MG 24 hr tablet Take 25 mg by mouth.    pravastatin (PRAVACHOL) 20 MG tablet Take 1 tablet (20 mg total) by mouth every evening.     Review of Systems   Respiratory:  Negative for shortness of breath.    Cardiovascular:  Negative for chest pain.   All other systems reviewed and are negative.    Objective:     Vital Signs (Most Recent):  Temp: 98.2 °F (36.8 °C) (05/16/25 1106)  Pulse: 94 (05/16/25 1106)  Resp: 18 (05/16/25 0754)  BP: (!) 130/90 (05/16/25 1106)  SpO2: (!) 94 % (05/16/25 1106) Vital Signs (24h Range):  Temp:  [97.3 °F (36.3 °C)-98.3 °F (36.8 °C)] 98.2 °F (36.8 °C)  Pulse:  [] 94  Resp:  [18] 18  SpO2:  [91 %-97 %] 94 %  BP: ()/(35-90) 130/90   Weight: 77.8 kg (171 lb 7.9 oz)  Body mass index is 29.42 kg/m².  SpO2: (!) 94 %       Intake/Output Summary (Last 24 hours) at 5/16/2025 1149  Last data filed at 5/15/2025 2000  Gross per 24 hour   Intake 240 ml   Output 250 ml   Net -10 ml     Lines/Drains/Airways       Peripherally Inserted Central Catheter Line  Duration             PICC Double Lumen 05/09/25 0853 right brachial 7 days                  Significant Labs:   Chemistries:   Recent Labs   Lab 05/10/25  0446 05/10/25  1742 05/11/25  0702 05/12/25  0423 05/13/25  0441 05/14/25  0346 05/15/25  0616 05/16/25  0505     --  141 139 134* 133* 134* 137   K 3.4*   < > 4.2 4.0 3.8 4.5 4.4 3.9     --  102 102 99 100 98 99   CO2 22*  --  24 23 22* 20* 21* 22*   BUN 30.4*  --  25.5* 20.7* 22.8* 25.1* 32.4* 29.3*   CREATININE 1.34*  --  1.23* 1.14* 1.16* 1.32* 1.57* 1.36*   CALCIUM 8.0*  --  8.1* 7.8* 7.7* 7.8* 8.0* 7.9*   PROT  --   --  5.4*  --   --  5.9  --  5.5*   BILITOT  --   --  2.5*  --   --  3.2*  --  2.5*   ALKPHOS  --   --  100  --   --  119  --  106   ALT  --   --  23  --   --  23  --  19   AST  --   --  21  --   --  24  --  22   MG 2.00   <  "> 1.90 1.80 2.20 2.10 2.20  --    PHOS 2.9  --   --   --   --   --   --   --     < > = values in this interval not displayed.        CBC/Anemia Labs: Coags:    Recent Labs   Lab 05/13/25  0441 05/14/25  0346 05/16/25  0504   WBC 4.84 5.60 5.72  5.72   HGB 13.3 13.4 13.5   HCT 39.4 40.7 39.4   * 136 124*   MCV 96.8* 97.1* 95.9*   RDW 21.7* 21.8* 22.2*    No results for input(s): "PT", "INR", "APTT" in the last 168 hours.     Telemetry: ST HR Low 100's     Physical Exam  Vitals and nursing note reviewed.   Constitutional:       General: She is not in acute distress.     Appearance: She is obese.   Cardiovascular:      Rate and Rhythm: Normal rate and regular rhythm.   Pulmonary:      Effort: Pulmonary effort is normal. No respiratory distress.   Musculoskeletal:      Right lower leg: Edema present.      Left lower leg: Edema present.   Skin:     General: Skin is warm and dry.   Neurological:      Mental Status: She is alert.       Home Medications:   Medications Ordered Prior to Encounter[1]  Current Schedule Inpatient Medications:   apixaban  5 mg Oral BID    cefTRIAXone (Rocephin) IV (PEDS and ADULTS)  1 g Intravenous Q24H    famotidine  20 mg Oral Daily    midodrine  5 mg Oral TID WM    pravastatin  40 mg Oral QHS      DOBUTamine IV infusion (non-titrating)  5 mcg/kg/min Intravenous Continuous 5.9 mL/hr at 05/15/25 1027 5 mcg/kg/min at 05/15/25 1027       Assessment:   Cardiogenic Shock/Intermittent Hypotension    - History of Hypertension/Hypertensive Heart Disease     - Episode of Orthostatic Hypotension on 5.13.25 afternoon- BP Recovered with IV Fluid Bolus 500 ML in Total (BP Now Stable)    - Episode of Orthostatic Hypotension on 5.14.25- IV Fluid Bolus 250 ML Given  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 EF 20-25% with EDP 5-10 mmHg. (3.10.22)  NSTEMI Type II due to Decompensated HF/Fluid Overload  Valvular Heart Disease    - MR: Moderate, TR: Mild to Moderate (Echo 5.13.25)  Pulmonary Hypertension  Dyslipidemia    - On Statin  Chronic VI/Edema- Lymphedema (Left Arm/Bilateral Lower Extremities)  History of Breast Cancer/Mastectomy  History of DVT (Left Popliteal Vein) (2018)    - on Eliquis  OA  Asymptomatic Bacteruria   Thrombocytopenia - Stable     Plan:   Continue Dobutamine Infusion for Palliative Support.  Holding afterload reduction medications as patient does not tolerate.  Hold diuretics  Palliative Care Consultation and Hospice Discussions. Patient updated on overall prognosis, which is poor. She appears to have little insight into her disease process and actually how sick she is.  Supportive Care as per Primary Team.  Will follow     Patient remains full code.     MACHELLE Donohue  Cardiology  [facility]                      [1]   No current facility-administered medications on file prior to encounter.     Current Outpatient Medications on File Prior to Encounter   Medication Sig Dispense Refill    anastrozole (ARIMIDEX) 1 mg Tab Take 1 tablet by mouth once daily.      apixaban (ELIQUIS) 5 mg Tab Take 5 mg by mouth 2 (two) times daily.      aspirin (ECOTRIN) 81 MG EC tablet Take 1 tablet (81 mg total) by mouth once daily. 90 tablet 3    cyproheptadine (PERIACTIN) 4 mg tablet Take 1 tablet by mouth 2 (two) times daily.      dexlansoprazole (DEXILANT) 60 mg capsule Take 60 mg by mouth once daily.      famotidine (PEPCID) 20 MG tablet Take 1 tablet (20 mg total) by mouth once daily. 30 tablet 11    furosemide (LASIX) 20 MG tablet Take 1 tablet (20 mg total) by mouth once daily. (Patient  taking differently: Take 40 mg by mouth once daily.) 30 tablet 11    metoprolol succinate (TOPROL-XL) 25 MG 24 hr tablet Take 25 mg by mouth.      pravastatin (PRAVACHOL) 20 MG tablet Take 1 tablet (20 mg total) by mouth every evening. 90 tablet 3

## 2025-05-16 NOTE — PLAN OF CARE
Problem: Occupational Therapy  Goal: Occupational Therapy Goal  Description: Goals to be met by: d/c     Patient will increase functional independence with ADLs by performing:    Grooming while seated with Stand-by Assistance.  Sitting at edge of bed x10 minutes with Stand-by Assistance to perform ADL.  Toilet transfer to bedside commode with Minimal Assistance.  Upper extremity exercise program 2x10 reps per handout, with assistance as needed.    Outcome: Ongoing

## 2025-05-16 NOTE — PROGRESS NOTES
[facility]  Hospital Medicine Progress Note        Chief Complaint: Inpatient Follow-up    HPI:     74-year-old female with significant history of HTN, HLD, breast cancer status post left mastectomy, chemo in remission, nonischemic cardiomyopathy with ejection fraction-25-30%, valvular heart disease-MR/TR, DVT/PE on Eliquis, GONZALEZ on CPAP, pulmonary hypertension.  Patient presented to the ED with complaints of bilateral lower extremity edema with associated pain and worsening dyspnea.  Lab significant for renal insufficiency, metabolic acidosis, elevated BNP, lactic acidosis, UA concerning for UTI.  Patient was admitted to hospital medicine services, concern for impending cardiogenic shock and cardiology services consulted.  Patient was initiated on IV diuretics/Lasix drip, dobutamine, holding other GDM T given compromised hemodynamics/low BP, holding Eliquis and placed on full-dose anticoagulation with Lovenox.  Overall prognosis extremely poor.  Patient only treated with 1 dose of IV ceftriaxone for bacteriuria, she was asymptomatic and therefore antibiotics were discontinued.  Patient also significantly physically deconditioned and therefore therapy services consulted.  Once hemodynamics stabilized dobutamine was discontinued and IV Lasix was transitioned to p.o. Lasix, planning for GDM T as hemodynamics tolerates.  Off dobutamine patient had episodic hypotension which responded to fluid bolus with normal saline.  Patient with profound hypotension on 05/15 secondary to cardiogenic shock, lactic acid elevated, discussed with Cardiology and decided to initiate on dobutamine drip at 5, discussed with family and patient regarding extremely poor prognosis, discussed palliative care/hospice, palliative care team also consulted, patient and family has very poor insight       Interval Hx:     Patient was seen at bedside, patient remains on dobutamine GTT and hemodynamics are stable on the  same, she continues with extreme poor insight into the severity of the situation, no acute events overnight.  Saturations low to mid 90s    Objective/physical exam:  General: In no acute distress, afebrile  Chest: Clear to auscultation bilaterally  Heart: S1, S2, no appreciable murmur  Abdomen: Soft, nontender, BS +  MSK: Warm, no lower extremity edema, no clubbing or cyanosis  Neurologic: Alert and oriented x4,   VITAL SIGNS: 24 HRS MIN & MAX LAST   Temp  Min: 97.3 °F (36.3 °C)  Max: 98.3 °F (36.8 °C) 98.3 °F (36.8 °C)   BP  Min: 73/35  Max: 109/78 103/62   Pulse  Min: 74  Max: 112  104   Resp  Min: 18  Max: 20 18   SpO2  Min: 91 %  Max: 97 % 97 %       Recent Labs   Lab 05/16/25  0504   WBC 5.72   RBC 4.11*   HGB 13.5   HCT 39.4   MCV 95.9*   MCH 32.8*   MCHC 34.3   RDW 22.2*   *   MPV 11.5*         Recent Labs   Lab 05/13/25  1135 05/14/25  0346 05/15/25  0616 05/16/25  0505   NA  --    < > 134* 137   K  --    < > 4.4 3.9   CL  --    < > 98 99   CO2  --    < > 21* 22*   BUN  --    < > 32.4* 29.3*   CREATININE  --    < > 1.57* 1.36*   GLU  --    < > 105 92   CALCIUM  --    < > 8.0* 7.9*   PH 7.500*  --   --   --    MG  --    < > 2.20  --    ALBUMIN  --    < >  --  2.2*   PROT  --    < >  --  5.5*   ALKPHOS  --    < >  --  106   ALT  --    < >  --  19   AST  --    < >  --  22   BILITOT  --    < >  --  2.5*    < > = values in this interval not displayed.          Microbiology Results (last 7 days)       Procedure Component Value Units Date/Time    Blood Culture [[id number]]  (Normal) Collected: 05/14/25 0174    Order Status: Completed Specimen: Blood, Venous Updated: 05/16/25 0008     Blood Culture No Growth At 24 Hours    Blood Culture [[id number]]  (Normal) Collected: 05/14/25 2252    Order Status: Completed Specimen: Blood, Venous Updated: 05/16/25 0008     Blood Culture No Growth At 24 Hours    Blood Culture #2 **CANNOT BE ORDERED STAT** [[id number]]  (Normal) Collected: 05/09/25 0400    Order Status:  Completed Specimen: Blood from Hand, Left Updated: 05/14/25 0507     Blood Culture No Growth at 5 days    Blood Culture #1 **CANNOT BE ORDERED STAT** [[id number]]  (Normal) Collected: 05/09/25 0351    Order Status: Completed Specimen: Blood from Wrist, Right Updated: 05/14/25 0440     Blood Culture No Growth at 5 days    Urine culture [[id number]]  (Abnormal)  (Susceptibility) Collected: 05/08/25 0810    Order Status: Completed Specimen: Urine, Clean Catch Updated: 05/11/25 0721     Urine Culture >/= 100,000 colonies/ml Escherichia coli      25,000-50,000 colonies/ml Klebsiella oxytoca             Scheduled Med:   apixaban  5 mg Oral BID    cefTRIAXone (Rocephin) IV (PEDS and ADULTS)  1 g Intravenous Q24H    famotidine  20 mg Oral Daily    midodrine  5 mg Oral TID WM    pravastatin  40 mg Oral QHS          Assessment/Plan:    Cardiogenic shock on dobutamine GTT  Acute decompensated systolic/diastolic heart failure with ejection fraction 20-25%/nonischemic cardiomyopathy status post S ICD  Lactic acidosis secondary to cardiogenic shock-improved  Acute on chronic right-sided heart failure-biventricular failure  Josiah I on CKD, stage III-cardiorenal, improving  Orthostatic hypotension  NSTEMI-type 2  Valvular heart disease-MR/TR   Pulmonary hypertension   HLD   History of breast cancer status post mastectomy, chemo, in remission  History of DVT/PE  History of GONZALEZ on CPAP  Asymptomatic bacteriuria on admit   Prophylaxis    Patient is inotrope dependent   We had to reinitiate dobutamine GTT on 05/15 and hemodynamics stable on the same  Renal function also improving on dobutamine GTT   Patient unfortunately not a candidate for surgical intervention for valvular heart disease, not a candidate for LVAD  Extremely poor prognosis and hospice/palliative care will be in her best interest  Palliative care team on board   Patient remains full code  Low suspicion for sepsis /septic shock  Hemodynamic instability secondary to  cardiogenic shock  Chest x-ray and UA unremarkable   DC all antibiotics  GDM T for nonischemic cardiomyopathy not possible given compromised hemodynamics   Keep midodrine  Continue Eliquis for thromboembolic prophylaxis   Continue statin and Pepcid   DVT prophylaxis-on Eliquis    Family and patient with extreme poor insight into the severity of the situation   She will benefit from hospice  Will not be a candidate for skilled nursing facility since she is inotrope dependent   Discussed with nursing staff about possibly considering LTAC   will work on it      Critical care time-35 minutes  Critical care diagnosis-echogenic shock requiring dobutamine   Critical care interventions: Hands-on evaluation, review of labs/radiographs/records and discussions with patient       Tana Harrison MD   05/16/2025

## 2025-05-17 LAB
ANION GAP SERPL CALC-SCNC: 13 MEQ/L
BUN SERPL-MCNC: 25.7 MG/DL (ref 9.8–20.1)
CALCIUM SERPL-MCNC: 7.9 MG/DL (ref 8.4–10.2)
CHLORIDE SERPL-SCNC: 99 MMOL/L (ref 98–107)
CO2 SERPL-SCNC: 21 MMOL/L (ref 23–31)
CREAT SERPL-MCNC: 1.23 MG/DL (ref 0.55–1.02)
CREAT/UREA NIT SERPL: 21
GFR SERPLBLD CREATININE-BSD FMLA CKD-EPI: 47 ML/MIN/1.73/M2
GLUCOSE SERPL-MCNC: 90 MG/DL (ref 82–115)
POTASSIUM SERPL-SCNC: 3.7 MMOL/L (ref 3.5–5.1)
SODIUM SERPL-SCNC: 133 MMOL/L (ref 136–145)

## 2025-05-17 PROCEDURE — 80048 BASIC METABOLIC PNL TOTAL CA: CPT | Performed by: INTERNAL MEDICINE

## 2025-05-17 PROCEDURE — 63600175 PHARM REV CODE 636 W HCPCS: Performed by: INTERNAL MEDICINE

## 2025-05-17 PROCEDURE — 21400001 HC TELEMETRY ROOM

## 2025-05-17 PROCEDURE — 36415 COLL VENOUS BLD VENIPUNCTURE: CPT | Performed by: INTERNAL MEDICINE

## 2025-05-17 PROCEDURE — 25000003 PHARM REV CODE 250: Performed by: INTERNAL MEDICINE

## 2025-05-17 PROCEDURE — 25000003 PHARM REV CODE 250: Performed by: NURSE PRACTITIONER

## 2025-05-17 RX ADMIN — MIDODRINE HYDROCHLORIDE 5 MG: 5 TABLET ORAL at 08:05

## 2025-05-17 RX ADMIN — APIXABAN 5 MG: 5 TABLET, FILM COATED ORAL at 07:05

## 2025-05-17 RX ADMIN — APIXABAN 5 MG: 5 TABLET, FILM COATED ORAL at 08:05

## 2025-05-17 RX ADMIN — MIDODRINE HYDROCHLORIDE 5 MG: 5 TABLET ORAL at 04:05

## 2025-05-17 RX ADMIN — FAMOTIDINE 20 MG: 20 TABLET, FILM COATED ORAL at 08:05

## 2025-05-17 RX ADMIN — PRAVASTATIN SODIUM 40 MG: 40 TABLET ORAL at 07:05

## 2025-05-17 RX ADMIN — MIDODRINE HYDROCHLORIDE 5 MG: 5 TABLET ORAL at 12:05

## 2025-05-17 RX ADMIN — DOBUTAMINE HYDROCHLORIDE 5 MCG/KG/MIN: 400 INJECTION INTRAVENOUS at 04:05

## 2025-05-17 NOTE — PROGRESS NOTES
[facility]  Hospital Medicine Progress Note        Chief Complaint: Inpatient Follow-up    HPI:     74-year-old female with significant history of HTN, HLD, breast cancer status post left mastectomy, chemo in remission, nonischemic cardiomyopathy with ejection fraction-25-30%, valvular heart disease-MR/TR, DVT/PE on Eliquis, GONZALEZ on CPAP, pulmonary hypertension.  Patient presented to the ED with complaints of bilateral lower extremity edema with associated pain and worsening dyspnea.  Lab significant for renal insufficiency, metabolic acidosis, elevated BNP, lactic acidosis, UA concerning for UTI.  Patient was admitted to hospital medicine services, concern for impending cardiogenic shock and cardiology services consulted.  Patient was initiated on IV diuretics/Lasix drip, dobutamine, holding other GDM T given compromised hemodynamics/low BP, holding Eliquis and placed on full-dose anticoagulation with Lovenox.  Overall prognosis extremely poor.  Patient only treated with 1 dose of IV ceftriaxone for bacteriuria, she was asymptomatic and therefore antibiotics were discontinued.  Patient also significantly physically deconditioned and therefore therapy services consulted.  Once hemodynamics stabilized dobutamine was discontinued and IV Lasix was transitioned to p.o. Lasix, planning for GDM T as hemodynamics tolerates.  Off dobutamine patient had episodic hypotension which responded to fluid bolus with normal saline.  Patient with profound hypotension on 05/15 secondary to cardiogenic shock, lactic acid elevated, discussed with Cardiology and decided to initiate on dobutamine drip at 5, discussed with family and patient regarding extremely poor prognosis, discussed palliative care/hospice, palliative care team also consulted, patient and family has very poor insight.  Patient inotrope dependent and prognosis continues to be extremely poor       Interval Hx:     Patient was seen at bedside,  she is comfortably laying in bed, on dobutamine, hemodynamics stable on the same, no acute events overnight, she is still continues with very poor insight    Objective/physical exam:  General: In no acute distress, afebrile  Chest: Clear to auscultation bilaterally  Heart: S1, S2, no appreciable murmur  Abdomen: Soft, nontender, BS +  MSK: Warm, no lower extremity edema, no clubbing or cyanosis  Neurologic: Alert and oriented x4,   VITAL SIGNS: 24 HRS MIN & MAX LAST   Temp  Min: 97.8 °F (36.6 °C)  Max: 98.3 °F (36.8 °C) 97.9 °F (36.6 °C)   BP  Min: 89/63  Max: 130/90 99/69   Pulse  Min: 91  Max: 107  99   Resp  Min: 16  Max: 18 16   SpO2  Min: 93 %  Max: 96 % (!) 93 %       Recent Labs   Lab 05/16/25  0504   WBC 5.72  5.72   RBC 4.11*   HGB 13.5   HCT 39.4   MCV 95.9*   MCH 32.8*   MCHC 34.3   RDW 22.2*   *   MPV 11.5*         Recent Labs   Lab 05/13/25  1135 05/14/25  0346 05/15/25  0616 05/16/25  0505 05/17/25  0454   NA  --    < > 134* 137 133*   K  --    < > 4.4 3.9 3.7   CL  --    < > 98 99 99   CO2  --    < > 21* 22* 21*   BUN  --    < > 32.4* 29.3* 25.7*   CREATININE  --    < > 1.57* 1.36* 1.23*   GLU  --    < > 105 92 90   CALCIUM  --    < > 8.0* 7.9* 7.9*   PH 7.500*  --   --   --   --    MG  --    < > 2.20  --   --    ALBUMIN  --    < >  --  2.2*  --    PROT  --    < >  --  5.5*  --    ALKPHOS  --    < >  --  106  --    ALT  --    < >  --  19  --    AST  --    < >  --  22  --    BILITOT  --    < >  --  2.5*  --     < > = values in this interval not displayed.          Microbiology Results (last 7 days)       Procedure Component Value Units Date/Time    Blood Culture [[id number]]  (Normal) Collected: 05/14/25 2252    Order Status: Completed Specimen: Blood, Venous Updated: 05/17/25 0029     Blood Culture No Growth At 48 Hours    Blood Culture [[id number]]  (Normal) Collected: 05/14/25 2252    Order Status: Completed Specimen: Blood, Venous Updated: 05/17/25 0029     Blood Culture No Growth At 48  Hours    Blood Culture #2 **CANNOT BE ORDERED STAT** [[id number]]  (Normal) Collected: 05/09/25 0400    Order Status: Completed Specimen: Blood from Hand, Left Updated: 05/14/25 0507     Blood Culture No Growth at 5 days    Blood Culture #1 **CANNOT BE ORDERED STAT** [[id number]]  (Normal) Collected: 05/09/25 0351    Order Status: Completed Specimen: Blood from Wrist, Right Updated: 05/14/25 0440     Blood Culture No Growth at 5 days    Urine culture [[id number]]  (Abnormal)  (Susceptibility) Collected: 05/08/25 0810    Order Status: Completed Specimen: Urine, Clean Catch Updated: 05/11/25 0721     Urine Culture >/= 100,000 colonies/ml Escherichia coli      25,000-50,000 colonies/ml Klebsiella oxytoca             Scheduled Med:   apixaban  5 mg Oral BID    famotidine  20 mg Oral Daily    midodrine  5 mg Oral TID WM    pravastatin  40 mg Oral QHS          Assessment/Plan:    Cardiogenic shock on dobutamine GTT  Acute decompensated systolic/diastolic heart failure with ejection fraction 20-25%/nonischemic cardiomyopathy status post SICD  Lactic acidosis secondary to cardiogenic shock-improved  Acute on chronic right-sided heart failure-biventricular failure  Josiah I on CKD, stage III-cardiorenal, improving  Orthostatic hypotension  NSTEMI-type 2  Valvular heart disease-MR/TR   Pulmonary hypertension   HLD   History of breast cancer status post mastectomy, chemo, in remission  History of DVT/PE  History of GONZALEZ on CPAP  Asymptomatic bacteriuria on admit   Prophylaxis    Patient is inotrope dependent   We had to reinitiate dobutamine GTT on 05/15 and hemodynamics stable on the same  Renal function also improving on dobutamine GTT   Patient unfortunately not a candidate for surgical intervention for valvular heart disease, not a candidate for LVAD  Extremely poor prognosis and hospice/palliative care will be in her best interest  Palliative care team on board   Patient remains full code  Low suspicion for sepsis /septic  shock  Hemodynamic instability secondary to cardiogenic shock  Chest x-ray and UA unremarkable   DC all antibiotics  GDM T for nonischemic cardiomyopathy not possible given compromised hemodynamics   Keep midodrine  Continue Eliquis for thromboembolic prophylaxis   Continue statin and Pepcid   DVT prophylaxis-on Eliquis    Patient continues with very poor insight into the severity of the situation   I tried to call her son today, did not    Do not think she will be a candidate for skilled nursing facility since she is inotrope dependent   If family is not agreeable for home with hospice then might have to consider LTAC or home with palliative care on dobutamine drip      Critical care time-35 minutes  Critical care diagnosis-echogenic shock requiring dobutamine   Critical care interventions: Hands-on evaluation, review of labs/radiographs/records and discussions with patient       Tana Harrison MD   05/17/2025

## 2025-05-17 NOTE — PROGRESS NOTES
"  [facility]    Cardiology  Progress Note    Patient Name: Laura Jacobs  MRN: [id number]  Admission Date: 5/8/2025  Hospital Length of Stay: 9 days  Code Status: Full Code   Attending Provider: Scooter Davis MD   Consulting Provider: Rosette Mccollum NP  Primary Care Physician: Ruben Brooks Sr., MD  Principal Problem:<principal problem not specified>    Patient information was obtained from patient, past medical records, ER records, and primary team.     Subjective:   Chief Complaint/Reason for Consult: Concern for Cardiogenic Shock    HPI: Ms. Jacobs is a 74 year old female, known to Dr. Washington, who presented to the hospital with bilateral lower extremity edema, pain, and SOB. Patient with history of NICMO. Noted Significant MR. Lab work significant for creatinine 1.7, bicarb 17, BNP 6700, troponin 0.06, lactic acid 6.0. Urinalysis consistent with UTI. BP initially marginal, but has improved. On Eliquis for history of DVT/PE. Admitted to Hospital Medicine Team. [facility] consulted for cardiac evaluation/management due to concern for impending shock.    Hospital Course:  5.9.25: NAD Noted. On RA, Lying flat in no distress. Marginal diuresis overnight. BP Systolic low normal. SR with intermittent bouts of ST.   5.10.25: NAD. "I am feeling a little better." Fluid Balance Net Negative 2022mL. ST 100s-110s.   5.11.25: BP marginal at times. Good UOP. Remains on Dobutamine and Lasix drip. Renal indices stable.   5.12.25: NAD Noted. Vitals Stable. Diuresing well. Edema is improving. Patient watching TV in no distress. She is awake/alert.  5.13.25: NAD Noted. Vitals Stable. UO slacked off overnight. On RA. Dobutamine infusion going.   5.14.25: NAD Noted. On RA. Lying flat, nurse aid assisting with bath. BP Stable. Sinus Tachycardia HR Low 100's.   5.15.25: Hypotensive this morning. Dobutamine Restarted for BP Support and Palliative support. ST low 100's. Patient updated on overall " prognosis and need for palliative measures. Insight into her disease process is limited. Attending notified of CIS plan moving forward.   5.17.25: NAD, NEON, pt stable on chronic Dobutamine gtt    PMH: Hypertension/HHD, Dyslipidemia, Palpitations, MO, Breast Cancer, CP, SOB/ROMO, Pulmonary Hypertension, GONZALEZ/CPAP, DVT/PE (Eliquis), OA, CVI, VHD/MR-TR  PSH: S-ICD, Knee Surgery, LHC, Mastectomy  Family History: Non-contributory  Social History: Tobacco- Negative, Alcohol- Negative, Substance Abuse- Negative     Previous Cardiac Diagnostics:   Echocardiogram (5.14.25):  Left Ventricle: The left ventricle is normal in size. Normal wall thickness. Moderate global hypokinesis present. There is severely reduced systolic function with a visually estimated ejection fraction of 20 - 25%. There is diastolic dysfunction but grade cannot be determined.  Right Ventricle: The right ventricle is mildly dilated Systolic function is severely reduced. TAPSE is 0.6 cm.  Left Atrium: The left atrium is moderately dilated  Right Atrium: The right atrium is moderately dilated .  Mitral Valve: There is moderate regurgitation.  Tricuspid Valve: There is mild to moderate regurgitation.  Pericardium: There is a trivial effusion.    Venous Doppler (5.8.25):  Negative for deep and superficial vein thrombosis in bilateral lower extremities.    Echocardiogram (3.15.25):  Left Ventricle: The left ventricle is dilated. Wall is thinner than normal. Severe global hypokinesis present. There is severely reduced systolic function with a visually estimated ejection fraction of 25 - 30%.  Right Ventricle: Right ventricular enlargement.  Left Atrium: Severely dilated Agitated saline study of the atrial septum is negative, suggesting absence of intracardiac shunt at the atrial level.  Right Atrium: Right atrium is severely dilated.  Mitral Valve: Mildly thickened leaflets. There is severe regurgitation.  Tricuspid Valve: There is moderate  regurgitation.    PET (2.14.25):  This is a normal perfusion study, no perfusion defects noted. There is no evidence of ischemia.   This scan is suggestive of low risk for future cardiovascular events.   The left ventricular cavity is noted to be mildly enlarged on the stress studies. The stress left ventricular ejection fraction was calculated to be 27% and left ventricular global function is severely reduced. The rest left ventricular cavity is noted to be mildly enlarged. The rest left ventricular ejection fraction was calculated to be 24% and rest left ventricular global function is severely reduced.   Persistent hypokinesis of the anterior region, septal region, inferior region and lateral region is noted in both rest and stress studies. When compared to the resting ejection fraction (24%), the stress ejection fraction (27%) has increased.   The study quality is good.   Calcium scoring was not performed in this patient due to specific concerns that can affect accuracy.   There was no rise in myocardial blood flow between rest and stress. Global myocardial blood flow reserve was 1.98. Non-diagnostic study.    Carotid US (2.14.25):  The study quality is average.   Antegrade left vertebral artery flow.   Antegrade right vertebral artery flow.   No plaque noted in bilateral internal carotid arteries.    Echocardiogram (1.20.25):  Left Ventricle: The left ventricle is dilated. Normal wall thickness. Global hypokinesis present. There is moderately reduced systolic function with a visually estimated ejection fraction of 35 - 40%.  Right Ventricle: Normal right ventricular cavity size. Systolic function is normal.  Left Atrium: Left atrium is mildly dilated.  Right Atrium: Right atrium is mildly dilated.  Aortic Valve: The aortic valve is a trileaflet valve. There is mild aortic valve sclerosis.  Mitral Valve: There is mitral annular calcification present. There is moderate to severe regurgitation.  Tricuspid Valve:  There is mild regurgitation.  Pulmonic Valve: There is mild regurgitation.  IVC/SVC: Normal venous pressure at 3 mmHg.  Pericardium: There is a trivial effusion adjacent to the right atrium. No indication of cardiac tamponade.     LHC (3.10.22):  Widely Patent Coronary Anatomy with severe LV Systolic Dysfunction. EF 20-25% with EDP 5-10 mmHg.     AMANDA (9.14.22):  LV systolic function, LVEF 30-35%.  Severe global hypokinesis  Mild Tricuspid regurgitation  Moderate to severe Mitral regurgitation  Mild aortic regurgitation.  There is Lambl's excrescences on aortic leaflets that is benign fibrous structure.   No evidence of infection, vegetation or abscess     SICD Placement (3.25.21):  SICD Placement Re: Primary Prevention of SCD.      LHC (1.4.21):  Left main coronary artery normal   Left anterior descending artery normal   Left circumflex artery codominant normal   Right coronary artery codominant normal   Left ventricle dilated with severe left ventricular systolic   dysfunction ejection fraction less than 30%   SUMMARY : Nonischemic dilated cardiomyopathy with severe left ventricular systolic dysfunction.      Venogram (10.17.18):  No Critical Venous Compression Noted.     Review of patient's allergies indicates:   Allergen Reactions    Sulfa (sulfonamide antibiotics)      Patient unsure if allergic to Sulfa     No current facility-administered medications on file prior to encounter.     Current Outpatient Medications on File Prior to Encounter   Medication Sig    anastrozole (ARIMIDEX) 1 mg Tab Take 1 tablet by mouth once daily.    apixaban (ELIQUIS) 5 mg Tab Take 5 mg by mouth 2 (two) times daily.    aspirin (ECOTRIN) 81 MG EC tablet Take 1 tablet (81 mg total) by mouth once daily.    cyproheptadine (PERIACTIN) 4 mg tablet Take 1 tablet by mouth 2 (two) times daily.    dexlansoprazole (DEXILANT) 60 mg capsule Take 60 mg by mouth once daily.    famotidine (PEPCID) 20 MG tablet Take 1 tablet (20 mg total) by mouth  once daily.    furosemide (LASIX) 20 MG tablet Take 1 tablet (20 mg total) by mouth once daily. (Patient taking differently: Take 40 mg by mouth once daily.)    metoprolol succinate (TOPROL-XL) 25 MG 24 hr tablet Take 25 mg by mouth.    pravastatin (PRAVACHOL) 20 MG tablet Take 1 tablet (20 mg total) by mouth every evening.     Review of Systems   Respiratory:  Negative for shortness of breath.    Cardiovascular:  Negative for chest pain.   All other systems reviewed and are negative.    Objective:     Vital Signs (Most Recent):  Temp: 97.5 °F (36.4 °C) (05/17/25 1124)  Pulse: 101 (05/17/25 1124)  Resp: 16 (05/17/25 1124)  BP: 121/80 (05/17/25 1124)  SpO2: (!) 94 % (05/17/25 1124) Vital Signs (24h Range):  Temp:  [97.5 °F (36.4 °C)-98.3 °F (36.8 °C)] 97.5 °F (36.4 °C)  Pulse:  [] 101  Resp:  [16-18] 16  SpO2:  [93 %-96 %] 94 %  BP: ()/(63-80) 121/80   Weight: 77.8 kg (171 lb 7.9 oz)  Body mass index is 29.42 kg/m².  SpO2: (!) 94 %       Intake/Output Summary (Last 24 hours) at 5/17/2025 1502  Last data filed at 5/17/2025 1458  Gross per 24 hour   Intake --   Output 300 ml   Net -300 ml     Lines/Drains/Airways       Peripherally Inserted Central Catheter Line  Duration             PICC Double Lumen 05/09/25 0853 right brachial 8 days                  Significant Labs:   Chemistries:   Recent Labs   Lab 05/11/25  0702 05/12/25  0423 05/13/25  0441 05/14/25  0346 05/15/25  0616 05/16/25  0505 05/17/25  0454      < > 134* 133* 134* 137 133*   K 4.2   < > 3.8 4.5 4.4 3.9 3.7      < > 99 100 98 99 99   CO2 24   < > 22* 20* 21* 22* 21*   BUN 25.5*   < > 22.8* 25.1* 32.4* 29.3* 25.7*   CREATININE 1.23*   < > 1.16* 1.32* 1.57* 1.36* 1.23*   CALCIUM 8.1*   < > 7.7* 7.8* 8.0* 7.9* 7.9*   PROT 5.4*  --   --  5.9  --  5.5*  --    BILITOT 2.5*  --   --  3.2*  --  2.5*  --    ALKPHOS 100  --   --  119  --  106  --    ALT 23  --   --  23  --  19  --    AST 21  --   --  24  --  22  --    MG 1.90   < > 2.20  "2.10 2.20  --   --     < > = values in this interval not displayed.        CBC/Anemia Labs: Coags:    Recent Labs   Lab 05/13/25  0441 05/14/25  0346 05/16/25  0504   WBC 4.84 5.60 5.72  5.72   HGB 13.3 13.4 13.5   HCT 39.4 40.7 39.4   * 136 124*   MCV 96.8* 97.1* 95.9*   RDW 21.7* 21.8* 22.2*    No results for input(s): "PT", "INR", "APTT" in the last 168 hours.     Telemetry: ST HR Low 100's     Physical Exam  Vitals and nursing note reviewed.   Constitutional:       General: She is not in acute distress.     Appearance: She is obese.   Cardiovascular:      Rate and Rhythm: Normal rate and regular rhythm.   Pulmonary:      Effort: Pulmonary effort is normal. No respiratory distress.   Musculoskeletal:      Right lower leg: Edema present.      Left lower leg: Edema present.   Skin:     General: Skin is warm and dry.   Neurological:      Mental Status: She is alert.       Home Medications:   Medications Ordered Prior to Encounter[1]  Current Schedule Inpatient Medications:   apixaban  5 mg Oral BID    famotidine  20 mg Oral Daily    midodrine  5 mg Oral TID WM    pravastatin  40 mg Oral QHS      DOBUTamine IV infusion (non-titrating)  5 mcg/kg/min Intravenous Continuous 5.9 mL/hr at 05/17/25 0405 5 mcg/kg/min at 05/17/25 0405       Assessment:   Cardiogenic Shock/Intermittent Hypotension    - History of Hypertension/Hypertensive Heart Disease     - Episode of Orthostatic Hypotension on 5.13.25 afternoon- BP Recovered with IV Fluid Bolus 500 ML in Total (BP Now Stable)    - Episode of Orthostatic Hypotension on 5.14.25- IV Fluid Bolus 250 ML Given  Acute on Chronic Combined Systolic & Diastolic HF (Stable on Room Air)    - EF 20-25% with Diastolic Dysfunction (Echo 5.14.25)    - Lactic Acidosis -Cleared  Acute/Chronic Right Sided HF (Severely Reduced RV Function)- Biventricular Failure  Nonischemic Cardiomyopathy    - EF 20-25% (Echo 5.14.25)    - Status Post SICD    - PET (2.14.25): No Ischemia/Low Risk     " - Normal Coronaries with severe LV Systolic Dysfunction. EF 20-25% with EDP 5-10 mmHg. (3.10.22)  NSTEMI Type II due to Decompensated HF/Fluid Overload  Valvular Heart Disease    - MR: Moderate, TR: Mild to Moderate (Echo 5.13.25)  Pulmonary Hypertension  Dyslipidemia    - On Statin  Chronic VI/Edema- Lymphedema (Left Arm/Bilateral Lower Extremities)  History of Breast Cancer/Mastectomy  History of DVT (Left Popliteal Vein) (2018)    - on Eliquis  OA  Asymptomatic Bacteruria   Thrombocytopenia - Stable     Plan:   Continue Dobutamine Infusion for Palliative Support.  Holding afterload reduction medications as patient does not tolerate.  Hold diuretics  Palliative Care Consultation and Hospice Discussions. Patient updated on overall prognosis, which is poor. She appears to have little insight into her disease process and actually how sick she is.  Supportive Care as per Primary Team.      Patient remains full code.       Dispo: please reconsult as needed    Rosette Mccollum NP  Cardiology  [facility]                      [1]   No current facility-administered medications on file prior to encounter.     Current Outpatient Medications on File Prior to Encounter   Medication Sig Dispense Refill    anastrozole (ARIMIDEX) 1 mg Tab Take 1 tablet by mouth once daily.      apixaban (ELIQUIS) 5 mg Tab Take 5 mg by mouth 2 (two) times daily.      aspirin (ECOTRIN) 81 MG EC tablet Take 1 tablet (81 mg total) by mouth once daily. 90 tablet 3    cyproheptadine (PERIACTIN) 4 mg tablet Take 1 tablet by mouth 2 (two) times daily.      dexlansoprazole (DEXILANT) 60 mg capsule Take 60 mg by mouth once daily.      famotidine (PEPCID) 20 MG tablet Take 1 tablet (20 mg total) by mouth once daily. 30 tablet 11    furosemide (LASIX) 20 MG tablet Take 1 tablet (20 mg total) by mouth once daily. (Patient taking differently: Take 40 mg by mouth once daily.) 30 tablet 11    metoprolol succinate (TOPROL-XL) 25 MG  24 hr tablet Take 25 mg by mouth.      pravastatin (PRAVACHOL) 20 MG tablet Take 1 tablet (20 mg total) by mouth every evening. 90 tablet 3

## 2025-05-18 LAB
ANION GAP SERPL CALC-SCNC: 12 MEQ/L
BUN SERPL-MCNC: 26.9 MG/DL (ref 9.8–20.1)
CALCIUM SERPL-MCNC: 7.9 MG/DL (ref 8.4–10.2)
CHLORIDE SERPL-SCNC: 100 MMOL/L (ref 98–107)
CO2 SERPL-SCNC: 20 MMOL/L (ref 23–31)
CREAT SERPL-MCNC: 1.3 MG/DL (ref 0.55–1.02)
CREAT/UREA NIT SERPL: 21
GFR SERPLBLD CREATININE-BSD FMLA CKD-EPI: 44 ML/MIN/1.73/M2
GLUCOSE SERPL-MCNC: 97 MG/DL (ref 82–115)
OHS QRS DURATION: 110 MS
OHS QTC CALCULATION: 491 MS
POTASSIUM SERPL-SCNC: 4 MMOL/L (ref 3.5–5.1)
SODIUM SERPL-SCNC: 132 MMOL/L (ref 136–145)

## 2025-05-18 PROCEDURE — 25000003 PHARM REV CODE 250: Performed by: INTERNAL MEDICINE

## 2025-05-18 PROCEDURE — 80048 BASIC METABOLIC PNL TOTAL CA: CPT | Performed by: INTERNAL MEDICINE

## 2025-05-18 PROCEDURE — 99900035 HC TECH TIME PER 15 MIN (STAT)

## 2025-05-18 PROCEDURE — 21400001 HC TELEMETRY ROOM

## 2025-05-18 PROCEDURE — 25000003 PHARM REV CODE 250: Performed by: NURSE PRACTITIONER

## 2025-05-18 PROCEDURE — 27000221 HC OXYGEN, UP TO 24 HOURS

## 2025-05-18 PROCEDURE — 93005 ELECTROCARDIOGRAM TRACING: CPT

## 2025-05-18 PROCEDURE — 93010 ELECTROCARDIOGRAM REPORT: CPT | Mod: ,,, | Performed by: INTERNAL MEDICINE

## 2025-05-18 PROCEDURE — 36415 COLL VENOUS BLD VENIPUNCTURE: CPT | Performed by: INTERNAL MEDICINE

## 2025-05-18 RX ORDER — METOPROLOL TARTRATE 1 MG/ML
2.5 INJECTION, SOLUTION INTRAVENOUS ONCE
Status: DISCONTINUED | OUTPATIENT
Start: 2025-05-18 | End: 2025-05-18

## 2025-05-18 RX ADMIN — FAMOTIDINE 20 MG: 20 TABLET, FILM COATED ORAL at 08:05

## 2025-05-18 RX ADMIN — APIXABAN 5 MG: 5 TABLET, FILM COATED ORAL at 07:05

## 2025-05-18 RX ADMIN — MIDODRINE HYDROCHLORIDE 5 MG: 5 TABLET ORAL at 08:05

## 2025-05-18 RX ADMIN — MIDODRINE HYDROCHLORIDE 5 MG: 5 TABLET ORAL at 04:05

## 2025-05-18 RX ADMIN — APIXABAN 5 MG: 5 TABLET, FILM COATED ORAL at 08:05

## 2025-05-18 RX ADMIN — PRAVASTATIN SODIUM 40 MG: 40 TABLET ORAL at 07:05

## 2025-05-18 NOTE — PROGRESS NOTES
05/18/25 1453   Vital Signs   Pulse 82   Resp 18   Flow (L/min) (Oxygen Therapy) 2   Device (Oxygen Therapy) nasal cannula   /81   MAP (mmHg) 89       Pt noted to be in SVT, HR 180s. Pt complaining of weakness/SOB upon assessment. Instructed pt to bare down and cough, HR improved shortly after. Vitals are WNL, stat EKG ordered. Pt resting in bed with no issues, NADN. Charge nurse made aware. Dr. Harrison notified. No new orders at this time. Will continue to monitor pt during shift for any acute changes.

## 2025-05-18 NOTE — PROGRESS NOTES
[facility]  Hospital Medicine Progress Note        Chief Complaint: Inpatient Follow-up    HPI:     74-year-old female with significant history of HTN, HLD, breast cancer status post left mastectomy, chemo in remission, nonischemic cardiomyopathy with ejection fraction-25-30%, valvular heart disease-MR/TR, DVT/PE on Eliquis, GONZALEZ on CPAP, pulmonary hypertension.  Patient presented to the ED with complaints of bilateral lower extremity edema with associated pain and worsening dyspnea.  Lab significant for renal insufficiency, metabolic acidosis, elevated BNP, lactic acidosis, UA concerning for UTI.  Patient was admitted to hospital medicine services, concern for impending cardiogenic shock and cardiology services consulted.  Patient was initiated on IV diuretics/Lasix drip, dobutamine, holding other GDM T given compromised hemodynamics/low BP, holding Eliquis and placed on full-dose anticoagulation with Lovenox.  Overall prognosis extremely poor.  Patient only treated with 1 dose of IV ceftriaxone for bacteriuria, she was asymptomatic and therefore antibiotics were discontinued.  Patient also significantly physically deconditioned and therefore therapy services consulted.  Once hemodynamics stabilized dobutamine was discontinued and IV Lasix was transitioned to p.o. Lasix, planning for GDM T as hemodynamics tolerates.  Off dobutamine patient had episodic hypotension which responded to fluid bolus with normal saline.  Patient with profound hypotension on 05/15 secondary to cardiogenic shock, lactic acid elevated, discussed with Cardiology and decided to initiate on dobutamine drip at 5, discussed with family and patient regarding extremely poor prognosis, discussed palliative care/hospice, palliative care team also consulted, patient and family has very poor insight.  Patient inotrope dependent and prognosis continues to be extremely poor       Interval Hx:     Patient was seen at bedside,  on dobutamine, hemodynamics stable at the time of my rounds, patient is still continues with very poor insight and she believes breathing treatments could possibly help her    Objective/physical exam:  General: In no acute distress, afebrile  Chest: Clear to auscultation bilaterally  Heart: S1, S2, no appreciable murmur  Abdomen: Soft, nontender, BS +  MSK: Warm, no lower extremity edema, no clubbing or cyanosis  Neurologic: Alert and oriented x4,   VITAL SIGNS: 24 HRS MIN & MAX LAST   Temp  Min: 97.5 °F (36.4 °C)  Max: 98.4 °F (36.9 °C) 98.4 °F (36.9 °C)   BP  Min: 90/66  Max: 121/80 100/67   Pulse  Min: 101  Max: 108  103   Resp  Min: 16  Max: 18 18   SpO2  Min: 90 %  Max: 98 % 95 %       Recent Labs   Lab 05/16/25  0504   WBC 5.72  5.72   RBC 4.11*   HGB 13.5   HCT 39.4   MCV 95.9*   MCH 32.8*   MCHC 34.3   RDW 22.2*   *   MPV 11.5*         Recent Labs   Lab 05/13/25  1135 05/14/25  0346 05/15/25  0616 05/16/25  0505 05/17/25  0454   NA  --    < > 134* 137 133*   K  --    < > 4.4 3.9 3.7   CL  --    < > 98 99 99   CO2  --    < > 21* 22* 21*   BUN  --    < > 32.4* 29.3* 25.7*   CREATININE  --    < > 1.57* 1.36* 1.23*   GLU  --    < > 105 92 90   CALCIUM  --    < > 8.0* 7.9* 7.9*   PH 7.500*  --   --   --   --    MG  --    < > 2.20  --   --    ALBUMIN  --    < >  --  2.2*  --    PROT  --    < >  --  5.5*  --    ALKPHOS  --    < >  --  106  --    ALT  --    < >  --  19  --    AST  --    < >  --  22  --    BILITOT  --    < >  --  2.5*  --     < > = values in this interval not displayed.          Microbiology Results (last 7 days)       Procedure Component Value Units Date/Time    Blood Culture [[id number]]  (Normal) Collected: 05/14/25 2252    Order Status: Completed Specimen: Blood, Venous Updated: 05/18/25 0009     Blood Culture No Growth At 72 Hours    Blood Culture [[id number]]  (Normal) Collected: 05/14/25 2252    Order Status: Completed Specimen: Blood, Venous Updated: 05/18/25 0009     Blood Culture No  Growth At 72 Hours    Blood Culture #2 **CANNOT BE ORDERED STAT** [[id number]]  (Normal) Collected: 05/09/25 0400    Order Status: Completed Specimen: Blood from Hand, Left Updated: 05/14/25 0507     Blood Culture No Growth at 5 days    Blood Culture #1 **CANNOT BE ORDERED STAT** [[id number]]  (Normal) Collected: 05/09/25 0351    Order Status: Completed Specimen: Blood from Wrist, Right Updated: 05/14/25 0440     Blood Culture No Growth at 5 days             Scheduled Med:   apixaban  5 mg Oral BID    famotidine  20 mg Oral Daily    midodrine  5 mg Oral TID WM    pravastatin  40 mg Oral QHS          Assessment/Plan:    Cardiogenic shock on dobutamine GTT  Acute decompensated systolic/diastolic heart failure with ejection fraction 20-25%/nonischemic cardiomyopathy status post SICD  Lactic acidosis secondary to cardiogenic shock-improved  Acute on chronic right-sided heart failure-biventricular failure  Josiah I on CKD, stage III-cardiorenal, improving/stable  Orthostatic hypotension  NSTEMI-type 2  Valvular heart disease-MR/TR   Pulmonary hypertension   HLD   History of breast cancer status post mastectomy, chemo, in remission  History of DVT/PE  History of GONZALEZ on CPAP  Asymptomatic bacteriuria on admit   Prophylaxis    Patient is inotrope dependent   We had to reinitiate dobutamine GTT on 05/15 and hemodynamics stable on the same  Renal function also improving on dobutamine GTT   Patient unfortunately not a candidate for surgical intervention for valvular heart disease, not a candidate for LVAD  Extremely poor prognosis and hospice/palliative care will be in her best interest  Palliative care team on board   Patient remains full code  Low suspicion for sepsis /septic shock  Hemodynamic instability secondary to cardiogenic shock  Chest x-ray and UA unremarkable   Off all antibiotics  GDM T for nonischemic cardiomyopathy not possible given compromised hemodynamics   Keep midodrine  Continue Eliquis for thromboembolic  prophylaxis   Continue statin and Pepcid   DVT prophylaxis-on Eliquis    Patient continues with very poor insight   I have been discussing case with her son on the phone, had discussion on multiple occasions  Today met with the son and his friend in person   Explained poor prognosis  They are yet not decided   Patient remains full code and would like to continue current management    Critical care time-35 minutes  Critical care diagnosis-echogenic shock requiring dobutamine   Critical care interventions: Hands-on evaluation, review of labs/radiographs/records and discussions with patient       Tana Harrison MD   05/18/2025     I was notified by the nursing staff that the patient was in  SVT per tele  This was transient and heart rate was in 180s   Dropped to 80s to 110s and EKG showed sinus tach  Normotensive

## 2025-05-19 LAB
ALBUMIN SERPL-MCNC: 2.1 G/DL (ref 3.4–4.8)
ALBUMIN/GLOB SERPL: 0.6 RATIO (ref 1.1–2)
ALP SERPL-CCNC: 97 UNIT/L (ref 40–150)
ALT SERPL-CCNC: 16 UNIT/L (ref 0–55)
ANION GAP SERPL CALC-SCNC: 15 MEQ/L
AST SERPL-CCNC: 19 UNIT/L (ref 11–45)
BASOPHILS # BLD AUTO: 0.05 X10(3)/MCL
BASOPHILS NFR BLD AUTO: 0.9 %
BILIRUB SERPL-MCNC: 2.8 MG/DL
BUN SERPL-MCNC: 28 MG/DL (ref 9.8–20.1)
CALCIUM SERPL-MCNC: 7.9 MG/DL (ref 8.4–10.2)
CHLORIDE SERPL-SCNC: 100 MMOL/L (ref 98–107)
CO2 SERPL-SCNC: 19 MMOL/L (ref 23–31)
CREAT SERPL-MCNC: 1.24 MG/DL (ref 0.55–1.02)
CREAT/UREA NIT SERPL: 23
EOSINOPHIL # BLD AUTO: 0.06 X10(3)/MCL (ref 0–0.9)
EOSINOPHIL NFR BLD AUTO: 1.1 %
ERYTHROCYTE [DISTWIDTH] IN BLOOD BY AUTOMATED COUNT: 22.4 % (ref 11.5–17)
GFR SERPLBLD CREATININE-BSD FMLA CKD-EPI: 46 ML/MIN/1.73/M2
GLOBULIN SER-MCNC: 3.6 GM/DL (ref 2.4–3.5)
GLUCOSE SERPL-MCNC: 93 MG/DL (ref 82–115)
HCT VFR BLD AUTO: 37.9 % (ref 37–47)
HGB BLD-MCNC: 12.8 G/DL (ref 12–16)
IMM GRANULOCYTES # BLD AUTO: 0.12 X10(3)/MCL (ref 0–0.04)
IMM GRANULOCYTES NFR BLD AUTO: 2.1 %
LYMPHOCYTES # BLD AUTO: 1 X10(3)/MCL (ref 0.6–4.6)
LYMPHOCYTES NFR BLD AUTO: 17.6 %
MCH RBC QN AUTO: 32.3 PG (ref 27–31)
MCHC RBC AUTO-ENTMCNC: 33.8 G/DL (ref 33–36)
MCV RBC AUTO: 95.7 FL (ref 80–94)
MONOCYTES # BLD AUTO: 0.21 X10(3)/MCL (ref 0.1–1.3)
MONOCYTES NFR BLD AUTO: 3.7 %
NEUTROPHILS # BLD AUTO: 4.24 X10(3)/MCL (ref 2.1–9.2)
NEUTROPHILS NFR BLD AUTO: 74.6 %
NRBC BLD AUTO-RTO: 0.4 %
PLATELET # BLD AUTO: 164 X10(3)/MCL (ref 130–400)
PMV BLD AUTO: 11.9 FL (ref 7.4–10.4)
POTASSIUM SERPL-SCNC: 4 MMOL/L (ref 3.5–5.1)
PROT SERPL-MCNC: 5.7 GM/DL (ref 5.8–7.6)
RBC # BLD AUTO: 3.96 X10(6)/MCL (ref 4.2–5.4)
SODIUM SERPL-SCNC: 134 MMOL/L (ref 136–145)
WBC # BLD AUTO: 5.68 X10(3)/MCL (ref 4.5–11.5)

## 2025-05-19 PROCEDURE — 25000003 PHARM REV CODE 250: Performed by: INTERNAL MEDICINE

## 2025-05-19 PROCEDURE — 94760 N-INVAS EAR/PLS OXIMETRY 1: CPT

## 2025-05-19 PROCEDURE — 36415 COLL VENOUS BLD VENIPUNCTURE: CPT | Performed by: INTERNAL MEDICINE

## 2025-05-19 PROCEDURE — 21400001 HC TELEMETRY ROOM

## 2025-05-19 PROCEDURE — 85025 COMPLETE CBC W/AUTO DIFF WBC: CPT | Performed by: INTERNAL MEDICINE

## 2025-05-19 PROCEDURE — 99232 SBSQ HOSP IP/OBS MODERATE 35: CPT | Mod: ,,,

## 2025-05-19 PROCEDURE — 80053 COMPREHEN METABOLIC PANEL: CPT | Performed by: INTERNAL MEDICINE

## 2025-05-19 PROCEDURE — 27000221 HC OXYGEN, UP TO 24 HOURS

## 2025-05-19 PROCEDURE — 25000003 PHARM REV CODE 250: Performed by: NURSE PRACTITIONER

## 2025-05-19 RX ADMIN — FAMOTIDINE 20 MG: 20 TABLET, FILM COATED ORAL at 09:05

## 2025-05-19 RX ADMIN — APIXABAN 5 MG: 5 TABLET, FILM COATED ORAL at 09:05

## 2025-05-19 RX ADMIN — MIDODRINE HYDROCHLORIDE 5 MG: 5 TABLET ORAL at 02:05

## 2025-05-19 RX ADMIN — ACETAMINOPHEN 1000 MG: 500 TABLET ORAL at 09:05

## 2025-05-19 RX ADMIN — PRAVASTATIN SODIUM 40 MG: 40 TABLET ORAL at 09:05

## 2025-05-19 RX ADMIN — MIDODRINE HYDROCHLORIDE 5 MG: 5 TABLET ORAL at 07:05

## 2025-05-19 NOTE — PLAN OF CARE
[facility] sent new referrals to foc ( foc received on 5/15/25 per CM note) sent to Kymber[facility] Pandya and [facility] via [facility]

## 2025-05-19 NOTE — PROGRESS NOTES
[facility]  Hospital Medicine Progress Note        Chief Complaint: Inpatient Follow-up    HPI:     74-year-old female with significant history of HTN, HLD, breast cancer status post left mastectomy, chemo in remission, nonischemic cardiomyopathy with ejection fraction-25-30%, valvular heart disease-MR/TR, DVT/PE on Eliquis, GONZALEZ on CPAP, pulmonary hypertension.  Patient presented to the ED with complaints of bilateral lower extremity edema with associated pain and worsening dyspnea.  Lab significant for renal insufficiency, metabolic acidosis, elevated BNP, lactic acidosis, UA concerning for UTI.  Patient was admitted to hospital medicine services, concern for impending cardiogenic shock and cardiology services consulted.  Patient was initiated on IV diuretics/Lasix drip, dobutamine, holding other GDM T given compromised hemodynamics/low BP, holding Eliquis and placed on full-dose anticoagulation with Lovenox.  Overall prognosis extremely poor.  Patient only treated with 1 dose of IV ceftriaxone for bacteriuria, she was asymptomatic and therefore antibiotics were discontinued.  Patient also significantly physically deconditioned and therefore therapy services consulted.  Once hemodynamics stabilized dobutamine was discontinued and IV Lasix was transitioned to p.o. Lasix, planning for GDM T as hemodynamics tolerates.  Off dobutamine patient had episodic hypotension which responded to fluid bolus with normal saline.  Patient with profound hypotension on 05/15 secondary to cardiogenic shock, lactic acid elevated, discussed with Cardiology and decided to initiate on dobutamine drip at 5, discussed with family and patient regarding extremely poor prognosis, discussed palliative care/hospice, palliative care team also consulted, patient and family has very poor insight.  Patient inotrope dependent and prognosis continues to be extremely poor       Interval Hx:     Patient was seen at bedside,  patient thinks her primary care physician can help her recover, continues with extreme poor insight, hemodynamics stable on dobutamine, no acute events overnight, no more NSVT     Objective/physical exam:  General: In no acute distress, afebrile  Chest: Clear to auscultation bilaterally  Heart: S1, S2, no appreciable murmur  Abdomen: Soft, nontender, BS +  MSK: Warm, no lower extremity edema, no clubbing or cyanosis  Neurologic: Alert and oriented x4,   VITAL SIGNS: 24 HRS MIN & MAX LAST   Temp  Min: 97.6 °F (36.4 °C)  Max: 98.4 °F (36.9 °C) 97.6 °F (36.4 °C)   BP  Min: 95/70  Max: 127/92 (!) 127/92   Pulse  Min: 82  Max: 113  99   Resp  Min: 16  Max: 20 20   SpO2  Min: 94 %  Max: 100 % 100 %       Recent Labs   Lab 05/19/25  0352   WBC 5.68   RBC 3.96*   HGB 12.8   HCT 37.9   MCV 95.7*   MCH 32.3*   MCHC 33.8   RDW 22.4*      MPV 11.9*         Recent Labs   Lab 05/13/25  1135 05/14/25  0346 05/15/25  0616 05/16/25  0505 05/19/25  0352   NA  --    < > 134*   < > 134*   K  --    < > 4.4   < > 4.0   CL  --    < > 98   < > 100   CO2  --    < > 21*   < > 19*   BUN  --    < > 32.4*   < > 28.0*   CREATININE  --    < > 1.57*   < > 1.24*   GLU  --    < > 105   < > 93   CALCIUM  --    < > 8.0*   < > 7.9*   PH 7.500*  --   --   --   --    MG  --    < > 2.20  --   --    ALBUMIN  --    < >  --    < > 2.1*   PROT  --    < >  --    < > 5.7*   ALKPHOS  --    < >  --    < > 97   ALT  --    < >  --    < > 16   AST  --    < >  --    < > 19   BILITOT  --    < >  --    < > 2.8*    < > = values in this interval not displayed.          Microbiology Results (last 7 days)       Procedure Component Value Units Date/Time    Blood Culture [[id number]]  (Normal) Collected: 05/14/25 2252    Order Status: Completed Specimen: Blood, Venous Updated: 05/19/25 0003     Blood Culture No Growth At 96 Hours    Blood Culture [[id number]]  (Normal) Collected: 05/14/25 2252    Order Status: Completed Specimen: Blood, Venous Updated: 05/19/25 0003      Blood Culture No Growth At 96 Hours    Blood Culture #2 **CANNOT BE ORDERED STAT** [[id number]]  (Normal) Collected: 05/09/25 0400    Order Status: Completed Specimen: Blood from Hand, Left Updated: 05/14/25 0507     Blood Culture No Growth at 5 days    Blood Culture #1 **CANNOT BE ORDERED STAT** [[id number]]  (Normal) Collected: 05/09/25 0351    Order Status: Completed Specimen: Blood from Wrist, Right Updated: 05/14/25 0440     Blood Culture No Growth at 5 days             Scheduled Med:   apixaban  5 mg Oral BID    famotidine  20 mg Oral Daily    midodrine  5 mg Oral TID WM    pravastatin  40 mg Oral QHS          Assessment/Plan:      Cardiogenic shock on dobutamine GTT  Acute decompensated systolic/diastolic heart failure with ejection fraction 20-25%/nonischemic cardiomyopathy status post SICD  SVT, transient 5/18  Lactic acidosis secondary to cardiogenic shock-improved  Acute on chronic right-sided heart failure-biventricular failure  Josiah I on CKD, stage III-cardiorenal, improving/stable  Orthostatic hypotension  NSTEMI-type 2  Valvular heart disease-MR/TR   Pulmonary hypertension   HLD   History of breast cancer status post mastectomy, chemo, in remission  History of DVT/PE  History of GONZALEZ on CPAP  Asymptomatic bacteriuria on admit   Prophylaxis    Patient is inotrope dependent   We had to reinitiate dobutamine GTT on 05/15 and hemodynamics stable on the same  Transient NSVT on 05/18, spontaneously converted to NSR  Renal function improving on dobutamine GTT   Patient unfortunately not a candidate for surgical intervention for valvular heart disease, not a candidate for LVAD  Extremely poor prognosis and hospice/palliative care will be in her best interest  Palliative care team on board   Patient remains full code  Low suspicion for sepsis /septic shock  Hemodynamic instability secondary to cardiogenic shock  Chest x-ray and UA unremarkable   Off all antibiotics  GDM T for nonischemic cardiomyopathy not  possible given compromised hemodynamics   Keep midodrine  Continue Eliquis for thromboembolic prophylaxis   Continue statin and Pepcid   DVT prophylaxis-on Eliquis      Patient and family continues with extreme poor insight  Has had multiple discussions with the patient and also her son over the phone  Had lengthy discussion again yesterday with son and his friend in person  They still have not decided as far as code status  She is not a candidate for skilled nursing facility given dobutamine  Case management trying to see if she can qualify for LTAC  Palliative is also on board and family is requesting another meeting Wednesday    Critical care time-35 minutes  Critical care diagnosis-echogenic shock requiring dobutamine   Critical care interventions: Hands-on evaluation, review of labs/radiographs/records and discussions with patient       Tana Harrison MD   05/19/2025

## 2025-05-19 NOTE — PROGRESS NOTES
"Patient Name: Laura Jacobs   MRN: [id number]   Admission Date: 5/8/2025   Hospital Length of Stay: 11   Attending Provider: Scooter Davis MD   Consulting Provider: MACHELLE Cuellar  Reason for Consult: Goals of Care  Primary Care Physician:  Ruben Brooks Sr., MD     Principal Problem: <principal problem not specified>     Patient information was obtained from patient and ER records.     Final diagnoses:  [R06.02] Shortness of breath  [I50.9] Acute on chronic congestive heart failure, unspecified heart failure type (Primary)  [R60.9] Edema, unspecified type  [N28.9] Acute renal insufficiency  [E87.20] Lactic acidosis      Assessment/Plan:     I reviewed the patient and family's understanding of the seriousness of the illness and its expected prognosis. We discussed the patient's goals of care and treatment preferences. We discussed the difference between palliative and curative medicine. I clarified current code status, which is full code. I identified the surrogate decision maker to be the patient's two sons. I answered all questions and we formulated a plan including recommendations for symptom management and how to best achieve goals of care.       Patient is resting comfortably in bed.  She is awake and alert.  Reviewed current medical condition again and overall poor prognosis.  States she is feeling okay today and that she plans to take her medication by mouth prescribed by PCP that is for her "heart and her kidneys." Reiterates that she is trusting her PCP and that her plan is to discharge to rehab facility then return home and follow up with PCP. States she will continue to discuss her care with PCP at that time.  Attempted to reorient to current situation and discussed that medications have been adjusted during hospitalization.  She is very focused on home medication regimen and following instructions as directed by PCP.  She reiterates multiple times that she wants to continue " aggressive treatments, with goal of rehabilitation returning home to live alone.  States that upon discharge home her nephew will be residing with her for assistance.    Discussed with attending physician. Will attempt to arrange family meeting. Spoke with patient's son Andrzej via telephone. Reviewed his mother's current condition. He denies questions after discussions with hospital medicine team over the weekend. He verbalizes understanding that patient would not be safe for discharge home alone and will likely require placement. Discussed that case management is working on placement options and that level of care would likely be determined by ongoing need for dobutamine drip, functional status, and goals of care. He verbalizes understanding. He is able to present to bedside for family meeting on Wednesday at 10:00 AM. He will notify his brother, in order for him to be in attendance in person or via telephone. Offered support. Encouraged to call with questions or concerns. Palliative Medicine will continue to follow.     Advance Care Planning     Date: 05/19/2025    [facility]  I engaged the patient in a voluntary conversation about advance care planning and we specifically addressed what the goals of care would be moving forward, in light of the patient's change in clinical status, specifically current condition.  We did specifically address the patient's likely prognosis, which is poor.  We explored the patient's values and preferences for future care.  The patient endorses that what is most important right now is to focus on curative/life-prolongation (regardless of treatment burdens)    Accordingly, we have decided that the best plan to meet the patient's goals includes continuing with treatment        Recommendations:     Family meeting with hospital medicine team on Wednesday at 10:00AM.      Interval History:     Remains dobutamine dependent with brief episode of SVT over the weekend.      Active Ambulatory Problems      Diagnosis Date Noted    Vasovagal syncope 05/10/2022    Closed intertrochanteric fracture of hip, left, initial encounter 10/15/2022    Dizziness 04/01/2023    Acute on chronic systolic (congestive) heart failure 01/23/2025    Anemia 03/09/2025    Internal hemorrhoids 03/19/2025     Resolved Ambulatory Problems     Diagnosis Date Noted    AUSTIN (acute kidney injury) 11/22/2023    Rectal bleeding 03/17/2025    Acute hypotension 03/19/2025     Past Medical History:   Diagnosis Date    Cancer     Cardiomyopathy     CHF (congestive heart failure)     History of breast cancer     History of DVT (deep vein thrombosis)     HLD (hyperlipidemia)     Hypertension     Lymphedema     Osteoarthritis     Venous insufficiency         Past Surgical History:   Procedure Laterality Date    HYSTERECTOMY      INSERTION OF PACEMAKER      INTRAMEDULLARY RODDING OF FEMUR Left 10/14/2022    Procedure: INSERTION, INTRAMEDULLARY СВЕТЛАНА, FEMUR;  Surgeon: Ankush Alex MD;  Location: [facility];  Service: Orthopedics;  Laterality: Left;    JOINT REPLACEMENT Bilateral     Knee    MASTECTOMY Left 2019        Review of patient's allergies indicates:   Allergen Reactions    Sulfa (sulfonamide antibiotics)      Patient unsure if allergic to Sulfa        Current Medications[1]       Current Facility-Administered Medications:     acetaminophen, 1,000 mg, Oral, Q8H PRN    acetaminophen, 650 mg, Oral, Q4H PRN    aluminum-magnesium hydroxide-simethicone, 30 mL, Oral, QID PRN    bisacodyL, 10 mg, Rectal, Daily PRN    dextrose 50%, 12.5 g, Intravenous, PRN    dextrose 50%, 25 g, Intravenous, PRN    glucagon (human recombinant), 1 mg, Intramuscular, PRN    glucose, 16 g, Oral, PRN    glucose, 24 g, Oral, PRN    melatonin, 6 mg, Oral, Nightly PRN    polyethylene glycol, 17 g, Oral, BID PRN    sodium chloride 0.9%, 10 mL, Intravenous, PRN    Flushing PICC/Midline Protocol, , , Until Discontinued **AND** sodium chloride 0.9%, 10 mL, Intravenous, Q12H PRN  "    Family History   Family history unknown: Yes          Review of Systems   Constitutional:  Positive for activity change.   HENT: Negative.     Respiratory: Negative.     Cardiovascular:  Positive for leg swelling.   Gastrointestinal: Negative.    Genitourinary: Negative.    Musculoskeletal: Negative.    Neurological:  Positive for weakness.   Psychiatric/Behavioral: Negative.              Objective:   BP (!) 127/92   Pulse 99   Temp 97.6 °F (36.4 °C) (Oral)   Resp 20   Ht 5' 4.02" (1.626 m)   Wt 77.8 kg (171 lb 7.9 oz)   SpO2 100%   BMI 29.42 kg/m²      Physical Exam   Constitutional: She is oriented to person, place, and time. No distress. She appears ill.   HENT:   Mouth/Throat: Mucous membranes are moist.   Cardiovascular: Normal rate. Pulmonary:      Effort: Pulmonary effort is normal. No respiratory distress.     Abdominal: She exhibits no distension.   Musculoskeletal:         General: Swelling present.      Cervical back: Normal range of motion.      Right lower leg: Edema present.      Left lower leg: Edema present.   Neurological: She is alert and oriented to person, place, and time.   Skin: Skin is warm and dry.   Psychiatric: Her behavior is normal.          Review of Symptoms  Review of Symptoms        Psychosocial/Cultural:   See Palliative Psychosocial Note: Yes  **Primary  to Follow**  Palliative Care  Consult: No      Advance Care Planning   Advance Directives:     Decision Making:  Patient answered questions  Goals of Care: What is most important right now is to focus on curative/life-prolongation (regardless of treatment burdens). Accordingly, we have decided that the best plan to meet the patient's goals includes continuing with treatment.          PAINAD: NA    Caregiver burden formerly assessed: Yes    > 50% of 45 min of encounter was spent in chart review, face to face discussion of goals of care, symptom assessment, coordination of care and emotional " support.    MACHELLE Cuellar-BC  Palliative Medicine  [facility]         [1]   Current Facility-Administered Medications:     acetaminophen tablet 1,000 mg, 1,000 mg, Oral, Q8H PRN, Olvin Choudhury MD    acetaminophen tablet 650 mg, 650 mg, Oral, Q4H PRN, Kimberly Sortosa, FNP    aluminum-magnesium hydroxide-simethicone 200-200-20 mg/5 mL suspension 30 mL, 30 mL, Oral, QID PRN, Macario Mallory, FNP    apixaban tablet 5 mg, 5 mg, Oral, BID, Stevie Carrera, FNP, 5 mg at 05/19/25 0905    bisacodyL suppository 10 mg, 10 mg, Rectal, Daily PRN, Kimberly Sortosa, FNP    dextrose 50% injection 12.5 g, 12.5 g, Intravenous, PRN, Macario, Mallory, FNP    dextrose 50% injection 25 g, 25 g, Intravenous, PRN, Macario, Mallory, FNP    DOBUtamine 1000 mg in D5W 250 mL infusion, 5 mcg/kg/min, Intravenous, Continuous, Tana Harrison MD, Last Rate: 5.9 mL/hr at 05/17/25 0405, 5 mcg/kg/min at 05/17/25 0405    famotidine tablet 20 mg, 20 mg, Oral, Daily, Stevie Carerra, FNP, 20 mg at 05/19/25 0905    glucagon (human recombinant) injection 1 mg, 1 mg, Intramuscular, PRN, Boomer, Mallory, FNP    glucose chewable tablet 16 g, 16 g, Oral, PRN, Boomer, Mallory, FNP    glucose chewable tablet 24 g, 24 g, Oral, PRN, Macario, Mallory, FNP    melatonin tablet 6 mg, 6 mg, Oral, Nightly PRN, Macario Mallory, FNP    midodrine tablet 5 mg, 5 mg, Oral, TID WM, Tana Harrison MD, 5 mg at 05/18/25 1643    polyethylene glycol packet 17 g, 17 g, Oral, BID PRN, Boomer, Mallory, FNP    pravastatin tablet 40 mg, 40 mg, Oral, QHS, Stevie Carrera, FNP, 40 mg at 05/18/25 1951    sodium chloride 0.9% flush 10 mL, 10 mL, Intravenous, PRN, Mallory Sorto FNP    Flushing PICC/Midline Protocol, , , Until Discontinued **AND** sodium chloride 0.9% flush 10 mL, 10 mL, Intravenous, Q12H PRN, Tevin Soliz MD

## 2025-05-19 NOTE — PLAN OF CARE
Received an update by Mary ([facility]: Clinical Liaison) who reports unfortunately, the dobutamine drip alone without significant other needs is not enough for LTAC services. . Denied (LTAC) services at [facility] unit due to patient does not meet (LTAC) criteria.

## 2025-05-20 LAB
BACTERIA BLD CULT: NORMAL
BACTERIA BLD CULT: NORMAL

## 2025-05-20 PROCEDURE — 21400001 HC TELEMETRY ROOM

## 2025-05-20 PROCEDURE — 97535 SELF CARE MNGMENT TRAINING: CPT | Mod: CO

## 2025-05-20 PROCEDURE — 25000003 PHARM REV CODE 250: Performed by: NURSE PRACTITIONER

## 2025-05-20 PROCEDURE — 97530 THERAPEUTIC ACTIVITIES: CPT | Mod: CQ

## 2025-05-20 PROCEDURE — 25000003 PHARM REV CODE 250: Performed by: INTERNAL MEDICINE

## 2025-05-20 PROCEDURE — 97116 GAIT TRAINING THERAPY: CPT | Mod: CQ

## 2025-05-20 PROCEDURE — 63600175 PHARM REV CODE 636 W HCPCS: Performed by: INTERNAL MEDICINE

## 2025-05-20 PROCEDURE — 27000221 HC OXYGEN, UP TO 24 HOURS

## 2025-05-20 PROCEDURE — 94761 N-INVAS EAR/PLS OXIMETRY MLT: CPT

## 2025-05-20 PROCEDURE — 99900035 HC TECH TIME PER 15 MIN (STAT)

## 2025-05-20 RX ADMIN — MIDODRINE HYDROCHLORIDE 5 MG: 5 TABLET ORAL at 09:05

## 2025-05-20 RX ADMIN — PRAVASTATIN SODIUM 40 MG: 40 TABLET ORAL at 10:05

## 2025-05-20 RX ADMIN — FAMOTIDINE 20 MG: 20 TABLET, FILM COATED ORAL at 09:05

## 2025-05-20 RX ADMIN — MIDODRINE HYDROCHLORIDE 5 MG: 5 TABLET ORAL at 12:05

## 2025-05-20 RX ADMIN — DOBUTAMINE HYDROCHLORIDE 5 MCG/KG/MIN: 400 INJECTION INTRAVENOUS at 07:05

## 2025-05-20 RX ADMIN — MIDODRINE HYDROCHLORIDE 5 MG: 5 TABLET ORAL at 05:05

## 2025-05-20 RX ADMIN — APIXABAN 5 MG: 5 TABLET, FILM COATED ORAL at 09:05

## 2025-05-20 RX ADMIN — APIXABAN 5 MG: 5 TABLET, FILM COATED ORAL at 10:05

## 2025-05-20 NOTE — PT/OT/SLP PROGRESS
Occupational Therapy   Treatment    Name: Laura Jacobs  MRN: [id number]  Admitting Diagnosis:  <principal problem not specified>       Recommendations:     Recommended therapy intensity at discharge: Moderate Intensity Therapy   Discharge Equipment Recommendations:  to be determined by next level of care  Barriers to discharge:       Assessment:     Laura Jacobs is a 74 y.o. female with a medical diagnosis of <principal problem not specified>.  She presents with good motivation. Performance deficits affecting function are weakness, impaired cardiopulmonary response to activity, impaired balance, edema, decreased safety awareness, impaired functional mobility, gait instability, decreased lower extremity function, decreased upper extremity function, impaired self care skills, impaired skin, decreased coordination, impaired endurance.     Rehab Prognosis:  Good; patient would benefit from acute skilled OT services to address these deficits and reach maximum level of function.       Plan:     Patient to be seen 3 x/week to address the above listed problems via self-care/home management, therapeutic activities, therapeutic exercises  Plan of Care Expires: 06/20/25  Plan of Care Reviewed with: patient    Subjective     Pain/Comfort:  Pain Rating 1: 0/10    Objective:     Communicated with: nurse prior to session.  Patient found HOB elevated with   upon OT entry to room.    General Precautions: Standard, fall    Orthopedic Precautions:N/A  Braces: N/A  Respiratory Status: Nasal cannula, but not on, O2 sats in high 90's  Vital Signs: Orthostatics: Supine 110/87, Sitting 126/98, [id number]     Occupational Performance:     Bed Mobility:    Patient completed Supine to Sit with minimum assistance  Patient completed Sit to Supine with moderate assistance     Functional Mobility/Transfers:  Patient completed Sit <> Stand Transfer with minimum assistance  with  rolling walker     Activities of Daily  Living:  Grooming: setup sitting edge of bed to brush teeth and wash face  Toileting: maximal assistance standing with RW edge of bed  LB Dressing: Max A to don brief standing and sitting edge of bed    Therapeutic Activities:  Maintain sitting edge of bed with SBA     Therapeutic Positioning    OT interventions performed during the course of today's session in an effort to prevent and/or reduce acquired pressure injuries:   Therapeutic positioning was provided at the conclusion of session to offload all bony prominences for the prevention and/or reduction of pressure injuries    [facility] 6 Click ADL: 15    Patient Education:  Patient provided with verbal education and demonstrations education regarding fall prevention and safety awareness.  Understanding was verbalized, however additional teaching warranted.      Patient left HOB elevated with all lines intact, call button in reach, and CNAs notified.    GOALS:   Multidisciplinary Problems       Occupational Therapy Goals          Problem: Occupational Therapy    Goal Priority Disciplines Outcome Interventions   Occupational Therapy Goal     OT, PT/OT Ongoing    Description: Goals to be met by: d/c     Patient will increase functional independence with ADLs by performing:    Grooming while seated with Stand-by Assistance.  Sitting at edge of bed x10 minutes with Stand-by Assistance to perform ADL.  Toilet transfer to bedside commode with Minimal Assistance.  Upper extremity exercise program 2x10 reps per handout, with assistance as needed.                         Time Tracking:     OT Date of Treatment: 05/20/25  OT Start Time: 1519  OT Stop Time: 1600  OT Total Time (min): 41 min    Billable Minutes:Self Care/Home Management 41    OT/ANGELICA: ANGELICA     Number of ANGELICA visits since last OT visit: 1    5/20/2025

## 2025-05-20 NOTE — PLAN OF CARE
Problem: Adult Inpatient Plan of Care  Goal: Plan of Care Review  5/20/2025 0001 by Dallas Ac RN  Outcome: Progressing  5/20/2025 0001 by Dallas Ac RN  Outcome: Ongoing  Goal: Patient-Specific Goal (Individualized)  5/20/2025 0001 by Dallas Ac RN  Outcome: Progressing  5/20/2025 0001 by Dallas Ac RN  Outcome: Ongoing  Goal: Absence of Hospital-Acquired Illness or Injury  5/20/2025 0001 by Dlalas Ac RN  Outcome: Progressing  5/20/2025 0001 by Dallas Ac RN  Outcome: Ongoing  Goal: Optimal Comfort and Wellbeing  5/20/2025 0001 by Dallas Ac RN  Outcome: Progressing  5/20/2025 0001 by Dallas Ac RN  Outcome: Ongoing  Goal: Readiness for Transition of Care  5/20/2025 0001 by Dallas Ac RN  Outcome: Progressing  5/20/2025 0001 by Dallas Ac RN  Outcome: Ongoing     Problem: Infection  Goal: Absence of Infection Signs and Symptoms  5/20/2025 0001 by Dallas Ac RN  Outcome: Progressing  5/20/2025 0001 by Dallas Ac RN  Outcome: Ongoing     Problem: Skin Injury Risk Increased  Goal: Skin Health and Integrity  5/20/2025 0001 by Dallas Ac RN  Outcome: Progressing  5/20/2025 0001 by Dallas Ac RN  Outcome: Ongoing     Problem: Fall Injury Risk  Goal: Absence of Fall and Fall-Related Injury  5/20/2025 0001 by Dallas Ac RN  Outcome: Progressing  5/20/2025 0001 by Dallas Ac RN  Outcome: Ongoing     Problem: Coping Ineffective  Goal: Effective Coping  5/20/2025 0001 by Dallas Ac RN  Outcome: Progressing  5/20/2025 0001 by Dallas Ac RN  Outcome: Ongoing

## 2025-05-20 NOTE — PROGRESS NOTES
Inpatient Nutrition Assessment    Admit Date: 5/8/2025   Total duration of encounter: 12 days   Patient Age: 74 y.o.    Nutrition Recommendation/Prescription     Continue oral diet as tolerated; Diet Heart Healthy Consistent Carbohydrate; 2000 Calories (up to 75 gm per meal); Standard Tray     Communication of Recommendations: reviewed with patient    Nutrition Assessment     Malnutrition Assessment/Nutrition-Focused Physical Exam       Malnutrition Level: other (see comments) (Does not meet criteria) (05/13/25 1110)     Weight Loss (Malnutrition): other (see comments) (Does not meet criteria) (05/13/25 1110)                                         Fluid Accumulation (Malnutrition): moderate to severe (05/13/25 1110)        A minimum of two characteristics is recommended for diagnosis of either severe or non-severe malnutrition.    Chart Review    Reason Seen: continuous nutrition monitoring    Malnutrition Screening Tool Results   Have you recently lost weight without trying?: Yes: 2-13 lbs  Have you been eating poorly because of a decreased appetite?: No   MST Score: 1   Diagnosis:  Acute on chronic HFrEF 25%-decompensated  Valvular heart disease-severe MR, moderate TR  Impending cardiogenic shock  Possible urinary tract infection-asymptomatic  Subclinical hypothyroidism    Relevant Medical History: breast cancer status post left mastectomy and status post chemo now and remission, chronic systolic heart failure, diabetes mellitus, CKD 3, hemorrhoids, celiac artery stenosis severe, pulmonary embolism on Eliquis.     Scheduled Medications:  apixaban, 5 mg, BID  famotidine, 20 mg, Daily  midodrine, 5 mg, TID WM  pravastatin, 40 mg, QHS    Continuous Infusions:  DOBUTamine IV infusion (non-titrating), Last Rate: 5 mcg/kg/min (05/20/25 0738)      PRN Medications:  acetaminophen, 1,000 mg, Q8H PRN  acetaminophen, 650 mg, Q4H PRN  aluminum-magnesium hydroxide-simethicone, 30 mL, QID PRN  bisacodyL, 10 mg, Daily  PRN  dextrose 50%, 12.5 g, PRN  dextrose 50%, 25 g, PRN  glucagon (human recombinant), 1 mg, PRN  glucose, 16 g, PRN  glucose, 24 g, PRN  melatonin, 6 mg, Nightly PRN  polyethylene glycol, 17 g, BID PRN  sodium chloride 0.9%, 10 mL, PRN  sodium chloride 0.9%, 10 mL, Q12H PRN    Calorie Containing IV Medications: no significant kcals from medications at this time    Recent Labs   Lab 05/14/25  0346 05/15/25  0616 05/16/25  0504 05/16/25  0505 05/17/25  0454 05/18/25  1047 05/19/25  0352   * 134*  --  137 133* 132* 134*   K 4.5 4.4  --  3.9 3.7 4.0 4.0   CALCIUM 7.8* 8.0*  --  7.9* 7.9* 7.9* 7.9*   MG 2.10 2.20  --   --   --   --   --     98  --  99 99 100 100   CO2 20* 21*  --  22* 21* 20* 19*   BUN 25.1* 32.4*  --  29.3* 25.7* 26.9* 28.0*   CREATININE 1.32* 1.57*  --  1.36* 1.23* 1.30* 1.24*   EGFRNORACEVR 43 35  --  41 47 44 46   GLU 84 105  --  92 90 97 93   BILITOT 3.2*  --   --  2.5*  --   --  2.8*   ALKPHOS 119  --   --  106  --   --  97   ALT 23  --   --  19  --   --  16   AST 24  --   --  22  --   --  19   ALBUMIN 2.4*  --   --  2.2*  --   --  2.1*   WBC 5.60  --  5.72  5.72  --   --   --  5.68   HGB 13.4  --  13.5  --   --   --  12.8   HCT 40.7  --  39.4  --   --   --  37.9     Nutrition Orders:  Diet Heart Healthy Consistent Carbohydrate; 2000 Calories (up to 75 gm per meal); Standard Tray      Appetite/Oral Intake: good/50-75% of meals  Factors Affecting Nutritional Intake: none identified  Social Needs Impacting Access to Food: none identified  Food/Denominational/Cultural Preferences: none reported  Food Allergies: no known food allergies  Last Bowel Movement: 05/19/25  Wound(s):      Comments    5/9/25 Pt tolerating oral diet, weight fluctuations noted in EMR, possibly due to fluid and/or error in weights, will monitor.    5/13/25 Pt reports good appetite and intake of meals, no unintentional weight loss    5/20/25 Pt reports good appetite and intake of meals, no family in room, nursing reports  "pt is eating about 25-50% of meals. Will plan for early f/u to check on intake.    Anthropometrics    Height: 5' 4.02" (162.6 cm), Height Method: Stated  Last Weight:  (bed not zeroed) (05/20/25 0551), Weight Method: Bed Scale  BMI (Calculated): 29.4  BMI Classification: overweight (BMI 25-29.9)     Ideal Body Weight (IBW), Female: 120.1 lb     % Ideal Body Weight, Female (lb): 124.17 %                             Usual Weight Provided By: EMR weight history    Wt Readings from Last 5 Encounters:   05/16/25 77.8 kg (171 lb 7.9 oz)   03/15/25 81.6 kg (179 lb 14.3 oz)   03/13/25 81.9 kg (180 lb 8.9 oz)   01/19/25 59 kg (130 lb)   01/25/24 59 kg (130 lb)     Weight Change(s) Since Admission:   Wt Readings from Last 1 Encounters:   05/16/25 0600 77.8 kg (171 lb 7.9 oz)   05/14/25 0500 78.1 kg (172 lb 2.9 oz)   05/13/25 0438 75.2 kg (165 lb 11.2 oz)   05/12/25 0503 78.5 kg (173 lb 1 oz)   05/11/25 0503 78.5 kg (173 lb 1 oz)   05/08/25 0103 67.6 kg (149 lb)   Admit Weight: 67.6 kg (149 lb) (05/08/25 0103), Weight Method: Bed Scale    Estimated Needs    Weight Used For Calorie Calculations: 67.6 kg (149 lb 0.5 oz)  Energy Calorie Requirements (kcal): 1400 kcal (1.2 stress factor)  Energy Need Method: [place]-St Jeor  Weight Used For Protein Calculations: 67.6 kg (149 lb 0.5 oz)  Protein Requirements: 74gm (1.1 gm/kg)  Fluid Requirements (mL): 1400 ml (1 ml/kcal)        Enteral Nutrition     Patient not receiving enteral nutrition at this time.    Parenteral Nutrition     Patient not receiving parenteral nutrition support at this time.    Evaluation of Received Nutrient Intake    Calories: not meeting estimated needs  Protein: not meeting estimated needs    Patient Education     Not applicable.    Nutrition Diagnosis     PES: Unintended weight gain related to acute illness as evidenced by moderate to severe lower extremity and upper extremity edema. (active)     PES:            Nutrition Interventions     Intervention(s): " general/healthful diet  Intervention(s):      Goal: Consume % of meals/snacks by follow-up. (goal met)      Nutrition Goals & Monitoring     Dietitian will monitor: food and beverage intake and weight change  Discharge planning: resume home regimen  Nutrition Risk/Follow-Up: dietitian will follow-up one time per week   Please consult if re-assessment needed sooner.

## 2025-05-20 NOTE — PROGRESS NOTES
[facility]  Hospital Medicine Progress Note        Chief Complaint: Inpatient Follow-up    HPI:     74-year-old female with significant history of HTN, HLD, breast cancer status post left mastectomy, chemo in remission, nonischemic cardiomyopathy with ejection fraction-25-30%, valvular heart disease-MR/TR, DVT/PE on Eliquis, GONZALEZ on CPAP, pulmonary hypertension.  Patient presented to the ED with complaints of bilateral lower extremity edema with associated pain and worsening dyspnea.  Lab significant for renal insufficiency, metabolic acidosis, elevated BNP, lactic acidosis, UA concerning for UTI.  Patient was admitted to hospital medicine services, concern for impending cardiogenic shock and cardiology services consulted.  Patient was initiated on IV diuretics/Lasix drip, dobutamine, holding other GDM T given compromised hemodynamics/low BP, holding Eliquis and placed on full-dose anticoagulation with Lovenox.  Overall prognosis extremely poor.  Patient only treated with 1 dose of IV ceftriaxone for bacteriuria, she was asymptomatic and therefore antibiotics were discontinued.  Patient also significantly physically deconditioned and therefore therapy services consulted.  Once hemodynamics stabilized dobutamine was discontinued and IV Lasix was transitioned to p.o. Lasix, planning for GDM T as hemodynamics tolerates.  Off dobutamine patient had episodic hypotension which responded to fluid bolus with normal saline.  Patient with profound hypotension on 05/15 secondary to cardiogenic shock, lactic acid elevated, discussed with Cardiology and decided to initiate on dobutamine drip at 5, discussed with family and patient regarding extremely poor prognosis, discussed palliative care/hospice, palliative care team also consulted, patient and family has very poor insight.  Patient inotrope dependent and prognosis continues to be extremely poor       Interval Hx:     Patient was seen at bedside,  remains on dobutamine with stable hemodynamics, no acute events overnight, still continues with extremely poor insight      Objective/physical exam:  General: In no acute distress, afebrile  Chest: Clear to auscultation bilaterally  Heart: S1, S2, no appreciable murmur  Abdomen: Soft, nontender, BS +  MSK: Warm, no lower extremity edema, no clubbing or cyanosis  Neurologic: Alert and oriented x4,   VITAL SIGNS: 24 HRS MIN & MAX LAST   Temp  Min: 96.6 °F (35.9 °C)  Max: 98.2 °F (36.8 °C) 96.6 °F (35.9 °C)   BP  Min: 90/68  Max: 128/83 101/69   Pulse  Min: 75  Max: 110  94   No data recorded 20   SpO2  Min: 91 %  Max: 96 % (!) 91 %       Recent Labs   Lab 05/19/25  0352   WBC 5.68   RBC 3.96*   HGB 12.8   HCT 37.9   MCV 95.7*   MCH 32.3*   MCHC 33.8   RDW 22.4*      MPV 11.9*         Recent Labs   Lab 05/13/25  1135 05/14/25  0346 05/15/25  0616 05/16/25  0505 05/19/25  0352   NA  --    < > 134*   < > 134*   K  --    < > 4.4   < > 4.0   CL  --    < > 98   < > 100   CO2  --    < > 21*   < > 19*   BUN  --    < > 32.4*   < > 28.0*   CREATININE  --    < > 1.57*   < > 1.24*   GLU  --    < > 105   < > 93   CALCIUM  --    < > 8.0*   < > 7.9*   PH 7.500*  --   --   --   --    MG  --    < > 2.20  --   --    ALBUMIN  --    < >  --    < > 2.1*   PROT  --    < >  --    < > 5.7*   ALKPHOS  --    < >  --    < > 97   ALT  --    < >  --    < > 16   AST  --    < >  --    < > 19   BILITOT  --    < >  --    < > 2.8*    < > = values in this interval not displayed.          Microbiology Results (last 7 days)       Procedure Component Value Units Date/Time    Blood Culture [[id number]]  (Normal) Collected: 05/14/25 2252    Order Status: Completed Specimen: Blood, Venous Updated: 05/20/25 0011     Blood Culture No Growth at 5 days    Blood Culture [[id number]]  (Normal) Collected: 05/14/25 2252    Order Status: Completed Specimen: Blood, Venous Updated: 05/20/25 0011     Blood Culture No Growth at 5 days    Blood Culture #2 **CANNOT  BE ORDERED STAT** [[id number]]  (Normal) Collected: 05/09/25 0400    Order Status: Completed Specimen: Blood from Hand, Left Updated: 05/14/25 0507     Blood Culture No Growth at 5 days    Blood Culture #1 **CANNOT BE ORDERED STAT** [[id number]]  (Normal) Collected: 05/09/25 0351    Order Status: Completed Specimen: Blood from Wrist, Right Updated: 05/14/25 0440     Blood Culture No Growth at 5 days             Scheduled Med:   apixaban  5 mg Oral BID    famotidine  20 mg Oral Daily    midodrine  5 mg Oral TID WM    pravastatin  40 mg Oral QHS          Assessment/Plan:      Cardiogenic shock on dobutamine GTT  Acute decompensated systolic/diastolic heart failure with ejection fraction 20-25%/nonischemic cardiomyopathy status post SICD  SVT, transient 5/18  Lactic acidosis secondary to cardiogenic shock-improved  Acute on chronic right-sided heart failure-biventricular failure  Josiah I on CKD, stage III-cardiorenal, improving/stable  Orthostatic hypotension  NSTEMI-type 2  Valvular heart disease-MR/TR   Pulmonary hypertension   HLD   History of breast cancer status post mastectomy, chemo, in remission  History of DVT/PE  History of GONZALEZ on CPAP  Asymptomatic bacteriuria on admit   Prophylaxis    Patient is inotrope dependent   We had to reinitiate dobutamine GTT on 05/15 and hemodynamics stable on the same  Transient NSVT on 05/18, spontaneously converted to NSR  Renal function improving on dobutamine GTT   Patient unfortunately not a candidate for surgical intervention for valvular heart disease, not a candidate for LVAD  Extremely poor prognosis and hospice/palliative care will be in her best interest  Palliative care team on board   Patient remains full code  Low suspicion for sepsis /septic shock  Hemodynamic instability secondary to cardiogenic shock  Chest x-ray and UA unremarkable   Off all antibiotics  GDM T for nonischemic cardiomyopathy not possible given compromised hemodynamics   Keep midodrine  Continue  Eliquis for thromboembolic prophylaxis   Continue statin and Pepcid   DVT prophylaxis-on Eliquis      Patient and family continues with extreme poor insight  Has had multiple discussions with the patient and also her son over the phone  Had lengthy discussion again yesterday with son and his friend in person  They still have not decided as far as code status  She is not a candidate for skilled nursing facility given dobutamine  Case management trying to see if she can qualify for LTAC  Palliative is also on board and family is requesting another meeting Wednesday    Critical care time-35 minutes  Critical care diagnosis-echogenic shock requiring dobutamine   Critical care interventions: Hands-on evaluation, review of labs/radiographs/records and discussions with patient       Tana Harrison MD   05/20/2025

## 2025-05-20 NOTE — PT/OT/SLP PROGRESS
"Physical Therapy Treatment    Patient Name:  Laura Jacobs   MRN:  [id number]    Recommendations:     Discharge therapy intensity: Moderate Intensity Therapy   Discharge Equipment Recommendations: walker, rolling  Barriers to discharge: Impaired mobility and Ongoing medical needs    Assessment:     Laura Jacobs is a 74 y.o. female admitted with a medical diagnosis of cardiogenic shock, hypotension, acute decompensated heart failure, cardiomyopathy, lactic acidosis, [place], NSTEMI, valvular heart disease, pulmonary hypertension, HLD, (extensive medical history). Palliative care team following patient.  She presents with the following impairments/functional limitations: weakness, impaired endurance, impaired self care skills, impaired functional mobility, gait instability, impaired balance, decreased safety awareness, edema.    Rehab Prognosis: Fair; patient would benefit from acute skilled PT services to address these deficits and reach maximum level of function.    Recent Surgery: * No surgery found *      Plan:     During this hospitalization, patient would benefit from acute PT services 3 x/week to address the identified rehab impairments via gait training, therapeutic activities, therapeutic exercises, neuromuscular re-education and progress toward the following goals:    Plan of Care Expires:  06/12/25    Subjective     Chief Complaint: "I can walk"  Patient/Family Comments/goals: to walk   Pain/Comfort:  Pain Rating 1: 0/10      Objective:     Communicated with nsg prior to session.  Patient found HOB elevated with pulse ox (continuous), peripheral IV, oxygen, telemetry upon PT entry to room.     General Precautions: Standard, fall  Orthopedic Precautions: N/A  Braces: N/A  Respiratory Status: Room air  Blood Pressure:   HOB elevated on RLE: 110/87  Seated with RLE elevated: 147/106  Seated on R forearm(placement okay'd by RN): 126/98  Standing on R forearm: 109/86    Functional Mobility:  Bed " Mobility:     Semi-Supine to Sit: minimum assistance  Sit to supine: Mod A x2   Transfers:     Sit to Stand:  Min-Mod with rolling walker  Mod progressing to min with VC for technique  Gait: 12'/2' w/RW, CGA  Waddle type gait pattern    Therapeutic Activities/Exercises:  Pt sat EOB for several mins prior to standing. Pt stood for ~3 mins for total A posterior pericare 2/2 +void & BM. Seated rest break given prior to ambulation. Pt ambulated x2 trials, during 2nd trial pt appeared with increased fatigue. Suspected BP drop. Pt sat and was assisted back to bed. Pt soiled post session, CNAs informed.       Education:  Patient provided with verbal education education regarding PT role/goals/POC, fall prevention, and safety awareness.  Additional teaching is warranted.     Patient left HOB elevated with all lines intact, call button in reach, nursing notified, and set up with lunch.    GOALS:   Multidisciplinary Problems       Physical Therapy Goals          Problem: Physical Therapy    Goal Priority Disciplines Outcome Interventions   Physical Therapy Goal     PT, PT/OT Progressing    Description: Goals to be met by: 25     Patient will increase functional independence with mobility by performin. Supine to sit with [place]  2. Sit to supine with [place]  3. Sit to stand transfer with Modified [place]  4. Gait  x 200 feet with Modified [place] using Rolling Walker.   5. Increased functional strength to 5/5 in BLE's.                         Time Tracking:     PT Received On: 25  PT Start Time: 1521     PT Stop Time: 1600  PT Total Time (min): 39 min     Billable Minutes: Gait Training 1 unit and Therapeutic Activity 2 units    Treatment Type: Treatment  PT/PTA: PTA     Number of PTA visits since last PT visit: 3     2025

## 2025-05-20 NOTE — PLAN OF CARE
[facility] Kymberly Pandya & [facility]    [place]- spoke to Cheli she  is reviewing   Kymberly Pandya -Daniella is out today

## 2025-05-21 PROBLEM — I47.10 SVT (SUPRAVENTRICULAR TACHYCARDIA): Status: ACTIVE | Noted: 2025-05-21

## 2025-05-21 PROBLEM — R60.9 SWELLING: Status: ACTIVE | Noted: 2025-05-21

## 2025-05-21 PROBLEM — Z71.89 COUNSELING REGARDING ADVANCE CARE PLANNING AND GOALS OF CARE: Status: ACTIVE | Noted: 2025-05-21

## 2025-05-21 PROBLEM — I50.9 ACUTE ON CHRONIC CONGESTIVE HEART FAILURE: Status: ACTIVE | Noted: 2025-05-21

## 2025-05-21 LAB
ANION GAP SERPL CALC-SCNC: 14 MEQ/L
BUN SERPL-MCNC: 29.7 MG/DL (ref 9.8–20.1)
CALCIUM SERPL-MCNC: 7.9 MG/DL (ref 8.4–10.2)
CHLORIDE SERPL-SCNC: 100 MMOL/L (ref 98–107)
CO2 SERPL-SCNC: 20 MMOL/L (ref 23–31)
CREAT SERPL-MCNC: 1.44 MG/DL (ref 0.55–1.02)
CREAT/UREA NIT SERPL: 21
GFR SERPLBLD CREATININE-BSD FMLA CKD-EPI: 39 ML/MIN/1.73/M2
GLUCOSE SERPL-MCNC: 83 MG/DL (ref 82–115)
POTASSIUM SERPL-SCNC: 3.9 MMOL/L (ref 3.5–5.1)
SODIUM SERPL-SCNC: 134 MMOL/L (ref 136–145)

## 2025-05-21 PROCEDURE — 63600175 PHARM REV CODE 636 W HCPCS: Mod: JZ,TB | Performed by: STUDENT IN AN ORGANIZED HEALTH CARE EDUCATION/TRAINING PROGRAM

## 2025-05-21 PROCEDURE — 36415 COLL VENOUS BLD VENIPUNCTURE: CPT | Performed by: INTERNAL MEDICINE

## 2025-05-21 PROCEDURE — 25000003 PHARM REV CODE 250: Performed by: INTERNAL MEDICINE

## 2025-05-21 PROCEDURE — 21400001 HC TELEMETRY ROOM

## 2025-05-21 PROCEDURE — 80048 BASIC METABOLIC PNL TOTAL CA: CPT | Performed by: INTERNAL MEDICINE

## 2025-05-21 PROCEDURE — 3E03317 INTRODUCTION OF OTHER THROMBOLYTIC INTO PERIPHERAL VEIN, PERCUTANEOUS APPROACH: ICD-10-PCS | Performed by: STUDENT IN AN ORGANIZED HEALTH CARE EDUCATION/TRAINING PROGRAM

## 2025-05-21 PROCEDURE — 99233 SBSQ HOSP IP/OBS HIGH 50: CPT | Mod: 25,,, | Performed by: INTERNAL MEDICINE

## 2025-05-21 PROCEDURE — 99497 ADVNCD CARE PLAN 30 MIN: CPT | Mod: 25,,, | Performed by: INTERNAL MEDICINE

## 2025-05-21 PROCEDURE — 25000003 PHARM REV CODE 250: Performed by: NURSE PRACTITIONER

## 2025-05-21 PROCEDURE — 99498 ADVNCD CARE PLAN ADDL 30 MIN: CPT | Mod: ,,, | Performed by: INTERNAL MEDICINE

## 2025-05-21 RX ADMIN — FAMOTIDINE 20 MG: 20 TABLET, FILM COATED ORAL at 08:05

## 2025-05-21 RX ADMIN — MIDODRINE HYDROCHLORIDE 5 MG: 5 TABLET ORAL at 08:05

## 2025-05-21 RX ADMIN — PRAVASTATIN SODIUM 40 MG: 40 TABLET ORAL at 09:05

## 2025-05-21 RX ADMIN — ALTEPLASE 2 MG: 2.2 INJECTION, POWDER, LYOPHILIZED, FOR SOLUTION INTRAVENOUS at 06:05

## 2025-05-21 RX ADMIN — MIDODRINE HYDROCHLORIDE 5 MG: 5 TABLET ORAL at 04:05

## 2025-05-21 RX ADMIN — MIDODRINE HYDROCHLORIDE 5 MG: 5 TABLET ORAL at 11:05

## 2025-05-21 RX ADMIN — APIXABAN 5 MG: 5 TABLET, FILM COATED ORAL at 09:05

## 2025-05-21 RX ADMIN — APIXABAN 5 MG: 5 TABLET, FILM COATED ORAL at 08:05

## 2025-05-21 NOTE — PLAN OF CARE
Received an updated referral by Dr. Tana Harrison (Hospitalist) for in-patient Hospice services at [facility] on a Dobutamine drip. The updated referral was uploaded and sent via (EPIC) for review.

## 2025-05-21 NOTE — PROGRESS NOTES
Patient Name: Laura Jacobs   MRN: [id number]   Admission Date: 5/8/2025   Hospital Length of Stay: 13   Attending Provider: Scooter Davis MD   Consulting Provider: Mckay Marinelli MD    Primary Care Physician:  Ruben Brooks Sr., MD     Principal Problem: <principal problem not specified>       Final diagnoses:  [R06.02] Shortness of breath  [I50.9] Acute on chronic congestive heart failure, unspecified heart failure type (Primary)  [R60.9] Edema, unspecified type  [N28.9] Acute renal insufficiency  [E87.20] Lactic acidosis      Goals of care review:    We met with the patient with her 2 sons at bedside and her neighbor, Tamar who is in the medical field assisting with the conversation on behalf of the patient.  The patient has a general poor understanding of the seriousness of her illness.  She frequently made statements indicating that she expects to get better.  She asked questions regarding some of her medication that she is on at home.  We explained that the reason she is not currently taking these medications is that her heart pump function is too weak and that such medications could cause low blood pressure which is dangerous for the patient.  We also explained the benefit of dobutamine IV drip as a palliative measure 2 improve symptom management and comfort.  We also explained that it is not life support and it will not prolong her life.  However, should the medication be stopped, she would have a high-risk of recurrent hypotension. The patient ultimately allowed family to participate and assist her with decisions.    The patient and family expressed a desire for her to go to a skilled nursing rehab.  We explained that the patient is not a candidate for skilled rehab or LTAC due to the fact that she is dobutamine dependent.  I also explained that she is not a candidate for needed mitral valve replacement due to dobutamine dependent or LVAD due to advanced age and poor performance status.   I presented the option of home hospice care as a service that will provide continuous oversight and management of dobutamine drip in the home.  I explained further the benefits of home hospice care to patient and family.    Hospice review:     I reviewed the benefits of home hospice care, including aggressive symptom-based management with the intent to avoid future hospitalizations which may increase increase the risk of having a negative effect on her quality of life. I reviewed the benefit of regular visits by an assigned RN and 24/7 on call RN to address uncontrolled symptoms which may precipitate into a symptom crisis. This may prevent unwanted ER visits or hospitalizations ordinarily necessary to manage symptoms of an advanced illness. I also reviewed benefit of regular CNA visits and the option of  and  visitations. I explained that all medications related to her diagnosis and symptom related medications would be covered by hospice, including oxygen, a hospital bed and other durable medical equipment. I also explained how respite in hospice works.    The patient's neighbor, Tamar asked about inpatient hospice.  We explained that this is an option as the patient is eligible as evidenced by the continued need for continuous dobutamine drip.  However, this is meant to be a temporary stay and if the patient does not continue to decline, she may be discharged to home with hospice care.  I recommended to family to begin to consider what support the patient may require for home care.  If the patient is Medicaid with waiver program, she may gain some sitter support.  Her neighbor stated that she currently has this and has received about 3 hours a day.  I explained to family that the patient would need essentially around the clock support in the home even though hospice would come and go.  We also talked about the option of electing to discontinue dobutamine at some point if the patient did not  wish to continue on this continuous drip seemingly long-term.  The patient could theoretically remain on IV drip for several weeks or longer.  Her family would like to elect inpatient hospice at this time with [facility] in her house.  I will place a consult to case management to assist with placement.      Advance Care Planning  Code Status  In light of the patients advanced and terminal illness, we reviewed what the patients preferences for care would be at the very end of life.  The patient wishes to have a natural, peaceful death.  Along those lines, the patient does not wish to have CPR or other invasive treatments performed when her heart and/or breathing stops.  I communicated to the patient that a DNR order would be placed in her medical record to reflect this preference.    I spent a total of 15 minutes engaging the patient in this advance care planning discussion.      I reviewed the fact that the patient currently has a pacemaker defibrillator.  I explained the potential risk of suffering at end of life should the patient die but defibrillator continues to shock her.  This could cause much suffering at end of life.  The family elected to discontinue defibrillator function and continue pacemaker function.  We will contact Cardiology to assist with mass in the device rep to discontinue the defibrillator function on the device.    I will consult a  At the request of patient and family for spiritual support.    Symptom review:  The patient denies complaints of pain, nausea, shortness of breath, anxiety or other symptoms at this time.    Assessment/Plan:     Goals of care/counseling  Combined systolic and diastolic congestive heart failure  Cardiogenic shock secondary to nonischemic cardiomyopathy, dobutamine dependent  Pulmonary hypertension  Severe mitral regurgitation  Obstructive sleep apnea requiring home CPAP device    The patient and family have elected DNR code status.  They have  elected transition to comfort focused care and general inpatient hospice with [facility].  A consult has been placed to case management to her assistance.  A consult will be placed to cardiology to assist with discontinuing defibrillator function on ICD.  Family has been advised that hospice can continue treatment of dobutamine infusion for palliative purposes. I have personally provided this myself and a hospice service, therefore my hope and expectation is that the attending physician with the hospice will allow this.      Addendum: I had multiple conversations between the administration, nurses and attending physicians from 2 inpatient hospice facilities as well as discussions with the patient's attending physician and the  regarding issues preventing potential discharge plan.    Interval History:     No interval changes in the patient's condition      Active Ambulatory Problems     Diagnosis Date Noted    Vasovagal syncope 05/10/2022    Closed intertrochanteric fracture of hip, left, initial encounter 10/15/2022    Dizziness 04/01/2023    Acute on chronic systolic (congestive) heart failure 01/23/2025    Anemia 03/09/2025    Internal hemorrhoids 03/19/2025     Resolved Ambulatory Problems     Diagnosis Date Noted    AUSTIN (acute kidney injury) 11/22/2023    Rectal bleeding 03/17/2025    Acute hypotension 03/19/2025     Past Medical History:   Diagnosis Date    Cancer     Cardiomyopathy     CHF (congestive heart failure)     History of breast cancer     History of DVT (deep vein thrombosis)     HLD (hyperlipidemia)     Hypertension     Lymphedema     Osteoarthritis     Venous insufficiency         Past Surgical History:   Procedure Laterality Date    HYSTERECTOMY      INSERTION OF PACEMAKER      INTRAMEDULLARY RODDING OF FEMUR Left 10/14/2022    Procedure: INSERTION, INTRAMEDULLARY СВЕТЛАНА, FEMUR;  Surgeon: Ankush Alex MD;  Location: [facility];  Service: Orthopedics;  Laterality:  "Left;    JOINT REPLACEMENT Bilateral     Knee    MASTECTOMY Left 2019        Review of patient's allergies indicates:   Allergen Reactions    Sulfa (sulfonamide antibiotics)      Patient unsure if allergic to Sulfa        Current Medications[1]       Current Facility-Administered Medications:     acetaminophen, 1,000 mg, Oral, Q8H PRN    acetaminophen, 650 mg, Oral, Q4H PRN    aluminum-magnesium hydroxide-simethicone, 30 mL, Oral, QID PRN    bisacodyL, 10 mg, Rectal, Daily PRN    dextrose 50%, 12.5 g, Intravenous, PRN    dextrose 50%, 25 g, Intravenous, PRN    glucagon (human recombinant), 1 mg, Intramuscular, PRN    glucose, 16 g, Oral, PRN    glucose, 24 g, Oral, PRN    melatonin, 6 mg, Oral, Nightly PRN    polyethylene glycol, 17 g, Oral, BID PRN    sodium chloride 0.9%, 10 mL, Intravenous, PRN    Flushing PICC/Midline Protocol, , , Until Discontinued **AND** sodium chloride 0.9%, 10 mL, Intravenous, Q12H PRN     Family History   Family history unknown: Yes          Review of Systems   All other systems reviewed and are negative.           Objective:   /70   Pulse 103   Temp 97.3 °F (36.3 °C) (Oral)   Resp 20   Ht 5' 4.02" (1.626 m)   Wt 77.8 kg (171 lb 7.9 oz)   SpO2 99%   BMI 29.42 kg/m²      Physical Exam   Constitutional: She is oriented to person, place, and time.  Non-toxic appearance. She appears ill.   HENT:   Nose: Nose normal.   Mouth/Throat: Mucous membranes are moist. Oropharynx is clear.   Eyes: Pupils are equal, round, and reactive to light. Conjunctivae are normal.   Cardiovascular: Normal pulses. An irregular rhythm present. Pulmonary:      Effort: Pulmonary effort is normal.      Breath sounds: Normal breath sounds.     Abdominal: Soft. Bowel sounds are normal. She exhibits no distension. There is no abdominal tenderness.   Musculoskeletal:         General: Swelling present.      Right lower leg: Edema present.      Left lower leg: Edema present.   Neurological: She is alert and " oriented to person, place, and time.   Skin: Skin is warm.   Psychiatric: Mood and thought content normal.          Review of Symptoms  Review of Symptoms      Symptom Assessment (ESAS 0-10 Scale)  Pain:  0  Dyspnea:  0  Anxiety:  0  Nausea:  0  Depression:  0  Anorexia:  0  Fatigue:  0  Insomnia:  0  Restlessness:  0  Agitation:  0         Psychosocial/Cultural:   See Palliative Psychosocial Note: Yes  **Primary  to Follow**  Palliative Care  Consult: No    Spiritual:  F - Codie and Belief:  [employer]  A - Address in Care:        Advance Care Planning   Advance Directives:   Living Will: No    LaPOST: No    Do Not Resuscitate Status: Yes    Medical Power of : No      Decision Making:  Patient answered questions and Family answered questions  Goals of Care: What is most important right now is to focus on curative/life-prolongation (regardless of treatment burdens). Accordingly, we have decided that the best plan to meet the patient's goals includes transition to inpatient hospice.                35 min of encounter was spent in chart review, face to face discussion of goals of care, symptom assessment, coordination of care and emotional support.     An additional 48 min of time was spent in Advanced Care Planning discussion    Mckay Marnielli MD  Palliative Medicine  [facility]          [1]   Current Facility-Administered Medications:     acetaminophen tablet 1,000 mg, 1,000 mg, Oral, Q8H PRN, Olvin Choudhury MD, 1,000 mg at 05/19/25 2149    acetaminophen tablet 650 mg, 650 mg, Oral, Q4H PRN, Mallory Sorto, FNP    aluminum-magnesium hydroxide-simethicone 200-200-20 mg/5 mL suspension 30 mL, 30 mL, Oral, QID PRN, Mallory Sorto, FNP    apixaban tablet 5 mg, 5 mg, Oral, BID, Stevie Carrera, FNP, 5 mg at 05/21/25 0817    bisacodyL suppository 10 mg, 10 mg, Rectal, Daily PRN, Mallory Sorto FNP    dextrose 50% injection 12.5 g, 12.5 g,  Intravenous, PRN, Macario, Mallory, FNP    dextrose 50% injection 25 g, 25 g, Intravenous, PRN, [place], Mallory, FNP    DOBUtamine 1000 mg in D5W 250 mL infusion, 5 mcg/kg/min, Intravenous, Continuous, Tana Harrison MD, Last Rate: 5.9 mL/hr at 05/20/25 0738, 5 mcg/kg/min at 05/20/25 0738    famotidine tablet 20 mg, 20 mg, Oral, Daily, Stevie Carrera, FNP, 20 mg at 05/21/25 0817    glucagon (human recombinant) injection 1 mg, 1 mg, Intramuscular, PRN, [place], Mallory, FNP    glucose chewable tablet 16 g, 16 g, Oral, PRN, Macario, Mallory, FNP    glucose chewable tablet 24 g, 24 g, Oral, PRN, [place], Mallory, FNP    melatonin tablet 6 mg, 6 mg, Oral, Nightly PRN, Macario, Mallory, FNP    midodrine tablet 5 mg, 5 mg, Oral, TID WM, Tana Harrison MD, 5 mg at 05/21/25 0817    polyethylene glycol packet 17 g, 17 g, Oral, BID PRN, Macario, Mallory, FNP    pravastatin tablet 40 mg, 40 mg, Oral, QHS, Stevie Carrera T, FNP, 40 mg at 05/20/25 2208    sodium chloride 0.9% flush 10 mL, 10 mL, Intravenous, PRN, Macario, Mallory, FNP    Flushing PICC/Midline Protocol, , , Until Discontinued **AND** sodium chloride 0.9% flush 10 mL, 10 mL, Intravenous, Q12H PRN, Tevin Soliz MD

## 2025-05-21 NOTE — PROGRESS NOTES
[facility]  Hospital Medicine Progress Note        Chief Complaint: Inpatient Follow-up    HPI:     74-year-old female with significant history of HTN, HLD, breast cancer status post left mastectomy, chemo in remission, nonischemic cardiomyopathy with ejection fraction-25-30%, valvular heart disease-MR/TR, DVT/PE on Eliquis, GONZALEZ on CPAP, pulmonary hypertension.  Patient presented to the ED with complaints of bilateral lower extremity edema with associated pain and worsening dyspnea.  Lab significant for renal insufficiency, metabolic acidosis, elevated BNP, lactic acidosis, UA concerning for UTI.  Patient was admitted to hospital medicine services, concern for impending cardiogenic shock and cardiology services consulted.  Patient was initiated on IV diuretics/Lasix drip, dobutamine, holding other GDM T given compromised hemodynamics/low BP, holding Eliquis and placed on full-dose anticoagulation with Lovenox.  Overall prognosis extremely poor.  Patient only treated with 1 dose of IV ceftriaxone for bacteriuria, she was asymptomatic and therefore antibiotics were discontinued.  Patient also significantly physically deconditioned and therefore therapy services consulted.  Once hemodynamics stabilized dobutamine was discontinued and IV Lasix was transitioned to p.o. Lasix, planning for GDM T as hemodynamics tolerates.  Off dobutamine patient had episodic hypotension which responded to fluid bolus with normal saline.  Patient with profound hypotension on 05/15 secondary to cardiogenic shock, lactic acid elevated, discussed with Cardiology and decided to initiate on dobutamine drip at 5, discussed with family and patient regarding extremely poor prognosis, discussed palliative care/hospice, palliative care team also consulted, patient and family has very poor insight.  Patient inotrope dependent and prognosis continues to be extremely poor       Interval Hx:     Patient was seen at bedside,  no acute issues overnight, remains on dobutamine with stable hemodynamics, multiple family members at bedside and a family friend at bedside     Objective/physical exam:  General: In no acute distress, afebrile  Chest: Clear to auscultation bilaterally  Heart: S1, S2, no appreciable murmur  Abdomen: Soft, nontender, BS +  MSK: Warm, no lower extremity edema, no clubbing or cyanosis  Neurologic: Alert and oriented x4,   VITAL SIGNS: 24 HRS MIN & MAX LAST   Temp  Min: 97.3 °F (36.3 °C)  Max: 97.8 °F (36.6 °C) 97.3 °F (36.3 °C)   BP  Min: 96/54  Max: 118/78 104/72   Pulse  Min: 67  Max: 104  104   Resp  Min: 18  Max: 20 20   SpO2  Min: 95 %  Max: 100 % 95 %       Recent Labs   Lab 05/19/25  0352   WBC 5.68   RBC 3.96*   HGB 12.8   HCT 37.9   MCV 95.7*   MCH 32.3*   MCHC 33.8   RDW 22.4*      MPV 11.9*         Recent Labs   Lab 05/15/25  0616 05/16/25  0505 05/19/25  0352 05/21/25  0526   *   < > 134* 134*   K 4.4   < > 4.0 3.9   CL 98   < > 100 100   CO2 21*   < > 19* 20*   BUN 32.4*   < > 28.0* 29.7*   CREATININE 1.57*   < > 1.24* 1.44*      < > 93 83   CALCIUM 8.0*   < > 7.9* 7.9*   MG 2.20  --   --   --    ALBUMIN  --    < > 2.1*  --    PROT  --    < > 5.7*  --    ALKPHOS  --    < > 97  --    ALT  --    < > 16  --    AST  --    < > 19  --    BILITOT  --    < > 2.8*  --     < > = values in this interval not displayed.          Microbiology Results (last 7 days)       Procedure Component Value Units Date/Time    Blood Culture [[id number]]  (Normal) Collected: 05/14/25 3593    Order Status: Completed Specimen: Blood, Venous Updated: 05/20/25 0011     Blood Culture No Growth at 5 days    Blood Culture [[id number]]  (Normal) Collected: 05/14/25 1990    Order Status: Completed Specimen: Blood, Venous Updated: 05/20/25 0011     Blood Culture No Growth at 5 days             Scheduled Med:   alteplase  2 mg Intra-Catheter Once    apixaban  5 mg Oral BID    famotidine  20 mg Oral Daily    midodrine  5 mg  Oral TID WM    pravastatin  40 mg Oral QHS          Assessment/Plan:      Cardiogenic shock on dobutamine GTT  Acute decompensated systolic/diastolic heart failure with ejection fraction 20-25%/nonischemic cardiomyopathy status post SICD  SVT, transient 5/18  Lactic acidosis secondary to cardiogenic shock-improved  Acute on chronic right-sided heart failure-biventricular failure  Josiah I on CKD, stage III-cardiorenal, improving/stable  Orthostatic hypotension  NSTEMI-type 2  Valvular heart disease-MR/TR   Pulmonary hypertension   HLD   History of breast cancer status post mastectomy, chemo, in remission  History of DVT/PE  History of GONZALEZ on CPAP  Asymptomatic bacteriuria on admit   Prophylaxis    Patient is inotrope dependent   We had to reinitiate dobutamine GTT on 05/15 and hemodynamics stable on the same  Transient NSVT on 05/18, spontaneously converted to NSR  Renal function stable on dobutamine GTT   Patient unfortunately not a candidate for surgical intervention for valvular heart disease, not a candidate for LVAD  Low suspicion for sepsis /septic shock  Hemodynamic instability secondary to cardiogenic shock  Chest x-ray and UA unremarkable   Off all antibiotics  GDM T for nonischemic cardiomyopathy not possible given compromised hemodynamics   Keep midodrine  Continue Eliquis for thromboembolic prophylaxis   Continue statin and Pepcid   DVT prophylaxis-on Eliquis      Again had long conversation with palliative care physician present.  Patient's brother, son and a family friend also present   Explained extremely poor prognosis   Family and patient now seems to understand better and has opted for DNR state  They are open to hospice, but would like to continue dobutamine drip   Inpatient hospice unable to accept the patient on dobutamine drip   Current plan is to transfer to [facility] to continue palliative care along with dobutamine under [facility]  Case management is working on that    Critical care time-35  minutes  Critical care diagnosis-echogenic shock requiring dobutamine   Critical care interventions: Hands-on evaluation, review of labs/radiographs/records and discussions with patient       Tana Harrison MD   05/21/2025

## 2025-05-21 NOTE — PLAN OF CARE
Received a call by Chayito Estrada RN ([facility]: Director of Clinical Services). The referral was sent through [employer] to [facility] for in-patient hospitalization at [facility] through [facility] contract. BALTAZAR Stratton reports she can accommodate the Dobutamine drip. She will contact the patient's Son: Andrzej Jacobs to schedule an informational meeting at his convenience.

## 2025-05-21 NOTE — PLAN OF CARE
Received a call by Domenica Ham RN ([facility]) who reports Janny Rowland RN ([facility]) will be here between 5:30 PM-6:00 PM for an informational meeting with the patient and her Son; Andrzej Jacobs. She also reports they will accept the patient on an Dobutamine drip. However, once the infusion is completed they will not add another bag. The infusion will be stopped at that point.

## 2025-05-21 NOTE — PT/OT/SLP PROGRESS
Physical Therapy      Patient Name:  Laura Jacobs   MRN:  [id number]    Patient not seen today secondary to patient politely declining 2/2 just getting back to bed from sitting EOB. Will follow-up as schedule allows.

## 2025-05-21 NOTE — PLAN OF CARE
Received a case management consult by Dr. Mkcay Marinelli (Palliative Medicine) to assist with transition from [facility] to [facility] for in-patient services. Clinical notes/updates and (Hospice) referral packet were uploaded and sent via ([employer]) for review. I contacted Dionne,  ([facility]: [facility]) and informed her of the above referral sent.

## 2025-05-21 NOTE — PLAN OF CARE
Received an update via chat by Dr. Tana Harrison (Hospitalist) who reports Dr. Marinelli spoke with both [facility] and [facility] and neither can accommodate dobutamine. She requests that I seek an Hospice agency which would accommodate the dobutamine drip at home. I am presently awaiting a return call from Shireen ([facility]: Hospice Care Consultant).

## 2025-05-22 LAB
ALBUMIN SERPL-MCNC: 2.2 G/DL (ref 3.4–4.8)
ALBUMIN/GLOB SERPL: 0.6 RATIO (ref 1.1–2)
ALP SERPL-CCNC: 113 UNIT/L (ref 40–150)
ALT SERPL-CCNC: 18 UNIT/L (ref 0–55)
ANION GAP SERPL CALC-SCNC: 13 MEQ/L
AST SERPL-CCNC: 19 UNIT/L (ref 11–45)
BASOPHILS # BLD AUTO: 0.05 X10(3)/MCL
BASOPHILS NFR BLD AUTO: 1.2 %
BILIRUB SERPL-MCNC: 2.5 MG/DL
BUN SERPL-MCNC: 29.7 MG/DL (ref 9.8–20.1)
CALCIUM SERPL-MCNC: 7.9 MG/DL (ref 8.4–10.2)
CHLORIDE SERPL-SCNC: 98 MMOL/L (ref 98–107)
CO2 SERPL-SCNC: 22 MMOL/L (ref 23–31)
CREAT SERPL-MCNC: 1.5 MG/DL (ref 0.55–1.02)
CREAT/UREA NIT SERPL: 20
EOSINOPHIL # BLD AUTO: 0.09 X10(3)/MCL (ref 0–0.9)
EOSINOPHIL NFR BLD AUTO: 2.2 %
ERYTHROCYTE [DISTWIDTH] IN BLOOD BY AUTOMATED COUNT: 23.5 % (ref 11.5–17)
GFR SERPLBLD CREATININE-BSD FMLA CKD-EPI: 37 ML/MIN/1.73/M2
GLOBULIN SER-MCNC: 3.6 GM/DL (ref 2.4–3.5)
GLUCOSE SERPL-MCNC: 84 MG/DL (ref 82–115)
HCT VFR BLD AUTO: 41.6 % (ref 37–47)
HGB BLD-MCNC: 14.1 G/DL (ref 12–16)
IMM GRANULOCYTES # BLD AUTO: 0.06 X10(3)/MCL (ref 0–0.04)
IMM GRANULOCYTES NFR BLD AUTO: 1.5 %
LYMPHOCYTES # BLD AUTO: 0.95 X10(3)/MCL (ref 0.6–4.6)
LYMPHOCYTES NFR BLD AUTO: 23.5 %
MCH RBC QN AUTO: 33.5 PG (ref 27–31)
MCHC RBC AUTO-ENTMCNC: 33.9 G/DL (ref 33–36)
MCV RBC AUTO: 98.8 FL (ref 80–94)
MONOCYTES # BLD AUTO: 0.17 X10(3)/MCL (ref 0.1–1.3)
MONOCYTES NFR BLD AUTO: 4.2 %
NEUTROPHILS # BLD AUTO: 2.73 X10(3)/MCL (ref 2.1–9.2)
NEUTROPHILS NFR BLD AUTO: 67.4 %
NRBC BLD AUTO-RTO: 0 %
PLATELET # BLD AUTO: 177 X10(3)/MCL (ref 130–400)
PMV BLD AUTO: 11.8 FL (ref 7.4–10.4)
POTASSIUM SERPL-SCNC: 3.9 MMOL/L (ref 3.5–5.1)
PROT SERPL-MCNC: 5.8 GM/DL (ref 5.8–7.6)
RBC # BLD AUTO: 4.21 X10(6)/MCL (ref 4.2–5.4)
SODIUM SERPL-SCNC: 133 MMOL/L (ref 136–145)
WBC # BLD AUTO: 4.05 X10(3)/MCL (ref 4.5–11.5)

## 2025-05-22 PROCEDURE — 36415 COLL VENOUS BLD VENIPUNCTURE: CPT | Performed by: INTERNAL MEDICINE

## 2025-05-22 PROCEDURE — 25000003 PHARM REV CODE 250: Performed by: NURSE PRACTITIONER

## 2025-05-22 PROCEDURE — 80053 COMPREHEN METABOLIC PANEL: CPT | Performed by: INTERNAL MEDICINE

## 2025-05-22 PROCEDURE — 25000003 PHARM REV CODE 250: Performed by: INTERNAL MEDICINE

## 2025-05-22 PROCEDURE — 85025 COMPLETE CBC W/AUTO DIFF WBC: CPT | Performed by: INTERNAL MEDICINE

## 2025-05-22 PROCEDURE — 21400001 HC TELEMETRY ROOM

## 2025-05-22 RX ADMIN — MIDODRINE HYDROCHLORIDE 5 MG: 5 TABLET ORAL at 04:05

## 2025-05-22 RX ADMIN — APIXABAN 5 MG: 5 TABLET, FILM COATED ORAL at 09:05

## 2025-05-22 RX ADMIN — PRAVASTATIN SODIUM 40 MG: 40 TABLET ORAL at 09:05

## 2025-05-22 RX ADMIN — FAMOTIDINE 20 MG: 20 TABLET, FILM COATED ORAL at 08:05

## 2025-05-22 RX ADMIN — MIDODRINE HYDROCHLORIDE 5 MG: 5 TABLET ORAL at 08:05

## 2025-05-22 RX ADMIN — APIXABAN 5 MG: 5 TABLET, FILM COATED ORAL at 08:05

## 2025-05-22 RX ADMIN — MIDODRINE HYDROCHLORIDE 5 MG: 5 TABLET ORAL at 11:05

## 2025-05-22 NOTE — PLAN OF CARE
Chayito Estrada RN ([facility]: Director of Clinical Services) and Shireen Lantigua (Clinical Liaison) here to meet with and assess the patient for in-patient hospice with an Dobutamine drip. They have an informational meeting scheduled at 5:30 PM this evening with Son: Andrzej Jacobs. The patient states she is in agreement to be transferred to [facility] for in-patient hospice care/services.

## 2025-05-22 NOTE — PLAN OF CARE
Received a message by Patti ( ) to call Jose NIEVES Care Manager with [facility] @ [phone]. I attempted to reach Jose at the above number (VM) left.

## 2025-05-22 NOTE — PROGRESS NOTES
[facility]  Hospital Medicine Progress Note        Chief Complaint: Inpatient Follow-up for decompensated systolic/diastolic heart failure    HPI per admitting team: 74-year-old female with significant history of HTN, HLD, breast cancer status post left mastectomy, chemo in remission, nonischemic cardiomyopathy with ejection fraction-25-30%, valvular heart disease-MR/TR, DVT/PE on Eliquis, GONZALEZ on CPAP, pulmonary hypertension. Patient presented to the ED with complaints of bilateral lower extremity edema with associated pain and worsening dyspnea. Lab significant for renal insufficiency, metabolic acidosis, elevated BNP, lactic acidosis, UA concerning for UTI. Patient was admitted to hospital medicine services, concern for impending cardiogenic shock and cardiology services consulted. Patient was initiated on IV diuretics/Lasix drip, dobutamine, holding other GDM T given compromised hemodynamics/low BP, holding Eliquis and placed on full-dose anticoagulation with Lovenox. Overall prognosis extremely poor. Patient only treated with 1 dose of IV ceftriaxone for bacteriuria, she was asymptomatic and therefore antibiotics were discontinued. Patient also significantly physically deconditioned and therefore therapy services consulted. Once hemodynamics stabilized dobutamine was discontinued and IV Lasix was transitioned to p.o. Lasix, planning for GDM T as hemodynamics tolerates. Off dobutamine patient had episodic hypotension which responded to fluid bolus with normal saline. Patient with profound hypotension on 05/15 secondary to cardiogenic shock, lactic acid elevated, discussed with Cardiology and decided to initiate on dobutamine drip at 5, discussed with family and patient regarding extremely poor prognosis, discussed palliative care/hospice, palliative care team also consulted, patient and family has very poor insight. Patient inotrope dependent and prognosis continues to be extremely  poor     Interval Hx:   Patient is laying in bed, reports she is doing so-so, discuss her hard being weak, patient reported she knows.  Informed that she lives at home by herself, she has 2 sons but they work  No family is at bedside  Case was discussed with patient's nurse and  on the floor.    Objective/physical exam:  General: In no acute distress, afebrile  Chest: Clear to auscultation bilaterally anteriorly, on 3 L supplemental oxygen via nasal cannula  Heart:  Near tachycardic rate and rhythm, +S1, S2,   Abdomen: Soft, nontender, BS +  MSK: Warm, lymphedema left upper extremity, mild swelling in the left lower extremity, no swelling right lower extremity and right upper extremity  Neurologic: Alert and oriented x4, able to move all 4 extremities    VITAL SIGNS: 24 HRS MIN & MAX LAST   Temp  Min: 97.3 °F (36.3 °C)  Max: 98 °F (36.7 °C) 97.5 °F (36.4 °C)   BP  Min: 96/74  Max: 117/63 96/74   Pulse  Min: 87  Max: 101  100   No data recorded 20   SpO2  Min: 91 %  Max: 97 % 95 %     I reviewed the labs below:  Recent Labs   Lab 05/16/25  0504 05/19/25  0352 05/22/25  0358   WBC 5.72  5.72 5.68 4.05*   RBC 4.11* 3.96* 4.21   HGB 13.5 12.8 14.1   HCT 39.4 37.9 41.6   MCV 95.9* 95.7* 98.8*   MCH 32.8* 32.3* 33.5*   MCHC 34.3 33.8 33.9   RDW 22.2* 22.4* 23.5*   * 164 177   MPV 11.5* 11.9* 11.8*     Recent Labs   Lab 05/16/25  0505 05/17/25  0454 05/19/25  0352 05/21/25  0526 05/22/25  0358      < > 134* 134* 133*   K 3.9   < > 4.0 3.9 3.9   CL 99   < > 100 100 98   CO2 22*   < > 19* 20* 22*   BUN 29.3*   < > 28.0* 29.7* 29.7*   CREATININE 1.36*   < > 1.24* 1.44* 1.50*   GLU 92   < > 93 83 84   CALCIUM 7.9*   < > 7.9* 7.9* 7.9*   ALBUMIN 2.2*  --  2.1*  --  2.2*   PROT 5.5*  --  5.7*  --  5.8   ALKPHOS 106  --  97  --  113   ALT 19  --  16  --  18   AST 22  --  19  --  19   BILITOT 2.5*  --  2.8*  --  2.5*    < > = values in this interval not displayed.     Microbiology Results (last 7 days)        Procedure Component Value Units Date/Time    Blood Culture [[id number]]  (Normal) Collected: 05/14/25 2252    Order Status: Completed Specimen: Blood, Venous Updated: 05/20/25 0011     Blood Culture No Growth at 5 days    Blood Culture [[id number]]  (Normal) Collected: 05/14/25 2252    Order Status: Completed Specimen: Blood, Venous Updated: 05/20/25 0011     Blood Culture No Growth at 5 days           See below for Radiology    Intake and Output:  Intake/Output Summary (Last 24 hours) at 5/22/2025 1836  Last data filed at 5/21/2025 2101  Gross per 24 hour   Intake --   Output 1 ml   Net -1 ml       Assessment/Plan:  Cardiogenic shock on dobutamine GTT  Acute decompensated systolic/diastolic heart failure with ejection fraction 20-25%/ NICMO status post AICD  Acute on chronic right-sided heart failure-biventricular failure  SVT, transient 5/18  Lactic acidosis secondary to cardiogenic shock- improved  AUSTIN on CKD III-cardiorenal, stable  Orthostatic hypotension  NSTEMI-type 2  Valvular heart disease-MR/TR   Pulmonary hypertension   HLD   History of breast cancer status post L mastectomy, chemo, in remission, L UE lymphedema   History of DVT/PE  History of GONZALEZ on CPAP  Asymptomatic bacteriuria on admit   DNR status         Patient is now inotrope dependent, reinitiate dobutamine GTT on 05/15 for palliative support, holding afterload reduction medications as patient does not tolerate   Transient NSVT on 05/18, spontaneously converted to NSR  Renal function stable on dobutamine GTT   Cr 1.5  Patient unfortunately not a candidate for surgical intervention for valvular heart disease, not a candidate for LVAD  Keep midodrine 5 mg t.i.d, Eliquis 5 mg b.i.d. for thromboembolic prophylaxis, statin and Pepcid   Low suspicion for sepsis /septic shock, hemodynamic instability secondary to cardiogenic shock  Chest x-ray and UA unremarkable   Off all antibiotics  Dr Harrison had long conversation with palliative care physician  present.  Patient's brother, son and a family friend also present, explained poor prognosis, opted for DNR state  They are open to hospice, but would like to continue dobutamine drip   [facility]patient [facility] hospice unable to accept the patient with continued dobutamine drip   Current plan is to admit to [facility] with dobutamine drip, only issue is GIP is for 5 days and also dependent on bed availability at [facility].  Patient and family to meet with come passes at 5:30 p.m. today per their request  Case management on board  Morning labs stable     VTE prophylaxis:   on Eliquis                      Patient condition:  Guarded    Anticipated discharge and Disposition:  Plan for inpatient care at [facility] with [facility] if eligible    Critical care note:  Critical care diagnosis:  Cardiogenic shock on dobutamine drip  Critical care interventions: Hands-on evaluation, review of labs/radiographs/records and discussion with patient and family if present  Critical care time spent: 35 minutes        All diagnosis and differential diagnosis have been reviewed; assessment and plan has been documented; I have personally reviewed the labs and test results that are presently available; I have reviewed the patients medication list; I have reviewed the consulting providers response and recommendations. I have reviewed or attempted to review medical records based upon their availability    All of the patient's questions have been  addressed and answered. Patient's is agreeable to the above stated plan. I will continue to monitor closely and make adjustments to medical management as needed.    Portions of this note dictated using EMR integrated voice recognition software, and may be subject to voice recognition errors not corrected at proofreading. Please contact writer for clarification if needed.   _____________________________________________________________________    Nutrition Status:  Nutrition  consulted. Most recent weight and BMI monitored-     Measurements:  Wt Readings from Last 1 Encounters:   05/16/25 77.8 kg (171 lb 7.9 oz)   Body mass index is 29.42 kg/m².    Patient has been screened and assessed by RD.    Malnutrition Type:  Context:    Level: other (see comments) (Does not meet criteria)    Malnutrition Characteristic Summary:  Weight Loss (Malnutrition): other (see comments) (Does not meet criteria)  Fluid Accumulation (Malnutrition): moderate to severe    Interventions/Recommendations (treatment strategy):         A minimum of two characteristics is recommended for diagnosis of either severe or non-severe malnutrition.     Current Med:   apixaban  5 mg Oral BID    famotidine  20 mg Oral Daily    midodrine  5 mg Oral TID WM    pravastatin  40 mg Oral QHS      PRN meds:  Current Facility-Administered Medications:     acetaminophen, 1,000 mg, Oral, Q8H PRN    acetaminophen, 650 mg, Oral, Q4H PRN    aluminum-magnesium hydroxide-simethicone, 30 mL, Oral, QID PRN    bisacodyL, 10 mg, Rectal, Daily PRN    dextrose 50%, 12.5 g, Intravenous, PRN    dextrose 50%, 25 g, Intravenous, PRN    glucagon (human recombinant), 1 mg, Intramuscular, PRN    glucose, 16 g, Oral, PRN    glucose, 24 g, Oral, PRN    melatonin, 6 mg, Oral, Nightly PRN    polyethylene glycol, 17 g, Oral, BID PRN    sodium chloride 0.9%, 10 mL, Intravenous, PRN    Flushing PICC/Midline Protocol, , , Until Discontinued **AND** sodium chloride 0.9%, 10 mL, Intravenous, Q12H PRN    Continuous infusion:   DOBUTamine IV infusion (non-titrating)  5 mcg/kg/min Intravenous Continuous 5.9 mL/hr at 05/20/25 0738 5 mcg/kg/min at 05/20/25 0738        Radiology:   I have personally reviewed the images and agree with radiologist report  X-Ray Chest 1 View  Narrative: EXAMINATION:  XR CHEST 1 VIEW    CLINICAL HISTORY:  Short of breath.Rule out sepsis;    COMPARISON:  Yesterday    FINDINGS:  Frontal view of the chest was obtained. Right Port-A-Cath and  PICC stable.  Stable cardiomegaly.  Similar interstitial prominence.  No new focal consolidation or pneumothorax.  Impression: No significant interval change.    Electronically signed by: Jony Beasley  Date:    05/15/2025  Time:    11:37        Yahir Lanier MD  Department of Hospital Medicine   [facility]   05/22/2025

## 2025-05-23 PROBLEM — Z85.3 HISTORY OF LEFT BREAST CANCER: Status: ACTIVE | Noted: 2025-05-23

## 2025-05-23 PROBLEM — Z71.89 COUNSELING REGARDING ADVANCE CARE PLANNING AND GOALS OF CARE: Status: RESOLVED | Noted: 2025-05-21 | Resolved: 2025-05-23

## 2025-05-23 PROCEDURE — 21400001 HC TELEMETRY ROOM

## 2025-05-23 PROCEDURE — 25000003 PHARM REV CODE 250: Performed by: NURSE PRACTITIONER

## 2025-05-23 PROCEDURE — 25000003 PHARM REV CODE 250: Performed by: INTERNAL MEDICINE

## 2025-05-23 PROCEDURE — 63600175 PHARM REV CODE 636 W HCPCS: Performed by: INTERNAL MEDICINE

## 2025-05-23 PROCEDURE — 27000221 HC OXYGEN, UP TO 24 HOURS

## 2025-05-23 RX ORDER — DOBUTAMINE HYDROCHLORIDE 400 MG/100ML
5 INJECTION INTRAVENOUS CONTINUOUS
Start: 2025-05-23

## 2025-05-23 RX ORDER — MIDODRINE HYDROCHLORIDE 5 MG/1
5 TABLET ORAL
Start: 2025-05-23 | End: 2025-05-25

## 2025-05-23 RX ORDER — POLYETHYLENE GLYCOL 3350 17 G/17G
17 POWDER, FOR SOLUTION ORAL 2 TIMES DAILY PRN
Start: 2025-05-23 | End: 2025-05-25

## 2025-05-23 RX ADMIN — DOBUTAMINE HYDROCHLORIDE 5 MCG/KG/MIN: 400 INJECTION INTRAVENOUS at 07:05

## 2025-05-23 RX ADMIN — APIXABAN 5 MG: 5 TABLET, FILM COATED ORAL at 08:05

## 2025-05-23 RX ADMIN — MIDODRINE HYDROCHLORIDE 5 MG: 5 TABLET ORAL at 08:05

## 2025-05-23 RX ADMIN — MIDODRINE HYDROCHLORIDE 5 MG: 5 TABLET ORAL at 12:05

## 2025-05-23 RX ADMIN — FAMOTIDINE 20 MG: 20 TABLET, FILM COATED ORAL at 08:05

## 2025-05-23 RX ADMIN — MIDODRINE HYDROCHLORIDE 5 MG: 5 TABLET ORAL at 05:05

## 2025-05-23 RX ADMIN — PRAVASTATIN SODIUM 40 MG: 40 TABLET ORAL at 08:05

## 2025-05-23 NOTE — DISCHARGE SUMMARY
[facility]  Hospital Medicine Discharge Summary    Sent this discharge summary as progress note for 05/23/2025 as the accepting physician at the last minute declined the patient and patient could not be transferred out to inpatient hospice    Admit Date: 5/8/2025  Discharge Date and Time: 5/23/20255:12 PM  Admitting Physician:  Team  Discharging Physician: Yahir Lanier MD.  Primary Care Physician: Ruben Brooks Sr., MD  Consults: Cardiology and palliative care    Discharge Diagnoses:  Cardiogenic shock on dobutamine GTT  Acute decompensated systolic/diastolic heart failure with ejection fraction 20-25%/ NICMO status post AICD  Acute on chronic right-sided heart failure-biventricular failure  SVT, transient 5/18  Lactic acidosis secondary to cardiogenic shock- improved  AUSTIN on CKD III-cardiorenal, stable  Orthostatic hypotension  NSTEMI-type 2  Valvular heart disease-MR/TR   Pulmonary hypertension   HLD   History of breast cancer status post L mastectomy, chemo, in remission, L UE lymphedema   History of DVT/PE  History of GONZALEZ on CPAP  Asymptomatic bacteriuria on admit   DNR status    Hospital Course:   74-year-old female with significant history of HTN, HLD, breast cancer status post left mastectomy, chemo in remission, nonischemic cardiomyopathy with ejection fraction-25-30%, valvular heart disease-MR/TR, DVT/PE on Eliquis, GONZALEZ on CPAP, pulmonary hypertension. Patient presented to the ED with complaints of bilateral lower extremity edema with associated pain and worsening dyspnea. Lab significant for renal insufficiency, metabolic acidosis, elevated BNP, lactic acidosis, UA concerning for UTI. Patient was admitted to hospital medicine services, concern for impending cardiogenic shock and cardiology services consulted. Patient was initiated on IV diuretics/Lasix drip, dobutamine, holding other GDM T given compromised hemodynamics/low BP, holding Eliquis and placed on full-dose  anticoagulation with Lovenox. Overall prognosis extremely poor. Patient only treated with 1 dose of IV ceftriaxone for bacteriuria, she was asymptomatic and therefore antibiotics were discontinued. Patient also significantly physically deconditioned and therefore therapy services consulted. Once hemodynamics stabilized dobutamine was discontinued and IV Lasix was transitioned to p.o. Lasix, planning for GDM T as hemodynamics tolerates. Off dobutamine patient had episodic hypotension which responded to fluid bolus with normal saline. Patient with profound hypotension on 05/15 secondary to cardiogenic shock, lactic acid elevated, discussed with Cardiology and decided to initiate on dobutamine drip at 5, discussed with family and patient regarding extremely poor prognosis, discussed palliative care/hospice, palliative care team also consulted, patient and family has very poor insight. Patient inotrope dependent and prognosis continues to be extremely poor, reinitiate dobutamine GTT on 05/15 for palliative support, holding afterload reduction medications as patient does not tolerate   Transient NSVT on 05/18, spontaneously converted to NSR  Renal function stable on dobutamine GTT   Cr 1.5  Patient unfortunately not a candidate for surgical intervention for valvular heart disease, not a candidate for LVAD  Keep midodrine 5 mg t.i.d, Eliquis 5 mg b.i.d. for thromboembolic prophylaxis, statin and Pepcid   Hemodynamic instability secondary to cardiogenic shock  Chest x-ray and UA unremarkable   Off all antibiotics  Dr Harrison had long conversation with palliative care physician present.  Patient's brother, son and a family friend also present, explained poor prognosis, opted for DNR [facility]  Inpatient [facility] unable to accept the patient with continued dobutamine drip   Patient has been accepted to [facility] with [facility] with dobutamine drip, only issue is GIP is for 5 days and family will have to  make a decision of taking her home with dobutamine or transfer her to NH without dobutamine   Patient is ready for transfer to [facility]  Pt was seen and examined on the day of discharge  Vitals:  VITAL SIGNS: 24 HRS MIN & MAX LAST   Temp  Min: 97.3 °F (36.3 °C)  Max: 98.1 °F (36.7 °C) 97.3 °F (36.3 °C)   BP  Min: 93/75  Max: 102/68 96/68   Pulse  Min: 99  Max: 106  106   Resp  Min: 16  Max: 18 18   SpO2  Min: 93 %  Max: 100 % 95 %       Physical Exam:  General: In no acute distress, afebrile  Chest: Clear to auscultation bilaterally anteriorly, on 3 L supplemental oxygen via nasal cannula  Heart:  Near tachycardic rate and rhythm, +S1, S2,   Abdomen: Soft, nontender, BS +  MSK: Warm, lymphedema left upper extremity, mild swelling in the left lower extremity, no swelling right lower extremity and right upper extremity  Neurologic: Alert and oriented x4, able to move all 4 extremities    Recent Labs   Lab 05/19/25  0352 05/22/25  0358   WBC 5.68 4.05*   RBC 3.96* 4.21   HGB 12.8 14.1   HCT 37.9 41.6   MCV 95.7* 98.8*   MCH 32.3* 33.5*   MCHC 33.8 33.9   RDW 22.4* 23.5*    177   MPV 11.9* 11.8*       Recent Labs   Lab 05/19/25  0352 05/21/25  0526 05/22/25  0358   * 134* 133*   K 4.0 3.9 3.9    100 98   CO2 19* 20* 22*   BUN 28.0* 29.7* 29.7*   CREATININE 1.24* 1.44* 1.50*   GLU 93 83 84   CALCIUM 7.9* 7.9* 7.9*   ALBUMIN 2.1*  --  2.2*   PROT 5.7*  --  5.8   ALKPHOS 97  --  113   ALT 16  --  18   AST 19  --  19   BILITOT 2.8*  --  2.5*        Microbiology Results (last 7 days)       Procedure Component Value Units Date/Time    Blood Culture [[id number]]  (Normal) Collected: 05/14/25 2252    Order Status: Completed Specimen: Blood, Venous Updated: 05/20/25 0011     Blood Culture No Growth at 5 days    Blood Culture [[id number]]  (Normal) Collected: 05/14/25 2252    Order Status: Completed Specimen: Blood, Venous Updated: 05/20/25 0011     Blood Culture No Growth at 5 days             X-Ray Chest 1  View  Narrative: EXAMINATION:  XR CHEST 1 VIEW    CLINICAL HISTORY:  Short of breath.Rule out sepsis;    COMPARISON:  Yesterday    FINDINGS:  Frontal view of the chest was obtained. Right Port-A-Cath and PICC stable.  Stable cardiomegaly.  Similar interstitial prominence.  No new focal consolidation or pneumothorax.  Impression: No significant interval change.    Electronically signed by: Jony Beasley  Date:    05/15/2025  Time:    11:37         Medication List        PAUSE taking these medications      furosemide 20 MG tablet  Wait to take this until your doctor or other care provider tells you to start again.  Commonly known as: LASIX  Take 1 tablet (20 mg total) by mouth once daily.  What changed: how much to take     metoprolol succinate 25 MG 24 hr tablet  Wait to take this until your doctor or other care provider tells you to start again.  Commonly known as: TOPROL-XL            START taking these medications      DOBUTamine 1,000 mg/250 mL (4,000 mcg/mL) infusion  Commonly known as: DOBUTREX  Inject 390.5 mcg/min into the vein continuous.     midodrine 5 MG Tab  Commonly known as: PROAMATINE  Take 1 tablet (5 mg total) by mouth 3 (three) times daily with meals.     polyethylene glycol 17 gram Pwpk  Commonly known as: GLYCOLAX  Take 17 g by mouth 2 (two) times daily as needed for Constipation (1st choice).            CONTINUE taking these medications      anastrozole 1 mg Tab  Commonly known as: ARIMIDEX     aspirin 81 MG EC tablet  Commonly known as: ECOTRIN  Take 1 tablet (81 mg total) by mouth once daily.     ELIQUIS 5 mg Tab  Generic drug: apixaban     famotidine 20 MG tablet  Commonly known as: PEPCID  Take 1 tablet (20 mg total) by mouth once daily.     pravastatin 20 MG tablet  Commonly known as: PRAVACHOL  Take 1 tablet (20 mg total) by mouth every evening.            STOP taking these medications      cyproheptadine 4 mg tablet  Commonly known as: PERIACTIN     dexlansoprazole 60 mg capsule  Commonly  known as: DEXILANT               Where to Get Your Medications        Information about where to get these medications is not yet available    Ask your nurse or doctor about these medications  DOBUTamine 1,000 mg/250 mL (4,000 mcg/mL) infusion  midodrine 5 MG Tab  polyethylene glycol 17 gram Pwpk          Explained in detail to the patient about the discharge plan, medications, and follow-up visits. Pt understands and agrees with the treatment plan  Discharge Disposition:  [facility] hospice with [facility] at [facility]  Discharged Condition: stable  Diet-   Dietary Orders (From admission, onward)       Start     Ordered    05/10/25 0718  Diet Heart Healthy Consistent Carbohydrate; 2000 Calories (up to 75 gm per meal); Standard Tray  Diet effective now        Question Answer Comment   Diet Modifier: Consistent Carbohydrate    Total calories / carbs: 2000 Calories (up to 75 gm per meal)    Tray type: Standard Tray        05/10/25 0718                   Medications Per DC med rec  Activities as tolerated   Contact information for after-discharge care       Destination       HOSPICE [facility] .    Service: Inpatient Hospice  Contact information:  [address]  [address]  [facility] [id number] [phone]                     Home Medical Care       HOME HEALTH CARE [address] [place] [address] .    Service: Home Health Services  Contact information:  [address], [address]  [facility] [id number] [phone]                                 For further questions contact hospitalist office    Discharge time 34 minutes    For worsening symptoms, chest pain, shortness of breath, increased abdominal pain, high grade fever, stroke or stroke like symptoms, immediately go to the nearest Emergency Room or call 911 as soon as possible.    Portions of this note dictated using EMR integrated voice recognition software, and may be subject to voice recognition errors not corrected at proofreading. Please contact  writer for clarification if needed    Yahir Lanier MD  Department of Hospital Medicine   [facility]   05/23/2025 5:12 PM

## 2025-05-23 NOTE — PROGRESS NOTES
Inpatient Nutrition Assessment    Admit Date: 5/8/2025   Total duration of encounter: 15 days   Patient Age: 74 y.o.    Nutrition Recommendation/Prescription     Continue oral diet as tolerated; Diet Heart Healthy Consistent Carbohydrate; 2000 Calories (up to 75 gm per meal); Standard Tray     Communication of Recommendations: reviewed with patient    Nutrition Assessment     Malnutrition Assessment/Nutrition-Focused Physical Exam       Malnutrition Level: other (see comments) (Does not meet criteria) (05/13/25 1110)     Weight Loss (Malnutrition): other (see comments) (Does not meet criteria) (05/13/25 1110)                                         Fluid Accumulation (Malnutrition): moderate to severe (05/13/25 1110)        A minimum of two characteristics is recommended for diagnosis of either severe or non-severe malnutrition.    Chart Review    Reason Seen: continuous nutrition monitoring    Malnutrition Screening Tool Results   Have you recently lost weight without trying?: Yes: 2-13 lbs  Have you been eating poorly because of a decreased appetite?: No   MST Score: 1   Diagnosis:  Acute on chronic HFrEF 25%-decompensated  Valvular heart disease-severe MR, moderate TR  Impending cardiogenic shock  Possible urinary tract infection-asymptomatic  Subclinical hypothyroidism    Relevant Medical History: breast cancer status post left mastectomy and status post chemo now and remission, chronic systolic heart failure, diabetes mellitus, CKD 3, hemorrhoids, celiac artery stenosis severe, pulmonary embolism on Eliquis.     Scheduled Medications:  apixaban, 5 mg, BID  famotidine, 20 mg, Daily  midodrine, 5 mg, TID WM  pravastatin, 40 mg, QHS    Continuous Infusions:  DOBUTamine IV infusion (non-titrating), Last Rate: 5 mcg/kg/min (05/20/25 0738)      PRN Medications:  acetaminophen, 1,000 mg, Q8H PRN  acetaminophen, 650 mg, Q4H PRN  aluminum-magnesium hydroxide-simethicone, 30 mL, QID PRN  bisacodyL, 10 mg, Daily  "PRN  dextrose 50%, 12.5 g, PRN  dextrose 50%, 25 g, PRN  glucagon (human recombinant), 1 mg, PRN  glucose, 16 g, PRN  glucose, 24 g, PRN  melatonin, 6 mg, Nightly PRN  polyethylene glycol, 17 g, BID PRN  sodium chloride 0.9%, 10 mL, PRN  sodium chloride 0.9%, 10 mL, Q12H PRN    Calorie Containing IV Medications: no significant kcals from medications at this time    Recent Labs   Lab 05/17/25  0454 05/18/25  1047 05/19/25  0352 05/21/25  0526 05/22/25  0358   * 132* 134* 134* 133*   K 3.7 4.0 4.0 3.9 3.9   CALCIUM 7.9* 7.9* 7.9* 7.9* 7.9*   CL 99 100 100 100 98   CO2 21* 20* 19* 20* 22*   BUN 25.7* 26.9* 28.0* 29.7* 29.7*   CREATININE 1.23* 1.30* 1.24* 1.44* 1.50*   EGFRNORACEVR 47 44 46 39 37   GLU 90 97 93 83 84   BILITOT  --   --  2.8*  --  2.5*   ALKPHOS  --   --  97  --  113   ALT  --   --  16  --  18   AST  --   --  19  --  19   ALBUMIN  --   --  2.1*  --  2.2*   WBC  --   --  5.68  --  4.05*   HGB  --   --  12.8  --  14.1   HCT  --   --  37.9  --  41.6     Nutrition Orders:  Diet Heart Healthy Consistent Carbohydrate; 2000 Calories (up to 75 gm per meal); Standard Tray      Appetite/Oral Intake: good/50-75% of meals  Factors Affecting Nutritional Intake: none identified  Social Needs Impacting Access to Food: none identified  Food/Pentec[employer]alism/Cultural Preferences: none reported  Food Allergies: no known food allergies  Last Bowel Movement: 05/22/25  Wound(s):      Comments    5/9/25 Pt tolerating oral diet, weight fluctuations noted in EMR, possibly due to fluid and/or error in weights, will monitor.    5/13/25 Pt reports good appetite and intake of meals, no unintentional weight loss    5/20/25 Pt reports good appetite and intake of meals, no family in room, nursing reports pt is eating about 25-50% of meals. Will plan for early f/u to check on intake.    5/23/25 Pt with fair appetite and intake; noted family considering hospice    Anthropometrics    Height: 5' 4.02" (162.6 cm), Height Method: " Stated  Last Weight:  (bed not zeroed) (05/20/25 0551), Weight Method: Bed Scale  BMI (Calculated): 29.4  BMI Classification: overweight (BMI 25-29.9)     Ideal Body Weight (IBW), Female: 120.1 lb     % Ideal Body Weight, Female (lb): 124.17 %                             Usual Weight Provided By: EMR weight history    Wt Readings from Last 5 Encounters:   05/16/25 77.8 kg (171 lb 7.9 oz)   03/15/25 81.6 kg (179 lb 14.3 oz)   03/13/25 81.9 kg (180 lb 8.9 oz)   01/19/25 59 kg (130 lb)   01/25/24 59 kg (130 lb)     Weight Change(s) Since Admission:   Wt Readings from Last 1 Encounters:   05/16/25 0600 77.8 kg (171 lb 7.9 oz)   05/14/25 0500 78.1 kg (172 lb 2.9 oz)   05/13/25 0438 75.2 kg (165 lb 11.2 oz)   05/12/25 0503 78.5 kg (173 lb 1 oz)   05/11/25 0503 78.5 kg (173 lb 1 oz)   05/08/25 0103 67.6 kg (149 lb)   Admit Weight: 67.6 kg (149 lb) (05/08/25 0103), Weight Method: Bed Scale    Estimated Needs    Weight Used For Calorie Calculations: 67.6 kg (149 lb 0.5 oz)  Energy Calorie Requirements (kcal): 1400 kcal (1.2 stress factor)  Energy Need Method: Bybee-St Jeor  Weight Used For Protein Calculations: 67.6 kg (149 lb 0.5 oz)  Protein Requirements: 74gm (1.1 gm/kg)  Fluid Requirements (mL): 1400 ml (1 ml/kcal)        Enteral Nutrition     Patient not receiving enteral nutrition at this time.    Parenteral Nutrition     Patient not receiving parenteral nutrition support at this time.    Evaluation of Received Nutrient Intake    Calories: not meeting estimated needs  Protein: not meeting estimated needs    Patient Education     Not applicable.    Nutrition Diagnosis     PES: Unintended weight gain related to acute illness as evidenced by moderate to severe lower extremity and upper extremity edema. (active)     PES:            Nutrition Interventions     Intervention(s): general/healthful diet  Intervention(s):      Goal: Consume % of meals/snacks by follow-up. (goal met)      Nutrition Goals & Monitoring      Dietitian will monitor: food and beverage intake and weight change  Discharge planning: resume home regimen  Nutrition Risk/Follow-Up: dietitian will follow-up one time per week   Please consult if re-assessment needed sooner.      normal...

## 2025-05-23 NOTE — PT/OT/SLP PROGRESS
Physical Therapy      Patient Name:  Laura Jacobs   MRN:  [id number]    Patient and family pursuing inpatient hospice care. No longer appropriate for acute PT services. Signing off at this time.    5/23/25

## 2025-05-23 NOTE — PLAN OF CARE
I received an update by Shireen: ([facility] Clinical Liaison) who reports they are awaiting bed availability at [facility] for in-patient hospice services through: (GIP) contact. The patient is unable to return home as there is inadequate family support for ATC caregivers. Shireen reports as soon as there is bed availability they can accept and manage the Dobutamine drip. She is hoping the transfer can occur over the weekend.

## 2025-05-23 NOTE — PT/OT/SLP DISCHARGE
Occupational Therapy Discharge Summary    Laura Jacobs  MRN: [id number]   Principal Problem: <principal problem not specified>      Patient Discharged from acute Occupational Therapy on 5/23/25.    Assessment:      Per chart review, pt and family electing to go on hospice. Acute OT services are not appropriate at this time. OT to sign off.     Objective:     GOALS:   Multidisciplinary Problems       Occupational Therapy Goals          Problem: Occupational Therapy    Goal Priority Disciplines Outcome Interventions   Occupational Therapy Goal     OT, PT/OT Ongoing    Description: Goals to be met by: d/c     Patient will increase functional independence with ADLs by performing:    Grooming while seated with Stand-by Assistance.  Sitting at edge of bed x10 minutes with Stand-by Assistance to perform ADL.  Toilet transfer to bedside commode with Minimal Assistance.  Upper extremity exercise program 2x10 reps per handout, with assistance as needed.                         Reasons for Discontinuation of Therapy Services  Transfer to alternate level of care. and Therapist determines that the patient will no longer benefit from therapy services.      Plan:     Patient Discharged to: Palliative Care/Hospice    5/23/2025

## 2025-05-23 NOTE — PLAN OF CARE
Problem: Infection  Goal: Absence of Infection Signs and Symptoms  Outcome: Progressing     Problem: Fall Injury Risk  Goal: Absence of Fall and Fall-Related Injury  Outcome: Progressing     Problem: Skin Injury Risk Increased  Goal: Skin Health and Integrity  Outcome: Progressing     Problem: Coping Ineffective  Goal: Effective Coping  Outcome: Progressing

## 2025-05-24 PROCEDURE — 25000003 PHARM REV CODE 250: Performed by: NURSE PRACTITIONER

## 2025-05-24 PROCEDURE — 25000003 PHARM REV CODE 250: Performed by: INTERNAL MEDICINE

## 2025-05-24 PROCEDURE — 21400001 HC TELEMETRY ROOM

## 2025-05-24 RX ADMIN — APIXABAN 5 MG: 5 TABLET, FILM COATED ORAL at 08:05

## 2025-05-24 RX ADMIN — FAMOTIDINE 20 MG: 20 TABLET, FILM COATED ORAL at 09:05

## 2025-05-24 RX ADMIN — MIDODRINE HYDROCHLORIDE 5 MG: 5 TABLET ORAL at 11:05

## 2025-05-24 RX ADMIN — MIDODRINE HYDROCHLORIDE 5 MG: 5 TABLET ORAL at 09:05

## 2025-05-24 RX ADMIN — APIXABAN 5 MG: 5 TABLET, FILM COATED ORAL at 09:05

## 2025-05-24 RX ADMIN — PRAVASTATIN SODIUM 40 MG: 40 TABLET ORAL at 08:05

## 2025-05-24 RX ADMIN — MIDODRINE HYDROCHLORIDE 5 MG: 5 TABLET ORAL at 05:05

## 2025-05-24 NOTE — PROGRESS NOTES
"  [facility]    Cardiology  Progress Note    Patient Name: Laura Jacobs  MRN: [id number]  Admission Date: 5/8/2025  Hospital Length of Stay: 16 days  Code Status: DNR   Attending Provider: Scooter Davis MD   Consulting Provider: MACHELLE Zimmerman  Primary Care Physician: Ruben Brooks Sr., MD  Principal Problem:Acute on chronic congestive heart failure    Patient information was obtained from patient, past medical records, ER records, and primary team.     Subjective:   Chief Complaint/Reason for Consult: Concern for Cardiogenic Shock    HPI: Ms. Jacobs is a 74 year old female, known to Dr. Washington, who presented to the hospital with bilateral lower extremity edema, pain, and SOB. Patient with history of NICMO. Noted Significant MR. Lab work significant for creatinine 1.7, bicarb 17, BNP 6700, troponin 0.06, lactic acid 6.0. Urinalysis consistent with UTI. BP initially marginal, but has improved. On Eliquis for history of DVT/PE. Admitted to Hospital Medicine Team. CIS consulted for cardiac evaluation/management due to concern for impending shock.    Hospital Course:  5.9.25: NAD Noted. On RA, Lying flat in no distress. Marginal diuresis overnight. BP Systolic low normal. SR with intermittent bouts of ST.   5.10.25: NAD. "I am feeling a little better." Fluid Balance Net Negative 2022mL. ST 100s-110s.   5.11.25: BP marginal at times. Good UOP. Remains on Dobutamine and Lasix drip. Renal indices stable.   5.12.25: NAD Noted. Vitals Stable. Diuresing well. Edema is improving. Patient watching TV in no distress. She is awake/alert.  5.13.25: NAD Noted. Vitals Stable. UO slacked off overnight. On RA. Dobutamine infusion going.   5.14.25: NAD Noted. On RA. Lying flat, nurse aid assisting with bath. BP Stable. Sinus Tachycardia HR Low 100's.   5.15.25: Hypotensive this morning. Dobutamine Restarted for BP Support and Palliative support. ST low 100's. Patient updated on " overall prognosis and need for palliative measures. Insight into her disease process is limited. Attending notified of CIS plan moving forward.   5.17.25: NAD, NEON, pt stable on chronic Dobutamine gtt.  5.18.25: NAD Noted. Reconsulted to assist with long term planning regarding discharge.  5.24.25: On Dobutamine. Reconsulted to assist with placement options.    PMH: Hypertension/HHD, Dyslipidemia, Palpitations, MO, Breast Cancer, CP, SOB/ROMO, Pulmonary Hypertension, GONZALEZ/CPAP, DVT/PE (Eliquis), OA, CVI, VHD/MR-TR  PSH: S-ICD, Knee Surgery, LHC, Mastectomy  Family History: Non-contributory  Social History: Tobacco- Negative, Alcohol- Negative, Substance Abuse- Negative     Previous Cardiac Diagnostics:   Echocardiogram (5.14.25):  Left Ventricle: The left ventricle is normal in size. Normal wall thickness. Moderate global hypokinesis present. There is severely reduced systolic function with a visually estimated ejection fraction of 20 - 25%. There is diastolic dysfunction but grade cannot be determined.  Right Ventricle: The right ventricle is mildly dilated Systolic function is severely reduced. TAPSE is 0.6 cm.  Left Atrium: The left atrium is moderately dilated  Right Atrium: The right atrium is moderately dilated .  Mitral Valve: There is moderate regurgitation.  Tricuspid Valve: There is mild to moderate regurgitation.  Pericardium: There is a trivial effusion.    Venous Doppler (5.8.25):  Negative for deep and superficial vein thrombosis in bilateral lower extremities.    Echocardiogram (3.15.25):  Left Ventricle: The left ventricle is dilated. Wall is thinner than normal. Severe global hypokinesis present. There is severely reduced systolic function with a visually estimated ejection fraction of 25 - 30%.  Right Ventricle: Right ventricular enlargement.  Left Atrium: Severely dilated Agitated saline study of the atrial septum is negative, suggesting absence of intracardiac shunt at the atrial level.  Right  Atrium: Right atrium is severely dilated.  Mitral Valve: Mildly thickened leaflets. There is severe regurgitation.  Tricuspid Valve: There is moderate regurgitation.    PET (2.14.25):  This is a normal perfusion study, no perfusion defects noted. There is no evidence of ischemia.   This scan is suggestive of low risk for future cardiovascular events.   The left ventricular cavity is noted to be mildly enlarged on the stress studies. The stress left ventricular ejection fraction was calculated to be 27% and left ventricular global function is severely reduced. The rest left ventricular cavity is noted to be mildly enlarged. The rest left ventricular ejection fraction was calculated to be 24% and rest left ventricular global function is severely reduced.   Persistent hypokinesis of the anterior region, septal region, inferior region and lateral region is noted in both rest and stress studies. When compared to the resting ejection fraction (24%), the stress ejection fraction (27%) has increased.   The study quality is good.   Calcium scoring was not performed in this patient due to specific concerns that can affect accuracy.   There was no rise in myocardial blood flow between rest and stress. Global myocardial blood flow reserve was 1.98. Non-diagnostic study.    Carotid US (2.14.25):  The study quality is average.   Antegrade left vertebral artery flow.   Antegrade right vertebral artery flow.   No plaque noted in bilateral internal carotid arteries.    Echocardiogram (1.20.25):  Left Ventricle: The left ventricle is dilated. Normal wall thickness. Global hypokinesis present. There is moderately reduced systolic function with a visually estimated ejection fraction of 35 - 40%.  Right Ventricle: Normal right ventricular cavity size. Systolic function is normal.  Left Atrium: Left atrium is mildly dilated.  Right Atrium: Right atrium is mildly dilated.  Aortic Valve: The aortic valve is a trileaflet valve. There is  mild aortic valve sclerosis.  Mitral Valve: There is mitral annular calcification present. There is moderate to severe regurgitation.  Tricuspid Valve: There is mild regurgitation.  Pulmonic Valve: There is mild regurgitation.  IVC/SVC: Normal venous pressure at 3 mmHg.  Pericardium: There is a trivial effusion adjacent to the right atrium. No indication of cardiac tamponade.     LHC (3.10.22):  Widely Patent Coronary Anatomy with severe LV Systolic Dysfunction. EF 20-25% with EDP 5-10 mmHg.     AMANDA (9.14.22):  LV systolic function, LVEF 30-35%.  Severe global hypokinesis  Mild Tricuspid regurgitation  Moderate to severe Mitral regurgitation  Mild aortic regurgitation.  There is Lambl's excrescences on aortic leaflets that is benign fibrous structure.   No evidence of infection, vegetation or abscess     SICD Placement (3.25.21):  SICD Placement Re: Primary Prevention of SCD.      LHC (1.4.21):  Left main coronary artery normal   Left anterior descending artery normal   Left circumflex artery codominant normal   Right coronary artery codominant normal   Left ventricle dilated with severe left ventricular systolic   dysfunction ejection fraction less than 30%   SUMMARY : Nonischemic dilated cardiomyopathy with severe left ventricular systolic dysfunction.      Venogram (10.17.18):  No Critical Venous Compression Noted.     Review of patient's allergies indicates:   Allergen Reactions    Sulfa (sulfonamide antibiotics)      Patient unsure if allergic to Sulfa     No current facility-administered medications on file prior to encounter.     Current Outpatient Medications on File Prior to Encounter   Medication Sig    anastrozole (ARIMIDEX) 1 mg Tab Take 1 tablet by mouth once daily.    apixaban (ELIQUIS) 5 mg Tab Take 5 mg by mouth 2 (two) times daily.    aspirin (ECOTRIN) 81 MG EC tablet Take 1 tablet (81 mg total) by mouth once daily.    famotidine (PEPCID) 20 MG tablet Take 1 tablet (20 mg total) by mouth once daily.  "   [Paused] furosemide (LASIX) 20 MG tablet Take 1 tablet (20 mg total) by mouth once daily. (Patient taking differently: Take 40 mg by mouth once daily.)    [Paused] metoprolol succinate (TOPROL-XL) 25 MG 24 hr tablet Take 25 mg by mouth.    pravastatin (PRAVACHOL) 20 MG tablet Take 1 tablet (20 mg total) by mouth every evening.     Review of Systems   Respiratory:  Negative for shortness of breath.    Cardiovascular:  Negative for chest pain.   All other systems reviewed and are negative.    Objective:     Vital Signs (Most Recent):  Temp: 97.7 °F (36.5 °C) (05/24/25 0732)  Pulse: 69 (05/24/25 0732)  Resp: 18 (05/24/25 0732)  BP: 101/71 (05/24/25 0732)  SpO2: 97 % (05/24/25 0732) Vital Signs (24h Range):  Temp:  [97.3 °F (36.3 °C)-98.2 °F (36.8 °C)] 97.7 °F (36.5 °C)  Pulse:  [] 69  Resp:  [18] 18  SpO2:  [89 %-100 %] 97 %  BP: ()/(68-76) 101/71   Weight:  (bed not zeroed)  Body mass index is 29.42 kg/m².  SpO2: 97 %       Intake/Output Summary (Last 24 hours) at 5/24/2025 0944  Last data filed at 5/24/2025 0434  Gross per 24 hour   Intake 303.8 ml   Output 250 ml   Net 53.8 ml     Lines/Drains/Airways       Peripherally Inserted Central Catheter Line  Duration             PICC Double Lumen 05/09/25 0853 right brachial 15 days                  Significant Labs:   Chemistries:   Recent Labs   Lab 05/18/25  1047 05/19/25  0352 05/21/25  0526 05/22/25  0358   * 134* 134* 133*   K 4.0 4.0 3.9 3.9    100 100 98   CO2 20* 19* 20* 22*   BUN 26.9* 28.0* 29.7* 29.7*   CREATININE 1.30* 1.24* 1.44* 1.50*   CALCIUM 7.9* 7.9* 7.9* 7.9*   PROT  --  5.7*  --  5.8   BILITOT  --  2.8*  --  2.5*   ALKPHOS  --  97  --  113   ALT  --  16  --  18   AST  --  19  --  19        CBC/Anemia Labs: [place]:    Recent Labs   Lab 05/19/25  0352 05/22/25  0358   WBC 5.68 4.05*   HGB 12.8 14.1   HCT 37.9 41.6    177   MCV 95.7* 98.8*   RDW 22.4* 23.5*    No results for input(s): "PT", "INR", "APTT" in the last 168 " hours.     Telemetry: SR     Physical Exam  Vitals and nursing note reviewed.   Constitutional:       General: She is not in acute distress.     Appearance: She is obese.   Cardiovascular:      Rate and Rhythm: Normal rate and regular rhythm.   Pulmonary:      Effort: Pulmonary effort is normal. No respiratory distress.   Musculoskeletal:      Right lower leg: Edema present.      Left lower leg: Edema present.   Skin:     General: Skin is warm and dry.   Neurological:      Mental Status: She is alert.       Home Medications:   Medications Ordered Prior to Encounter[1]  Current Schedule Inpatient Medications:   apixaban  5 mg Oral BID    famotidine  20 mg Oral Daily    midodrine  5 mg Oral TID WM    pravastatin  40 mg Oral QHS      DOBUTamine IV infusion (non-titrating)  5 mcg/kg/min Intravenous Continuous 5.9 mL/hr at 05/23/25 1953 5 mcg/kg/min at 05/23/25 1953       Assessment:   Cardiogenic Shock/Intermittent Hypotension    - History of Hypertension/Hypertensive Heart Disease     - Episode of Orthostatic Hypotension on 5.13.25 afternoon- BP Recovered with IV Fluid Bolus 500 ML in Total (BP Now Stable)    - Episode of Orthostatic Hypotension on 5.14.25- IV Fluid Bolus 250 ML Given  Acute on Chronic Combined Systolic & Diastolic HF (Stable on NC)    - EF 20-25% with Diastolic Dysfunction (Echo 5.14.25)    - Lactic Acidosis -Cleared  Acute/Chronic Right Sided HF (Severely Reduced RV Function)- Biventricular Failure  Nonischemic Cardiomyopathy    - EF 20-25% (Echo 5.14.25)    - Status Post SICD    - PET (2.14.25): No Ischemia/Low Risk     - Normal Coronaries with severe LV Systolic Dysfunction. EF 20-25% with EDP 5-10 mmHg. (3.10.22)  NSTEMI Type II due to Decompensated HF/Fluid Overload  Valvular Heart Disease    - MR: Moderate, TR: Mild to Moderate (Echo 5.13.25)  Pulmonary Hypertension  Dyslipidemia    - On Statin  Chronic VI/Edema- Lymphedema (Left Arm/Bilateral Lower Extremities)  History of Breast  Cancer/Mastectomy  History of DVT (Left Popliteal Vein) (2018)    - on Eliquis  OA  Asymptomatic Bacteruria   Thrombocytopenia - Resolved    Plan:   Continue Dobutamine Infusion for Palliative Support.  Holding afterload reduction medications as patient does not tolerate.  Continue Scheduled Midodrine  Patient should not be on beta blocker given dobutamine infusion  Hold diuretics due to hypotension  Agree with Long Term LTAC Placement and continuation of Dobutamine.    Stevie Carrera, MACHELLE  Cardiology  [facility]   Physician addendum:  I have seen and examined this patient as a split-shared visit with the MAGDY d/t complicated medical management of above problems written in assessment and high acuity requiring physician expertise in medical decision-making. I performed the substantive portion of the history and exam. Above medical decision-making is also formulated by me.    Cardiovascular exam:  S1, S2  Lungs:  fine crackles at bases.  Extremities:  + edema bilaterally    Plan:  Medications as above.  Continue supportive therapy.     Deepak Banuelos MD  Cardiologist         [1]   No current facility-administered medications on file prior to encounter.     Current Outpatient Medications on File Prior to Encounter   Medication Sig Dispense Refill    anastrozole (ARIMIDEX) 1 mg Tab Take 1 tablet by mouth once daily.      apixaban (ELIQUIS) 5 mg Tab Take 5 mg by mouth 2 (two) times daily.      aspirin (ECOTRIN) 81 MG EC tablet Take 1 tablet (81 mg total) by mouth once daily. 90 tablet 3    famotidine (PEPCID) 20 MG tablet Take 1 tablet (20 mg total) by mouth once daily. 30 tablet 11    [Paused] furosemide (LASIX) 20 MG tablet Take 1 tablet (20 mg total) by mouth once daily. (Patient taking differently: Take 40 mg by mouth once daily.) 30 tablet 11    [Paused] metoprolol succinate (TOPROL-XL) 25 MG 24 hr tablet Take 25 mg by mouth.      pravastatin (PRAVACHOL) 20 MG tablet Take 1 tablet (20 mg  total) by mouth every evening. 90 tablet 3

## 2025-05-24 NOTE — PROGRESS NOTES
[facility]  Hospital Medicine Progress Note        Chief Complaint: Inpatient Follow-up for decompensated systolic/diastolic heart failure    HPI per admitting team: 74-year-old female with significant history of HTN, HLD, breast cancer status post left mastectomy, chemo in remission, nonischemic cardiomyopathy with ejection fraction-25-30%, valvular heart disease-MR/TR, DVT/PE on Eliquis, GONZALEZ on CPAP, pulmonary hypertension. Patient presented to the ED with complaints of bilateral lower extremity edema with associated pain and worsening dyspnea. Lab significant for renal insufficiency, metabolic acidosis, elevated BNP, lactic acidosis, UA concerning for UTI. Patient was admitted to hospital medicine services, concern for impending cardiogenic shock and cardiology services consulted. Patient was initiated on IV diuretics/Lasix drip, dobutamine, holding other GDM T given compromised hemodynamics/low BP, holding Eliquis and placed on full-dose anticoagulation with Lovenox. Overall prognosis extremely poor. Patient only treated with 1 dose of IV ceftriaxone for bacteriuria, she was asymptomatic and therefore antibiotics were discontinued. Patient also significantly physically deconditioned and therefore therapy services consulted. Once hemodynamics stabilized dobutamine was discontinued and IV Lasix was transitioned to p.o. Lasix, planning for GDM T as hemodynamics tolerates. Off dobutamine patient had episodic hypotension which responded to fluid bolus with normal saline. Patient with profound hypotension on 05/15 secondary to cardiogenic shock, lactic acid elevated, discussed with Cardiology and decided to initiate on dobutamine drip at 5, discussed with family and patient regarding extremely poor prognosis, discussed palliative care/hospice, palliative care team also consulted, patient and family has very poor insight. Patient inotrope dependent and prognosis continues to be extremely  "poor   Low suspicion for sepsis /septic shock, hemodynamic instability secondary to cardiogenic shock  Chest x-ray and UA unremarkable   Off all antibiotics    Interval Hx:   Patient is laying in bed, very much confused about why she was not transferred to [facility] when she was supposed to go last night.  Informed that there was some miscommunication and the admitting physician declined her at last minute.  Talk to patient's son and he reported he signed all the papers for her to be transferred and was upset that patient is still in the hospital.  In the meantime patient reported she wants to stay on the dobutamine drip and she is not ready to die.  Spoke to Cardiology, recommended other going home with hospice on dobutamine drip versus going to LTAC  Spoke to patient, she told me she does not want to get off of dopamine drip and would like to go home.  Spoke to patient's son and he said they have nobody at home to care for her.  Told me to speak to patient's niece Ksenia Reynaldo.  Called Ksenia and she told me that patient other son Nikolay and his girlfriend, Marjan are living in the house and patient has them to care for in the house right now.  Conference call was done with Monica and she reported she stays home and hospice can bring the equipment and patient can come home tomorrow    Call hospice and informed them of the plan to go home with hospice, they will attempt to deliver all the equipment today and plan for discharge home tomorrow   Case was discussed with patient's nurse on the floor    Objective/physical exam:  General: In no acute distress, afebrile  Chest: Clear to auscultation bilaterally anteriorly, on 2 L supplemental oxygen via nasal cannula  Heart:  Near tachycardic rate and rhythm, +S1, S2,   Abdomen: Soft, nontender, BS +  MSK: Warm, lymphedema left upper extremity, mild swelling in the left lower extremity, no swelling right lower extremity and right upper extremity  Neurologic: " Alert and oriented x4, able to move all 4 extremities    VITAL SIGNS: 24 HRS MIN & MAX LAST   Temp  Min: 97.4 °F (36.3 °C)  Max: 98.2 °F (36.8 °C) 97.4 °F (36.3 °C)   BP  Min: 91/72  Max: 115/79 95/67   Pulse  Min: 69  Max: 103  103   Resp  Min: 18  Max: 18 18   SpO2  Min: 89 %  Max: 100 % 98 %     I reviewed the labs below:  Recent Labs   Lab 05/19/25 0352 05/22/25 0358   WBC 5.68 4.05*   RBC 3.96* 4.21   HGB 12.8 14.1   HCT 37.9 41.6   MCV 95.7* 98.8*   MCH 32.3* 33.5*   MCHC 33.8 33.9   RDW 22.4* 23.5*    177   MPV 11.9* 11.8*     Recent Labs   Lab 05/19/25 0352 05/21/25 0526 05/22/25 0358   * 134* 133*   K 4.0 3.9 3.9    100 98   CO2 19* 20* 22*   BUN 28.0* 29.7* 29.7*   CREATININE 1.24* 1.44* 1.50*   GLU 93 83 84   CALCIUM 7.9* 7.9* 7.9*   ALBUMIN 2.1*  --  2.2*   PROT 5.7*  --  5.8   ALKPHOS 97  --  113   ALT 16  --  18   AST 19  --  19   BILITOT 2.8*  --  2.5*     Microbiology Results (last 7 days)       Procedure Component Value Units Date/Time    Blood Culture [[id number]]  (Normal) Collected: 05/14/25 2252    Order Status: Completed Specimen: Blood, Venous Updated: 05/20/25 0011     Blood Culture No Growth at 5 days    Blood Culture [[id number]]  (Normal) Collected: 05/14/25 2252    Order Status: Completed Specimen: Blood, Venous Updated: 05/20/25 0011     Blood Culture No Growth at 5 days           See below for Radiology    Intake and Output:  Intake/Output Summary (Last 24 hours) at 5/24/2025 1611  Last data filed at 5/24/2025 1502  Gross per 24 hour   Intake 303.8 ml   Output 100 ml   Net 203.8 ml       Assessment/Plan:  Cardiogenic shock on dobutamine GTT  Acute decompensated systolic/diastolic heart failure with ejection fraction 20-25%/ NICMO status post AICD  Acute on chronic right-sided heart failure-biventricular failure  SVT, transient 5/18  Lactic acidosis secondary to cardiogenic shock- improved  AUSTIN on CKD III-cardiorenal, stable  Orthostatic hypotension  NSTEMI-type  2  Valvular heart disease-MR/TR   Pulmonary hypertension   HLD   History of breast cancer status post L mastectomy, chemo, in remission, L UE lymphedema   History of DVT/PE  History of GONZALEZ on CPAP  Asymptomatic bacteriuria on admit   DNR status         Patient is now inotrope dependent, reinitiate dobutamine GTT on 05/15 for palliative support, holding afterload reduction medications as patient does not tolerate   Transient NSVT on 05/18, spontaneously converted to NSR  Renal function stable on dobutamine GTT   Cr 1.5  Patient unfortunately not a candidate for surgical intervention for valvular heart disease, not a candidate for LVAD  Keep midodrine 5 mg t.i.d, Eliquis 5 mg b.i.d. for thromboembolic prophylaxis, statin and Pepcid   Patient was planned to go to [facility] inpatient hosp[facility] yesterday, accepting physician was Dr. Donnie Viveros, I gave him the detailed report and plan, some how during the night, he refused the patient and patient could not be transferred to inpatient hospice  This morning I reached out to Cardiology given patient told me she wants to go home with dobutamine drip, reached out to patient's son Andrzej who was not very helpful or would not answer my questions, asked me to call patient's niece Saravanan Jacobs, who informed me that Andrzej has not been very up front and does not want to take responsibility.  Reported she has been the caregiver to her and.  She informed me that patient other son Nikolay and his girlfriend Marjan are now living in Ms. Reynaldo house and patient has somebody at home to care for and Ms. Coe lives 2 house down from her.  We discussed patient wishes and wishes to go home with hospice on dobutamine drip.  It is decided that hospice will deliver equipment today and patient will be discharged home tomorrow with hospice [facility] as planned  Reached out to hospice company, they will attempt to deliver all the equipment needed to her home today  Also reached  out to our  and sent her the updated plan      VTE prophylaxis:   on Eliquis                      Patient condition:  Guarded    Anticipated discharge and Disposition:  Plan for home with hospice tomorrow      All diagnosis and differential diagnosis have been reviewed; assessment and plan has been documented; I have personally reviewed the labs and test results that are presently available; I have reviewed the patients medication list; I have reviewed the consulting providers response and recommendations. I have reviewed or attempted to review medical records based upon their availability    All of the patient's questions have been  addressed and answered. Patient's is agreeable to the above stated plan. I will continue to monitor closely and make adjustments to medical management as needed.    Portions of this note dictated using EMR integrated voice recognition software, and may be subject to voice recognition errors not corrected at proofreading. Please contact writer for clarification if needed.   _____________________________________________________________________    Nutrition Status:  Nutrition consulted. Most recent weight and BMI monitored-     Measurements:  Wt Readings from Last 1 Encounters:   05/16/25 77.8 kg (171 lb 7.9 oz)   Body mass index is 29.42 kg/m².    Patient has been screened and assessed by RD.    Malnutrition Type:  Context:    Level: other (see comments) (Does not meet criteria)    Malnutrition Characteristic Summary:  Weight Loss (Malnutrition): other (see comments) (Does not meet criteria)  Fluid Accumulation (Malnutrition): moderate to severe    Interventions/Recommendations (treatment strategy):         A minimum of two characteristics is recommended for diagnosis of either severe or non-severe malnutrition.     Current Med:   apixaban  5 mg Oral BID    famotidine  20 mg Oral Daily    midodrine  5 mg Oral TID WM    pravastatin  40 mg Oral QHS      PRN meds:  Current  Facility-Administered Medications:     acetaminophen, 1,000 mg, Oral, Q8H PRN    acetaminophen, 650 mg, Oral, Q4H PRN    aluminum-magnesium hydroxide-simethicone, 30 mL, Oral, QID PRN    bisacodyL, 10 mg, Rectal, Daily PRN    dextrose 50%, 12.5 g, Intravenous, PRN    dextrose 50%, 25 g, Intravenous, PRN    glucagon (human recombinant), 1 mg, Intramuscular, PRN    glucose, 16 g, Oral, PRN    glucose, 24 g, Oral, PRN    melatonin, 6 mg, Oral, Nightly PRN    polyethylene glycol, 17 g, Oral, BID PRN    sodium chloride 0.9%, 10 mL, Intravenous, PRN    Flushing PICC/Midline Protocol, , , Until Discontinued **AND** sodium chloride 0.9%, 10 mL, Intravenous, Q12H PRN    Continuous infusion:   DOBUTamine IV infusion (non-titrating)  5 mcg/kg/min Intravenous Continuous 5.9 mL/hr at 05/23/25 1953 5 mcg/kg/min at 05/23/25 1953        Radiology:   I have personally reviewed the images and agree with radiologist report  X-Ray Chest 1 View  Narrative: EXAMINATION:  XR CHEST 1 VIEW    CLINICAL HISTORY:  Short of breath.Rule out sepsis;    COMPARISON:  Yesterday    FINDINGS:  Frontal view of the chest was obtained. Right Port-A-Cath and PICC stable.  Stable cardiomegaly.  Similar interstitial prominence.  No new focal consolidation or pneumothorax.  Impression: No significant interval change.    Electronically signed by: Jony Beasley  Date:    05/15/2025  Time:    11:37        Yahir Lanier MD  Department of Hospital Medicine   [facility]   05/24/2025

## 2025-05-24 NOTE — PLAN OF CARE
Problem: Adult Inpatient Plan of Care  Goal: Plan of Care Review  Outcome: Not Progressing  Goal: Patient-Specific Goal (Individualized)  Outcome: Not Progressing  Goal: Absence of Hospital-Acquired Illness or Injury  Outcome: Not Progressing  Goal: Optimal Comfort and Wellbeing  Outcome: Not Progressing  Goal: Readiness for Transition of Care  Outcome: Not Progressing     Problem: Infection  Goal: Absence of Infection Signs and Symptoms  Outcome: Not Progressing     Problem: Skin Injury Risk Increased  Goal: Skin Health and Integrity  Outcome: Not Progressing     Problem: Fall Injury Risk  Goal: Absence of Fall and Fall-Related Injury  Outcome: Not Progressing     Problem: Coping Ineffective  Goal: Effective Coping  Outcome: Not Progressing

## 2025-05-25 VITALS
TEMPERATURE: 97 F | SYSTOLIC BLOOD PRESSURE: 96 MMHG | HEART RATE: 108 BPM | OXYGEN SATURATION: 100 % | RESPIRATION RATE: 20 BRPM | DIASTOLIC BLOOD PRESSURE: 62 MMHG | WEIGHT: 171.5 LBS | BODY MASS INDEX: 29.28 KG/M2 | HEIGHT: 64 IN

## 2025-05-25 PROCEDURE — 25000003 PHARM REV CODE 250: Performed by: NURSE PRACTITIONER

## 2025-05-25 PROCEDURE — 25000003 PHARM REV CODE 250: Performed by: INTERNAL MEDICINE

## 2025-05-25 PROCEDURE — 27000221 HC OXYGEN, UP TO 24 HOURS

## 2025-05-25 PROCEDURE — 94760 N-INVAS EAR/PLS OXIMETRY 1: CPT

## 2025-05-25 RX ORDER — MIDODRINE HYDROCHLORIDE 5 MG/1
5 TABLET ORAL
Qty: 90 TABLET | Refills: 1
Start: 2025-05-25

## 2025-05-25 RX ORDER — POLYETHYLENE GLYCOL 3350 17 G/17G
17 POWDER, FOR SOLUTION ORAL 2 TIMES DAILY PRN
COMMUNITY
Start: 2025-05-25

## 2025-05-25 RX ADMIN — MIDODRINE HYDROCHLORIDE 5 MG: 5 TABLET ORAL at 09:05

## 2025-05-25 RX ADMIN — APIXABAN 5 MG: 5 TABLET, FILM COATED ORAL at 09:05

## 2025-05-25 RX ADMIN — MIDODRINE HYDROCHLORIDE 5 MG: 5 TABLET ORAL at 12:05

## 2025-05-25 RX ADMIN — FAMOTIDINE 20 MG: 20 TABLET, FILM COATED ORAL at 09:05

## 2025-05-25 NOTE — PROGRESS NOTES
"  [facility]    Cardiology  Progress Note    Patient Name: Laura Jacobs  MRN: [id number]  Admission Date: 5/8/2025  Hospital Length of Stay: 17 days  Code Status: DNR   Attending Provider: Scooter Davis MD   Consulting Provider: MACHELLE Zimmerman  Primary Care Physician: Ruben Brooks Sr., MD  Principal Problem:Acute on chronic congestive heart failure    Patient information was obtained from patient, past medical records, ER records, and primary team.     Subjective:   Chief Complaint/Reason for Consult: Concern for Cardiogenic Shock    HPI: Ms. Jacobs is a 74 year old female, known to Dr. Washington, who presented to the hospital with bilateral lower extremity edema, pain, and SOB. Patient with history of NICMO. Noted Significant MR. Lab work significant for creatinine 1.7, bicarb 17, BNP 6700, troponin 0.06, lactic acid 6.0. Urinalysis consistent with UTI. BP initially marginal, but has improved. On Eliquis for history of DVT/PE. Admitted to Hospital Medicine Team. CIS consulted for cardiac evaluation/management due to concern for impending shock.    Hospital Course:  5.9.25: NAD Noted. On RA, Lying flat in no distress. Marginal diuresis overnight. BP Systolic low normal. SR with intermittent bouts of ST.   5.10.25: NAD. "I am feeling a little better." Fluid Balance Net Negative 2022mL. ST 100s-110s.   5.11.25: BP marginal at times. Good UOP. Remains on Dobutamine and Lasix drip. Renal indices stable.   5.12.25: NAD Noted. Vitals Stable. Diuresing well. Edema is improving. Patient watching TV in no distress. She is awake/alert.  5.13.25: NAD Noted. Vitals Stable. UO slacked off overnight. On RA. Dobutamine infusion going.   5.14.25: NAD Noted. On RA. Lying flat, nurse aid assisting with bath. BP Stable. Sinus Tachycardia HR Low 100's.   5.15.25: Hypotensive this morning. Dobutamine Restarted for BP Support and Palliative support. ST low 100's. Patient updated on " overall prognosis and need for palliative measures. Insight into her disease process is limited. Attending notified of CIS plan moving forward.   5.17.25: NAD, NEON, pt stable on chronic Dobutamine gtt.  5.18.25: NAD Noted. Reconsulted to assist with long term planning regarding discharge.  5.24.25: On Dobutamine. Reconsulted to assist with placement options.  5.25.25: NAD Noted. On Dobutamine Infusion. Stable at current time. Patient appears comfortable.     PMH: Hypertension/HHD, Dyslipidemia, Palpitations, MO, Breast Cancer, CP, SOB/ROMO, Pulmonary Hypertension, GONZALEZ/CPAP, DVT/PE (Eliquis), OA, CVI, VHD/MR-TR  PSH: S-ICD, Knee Surgery, LHC, Mastectomy  Family History: Non-contributory  Social History: Tobacco- Negative, Alcohol- Negative, Substance Abuse- Negative     Previous Cardiac Diagnostics:   Echocardiogram (5.14.25):  Left Ventricle: The left ventricle is normal in size. Normal wall thickness. Moderate global hypokinesis present. There is severely reduced systolic function with a visually estimated ejection fraction of 20 - 25%. There is diastolic dysfunction but grade cannot be determined.  Right Ventricle: The right ventricle is mildly dilated Systolic function is severely reduced. TAPSE is 0.6 cm.  Left Atrium: The left atrium is moderately dilated  Right Atrium: The right atrium is moderately dilated .  Mitral Valve: There is moderate regurgitation.  Tricuspid Valve: There is mild to moderate regurgitation.  Pericardium: There is a trivial effusion.    Venous Doppler (5.8.25):  Negative for deep and superficial vein thrombosis in bilateral lower extremities.    Echocardiogram (3.15.25):  Left Ventricle: The left ventricle is dilated. Wall is thinner than normal. Severe global hypokinesis present. There is severely reduced systolic function with a visually estimated ejection fraction of 25 - 30%.  Right Ventricle: Right ventricular enlargement.  Left Atrium: Severely dilated Agitated saline study of the  atrial septum is negative, suggesting absence of intracardiac shunt at the atrial level.  Right Atrium: Right atrium is severely dilated.  Mitral Valve: Mildly thickened leaflets. There is severe regurgitation.  Tricuspid Valve: There is moderate regurgitation.    PET (2.14.25):  This is a normal perfusion study, no perfusion defects noted. There is no evidence of ischemia.   This scan is suggestive of low risk for future cardiovascular events.   The left ventricular cavity is noted to be mildly enlarged on the stress studies. The stress left ventricular ejection fraction was calculated to be 27% and left ventricular global function is severely reduced. The rest left ventricular cavity is noted to be mildly enlarged. The rest left ventricular ejection fraction was calculated to be 24% and rest left ventricular global function is severely reduced.   Persistent hypokinesis of the anterior region, septal region, inferior region and lateral region is noted in both rest and stress studies. When compared to the resting ejection fraction (24%), the stress ejection fraction (27%) has increased.   The study quality is good.   Calcium scoring was not performed in this patient due to specific concerns that can affect accuracy.   There was no rise in myocardial blood flow between rest and stress. Global myocardial blood flow reserve was 1.98. Non-diagnostic study.    Carotid US (2.14.25):  The study quality is average.   Antegrade left vertebral artery flow.   Antegrade right vertebral artery flow.   No plaque noted in bilateral internal carotid arteries.    Echocardiogram (1.20.25):  Left Ventricle: The left ventricle is dilated. Normal wall thickness. Global hypokinesis present. There is moderately reduced systolic function with a visually estimated ejection fraction of 35 - 40%.  Right Ventricle: Normal right ventricular cavity size. Systolic function is normal.  Left Atrium: Left atrium is mildly dilated.  Right Atrium:  Right atrium is mildly dilated.  Aortic Valve: The aortic valve is a trileaflet valve. There is mild aortic valve sclerosis.  Mitral Valve: There is mitral annular calcification present. There is moderate to severe regurgitation.  Tricuspid Valve: There is mild regurgitation.  Pulmonic Valve: There is mild regurgitation.  IVC/SVC: Normal venous pressure at 3 mmHg.  Pericardium: There is a trivial effusion adjacent to the right atrium. No indication of cardiac tamponade.     LHC (3.10.22):  Widely Patent Coronary Anatomy with severe LV Systolic Dysfunction. EF 20-25% with EDP 5-10 mmHg.     AMANDA (9.14.22):  LV systolic function, LVEF 30-35%.  Severe global hypokinesis  Mild Tricuspid regurgitation  Moderate to severe Mitral regurgitation  Mild aortic regurgitation.  There is Lambl's excrescences on aortic leaflets that is benign fibrous structure.   No evidence of infection, vegetation or abscess     SICD Placement (3.25.21):  SICD Placement Re: Primary Prevention of SCD.      LHC (1.4.21):  Left main coronary artery normal   Left anterior descending artery normal   Left circumflex artery codominant normal   Right coronary artery codominant normal   Left ventricle dilated with severe left ventricular systolic   dysfunction ejection fraction less than 30%   SUMMARY : Nonischemic dilated cardiomyopathy with severe left ventricular systolic dysfunction.      Venogram (10.17.18):  No Critical Venous Compression Noted.     Review of patient's allergies indicates:   Allergen Reactions    Sulfa (sulfonamide antibiotics)      Patient unsure if allergic to Sulfa     No current facility-administered medications on file prior to encounter.     Current Outpatient Medications on File Prior to Encounter   Medication Sig    anastrozole (ARIMIDEX) 1 mg Tab Take 1 tablet by mouth once daily.    apixaban (ELIQUIS) 5 mg Tab Take 5 mg by mouth 2 (two) times daily.    aspirin (ECOTRIN) 81 MG EC tablet Take 1 tablet (81 mg total) by mouth  "once daily.    famotidine (PEPCID) 20 MG tablet Take 1 tablet (20 mg total) by mouth once daily.    [Paused] furosemide (LASIX) 20 MG tablet Take 1 tablet (20 mg total) by mouth once daily. (Patient taking differently: Take 40 mg by mouth once daily.)    [Paused] metoprolol succinate (TOPROL-XL) 25 MG 24 hr tablet Take 25 mg by mouth.    pravastatin (PRAVACHOL) 20 MG tablet Take 1 tablet (20 mg total) by mouth every evening.     Review of Systems   Respiratory:  Negative for shortness of breath.    Cardiovascular:  Negative for chest pain.   All other systems reviewed and are negative.    Objective:     Vital Signs (Most Recent):  Temp: 97.4 °F (36.3 °C) (05/25/25 1126)  Pulse: 108 (05/25/25 1126)  Resp: 20 (05/25/25 0456)  BP: 96/62 (05/25/25 1126)  SpO2: 100 % (05/25/25 1126) Vital Signs (24h Range):  Temp:  [97.4 °F (36.3 °C)-97.7 °F (36.5 °C)] 97.4 °F (36.3 °C)  Pulse:  [] 108  Resp:  [20] 20  SpO2:  [94 %-100 %] 100 %  BP: ()/(57-78) 96/62   Weight:  (bed not zeroed)  Body mass index is 29.42 kg/m².  SpO2: 100 %       Intake/Output Summary (Last 24 hours) at 5/25/2025 1214  Last data filed at 5/24/2025 1502  Gross per 24 hour   Intake 240 ml   Output 100 ml   Net 140 ml     Lines/Drains/Airways       Peripherally Inserted Central Catheter Line  Duration             PICC Double Lumen 05/09/25 0853 right brachial 16 days                  Significant Labs:   Chemistries:   Recent Labs   Lab 05/19/25  0352 05/21/25  0526 05/22/25  0358   * 134* 133*   K 4.0 3.9 3.9    100 98   CO2 19* 20* 22*   BUN 28.0* 29.7* 29.7*   CREATININE 1.24* 1.44* 1.50*   CALCIUM 7.9* 7.9* 7.9*   PROT 5.7*  --  5.8   BILITOT 2.8*  --  2.5*   ALKPHOS 97  --  113   ALT 16  --  18   AST 19  --  19        CBC/Anemia Labs: [facility]:    Recent Labs   Lab 05/19/25  0352 05/22/25  0358   WBC 5.68 4.05*   HGB 12.8 14.1   HCT 37.9 41.6    177   MCV 95.7* 98.8*   RDW 22.4* 23.5*    No results for input(s): "PT", "INR", " ""APTT" in the last 168 hours.     Telemetry: SR     Physical Exam  Vitals and nursing note reviewed.   Constitutional:       General: She is not in acute distress.     Appearance: She is obese.   Cardiovascular:      Rate and Rhythm: Normal rate and regular rhythm.   Pulmonary:      Effort: Pulmonary effort is normal. No respiratory distress.   Musculoskeletal:      Right lower leg: Edema present.      Left lower leg: Edema present.   Skin:     General: Skin is warm and dry.   Neurological:      Mental Status: She is alert.       Home Medications:   Medications Ordered Prior to Encounter[1]  Current Schedule Inpatient Medications:   apixaban  5 mg Oral BID    famotidine  20 mg Oral Daily    midodrine  5 mg Oral TID WM    pravastatin  40 mg Oral QHS      DOBUTamine IV infusion (non-titrating)  5 mcg/kg/min Intravenous Continuous 5.9 mL/hr at 05/23/25 1953 5 mcg/kg/min at 05/23/25 1953       Assessment:   Cardiogenic Shock/Intermittent Hypotension    - History of Hypertension/Hypertensive Heart Disease     - Episode of Orthostatic Hypotension on 5.13.25 afternoon- BP Recovered with IV Fluid Bolus 500 ML in Total (BP Now Stable)    - Episode of Orthostatic Hypotension on 5.14.25- IV Fluid Bolus 250 ML Given  Acute on Chronic Combined Systolic & Diastolic HF (Stable on NC)    - EF 20-25% with Diastolic Dysfunction (Echo 5.14.25)    - Lactic Acidosis -Cleared  Acute/Chronic Right Sided HF (Severely Reduced RV Function)- Biventricular Failure  Nonischemic Cardiomyopathy    - EF 20-25% (Echo 5.14.25)    - Status Post SICD    - PET (2.14.25): No Ischemia/Low Risk     - Normal Coronaries with severe LV Systolic Dysfunction. EF 20-25% with EDP 5-10 mmHg. (3.10.22)  NSTEMI Type II due to Decompensated HF/Fluid Overload  Valvular Heart Disease    - MR: Moderate, TR: Mild to Moderate (Echo 5.13.25)  Pulmonary Hypertension  Dyslipidemia    - On Statin  Chronic VI/Edema- Lymphedema (Left Arm/Bilateral Lower Extremities)  History " of Breast Cancer/Mastectomy  History of DVT (Left Popliteal Vein) (2018)    - on Eliquis  OA  Asymptomatic Bacteruria   Thrombocytopenia - Resolved    Plan:   Continue Dobutamine Infusion for Palliative Support.  Holding afterload reduction medications as patient does not tolerate.  Continue Scheduled Midodrine  Patient should not be on beta blocker given dobutamine infusion  Hold diuretics due to hypotension  DC Planning as per Primary Team  Will be available. Call if needed.    MACHELLE Zimmerman  Cardiology  [facility]        [1]   No current facility-administered medications on file prior to encounter.     Current Outpatient Medications on File Prior to Encounter   Medication Sig Dispense Refill    anastrozole (ARIMIDEX) 1 mg Tab Take 1 tablet by mouth once daily.      apixaban (ELIQUIS) 5 mg Tab Take 5 mg by mouth 2 (two) times daily.      aspirin (ECOTRIN) 81 MG EC tablet Take 1 tablet (81 mg total) by mouth once daily. 90 tablet 3    famotidine (PEPCID) 20 MG tablet Take 1 tablet (20 mg total) by mouth once daily. 30 tablet 11    [Paused] furosemide (LASIX) 20 MG tablet Take 1 tablet (20 mg total) by mouth once daily. (Patient taking differently: Take 40 mg by mouth once daily.) 30 tablet 11    [Paused] metoprolol succinate (TOPROL-XL) 25 MG 24 hr tablet Take 25 mg by mouth.      pravastatin (PRAVACHOL) 20 MG tablet Take 1 tablet (20 mg total) by mouth every evening. 90 tablet 3

## 2025-05-25 NOTE — DISCHARGE SUMMARY
[facility] Hospital Medicine Discharge Summary    Admit Date: 5/8/2025  Discharge Date and Time: 5/25/20257:40 AM  Admitting Physician:  Team  Discharging Physician: Yahir Lanier MD.  Primary Care Physician: Ruben Brooks Sr., MD  Consults:  Cardiology and palliative care     Discharge Diagnoses:  Cardiogenic shock - dobutamine GTT dependednt  Acute decompensated systolic/diastolic heart failure with ejection fraction 20-25%/ NICMO status post AICD  Acute on chronic right-sided heart failure-biventricular failure  SVT, transient 5/18  Lactic acidosis secondary to cardiogenic shock- improved  AUSTIN on CKD III-cardiorenal, stable  Orthostatic hypotension  NSTEMI-type 2  Valvular heart disease-MR/TR   Pulmonary hypertension   HLD   History of breast cancer status post L mastectomy, chemo, in remission, L UE lymphedema   History of DVT/PE  History of GONZALEZ on CPAP  Asymptomatic bacteriuria on admit   DNR status    Hospital Course:   74-year-old female with significant history of HTN, HLD, breast cancer status post left mastectomy, chemo in remission, nonischemic cardiomyopathy with ejection fraction-25-30%, valvular heart disease-MR/TR, DVT/PE on Eliquis, GONZALEZ on CPAP, pulmonary hypertension. Patient presented to the ED with complaints of bilateral lower extremity edema with associated pain and worsening dyspnea. Lab significant for renal insufficiency, metabolic acidosis, elevated BNP, lactic acidosis, UA concerning for UTI. Patient was admitted to hospital medicine services, concern for impending cardiogenic shock and cardiology services consulted. Patient was initiated on IV diuretics/Lasix drip, dobutamine, holding other GDM T given compromised hemodynamics/low BP, holding Eliquis and placed on full-dose anticoagulation with Lovenox. Overall prognosis extremely poor. Patient only treated with 1 dose of IV ceftriaxone for bacteriuria, she was asymptomatic and therefore antibiotics were  discontinued. Patient also significantly physically deconditioned and therefore therapy services consulted. Once hemodynamics stabilized dobutamine was discontinued and IV Lasix was transitioned to p.o. Lasix, planning for GDM T as hemodynamics tolerates. Off dobutamine patient had episodic hypotension which responded to fluid bolus with normal saline. Patient with profound hypotension on 05/15 secondary to cardiogenic shock, lactic acid elevated, discussed with Cardiology and decided to initiate on dobutamine drip at 5, discussed with family and patient regarding extremely poor prognosis, discussed palliative care/hospice, palliative care team also consulted, patient and family has very poor insight. Patient inotrope dependent and prognosis continues to be extremely poor   Low suspicion for sepsis /septic shock, hemodynamic instability secondary to cardiogenic shock  Chest x-ray and UA unremarkable   Patient is now inotrope dependent, reinitiate dobutamine GTT on 05/15 for palliative support, holding afterload reduction medications as patient does not tolerate   Transient NSVT on 05/18, spontaneously converted to NSR  Renal function stable on dobutamine GTT   Cr 1.5  Patient unfortunately not a candidate for surgical intervention for valvular heart disease, not a candidate for LVAD  Keep midodrine 5 mg t.i.d, Eliquis 5 mg b.i.d. for thromboembolic prophylaxis, statin and Pepcid   Patient was planned to go to [facility] general inpatient hospice yesterday, accepting physician was Dr. Donnie Viveros, I gave him the detailed report and plan, some how during the night, he refused the patient and patient could not be transferred to inpatient hospice  5/24- I reached out to Cardiology given patient told me she wants to go home with dobutamine drip, agree with the plan.   Reached out to patient's son Andrzej who was not very helpful or would not answer my questions, asked me to call patient's niece Coe Reynaldo, who  informed me that Andrzej has not been very up front and does not want to take responsibility.  Reported she has been the caregiver to her and informed me that patient other son Nikolay and his girlfriend Marjan are now living in Ms. Jacobs house and patient has somebody at home to care for and Ms. Coe lives 2 house down from her.  We discussed patient wishes and wishes to go home with hospice on dobutamine drip.    [facility] delivered equipment to her home and once home oxygen arranged pt will be discharged home     Pt was seen and examined on the day of discharge  Vitals:  VITAL SIGNS: 24 HRS MIN & MAX LAST   Temp  Min: 97.4 °F (36.3 °C)  Max: 97.9 °F (36.6 °C) 97.7 °F (36.5 °C)   BP  Min: 86/57  Max: 115/79 108/78   Pulse  Min: 64  Max: 107  99   Resp  Min: 18  Max: 20 20   SpO2  Min: 96 %  Max: 99 % 96 %       Physical Exam:  General: In no acute distress, afebrile  Chest: Clear to auscultation bilaterally anteriorly, on 2 L supplemental oxygen via nasal cannula  Heart:  Near tachycardic rate and rhythm, +S1, S2,   Abdomen: Soft, nontender, BS +  MSK: Warm, lymphedema left upper extremity, mild swelling in the left lower extremity, no swelling right lower extremity and right upper extremity  Neurologic: Alert and oriented x4, able to move all 4 extremities    Recent Labs   Lab 05/19/25 0352 05/22/25 0358   WBC 5.68 4.05*   RBC 3.96* 4.21   HGB 12.8 14.1   HCT 37.9 41.6   MCV 95.7* 98.8*   MCH 32.3* 33.5*   MCHC 33.8 33.9   RDW 22.4* 23.5*    177   MPV 11.9* 11.8*       Recent Labs   Lab 05/19/25  0352 05/21/25  0526 05/22/25  0358   * 134* 133*   K 4.0 3.9 3.9    100 98   CO2 19* 20* 22*   BUN 28.0* 29.7* 29.7*   CREATININE 1.24* 1.44* 1.50*   GLU 93 83 84   CALCIUM 7.9* 7.9* 7.9*   ALBUMIN 2.1*  --  2.2*   PROT 5.7*  --  5.8   ALKPHOS 97  --  113   ALT 16  --  18   AST 19  --  19   BILITOT 2.8*  --  2.5*        Microbiology Results (last 7 days)       Procedure Component Value  Units Date/Time    Blood Culture [[id number]]  (Normal) Collected: 05/14/25 2252    Order Status: Completed Specimen: Blood, Venous Updated: 05/20/25 0011     Blood Culture No Growth at 5 days    Blood Culture [[id number]]  (Normal) Collected: 05/14/25 2252    Order Status: Completed Specimen: Blood, Venous Updated: 05/20/25 0011     Blood Culture No Growth at 5 days             X-Ray Chest 1 View  Narrative: EXAMINATION:  XR CHEST 1 VIEW    CLINICAL HISTORY:  Short of breath.Rule out sepsis;    COMPARISON:  Yesterday    FINDINGS:  Frontal view of the chest was obtained. Right Port-A-Cath and PICC stable.  Stable cardiomegaly.  Similar interstitial prominence.  No new focal consolidation or pneumothorax.  Impression: No significant interval change.    Electronically signed by: Jony Beasley  Date:    05/15/2025  Time:    11:37         Medication List        PAUSE taking these medications      furosemide 20 MG tablet  Wait to take this until your doctor or other care provider tells you to start again.  Commonly known as: LASIX  Take 1 tablet (20 mg total) by mouth once daily.  What changed: how much to take     metoprolol succinate 25 MG 24 hr tablet  Wait to take this until your doctor or other care provider tells you to start again.  Commonly known as: TOPROL-XL            START taking these medications      DOBUTamine 1,000 mg/250 mL (4,000 mcg/mL) infusion  Commonly known as: DOBUTREX  Inject 390.5 mcg/min into the vein continuous.     midodrine 5 MG Tab  Commonly known as: PROAMATINE  Take 1 tablet (5 mg total) by mouth 3 (three) times daily with meals.     polyethylene glycol 17 gram Pwpk  Commonly known as: GLYCOLAX  Take 17 g by mouth 2 (two) times daily as needed for Constipation (1st choice).            CONTINUE taking these medications      anastrozole 1 mg Tab  Commonly known as: ARIMIDEX     aspirin 81 MG EC tablet  Commonly known as: ECOTRIN  Take 1 tablet (81 mg total) by mouth once daily.     ELIQUIS 5  mg Tab  Generic drug: apixaban     famotidine 20 MG tablet  Commonly known as: PEPCID  Take 1 tablet (20 mg total) by mouth once daily.     pravastatin 20 MG tablet  Commonly known as: PRAVACHOL  Take 1 tablet (20 mg total) by mouth every evening.            STOP taking these medications      cyproheptadine 4 mg tablet  Commonly known as: PERIACTIN     dexlansoprazole 60 mg capsule  Commonly known as: DEXILANT               Where to Get Your Medications        You can get these medications from any pharmacy    You don't need a prescription for these medications  polyethylene glycol 17 gram Pwpk       Information about where to get these medications is not yet available    Ask your nurse or doctor about these medications  DOBUTamine 1,000 mg/250 mL (4,000 mcg/mL) infusion  midodrine 5 MG Tab          Explained in detail to the patient about the discharge plan, medications, and follow-up visits. Pt understands and agrees with the treatment plan  Discharge Disposition: Home with [facility] with family  Discharged Condition: stable  Diet-   Dietary Orders (From admission, onward)       Start     Ordered    05/24/25 1839  Dietary nutrition supplements BID; Boost Glucose Control - Vanilla  Continuous        Question Answer Comment   Frequency: BID    Select PO Supplement: Boost Glucose Control - Vanilla        05/24/25 1838    05/10/25 0718  Diet Heart Healthy Consistent Carbohydrate; 2000 Calories (up to 75 gm per meal); Standard Tray  Diet effective now        Question Answer Comment   Diet Modifier: Consistent Carbohydrate    Total calories / carbs: 2000 Calories (up to 75 gm per meal)    Tray type: Standard Tray        05/10/25 0718                   Medications Per DC med rec  Activities as tolerated   Contact information for after-discharge care       Destination       [facility] .    Service: Inpatient Hospice  Contact information:  [address]  [address]  [facility]  [address]  [phone]                     Home Medical Care       [facility] [place] .    Service: Home Health Services  Contact information:  [address], [address]  [facility] [address]  [phone]                                 For further questions contact hospitalist office    Discharge time 34 minutes    For worsening symptoms, chest pain, shortness of breath, increased abdominal pain, high grade fever, stroke or stroke like symptoms, immediately go to the nearest Emergency Room or call 911 as soon as possible.    Portions of this note dictated using EMR integrated voice recognition software, and may be subject to voice recognition errors not corrected at proofreading. Please contact writer for clarification if needed    Yahir Lanier MD  Department of Hospital Medicine   [facility]   05/25/2025 7:40 AM

## 2025-05-25 NOTE — PLAN OF CARE
Spoke to Rocio with [facility]. They are preparing for discharge home today. Equipment has been ordered and delivery is pending. She informed me that referral will need to be sent to [facility] for Dobutamine Drip. Referral sent. Transportation arranged with [facility] and placed in will call.

## 2025-05-29 ENCOUNTER — HOSPITAL ENCOUNTER (EMERGENCY)
Facility: HOSPITAL | Age: 75
Discharge: HOME | End: 2025-05-29
Attending: EMERGENCY MEDICINE
Payer: MEDICARE

## 2025-05-29 VITALS
OXYGEN SATURATION: 99 % | RESPIRATION RATE: 17 BRPM | HEART RATE: 102 BPM | TEMPERATURE: 98 F | DIASTOLIC BLOOD PRESSURE: 85 MMHG | SYSTOLIC BLOOD PRESSURE: 130 MMHG

## 2025-05-29 DIAGNOSIS — I50.9 CONGESTIVE HEART FAILURE, UNSPECIFIED HF CHRONICITY, UNSPECIFIED HEART FAILURE TYPE: Primary | ICD-10-CM

## 2025-05-29 DIAGNOSIS — Z51.5 HOSPICE CARE: ICD-10-CM

## 2025-05-29 DIAGNOSIS — N18.9 CHRONIC KIDNEY DISEASE, UNSPECIFIED CKD STAGE: ICD-10-CM

## 2025-05-29 DIAGNOSIS — Z99.81 OXYGEN DEPENDENT: ICD-10-CM

## 2025-05-29 DIAGNOSIS — N17.9 ACUTE RENAL FAILURE, UNSPECIFIED ACUTE RENAL FAILURE TYPE: ICD-10-CM

## 2025-05-29 DIAGNOSIS — R06.02 SOB (SHORTNESS OF BREATH): ICD-10-CM

## 2025-05-29 LAB
ALBUMIN SERPL-MCNC: 2.4 G/DL (ref 3.4–4.8)
ALBUMIN/GLOB SERPL: 0.6 RATIO (ref 1.1–2)
ALP SERPL-CCNC: 129 UNIT/L (ref 40–150)
ALT SERPL-CCNC: 27 UNIT/L (ref 0–55)
ANION GAP SERPL CALC-SCNC: 14 MEQ/L
ANISOCYTOSIS BLD QL SMEAR: SLIGHT
AST SERPL-CCNC: 27 UNIT/L (ref 11–45)
BACTERIA #/AREA URNS AUTO: ABNORMAL /HPF
BASOPHILS # BLD AUTO: 0.05 X10(3)/MCL
BASOPHILS NFR BLD AUTO: 0.9 %
BILIRUB SERPL-MCNC: 3.6 MG/DL
BILIRUB UR QL STRIP.AUTO: NEGATIVE
BNP BLD-MCNC: 4975.7 PG/ML
BUN SERPL-MCNC: 47.5 MG/DL (ref 9.8–20.1)
CALCIUM SERPL-MCNC: 8.8 MG/DL (ref 8.4–10.2)
CHLORIDE SERPL-SCNC: 99 MMOL/L (ref 98–107)
CLARITY UR: ABNORMAL
CO2 SERPL-SCNC: 21 MMOL/L (ref 23–31)
COLOR UR AUTO: ABNORMAL
CREAT SERPL-MCNC: 2.37 MG/DL (ref 0.55–1.02)
CREAT/UREA NIT SERPL: 20
EOSINOPHIL # BLD AUTO: 0.11 X10(3)/MCL (ref 0–0.9)
EOSINOPHIL NFR BLD AUTO: 2 %
ERYTHROCYTE [DISTWIDTH] IN BLOOD BY AUTOMATED COUNT: 23.1 % (ref 11.5–17)
FLUAV AG UPPER RESP QL IA.RAPID: NOT DETECTED
FLUBV AG UPPER RESP QL IA.RAPID: NOT DETECTED
GFR SERPLBLD CREATININE-BSD FMLA CKD-EPI: 21 ML/MIN/1.73/M2
GLOBULIN SER-MCNC: 4.2 GM/DL (ref 2.4–3.5)
GLUCOSE SERPL-MCNC: 93 MG/DL (ref 82–115)
GLUCOSE UR QL STRIP: NORMAL
HCT VFR BLD AUTO: 41.5 % (ref 37–47)
HGB BLD-MCNC: 14 G/DL (ref 12–16)
HGB UR QL STRIP: ABNORMAL
HOLD SPECIMEN: NORMAL
HOLD SPECIMEN: NORMAL
HYALINE CASTS #/AREA URNS LPF: ABNORMAL /LPF
IMM GRANULOCYTES # BLD AUTO: 0.07 X10(3)/MCL (ref 0–0.04)
IMM GRANULOCYTES NFR BLD AUTO: 1.3 %
KETONES UR QL STRIP: NEGATIVE
LEUKOCYTE ESTERASE UR QL STRIP: 500
LIPASE SERPL-CCNC: 29 U/L
LYMPHOCYTES # BLD AUTO: 1.22 X10(3)/MCL (ref 0.6–4.6)
LYMPHOCYTES NFR BLD AUTO: 22 %
MCH RBC QN AUTO: 33.1 PG (ref 27–31)
MCHC RBC AUTO-ENTMCNC: 33.7 G/DL (ref 33–36)
MCV RBC AUTO: 98.1 FL (ref 80–94)
MONOCYTES # BLD AUTO: 0.16 X10(3)/MCL (ref 0.1–1.3)
MONOCYTES NFR BLD AUTO: 2.9 %
MUCOUS THREADS URNS QL MICRO: ABNORMAL /LPF
NEUTROPHILS # BLD AUTO: 3.94 X10(3)/MCL (ref 2.1–9.2)
NEUTROPHILS NFR BLD AUTO: 70.9 %
NITRITE UR QL STRIP: NEGATIVE
NRBC BLD AUTO-RTO: 0.9 %
PH UR STRIP: 5.5 [PH]
PLATELET # BLD AUTO: 153 X10(3)/MCL (ref 130–400)
PLATELET # BLD EST: NORMAL 10*3/UL
PMV BLD AUTO: 11.9 FL (ref 7.4–10.4)
POTASSIUM SERPL-SCNC: 4.2 MMOL/L (ref 3.5–5.1)
PROT SERPL-MCNC: 6.6 GM/DL (ref 5.8–7.6)
PROT UR QL STRIP: ABNORMAL
RBC # BLD AUTO: 4.23 X10(6)/MCL (ref 4.2–5.4)
RBC #/AREA URNS AUTO: >100 /HPF
RSV A 5' UTR RNA NPH QL NAA+PROBE: NOT DETECTED
SARS-COV-2 RNA RESP QL NAA+PROBE: NOT DETECTED
SODIUM SERPL-SCNC: 134 MMOL/L (ref 136–145)
SP GR UR STRIP.AUTO: 1.02 (ref 1–1.03)
SQUAMOUS #/AREA URNS LPF: ABNORMAL /HPF
TROPONIN I SERPL-MCNC: 0.08 NG/ML (ref 0–0.04)
UNIDENT CRYS #/AREA URNS HPF: ABNORMAL /HPF
UROBILINOGEN UR STRIP-ACNC: NORMAL
WBC # BLD AUTO: 5.55 X10(3)/MCL (ref 4.5–11.5)
WBC #/AREA URNS AUTO: >100 /HPF
WBC CLUMPS UR QL AUTO: ABNORMAL
YEAST BUDDING URNS QL: ABNORMAL /HPF

## 2025-05-29 PROCEDURE — 80053 COMPREHEN METABOLIC PANEL: CPT | Performed by: EMERGENCY MEDICINE

## 2025-05-29 PROCEDURE — 83690 ASSAY OF LIPASE: CPT | Performed by: EMERGENCY MEDICINE

## 2025-05-29 PROCEDURE — 81001 URINALYSIS AUTO W/SCOPE: CPT | Performed by: EMERGENCY MEDICINE

## 2025-05-29 PROCEDURE — 83880 ASSAY OF NATRIURETIC PEPTIDE: CPT | Performed by: EMERGENCY MEDICINE

## 2025-05-29 PROCEDURE — 85025 COMPLETE CBC W/AUTO DIFF WBC: CPT | Performed by: EMERGENCY MEDICINE

## 2025-05-29 PROCEDURE — 0241U COVID/RSV/FLU A&B PCR: CPT | Performed by: EMERGENCY MEDICINE

## 2025-05-29 PROCEDURE — 93005 ELECTROCARDIOGRAM TRACING: CPT

## 2025-05-29 PROCEDURE — 87077 CULTURE AEROBIC IDENTIFY: CPT | Performed by: EMERGENCY MEDICINE

## 2025-05-29 PROCEDURE — 84484 ASSAY OF TROPONIN QUANT: CPT | Performed by: EMERGENCY MEDICINE

## 2025-05-29 PROCEDURE — 99285 EMERGENCY DEPT VISIT HI MDM: CPT | Mod: 25

## 2025-05-29 NOTE — ED PROVIDER NOTES
Encounter Date: 5/29/2025       History     Chief Complaint   Patient presents with    Shortness of Breath     PT IN /[facility] W REPORT OF SOB WHEN THE ELECTRICITY WENT OUT AT HOME AND UNABLE TO UTILIZE HOME O2. PT HX OF CA AND CHF ON DOBUTAMINE DRIP THRU PICC LINE. HEMATURIA NOTED IN ROBLES BAG. PT LETHARGIC BUT RESPONDS TO VOICE.  DENIES PAIN.  EKG OBTAINED.  REPORT OF PT TO BE DC W HOSPICE CARE.       Patient presenting to the emergency department by EMS who states that patient lost power, she has CHF in his on a dobutamine drip and home oxygen.  Patient is a very poor historian, and no family members are available to offer any further history.  Nurse will follow up with family for update to history of present illness.    The history is provided by the patient and the EMS personnel.     Review of patient's allergies indicates:   Allergen Reactions    Sulfa (sulfonamide antibiotics)      Patient unsure if allergic to Sulfa     Past Medical History:   Diagnosis Date    Cancer     breast CA s/p chemo and L masectomy     Cardiomyopathy     CHF (congestive heart failure)     History of breast cancer     History of DVT (deep vein thrombosis)     HLD (hyperlipidemia)     Hypertension     Lymphedema     Osteoarthritis     Venous insufficiency      Past Surgical History:   Procedure Laterality Date    HYSTERECTOMY      INSERTION OF PACEMAKER      INTRAMEDULLARY RODDING OF FEMUR Left 10/14/2022    Procedure: INSERTION, INTRAMEDULLARY СВЕТЛАНА, FEMUR;  Surgeon: Ankush Alex MD;  Location: [facility];  Service: Orthopedics;  Laterality: Left;    JOINT REPLACEMENT Bilateral     Knee    MASTECTOMY Left 2019     Family History   Family history unknown: Yes     Social History[1]  Review of Systems   Respiratory:  Positive for shortness of breath.    All other systems reviewed and are negative.      Physical Exam     Initial Vitals [05/29/25 1642]   BP Pulse Resp Temp SpO2   98/62 108 20 97.5 °F (36.4 °C) 100 %      MAP       --          Physical Exam    Vitals reviewed.  Cardiovascular:  Normal rate and regular rhythm.           Pulmonary/Chest:   Symmetric breath sounds, with bilateral rales and crackles   Abdominal: Abdomen is soft. Bowel sounds are normal.     Neurological: She is alert.   Skin: Skin is warm and dry.         ED Course   Procedures  Labs Reviewed   B-TYPE NATRIURETIC PEPTIDE - Abnormal       Result Value    Natriuretic Peptide 4,975.7 (*)    COMPREHENSIVE METABOLIC PANEL - Abnormal    Sodium 134 (*)     Potassium 4.2      Chloride 99      CO2 21 (*)     Glucose 93      Blood Urea Nitrogen 47.5 (*)     Creatinine 2.37 (*)     Calcium 8.8      Protein Total 6.6      Albumin 2.4 (*)     Globulin 4.2 (*)     Albumin/Globulin Ratio 0.6 (*)     Bilirubin Total 3.6 (*)           ALT 27      AST 27      eGFR 21      Anion Gap 14.0      BUN/Creatinine Ratio 20     TROPONIN I - Abnormal    Troponin-I 0.079 (*)    CBC WITH DIFFERENTIAL - Abnormal    WBC 5.55      RBC 4.23      Hgb 14.0      Hct 41.5      MCV 98.1 (*)     MCH 33.1 (*)     MCHC 33.7      RDW 23.1 (*)     Platelet 153      MPV 11.9 (*)     Neut % 70.9      Lymph % 22.0      Mono % 2.9      Eos % 2.0      Basophil % 0.9      Imm Grans % 1.3      Neut # 3.94      Lymph # 1.22      Mono # 0.16      Eos # 0.11      Baso # 0.05      Imm Gran # 0.07 (*)     NRBC% 0.9     BLOOD SMEAR MICROSCOPIC EXAM (OLG) - Abnormal    Anisocytosis Slight (*)     Platelets Normal     COVID/RSV/FLU A&B PCR - Normal    Influenza A PCR Not Detected      Influenza B PCR Not Detected      Respiratory Syncytial Virus PCR Not Detected      SARS-CoV-2 PCR Not Detected      Narrative:     The Xpert Xpress SARS-CoV-2/FLU/RSV plus is a rapid, multiplexed real-time PCR test intended for the simultaneous qualitative detection and differentiation of SARS-CoV-2, Influenza A, Influenza B, and respiratory syncytial virus (RSV) viral RNA in either nasopharyngeal swab or nasal swab specimens.         LIPASE  - Normal    Lipase Level 29     CBC W/ AUTO DIFFERENTIAL    Narrative:     The following orders were created for panel order CBC auto differential.  Procedure                               Abnormality         Status                     ---------                               -----------         ------                     CBC with Differential[[id number]]       Abnormal            Final result                 Please view results for these tests on the individual orders.   URINALYSIS, REFLEX TO URINE CULTURE   EXTRA TUBES    Narrative:     The following orders were created for panel order EXTRA TUBES.  Procedure                               Abnormality         Status                     ---------                               -----------         ------                     Light Blue Top Hold[[id number]]                             In process                 Gold Top Hold[[id number]]                                   In process                   Please view results for these tests on the individual orders.   LIGHT BLUE TOP HOLD   GOLD TOP HOLD     EKG Readings: (Independently Interpreted)   EKG May 29, 2025, time 4:45 p.m., rate 110, sinus tachycardia, with left axis deviation, inferior infarct age undetermined, anterior septal infarct age undetermined, no STEMI       Imaging Results              X-Ray Chest AP Portable (Final result)  Result time 05/29/25 17:27:44      Final result by Marcell Boyd MD (05/29/25 17:27:44)                   Impression:      Increased volume status with bilateral pulmonary edema pulmonary venous congestion and bilateral pleural effusions overall worse than the prior examination      Electronically signed by: Saran Boyd  Date:    05/29/2025  Time:    17:27               Narrative:    EXAMINATION:  XR CHEST AP PORTABLE    CLINICAL HISTORY:  shortness  of breath;    TECHNIQUE:  Single frontal view of the chest was performed.    COMPARISON:  05/15/2025    FINDINGS:  There is  some interstitial edema seen bilaterally.  There are bilateral pleural effusions.  The heart is enlarged in size.  The aorta is ectatic.  There is a port in the right anterior chest wall with the tip in the SVC.  There is a right-sided PICC line seen with the tip in the SVC.  Bones and joints show no acute abnormality.                                       Medications - No data to display  Medical Decision Making  Amount and/or Complexity of Data Reviewed  Labs: ordered.  Radiology: ordered.    Risk  Decision regarding hospitalization.                   Discussed with hospital Medicine Dr. Gomez, resident will evaluate patient in the emergency room for admission                   Clinical Impression:  Final diagnoses:  [R06.02] SOB (shortness of breath)  [I50.9] Congestive heart failure, unspecified HF chronicity, unspecified heart failure type (Primary)  [Z99.81] Oxygen dependent  [N18.9] Chronic kidney disease, unspecified CKD stage  [N17.9] Acute renal failure, unspecified acute renal failure type  [Z51.5] Hospice care          ED Disposition Condition    Admit                       [1]   Social History  Tobacco Use    Smoking status: Never    Smokeless tobacco: Never   Substance Use Topics    Alcohol use: Not Currently    Drug use: Never        Landen Gilbert MD  05/29/25 0117

## 2025-05-30 LAB
OHS QRS DURATION: 110 MS
OHS QTC CALCULATION: 479 MS

## 2025-05-30 NOTE — CONSULTS
[facility] - Emergency Dept  Hospital Medicine  Consult Note    Patient Name: Laura Jacobs  MRN: [id number]  Admission Date: 5/29/2025  Hospital Length of Stay: 0 days  Attending Physician: No att. providers found   Primary Care Provider: Ruben Brooks Sr., MD        Patient information was obtained from ER records and Family, Hospice.     Inpatient consult to Internal Medicine  Consult performed by: Keren Nayak MD  Consult ordered by: Landen Gilbert MD  Reason for consult: GIP  Assessment/Recommendations: Power restored, recommended containing comfort care at home. No indication for GIP at this time.         Subjective:     Principal Problem: <principal problem not specified>    Chief Complaint:   Chief Complaint   Patient presents with    Shortness of Breath     PT IN /[facility] W REPORT OF SOB WHEN THE ELECTRICITY WENT OUT AT HOME AND UNABLE TO UTILIZE HOME O2. PT HX OF CA AND CHF ON DOBUTAMINE DRIP THRU PICC LINE. HEMATURIA NOTED IN ROBLES BAG. PT LETHARGIC BUT RESPONDS TO VOICE.  DENIES PAIN.  EKG OBTAINED.  REPORT OF PT TO BE DC W HOSPICE CARE.          HPI: Patient in [facility] on dobutamine drip and home oxygen arrives to ED for inability to continue comfort measure at home. Family reports power was out at home and unsure of how to keep meds going, they presented to ER. Discussed with multiple family members and reported breaker was tripped resulting in temporary loss of power. Electricity has since been restored and multiple family members are in agreement with taking patient home at this time. Case discussed with [facility] and GIP Attending.     No new subjective & objective note has been filed under this hospital service since the last note was generated.    Assessment/Plan:     Return home with continued comfort care. Care discussed with multiple family members, Hospice, GIP attending and in agreement.     Thank you for your consult. [facility] to follow-up with patient.  Please contact us if you have any additional questions.    Keren Nayak MD  Department of Hospital Medicine   [facility] - Emergency Dept

## 2025-05-30 NOTE — HPI
Patient in [facility] on dobutamine drip and home oxygen arrives to ED for inability to continue comfort measure at home. Family reports power was out at home and unsure of how to keep meds going, they presented to ER. Discussed with multiple family members and reported breaker was tripped resulting in temporary loss of power. Electricity has since been restored and multiple family members are in agreement with taking patient home at this time. Case discussed with [facility] and GIP Attending.

## 2025-05-31 LAB — BACTERIA UR CULT: ABNORMAL

## 2025-06-16 DIAGNOSIS — C50.919 MALIGNANT NEOPLASM OF FEMALE BREAST, UNSPECIFIED ESTROGEN RECEPTOR STATUS, UNSPECIFIED LATERALITY, UNSPECIFIED SITE OF BREAST: ICD-10-CM

## 2025-06-16 DIAGNOSIS — Z79.811 LONG TERM CURRENT USE OF AROMATASE INHIBITOR: ICD-10-CM

## 2025-06-16 DIAGNOSIS — C50.911 MALIGNANT NEOPLASM OF RIGHT FEMALE BREAST, UNSPECIFIED ESTROGEN RECEPTOR STATUS, UNSPECIFIED SITE OF BREAST: ICD-10-CM

## 2025-06-16 RX ORDER — ANASTROZOLE 1 MG/1
1 TABLET ORAL DAILY
Qty: 30 TABLET | Refills: 6 | Status: SHIPPED | OUTPATIENT
Start: 2025-06-16

## 2025-08-29 ENCOUNTER — HOSPITAL ENCOUNTER (OUTPATIENT)
Dept: RADIOLOGY | Facility: HOSPITAL | Age: 75
Discharge: HOME | End: 2025-08-29
Attending: NURSE PRACTITIONER
Payer: COMMERCIAL

## 2025-08-29 ENCOUNTER — HOSPITAL ENCOUNTER (OUTPATIENT)
Dept: RADIOLOGY | Facility: HOSPITAL | Age: 75
Discharge: HOME | End: 2025-08-29
Attending: FAMILY MEDICINE
Payer: COMMERCIAL

## (undated) DEVICE — Device

## (undated) DEVICE — ADHESIVE DERMABOND ADVANCED

## (undated) DEVICE — APPLICATOR CHLORAPREP ORN 26ML

## (undated) DEVICE — GLOVE PROTEXIS PI CRM 7

## (undated) DEVICE — BLADE SURG CARBON STEEL #10

## (undated) DEVICE — K-WIRE GAMMA 3.2MM DIAX450MM L
Type: IMPLANTABLE DEVICE | Site: FEMUR | Status: NON-FUNCTIONAL
Removed: 2022-10-14

## (undated) DEVICE — COVER FULLGUARD SHOE HIGH-TOP

## (undated) DEVICE — KIT OTRHOPEDIC FRACTURE

## (undated) DEVICE — GLOVE PROTEXIS BLUE LATEX 7.5

## (undated) DEVICE — COVER TABLE HVY DTY 60X90IN

## (undated) DEVICE — DRAPE C-ARMOR EQUIPMENT COVER

## (undated) DEVICE — ELECTRODE REM POLYHESIVE II

## (undated) DEVICE — CLOSURE SKIN STERI STRIP 1/2X4

## (undated) DEVICE — DRESSING ADH ISLAND 3.6 X 14

## (undated) DEVICE — DRESSING TELFA + BARR 4X6IN

## (undated) DEVICE — BIT DRILL BN GAMMA 3 4.2MM DIA

## (undated) DEVICE — DRAPE STERI U-SHAPED 47X51IN

## (undated) DEVICE — SUT VICRYL BR 1 GEN 27 CT-1

## (undated) DEVICE — TAPE SILK 3IN